# Patient Record
Sex: FEMALE | Race: WHITE | Employment: OTHER | URBAN - METROPOLITAN AREA
[De-identification: names, ages, dates, MRNs, and addresses within clinical notes are randomized per-mention and may not be internally consistent; named-entity substitution may affect disease eponyms.]

---

## 2017-03-29 ENCOUNTER — HOSPITAL ENCOUNTER (OUTPATIENT)
Dept: OTHER | Age: 77
Discharge: HOME | End: 2017-03-29
Attending: INTERNAL MEDICINE | Admitting: INTERNAL MEDICINE

## 2017-03-29 LAB
ALBUMIN SERPL-MCNC: 3.8 GM/DL (ref 3.4–5)
ALP BLD-CCNC: 62 IU/L (ref 40–129)
ALT SERPL-CCNC: 9 U/L (ref 10–40)
ANION GAP SERPL CALCULATED.3IONS-SCNC: 12 MMOL/L (ref 4–16)
AST SERPL-CCNC: 16 IU/L (ref 15–37)
BASOPHILS ABSOLUTE: 0.1 K/CU MM
BASOPHILS RELATIVE PERCENT: 2 % (ref 0–1)
BILIRUB SERPL-MCNC: 0.4 MG/DL (ref 0–1)
BUN BLDV-MCNC: 8 MG/DL (ref 6–23)
CALCIUM SERPL-MCNC: 8.7 MG/DL (ref 8.3–10.6)
CHLORIDE BLD-SCNC: 98 MMOL/L (ref 99–110)
CO2: 27 MMOL/L (ref 21–32)
CREAT SERPL-MCNC: 0.9 MG/DL (ref 0.6–1.1)
DIFFERENTIAL TYPE: ABNORMAL
EOSINOPHILS ABSOLUTE: 0.6 K/CU MM
EOSINOPHILS RELATIVE PERCENT: 9 % (ref 0–3)
GFR AFRICAN AMERICAN: >60 ML/MIN/1.73M2
GFR NON-AFRICAN AMERICAN: >60 ML/MIN/1.73M2
GLUCOSE BLD-MCNC: 150 MG/DL (ref 70–140)
HCT VFR BLD CALC: 35.9 % (ref 37–47)
HEMOGLOBIN: 11.2 GM/DL (ref 12.5–16)
LACTATE DEHYDROGENASE: 217 IU/L (ref 120–246)
LYMPHOCYTES ABSOLUTE: 1.3 K/CU MM
LYMPHOCYTES RELATIVE PERCENT: 20 % (ref 24–44)
MCH RBC QN AUTO: 28.1 PG (ref 27–31)
MCHC RBC AUTO-ENTMCNC: 31.2 % (ref 32–36)
MCV RBC AUTO: 90.2 FL (ref 78–100)
MONOCYTES ABSOLUTE: 0.6 K/CU MM
MONOCYTES RELATIVE PERCENT: 9 % (ref 0–4)
PDW BLD-RTO: 14.8 % (ref 11.7–14.9)
PLATELET # BLD: 52 K/CU MM (ref 140–440)
PLT MORPHOLOGY: ABNORMAL
PMV BLD AUTO: 11.5 FL (ref 7.5–11.1)
POLYCHROMASIA: ABNORMAL
POTASSIUM SERPL-SCNC: 4.1 MMOL/L (ref 3.5–5.1)
RBC # BLD: 3.98 M/CU MM (ref 4.2–5.4)
SEGMENTED NEUTROPHILS ABSOLUTE COUNT: 4.1 K/CU MM
SEGMENTED NEUTROPHILS RELATIVE PERCENT: 60 % (ref 36–66)
SODIUM BLD-SCNC: 137 MMOL/L (ref 135–145)
TOTAL PROTEIN: 6.5 GM/DL (ref 6.4–8.2)
WBC # BLD: 6.7 K/CU MM (ref 4–10.5)

## 2017-05-03 ENCOUNTER — HOSPITAL ENCOUNTER (OUTPATIENT)
Dept: GENERAL RADIOLOGY | Age: 77
Discharge: HOME | End: 2017-05-03

## 2017-05-03 LAB
BASOPHILS ABSOLUTE: 0.1 K/CU MM
BASOPHILS RELATIVE PERCENT: 1.6 % (ref 0–1)
DIFFERENTIAL TYPE: ABNORMAL
EOSINOPHILS ABSOLUTE: 0.5 K/CU MM
EOSINOPHILS RELATIVE PERCENT: 6.7 % (ref 0–3)
HCT VFR BLD CALC: 37 % (ref 37–47)
HEMOGLOBIN: 11.2 GM/DL (ref 12.5–16)
IMMATURE NEUTROPHIL %: 0.4 % (ref 0–0.43)
LYMPHOCYTES ABSOLUTE: 1.4 K/CU MM
LYMPHOCYTES RELATIVE PERCENT: 20.1 % (ref 24–44)
MCH RBC QN AUTO: 29.2 PG (ref 27–31)
MCHC RBC AUTO-ENTMCNC: 30.3 % (ref 32–36)
MCV RBC AUTO: 96.4 FL (ref 78–100)
MONOCYTES ABSOLUTE: 0.7 K/CU MM
MONOCYTES RELATIVE PERCENT: 10.4 % (ref 0–4)
NUCLEATED RBC %: 0 %
PDW BLD-RTO: 15.9 % (ref 11.7–14.9)
PLATELET # BLD: 55 K/CU MM (ref 140–440)
PMV BLD AUTO: 11.2 FL (ref 7.5–11.1)
RBC # BLD: 3.84 M/CU MM (ref 4.2–5.4)
SEGMENTED NEUTROPHILS ABSOLUTE COUNT: 4.1 K/CU MM
SEGMENTED NEUTROPHILS RELATIVE PERCENT: 60.8 % (ref 36–66)
TOTAL IMMATURE NEUTOROPHIL: 0.03 K/CU MM
TOTAL NUCLEATED RBC: 0 K/CU MM
WBC # BLD: 6.7 K/CU MM (ref 4–10.5)

## 2017-05-15 ENCOUNTER — HOSPITAL ENCOUNTER (OUTPATIENT)
Dept: GENERAL RADIOLOGY | Age: 77
Discharge: HOME | End: 2017-05-15
Attending: FAMILY MEDICINE | Admitting: FAMILY MEDICINE

## 2017-05-15 DIAGNOSIS — R10.0 ACUTE ABDOMINAL PAIN SYNDROME: ICD-10-CM

## 2017-06-02 ENCOUNTER — HOSPITAL ENCOUNTER (OUTPATIENT)
Dept: OTHER | Age: 77
Discharge: HOME | End: 2017-06-02
Attending: INTERNAL MEDICINE | Admitting: INTERNAL MEDICINE

## 2017-06-02 LAB
ALBUMIN SERPL-MCNC: 4.1 GM/DL (ref 3.4–5)
ALP BLD-CCNC: 67 IU/L (ref 40–129)
ALT SERPL-CCNC: 13 U/L (ref 10–40)
ANION GAP SERPL CALCULATED.3IONS-SCNC: 12 MMOL/L (ref 4–16)
AST SERPL-CCNC: 17 IU/L (ref 15–37)
BILIRUB SERPL-MCNC: 0.3 MG/DL (ref 0–1)
BUN BLDV-MCNC: 19 MG/DL (ref 6–23)
CALCIUM SERPL-MCNC: 9 MG/DL (ref 8.3–10.6)
CHLORIDE BLD-SCNC: 101 MMOL/L (ref 99–110)
CO2: 23 MMOL/L (ref 21–32)
CREAT SERPL-MCNC: 1.2 MG/DL (ref 0.6–1.1)
DIFFERENTIAL TYPE: ABNORMAL
EOSINOPHILS ABSOLUTE: 0.7 K/CU MM
EOSINOPHILS RELATIVE PERCENT: 10 % (ref 0–3)
GFR AFRICAN AMERICAN: 53 ML/MIN/1.73M2
GFR NON-AFRICAN AMERICAN: 44 ML/MIN/1.73M2
GLUCOSE BLD-MCNC: 155 MG/DL (ref 70–140)
HCT VFR BLD CALC: 38.3 % (ref 37–47)
HEMOGLOBIN: 12 GM/DL (ref 12.5–16)
LACTATE DEHYDROGENASE: 193 IU/L (ref 120–246)
LYMPHOCYTES ABSOLUTE: 1 K/CU MM
LYMPHOCYTES RELATIVE PERCENT: 14 % (ref 24–44)
MCH RBC QN AUTO: 29 PG (ref 27–31)
MCHC RBC AUTO-ENTMCNC: 31.3 % (ref 32–36)
MCV RBC AUTO: 92.5 FL (ref 78–100)
METAMYELOCYTES ABSOLUTE COUNT: 0.07 K/CU MM
METAMYELOCYTES PERCENT: 1 %
MONOCYTES ABSOLUTE: 0.4 K/CU MM
MONOCYTES RELATIVE PERCENT: 6 % (ref 0–4)
PDW BLD-RTO: 13.6 % (ref 11.7–14.9)
PLATELET # BLD: 97 K/CU MM (ref 140–440)
PMV BLD AUTO: 11.5 FL (ref 7.5–11.1)
POTASSIUM SERPL-SCNC: 4.7 MMOL/L (ref 3.5–5.1)
RBC # BLD: 4.14 M/CU MM (ref 4.2–5.4)
SEGMENTED NEUTROPHILS ABSOLUTE COUNT: 4.8 K/CU MM
SEGMENTED NEUTROPHILS RELATIVE PERCENT: 69 % (ref 36–66)
SODIUM BLD-SCNC: 136 MMOL/L (ref 135–145)
TOTAL PROTEIN: 6.7 GM/DL (ref 6.4–8.2)
WBC # BLD: 7 K/CU MM (ref 4–10.5)

## 2017-09-08 ENCOUNTER — HOSPITAL ENCOUNTER (OUTPATIENT)
Dept: OTHER | Age: 77
Discharge: HOME | End: 2017-09-08
Attending: INTERNAL MEDICINE | Admitting: INTERNAL MEDICINE

## 2017-09-08 LAB
ALBUMIN SERPL-MCNC: 4 GM/DL (ref 3.4–5)
ALP BLD-CCNC: 70 IU/L (ref 40–128)
ALT SERPL-CCNC: 12 U/L (ref 10–40)
ANION GAP SERPL CALCULATED.3IONS-SCNC: 11 MMOL/L (ref 4–16)
AST SERPL-CCNC: 17 IU/L (ref 15–37)
BILIRUB SERPL-MCNC: 0.6 MG/DL (ref 0–1)
BUN BLDV-MCNC: 8 MG/DL (ref 6–23)
CALCIUM SERPL-MCNC: 9 MG/DL (ref 8.3–10.6)
CHLORIDE BLD-SCNC: 98 MMOL/L (ref 99–110)
CO2: 29 MMOL/L (ref 21–32)
CREAT SERPL-MCNC: 1 MG/DL (ref 0.6–1.1)
GFR AFRICAN AMERICAN: >60 ML/MIN/1.73M2
GFR NON-AFRICAN AMERICAN: 54 ML/MIN/1.73M2
GLUCOSE BLD-MCNC: 170 MG/DL (ref 70–140)
LACTATE DEHYDROGENASE: 230 IU/L (ref 120–246)
POTASSIUM SERPL-SCNC: 3.7 MMOL/L (ref 3.5–5.1)
SODIUM BLD-SCNC: 138 MMOL/L (ref 135–145)
TOTAL PROTEIN: 6.6 GM/DL (ref 6.4–8.2)

## 2017-09-11 LAB — CHROMOGRANIN A: 52

## 2017-10-16 ENCOUNTER — HOSPITAL ENCOUNTER (OUTPATIENT)
Dept: ULTRASOUND IMAGING | Age: 77
Discharge: HOME | End: 2017-10-16
Attending: UROLOGY | Admitting: UROLOGY

## 2017-10-16 DIAGNOSIS — C7A.8 NEUROENDOCRINE CARCINOMA (HCC): ICD-10-CM

## 2017-10-16 DIAGNOSIS — N17.9 ACUTE RENAL FAILURE, UNSPECIFIED ACUTE RENAL FAILURE TYPE (HCC): ICD-10-CM

## 2018-01-18 ENCOUNTER — HOSPITAL ENCOUNTER (OUTPATIENT)
Dept: OTHER | Age: 78
Discharge: HOME | End: 2018-01-18
Attending: NURSE PRACTITIONER | Admitting: NURSE PRACTITIONER

## 2018-01-18 LAB
ALBUMIN SERPL-MCNC: 4.3 GM/DL (ref 3.4–5)
ALP BLD-CCNC: 87 IU/L (ref 40–129)
ALT SERPL-CCNC: 11 U/L (ref 10–40)
ANION GAP SERPL CALCULATED.3IONS-SCNC: 13 MMOL/L (ref 4–16)
AST SERPL-CCNC: 14 IU/L (ref 15–37)
BILIRUB SERPL-MCNC: 0.5 MG/DL (ref 0–1)
BUN BLDV-MCNC: 13 MG/DL (ref 6–23)
CALCIUM SERPL-MCNC: 9.4 MG/DL (ref 8.3–10.6)
CHLORIDE BLD-SCNC: 99 MMOL/L (ref 99–110)
CO2: 27 MMOL/L (ref 21–32)
CREAT SERPL-MCNC: 1 MG/DL (ref 0.6–1.1)
GFR AFRICAN AMERICAN: >60 ML/MIN/1.73M2
GFR NON-AFRICAN AMERICAN: 54 ML/MIN/1.73M2
GLUCOSE BLD-MCNC: 256 MG/DL (ref 70–99)
LACTATE DEHYDROGENASE: 226 IU/L (ref 120–246)
POTASSIUM SERPL-SCNC: 4 MMOL/L (ref 3.5–5.1)
SODIUM BLD-SCNC: 139 MMOL/L (ref 135–145)
TOTAL PROTEIN: 6.8 GM/DL (ref 6.4–8.2)

## 2018-01-21 LAB — CHROMOGRANIN A: 44

## 2018-04-27 ENCOUNTER — HOSPITAL ENCOUNTER (OUTPATIENT)
Dept: OTHER | Age: 78
Discharge: HOME | End: 2018-04-27
Attending: INTERNAL MEDICINE | Admitting: INTERNAL MEDICINE

## 2018-04-27 LAB
ALBUMIN SERPL-MCNC: 4.3 GM/DL (ref 3.4–5)
ALP BLD-CCNC: 72 IU/L (ref 40–129)
ALT SERPL-CCNC: 9 U/L (ref 10–40)
ANION GAP SERPL CALCULATED.3IONS-SCNC: 12 MMOL/L (ref 4–16)
AST SERPL-CCNC: 15 IU/L (ref 15–37)
BILIRUB SERPL-MCNC: 0.8 MG/DL (ref 0–1)
BUN BLDV-MCNC: 12 MG/DL (ref 6–23)
CALCIUM SERPL-MCNC: 9 MG/DL (ref 8.3–10.6)
CHLORIDE BLD-SCNC: 97 MMOL/L (ref 99–110)
CO2: 29 MMOL/L (ref 21–32)
CREAT SERPL-MCNC: 1 MG/DL (ref 0.6–1.1)
GFR AFRICAN AMERICAN: >60 ML/MIN/1.73M2
GFR NON-AFRICAN AMERICAN: 54 ML/MIN/1.73M2
GLUCOSE BLD-MCNC: 186 MG/DL (ref 70–99)
POTASSIUM SERPL-SCNC: 4 MMOL/L (ref 3.5–5.1)
SODIUM BLD-SCNC: 138 MMOL/L (ref 135–145)
TOTAL PROTEIN: 6.4 GM/DL (ref 6.4–8.2)

## 2018-06-04 ENCOUNTER — HOSPITAL ENCOUNTER (OUTPATIENT)
Dept: OTHER | Age: 78
Discharge: HOME | End: 2018-06-04
Attending: NURSE PRACTITIONER | Admitting: NURSE PRACTITIONER

## 2018-06-04 LAB
ALBUMIN SERPL-MCNC: 4.5 GM/DL (ref 3.4–5)
ALP BLD-CCNC: 68 IU/L (ref 40–128)
ALT SERPL-CCNC: 9 U/L (ref 10–40)
ANION GAP SERPL CALCULATED.3IONS-SCNC: 13 MMOL/L (ref 4–16)
AST SERPL-CCNC: 16 IU/L (ref 15–37)
BILIRUB SERPL-MCNC: 0.4 MG/DL (ref 0–1)
BUN BLDV-MCNC: 19 MG/DL (ref 6–23)
CALCIUM SERPL-MCNC: 9.3 MG/DL (ref 8.3–10.6)
CHLORIDE BLD-SCNC: 100 MMOL/L (ref 99–110)
CO2: 28 MMOL/L (ref 21–32)
CREAT SERPL-MCNC: 1.1 MG/DL (ref 0.6–1.1)
GFR AFRICAN AMERICAN: 58 ML/MIN/1.73M2
GFR NON-AFRICAN AMERICAN: 48 ML/MIN/1.73M2
GLUCOSE BLD-MCNC: 287 MG/DL (ref 70–99)
POTASSIUM SERPL-SCNC: 4 MMOL/L (ref 3.5–5.1)
SODIUM BLD-SCNC: 141 MMOL/L (ref 135–145)
TOTAL PROTEIN: 6.7 GM/DL (ref 6.4–8.2)

## 2018-07-02 ENCOUNTER — HOSPITAL ENCOUNTER (OUTPATIENT)
Dept: OTHER | Age: 78
Discharge: HOME | End: 2018-07-02
Attending: NURSE PRACTITIONER | Admitting: NURSE PRACTITIONER

## 2018-07-02 LAB
ALBUMIN SERPL-MCNC: 4.3 GM/DL (ref 3.4–5)
ALP BLD-CCNC: 61 IU/L (ref 40–129)
ALT SERPL-CCNC: 10 U/L (ref 10–40)
ANION GAP SERPL CALCULATED.3IONS-SCNC: 15 MMOL/L (ref 4–16)
AST SERPL-CCNC: 17 IU/L (ref 15–37)
BILIRUB SERPL-MCNC: 0.6 MG/DL (ref 0–1)
BUN BLDV-MCNC: 18 MG/DL (ref 6–23)
CALCIUM SERPL-MCNC: 8.8 MG/DL (ref 8.3–10.6)
CHLORIDE BLD-SCNC: 100 MMOL/L (ref 99–110)
CO2: 25 MMOL/L (ref 21–32)
CREAT SERPL-MCNC: 1.1 MG/DL (ref 0.6–1.1)
GFR AFRICAN AMERICAN: 58 ML/MIN/1.73M2
GFR NON-AFRICAN AMERICAN: 48 ML/MIN/1.73M2
GLUCOSE BLD-MCNC: 180 MG/DL (ref 70–99)
POTASSIUM SERPL-SCNC: 3.8 MMOL/L (ref 3.5–5.1)
SODIUM BLD-SCNC: 140 MMOL/L (ref 135–145)
TOTAL PROTEIN: 6.5 GM/DL (ref 6.4–8.2)

## 2018-07-30 ENCOUNTER — HOSPITAL ENCOUNTER (OUTPATIENT)
Dept: OTHER | Age: 78
Discharge: HOME | End: 2018-07-30
Attending: NURSE PRACTITIONER | Admitting: NURSE PRACTITIONER

## 2018-07-30 LAB
ALBUMIN SERPL-MCNC: 4.3 GM/DL (ref 3.4–5)
ALP BLD-CCNC: 69 IU/L (ref 40–128)
ALT SERPL-CCNC: 10 U/L (ref 10–40)
ANION GAP SERPL CALCULATED.3IONS-SCNC: 16 MMOL/L (ref 4–16)
AST SERPL-CCNC: 18 IU/L (ref 15–37)
BILIRUB SERPL-MCNC: 0.6 MG/DL (ref 0–1)
BUN BLDV-MCNC: 12 MG/DL (ref 6–23)
CALCIUM SERPL-MCNC: 9.3 MG/DL (ref 8.3–10.6)
CHLORIDE BLD-SCNC: 98 MMOL/L (ref 99–110)
CO2: 26 MMOL/L (ref 21–32)
CREAT SERPL-MCNC: 1.2 MG/DL (ref 0.6–1.1)
GFR AFRICAN AMERICAN: 53 ML/MIN/1.73M2
GFR NON-AFRICAN AMERICAN: 44 ML/MIN/1.73M2
GLUCOSE BLD-MCNC: 208 MG/DL (ref 70–99)
POTASSIUM SERPL-SCNC: 4 MMOL/L (ref 3.5–5.1)
SODIUM BLD-SCNC: 140 MMOL/L (ref 135–145)
TOTAL PROTEIN: 6.8 GM/DL (ref 6.4–8.2)

## 2018-08-31 ENCOUNTER — HOSPITAL ENCOUNTER (OUTPATIENT)
Dept: OTHER | Age: 78
Discharge: HOME | End: 2018-08-31
Attending: NURSE PRACTITIONER | Admitting: NURSE PRACTITIONER

## 2018-08-31 LAB
ALBUMIN SERPL-MCNC: 4.2 GM/DL (ref 3.4–5)
ALP BLD-CCNC: 60 IU/L (ref 40–128)
ALT SERPL-CCNC: 9 U/L (ref 10–40)
ANION GAP SERPL CALCULATED.3IONS-SCNC: 16 MMOL/L (ref 4–16)
AST SERPL-CCNC: 17 IU/L (ref 15–37)
BILIRUB SERPL-MCNC: 0.4 MG/DL (ref 0–1)
BUN BLDV-MCNC: 22 MG/DL (ref 6–23)
CALCIUM SERPL-MCNC: 9.3 MG/DL (ref 8.3–10.6)
CHLORIDE BLD-SCNC: 98 MMOL/L (ref 99–110)
CO2: 27 MMOL/L (ref 21–32)
CREAT SERPL-MCNC: 1.2 MG/DL (ref 0.6–1.1)
GFR AFRICAN AMERICAN: 53 ML/MIN/1.73M2
GFR NON-AFRICAN AMERICAN: 44 ML/MIN/1.73M2
GLUCOSE BLD-MCNC: 147 MG/DL (ref 70–99)
POTASSIUM SERPL-SCNC: 4.3 MMOL/L (ref 3.5–5.1)
SODIUM BLD-SCNC: 141 MMOL/L (ref 135–145)
TOTAL PROTEIN: 6.3 GM/DL (ref 6.4–8.2)

## 2018-11-06 ENCOUNTER — APPOINTMENT (OUTPATIENT)
Dept: CT IMAGING | Age: 78
End: 2018-11-06
Payer: MEDICARE

## 2018-11-06 ENCOUNTER — HOSPITAL ENCOUNTER (EMERGENCY)
Age: 78
Discharge: HOME | End: 2018-11-06
Attending: EMERGENCY MEDICINE
Payer: MEDICARE

## 2018-11-06 VITALS
SYSTOLIC BLOOD PRESSURE: 199 MMHG | DIASTOLIC BLOOD PRESSURE: 63 MMHG | OXYGEN SATURATION: 99 % | WEIGHT: 161 LBS | TEMPERATURE: 97.7 F | HEART RATE: 72 BPM | RESPIRATION RATE: 16 BRPM | HEIGHT: 62 IN | BODY MASS INDEX: 29.63 KG/M2

## 2018-11-06 DIAGNOSIS — S22.31XA CLOSED FRACTURE OF ONE RIB OF RIGHT SIDE, INITIAL ENCOUNTER: Primary | ICD-10-CM

## 2018-11-06 DIAGNOSIS — K63.89 MESENTERIC MASS: ICD-10-CM

## 2018-11-06 LAB
ALBUMIN SERPL-MCNC: 3.5 GM/DL (ref 3.4–5)
ALP BLD-CCNC: 52 IU/L (ref 40–129)
ALT SERPL-CCNC: 9 U/L (ref 10–40)
ANION GAP SERPL CALCULATED.3IONS-SCNC: 12 MMOL/L (ref 4–16)
AST SERPL-CCNC: 17 IU/L (ref 15–37)
BASOPHILS ABSOLUTE: 0.1 K/CU MM
BASOPHILS RELATIVE PERCENT: 0.9 % (ref 0–1)
BILIRUB SERPL-MCNC: 0.4 MG/DL (ref 0–1)
BUN BLDV-MCNC: 34 MG/DL (ref 6–23)
CALCIUM SERPL-MCNC: 8.6 MG/DL (ref 8.3–10.6)
CHLORIDE BLD-SCNC: 99 MMOL/L (ref 99–110)
CO2: 25 MMOL/L (ref 21–32)
CREAT SERPL-MCNC: 1.3 MG/DL (ref 0.6–1.1)
DIFFERENTIAL TYPE: ABNORMAL
EOSINOPHILS ABSOLUTE: 0.3 K/CU MM
EOSINOPHILS RELATIVE PERCENT: 3.5 % (ref 0–3)
GFR AFRICAN AMERICAN: 48 ML/MIN/1.73M2
GFR NON-AFRICAN AMERICAN: 40 ML/MIN/1.73M2
GLUCOSE BLD-MCNC: 221 MG/DL (ref 70–99)
HCT VFR BLD CALC: 33.3 % (ref 37–47)
HEMOGLOBIN: 10.2 GM/DL (ref 12.5–16)
IMMATURE NEUTROPHIL %: 0.4 % (ref 0–0.43)
LYMPHOCYTES ABSOLUTE: 1.3 K/CU MM
LYMPHOCYTES RELATIVE PERCENT: 15.9 % (ref 24–44)
MCH RBC QN AUTO: 28.7 PG (ref 27–31)
MCHC RBC AUTO-ENTMCNC: 30.6 % (ref 32–36)
MCV RBC AUTO: 93.5 FL (ref 78–100)
MONOCYTES ABSOLUTE: 0.8 K/CU MM
MONOCYTES RELATIVE PERCENT: 9.1 % (ref 0–4)
NUCLEATED RBC %: 0 %
PDW BLD-RTO: 14.1 % (ref 11.7–14.9)
PLATELET # BLD: 58 K/CU MM (ref 140–440)
PMV BLD AUTO: 11.4 FL (ref 7.5–11.1)
POTASSIUM SERPL-SCNC: 3.7 MMOL/L (ref 3.5–5.1)
RBC # BLD: 3.56 M/CU MM (ref 4.2–5.4)
SEGMENTED NEUTROPHILS ABSOLUTE COUNT: 5.8 K/CU MM
SEGMENTED NEUTROPHILS RELATIVE PERCENT: 70.2 % (ref 36–66)
SODIUM BLD-SCNC: 136 MMOL/L (ref 135–145)
TOTAL IMMATURE NEUTOROPHIL: 0.03 K/CU MM
TOTAL NUCLEATED RBC: 0 K/CU MM
TOTAL PROTEIN: 6.2 GM/DL (ref 6.4–8.2)
WBC # BLD: 8.2 K/CU MM (ref 4–10.5)

## 2018-11-06 PROCEDURE — 74176 CT ABD & PELVIS W/O CONTRAST: CPT

## 2018-11-06 PROCEDURE — 36415 COLL VENOUS BLD VENIPUNCTURE: CPT

## 2018-11-06 PROCEDURE — 80053 COMPREHEN METABOLIC PANEL: CPT

## 2018-11-06 PROCEDURE — 71250 CT THORAX DX C-: CPT

## 2018-11-06 PROCEDURE — 85025 COMPLETE CBC W/AUTO DIFF WBC: CPT

## 2018-11-06 PROCEDURE — 99284 EMERGENCY DEPT VISIT MOD MDM: CPT

## 2018-11-06 RX ORDER — HYDROCODONE BITARTRATE AND ACETAMINOPHEN 5; 325 MG/1; MG/1
1 TABLET ORAL EVERY 6 HOURS PRN
Qty: 10 TABLET | Refills: 0 | Status: SHIPPED | OUTPATIENT
Start: 2018-11-06 | End: 2018-11-09

## 2018-11-06 ASSESSMENT — PAIN DESCRIPTION - LOCATION: LOCATION: BACK

## 2018-11-06 ASSESSMENT — PAIN DESCRIPTION - PAIN TYPE: TYPE: ACUTE PAIN

## 2018-11-06 ASSESSMENT — PAIN SCALES - GENERAL: PAINLEVEL_OUTOF10: 6

## 2018-11-06 NOTE — ED PROVIDER NOTES
eMERGENCY dEPARTMENT eNCOUnter        Susie Fearing    Chief Complaint   Patient presents with    Fall     3-4 DAYS AGO    Back Pain     from fall    Abdominal Pain       HPI    Madison Fregoso is a 69 y.o. female who presents following a fall. She states that 3 or 4 days ago she had a dream which caused her to roll out of bed. She states she fell on her right side on the floor and it awoke her from sleep at that time. She states that since then she's been having pain in the right side of her mid back as well as right ribs and now in the right side of her abdomen. She reports it worsens with movement or palpation. She denies any midline back or neck pain. She denies any radiation of pain into the extremities. No numbness tingling or weakness of extremities. She denies any anticoagulant use. She denies shortness of breath or cough. Denies fever or chills. Denies nausea or vomiting. REVIEW OF SYSTEMS    Constitutional:  Denies fever, chills  Neurologic:   Denies confusion or memory loss, weakness or numbness. Neck: Denies neck pain or injury  Eyes:  Denies vision change, loss of vision. 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      Diabetes mellitus ([address]     H/O cardiovascular stress test 5/02    EF 74 %,normal perfusion    H/O echocardiogram 11/02    EF 60%, mild to mod MR,    involve the lumbar spine. 1. Acute right 11th rib fracture. 2. Trace right hemothorax. 3. 3.2 cm enlarging soft tissue mass within the right upper quadrant mesentery. The differential diagnosis favors carcinoid tumor rather than adenopathy, desmoid tumor or sclerosing mesenteritis. Ct Chest Wo Contrast    Result Date: 11/6/2018  EXAMINATION: CT OF THE CHEST WITHOUT CONTRAST; CT OF THE ABDOMEN AND PELVIS WITHOUT CONTRAST 11/6/2018 2:12 pm; 11/6/2018 2:13 pm TECHNIQUE: CT of the chest was performed without the administration of intravenous contrast. Multiplanar reformatted images are provided for review. Dose modulation, iterative reconstruction, and/or weight based adjustment of the mA/kV was utilized to reduce the radiation dose to as low as reasonably achievable.; CT of the abdomen and pelvis was performed without the administration of intravenous contrast. Multiplanar reformatted images are provided for review. Dose modulation, iterative reconstruction, and/or weight based adjustment of the mA/kV was utilized to reduce the radiation dose to as low as reasonably achievable. COMPARISON: 08/16/2015 and 08/11/2015 HISTORY: ORDERING SYSTEM PROVIDED HISTORY: right rib pain and abdominal pain, fell out of bed TECHNOLOGIST PROVIDED HISTORY: Ordering Physician Provided Reason for Exam: right rib pain and abdominal pain, fell out of bed Acuity: Unknown Type of Exam: Unknown Additional signs and symptoms: right rib pain and abdominal pain, fell out of bed Relevant Medical/Surgical History: none ; ORDERING SYSTEM PROVIDED HISTORY: right rib and abdominal pain, fell out of bed TECHNOLOGIST PROVIDED HISTORY: IV contrast only. Thank you.  Additional Contrast?->None Ordering Physician Provided Reason for Exam: right rib pain and abdominal pain, fell out of bed Acuity: Acute Type of Exam: Initial Mechanism of Injury: right rib pain and abdominal pain, fell out of bed Relevant Medical/Surgical History: none FINDINGS: Chest: results of any tests/labs/imaging, the treatment plan, and time was allotted to answer questions. This is a 50-year-old female who presented with right-sided back, abdominal pain after a fall 3 or 4 days ago. She did have a bruise on the right lateral rib cage. No midline tenderness on exam.  She is neurologically intact. CT scan of the abdomen, chest were done, showed 11th right rib fracture, trace right hemothorax. I do feel this is likely stable given that this occurred 3 or 4 days ago. She's not requiring any oxygen, is not hypoxic, is well-appearing and nontoxic, no respiratory distress. CT scan abdomen also showed a enlarging soft tissue mass in the right upper quadrant mesentery. Discussed with the patient, she is aware of this, takes medication for it, follows with [facility]. We discussed that she should get a copy of the CT scan, take a dose to because it does appear slightly larger than her previous CT scan. Plan will be symptomatically care for rib fracture, close follow-up with primary care physician as well as her surgeons at [facility]. She is instructed to return here with any new or worsening signs or symptoms. Patient and her daughter reports understanding of the discharge instructions and return precautions. Clinical  IMPRESSION    Rib fracture      I discussed Return to emergency Department precautions with patient which include worsening pain or swelling, vision changes, focal neurological symptoms (discussed), or any new symptoms.       (Please note the MDM and HPI sections of this note were completed with a voice recognition program.  Efforts were made to edit the dictations but occasionally words are mis-transcribed.)      Geni Kenney,   11/06/18 [id number]

## 2018-11-26 ENCOUNTER — HOSPITAL ENCOUNTER (OUTPATIENT)
Age: 78
Setting detail: SPECIMEN
Discharge: HOME | End: 2018-11-26
Payer: MEDICARE

## 2018-11-26 LAB
ALBUMIN SERPL-MCNC: 4.2 GM/DL (ref 3.4–5)
ALP BLD-CCNC: 77 IU/L (ref 40–129)
ALT SERPL-CCNC: 15 U/L (ref 10–40)
ANION GAP SERPL CALCULATED.3IONS-SCNC: 12 MMOL/L (ref 4–16)
AST SERPL-CCNC: 18 IU/L (ref 15–37)
BILIRUB SERPL-MCNC: 0.5 MG/DL (ref 0–1)
BUN BLDV-MCNC: 18 MG/DL (ref 6–23)
CALCIUM SERPL-MCNC: 8.9 MG/DL (ref 8.3–10.6)
CHLORIDE BLD-SCNC: 99 MMOL/L (ref 99–110)
CO2: 28 MMOL/L (ref 21–32)
CREAT SERPL-MCNC: 1.1 MG/DL (ref 0.6–1.1)
GFR AFRICAN AMERICAN: 58 ML/MIN/1.73M2
GFR NON-AFRICAN AMERICAN: 48 ML/MIN/1.73M2
GLUCOSE BLD-MCNC: 208 MG/DL (ref 70–99)
POTASSIUM SERPL-SCNC: 3.8 MMOL/L (ref 3.5–5.1)
SODIUM BLD-SCNC: 139 MMOL/L (ref 135–145)
TOTAL PROTEIN: 6.5 GM/DL (ref 6.4–8.2)

## 2018-11-26 PROCEDURE — 80053 COMPREHEN METABOLIC PANEL: CPT

## 2018-12-23 ENCOUNTER — APPOINTMENT (OUTPATIENT)
Dept: GENERAL RADIOLOGY | Age: 78
End: 2018-12-23
Payer: MEDICARE

## 2018-12-23 ENCOUNTER — APPOINTMENT (OUTPATIENT)
Dept: CT IMAGING | Age: 78
End: 2018-12-23
Payer: MEDICARE

## 2018-12-23 ENCOUNTER — HOSPITAL ENCOUNTER (EMERGENCY)
Age: 78
Discharge: HOME | End: 2018-12-23
Attending: EMERGENCY MEDICINE
Payer: MEDICARE

## 2018-12-23 VITALS
WEIGHT: 165 LBS | RESPIRATION RATE: 16 BRPM | OXYGEN SATURATION: 98 % | DIASTOLIC BLOOD PRESSURE: 74 MMHG | TEMPERATURE: 98 F | BODY MASS INDEX: 30.36 KG/M2 | HEART RATE: 75 BPM | SYSTOLIC BLOOD PRESSURE: 185 MMHG | HEIGHT: 62 IN

## 2018-12-23 DIAGNOSIS — M25.551 RIGHT HIP PAIN: Primary | ICD-10-CM

## 2018-12-23 DIAGNOSIS — T14.8XXA ABRASION: ICD-10-CM

## 2018-12-23 DIAGNOSIS — S60.222A CONTUSION OF LEFT HAND, INITIAL ENCOUNTER: ICD-10-CM

## 2018-12-23 PROCEDURE — 70450 CT HEAD/BRAIN W/O DYE: CPT

## 2018-12-23 PROCEDURE — 73501 X-RAY EXAM HIP UNI 1 VIEW: CPT

## 2018-12-23 PROCEDURE — 99284 EMERGENCY DEPT VISIT MOD MDM: CPT

## 2018-12-23 PROCEDURE — 73130 X-RAY EXAM OF HAND: CPT

## 2018-12-23 PROCEDURE — 73080 X-RAY EXAM OF ELBOW: CPT

## 2018-12-23 ASSESSMENT — PAIN DESCRIPTION - PROGRESSION: CLINICAL_PROGRESSION: NOT CHANGED

## 2018-12-23 ASSESSMENT — PAIN SCALES - GENERAL: PAINLEVEL_OUTOF10: 7

## 2018-12-23 ASSESSMENT — PAIN DESCRIPTION - PAIN TYPE: TYPE: ACUTE PAIN

## 2018-12-23 ASSESSMENT — PAIN DESCRIPTION - FREQUENCY: FREQUENCY: CONTINUOUS

## 2018-12-23 ASSESSMENT — PAIN DESCRIPTION - LOCATION: LOCATION: HEAD

## 2018-12-23 NOTE — ED NOTES
Bed: ED-17  Expected date:   Expected time:   Means of arrival:   Comments:  Medic 7     Katie Foreman RN  12/23/18 [id number]

## 2018-12-23 NOTE — ED PROVIDER NOTES
(MIN 3 VIEWS)   Preliminary Result   Osteopenia with no evidence for fracture involving the left hand, right elbow   or right hip. XR HIP 1 VW W PELVIS RIGHT   Preliminary Result   Osteopenia with no evidence for fracture involving the left hand, right elbow   or right hip. CT Head WO Contrast   Final Result   No acute intracranial abnormality. EKG (if obtained): (All EKG's are interpreted by myself in the absence of a cardiologist)    Chart review shows recent radiographs:  No results found. MDM:  Presenting after mechanical fall, imaging ordered based on areas of patient's concern, imaging read by radiology as no evidence of fracture, patient was ambulated here and did so without difficulty at this point we will discharge and treat symptomatically, have her follow-up as an outpatient she understands and agrees, return warnings given. Clinical Impression:  1. Right hip pain    2. Contusion of left hand, initial encounter    3. Abrasion      Disposition referral (if applicable):  Son Lundberg MD  [address]  [address] [place] [id number] [phone]    In 2 days      Disposition medications (if applicable):  New Prescriptions    No medications on file       Comment: Please note this report has been produced using speech recognition software and may contain errors related to that system including errors in grammar, punctuation, and spelling, as well as words and phrases that may be inappropriate. If there are any questions or concerns please feel free to contact the dictating provider for clarification.         Amy Guajardo MD  12/23/18 559 [facility] Christa Bryant MD  12/23/18 5035

## 2019-03-28 ENCOUNTER — HOSPITAL ENCOUNTER (INPATIENT)
Age: 79
LOS: 1 days | Discharge: HOME | DRG: 683 | End: 2019-03-30
Attending: EMERGENCY MEDICINE | Admitting: HOSPITALIST
Payer: MEDICARE

## 2019-03-28 ENCOUNTER — APPOINTMENT (OUTPATIENT)
Dept: CT IMAGING | Age: 79
DRG: 683 | End: 2019-03-28
Payer: MEDICARE

## 2019-03-28 DIAGNOSIS — N39.0 URINARY TRACT INFECTION WITHOUT HEMATURIA, SITE UNSPECIFIED: ICD-10-CM

## 2019-03-28 DIAGNOSIS — R10.84 GENERALIZED ABDOMINAL PAIN: ICD-10-CM

## 2019-03-28 DIAGNOSIS — N17.9 AKI (ACUTE KIDNEY INJURY) (HCC): ICD-10-CM

## 2019-03-28 DIAGNOSIS — R11.2 NON-INTRACTABLE VOMITING WITH NAUSEA, UNSPECIFIED VOMITING TYPE: Primary | ICD-10-CM

## 2019-03-28 LAB
ALBUMIN SERPL-MCNC: 4 GM/DL (ref 3.4–5)
ALP BLD-CCNC: 86 IU/L (ref 40–129)
ALT SERPL-CCNC: 13 U/L (ref 10–40)
ANION GAP SERPL CALCULATED.3IONS-SCNC: 14 MMOL/L (ref 4–16)
AST SERPL-CCNC: 21 IU/L (ref 15–37)
BACTERIA: ABNORMAL /HPF
BASOPHILS ABSOLUTE: 0.1 K/CU MM
BASOPHILS RELATIVE PERCENT: 0.7 % (ref 0–1)
BILIRUB SERPL-MCNC: 0.6 MG/DL (ref 0–1)
BILIRUBIN URINE: NEGATIVE MG/DL
BLOOD, URINE: ABNORMAL
BUN BLDV-MCNC: 26 MG/DL (ref 6–23)
CALCIUM SERPL-MCNC: 8.8 MG/DL (ref 8.3–10.6)
CHLORIDE BLD-SCNC: 93 MMOL/L (ref 99–110)
CLARITY: CLEAR
CO2: 22 MMOL/L (ref 21–32)
COLOR: YELLOW
CREAT SERPL-MCNC: 1.6 MG/DL (ref 0.6–1.1)
DIFFERENTIAL TYPE: ABNORMAL
EOSINOPHILS ABSOLUTE: 0.2 K/CU MM
EOSINOPHILS RELATIVE PERCENT: 1.6 % (ref 0–3)
GFR AFRICAN AMERICAN: 38 ML/MIN/1.73M2
GFR NON-AFRICAN AMERICAN: 31 ML/MIN/1.73M2
GLUCOSE BLD-MCNC: 305 MG/DL (ref 70–99)
GLUCOSE, URINE: 150 MG/DL
HCT VFR BLD CALC: 40 % (ref 37–47)
HEMOGLOBIN: 12.6 GM/DL (ref 12.5–16)
IMMATURE NEUTROPHIL %: 0.4 % (ref 0–0.43)
KETONES, URINE: NEGATIVE MG/DL
LACTATE: ABNORMAL MMOL/L (ref 0.4–2)
LEUKOCYTE ESTERASE, URINE: ABNORMAL
LIPASE: 26 IU/L (ref 13–60)
LYMPHOCYTES ABSOLUTE: 1.3 K/CU MM
LYMPHOCYTES RELATIVE PERCENT: 13.4 % (ref 24–44)
MAGNESIUM: 1.7 MG/DL (ref 1.8–2.4)
MCH RBC QN AUTO: 27.6 PG (ref 27–31)
MCHC RBC AUTO-ENTMCNC: 31.5 % (ref 32–36)
MCV RBC AUTO: 87.5 FL (ref 78–100)
MONOCYTES ABSOLUTE: 0.9 K/CU MM
MONOCYTES RELATIVE PERCENT: 9.2 % (ref 0–4)
NITRITE URINE, QUANTITATIVE: POSITIVE
NUCLEATED RBC %: 0 %
PDW BLD-RTO: 13.2 % (ref 11.7–14.9)
PH, URINE: 6 (ref 5–8)
PLATELET # BLD: 70 K/CU MM (ref 140–440)
PMV BLD AUTO: 11.4 FL (ref 7.5–11.1)
POTASSIUM SERPL-SCNC: 3.4 MMOL/L (ref 3.5–5.1)
PROTEIN UA: NEGATIVE MG/DL
RBC # BLD: 4.57 M/CU MM (ref 4.2–5.4)
RBC URINE: 1 /HPF (ref 0–6)
RENAL EPITHELIAL, UA: <1 /HPF
SEGMENTED NEUTROPHILS ABSOLUTE COUNT: 7 K/CU MM
SEGMENTED NEUTROPHILS RELATIVE PERCENT: 74.7 % (ref 36–66)
SODIUM BLD-SCNC: 129 MMOL/L (ref 135–145)
SPECIFIC GRAVITY UA: 1.01 (ref 1–1.03)
SQUAMOUS EPITHELIAL: 1 /HPF
TOTAL IMMATURE NEUTOROPHIL: 0.04 K/CU MM
TOTAL NUCLEATED RBC: 0 K/CU MM
TOTAL PROTEIN: 7 GM/DL (ref 6.4–8.2)
TRICHOMONAS: ABNORMAL /HPF
TROPONIN T: <0.01 NG/ML
UROBILINOGEN, URINE: NORMAL MG/DL (ref 0.2–1)
WBC # BLD: 9.4 K/CU MM (ref 4–10.5)
WBC CLUMP: ABNORMAL /HPF
WBC UA: 6 /HPF (ref 0–5)

## 2019-03-28 PROCEDURE — 85025 COMPLETE CBC W/AUTO DIFF WBC: CPT

## 2019-03-28 PROCEDURE — 96375 TX/PRO/DX INJ NEW DRUG ADDON: CPT

## 2019-03-28 PROCEDURE — 2580000003 HC RX 258: Performed by: EMERGENCY MEDICINE

## 2019-03-28 PROCEDURE — 83605 ASSAY OF LACTIC ACID: CPT

## 2019-03-28 PROCEDURE — 6360000002 HC RX W HCPCS: Performed by: EMERGENCY MEDICINE

## 2019-03-28 PROCEDURE — 36415 COLL VENOUS BLD VENIPUNCTURE: CPT

## 2019-03-28 PROCEDURE — 93005 ELECTROCARDIOGRAM TRACING: CPT | Performed by: EMERGENCY MEDICINE

## 2019-03-28 PROCEDURE — 83690 ASSAY OF LIPASE: CPT

## 2019-03-28 PROCEDURE — 87186 SC STD MICRODIL/AGAR DIL: CPT

## 2019-03-28 PROCEDURE — 81001 URINALYSIS AUTO W/SCOPE: CPT

## 2019-03-28 PROCEDURE — 83735 ASSAY OF MAGNESIUM: CPT

## 2019-03-28 PROCEDURE — 99285 EMERGENCY DEPT VISIT HI MDM: CPT

## 2019-03-28 PROCEDURE — 84484 ASSAY OF TROPONIN QUANT: CPT

## 2019-03-28 PROCEDURE — 87077 CULTURE AEROBIC IDENTIFY: CPT

## 2019-03-28 PROCEDURE — 80053 COMPREHEN METABOLIC PANEL: CPT

## 2019-03-28 PROCEDURE — 74176 CT ABD & PELVIS W/O CONTRAST: CPT

## 2019-03-28 PROCEDURE — 6370000000 HC RX 637 (ALT 250 FOR IP): Performed by: EMERGENCY MEDICINE

## 2019-03-28 PROCEDURE — 87086 URINE CULTURE/COLONY COUNT: CPT

## 2019-03-28 RX ORDER — ONDANSETRON 2 MG/ML
4 INJECTION INTRAMUSCULAR; INTRAVENOUS EVERY 30 MIN PRN
Status: DISCONTINUED | OUTPATIENT
Start: 2019-03-28 | End: 2019-03-29

## 2019-03-28 RX ORDER — 0.9 % SODIUM CHLORIDE 0.9 %
1000 INTRAVENOUS SOLUTION INTRAVENOUS ONCE
Status: COMPLETED | OUTPATIENT
Start: 2019-03-28 | End: 2019-03-28

## 2019-03-28 RX ORDER — MORPHINE SULFATE 4 MG/ML
4 INJECTION, SOLUTION INTRAMUSCULAR; INTRAVENOUS EVERY 30 MIN PRN
Status: DISCONTINUED | OUTPATIENT
Start: 2019-03-28 | End: 2019-03-29

## 2019-03-28 RX ORDER — 0.9 % SODIUM CHLORIDE 0.9 %
1000 INTRAVENOUS SOLUTION INTRAVENOUS ONCE
Status: COMPLETED | OUTPATIENT
Start: 2019-03-28 | End: 2019-03-29

## 2019-03-28 RX ADMIN — ONDANSETRON 4 MG: 2 INJECTION INTRAMUSCULAR; INTRAVENOUS at 21:35

## 2019-03-28 RX ADMIN — HYOSCYAMINE SULFATE 125 MCG: 0.12 TABLET ORAL; SUBLINGUAL at 21:35

## 2019-03-28 RX ADMIN — SODIUM CHLORIDE 1000 ML: 9 INJECTION, SOLUTION INTRAVENOUS at 21:36

## 2019-03-28 RX ADMIN — SODIUM CHLORIDE 1000 ML: 9 INJECTION, SOLUTION INTRAVENOUS at 23:51

## 2019-03-28 RX ADMIN — MORPHINE SULFATE 4 MG: 4 INJECTION INTRAVENOUS at 21:35

## 2019-03-28 ASSESSMENT — ENCOUNTER SYMPTOMS
RHINORRHEA: 0
COUGH: 0
ABDOMINAL PAIN: 1
VOMITING: 1
BLOOD IN STOOL: 0
EYE REDNESS: 0
SHORTNESS OF BREATH: 0
CONSTIPATION: 0
NAUSEA: 1
DIARRHEA: 1
BACK PAIN: 0
SORE THROAT: 0

## 2019-03-28 ASSESSMENT — PAIN DESCRIPTION - LOCATION: LOCATION: ABDOMEN

## 2019-03-28 ASSESSMENT — PAIN SCALES - GENERAL
PAINLEVEL_OUTOF10: 10
PAINLEVEL_OUTOF10: 6

## 2019-03-28 ASSESSMENT — PAIN DESCRIPTION - PAIN TYPE: TYPE: ACUTE PAIN

## 2019-03-28 ASSESSMENT — PAIN DESCRIPTION - PROGRESSION: CLINICAL_PROGRESSION: RESOLVED

## 2019-03-29 PROBLEM — K52.9 GASTROENTERITIS: Status: ACTIVE | Noted: 2019-03-29

## 2019-03-29 PROBLEM — E86.0 DEHYDRATION: Status: ACTIVE | Noted: 2019-03-29

## 2019-03-29 LAB
GLUCOSE BLD-MCNC: 184 MG/DL (ref 70–99)
GLUCOSE BLD-MCNC: 195 MG/DL (ref 70–99)
GLUCOSE BLD-MCNC: 195 MG/DL (ref 70–99)
GLUCOSE BLD-MCNC: 288 MG/DL (ref 70–99)
GLUCOSE BLD-MCNC: 291 MG/DL (ref 70–99)
LACTATE: 1.6 MMOL/L (ref 0.4–2)

## 2019-03-29 PROCEDURE — 6370000000 HC RX 637 (ALT 250 FOR IP): Performed by: INTERNAL MEDICINE

## 2019-03-29 PROCEDURE — 2580000003 HC RX 258: Performed by: EMERGENCY MEDICINE

## 2019-03-29 PROCEDURE — 2580000003 HC RX 258: Performed by: INTERNAL MEDICINE

## 2019-03-29 PROCEDURE — 6360000002 HC RX W HCPCS: Performed by: INTERNAL MEDICINE

## 2019-03-29 PROCEDURE — 96375 TX/PRO/DX INJ NEW DRUG ADDON: CPT

## 2019-03-29 PROCEDURE — 82962 GLUCOSE BLOOD TEST: CPT

## 2019-03-29 PROCEDURE — 1200000000 HC SEMI PRIVATE

## 2019-03-29 PROCEDURE — G0378 HOSPITAL OBSERVATION PER HR: HCPCS

## 2019-03-29 PROCEDURE — 96368 THER/DIAG CONCURRENT INF: CPT

## 2019-03-29 PROCEDURE — 83605 ASSAY OF LACTIC ACID: CPT

## 2019-03-29 PROCEDURE — 6360000002 HC RX W HCPCS: Performed by: EMERGENCY MEDICINE

## 2019-03-29 PROCEDURE — 93010 ELECTROCARDIOGRAM REPORT: CPT | Performed by: INTERNAL MEDICINE

## 2019-03-29 PROCEDURE — 6370000000 HC RX 637 (ALT 250 FOR IP): Performed by: HOSPITALIST

## 2019-03-29 PROCEDURE — 96366 THER/PROPH/DIAG IV INF ADDON: CPT

## 2019-03-29 PROCEDURE — 36415 COLL VENOUS BLD VENIPUNCTURE: CPT

## 2019-03-29 PROCEDURE — 2580000003 HC RX 258: Performed by: HOSPITALIST

## 2019-03-29 PROCEDURE — 6360000002 HC RX W HCPCS: Performed by: HOSPITALIST

## 2019-03-29 PROCEDURE — 94761 N-INVAS EAR/PLS OXIMETRY MLT: CPT

## 2019-03-29 PROCEDURE — 6370000000 HC RX 637 (ALT 250 FOR IP): Performed by: EMERGENCY MEDICINE

## 2019-03-29 PROCEDURE — 96367 TX/PROPH/DG ADDL SEQ IV INF: CPT

## 2019-03-29 PROCEDURE — 96365 THER/PROPH/DIAG IV INF INIT: CPT

## 2019-03-29 RX ORDER — VENLAFAXINE HYDROCHLORIDE 150 MG/1
150 CAPSULE, EXTENDED RELEASE ORAL DAILY
Status: DISCONTINUED | OUTPATIENT
Start: 2019-03-29 | End: 2019-03-30 | Stop reason: HOSPADM

## 2019-03-29 RX ORDER — FENOFIBRATE 54 MG/1
54 TABLET ORAL DAILY
Status: DISCONTINUED | OUTPATIENT
Start: 2019-03-29 | End: 2019-03-30 | Stop reason: HOSPADM

## 2019-03-29 RX ORDER — MORPHINE SULFATE 4 MG/ML
2 INJECTION, SOLUTION INTRAMUSCULAR; INTRAVENOUS EVERY 4 HOURS PRN
Status: DISCONTINUED | OUTPATIENT
Start: 2019-03-29 | End: 2019-03-30 | Stop reason: HOSPADM

## 2019-03-29 RX ORDER — MEGESTROL ACETATE 20 MG/1
40 TABLET ORAL DAILY
Status: DISCONTINUED | OUTPATIENT
Start: 2019-03-29 | End: 2019-03-30 | Stop reason: HOSPADM

## 2019-03-29 RX ORDER — SODIUM CHLORIDE 0.9 % (FLUSH) 0.9 %
10 SYRINGE (ML) INJECTION PRN
Status: DISCONTINUED | OUTPATIENT
Start: 2019-03-29 | End: 2019-03-30 | Stop reason: HOSPADM

## 2019-03-29 RX ORDER — FAMOTIDINE 20 MG/1
20 TABLET, FILM COATED ORAL DAILY
Status: DISCONTINUED | OUTPATIENT
Start: 2019-03-29 | End: 2019-03-30 | Stop reason: HOSPADM

## 2019-03-29 RX ORDER — PREDNISONE 1 MG/1
5 TABLET ORAL DAILY
Status: DISCONTINUED | OUTPATIENT
Start: 2019-03-29 | End: 2019-03-30 | Stop reason: HOSPADM

## 2019-03-29 RX ORDER — VITAMIN E 268 MG
400 CAPSULE ORAL DAILY
Status: DISCONTINUED | OUTPATIENT
Start: 2019-03-29 | End: 2019-03-30 | Stop reason: HOSPADM

## 2019-03-29 RX ORDER — ALPRAZOLAM 0.5 MG/1
0.5 TABLET ORAL 2 TIMES DAILY PRN
Status: DISCONTINUED | OUTPATIENT
Start: 2019-03-29 | End: 2019-03-30 | Stop reason: HOSPADM

## 2019-03-29 RX ORDER — SODIUM CHLORIDE 9 MG/ML
INJECTION, SOLUTION INTRAVENOUS CONTINUOUS
Status: DISCONTINUED | OUTPATIENT
Start: 2019-03-29 | End: 2019-03-30

## 2019-03-29 RX ORDER — DEXTROSE MONOHYDRATE 50 MG/ML
100 INJECTION, SOLUTION INTRAVENOUS PRN
Status: DISCONTINUED | OUTPATIENT
Start: 2019-03-29 | End: 2019-03-30 | Stop reason: HOSPADM

## 2019-03-29 RX ORDER — ONDANSETRON 2 MG/ML
4 INJECTION INTRAMUSCULAR; INTRAVENOUS EVERY 6 HOURS PRN
Status: DISCONTINUED | OUTPATIENT
Start: 2019-03-29 | End: 2019-03-30 | Stop reason: HOSPADM

## 2019-03-29 RX ORDER — POTASSIUM CHLORIDE 750 MG/1
10 TABLET, FILM COATED, EXTENDED RELEASE ORAL 2 TIMES DAILY
Status: DISCONTINUED | OUTPATIENT
Start: 2019-03-29 | End: 2019-03-30 | Stop reason: HOSPADM

## 2019-03-29 RX ORDER — BUPROPION HYDROCHLORIDE 150 MG/1
150 TABLET ORAL EVERY MORNING
Status: DISCONTINUED | OUTPATIENT
Start: 2019-03-29 | End: 2019-03-30 | Stop reason: HOSPADM

## 2019-03-29 RX ORDER — CLONIDINE HYDROCHLORIDE 0.1 MG/1
0.05 TABLET ORAL 2 TIMES DAILY
Status: DISCONTINUED | OUTPATIENT
Start: 2019-03-29 | End: 2019-03-30

## 2019-03-29 RX ORDER — DEXTROSE MONOHYDRATE 25 G/50ML
12.5 INJECTION, SOLUTION INTRAVENOUS PRN
Status: DISCONTINUED | OUTPATIENT
Start: 2019-03-29 | End: 2019-03-30 | Stop reason: HOSPADM

## 2019-03-29 RX ORDER — ALPRAZOLAM 0.25 MG/1
0.5 TABLET ORAL ONCE
Status: COMPLETED | OUTPATIENT
Start: 2019-03-29 | End: 2019-03-29

## 2019-03-29 RX ORDER — SODIUM CHLORIDE 0.9 % (FLUSH) 0.9 %
10 SYRINGE (ML) INJECTION EVERY 12 HOURS SCHEDULED
Status: DISCONTINUED | OUTPATIENT
Start: 2019-03-29 | End: 2019-03-30 | Stop reason: HOSPADM

## 2019-03-29 RX ORDER — PANTOPRAZOLE SODIUM 40 MG/1
40 TABLET, DELAYED RELEASE ORAL
Status: DISCONTINUED | OUTPATIENT
Start: 2019-03-29 | End: 2019-03-30 | Stop reason: HOSPADM

## 2019-03-29 RX ORDER — CLONIDINE HYDROCHLORIDE 0.1 MG/1
0.1 TABLET ORAL 4 TIMES DAILY PRN
Status: DISCONTINUED | OUTPATIENT
Start: 2019-03-29 | End: 2019-03-30 | Stop reason: HOSPADM

## 2019-03-29 RX ORDER — ACETAMINOPHEN 80 MG
TABLET,CHEWABLE ORAL
Status: COMPLETED
Start: 2019-03-29 | End: 2019-03-29

## 2019-03-29 RX ORDER — NICOTINE POLACRILEX 4 MG
15 LOZENGE BUCCAL PRN
Status: DISCONTINUED | OUTPATIENT
Start: 2019-03-29 | End: 2019-03-30 | Stop reason: HOSPADM

## 2019-03-29 RX ORDER — CARVEDILOL 25 MG/1
25 TABLET ORAL 2 TIMES DAILY
Status: DISCONTINUED | OUTPATIENT
Start: 2019-03-29 | End: 2019-03-30 | Stop reason: HOSPADM

## 2019-03-29 RX ORDER — MAGNESIUM SULFATE 1 G/100ML
1 INJECTION INTRAVENOUS ONCE
Status: COMPLETED | OUTPATIENT
Start: 2019-03-29 | End: 2019-03-29

## 2019-03-29 RX ORDER — CARVEDILOL PHOSPHATE 80 MG/1
80 CAPSULE, EXTENDED RELEASE ORAL
Status: DISCONTINUED | OUTPATIENT
Start: 2019-03-29 | End: 2019-03-29 | Stop reason: CLARIF

## 2019-03-29 RX ORDER — PREGABALIN 75 MG/1
75 CAPSULE ORAL 3 TIMES DAILY
Status: DISCONTINUED | OUTPATIENT
Start: 2019-03-29 | End: 2019-03-30 | Stop reason: HOSPADM

## 2019-03-29 RX ORDER — CYANOCOBALAMIN 1000 UG/ML
1000 INJECTION INTRAMUSCULAR; SUBCUTANEOUS
Status: DISCONTINUED | OUTPATIENT
Start: 2019-04-04 | End: 2019-03-30 | Stop reason: HOSPADM

## 2019-03-29 RX ORDER — PIOGLITAZONEHYDROCHLORIDE 45 MG/1
45 TABLET ORAL DAILY
Status: DISCONTINUED | OUTPATIENT
Start: 2019-03-29 | End: 2019-03-30 | Stop reason: HOSPADM

## 2019-03-29 RX ADMIN — PREGABALIN 75 MG: 75 CAPSULE ORAL at 22:15

## 2019-03-29 RX ADMIN — SODIUM CHLORIDE: 9 INJECTION, SOLUTION INTRAVENOUS at 13:05

## 2019-03-29 RX ADMIN — CLONIDINE HYDROCHLORIDE 0.1 MG: 0.1 TABLET ORAL at 22:15

## 2019-03-29 RX ADMIN — INSULIN LISPRO 1 UNITS: 100 INJECTION, SOLUTION INTRAVENOUS; SUBCUTANEOUS at 22:22

## 2019-03-29 RX ADMIN — CLONIDINE HYDROCHLORIDE 0.1 MG: 0.1 TABLET ORAL at 08:32

## 2019-03-29 RX ADMIN — PREGABALIN 75 MG: 75 CAPSULE ORAL at 12:10

## 2019-03-29 RX ADMIN — INSULIN LISPRO 1 UNITS: 100 INJECTION, SOLUTION INTRAVENOUS; SUBCUTANEOUS at 12:10

## 2019-03-29 RX ADMIN — HYDRALAZINE HYDROCHLORIDE 10 MG: 20 INJECTION INTRAMUSCULAR; INTRAVENOUS at 04:27

## 2019-03-29 RX ADMIN — CARVEDILOL 25 MG: 25 TABLET, FILM COATED ORAL at 22:15

## 2019-03-29 RX ADMIN — CARVEDILOL 25 MG: 25 TABLET, FILM COATED ORAL at 08:33

## 2019-03-29 RX ADMIN — VITAMIN E CAP 400 UNIT 400 UNITS: 400 CAP at 08:33

## 2019-03-29 RX ADMIN — FENOFIBRATE 54 MG: 54 TABLET ORAL at 08:33

## 2019-03-29 RX ADMIN — PREDNISONE 5 MG: 5 TABLET ORAL at 08:33

## 2019-03-29 RX ADMIN — PREGABALIN 75 MG: 75 CAPSULE ORAL at 08:32

## 2019-03-29 RX ADMIN — MORPHINE SULFATE 2 MG: 4 INJECTION INTRAVENOUS at 13:03

## 2019-03-29 RX ADMIN — SODIUM CHLORIDE: 9 INJECTION, SOLUTION INTRAVENOUS at 03:26

## 2019-03-29 RX ADMIN — PANTOPRAZOLE SODIUM 40 MG: 40 TABLET, DELAYED RELEASE ORAL at 05:15

## 2019-03-29 RX ADMIN — MEGESTROL ACETATE 40 MG: 20 TABLET ORAL at 08:33

## 2019-03-29 RX ADMIN — CLONIDINE HYDROCHLORIDE 0.1 MG: 0.1 TABLET ORAL at 04:20

## 2019-03-29 RX ADMIN — CEFTRIAXONE 1 G: 1 INJECTION, POWDER, FOR SOLUTION INTRAMUSCULAR; INTRAVENOUS at 01:48

## 2019-03-29 RX ADMIN — Medication: at 12:04

## 2019-03-29 RX ADMIN — ALPRAZOLAM 0.5 MG: 0.25 TABLET ORAL at 01:36

## 2019-03-29 RX ADMIN — VENLAFAXINE HYDROCHLORIDE 150 MG: 150 CAPSULE, EXTENDED RELEASE ORAL at 08:32

## 2019-03-29 RX ADMIN — MAGNESIUM SULFATE HEPTAHYDRATE 1 G: 1 INJECTION, SOLUTION INTRAVENOUS at 02:19

## 2019-03-29 RX ADMIN — FAMOTIDINE 20 MG: 20 TABLET ORAL at 08:33

## 2019-03-29 RX ADMIN — POTASSIUM CHLORIDE 10 MEQ: 750 TABLET, FILM COATED, EXTENDED RELEASE ORAL at 08:33

## 2019-03-29 RX ADMIN — POTASSIUM CHLORIDE 10 MEQ: 750 TABLET, FILM COATED, EXTENDED RELEASE ORAL at 22:15

## 2019-03-29 RX ADMIN — PIOGLITAZONE 45 MG: 45 TABLET ORAL at 08:35

## 2019-03-29 RX ADMIN — BUPROPION HYDROCHLORIDE 150 MG: 150 TABLET, FILM COATED, EXTENDED RELEASE ORAL at 08:34

## 2019-03-29 RX ADMIN — CLONIDINE HYDROCHLORIDE 0.05 MG: 0.1 TABLET ORAL at 11:23

## 2019-03-29 RX ADMIN — INSULIN LISPRO 3 UNITS: 100 INJECTION, SOLUTION INTRAVENOUS; SUBCUTANEOUS at 18:19

## 2019-03-29 RX ADMIN — INSULIN LISPRO 1 UNITS: 100 INJECTION, SOLUTION INTRAVENOUS; SUBCUTANEOUS at 08:30

## 2019-03-29 ASSESSMENT — PAIN SCALES - GENERAL
PAINLEVEL_OUTOF10: 10
PAINLEVEL_OUTOF10: 0
PAINLEVEL_OUTOF10: 5

## 2019-03-29 NOTE — H&P
HISTORY AND PHYSICAL  (Hospitalist, Internal Medicine)  IDENTIFYING INFORMATION   PATIENT:  Goran Sim  MRN:  [id number]  ADMIT DATE: 3/28/2019  TIME OF EVALUATION: 3/29/2019 2:09 AM    CHIEF COMPLAINT     Nausea or vomiting    HISTORY OF PRESENT ILLNESS   Goran Sim is a 69 y.o. female presents with nausea vomiting    60-year-old female with a history of bowel resection 2 years ago but had been in her usual state of health until 3 days ago when she started having nausea with vomiting daily. This was associated with dyspepsia she however denies abdominal pains. She denies diarrhea or constipation. She denies eating anything unusual.  Imaging done on presentation does not show any evidence of intestinal obstruction. She was however found to be dehydrated with acute kidney injury for which she is being admitted for IV fluid resuscitation. She was noted to have bacteriuria, she however denies any urinary symptoms at this point. She denies fever or chills. 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  Diabetes mellitus ([address]     H/O cardiovascular stress test 5/02    EF 74 %,normal perfusion    H/O echocardiogram 11/02    EF 60%, mild to mod MR,    Hyperlipidemia     Protein-calorie malnutrition, moderate ([address] 8/12/2015     Past Surgical History:   Procedure Laterality Date    ABDOMEN SURGERY      CORONARY ARTERY BYPASS GRAFT  5/07    CABG X 3, L mammary artery to the LAD, saphenous vein graft to RCA.  a saphenous vein graft to Cx marginal artery    DIAGNOSTIC CARDIAC CATH LAB PROCEDURE  3/30/07    PTCA to LAD    OTHER SURGICAL HISTORY  08 11 2015    exp lap, right colon resection    SKIN BIOPSY       No family history on file. Family Hx of HTN  Family Hx as reviewed above, otherwise non-contributory  Social History     Socioeconomic History    Marital status:      Spouse name: Not on file    Number of children: Not on file    Years of education: Not on file    Highest education level: Not on file   Occupational History    Not on file   Social Needs    Financial resource strain: Not on file    Food insecurity:     Worry: Not on file     Inability: Not on file    Transportation needs:     Medical: Not on file     Non-medical: Not on file   Tobacco Use    Smoking status: Former Smoker    Smokeless tobacco: Never Used   Substance and Sexual Activity    Alcohol use: No    Drug use: No    Sexual activity: Not on file   Lifestyle    Physical activity:     Days per week: Not on file     Minutes per session: Not on file    Stress: Not on file   Relationships    Social connections:     Talks on phone: Not on file     Gets together: Not on file     Attends Yazdanism service: Not on file     Active member of club or organization: Not on file     Attends meetings of clubs or organizations: Not on file     Relationship status: Not on file    Intimate partner violence:     Fear of current or ex partner: Not on file     Emotionally abused: Not on file     Physically abused: Not on file     Forced sexual activity: Not on file   Other Topics Concern    Not on file   Social History Narrative    Not on file       MEDICATIONS   Medications Prior to Admission  Not in a hospital admission.     Current Medications  Current Facility-Administered Medications   Medication Dose Route Frequency Provider Last Rate Last Dose    cefTRIAXone (ROCEPHIN) 1 g IVPB in 50 mL D5W minibag  1 g Intravenous Once Juan Gibbs  mL/hr at 03/29/19 0148 1 g at 03/29/19 0148    magnesium sulfate 1 g in dextrose 5% 100 mL IVPB  1 g Intravenous Once Juan Gibbs MD        0.9 % sodium chloride infusion   Intravenous Continuous Nhung Bassett MD        morphine sulfate (PF) injection 4 mg  4 mg Intravenous Q30 Min PRN Paige Moody MD   4 mg at 03/28/19 2135    ondansetron (ZOFRAN) injection 4 mg  4 mg Intravenous Q30 Min PRN Paige Moody MD   4 mg at 03/28/19 2135     Current Outpatient Medications   Medication Sig Dispense Refill    megestrol (MEGACE) 40 MG tablet Take 1 tablet by mouth daily 30 tablet 3    cloNIDine (CATAPRES) 0.1 MG/24HR Place 1 patch onto the skin once a week 4 patch 3    potassium chloride (MICRO-K) 10 MEQ CR capsule Take 10 mEq by mouth 2 times daily      Multiple Vitamins-Minerals (THERAPEUTIC MULTIVITAMIN-MINERALS) tablet Take 1 tablet by mouth daily      vitamin E 1000 UNITS capsule Take 400 Units by mouth daily      predniSONE (DELTASONE) 20 MG tablet Take 5 mg by mouth daily       omeprazole (PRILOSEC) 20 MG capsule Take 20 mg by mouth Daily      pioglitazone (ACTOS) 45 MG tablet Take 45 mg by mouth daily.  ALPRAZolam (XANAX) 0.5 MG tablet Take 0.5 mg by mouth 2 times daily as needed for Sleep.  carvedilol (COREG CR) 80 MG CP24 Take 80 mg by mouth daily (with breakfast).  cyanocobalamin 1000 MCG/ML injection Inject 1,000 mcg into the muscle every 7 days.  glipiZIDE (GLUCOTROL XL) 5 MG CR tablet Take 5 mg by mouth daily.  pregabalin (LYRICA) 75 MG capsule Take 75 mg by mouth 3 times daily.  famotidine (PEPCID) 20 MG tablet Take 20 mg by mouth daily.  fenofibrate (TRICOR) 145 MG tablet Take 145 mg by mouth daily.  buPROPion (WELLBUTRIN XL) 150 MG XL tablet Take 150 mg by mouth every morning.  venlafaxine (EFFEXOR XR) 150 MG XR capsule Take 150 mg by mouth daily.  valsartan-hydrochlorothiazide (DIOVAN-HCT) 160-25 MG per tablet Take 1 tablet by mouth daily.            Allergies  Allergies   Allergen Reactions    Tramadol Itching    Vicodin [Hydrocodone-Acetaminophen] Itching       REVIEW OF SYSTEMS   Within above limitations. 14 point review of systems reviewed. Pertinent positive or negative as per HPI or otherwise negative per 14 point systems review. PHYSICAL EXAM     Wt Readings from Last 3 Encounters:   03/28/19 165 lb (74.8 kg)   12/23/18 165 lb (74.8 kg)   11/06/18 161 lb (73 kg)       Blood pressure (!) 186/65, pulse 60, temperature 98.2 °F (36.8 °C), temperature source Oral, resp. rate 14, height 5' 2\" (1.575 m), weight 165 lb (74.8 kg), SpO2 96 %, not currently breastfeeding. General - AAO x 3  Psych - Appropriate affect/speech. No agitation  Eyes - Eye lids intact. No scleral icterus  ENT - Lips wnl. External ear clear/dry/intact. No thyromegaly on inspection  Neuro - No gross peripheral or central neuro deficits on inspection  Heart - Sinus. RRR. S1 and S2 present. No added HS/murmurs appreciated. No elevated JVD appreciated  Lung - Adequate air entry b/l, No crackles/wheezes appreciated  GI - Soft. No guarding/rigidity. No hepatosplenomegaly/ascites. BS+   - No CVA/suprapubic tenderness or palpable bladder distension  Skin - Intact. No rash/petechiae/ecchymosis. Warm extremities  MSK - Joints with normal ROM.  No joint swellings    Lines/Drains/Airways/Wounds:  [unfilled]    LABS AND IMAGING   CBC  [unfilled]    Last 3 Hemoglobin  Lab Results   Component Value Date    HGB 12.6 03/28/2019    HGB 10.2 11/06/2018    HGB 12.0 06/02/2017     Last 3 WBC/ANC  Lab Results   Component Value Date    WBC 9.4 03/28/2019    WBC 8.2 11/06/2018    WBC 7.0 06/02/2017     No components found for: GRNLOCTYABS  Last 3 Platelets  No results found for: PLATELET  Chemistry  [unfilled]  [unfilled]  Lab Results   Component Value Date     01/18/2018     09/08/2017     06/02/2017     Coagulation Studies  Lab Results   Component Value Date    INR 1.10 08/11/2015     Liver Function Studies  Lab Results   Component Value Date    ALT 13 03/28/2019    AST 21 03/28/2019    ALKPHOS 86 03/28/2019 Recent Imaging        Relevant labs and imaging reviewed    ASSESSMENT AND PLAN     UTI  Continue ceftriaxone  Follow-up with urine cultures    Nausea vomiting  Zofran as needed  Possibly due to UTI  Continue IV fluid resuscitation    Hyponatremia  Most likely due to persistent vomiting  Continue IV fluid resuscitation    Hypokalemia  Supplemented with KCl    Hypertension   Blood pressure control adequate  Continue home antihypertensives.     Hypomagnesemia  Supplemented with magnesium chloride     Acute kidney injury  Most likely from prerenal azotemia  Ensure adequate hydration and avoid any nephrotoxic drugs     DVT prophylaxis  Lovenox        Case d/w ED physician   time spent on admission: 40 minutes   Hospitalist, Internal Medicine  3/29/2019 at 2:09 AM

## 2019-03-29 NOTE — ED NOTES
Report called and given to [facility] on 4N. Pt to be transported to room 4110.      Any Gamez RN  03/29/19 [id number]

## 2019-03-29 NOTE — ED PROVIDER NOTES
Triage Chief Complaint:   Abdominal Pain (onset 3 days ago, pt fell to ground in pain at home tonight. 50 mcg of fentanyl given by EMS en route)    [place]:  Lilia Harp is a 69 y.o. female that presents with abdominal pain, decreased appetite today, nausea and vomiting for the last 3 days. The pain is in the.  Umbilical region and suprapubic region. The vomit is nonbloody and nonbilious. She has had a small amount of nonbloody diarrhea also. Today she had a couple episodes of syncope associated with the vomiting episodes. She has small chest pain last night that has resolved today. No shortness of breath. She denies any fevers. No dysuria or increased urinary frequency. 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 She also has had an appendectomy, cholecystectomy and hysterectomy. Surgeon is Dr. Isabel Torres    ROS:   Review of Systems   Constitutional: Positive for appetite change and fatigue. Negative for chills and fever. HENT: Negative for congestion, rhinorrhea and sore throat. Eyes: Negative for redness and visual disturbance. Respiratory: Negative for cough and shortness of breath. Cardiovascular: Positive for chest pain (last night, now resolved). Negative for leg swelling. Gastrointestinal: Positive for abdominal pain, diarrhea, nausea and vomiting. Negative for blood in stool and constipation. Genitourinary: Negative for dysuria, frequency and hematuria. Musculoskeletal: Negative for arthralgias and back pain. Skin: Negative for rash and wound. Neurological: Negative for dizziness, syncope, weakness, light-headedness, numbness and headaches. Psychiatric/Behavioral: Negative. Negative for hallucinations and suicidal ideas.        Past Medical History:   Diagnosis Date    Acute anterior wall MI ([facility] 2007    CAD (coronary artery disease)     Cancer ([facility]     melanoma,     [facility] ([facility]     near pancreas    Carotid artery stenosis 3/2000    Bilateral intimal thickening and scattered atheromatous plaques but no flow limiting obstructions.  Diabetes mellitus ([facility]     H/O cardiovascular stress test 5/02    EF 74 %,normal perfusion    H/O echocardiogram 11/02    EF 60%, mild to mod MR,    Hyperlipidemia     Protein-calorie malnutrition, moderate ([facility] 8/12/2015     Past Surgical History:   Procedure Laterality Date    ABDOMEN SURGERY      CORONARY ARTERY BYPASS GRAFT  5/07    CABG X 3, L mammary artery to the LAD, saphenous vein graft to RCA. a saphenous vein graft to Cx marginal artery    DIAGNOSTIC CARDIAC CATH LAB PROCEDURE  3/30/07    PTCA to LAD    OTHER SURGICAL HISTORY  08 11 2015    exp lap, right colon resection    SKIN BIOPSY       No family history on file.   Social History     Socioeconomic History    Marital status:      Spouse name: Not on file    Number of children: Not on file    Years of education: Not on file    Highest education level: Not on file   Occupational History    Not on file   Social Needs    Financial resource strain: Not on file    Food insecurity:     Worry: Not on file     Inability: Not on file    Transportation needs:     Medical: Not on file     Non-medical: Not on file   Tobacco Use    Smoking status: Former Smoker    Smokeless tobacco: Never Used   Substance and Sexual Activity    Alcohol use: No    Drug use: No    Sexual activity: Not on file   Lifestyle    Physical activity:     Days per week: Not on file     Minutes per session: Not on file    Stress: Not on file   Relationships    Social connections:     Talks on phone: Not on file     Gets together: Not on file     Attends [employer] service: Not on file     Active member of club or organization: Not on file     Attends meetings of clubs or organizations: Not on file     Relationship status: Not on file    Intimate partner violence:     Fear of current or ex partner: Not on file     Emotionally abused: Not on file     Physically abused: Not on file     Forced sexual activity: Not on file   Other Topics Concern    Not on file   Social History Narrative    Not on file     Current Facility-Administered Medications   Medication Dose Route Frequency Provider Last Rate Last Dose    cefTRIAXone (ROCEPHIN) 1 g IVPB in 50 mL D5W minibag  1 g Intravenous Once Marina Husain MD        magnesium sulfate 1 g in dextrose 5% 100 mL IVPB  1 g Intravenous Once Marina Husain MD        morphine sulfate (PF) injection 4 mg  4 mg Intravenous Q30 Min PRN Marina Husain MD   4 mg at 03/28/19 2135    ondansetron (ZOFRAN) injection 4 mg  4 mg Intravenous Q30 Min PRN Marina Husain MD   4 mg at 03/28/19 2135     Current Outpatient Medications   Medication Sig Dispense Refill    megestrol (MEGACE) 40 MG tablet Take 1 tablet by mouth daily 30 tablet 3    cloNIDine (CATAPRES) 0.1 MG/24HR Place 1 patch onto the skin once a week 4 patch 3    potassium chloride (MICRO-K) 10 MEQ CR capsule Take 10 mEq by mouth 2 times daily      Multiple Vitamins-Minerals (THERAPEUTIC MULTIVITAMIN-MINERALS) tablet Take 1 tablet by mouth daily      vitamin E 1000 UNITS capsule Take 400 Units by mouth daily      predniSONE (DELTASONE) 20 MG tablet Take 5 mg by mouth daily       omeprazole (PRILOSEC) 20 MG capsule Take 20 mg by mouth Daily      pioglitazone (ACTOS) 45 MG tablet Take 45 mg by mouth daily.  ALPRAZolam (XANAX) 0.5 MG tablet Take 0.5 mg by mouth 2 times daily as needed for Sleep.  carvedilol (COREG CR) 80 MG CP24 Take 80 mg by mouth daily (with breakfast).  cyanocobalamin 1000 MCG/ML injection Inject 1,000 mcg into the muscle every 7 days.  glipiZIDE (GLUCOTROL XL) 5 MG CR tablet Take 5 mg by mouth daily.  pregabalin (LYRICA) 75 MG capsule Take 75 mg by mouth 3 times daily.  famotidine (PEPCID) 20 MG tablet Take 20 mg by mouth daily.  fenofibrate (TRICOR) 145 MG tablet Take 145 mg by mouth daily.  buPROPion (WELLBUTRIN XL) 150 MG XL tablet Take 150 mg by mouth every morning.  venlafaxine (EFFEXOR XR) 150 MG XR capsule Take 150 mg by mouth daily.  valsartan-hydrochlorothiazide (DIOVAN-HCT) 160-25 MG per tablet Take 1 tablet by mouth daily. Allergies   Allergen Reactions    Tramadol Itching    Vicodin [Hydrocodone-Acetaminophen] Itching       Nursing Notes Reviewed     Physical Exam:   ED Triage Vitals   Enc Vitals Group      BP       Pulse       Resp       Temp       Temp src       SpO2       Weight       Height       Head Circumference       Peak Flow       Pain Score       Pain Loc       Pain Edu? Excl. in [address]? BP (!) 186/62   Pulse 57   Temp 98.2 °F (36.8 °C) (Oral)   Resp 10   Ht 5' 2\" (1.575 m)   Wt 165 lb (74.8 kg)   SpO2 99%   BMI 30.18 kg/m²   My pulse ox interpretation is - normal  Physical Exam   Constitutional: She appears well-developed and well-nourished. She appears distressed (2/2 pain). HENT:   Head: Normocephalic and atraumatic. Eyes: Conjunctivae are normal. Right eye exhibits no discharge. Left eye exhibits no discharge. Cardiovascular: Regular rhythm and intact distal pulses. Tachycardia present. Pulmonary/Chest: Effort normal and breath sounds normal. No respiratory distress. Abdominal: Soft. Bowel sounds are normal. She exhibits no distension. There is tenderness (midline). There is rebound and guarding. Musculoskeletal: Normal range of motion. She exhibits no edema or deformity. Neurological: She is alert. No cranial nerve deficit. Skin: Skin is warm and dry. She is not diaphoretic. Psychiatric: She has a normal mood and affect.  Her behavior is normal.       I have reviewed and interpreted all of the currently available lab results from this visit (if applicable):  Results for orders placed or performed during the hospital encounter of 03/28/19   CBC Auto Differential   Result Value Ref Range    WBC 9.4 4.0 - 10.5 K/CU MM    RBC Leukocyte Esterase, Urine TRACE (A) NEGATIVE    RBC, UA 1 0 - 6 /HPF    WBC, UA 6 (H) 0 - 5 /HPF    Bacteria, UA OCCASIONAL (A) NEGATIVE /HPF    WBC Clumps, UA RARE /HPF    Squam Epithel, UA 1 /HPF    Renal Epithelial, Urine <1 /HPF    Trichomonas, UA NONE SEEN NONE SEEN /HPF   Lactic Acid, Plasma   Result Value Ref Range    Lactate (HH) 0.4 - 2.0 mMOL/L     2.3  LACTI CALLED TO DR WOODRUFF @ 1408 [id number] [facility] MLT  RESULTS READ BACK     Troponin   Result Value Ref Range    Troponin T <0.010 <0.01 NG/ML   Magnesium   Result Value Ref Range    Magnesium 1.7 (L) 1.8 - 2.4 mg/dl      Radiographs (if obtained):  [] The following radiograph was interpreted by myself in the absence of a radiologist:  [x]Radiologist's Report Reviewed:  CT ABDOMEN PELVIS WO CONTRAST Additional Contrast? None   Preliminary Result   No acute findings are seen to the abdomen or pelvis. Stable appearance to soft tissue mass to the right upper quadrant as above. This is in close approximation to the pancreas and could relate to the   clinical history of pancreatic cancer although other etiologies are not   excluded. If this represents a primary neoplasm no evidence for metastatic   disease is seen to the abdomen or pelvis. Small hiatal hernia. EKG (if obtained): (All EKG's are interpreted by myself in the absence of a cardiologist)  Normal sinus rhythm with a rate of 60. UT interval 160, , . No ST elevations or depressions. Normal T waves. Q waves in the septal leads. Impression: Abnormal EKG, no previous EKGs for comparison. MDM:  Differential diagnoses considered include small bowel obstruction, diverticulitis, gastroenteritis, urinary tract infection, ischemic bowel, recurrent carcinoid. Patient was given pain and nausea medication upon arrival to the emergency department. Basic labs are obtained and are concerning for a cellulitic creatinine level.   Patient's urine does have positive nitrites and an occasional bacteria. I'm concerned for a possible urinary tract infection. Urine was sent for culture. Lactic acid is slightly elevated. Patient was given a bolus of normal saline. CT scan of her abdomen shows no acute findings. There is a soft tissue mass in the right upper quadrant which is unchanged from previous. She is currently going evaluation for this at the [facility]. I'm not concerned for an intestinal obstruction. Her symptoms are improved after initial pain medication. Given the acute kidney injury, ventricular the hospital for further evaluation and monitoring overnight. She was given a dose of Rocephin for the urinary tract infection. I spoke with Dr. Rk Willoughby, who graciously agreed to admit the patient. Plan of care explained to patient. All questions and concerns were addressed to the patient's satisfaction. Patient understood and agreed with plan. Clinical Impression:  1. Non-intractable vomiting with nausea, unspecified vomiting type    2. Generalized abdominal pain    3. KENZIE (acute kidney injury) ([address]    4. Urinary tract infection without hematuria, site unspecified          Rhiannon Flores MD       Please note that portions of this note may have been complete with a voice recognition program.  Effortswere made to edit the dictations, but occasional words are mis-transcribed.           Rhiannon Flores MD  03/29/19 8818

## 2019-03-29 NOTE — ED NOTES
Bed: ED-26  Expected date:   Expected time:   Means of arrival:   Comments:  triage     Coleman Vazquez, ELIE  03/28/19 1589

## 2019-03-29 NOTE — ED NOTES
Bed: H-05  Expected date:   Expected time:   Means of arrival:   Comments:  Medic 7 abdominal pain/fall due to cancer.  78yr female     Fiona Lucian, ELIE  03/28/19 2056

## 2019-03-29 NOTE — ED NOTES
@0209 bed 4110 received clean and ready ER RN Dom notified of ready room     Shaista Haque  03/29/19 0665

## 2019-03-30 VITALS
WEIGHT: 152.2 LBS | HEIGHT: 62 IN | HEART RATE: 61 BPM | TEMPERATURE: 97.6 F | RESPIRATION RATE: 18 BRPM | OXYGEN SATURATION: 92 % | BODY MASS INDEX: 28.01 KG/M2 | SYSTOLIC BLOOD PRESSURE: 154 MMHG | DIASTOLIC BLOOD PRESSURE: 65 MMHG

## 2019-03-30 LAB
ANION GAP SERPL CALCULATED.3IONS-SCNC: 14 MMOL/L (ref 4–16)
BUN BLDV-MCNC: 13 MG/DL (ref 6–23)
CALCIUM SERPL-MCNC: 8.5 MG/DL (ref 8.3–10.6)
CHLORIDE BLD-SCNC: 98 MMOL/L (ref 99–110)
CO2: 23 MMOL/L (ref 21–32)
CREAT SERPL-MCNC: 1.2 MG/DL (ref 0.6–1.1)
GFR AFRICAN AMERICAN: 53 ML/MIN/1.73M2
GFR NON-AFRICAN AMERICAN: 44 ML/MIN/1.73M2
GLUCOSE BLD-MCNC: 190 MG/DL (ref 70–99)
GLUCOSE BLD-MCNC: 208 MG/DL (ref 70–99)
GLUCOSE BLD-MCNC: 213 MG/DL (ref 70–99)
HCT VFR BLD CALC: 37.8 % (ref 37–47)
HEMOGLOBIN: 11.7 GM/DL (ref 12.5–16)
MCH RBC QN AUTO: 27.8 PG (ref 27–31)
MCHC RBC AUTO-ENTMCNC: 31 % (ref 32–36)
MCV RBC AUTO: 89.8 FL (ref 78–100)
PDW BLD-RTO: 13.3 % (ref 11.7–14.9)
PLATELET # BLD: 66 K/CU MM (ref 140–440)
PMV BLD AUTO: 12.1 FL (ref 7.5–11.1)
POTASSIUM SERPL-SCNC: 3.2 MMOL/L (ref 3.5–5.1)
RBC # BLD: 4.21 M/CU MM (ref 4.2–5.4)
SODIUM BLD-SCNC: 135 MMOL/L (ref 135–145)
WBC # BLD: 11.1 K/CU MM (ref 4–10.5)

## 2019-03-30 PROCEDURE — G0378 HOSPITAL OBSERVATION PER HR: HCPCS

## 2019-03-30 PROCEDURE — 36415 COLL VENOUS BLD VENIPUNCTURE: CPT

## 2019-03-30 PROCEDURE — 6370000000 HC RX 637 (ALT 250 FOR IP): Performed by: HOSPITALIST

## 2019-03-30 PROCEDURE — 80048 BASIC METABOLIC PNL TOTAL CA: CPT

## 2019-03-30 PROCEDURE — 96367 TX/PROPH/DG ADDL SEQ IV INF: CPT

## 2019-03-30 PROCEDURE — 6360000002 HC RX W HCPCS: Performed by: HOSPITALIST

## 2019-03-30 PROCEDURE — 6370000000 HC RX 637 (ALT 250 FOR IP): Performed by: INTERNAL MEDICINE

## 2019-03-30 PROCEDURE — 2580000003 HC RX 258: Performed by: HOSPITALIST

## 2019-03-30 PROCEDURE — 82962 GLUCOSE BLOOD TEST: CPT

## 2019-03-30 PROCEDURE — 85027 COMPLETE CBC AUTOMATED: CPT

## 2019-03-30 RX ORDER — PANTOPRAZOLE SODIUM 40 MG/1
40 TABLET, DELAYED RELEASE ORAL
Qty: 90 TABLET | Refills: 1 | Status: ON HOLD | OUTPATIENT
Start: 2019-03-30 | End: 2022-09-27 | Stop reason: SDUPTHER

## 2019-03-30 RX ORDER — CIPROFLOXACIN 500 MG/1
250 TABLET, FILM COATED ORAL 2 TIMES DAILY
Qty: 3 TABLET | Refills: 0 | Status: SHIPPED | OUTPATIENT
Start: 2019-03-30 | End: 2019-04-02

## 2019-03-30 RX ORDER — CLONIDINE HYDROCHLORIDE 0.1 MG/1
0.05 TABLET ORAL 2 TIMES DAILY
Qty: 60 TABLET | Refills: 3 | Status: SHIPPED | OUTPATIENT
Start: 2019-03-30 | End: 2019-10-14

## 2019-03-30 RX ORDER — CLONIDINE HYDROCHLORIDE 0.1 MG/1
0.1 TABLET ORAL 2 TIMES DAILY
Status: DISCONTINUED | OUTPATIENT
Start: 2019-03-30 | End: 2019-03-30

## 2019-03-30 RX ORDER — CLONIDINE HYDROCHLORIDE 0.1 MG/1
0.05 TABLET ORAL 2 TIMES DAILY
Status: DISCONTINUED | OUTPATIENT
Start: 2019-03-30 | End: 2019-03-30 | Stop reason: HOSPADM

## 2019-03-30 RX ORDER — CARVEDILOL 25 MG/1
25 TABLET ORAL 2 TIMES DAILY
Qty: 60 TABLET | Refills: 3 | Status: ON HOLD | OUTPATIENT
Start: 2019-03-30 | End: 2021-04-26 | Stop reason: HOSPADM

## 2019-03-30 RX ORDER — AMLODIPINE BESYLATE 5 MG/1
5 TABLET ORAL DAILY
Status: DISCONTINUED | OUTPATIENT
Start: 2019-03-30 | End: 2019-03-30 | Stop reason: HOSPADM

## 2019-03-30 RX ORDER — AMLODIPINE BESYLATE 5 MG/1
5 TABLET ORAL DAILY
Qty: 30 TABLET | Refills: 3 | Status: ON HOLD | OUTPATIENT
Start: 2019-03-31 | End: 2021-04-16

## 2019-03-30 RX ORDER — ONDANSETRON 4 MG/1
4 TABLET, FILM COATED ORAL DAILY PRN
Qty: 30 TABLET | Refills: 0 | Status: ON HOLD | OUTPATIENT
Start: 2019-03-30 | End: 2021-04-16

## 2019-03-30 RX ORDER — POTASSIUM CHLORIDE 1.5 G/1.77G
40 POWDER, FOR SOLUTION ORAL ONCE
Status: COMPLETED | OUTPATIENT
Start: 2019-03-30 | End: 2019-03-30

## 2019-03-30 RX ADMIN — VITAMIN E CAP 400 UNIT 400 UNITS: 400 CAP at 09:29

## 2019-03-30 RX ADMIN — BUPROPION HYDROCHLORIDE 150 MG: 150 TABLET, FILM COATED, EXTENDED RELEASE ORAL at 09:29

## 2019-03-30 RX ADMIN — SODIUM CHLORIDE: 9 INJECTION, SOLUTION INTRAVENOUS at 04:05

## 2019-03-30 RX ADMIN — SODIUM CHLORIDE: 9 INJECTION, SOLUTION INTRAVENOUS at 14:05

## 2019-03-30 RX ADMIN — FENOFIBRATE 54 MG: 54 TABLET ORAL at 09:30

## 2019-03-30 RX ADMIN — CEFTRIAXONE 1 G: 1 INJECTION, POWDER, FOR SOLUTION INTRAMUSCULAR; INTRAVENOUS at 00:52

## 2019-03-30 RX ADMIN — MEGESTROL ACETATE 40 MG: 20 TABLET ORAL at 09:29

## 2019-03-30 RX ADMIN — FAMOTIDINE 20 MG: 20 TABLET ORAL at 09:30

## 2019-03-30 RX ADMIN — CLONIDINE HYDROCHLORIDE 0.05 MG: 0.1 TABLET ORAL at 09:43

## 2019-03-30 RX ADMIN — ALPRAZOLAM 0.5 MG: 0.5 TABLET ORAL at 01:15

## 2019-03-30 RX ADMIN — AMLODIPINE BESYLATE 5 MG: 5 TABLET ORAL at 11:34

## 2019-03-30 RX ADMIN — PANTOPRAZOLE SODIUM 40 MG: 40 TABLET, DELAYED RELEASE ORAL at 06:09

## 2019-03-30 RX ADMIN — PREDNISONE 5 MG: 5 TABLET ORAL at 09:29

## 2019-03-30 RX ADMIN — POTASSIUM CHLORIDE 10 MEQ: 750 TABLET, FILM COATED, EXTENDED RELEASE ORAL at 09:29

## 2019-03-30 RX ADMIN — PIOGLITAZONE 45 MG: 45 TABLET ORAL at 09:52

## 2019-03-30 RX ADMIN — INSULIN LISPRO 1 UNITS: 100 INJECTION, SOLUTION INTRAVENOUS; SUBCUTANEOUS at 09:25

## 2019-03-30 RX ADMIN — PREGABALIN 75 MG: 75 CAPSULE ORAL at 14:05

## 2019-03-30 RX ADMIN — POTASSIUM CHLORIDE 40 MEQ: 1.5 POWDER, FOR SOLUTION ORAL at 14:54

## 2019-03-30 RX ADMIN — SODIUM CHLORIDE, PRESERVATIVE FREE 10 ML: 5 INJECTION INTRAVENOUS at 09:44

## 2019-03-30 RX ADMIN — INSULIN LISPRO 2 UNITS: 100 INJECTION, SOLUTION INTRAVENOUS; SUBCUTANEOUS at 13:50

## 2019-03-30 RX ADMIN — PREGABALIN 75 MG: 75 CAPSULE ORAL at 10:25

## 2019-03-30 RX ADMIN — VENLAFAXINE HYDROCHLORIDE 150 MG: 150 CAPSULE, EXTENDED RELEASE ORAL at 09:29

## 2019-03-30 NOTE — PLAN OF CARE
Problem: Pain:  Goal: Pain level will decrease  Description  Pain level will decrease  Outcome: Ongoing  Goal: Control of acute pain  Description  Control of acute pain  Outcome: Ongoing  Goal: Control of chronic pain  Description  Control of chronic pain  Outcome: Ongoing     Problem: Falls - Risk of:  Goal: Will remain free from falls  Description  Will remain free from falls  3/30/2019 0221 by Cindi Brown RN  Outcome: Ongoing  3/29/2019 1802 by Shy Malcolm RN  Outcome: Ongoing  3/29/2019 1801 by Shy Malcolm RN  Outcome: Ongoing  Goal: Absence of physical injury  Description  Absence of physical injury  3/30/2019 0221 by Cindi Brown RN  Outcome: Ongoing  3/29/2019 1802 by Shy Malcolm RN  Outcome: Ongoing  3/29/2019 1801 by Shy Malcolm RN  Outcome: Ongoing     Problem: Nutrition  Goal: Optimal nutrition therapy  3/30/2019 0221 by Cindi Brown RN  Outcome: Ongoing  3/29/2019 1536 by Nura Hernandez RD, LD  Outcome: Ongoing

## 2019-03-30 NOTE — CONSULTS
Nephrology Service Consultation        [address], [address] [address]  Phone: [phone]  Office Hours: 8:30AM - 4:30PM  Monday - Friday           Patient:  Elinor Herron  MRN: [id number]  Consulting physician:  Santi Carrasco MD  Reason for Consult: elevated cr, electrolyte derangements      PCP: Aki Tucker MD    HISTORY OF PRESENT ILLNESS:   The patient is a 69 y.o. female with an abdominal mass, HTN, bowel resection 2 y ago presented due to 5 days of vomiting and having passed out at home about 2 days ago, which prompted her arrival to the ED. Renal consult for cr 1.6 on admission from baseline 1.2. She was also hypokalemic and hyponatremic. She denies Nsaid use at home. Denies diarrhea  She is on room air today and has been on NS since admission. She reports walking to the bathroom and not feeling dizzy when she gets up    REVIEW OF SYSTEMS:  14 point ROS is Negative. See positive ROS per HPI    Past Medical History:        Diagnosis Date    Acute anterior wall MI ([address] 2007    CAD (coronary artery disease)     Cancer (HCC)     melanoma,     Cancer ([address]     near pancreas    Carotid artery stenosis 3/2000    Bilateral intimal thickening and scattered atheromatous plaques but no flow limiting obstructions.  Diabetes mellitus ([address]     H/O cardiovascular stress test 5/02    EF 74 %,normal perfusion    H/O echocardiogram 11/02    EF 60%, mild to mod MR,    Hyperlipidemia     Protein-calorie malnutrition, moderate ([address] 8/12/2015       Past Surgical History:        Procedure Laterality Date    ABDOMEN SURGERY      CORONARY ARTERY BYPASS GRAFT  5/07    CABG X 3, L mammary artery to the LAD, saphenous vein graft to RCA.  a saphenous vein graft to Cx marginal artery    DIAGNOSTIC CARDIAC CATH LAB PROCEDURE  3/30/07    PTCA to LAD    OTHER SURGICAL HISTORY  08 11 2015    exp lap, right colon resection    SKIN BIOPSY         Medications:   Scheduled Meds:   buPROPion  150 mg Oral QAM    [START ON 4/4/2019] cyanocobalamin  1,000 mcg Intramuscular Q7 Days    famotidine  20 mg Oral Daily    fenofibrate  54 mg Oral Daily    megestrol  40 mg Oral Daily    pantoprazole  40 mg Oral QAM AC    pioglitazone  45 mg Oral Daily    potassium chloride  10 mEq Oral BID    predniSONE  5 mg Oral Daily    pregabalin  75 mg Oral TID    venlafaxine  150 mg Oral Daily    vitamin E  400 Units Oral Daily    sodium chloride flush  10 mL Intravenous 2 times per day    carvedilol  25 mg Oral BID    cloNIDine  0.05 mg Oral BID    enoxaparin  30 mg Subcutaneous Daily    cefTRIAXone (ROCEPHIN) IV  1 g Intravenous Q24H    insulin lispro  0-6 Units Subcutaneous TID WC    insulin lispro  0-3 Units Subcutaneous Nightly     Continuous Infusions:   sodium chloride 100 mL/hr at 03/30/19 0405    dextrose       PRN Meds:. ALPRAZolam, sodium chloride flush, magnesium hydroxide, ondansetron, morphine, hydrALAZINE (APRESOLINE) ivpb, cloNIDine, glucose, dextrose, glucagon (rDNA), dextrose    Allergies:  Tramadol and Vicodin [hydrocodone-acetaminophen]    Social History:   TOBACCO:   reports that she has quit smoking. She has never used smokeless tobacco.  ETOH:   reports that she does not drink alcohol. OCCUPATION:      Family History:   No family history on file.         Physical Exam:    Vitals: BP (!) 166/74   Pulse 60   Temp 97.6 °F (36.4 °C) (Oral)   Resp 18   Ht 5' 2\" (1.575 m)   Wt 152 lb 3.2 oz (69 kg)   SpO2 92%   BMI 27.84 kg/m²   General appearance: in no acute distress, appears stated age  Skin: Skin color, texture, turgor normal. No rashes or lesions  HEENT: normocephalic, atraumatic  Neck: supple, trachea midline  Lungs: clear to auscultation bilaterally, breathing comfortably  Heart[de-identified] regular rate and rhythm, S1, S2 normal,  Abdomen: soft, non-tender; bowel sounds normal; no masses,   Extremities: extremities normal, atraumatic, no cyanosis or edema  Neurologic: Mental status: alert, oriented, interactive, following commands  Psychiatric: mood and affect appropriate    CBC:   Recent Labs     03/28/19 2110   WBC 9.4   HGB 12.6   PLT 70*     BMP:    Recent Labs     03/28/19 2110   *   K 3.4*   CL 93*   CO2 22   BUN 26*   CREATININE 1.6*   GLUCOSE 305*     Hepatic:   Recent Labs     03/28/19 2110   AST 21   ALT 13   BILITOT 0.6   ALKPHOS 86     Troponin: No results for input(s): TROPONINI in the last 72 hours. BNP: No results for input(s): BNP in the last 72 hours. Lipids: No results for input(s): CHOL, HDL in the last 72 hours. Invalid input(s): LDLCALCU  ABGs:   Lab Results   Component Value Date    PO2ART 112 08/12/2015    DMX1GMB 24.0 08/12/2015     INR: No results for input(s): INR in the last 72 hours.   -----------------------------------------------------------------      Assessment and Recommendations   - KENZIE from volume depletion: ct a/p showed no HN, UA suggestive of UTI  - CKD3; baseline cr 1.2  - hyponatremia  - Hypokalemia  - Vomiting prior to admission  - Pancreatic mass; follows with Dr Chester Quigley  - CAD  - HTN  - Thrombocytopenia: chronic    Plan:  Am BMP pending today  Continue IVF for now  Add amlodipine 5mg daily for better bp control  Discussed nsaid avoidance on dc  Will follow  Thank you          Electronically signed by Charline Fischer DO on 3/30/2019 at 10:25 MD Jose C Marrufo DO  [facility],  Kermit GALVAN [facility], [facility]  PHONE: [phone]  FAX: [phone]

## 2019-03-30 NOTE — PROGRESS NOTES
Hospitalist Progress Note      Name:  Elinor Herron /Age/Sex: 1940  (69 y.o. female)   MRN & CSN:  [id number] & [id number] Admission Date/Time: 3/28/2019  8:55 PM   Location:  [address] [place] [address] PCP: Aki Tucker MD         Hospital Day: 3    Assessment and Plan:   Elinor Herron is a 69 y.o.  female  who presents with Gastroenteritis    1. Urinary Tract Infection. UA. Urine culture with coliform. Continue Ceftriaxone  2. Nausea/Vomiting: with associated electrolyte abnormalities. Could be from the underlying mass compressing the stomach. 3. Hyponatremia: could be from GI loss. continue IVF. Awaiting BMP today  4. Hypokalemia: replace accordingly. Magnesium  5. Hypomagnesemia: replace accordingly  6. Hypertension: Blood pressure controlled. Continue medications. 7. Acute Kidney Injury: Likely from dehydration. Creatinine 1.6 on admission. Continue IVF. Avoid nephrotoxic agents. Awaiting BMP    Diet DIET CARB CONTROL; Dietary Nutrition Supplements: Low Calorie High Protein Supplement  Dietary Nutrition Supplements: Snack (see comment)   DVT Prophylaxis [x] Lovenox, []  Heparin, [] SCDs, [] Warfarin  [] NOAC     GI Prophylaxis [] PPI,  [x] H2 Blocker,  [] Carafate,  [] Diet/Tube Feeds   Code Status Full Code   MDM [] Low, [] Moderate,[]  High     History of Present Illness:     Chief Complaint: Gastroenteritis    She was seen and examined. Feels better. No N/V. Denied abdominal pain. Ten point ROS reviewed negative, unless as noted above    Objective: Intake/Output Summary (Last 24 hours) at 3/30/2019 1220  Last data filed at 3/30/2019 0510  Gross per 24 hour   Intake 1958 ml   Output --   Net 1958 ml      Vitals:   Vitals:    19 1130   BP: (!) 186/78   Pulse: 61   Resp:    Temp:    SpO2:      Physical Exam:   GEN Awake female, sitting upright in bed in no apparent distress. Appears given age. EYES Pupils are equally round. No scleral erythema, discharge, or conjunctivitis.   HENT Mucous
Skin check off done with this Nurse and Aracely Montgomery RN bruising to R upper arm.  No other skin issues
· Evaluation: Goals set   · Goals: Pt will tolerate greater than 50% intake of meals and supplements during los     · Monitoring: Meal Intake, Supplement Intake, Diet Tolerance, Skin Integrity, Wound Healing, Pertinent Labs, Nausea or Vomiting      Electronically signed by Archie Guzman RD, LORE on 3/29/19 at 3:31 PM    Contact Number: [phone]

## 2019-03-30 NOTE — CONSULTS
[facility]  Consult Note    3/30/2019  1:54 PM    Patient:    Cherelle Caal  : 1940   69 y.o. MRN: [id number]  Admitted: 3/28/2019  8:55 PM ATT: Bobby Rahman MD   [id number]  AdmitDx: Gastroenteritis [K52.9]  Generalized abdominal pain [R10.84]  KENZIE (acute kidney injury) ([facility] [N17.9]  Urinary tract infection without hematuria, site unspecified [N39.0]  Non-intractable vomiting with nausea, unspecified vomiting type [R11.2]  Dehydration [E86.0]  PCP: Padmini Albert MD    Reason for Consult:   Nausea and emesis  Requesting Physician:  Bobby Rahman MD      History Obtained From:  Patient and review of all records    HISTORY OF PRESENT ILLNESS:    Ms. Alvarado Barrera is being followed by Dr Chon Tellez  for immune thrombocytopenia, stage IIB malignant melanoma and recurrent/metastatic small intestine carcinoid tumor and has been on octreotide since 2017      She reported two day h/o nausea, emesis, and abdominal cramps. No Diarrhea or any constipation. No fever. No chest pain, sob, cough, dizziness or any palpitations. Denied any overt bleeding or any rash. Denied any  sx. Denied any facial flushing. Denied any ha, vision changes or any focal weakness  3/28/19: Ct abdomen:  No acute findings are seen to the abdomen or pelvis.       Stable appearance to soft tissue mass to the right upper quadrant as above.    This is in close approximation to the pancreas and could relate to the   clinical history of pancreatic cancer although other etiologies are not   excluded.  If this represents a primary neoplasm no evidence for metastatic   disease is seen to the abdomen or pelvis.       Small hiatal hernia.         3/28/19: CMP with Na 129, Kn 3.4, BUN 26, creatinine 1.6, alb 4  3/30/13: CBC with wbc 11.1, Hb 11.7, Hct 37.8, Plt 66    Past Medical History:        Diagnosis Date    Acute anterior wall MI ([facility] [address]     CAD (coronary artery disease)     Cancer ([facility] [address]     melanoma,     Cancer ([facility]     near pancreas    Carotid artery stenosis 3/2000    Bilateral intimal thickening and scattered atheromatous plaques but no flow limiting obstructions.  Diabetes mellitus ([facility] [address]     H/O cardiovascular stress test 5/02    EF 74 %,normal perfusion    H/O echocardiogram 11/02    EF 60%, mild to mod MR,    Hyperlipidemia     Protein-calorie malnutrition, moderate ([facility] [address] 8/12/2015       Past Surgical History:        Procedure Laterality Date    ABDOMEN SURGERY      CORONARY ARTERY BYPASS GRAFT  5/07    CABG X 3, L mammary artery to the LAD, saphenous vein graft to RCA.  a saphenous vein graft to Cx marginal artery    DIAGNOSTIC CARDIAC CATH LAB PROCEDURE  3/30/07    PTCA to LAD    OTHER SURGICAL HISTORY  08 11 2015    exp lap, right colon resection    SKIN BIOPSY           Current Medications:    Medications    Scheduled Medications:    cloNIDine  0.05 mg Oral BID    amLODIPine  5 mg Oral Daily    buPROPion  150 mg Oral QAM    [START ON 4/4/2019] cyanocobalamin  1,000 mcg Intramuscular Q7 Days    famotidine  20 mg Oral Daily    fenofibrate  54 mg Oral Daily    megestrol  40 mg Oral Daily    pantoprazole  40 mg Oral QAM AC    pioglitazone  45 mg Oral Daily    potassium chloride  10 mEq Oral BID    predniSONE  5 mg Oral Daily    pregabalin  75 mg Oral TID    venlafaxine  150 mg Oral Daily    vitamin E  400 Units Oral Daily    sodium chloride flush  10 mL Intravenous 2 times per day    carvedilol  25 mg Oral BID    enoxaparin  30 mg Subcutaneous Daily    cefTRIAXone (ROCEPHIN) IV  1 g Intravenous Q24H    insulin lispro  0-6 Units Subcutaneous TID WC    insulin lispro  0-3 Units Subcutaneous Nightly     PRN Medications: ALPRAZolam, sodium chloride flush, magnesium hydroxide, ondansetron, morphine, hydrALAZINE (APRESOLINE) ivpb, cloNIDine, glucose, dextrose, glucagon (rDNA), dextrose      Allergies:  Tramadol and Vicodin [hydrocodone-acetaminophen]    Social History:   TOBACCO:   reports that she has quit smoking. She has never used smokeless tobacco.  ETOH:   reports that she does not drink alcohol. Family History:   Melanoma in brother ans sister    REVIEW OF SYSTEMS:    Please refer to HPI, other ROS negative    PHYSICAL EXAM:      Vitals:    BP (!) 186/78   Pulse 61   Temp 97.6 °F (36.4 °C) (Oral)   Resp 18   Ht 5' 2\" (1.575 m)   Wt 152 lb 3.2 oz (69 kg)   SpO2 92%   BMI 27.84 kg/m²      CONSTITUTIONAL: awake, alert, cooperative, no apparent distress   EYES: aureliano, no palor  ENT: Normocephalic, without obvious abnormality, atraumatic   NECK: supple, symmetrical, no jugular venous distension and no carotid bruits   HEMATOLOGIC/LYMPHATIC: no cervical, supraclavicular or axillary lymphadenopathy   LUNGS:ctab  CARDIOVASCULAR: regular rate and rhythm, normal S1 and S2, no murmur noted   ABDOMEN: normal bowel sounds x 4, soft, non-distended, non-tender, no masses palpated, no hepatosplenomegaly   MUSCULOSKELETAL: full range of motion noted, tone is normal   NEUROLOGIC: awake, alert, oriented to name, place and time. Motor skills grossly intact. SKIN: Normal skin color, texture, turgor and no jaundice. appears intact   EXTREMITIES: no LE edema         DATA:    ABGs:   Lab Results   Component Value Date    PO2ART 112 08/12/2015    PYQ3KBG 24.0 08/12/2015     CBC:   Recent Labs     03/28/19 2110 03/30/19  1230   WBC 9.4 11.1*   HGB 12.6 11.7*   PLT 70* 66*     BMP:    Recent Labs     03/28/19 2110   *   K 3.4*   CL 93*   CO2 22   BUN 26*   CREATININE 1.6*   GLUCOSE 305*     Magnesium:   Lab Results   Component Value Date    MG 1.7 03/28/2019     Hepatic:   Recent Labs     03/28/19 2110   AST 21   ALT 13   BILITOT 0.6   ALKPHOS 86     Recent Labs     03/28/19 2110   LIPASE 26     No results for input(s): PROTIME, INR in the last 72 hours. No results for input(s): PTT in the last 72 hours. Lipids: No results for input(s): CHOL, HDL in the last 72 hours.     Invalid input(s): LDLCALCU  INR: No results for input(s): INR in the last 72 hours. TSH: No results found for: TSH    Intake/Output Summary (Last 24 hours) at 3/30/2019 1354  Last data filed at 3/30/2019 0510  Gross per 24 hour   Intake 1958 ml   Output --   Net 1958 ml      sodium chloride 100 mL/hr at 03/30/19 0405    dextrose     Nausea and emesis:     URCONSTITUTIONAL: awake, alert, cooperative, no apparent distress   EYES: pupils equal, round and reactive to light, sclera clear and conjunctiva normal   ENT: Normocephalic, without obvious abnormality, atraumatic   NECK: supple, symmetrical, no jugular venous distension and no carotid bruits   HEMATOLOGIC/LYMPHATIC: no cervical, supraclavicular or axillary lymphadenopathy   LUNGS: no increased work of breathing and clear to auscultation   CARDIOVASCULAR: regular rate and rhythm, normal S1 and S2, no murmur noted   ABDOMEN: normal bowel sounds x 4, soft, non-distended, non-tender, no masses palpated, no hepatosplenomegaly   MUSCULOSKELETAL: full range of motion noted, tone is normal   NEUROLOGIC: awake, alert, oriented to name, place and time. Motor skills grossly intact. SKIN: Normal skin color, texture, turgor and no jaundice. appears intact   EXTREMITIES: no LE edema     Nausea and emesis:Sx resolved with antiemetics    UTI:is on antimicrobials    Hyponatremia/Fluid depletion/RF: Adequate hydration, renal was  following    ITP Chronic: Platelet counts at baseline and stable.  No bleeding    Met Carcinoid: is on octreotide SQ as OP    Mild NC anemia    Thank you for letting us be part of the care    Follow with Dr Gertrudis Loza as OP, noted plans for discharge    Jacob Heaton MD  3/30/2019  1:54 PM

## 2019-03-31 LAB
CULTURE: ABNORMAL
CULTURE: ABNORMAL
Lab: ABNORMAL
SPECIMEN: ABNORMAL
TOTAL COLONY COUNT: ABNORMAL

## 2019-04-01 LAB
EKG ATRIAL RATE: 60 BPM
EKG DIAGNOSIS: NORMAL
EKG P AXIS: 85 DEGREES
EKG P-R INTERVAL: 160 MS
EKG Q-T INTERVAL: 484 MS
EKG QRS DURATION: 106 MS
EKG QTC CALCULATION (BAZETT): 484 MS
EKG R AXIS: 44 DEGREES
EKG T AXIS: 51 DEGREES
EKG VENTRICULAR RATE: 60 BPM

## 2019-04-29 PROBLEM — E86.0 DEHYDRATION: Status: RESOLVED | Noted: 2019-03-29 | Resolved: 2019-04-29

## 2019-04-30 ENCOUNTER — HOSPITAL ENCOUNTER (OUTPATIENT)
Age: 79
Setting detail: SPECIMEN
Discharge: HOME | End: 2019-04-30
Payer: MEDICARE

## 2019-04-30 LAB
ALBUMIN SERPL-MCNC: 4.3 GM/DL (ref 3.4–5)
ALP BLD-CCNC: 88 IU/L (ref 40–129)
ALT SERPL-CCNC: 15 U/L (ref 10–40)
ANION GAP SERPL CALCULATED.3IONS-SCNC: 14 MMOL/L (ref 4–16)
AST SERPL-CCNC: 16 IU/L (ref 15–37)
BILIRUB SERPL-MCNC: 0.5 MG/DL (ref 0–1)
BUN BLDV-MCNC: 16 MG/DL (ref 6–23)
CALCIUM SERPL-MCNC: 9 MG/DL (ref 8.3–10.6)
CHLORIDE BLD-SCNC: 102 MMOL/L (ref 99–110)
CO2: 24 MMOL/L (ref 21–32)
CREAT SERPL-MCNC: 1.4 MG/DL (ref 0.6–1.1)
GFR AFRICAN AMERICAN: 44 ML/MIN/1.73M2
GFR NON-AFRICAN AMERICAN: 36 ML/MIN/1.73M2
GLUCOSE BLD-MCNC: 323 MG/DL (ref 70–99)
LACTATE DEHYDROGENASE: 231 IU/L (ref 120–246)
POTASSIUM SERPL-SCNC: 3.7 MMOL/L (ref 3.5–5.1)
SODIUM BLD-SCNC: 140 MMOL/L (ref 135–145)
TOTAL PROTEIN: 6.6 GM/DL (ref 6.4–8.2)

## 2019-04-30 PROCEDURE — 83615 LACTATE (LD) (LDH) ENZYME: CPT

## 2019-04-30 PROCEDURE — 80053 COMPREHEN METABOLIC PANEL: CPT

## 2019-08-27 ENCOUNTER — HOSPITAL ENCOUNTER (OUTPATIENT)
Age: 79
Setting detail: SPECIMEN
Discharge: HOME | End: 2019-08-27
Payer: MEDICARE

## 2019-08-27 LAB
ALBUMIN SERPL-MCNC: 4.6 GM/DL (ref 3.4–5)
ALP BLD-CCNC: 76 IU/L (ref 40–129)
ALT SERPL-CCNC: 15 U/L (ref 10–40)
ANION GAP SERPL CALCULATED.3IONS-SCNC: 16 MMOL/L (ref 4–16)
AST SERPL-CCNC: 18 IU/L (ref 15–37)
BILIRUB SERPL-MCNC: 0.6 MG/DL (ref 0–1)
BUN BLDV-MCNC: 34 MG/DL (ref 6–23)
CALCIUM SERPL-MCNC: 9.1 MG/DL (ref 8.3–10.6)
CHLORIDE BLD-SCNC: 100 MMOL/L (ref 99–110)
CO2: 22 MMOL/L (ref 21–32)
CREAT SERPL-MCNC: 1.4 MG/DL (ref 0.6–1.1)
GFR AFRICAN AMERICAN: 44 ML/MIN/1.73M2
GFR NON-AFRICAN AMERICAN: 36 ML/MIN/1.73M2
GLUCOSE BLD-MCNC: 275 MG/DL (ref 70–99)
LACTATE DEHYDROGENASE: 223 IU/L (ref 120–246)
POTASSIUM SERPL-SCNC: 4 MMOL/L (ref 3.5–5.1)
SODIUM BLD-SCNC: 138 MMOL/L (ref 135–145)
TOTAL PROTEIN: 7.1 GM/DL (ref 6.4–8.2)

## 2019-08-27 PROCEDURE — 80053 COMPREHEN METABOLIC PANEL: CPT

## 2019-08-27 PROCEDURE — 83615 LACTATE (LD) (LDH) ENZYME: CPT

## 2019-10-14 ENCOUNTER — HOSPITAL ENCOUNTER (OUTPATIENT)
Age: 79
Setting detail: OBSERVATION
Discharge: HOME | End: 2019-10-17
Attending: EMERGENCY MEDICINE | Admitting: INTERNAL MEDICINE
Payer: MEDICARE

## 2019-10-14 ENCOUNTER — APPOINTMENT (OUTPATIENT)
Dept: CT IMAGING | Age: 79
End: 2019-10-14
Payer: MEDICARE

## 2019-10-14 ENCOUNTER — APPOINTMENT (OUTPATIENT)
Dept: GENERAL RADIOLOGY | Age: 79
End: 2019-10-14
Payer: MEDICARE

## 2019-10-14 ENCOUNTER — APPOINTMENT (OUTPATIENT)
Dept: ULTRASOUND IMAGING | Age: 79
End: 2019-10-14
Payer: MEDICARE

## 2019-10-14 DIAGNOSIS — N39.0 URINARY TRACT INFECTION WITHOUT HEMATURIA, SITE UNSPECIFIED: ICD-10-CM

## 2019-10-14 DIAGNOSIS — R27.0 ATAXIA: ICD-10-CM

## 2019-10-14 DIAGNOSIS — G62.9 PERIPHERAL POLYNEUROPATHY: Primary | ICD-10-CM

## 2019-10-14 PROBLEM — R29.90 STROKE-LIKE SYMPTOMS: Status: ACTIVE | Noted: 2019-10-14

## 2019-10-14 LAB
ALBUMIN SERPL-MCNC: 4 GM/DL (ref 3.4–5)
ALP BLD-CCNC: 71 IU/L (ref 40–129)
ALT SERPL-CCNC: 9 U/L (ref 10–40)
ANION GAP SERPL CALCULATED.3IONS-SCNC: 14 MMOL/L (ref 4–16)
AST SERPL-CCNC: 15 IU/L (ref 15–37)
BACTERIA: ABNORMAL /HPF
BASOPHILS ABSOLUTE: 0.1 K/CU MM
BASOPHILS RELATIVE PERCENT: 0.7 % (ref 0–1)
BILIRUB SERPL-MCNC: 0.5 MG/DL (ref 0–1)
BILIRUBIN URINE: NEGATIVE MG/DL
BLOOD, URINE: NEGATIVE
BUN BLDV-MCNC: 21 MG/DL (ref 6–23)
CALCIUM SERPL-MCNC: 8.5 MG/DL (ref 8.3–10.6)
CHLORIDE BLD-SCNC: 104 MMOL/L (ref 99–110)
CLARITY: ABNORMAL
CO2: 18 MMOL/L (ref 21–32)
COLOR: YELLOW
CREAT SERPL-MCNC: 1.5 MG/DL (ref 0.6–1.1)
DIFFERENTIAL TYPE: ABNORMAL
EOSINOPHILS ABSOLUTE: 0.3 K/CU MM
EOSINOPHILS RELATIVE PERCENT: 2.3 % (ref 0–3)
GFR AFRICAN AMERICAN: 41 ML/MIN/1.73M2
GFR NON-AFRICAN AMERICAN: 34 ML/MIN/1.73M2
GLUCOSE BLD-MCNC: 130 MG/DL (ref 70–99)
GLUCOSE BLD-MCNC: 194 MG/DL (ref 70–99)
GLUCOSE, URINE: >500 MG/DL
HCT VFR BLD CALC: 37.2 % (ref 37–47)
HEMOGLOBIN: 11.1 GM/DL (ref 12.5–16)
IMMATURE NEUTROPHIL %: 0.3 % (ref 0–0.43)
KETONES, URINE: NEGATIVE MG/DL
LEUKOCYTE ESTERASE, URINE: NEGATIVE
LYMPHOCYTES ABSOLUTE: 1.6 K/CU MM
LYMPHOCYTES RELATIVE PERCENT: 13.1 % (ref 24–44)
MCH RBC QN AUTO: 26.6 PG (ref 27–31)
MCHC RBC AUTO-ENTMCNC: 29.8 % (ref 32–36)
MCV RBC AUTO: 89.2 FL (ref 78–100)
MONOCYTES ABSOLUTE: 1.1 K/CU MM
MONOCYTES RELATIVE PERCENT: 9.1 % (ref 0–4)
NITRITE URINE, QUANTITATIVE: POSITIVE
NUCLEATED RBC %: 0 %
PDW BLD-RTO: 14.2 % (ref 11.7–14.9)
PH, URINE: 5 (ref 5–8)
PLATELET # BLD: 84 K/CU MM (ref 140–440)
PMV BLD AUTO: 11.5 FL (ref 7.5–11.1)
POTASSIUM SERPL-SCNC: 3.6 MMOL/L (ref 3.5–5.1)
PROTEIN UA: NEGATIVE MG/DL
RBC # BLD: 4.17 M/CU MM (ref 4.2–5.4)
RBC URINE: <1 /HPF (ref 0–6)
SEGMENTED NEUTROPHILS ABSOLUTE COUNT: 8.9 K/CU MM
SEGMENTED NEUTROPHILS RELATIVE PERCENT: 74.5 % (ref 36–66)
SODIUM BLD-SCNC: 136 MMOL/L (ref 135–145)
SPECIFIC GRAVITY UA: 1.01 (ref 1–1.03)
SQUAMOUS EPITHELIAL: 2 /HPF
TOTAL IMMATURE NEUTOROPHIL: 0.04 K/CU MM
TOTAL NUCLEATED RBC: 0 K/CU MM
TOTAL PROTEIN: 7.2 GM/DL (ref 6.4–8.2)
TRICHOMONAS: ABNORMAL /HPF
TROPONIN T: <0.01 NG/ML
UROBILINOGEN, URINE: NORMAL MG/DL (ref 0.2–1)
WBC # BLD: 12 K/CU MM (ref 4–10.5)
WBC UA: 1 /HPF (ref 0–5)

## 2019-10-14 PROCEDURE — 6370000000 HC RX 637 (ALT 250 FOR IP): Performed by: NURSE PRACTITIONER

## 2019-10-14 PROCEDURE — G0378 HOSPITAL OBSERVATION PER HR: HCPCS

## 2019-10-14 PROCEDURE — 93010 ELECTROCARDIOGRAM REPORT: CPT | Performed by: INTERNAL MEDICINE

## 2019-10-14 PROCEDURE — 84484 ASSAY OF TROPONIN QUANT: CPT

## 2019-10-14 PROCEDURE — 96366 THER/PROPH/DIAG IV INF ADDON: CPT

## 2019-10-14 PROCEDURE — 70450 CT HEAD/BRAIN W/O DYE: CPT

## 2019-10-14 PROCEDURE — 94761 N-INVAS EAR/PLS OXIMETRY MLT: CPT

## 2019-10-14 PROCEDURE — 93880 EXTRACRANIAL BILAT STUDY: CPT

## 2019-10-14 PROCEDURE — 96372 THER/PROPH/DIAG INJ SC/IM: CPT

## 2019-10-14 PROCEDURE — 99285 EMERGENCY DEPT VISIT HI MDM: CPT

## 2019-10-14 PROCEDURE — 96361 HYDRATE IV INFUSION ADD-ON: CPT

## 2019-10-14 PROCEDURE — 87186 SC STD MICRODIL/AGAR DIL: CPT

## 2019-10-14 PROCEDURE — 80053 COMPREHEN METABOLIC PANEL: CPT

## 2019-10-14 PROCEDURE — 6360000002 HC RX W HCPCS: Performed by: EMERGENCY MEDICINE

## 2019-10-14 PROCEDURE — 81001 URINALYSIS AUTO W/SCOPE: CPT

## 2019-10-14 PROCEDURE — 93005 ELECTROCARDIOGRAM TRACING: CPT | Performed by: EMERGENCY MEDICINE

## 2019-10-14 PROCEDURE — 87086 URINE CULTURE/COLONY COUNT: CPT

## 2019-10-14 PROCEDURE — 85025 COMPLETE CBC W/AUTO DIFF WBC: CPT

## 2019-10-14 PROCEDURE — 6360000002 HC RX W HCPCS: Performed by: NURSE PRACTITIONER

## 2019-10-14 PROCEDURE — 82962 GLUCOSE BLOOD TEST: CPT

## 2019-10-14 PROCEDURE — 2580000003 HC RX 258: Performed by: NURSE PRACTITIONER

## 2019-10-14 PROCEDURE — 2580000003 HC RX 258: Performed by: EMERGENCY MEDICINE

## 2019-10-14 PROCEDURE — 96365 THER/PROPH/DIAG IV INF INIT: CPT

## 2019-10-14 PROCEDURE — 71046 X-RAY EXAM CHEST 2 VIEWS: CPT

## 2019-10-14 PROCEDURE — 36415 COLL VENOUS BLD VENIPUNCTURE: CPT

## 2019-10-14 RX ORDER — ATORVASTATIN CALCIUM 40 MG/1
40 TABLET, FILM COATED ORAL DAILY
Refills: 3 | Status: ON HOLD | COMMUNITY
Start: 2019-07-27 | End: 2019-10-17 | Stop reason: SDUPTHER

## 2019-10-14 RX ORDER — ATORVASTATIN CALCIUM 40 MG/1
80 TABLET, FILM COATED ORAL NIGHTLY
Status: DISCONTINUED | OUTPATIENT
Start: 2019-10-14 | End: 2019-10-15 | Stop reason: SDUPTHER

## 2019-10-14 RX ORDER — ONDANSETRON 2 MG/ML
4 INJECTION INTRAMUSCULAR; INTRAVENOUS EVERY 6 HOURS PRN
Status: DISCONTINUED | OUTPATIENT
Start: 2019-10-14 | End: 2019-10-17 | Stop reason: HOSPADM

## 2019-10-14 RX ORDER — PIOGLITAZONEHYDROCHLORIDE 45 MG/1
45 TABLET ORAL DAILY
Status: DISCONTINUED | OUTPATIENT
Start: 2019-10-14 | End: 2019-10-14

## 2019-10-14 RX ORDER — AMLODIPINE BESYLATE 5 MG/1
5 TABLET ORAL DAILY
Status: DISCONTINUED | OUTPATIENT
Start: 2019-10-14 | End: 2019-10-15

## 2019-10-14 RX ORDER — PANTOPRAZOLE SODIUM 40 MG/1
40 TABLET, DELAYED RELEASE ORAL
Status: DISCONTINUED | OUTPATIENT
Start: 2019-10-15 | End: 2019-10-14

## 2019-10-14 RX ORDER — VALSARTAN AND HYDROCHLOROTHIAZIDE 320; 25 MG/1; MG/1
1 TABLET, FILM COATED ORAL DAILY
Refills: 3 | COMMUNITY
Start: 2019-07-27

## 2019-10-14 RX ORDER — PANTOPRAZOLE SODIUM 40 MG/1
40 TABLET, DELAYED RELEASE ORAL
Status: DISCONTINUED | OUTPATIENT
Start: 2019-10-15 | End: 2019-10-17 | Stop reason: HOSPADM

## 2019-10-14 RX ORDER — SODIUM CHLORIDE 9 MG/ML
1000 INJECTION, SOLUTION INTRAVENOUS CONTINUOUS
Status: DISCONTINUED | OUTPATIENT
Start: 2019-10-14 | End: 2019-10-15

## 2019-10-14 RX ORDER — VENLAFAXINE HYDROCHLORIDE 150 MG/1
150 CAPSULE, EXTENDED RELEASE ORAL DAILY
Status: DISCONTINUED | OUTPATIENT
Start: 2019-10-15 | End: 2019-10-17 | Stop reason: HOSPADM

## 2019-10-14 RX ORDER — FLUCONAZOLE 100 MG/1
150 TABLET ORAL
Status: DISCONTINUED | OUTPATIENT
Start: 2019-10-14 | End: 2019-10-17 | Stop reason: HOSPADM

## 2019-10-14 RX ORDER — SODIUM CHLORIDE 0.9 % (FLUSH) 0.9 %
10 SYRINGE (ML) INJECTION EVERY 12 HOURS SCHEDULED
Status: DISCONTINUED | OUTPATIENT
Start: 2019-10-14 | End: 2019-10-17 | Stop reason: HOSPADM

## 2019-10-14 RX ORDER — ASPIRIN 300 MG/1
300 SUPPOSITORY RECTAL DAILY
Status: DISCONTINUED | OUTPATIENT
Start: 2019-10-14 | End: 2019-10-17 | Stop reason: HOSPADM

## 2019-10-14 RX ORDER — ASPIRIN 81 MG/1
81 TABLET ORAL DAILY
Status: DISCONTINUED | OUTPATIENT
Start: 2019-10-14 | End: 2019-10-17 | Stop reason: HOSPADM

## 2019-10-14 RX ORDER — SODIUM CHLORIDE 0.9 % (FLUSH) 0.9 %
10 SYRINGE (ML) INJECTION PRN
Status: DISCONTINUED | OUTPATIENT
Start: 2019-10-14 | End: 2019-10-17 | Stop reason: HOSPADM

## 2019-10-14 RX ORDER — LABETALOL 20 MG/4 ML (5 MG/ML) INTRAVENOUS SYRINGE
10 EVERY 10 MIN PRN
Status: DISCONTINUED | OUTPATIENT
Start: 2019-10-14 | End: 2019-10-17 | Stop reason: HOSPADM

## 2019-10-14 RX ORDER — ONDANSETRON 2 MG/ML
4 INJECTION INTRAMUSCULAR; INTRAVENOUS EVERY 6 HOURS PRN
Status: DISCONTINUED | OUTPATIENT
Start: 2019-10-14 | End: 2019-10-14 | Stop reason: SDUPTHER

## 2019-10-14 RX ORDER — POTASSIUM CHLORIDE 750 MG/1
10 TABLET, FILM COATED, EXTENDED RELEASE ORAL 2 TIMES DAILY WITH MEALS
Status: DISCONTINUED | OUTPATIENT
Start: 2019-10-14 | End: 2019-10-15

## 2019-10-14 RX ORDER — M-VIT,TX,IRON,MINS/CALC/FOLIC 27MG-0.4MG
1 TABLET ORAL DAILY
Status: DISCONTINUED | OUTPATIENT
Start: 2019-10-15 | End: 2019-10-17 | Stop reason: HOSPADM

## 2019-10-14 RX ORDER — VITAMIN E 268 MG
400 CAPSULE ORAL DAILY
Status: DISCONTINUED | OUTPATIENT
Start: 2019-10-15 | End: 2019-10-17 | Stop reason: HOSPADM

## 2019-10-14 RX ORDER — HEPARIN SODIUM 5000 [USP'U]/ML
5000 INJECTION, SOLUTION INTRAVENOUS; SUBCUTANEOUS EVERY 8 HOURS SCHEDULED
Status: DISCONTINUED | OUTPATIENT
Start: 2019-10-14 | End: 2019-10-17 | Stop reason: HOSPADM

## 2019-10-14 RX ORDER — BUPROPION HYDROCHLORIDE 150 MG/1
150 TABLET ORAL EVERY MORNING
Status: DISCONTINUED | OUTPATIENT
Start: 2019-10-15 | End: 2019-10-17 | Stop reason: HOSPADM

## 2019-10-14 RX ORDER — CLONIDINE HYDROCHLORIDE 0.1 MG/1
0.05 TABLET ORAL 2 TIMES DAILY
Status: DISCONTINUED | OUTPATIENT
Start: 2019-10-14 | End: 2019-10-15

## 2019-10-14 RX ORDER — CANAGLIFLOZIN 300 MG/1
300 TABLET, FILM COATED ORAL DAILY
Status: ON HOLD | COMMUNITY
Start: 2019-10-08 | End: 2019-10-17 | Stop reason: HOSPADM

## 2019-10-14 RX ORDER — CYANOCOBALAMIN 1000 UG/ML
1000 INJECTION INTRAMUSCULAR; SUBCUTANEOUS
Status: DISCONTINUED | OUTPATIENT
Start: 2019-10-15 | End: 2019-10-17 | Stop reason: HOSPADM

## 2019-10-14 RX ORDER — MEGESTROL ACETATE 20 MG/1
40 TABLET ORAL DAILY
Status: DISCONTINUED | OUTPATIENT
Start: 2019-10-15 | End: 2019-10-17 | Stop reason: HOSPADM

## 2019-10-14 RX ORDER — PREGABALIN 75 MG/1
75 CAPSULE ORAL 3 TIMES DAILY
Status: DISCONTINUED | OUTPATIENT
Start: 2019-10-14 | End: 2019-10-17 | Stop reason: HOSPADM

## 2019-10-14 RX ORDER — CARVEDILOL 25 MG/1
25 TABLET ORAL 2 TIMES DAILY WITH MEALS
Status: DISCONTINUED | OUTPATIENT
Start: 2019-10-14 | End: 2019-10-17 | Stop reason: HOSPADM

## 2019-10-14 RX ADMIN — CEFTRIAXONE SODIUM 1 G: 1 INJECTION, POWDER, FOR SOLUTION INTRAMUSCULAR; INTRAVENOUS at 15:46

## 2019-10-14 RX ADMIN — ATORVASTATIN CALCIUM 80 MG: 40 TABLET, FILM COATED ORAL at 22:27

## 2019-10-14 RX ADMIN — CARVEDILOL 25 MG: 25 TABLET, FILM COATED ORAL at 22:28

## 2019-10-14 RX ADMIN — POTASSIUM CHLORIDE 10 MEQ: 750 TABLET, FILM COATED, EXTENDED RELEASE ORAL at 22:28

## 2019-10-14 RX ADMIN — PREGABALIN 75 MG: 75 CAPSULE ORAL at 22:27

## 2019-10-14 RX ADMIN — ASPIRIN 81 MG: 81 TABLET, COATED ORAL at 22:28

## 2019-10-14 RX ADMIN — HEPARIN SODIUM 5000 UNITS: 5000 INJECTION INTRAVENOUS; SUBCUTANEOUS at 22:29

## 2019-10-14 RX ADMIN — FLUCONAZOLE 150 MG: 100 TABLET ORAL at 22:36

## 2019-10-14 RX ADMIN — SODIUM CHLORIDE 1000 ML: 9 INJECTION, SOLUTION INTRAVENOUS at 22:25

## 2019-10-14 ASSESSMENT — PAIN SCALES - GENERAL: PAINLEVEL_OUTOF10: 0

## 2019-10-15 ENCOUNTER — APPOINTMENT (OUTPATIENT)
Dept: MRI IMAGING | Age: 79
End: 2019-10-15
Payer: MEDICARE

## 2019-10-15 LAB
ANION GAP SERPL CALCULATED.3IONS-SCNC: 13 MMOL/L (ref 4–16)
BACTERIA: NEGATIVE /HPF
BILIRUBIN URINE: NEGATIVE MG/DL
BLOOD, URINE: NEGATIVE
BUN BLDV-MCNC: 20 MG/DL (ref 6–23)
CALCIUM SERPL-MCNC: 8.4 MG/DL (ref 8.3–10.6)
CHLORIDE BLD-SCNC: 106 MMOL/L (ref 99–110)
CHOLESTEROL: 84 MG/DL
CLARITY: CLEAR
CO2: 20 MMOL/L (ref 21–32)
COLOR: YELLOW
CREAT SERPL-MCNC: 1.3 MG/DL (ref 0.6–1.1)
CREATININE URINE: 47.2 MG/DL (ref 28–217)
CREATININE URINE: 48.4 MG/DL
ESTIMATED AVERAGE GLUCOSE: 217 MG/DL
GFR AFRICAN AMERICAN: 48 ML/MIN/1.73M2
GFR NON-AFRICAN AMERICAN: 40 ML/MIN/1.73M2
GLUCOSE BLD-MCNC: 167 MG/DL (ref 70–99)
GLUCOSE BLD-MCNC: 198 MG/DL (ref 70–99)
GLUCOSE BLD-MCNC: 249 MG/DL (ref 70–99)
GLUCOSE, URINE: >500 MG/DL
HBA1C MFR BLD: 9.2 % (ref 4.2–6.3)
HCT VFR BLD CALC: 32.8 % (ref 37–47)
HDLC SERPL-MCNC: 32 MG/DL
HEMOGLOBIN: 10 GM/DL (ref 12.5–16)
HOMOCYSTEINE: ABNORMAL UMOL/L (ref 0–10)
KETONES, URINE: NEGATIVE MG/DL
LDL CHOLESTEROL DIRECT: 26 MG/DL
LEUKOCYTE ESTERASE, URINE: ABNORMAL
LV EF: 53 %
LVEF MODALITY: NORMAL
MCH RBC QN AUTO: 26.5 PG (ref 27–31)
MCHC RBC AUTO-ENTMCNC: 30.5 % (ref 32–36)
MCV RBC AUTO: 87 FL (ref 78–100)
NITRITE URINE, QUANTITATIVE: NEGATIVE
PDW BLD-RTO: 14.1 % (ref 11.7–14.9)
PH, URINE: 5 (ref 5–8)
PLATELET # BLD: 66 K/CU MM (ref 140–440)
PMV BLD AUTO: 10.9 FL (ref 7.5–11.1)
POTASSIUM SERPL-SCNC: 3.6 MMOL/L (ref 3.5–5.1)
PROT/CREAT RATIO, UR: NORMAL
PROTEIN UA: NEGATIVE MG/DL
RBC # BLD: 3.77 M/CU MM (ref 4.2–5.4)
RBC URINE: <1 /HPF (ref 0–6)
SODIUM BLD-SCNC: 139 MMOL/L (ref 135–145)
SODIUM URINE: 57 MMOL/L (ref 35–167)
SPECIFIC GRAVITY UA: 1.01 (ref 1–1.03)
SQUAMOUS EPITHELIAL: 1 /HPF
TRICHOMONAS: ABNORMAL /HPF
TRIGL SERPL-MCNC: 182 MG/DL
TROPONIN T: <0.01 NG/ML
TROPONIN T: <0.01 NG/ML
URINE TOTAL PROTEIN: 6.4 MG/DL
UROBILINOGEN, URINE: NORMAL MG/DL (ref 0.2–1)
WBC # BLD: 10.1 K/CU MM (ref 4–10.5)
WBC UA: 6 /HPF (ref 0–5)

## 2019-10-15 PROCEDURE — 97535 SELF CARE MNGMENT TRAINING: CPT

## 2019-10-15 PROCEDURE — 6360000002 HC RX W HCPCS: Performed by: HOSPITALIST

## 2019-10-15 PROCEDURE — 84156 ASSAY OF PROTEIN URINE: CPT

## 2019-10-15 PROCEDURE — 6370000000 HC RX 637 (ALT 250 FOR IP): Performed by: NURSE PRACTITIONER

## 2019-10-15 PROCEDURE — 84300 ASSAY OF URINE SODIUM: CPT

## 2019-10-15 PROCEDURE — 80061 LIPID PANEL: CPT

## 2019-10-15 PROCEDURE — 2580000003 HC RX 258: Performed by: HOSPITALIST

## 2019-10-15 PROCEDURE — 97162 PT EVAL MOD COMPLEX 30 MIN: CPT

## 2019-10-15 PROCEDURE — 93306 TTE W/DOPPLER COMPLETE: CPT

## 2019-10-15 PROCEDURE — 80048 BASIC METABOLIC PNL TOTAL CA: CPT

## 2019-10-15 PROCEDURE — 81001 URINALYSIS AUTO W/SCOPE: CPT

## 2019-10-15 PROCEDURE — 83090 ASSAY OF HOMOCYSTEINE: CPT

## 2019-10-15 PROCEDURE — 83721 ASSAY OF BLOOD LIPOPROTEIN: CPT

## 2019-10-15 PROCEDURE — 97116 GAIT TRAINING THERAPY: CPT

## 2019-10-15 PROCEDURE — 92610 EVALUATE SWALLOWING FUNCTION: CPT

## 2019-10-15 PROCEDURE — 82570 ASSAY OF URINE CREATININE: CPT

## 2019-10-15 PROCEDURE — 6370000000 HC RX 637 (ALT 250 FOR IP): Performed by: HOSPITALIST

## 2019-10-15 PROCEDURE — 83036 HEMOGLOBIN GLYCOSYLATED A1C: CPT

## 2019-10-15 PROCEDURE — 70551 MRI BRAIN STEM W/O DYE: CPT

## 2019-10-15 PROCEDURE — 97166 OT EVAL MOD COMPLEX 45 MIN: CPT

## 2019-10-15 PROCEDURE — 96372 THER/PROPH/DIAG INJ SC/IM: CPT

## 2019-10-15 PROCEDURE — 82962 GLUCOSE BLOOD TEST: CPT

## 2019-10-15 PROCEDURE — G0378 HOSPITAL OBSERVATION PER HR: HCPCS

## 2019-10-15 PROCEDURE — 84484 ASSAY OF TROPONIN QUANT: CPT

## 2019-10-15 PROCEDURE — 96366 THER/PROPH/DIAG IV INF ADDON: CPT

## 2019-10-15 PROCEDURE — 85027 COMPLETE CBC AUTOMATED: CPT

## 2019-10-15 PROCEDURE — 36415 COLL VENOUS BLD VENIPUNCTURE: CPT

## 2019-10-15 PROCEDURE — 96361 HYDRATE IV INFUSION ADD-ON: CPT

## 2019-10-15 PROCEDURE — 6360000002 HC RX W HCPCS: Performed by: NURSE PRACTITIONER

## 2019-10-15 PROCEDURE — 2580000003 HC RX 258: Performed by: NURSE PRACTITIONER

## 2019-10-15 RX ORDER — ATORVASTATIN CALCIUM 40 MG/1
40 TABLET, FILM COATED ORAL NIGHTLY
Status: DISCONTINUED | OUTPATIENT
Start: 2019-10-15 | End: 2019-10-15

## 2019-10-15 RX ORDER — AMLODIPINE BESYLATE 5 MG/1
5 TABLET ORAL DAILY
Status: DISCONTINUED | OUTPATIENT
Start: 2019-10-15 | End: 2019-10-17 | Stop reason: HOSPADM

## 2019-10-15 RX ORDER — ATORVASTATIN CALCIUM 40 MG/1
80 TABLET, FILM COATED ORAL NIGHTLY
Status: DISCONTINUED | OUTPATIENT
Start: 2019-10-15 | End: 2019-10-17 | Stop reason: HOSPADM

## 2019-10-15 RX ORDER — VALSARTAN AND HYDROCHLOROTHIAZIDE 80; 12.5 MG/1; MG/1
2 TABLET, FILM COATED ORAL DAILY
Status: DISCONTINUED | OUTPATIENT
Start: 2019-10-16 | End: 2019-10-17 | Stop reason: HOSPADM

## 2019-10-15 RX ADMIN — HEPARIN SODIUM 5000 UNITS: 5000 INJECTION INTRAVENOUS; SUBCUTANEOUS at 06:23

## 2019-10-15 RX ADMIN — Medication 400 UNITS: at 08:41

## 2019-10-15 RX ADMIN — VENLAFAXINE HYDROCHLORIDE 150 MG: 150 CAPSULE, EXTENDED RELEASE ORAL at 08:41

## 2019-10-15 RX ADMIN — HEPARIN SODIUM 5000 UNITS: 5000 INJECTION INTRAVENOUS; SUBCUTANEOUS at 20:50

## 2019-10-15 RX ADMIN — PREGABALIN 75 MG: 75 CAPSULE ORAL at 20:50

## 2019-10-15 RX ADMIN — CARVEDILOL 25 MG: 25 TABLET, FILM COATED ORAL at 08:41

## 2019-10-15 RX ADMIN — HEPARIN SODIUM 5000 UNITS: 5000 INJECTION INTRAVENOUS; SUBCUTANEOUS at 13:38

## 2019-10-15 RX ADMIN — BUPROPION HYDROCHLORIDE 150 MG: 150 TABLET, FILM COATED, EXTENDED RELEASE ORAL at 08:41

## 2019-10-15 RX ADMIN — MEGESTROL ACETATE 40 MG: 20 TABLET ORAL at 08:40

## 2019-10-15 RX ADMIN — CARVEDILOL 25 MG: 25 TABLET, FILM COATED ORAL at 17:05

## 2019-10-15 RX ADMIN — ASPIRIN 81 MG: 81 TABLET, COATED ORAL at 08:40

## 2019-10-15 RX ADMIN — PREGABALIN 75 MG: 75 CAPSULE ORAL at 13:39

## 2019-10-15 RX ADMIN — CYANOCOBALAMIN 1000 MCG: 1000 INJECTION, SOLUTION INTRAMUSCULAR at 08:39

## 2019-10-15 RX ADMIN — POTASSIUM CHLORIDE 10 MEQ: 750 TABLET, FILM COATED, EXTENDED RELEASE ORAL at 08:41

## 2019-10-15 RX ADMIN — ATORVASTATIN CALCIUM 80 MG: 40 TABLET, FILM COATED ORAL at 20:50

## 2019-10-15 RX ADMIN — CEFTRIAXONE 1 G: 1 INJECTION, POWDER, FOR SOLUTION INTRAMUSCULAR; INTRAVENOUS at 10:20

## 2019-10-15 RX ADMIN — PREGABALIN 75 MG: 75 CAPSULE ORAL at 08:40

## 2019-10-15 RX ADMIN — Medication 10 ML: at 20:55

## 2019-10-15 RX ADMIN — MULTIPLE VITAMINS W/ MINERALS TAB 1 TABLET: TAB at 08:41

## 2019-10-15 RX ADMIN — AMLODIPINE BESYLATE 5 MG: 5 TABLET ORAL at 17:04

## 2019-10-15 RX ADMIN — PANTOPRAZOLE SODIUM 40 MG: 40 TABLET, DELAYED RELEASE ORAL at 06:23

## 2019-10-15 ASSESSMENT — PAIN SCALES - GENERAL
PAINLEVEL_OUTOF10: 0

## 2019-10-16 LAB
ESTIMATED AVERAGE GLUCOSE: 212 MG/DL
GLUCOSE BLD-MCNC: 118 MG/DL (ref 70–99)
GLUCOSE BLD-MCNC: 134 MG/DL (ref 70–99)
GLUCOSE BLD-MCNC: 150 MG/DL (ref 70–99)
HBA1C MFR BLD: 9 % (ref 4.2–6.3)

## 2019-10-16 PROCEDURE — G0378 HOSPITAL OBSERVATION PER HR: HCPCS

## 2019-10-16 PROCEDURE — 82962 GLUCOSE BLOOD TEST: CPT

## 2019-10-16 PROCEDURE — 6360000002 HC RX W HCPCS: Performed by: HOSPITALIST

## 2019-10-16 PROCEDURE — 83036 HEMOGLOBIN GLYCOSYLATED A1C: CPT

## 2019-10-16 PROCEDURE — 6370000000 HC RX 637 (ALT 250 FOR IP): Performed by: HOSPITALIST

## 2019-10-16 PROCEDURE — 97535 SELF CARE MNGMENT TRAINING: CPT

## 2019-10-16 PROCEDURE — 36415 COLL VENOUS BLD VENIPUNCTURE: CPT

## 2019-10-16 PROCEDURE — 6370000000 HC RX 637 (ALT 250 FOR IP): Performed by: NURSE PRACTITIONER

## 2019-10-16 PROCEDURE — 6360000002 HC RX W HCPCS: Performed by: NURSE PRACTITIONER

## 2019-10-16 PROCEDURE — 96366 THER/PROPH/DIAG IV INF ADDON: CPT

## 2019-10-16 PROCEDURE — 96372 THER/PROPH/DIAG INJ SC/IM: CPT

## 2019-10-16 PROCEDURE — 97530 THERAPEUTIC ACTIVITIES: CPT

## 2019-10-16 PROCEDURE — 2580000003 HC RX 258: Performed by: NURSE PRACTITIONER

## 2019-10-16 PROCEDURE — 99205 OFFICE O/P NEW HI 60 MIN: CPT | Performed by: PSYCHIATRY & NEUROLOGY

## 2019-10-16 PROCEDURE — 2580000003 HC RX 258: Performed by: HOSPITALIST

## 2019-10-16 RX ORDER — NICOTINE POLACRILEX 4 MG
15 LOZENGE BUCCAL PRN
Status: DISCONTINUED | OUTPATIENT
Start: 2019-10-16 | End: 2019-10-17 | Stop reason: HOSPADM

## 2019-10-16 RX ORDER — DEXTROSE MONOHYDRATE 25 G/50ML
12.5 INJECTION, SOLUTION INTRAVENOUS PRN
Status: DISCONTINUED | OUTPATIENT
Start: 2019-10-16 | End: 2019-10-17 | Stop reason: HOSPADM

## 2019-10-16 RX ORDER — DEXTROSE MONOHYDRATE 50 MG/ML
100 INJECTION, SOLUTION INTRAVENOUS PRN
Status: DISCONTINUED | OUTPATIENT
Start: 2019-10-16 | End: 2019-10-17 | Stop reason: HOSPADM

## 2019-10-16 RX ORDER — B12/LEVOMEFOLATE CALCIUM/B-6 2-1.13-25
1 TABLET ORAL DAILY
Status: DISCONTINUED | OUTPATIENT
Start: 2019-10-16 | End: 2019-10-17 | Stop reason: HOSPADM

## 2019-10-16 RX ADMIN — PREGABALIN 75 MG: 75 CAPSULE ORAL at 22:16

## 2019-10-16 RX ADMIN — PANTOPRAZOLE SODIUM 40 MG: 40 TABLET, DELAYED RELEASE ORAL at 05:59

## 2019-10-16 RX ADMIN — BUPROPION HYDROCHLORIDE 150 MG: 150 TABLET, FILM COATED, EXTENDED RELEASE ORAL at 09:28

## 2019-10-16 RX ADMIN — CARVEDILOL 25 MG: 25 TABLET, FILM COATED ORAL at 16:34

## 2019-10-16 RX ADMIN — HEPARIN SODIUM 5000 UNITS: 5000 INJECTION INTRAVENOUS; SUBCUTANEOUS at 14:09

## 2019-10-16 RX ADMIN — HEPARIN SODIUM 5000 UNITS: 5000 INJECTION INTRAVENOUS; SUBCUTANEOUS at 22:08

## 2019-10-16 RX ADMIN — CARVEDILOL 25 MG: 25 TABLET, FILM COATED ORAL at 08:07

## 2019-10-16 RX ADMIN — ASPIRIN 81 MG: 81 TABLET, COATED ORAL at 09:29

## 2019-10-16 RX ADMIN — Medication 10 ML: at 09:29

## 2019-10-16 RX ADMIN — HEPARIN SODIUM 5000 UNITS: 5000 INJECTION INTRAVENOUS; SUBCUTANEOUS at 05:59

## 2019-10-16 RX ADMIN — PREGABALIN 75 MG: 75 CAPSULE ORAL at 15:27

## 2019-10-16 RX ADMIN — Medication 1 TABLET: at 10:10

## 2019-10-16 RX ADMIN — MEGESTROL ACETATE 40 MG: 20 TABLET ORAL at 09:28

## 2019-10-16 RX ADMIN — Medication 400 UNITS: at 09:28

## 2019-10-16 RX ADMIN — AMLODIPINE BESYLATE 5 MG: 5 TABLET ORAL at 09:28

## 2019-10-16 RX ADMIN — Medication 10 ML: at 22:19

## 2019-10-16 RX ADMIN — CEFTRIAXONE 1 G: 1 INJECTION, POWDER, FOR SOLUTION INTRAMUSCULAR; INTRAVENOUS at 09:32

## 2019-10-16 RX ADMIN — VENLAFAXINE HYDROCHLORIDE 150 MG: 150 CAPSULE, EXTENDED RELEASE ORAL at 09:28

## 2019-10-16 RX ADMIN — VALSARTAN AND HYDROCHLOROTHIAZIDE 2 TABLET: 80; 12.5 TABLET, FILM COATED ORAL at 09:29

## 2019-10-16 RX ADMIN — PREGABALIN 75 MG: 75 CAPSULE ORAL at 10:42

## 2019-10-16 RX ADMIN — ATORVASTATIN CALCIUM 80 MG: 40 TABLET, FILM COATED ORAL at 22:16

## 2019-10-16 RX ADMIN — MULTIPLE VITAMINS W/ MINERALS TAB 1 TABLET: TAB at 09:28

## 2019-10-16 ASSESSMENT — PAIN SCALES - GENERAL
PAINLEVEL_OUTOF10: 0

## 2019-10-17 VITALS
SYSTOLIC BLOOD PRESSURE: 161 MMHG | BODY MASS INDEX: 27.97 KG/M2 | DIASTOLIC BLOOD PRESSURE: 59 MMHG | OXYGEN SATURATION: 97 % | RESPIRATION RATE: 14 BRPM | WEIGHT: 152 LBS | HEART RATE: 93 BPM | HEIGHT: 62 IN | TEMPERATURE: 98.2 F

## 2019-10-17 LAB
CULTURE: ABNORMAL
CULTURE: ABNORMAL
EKG ATRIAL RATE: 69 BPM
EKG DIAGNOSIS: NORMAL
EKG P AXIS: 48 DEGREES
EKG P-R INTERVAL: 190 MS
EKG Q-T INTERVAL: 436 MS
EKG QRS DURATION: 102 MS
EKG QTC CALCULATION (BAZETT): 467 MS
EKG R AXIS: 36 DEGREES
EKG T AXIS: 38 DEGREES
EKG VENTRICULAR RATE: 69 BPM
GLUCOSE BLD-MCNC: 124 MG/DL (ref 70–99)
GLUCOSE BLD-MCNC: 160 MG/DL (ref 70–99)
GLUCOSE BLD-MCNC: 232 MG/DL (ref 70–99)
Lab: ABNORMAL
SPECIMEN: ABNORMAL
TOTAL COLONY COUNT: ABNORMAL

## 2019-10-17 PROCEDURE — 2580000003 HC RX 258: Performed by: HOSPITALIST

## 2019-10-17 PROCEDURE — G0378 HOSPITAL OBSERVATION PER HR: HCPCS

## 2019-10-17 PROCEDURE — 6360000002 HC RX W HCPCS: Performed by: HOSPITALIST

## 2019-10-17 PROCEDURE — 6370000000 HC RX 637 (ALT 250 FOR IP): Performed by: HOSPITALIST

## 2019-10-17 PROCEDURE — 82962 GLUCOSE BLOOD TEST: CPT

## 2019-10-17 PROCEDURE — 2580000003 HC RX 258: Performed by: NURSE PRACTITIONER

## 2019-10-17 PROCEDURE — 96372 THER/PROPH/DIAG INJ SC/IM: CPT

## 2019-10-17 PROCEDURE — 6360000002 HC RX W HCPCS: Performed by: NURSE PRACTITIONER

## 2019-10-17 PROCEDURE — 6370000000 HC RX 637 (ALT 250 FOR IP): Performed by: NURSE PRACTITIONER

## 2019-10-17 PROCEDURE — 96366 THER/PROPH/DIAG IV INF ADDON: CPT

## 2019-10-17 RX ORDER — ASPIRIN 81 MG/1
81 TABLET ORAL DAILY
Qty: 30 TABLET | Refills: 0 | Status: ON HOLD | OUTPATIENT
Start: 2019-10-18 | End: 2021-04-16

## 2019-10-17 RX ORDER — B12/LEVOMEFOLATE CALCIUM/B-6 2-1.13-25
1 TABLET ORAL DAILY
Qty: 30 TABLET | Refills: 0 | Status: SHIPPED | OUTPATIENT
Start: 2019-10-18 | End: 2022-10-04

## 2019-10-17 RX ORDER — BUPROPION HYDROCHLORIDE 150 MG/1
150 TABLET ORAL EVERY MORNING
Qty: 30 TABLET | Refills: 0 | Status: SHIPPED | OUTPATIENT
Start: 2019-10-18 | End: 2021-01-06

## 2019-10-17 RX ORDER — GLIPIZIDE 5 MG/1
10 TABLET, FILM COATED, EXTENDED RELEASE ORAL DAILY
Qty: 30 TABLET | Refills: 0 | Status: SHIPPED | OUTPATIENT
Start: 2019-10-17 | End: 2022-10-04 | Stop reason: CLARIF

## 2019-10-17 RX ORDER — ATORVASTATIN CALCIUM 40 MG/1
60 TABLET, FILM COATED ORAL DAILY
Qty: 45 TABLET | Refills: 0 | Status: ON HOLD | OUTPATIENT
Start: 2019-10-17 | End: 2021-04-28 | Stop reason: SDUPTHER

## 2019-10-17 RX ORDER — VENLAFAXINE HYDROCHLORIDE 150 MG/1
150 CAPSULE, EXTENDED RELEASE ORAL DAILY
Qty: 30 CAPSULE | Refills: 0 | Status: ON HOLD | OUTPATIENT
Start: 2019-10-18 | End: 2022-09-27 | Stop reason: SDUPTHER

## 2019-10-17 RX ADMIN — BUPROPION HYDROCHLORIDE 150 MG: 150 TABLET, FILM COATED, EXTENDED RELEASE ORAL at 08:13

## 2019-10-17 RX ADMIN — ASPIRIN 81 MG: 81 TABLET, COATED ORAL at 08:14

## 2019-10-17 RX ADMIN — MULTIPLE VITAMINS W/ MINERALS TAB 1 TABLET: TAB at 08:14

## 2019-10-17 RX ADMIN — Medication 400 UNITS: at 08:14

## 2019-10-17 RX ADMIN — CEFTRIAXONE 1 G: 1 INJECTION, POWDER, FOR SOLUTION INTRAMUSCULAR; INTRAVENOUS at 10:26

## 2019-10-17 RX ADMIN — HEPARIN SODIUM 5000 UNITS: 5000 INJECTION INTRAVENOUS; SUBCUTANEOUS at 06:13

## 2019-10-17 RX ADMIN — PANTOPRAZOLE SODIUM 40 MG: 40 TABLET, DELAYED RELEASE ORAL at 06:15

## 2019-10-17 RX ADMIN — VENLAFAXINE HYDROCHLORIDE 150 MG: 150 CAPSULE, EXTENDED RELEASE ORAL at 08:14

## 2019-10-17 RX ADMIN — AMLODIPINE BESYLATE 5 MG: 5 TABLET ORAL at 08:14

## 2019-10-17 RX ADMIN — PREGABALIN 75 MG: 75 CAPSULE ORAL at 08:14

## 2019-10-17 RX ADMIN — CARVEDILOL 25 MG: 25 TABLET, FILM COATED ORAL at 08:13

## 2019-10-17 RX ADMIN — MEGESTROL ACETATE 40 MG: 20 TABLET ORAL at 08:13

## 2019-10-17 RX ADMIN — VALSARTAN AND HYDROCHLOROTHIAZIDE 2 TABLET: 80; 12.5 TABLET, FILM COATED ORAL at 08:23

## 2019-10-17 RX ADMIN — Medication 1 TABLET: at 08:13

## 2019-10-17 RX ADMIN — Medication 10 ML: at 08:22

## 2019-10-17 ASSESSMENT — PAIN SCALES - GENERAL
PAINLEVEL_OUTOF10: 0
PAINLEVEL_OUTOF10: 0

## 2019-12-31 ENCOUNTER — HOSPITAL ENCOUNTER (OUTPATIENT)
Dept: ULTRASOUND IMAGING | Age: 79
Discharge: HOME | End: 2019-12-31
Payer: MEDICARE

## 2019-12-31 ENCOUNTER — HOSPITAL ENCOUNTER (OUTPATIENT)
Age: 79
Discharge: HOME | End: 2019-12-31
Payer: MEDICARE

## 2019-12-31 LAB
ALBUMIN SERPL-MCNC: 4 GM/DL (ref 3.4–5)
ANION GAP SERPL CALCULATED.3IONS-SCNC: 11 MMOL/L (ref 4–16)
BACTERIA: ABNORMAL /HPF
BILIRUBIN URINE: NEGATIVE MG/DL
BLOOD, URINE: NEGATIVE
BUN BLDV-MCNC: 30 MG/DL (ref 6–23)
CALCIUM SERPL-MCNC: 8.7 MG/DL (ref 8.3–10.6)
CHLORIDE BLD-SCNC: 97 MMOL/L (ref 99–110)
CLARITY: CLEAR
CO2: 26 MMOL/L (ref 21–32)
COLOR: YELLOW
CREAT SERPL-MCNC: 1.7 MG/DL (ref 0.6–1.1)
CREATININE URINE: 88.3 MG/DL (ref 28–217)
GFR AFRICAN AMERICAN: 35 ML/MIN/1.73M2
GFR NON-AFRICAN AMERICAN: 29 ML/MIN/1.73M2
GLUCOSE BLD-MCNC: 181 MG/DL (ref 70–99)
GLUCOSE, URINE: NEGATIVE MG/DL
KETONES, URINE: NEGATIVE MG/DL
LEUKOCYTE ESTERASE, URINE: ABNORMAL
MUCUS: ABNORMAL HPF
NITRITE URINE, QUANTITATIVE: NEGATIVE
PH, URINE: 5 (ref 5–8)
PHOSPHORUS: 3.8 MG/DL (ref 2.5–4.9)
POTASSIUM SERPL-SCNC: 4.5 MMOL/L (ref 3.5–5.1)
PROT/CREAT RATIO, UR: ABNORMAL
PROTEIN UA: NEGATIVE MG/DL
RBC URINE: <1 /HPF (ref 0–6)
SODIUM BLD-SCNC: 134 MMOL/L (ref 135–145)
SPECIFIC GRAVITY UA: 1.01 (ref 1–1.03)
SQUAMOUS EPITHELIAL: <1 /HPF
TRICHOMONAS: ABNORMAL /HPF
URINE TOTAL PROTEIN: 12.9 MG/DL
UROBILINOGEN, URINE: NORMAL MG/DL (ref 0.2–1)
WBC CLUMP: ABNORMAL /HPF
WBC UA: 16 /HPF (ref 0–5)

## 2019-12-31 PROCEDURE — 81001 URINALYSIS AUTO W/SCOPE: CPT

## 2019-12-31 PROCEDURE — 36415 COLL VENOUS BLD VENIPUNCTURE: CPT

## 2019-12-31 PROCEDURE — 80069 RENAL FUNCTION PANEL: CPT

## 2019-12-31 PROCEDURE — 76775 US EXAM ABDO BACK WALL LIM: CPT

## 2019-12-31 PROCEDURE — 82570 ASSAY OF URINE CREATININE: CPT

## 2019-12-31 PROCEDURE — 84156 ASSAY OF PROTEIN URINE: CPT

## 2020-01-27 ENCOUNTER — HOSPITAL ENCOUNTER (OUTPATIENT)
Dept: INFUSION THERAPY | Age: 80
Discharge: HOME | End: 2020-01-27
Payer: MEDICARE

## 2020-01-27 LAB
ALBUMIN SERPL-MCNC: 4.3 GM/DL (ref 3.4–5)
ALP BLD-CCNC: 78 IU/L (ref 40–129)
ALT SERPL-CCNC: 12 U/L (ref 10–40)
ANION GAP SERPL CALCULATED.3IONS-SCNC: 15 MMOL/L (ref 4–16)
AST SERPL-CCNC: 16 IU/L (ref 15–37)
BASOPHILS ABSOLUTE: 0.1 K/CU MM
BASOPHILS RELATIVE PERCENT: 1 % (ref 0–1)
BILIRUB SERPL-MCNC: 0.7 MG/DL (ref 0–1)
BUN BLDV-MCNC: 32 MG/DL (ref 6–23)
CALCIUM SERPL-MCNC: 8.6 MG/DL (ref 8.3–10.6)
CHLORIDE BLD-SCNC: 96 MMOL/L (ref 99–110)
CO2: 26 MMOL/L (ref 21–32)
CREAT SERPL-MCNC: 1.5 MG/DL (ref 0.6–1.1)
DIFFERENTIAL TYPE: ABNORMAL
EOSINOPHILS ABSOLUTE: 0.2 K/CU MM
EOSINOPHILS RELATIVE PERCENT: 2 % (ref 0–3)
GFR AFRICAN AMERICAN: 41 ML/MIN/1.73M2
GFR NON-AFRICAN AMERICAN: 34 ML/MIN/1.73M2
GLUCOSE BLD-MCNC: 286 MG/DL (ref 70–99)
HCT VFR BLD CALC: 34.1 % (ref 37–47)
HEMOGLOBIN: 10.9 GM/DL (ref 12.5–16)
LACTATE DEHYDROGENASE: 253 IU/L (ref 120–246)
LYMPHOCYTES ABSOLUTE: 1.5 K/CU MM
LYMPHOCYTES RELATIVE PERCENT: 16 % (ref 24–44)
MCH RBC QN AUTO: 26.4 PG (ref 27–31)
MCHC RBC AUTO-ENTMCNC: 32 % (ref 32–36)
MCV RBC AUTO: 82.6 FL (ref 78–100)
MONOCYTES ABSOLUTE: 0.7 K/CU MM
MONOCYTES RELATIVE PERCENT: 8 % (ref 0–4)
PDW BLD-RTO: 15 % (ref 11.7–14.9)
PLATELET # BLD: 52 K/CU MM (ref 140–440)
PMV BLD AUTO: 11.2 FL (ref 7.5–11.1)
POTASSIUM SERPL-SCNC: 3.6 MMOL/L (ref 3.5–5.1)
RBC # BLD: 4.13 M/CU MM (ref 4.2–5.4)
SEGMENTED NEUTROPHILS ABSOLUTE COUNT: 6.6 K/CU MM
SEGMENTED NEUTROPHILS RELATIVE PERCENT: 73 % (ref 36–66)
SODIUM BLD-SCNC: 137 MMOL/L (ref 135–145)
TOTAL PROTEIN: 6.8 GM/DL (ref 6.4–8.2)
WBC # BLD: 9.1 K/CU MM (ref 4–10.5)

## 2020-01-27 PROCEDURE — 6360000002 HC RX W HCPCS: Performed by: INTERNAL MEDICINE

## 2020-01-27 PROCEDURE — 80053 COMPREHEN METABOLIC PANEL: CPT

## 2020-01-27 PROCEDURE — 83615 LACTATE (LD) (LDH) ENZYME: CPT

## 2020-01-27 PROCEDURE — 99214 OFFICE O/P EST MOD 30 MIN: CPT

## 2020-01-27 PROCEDURE — 36415 COLL VENOUS BLD VENIPUNCTURE: CPT

## 2020-01-27 PROCEDURE — 85025 COMPLETE CBC W/AUTO DIFF WBC: CPT

## 2020-01-27 PROCEDURE — 96372 THER/PROPH/DIAG INJ SC/IM: CPT

## 2020-01-27 RX ORDER — LANREOTIDE ACETATE 120 MG/.5ML
120 INJECTION SUBCUTANEOUS ONCE
Status: DISCONTINUED | OUTPATIENT
Start: 2020-01-27 | End: 2020-01-28 | Stop reason: HOSPADM

## 2020-02-24 ENCOUNTER — HOSPITAL ENCOUNTER (OUTPATIENT)
Dept: INFUSION THERAPY | Age: 80
Discharge: HOME | End: 2020-02-24
Payer: MEDICARE

## 2020-02-24 LAB
ALBUMIN SERPL-MCNC: 4.5 GM/DL (ref 3.4–5)
ALP BLD-CCNC: 75 IU/L (ref 40–129)
ALT SERPL-CCNC: 10 U/L (ref 10–40)
ANION GAP SERPL CALCULATED.3IONS-SCNC: 16 MMOL/L (ref 4–16)
AST SERPL-CCNC: 13 IU/L (ref 15–37)
BASOPHILS ABSOLUTE: 0 K/CU MM
BASOPHILS RELATIVE PERCENT: 0.3 % (ref 0–1)
BILIRUB SERPL-MCNC: 0.4 MG/DL (ref 0–1)
BUN BLDV-MCNC: 21 MG/DL (ref 6–23)
CALCIUM SERPL-MCNC: 9.1 MG/DL (ref 8.3–10.6)
CHLORIDE BLD-SCNC: 102 MMOL/L (ref 99–110)
CO2: 22 MMOL/L (ref 21–32)
CREAT SERPL-MCNC: 1.4 MG/DL (ref 0.6–1.1)
DIFFERENTIAL TYPE: ABNORMAL
EOSINOPHILS ABSOLUTE: 0.4 K/CU MM
EOSINOPHILS RELATIVE PERCENT: 4.3 % (ref 0–3)
GFR AFRICAN AMERICAN: 44 ML/MIN/1.73M2
GFR NON-AFRICAN AMERICAN: 36 ML/MIN/1.73M2
GLUCOSE BLD-MCNC: 325 MG/DL (ref 70–99)
HCT VFR BLD CALC: 36.1 % (ref 37–47)
HEMOGLOBIN: 11.5 GM/DL (ref 12.5–16)
LACTATE DEHYDROGENASE: 225 IU/L (ref 120–246)
LYMPHOCYTES ABSOLUTE: 1.3 K/CU MM
LYMPHOCYTES RELATIVE PERCENT: 14.4 % (ref 24–44)
MCH RBC QN AUTO: 27.3 PG (ref 27–31)
MCHC RBC AUTO-ENTMCNC: 31.9 % (ref 32–36)
MCV RBC AUTO: 85.7 FL (ref 78–100)
MONOCYTES ABSOLUTE: 0.7 K/CU MM
MONOCYTES RELATIVE PERCENT: 7.4 % (ref 0–4)
PDW BLD-RTO: 14.6 % (ref 11.7–14.9)
PLATELET # BLD: 56 K/CU MM (ref 140–440)
PMV BLD AUTO: 12.1 FL (ref 7.5–11.1)
POTASSIUM SERPL-SCNC: 4 MMOL/L (ref 3.5–5.1)
RBC # BLD: 4.21 M/CU MM (ref 4.2–5.4)
SEGMENTED NEUTROPHILS ABSOLUTE COUNT: 6.5 K/CU MM
SEGMENTED NEUTROPHILS RELATIVE PERCENT: 73.6 % (ref 36–66)
SODIUM BLD-SCNC: 140 MMOL/L (ref 135–145)
TOTAL PROTEIN: 7.1 GM/DL (ref 6.4–8.2)
WBC # BLD: 8.8 K/CU MM (ref 4–10.5)

## 2020-02-24 PROCEDURE — 80053 COMPREHEN METABOLIC PANEL: CPT

## 2020-02-24 PROCEDURE — 36415 COLL VENOUS BLD VENIPUNCTURE: CPT

## 2020-02-24 PROCEDURE — 85025 COMPLETE CBC W/AUTO DIFF WBC: CPT

## 2020-02-24 PROCEDURE — 6360000002 HC RX W HCPCS: Performed by: INTERNAL MEDICINE

## 2020-02-24 PROCEDURE — 96372 THER/PROPH/DIAG INJ SC/IM: CPT

## 2020-02-24 PROCEDURE — 83615 LACTATE (LD) (LDH) ENZYME: CPT

## 2020-02-24 RX ORDER — LANREOTIDE ACETATE 120 MG/.5ML
120 INJECTION SUBCUTANEOUS ONCE
Status: DISCONTINUED | OUTPATIENT
Start: 2020-02-24 | End: 2020-02-25 | Stop reason: HOSPADM

## 2020-03-23 ENCOUNTER — HOSPITAL ENCOUNTER (OUTPATIENT)
Dept: INFUSION THERAPY | Age: 80
Discharge: HOME | End: 2020-03-23
Payer: MEDICARE

## 2020-03-23 PROCEDURE — 6360000002 HC RX W HCPCS: Performed by: INTERNAL MEDICINE

## 2020-03-23 PROCEDURE — 96372 THER/PROPH/DIAG INJ SC/IM: CPT

## 2020-03-23 RX ORDER — LANREOTIDE ACETATE 120 MG/.5ML
120 INJECTION SUBCUTANEOUS ONCE
Status: DISCONTINUED | OUTPATIENT
Start: 2020-03-23 | End: 2020-03-24 | Stop reason: HOSPADM

## 2020-04-20 ENCOUNTER — HOSPITAL ENCOUNTER (OUTPATIENT)
Dept: INFUSION THERAPY | Age: 80
Discharge: HOME | End: 2020-04-20
Payer: MEDICARE

## 2020-04-20 LAB
ALBUMIN SERPL-MCNC: 4.2 GM/DL (ref 3.4–5)
ALP BLD-CCNC: 66 IU/L (ref 40–129)
ALT SERPL-CCNC: 9 U/L (ref 10–40)
ANION GAP SERPL CALCULATED.3IONS-SCNC: 10 MMOL/L (ref 4–16)
AST SERPL-CCNC: 13 IU/L (ref 15–37)
BASOPHILS ABSOLUTE: 0.1 K/CU MM
BASOPHILS RELATIVE PERCENT: 0.6 % (ref 0–1)
BILIRUB SERPL-MCNC: 0.5 MG/DL (ref 0–1)
BUN BLDV-MCNC: 32 MG/DL (ref 6–23)
CALCIUM SERPL-MCNC: 8.5 MG/DL (ref 8.3–10.6)
CHLORIDE BLD-SCNC: 99 MMOL/L (ref 99–110)
CO2: 24 MMOL/L (ref 21–32)
CREAT SERPL-MCNC: 1.6 MG/DL (ref 0.6–1.1)
DIFFERENTIAL TYPE: ABNORMAL
EOSINOPHILS ABSOLUTE: 0.3 K/CU MM
EOSINOPHILS RELATIVE PERCENT: 4 % (ref 0–3)
GFR AFRICAN AMERICAN: 38 ML/MIN/1.73M2
GFR NON-AFRICAN AMERICAN: 31 ML/MIN/1.73M2
GLUCOSE BLD-MCNC: 329 MG/DL (ref 70–99)
HCT VFR BLD CALC: 34.1 % (ref 37–47)
HEMOGLOBIN: 10.8 GM/DL (ref 12.5–16)
LACTATE DEHYDROGENASE: 211 IU/L (ref 120–246)
LYMPHOCYTES ABSOLUTE: 1 K/CU MM
LYMPHOCYTES RELATIVE PERCENT: 11.4 % (ref 24–44)
MCH RBC QN AUTO: 26.5 PG (ref 27–31)
MCHC RBC AUTO-ENTMCNC: 31.7 % (ref 32–36)
MCV RBC AUTO: 83.8 FL (ref 78–100)
MONOCYTES ABSOLUTE: 0.7 K/CU MM
MONOCYTES RELATIVE PERCENT: 8.7 % (ref 0–4)
PDW BLD-RTO: 13.9 % (ref 11.7–14.9)
PLATELET # BLD: 34 K/CU MM (ref 140–440)
PMV BLD AUTO: 10.5 FL (ref 7.5–11.1)
POTASSIUM SERPL-SCNC: 4.3 MMOL/L (ref 3.5–5.1)
RBC # BLD: 4.07 M/CU MM (ref 4.2–5.4)
SEGMENTED NEUTROPHILS ABSOLUTE COUNT: 6.4 K/CU MM
SEGMENTED NEUTROPHILS RELATIVE PERCENT: 75.3 % (ref 36–66)
SODIUM BLD-SCNC: 133 MMOL/L (ref 135–145)
TOTAL PROTEIN: 6.4 GM/DL (ref 6.4–8.2)
WBC # BLD: 8.5 K/CU MM (ref 4–10.5)

## 2020-04-20 PROCEDURE — 36415 COLL VENOUS BLD VENIPUNCTURE: CPT

## 2020-04-20 PROCEDURE — 6360000002 HC RX W HCPCS: Performed by: INTERNAL MEDICINE

## 2020-04-20 PROCEDURE — 83615 LACTATE (LD) (LDH) ENZYME: CPT

## 2020-04-20 PROCEDURE — 85025 COMPLETE CBC W/AUTO DIFF WBC: CPT

## 2020-04-20 PROCEDURE — 80053 COMPREHEN METABOLIC PANEL: CPT

## 2020-04-20 PROCEDURE — 96372 THER/PROPH/DIAG INJ SC/IM: CPT

## 2020-04-20 RX ORDER — LANREOTIDE ACETATE 120 MG/.5ML
120 INJECTION SUBCUTANEOUS ONCE
Status: DISCONTINUED | OUTPATIENT
Start: 2020-04-20 | End: 2020-04-21 | Stop reason: HOSPADM

## 2020-04-21 PROBLEM — D37.2 NEOPLASM OF UNCERTAIN BEHAVIOR OF SMALL INTESTINE: Status: ACTIVE | Noted: 2020-04-21

## 2020-04-21 PROBLEM — E34.0 CARCINOID SYNDROME (HCC): Status: ACTIVE | Noted: 2020-04-21

## 2020-04-21 PROBLEM — D69.3 IMMUNE THROMBOCYTOPENIC PURPURA (HCC): Status: ACTIVE | Noted: 2020-04-21

## 2020-05-18 ENCOUNTER — HOSPITAL ENCOUNTER (OUTPATIENT)
Dept: INFUSION THERAPY | Age: 80
Discharge: HOME | End: 2020-05-18
Payer: MEDICARE

## 2020-05-18 LAB
ALBUMIN SERPL-MCNC: 4.6 GM/DL (ref 3.4–5)
ALP BLD-CCNC: 72 IU/L (ref 40–129)
ALT SERPL-CCNC: 12 U/L (ref 10–40)
ANION GAP SERPL CALCULATED.3IONS-SCNC: 13 MMOL/L (ref 4–16)
AST SERPL-CCNC: 18 IU/L (ref 15–37)
BASOPHILS ABSOLUTE: 0.1 K/CU MM
BASOPHILS RELATIVE PERCENT: 0.9 % (ref 0–1)
BILIRUB SERPL-MCNC: 0.6 MG/DL (ref 0–1)
BUN BLDV-MCNC: 24 MG/DL (ref 6–23)
CALCIUM SERPL-MCNC: 8.9 MG/DL (ref 8.3–10.6)
CHLORIDE BLD-SCNC: 97 MMOL/L (ref 99–110)
CO2: 27 MMOL/L (ref 21–32)
CREAT SERPL-MCNC: 1.4 MG/DL (ref 0.6–1.1)
DIFFERENTIAL TYPE: ABNORMAL
EOSINOPHILS ABSOLUTE: 0.3 K/CU MM
EOSINOPHILS RELATIVE PERCENT: 3 % (ref 0–3)
GFR AFRICAN AMERICAN: 44 ML/MIN/1.73M2
GFR NON-AFRICAN AMERICAN: 36 ML/MIN/1.73M2
GLUCOSE BLD-MCNC: 170 MG/DL (ref 70–99)
HCT VFR BLD CALC: 36.4 % (ref 37–47)
HEMOGLOBIN: 11.4 GM/DL (ref 12.5–16)
LACTATE DEHYDROGENASE: 243 IU/L (ref 120–246)
LYMPHOCYTES ABSOLUTE: 1.4 K/CU MM
LYMPHOCYTES RELATIVE PERCENT: 16.2 % (ref 24–44)
MCH RBC QN AUTO: 26.1 PG (ref 27–31)
MCHC RBC AUTO-ENTMCNC: 31.3 % (ref 32–36)
MCV RBC AUTO: 83.5 FL (ref 78–100)
MONOCYTES ABSOLUTE: 0.9 K/CU MM
MONOCYTES RELATIVE PERCENT: 10.1 % (ref 0–4)
PDW BLD-RTO: 14.6 % (ref 11.7–14.9)
PLATELET # BLD: 59 K/CU MM (ref 140–440)
PMV BLD AUTO: 11.5 FL (ref 7.5–11.1)
POTASSIUM SERPL-SCNC: 4.1 MMOL/L (ref 3.5–5.1)
RBC # BLD: 4.36 M/CU MM (ref 4.2–5.4)
SEGMENTED NEUTROPHILS ABSOLUTE COUNT: 6.1 K/CU MM
SEGMENTED NEUTROPHILS RELATIVE PERCENT: 69.8 % (ref 36–66)
SODIUM BLD-SCNC: 137 MMOL/L (ref 135–145)
TOTAL PROTEIN: 6.9 GM/DL (ref 6.4–8.2)
WBC # BLD: 8.7 K/CU MM (ref 4–10.5)

## 2020-05-18 PROCEDURE — 85025 COMPLETE CBC W/AUTO DIFF WBC: CPT

## 2020-05-18 PROCEDURE — 36415 COLL VENOUS BLD VENIPUNCTURE: CPT

## 2020-05-18 PROCEDURE — 80053 COMPREHEN METABOLIC PANEL: CPT

## 2020-05-18 PROCEDURE — 83615 LACTATE (LD) (LDH) ENZYME: CPT

## 2020-05-18 PROCEDURE — 96372 THER/PROPH/DIAG INJ SC/IM: CPT

## 2020-05-18 PROCEDURE — 6360000002 HC RX W HCPCS: Performed by: INTERNAL MEDICINE

## 2020-05-18 RX ORDER — LANREOTIDE ACETATE 120 MG/.5ML
120 INJECTION SUBCUTANEOUS ONCE
Status: DISCONTINUED | OUTPATIENT
Start: 2020-05-18 | End: 2020-05-19 | Stop reason: HOSPADM

## 2020-06-17 ENCOUNTER — HOSPITAL ENCOUNTER (OUTPATIENT)
Dept: INFUSION THERAPY | Age: 80
Discharge: HOME | End: 2020-06-17
Payer: MEDICARE

## 2020-06-17 LAB
ALBUMIN SERPL-MCNC: 4.1 GM/DL (ref 3.4–5)
ALP BLD-CCNC: 84 IU/L (ref 40–129)
ALT SERPL-CCNC: 9 U/L (ref 10–40)
ANION GAP SERPL CALCULATED.3IONS-SCNC: 11 MMOL/L (ref 4–16)
AST SERPL-CCNC: 15 IU/L (ref 15–37)
BASOPHILS ABSOLUTE: 0.1 K/CU MM
BASOPHILS RELATIVE PERCENT: 0.9 % (ref 0–1)
BILIRUB SERPL-MCNC: 0.3 MG/DL (ref 0–1)
BUN BLDV-MCNC: 18 MG/DL (ref 6–23)
CALCIUM SERPL-MCNC: 8.4 MG/DL (ref 8.3–10.6)
CHLORIDE BLD-SCNC: 107 MMOL/L (ref 99–110)
CO2: 23 MMOL/L (ref 21–32)
CREAT SERPL-MCNC: 1.4 MG/DL (ref 0.6–1.1)
DIFFERENTIAL TYPE: ABNORMAL
EOSINOPHILS ABSOLUTE: 0.3 K/CU MM
EOSINOPHILS RELATIVE PERCENT: 4.7 % (ref 0–3)
GFR AFRICAN AMERICAN: 44 ML/MIN/1.73M2
GFR NON-AFRICAN AMERICAN: 36 ML/MIN/1.73M2
GLUCOSE BLD-MCNC: 131 MG/DL (ref 70–99)
HCT VFR BLD CALC: 31.6 % (ref 37–47)
HEMOGLOBIN: 10 GM/DL (ref 12.5–16)
LACTATE DEHYDROGENASE: 211 IU/L (ref 120–246)
LYMPHOCYTES ABSOLUTE: 1.1 K/CU MM
LYMPHOCYTES RELATIVE PERCENT: 17.8 % (ref 24–44)
MCH RBC QN AUTO: 26.5 PG (ref 27–31)
MCHC RBC AUTO-ENTMCNC: 31.6 % (ref 32–36)
MCV RBC AUTO: 83.6 FL (ref 78–100)
MONOCYTES ABSOLUTE: 0.7 K/CU MM
MONOCYTES RELATIVE PERCENT: 10.9 % (ref 0–4)
PDW BLD-RTO: 15.3 % (ref 11.7–14.9)
PLATELET # BLD: 51 K/CU MM (ref 140–440)
PMV BLD AUTO: 11.5 FL (ref 7.5–11.1)
POTASSIUM SERPL-SCNC: 4.2 MMOL/L (ref 3.5–5.1)
RBC # BLD: 3.78 M/CU MM (ref 4.2–5.4)
SEGMENTED NEUTROPHILS ABSOLUTE COUNT: 4.2 K/CU MM
SEGMENTED NEUTROPHILS RELATIVE PERCENT: 65.7 % (ref 36–66)
SODIUM BLD-SCNC: 141 MMOL/L (ref 135–145)
TOTAL PROTEIN: 6.4 GM/DL (ref 6.4–8.2)
WBC # BLD: 6.4 K/CU MM (ref 4–10.5)

## 2020-06-17 PROCEDURE — 85025 COMPLETE CBC W/AUTO DIFF WBC: CPT

## 2020-06-17 PROCEDURE — 96372 THER/PROPH/DIAG INJ SC/IM: CPT

## 2020-06-17 PROCEDURE — 6360000002 HC RX W HCPCS: Performed by: INTERNAL MEDICINE

## 2020-06-17 PROCEDURE — 83615 LACTATE (LD) (LDH) ENZYME: CPT

## 2020-06-17 PROCEDURE — 36415 COLL VENOUS BLD VENIPUNCTURE: CPT

## 2020-06-17 PROCEDURE — 80053 COMPREHEN METABOLIC PANEL: CPT

## 2020-06-17 RX ORDER — LANREOTIDE ACETATE 120 MG/.5ML
120 INJECTION SUBCUTANEOUS ONCE
Status: DISCONTINUED | OUTPATIENT
Start: 2020-06-17 | End: 2020-06-18 | Stop reason: HOSPADM

## 2020-07-17 ENCOUNTER — HOSPITAL ENCOUNTER (OUTPATIENT)
Dept: INFUSION THERAPY | Age: 80
Discharge: HOME | End: 2020-07-17
Payer: MEDICARE

## 2020-07-17 PROCEDURE — 6360000002 HC RX W HCPCS: Performed by: INTERNAL MEDICINE

## 2020-07-17 PROCEDURE — 96372 THER/PROPH/DIAG INJ SC/IM: CPT

## 2020-07-17 RX ORDER — LANREOTIDE ACETATE 120 MG/.5ML
120 INJECTION SUBCUTANEOUS ONCE
Status: DISCONTINUED | OUTPATIENT
Start: 2020-07-17 | End: 2020-07-18 | Stop reason: HOSPADM

## 2020-07-24 RX ORDER — LANREOTIDE ACETATE 120 MG/.5ML
120 INJECTION SUBCUTANEOUS ONCE
Status: CANCELLED | OUTPATIENT
Start: 2020-08-12

## 2020-08-09 NOTE — PROGRESS NOTES
Patient Name: Tate Rivas  Patient : 1940  Patient MRN: [id number]     Primary Oncologist: Nighat Molina MD  Referring Provider: Lilia Marvin MD       Date of Service: 2020      Chief Complaint:   Chief Complaint   Patient presents with    Follow-up        Problem List: Patient Active Problem List:     Diabetes mellitus ([address]     CAD (coronary artery disease)     Hyperlipidemia     Carotid artery stenosis     S/P CABG x 3     Ischemia, bowel ([address]     Protein-calorie malnutrition, moderate ([address]     HTN (hypertension)     Hypokalemia     Anxiety     Gastroenteritis     Stroke-like symptoms     Peripheral polyneuropathy     Ataxia     Urinary tract infection without hematuria     Carcinoid syndrome ([address]     Immune thrombocytopenic purpura ([address]     Neoplasm of uncertain behavior of small intestine         HPI: Ms. Mahogany Akers is a 67-year-old very pleasant patient with medical history significant for hyperlipidemia, gastroesophageal reflux disease, coronary artery disease, depression and adjustment disorder, osteoporosis, diabetes mellitus, and history of small intestinal carcinoid tumor, status post surgery by Dr. Lanre Sagastume in , initially referred to me on 2014, for evaluation of thrombocytopenia. She stated that she was found to have thrombocytopenia on routine blood tests done on 2014. Repeat blood tests on 2014, showed persistent thrombocytopenia and she was referred to me for evaluation. She does not have leucopenia or anemia. She complains of severe tiredness and fatigue. Besides that she does not have any other significant symptoms. She denies bleeding diathesis too. Laboratory workups done on 2014, showed platelet count of 37, however, manual differential showed platelet count of 97. Her white blood cell count was 6.4 and hemoglobin was 12.2.   Her LDH is mildly elevated (261), vitamin B12 normal (154), normal folate (more than 20), negative hepatitis panel, antinuclear antibody was not detected and negative rheumatoid factor and normal TSH (2.4). Since all the workups have been within normal range, I believe her mild thrombocytopenia is most likely due to immune thrombocytopenia. Ms. Britney Gallardo started following with Dr. Mee Carbajal at [facility] for immune thrombocytopenia. Since her platelet count dropped below 20,000 per cubic millimeter, she was treated with prednisone. She responded well to prednisone; however, her platelet is always in her 40,000 range when she started tapering off the prednisone. She discussed about the splenectomy and Ms. Britney Gallardo was not sure she really wanted to proceed with splenectomy at this moment. Since Ms. Diane's insurance does not cover [facility], she was referred back to me for continuation of care for her immune thrombocytopenia. Since Ms. Bowman thrombocytopenia has gotten better, I stopped prednisone since April 29, 2016. She has been under close observation since then. Ms. Britney Gallardo was found to have suspicious lesion in her mid back by Dermatologist, Dr. Sydni Keating and she referred her to Dr. Wilfrido Henriquez at [facility] for further evaluation. She initially underwent wide excision and sentinel lymph node biopsy on April 14, 2017. The initial pathology revealed 4 mm in Breslows depth, Charles level IV, non-ulcerated, with 2 mitosis/mm2. 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 Since she has stage IIB malignant melanoma, I recommend for close observation only. She has been under close observation since then. She had PET-CT scan and it showed mesenteric calcified mass. She was subsequently referred to the Gastroenterologist for EUS since she had history of carcinoid tumor. Ms. Britney Gallardo underwent endoscopic ultrasound by Dr. Saida Miles on May 10, 2017, and it showed a mass in the parapancreatic region, 3 cm in size.   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 The ki-67 was less than 2%. Tumor cells are positive for AE1/3, synaptophysin, and chromogranin. She subsequently underwent nuclear PET neuroendocrine scan on July 21, 2017, and it showed a soft tissue mass in the pancreatic uncinate process with intense radiotracer activity, compatible with somatostatin receptor-avid malignancy. Paraesophageal lymph node in the chest at the level of the rafiq and aortocaval lymph node with intense radiotracer activity, compatible with somatostatin receptor-avid robbie metastasis. Since she was found to have metastatic lymph node involvement in intrathoracic and abdominal lymph nodes, she is not a surgical candidate. Tumor markers were reviewed and she was found to have mild elevation in chromogranin A (136) but her other tumor markers were within normal range. Since she has been having diarrhea (at least 5-6 times a day) and her tumors are octreotide avid, she was started with octreotide since September 1, 2017. She was started with hormone therapy, octreotide for tumor stabilization as well as for the symptom management (diarrhea). Since she lives in [facility], her case was subsequently referred to us for continuation of octreotide injection locally here in [facility]. Octreotide was started in our center since October 5, 2017. On June 17, 2020, she presented to me for followup. I have been following Ms. Diane for immune thrombocytopenia, history of stage IIB malignant melanoma, and the recurrent carcinoid tumor. 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 She stated that her diarrhea is getting better since starting the Lanreotid. She will be due for another Lanreotide injection today, 2/24/20. I recommend her to continue with octreotide for now.  Neuroendocrine PET scan done on January or February 2019 showed at least stable disease. Her platelet count today on 6/17/20 was 51,000/cumm and I recommend to continue with observation. She stated that she has been having difficulty controlling her blood sugar. She is loosing some weight lately. She complains of significant tiredness and fatigue. Besides that, she doesn't have any other significant new symptoms on today's visit. Previous Therapies    PAST MEDICAL HISTORY:  Significant for:  1. Hyperlipidemia. 2.         Diabetes mellitus. 3.         Coronary artery disease. 4.         Gastroesophageal reflux disease. 5.         History of depression and adjustment disorder. 6.         Osteoporosis. 7.         History of small intestinal cancer, status post surgery by Dr. Meliza Campos in [address] PAST SURGICAL HISTORY:  Significant for:  1. Appendectomy in 1959.  2.         Coronary artery angioplasty in 1992. 3.         Small intestine cancer removal in 1998.  4.         Cholecystectomy in 1998.  5.         Open heart surgery in March 31, 2009. 6.         Melanoma surgery on October 10, 2011. 7.         Hysterectomy in 1996. FAMILY HISTORY:  Significant for melanoma in her brother and sister. No other family history of cancer disease. SOCIAL HISTORY:  She denies smoking, alcohol drinking, and illicit drug abuse. She is  for 55 years and has four children. She is retiree from the [employer]. ALLERGIES:  No known drug allergies. Interval History:  Presents for scheduled follow up. Reports that she has diarrhea more frequently when she eats chocolate or soda. Denies melena or hematochezia. She reports she has diarrhea intermittently throughout the week. She is using Lomotil once a day. She is encouraged to maximize use for effectiveness. Weight is stable. She denies fever or infectious symptoms, she denies chest pain, shortness of breath, no nausea. She denies easy bleeding or bruising. No lower extremity edema or calf pain. Reports she needs to have teeth extracted. Review of Systems: \"Per interval history; otherwise 10 point ROS is negative. \"     Vital Signs:  /76   Pulse 73   Temp 97.5 °F (36.4 °C) (Infrared)   Resp 18   Ht 5' 2\" (1.575 m)   Wt 132 lb 6.4 oz (60.1 kg)   SpO2 97%   BMI 24.22 kg/m²     Physical Exam:    CONSTITUTIONAL: awake, alert, cooperative, no apparent distress   EYES: pupils equal, round and reactive to light, sclera clear and conjunctiva normal  ENT: Normocephalic, without obvious abnormality, atraumatic  NECK: supple, symmetrical, no jugular venous distension and no carotid bruits   HEMATOLOGIC/LYMPHATIC: no cervical, supraclavicular or axillary lymphadenopathy   LUNGS: no increased work of breathing and clear to auscultation   CARDIOVASCULAR: regular rate and rhythm, normal S1 and S2, no murmur noted  ABDOMEN: normal bowel sounds x 4, soft, non-distended, non-tender, no masses palpated, no hepatosplenomegaly   MUSCULOSKELETAL: full range of motion noted, tone is normal  NEUROLOGIC: awake, alert, oriented to name, place and time. Motor skills grossly intact. SKIN: Normal skin color, texture, turgor and no jaundice.  appears intact   EXTREMITIES: no LE edema     Labs:    Hematology:  Lab Results   Component Value Date    WBC 7.8 08/12/2020    RBC 3.95 (L) 08/12/2020    HGB 10.5 (L) 08/12/2020    HCT 33.6 (L) 08/12/2020    MCV 85.1 08/12/2020    MCH 26.6 (L) 08/12/2020    MCHC 31.3 (L) 08/12/2020    RDW 14.9 08/12/2020    PLT 46 (L) 08/12/2020    MPV 11.9 (H) 08/12/2020    BANDSPCT 6 06/07/2016    SEGSPCT 69.0 (H) 08/12/2020    EOSRELPCT 4.5 (H) 08/12/2020    BASOPCT 0.9 08/12/2020    LYMPHOPCT 16.5 (L) 08/12/2020    MONOPCT 9.1 (H) 08/12/2020    BANDABS 0.37 06/07/2016    SEGSABS 5.4 08/12/2020    EOSABS 0.4 08/12/2020    BASOSABS 0.1 08/12/2020    LYMPHSABS 1.3 08/12/2020    MONOSABS 0.7 08/12/2020    DIFFTYPE AUTOMATED DIFFERENTIAL 08/12/2020 POLYCHROM 1+ 03/29/2017    PLTM  03/29/2017     PLATELETS APPEAR DECREASED    A FEW LARGE PLATELETS NOTED     No results found for: ESR    Chemistry:  Lab Results   Component Value Date     (L) 08/12/2020    K 4.2 08/12/2020     08/12/2020    CO2 19 (L) 08/12/2020    BUN 22 08/12/2020    CREATININE 1.5 (H) 08/12/2020    GLUCOSE 191 (H) 08/12/2020    CALCIUM 8.7 08/12/2020    PROT 6.6 08/12/2020    LABALBU 4.4 08/12/2020    BILITOT 0.4 08/12/2020    ALKPHOS 71 08/12/2020    AST 16 08/12/2020    ALT 9 (L) 08/12/2020    LABGLOM 33 (L) 08/12/2020    GFRAA 41 (L) 08/12/2020    PHOS 3.8 12/31/2019    MG 1.7 (L) 03/28/2019    POCGLU 232 (H) 10/17/2019     Lab Results   Component Value Date     06/17/2020    HOMOCYSTEINE (H) 10/15/2019     17.8  Risk of vascular disease   increases above 9 umol/L  and is significant >15 umol/L. No components found for: LD  Lab Results   Component Value Date    TSHHS 2.480 11/24/2014    T4FREE 1.13 11/24/2014    FT3 2.9 11/24/2014       Immunology:  Lab Results   Component Value Date    PROT 6.6 08/12/2020     No results found for: Rocael Quiroz, KLFLCR  No results found for: B2M    Coagulation Panel:  Lab Results   Component Value Date    PROTIME 12.5 08/11/2015    INR 1.10 08/11/2015    APTT 30.5 08/11/2015       Anemia Panel:  Lab Results   Component Value Date    LCKJMMKP61 8091 (H) 11/24/2014    FOLATE >20.0 (H) 11/24/2014       Tumor Markers:  No results found for: , CEA, , LABCA2, PSA  Observations:  Performance Status: ECOG 1  Cognitive Status:Alert, oriented to person place and time. Assessment & Plan:     Overall Ms. Caleb Yen is feeling quite well and does not have any significant new symptoms on today's visit. I have been following Ms. Diane for immune thrombocytopenia, history of stage IIB malignant melanoma, and the recurrent carcinoid tumor. 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      She stated that her diarrhea is getting better since starting the Lanreotid. She will be due for another Lanreotide injection today, 2/24/20. I recommend her to continue with octreotide for now. Neuroendocrine PET scan done on January or February 2019 showed at least stable disease. Her platelet count today on 8/12/2020 was 48,000/cumm and I recommend to continue with observation. She is anticipating having teeth pulled with date not yet established. She is instructed platelets should be greater than 50, 000/cumm. We will administer steroids as needed prior to extraction. She stated that she has been having difficulty controlling her blood sugar. She is loosing some weight lately. I have reviewed all these plans with Ms. Francisca Burdick and she is in agreement with the plans. I answered all her questions and concerns for today. I recommend that she contact me if she has any medical issues before I see her again next time. I encouraged her to stay active and eat healthy balanced diet. I asked her to follow up with her primary care physician on regular basis and I will continue to keep you update on her progress. Thank you for allowing me to participate in the care of this very pleasant patient. Recent imaging and labs were reviewed and discussed with the patient. I have recommended that the patient follow CDC guidelines for prevention of COVID-19 infection. Return in about 4 weeks (around 9/9/2020) for RTC Dr. Girish Castro and blanca FALL  Patient was instructed to stop at our  to make their next appointment before leaving. They will call in the interim with any questions/concerns/new symptoms. This plan was discussed with the patient and they verbalized understanding and acceptance of the plan. Dr. Girish Castro was available consultation for this visit. I have recommended that the patient follow CDC guidelines for prevention of COVID-19 infection.        Electronically signed by TEMITOPE Mesa CNP on 8/9/20 at 2:00 PM EDT

## 2020-08-12 ENCOUNTER — OFFICE VISIT (OUTPATIENT)
Dept: ONCOLOGY | Age: 80
End: 2020-08-12
Payer: MEDICARE

## 2020-08-12 ENCOUNTER — HOSPITAL ENCOUNTER (OUTPATIENT)
Dept: INFUSION THERAPY | Age: 80
Discharge: HOME | End: 2020-08-12
Payer: MEDICARE

## 2020-08-12 VITALS
DIASTOLIC BLOOD PRESSURE: 76 MMHG | WEIGHT: 132 LBS | SYSTOLIC BLOOD PRESSURE: 138 MMHG | RESPIRATION RATE: 18 BRPM | BODY MASS INDEX: 24.29 KG/M2 | OXYGEN SATURATION: 97 % | TEMPERATURE: 97.5 F | HEIGHT: 62 IN | HEART RATE: 73 BPM

## 2020-08-12 VITALS
BODY MASS INDEX: 24.37 KG/M2 | DIASTOLIC BLOOD PRESSURE: 76 MMHG | WEIGHT: 132.4 LBS | SYSTOLIC BLOOD PRESSURE: 138 MMHG | TEMPERATURE: 97.5 F | RESPIRATION RATE: 18 BRPM | OXYGEN SATURATION: 97 % | HEART RATE: 73 BPM | HEIGHT: 62 IN

## 2020-08-12 DIAGNOSIS — E34.0 CARCINOID SYNDROME (HCC): ICD-10-CM

## 2020-08-12 DIAGNOSIS — E34.0 CARCINOID SYNDROME (HCC): Primary | ICD-10-CM

## 2020-08-12 DIAGNOSIS — D69.3 IMMUNE THROMBOCYTOPENIC PURPURA (HCC): ICD-10-CM

## 2020-08-12 LAB
ALBUMIN SERPL-MCNC: 4.4 GM/DL (ref 3.4–5)
ALP BLD-CCNC: 71 IU/L (ref 40–128)
ALT SERPL-CCNC: 9 U/L (ref 10–40)
ANION GAP SERPL CALCULATED.3IONS-SCNC: 11 MMOL/L (ref 4–16)
AST SERPL-CCNC: 16 IU/L (ref 15–37)
BASOPHILS ABSOLUTE: 0.1 K/CU MM
BASOPHILS RELATIVE PERCENT: 0.9 % (ref 0–1)
BILIRUB SERPL-MCNC: 0.4 MG/DL (ref 0–1)
BUN BLDV-MCNC: 22 MG/DL (ref 6–23)
CALCIUM SERPL-MCNC: 8.7 MG/DL (ref 8.3–10.6)
CHLORIDE BLD-SCNC: 103 MMOL/L (ref 99–110)
CO2: 19 MMOL/L (ref 21–32)
CREAT SERPL-MCNC: 1.5 MG/DL (ref 0.6–1.1)
DIFFERENTIAL TYPE: ABNORMAL
EOSINOPHILS ABSOLUTE: 0.4 K/CU MM
EOSINOPHILS RELATIVE PERCENT: 4.5 % (ref 0–3)
GFR AFRICAN AMERICAN: 41 ML/MIN/1.73M2
GFR NON-AFRICAN AMERICAN: 33 ML/MIN/1.73M2
GLUCOSE BLD-MCNC: 191 MG/DL (ref 70–99)
HCT VFR BLD CALC: 33.6 % (ref 37–47)
HEMOGLOBIN: 10.5 GM/DL (ref 12.5–16)
LACTATE DEHYDROGENASE: 248 IU/L (ref 120–246)
LYMPHOCYTES ABSOLUTE: 1.3 K/CU MM
LYMPHOCYTES RELATIVE PERCENT: 16.5 % (ref 24–44)
MCH RBC QN AUTO: 26.6 PG (ref 27–31)
MCHC RBC AUTO-ENTMCNC: 31.3 % (ref 32–36)
MCV RBC AUTO: 85.1 FL (ref 78–100)
MONOCYTES ABSOLUTE: 0.7 K/CU MM
MONOCYTES RELATIVE PERCENT: 9.1 % (ref 0–4)
PDW BLD-RTO: 14.9 % (ref 11.7–14.9)
PLATELET # BLD: 46 K/CU MM (ref 140–440)
PMV BLD AUTO: 11.9 FL (ref 7.5–11.1)
POTASSIUM SERPL-SCNC: 4.2 MMOL/L (ref 3.5–5.1)
RBC # BLD: 3.95 M/CU MM (ref 4.2–5.4)
SEGMENTED NEUTROPHILS ABSOLUTE COUNT: 5.4 K/CU MM
SEGMENTED NEUTROPHILS RELATIVE PERCENT: 69 % (ref 36–66)
SODIUM BLD-SCNC: 133 MMOL/L (ref 135–145)
TOTAL PROTEIN: 6.6 GM/DL (ref 6.4–8.2)
WBC # BLD: 7.8 K/CU MM (ref 4–10.5)

## 2020-08-12 PROCEDURE — G8427 DOCREV CUR MEDS BY ELIG CLIN: HCPCS | Performed by: NURSE PRACTITIONER

## 2020-08-12 PROCEDURE — G8420 CALC BMI NORM PARAMETERS: HCPCS | Performed by: NURSE PRACTITIONER

## 2020-08-12 PROCEDURE — 1123F ACP DISCUSS/DSCN MKR DOCD: CPT | Performed by: NURSE PRACTITIONER

## 2020-08-12 PROCEDURE — 96402 CHEMO HORMON ANTINEOPL SQ/IM: CPT

## 2020-08-12 PROCEDURE — 85025 COMPLETE CBC W/AUTO DIFF WBC: CPT

## 2020-08-12 PROCEDURE — 6360000002 HC RX W HCPCS: Performed by: INTERNAL MEDICINE

## 2020-08-12 PROCEDURE — 80053 COMPREHEN METABOLIC PANEL: CPT

## 2020-08-12 PROCEDURE — 1036F TOBACCO NON-USER: CPT | Performed by: NURSE PRACTITIONER

## 2020-08-12 PROCEDURE — 83615 LACTATE (LD) (LDH) ENZYME: CPT

## 2020-08-12 PROCEDURE — 1090F PRES/ABSN URINE INCON ASSESS: CPT | Performed by: NURSE PRACTITIONER

## 2020-08-12 PROCEDURE — G8400 PT W/DXA NO RESULTS DOC: HCPCS | Performed by: NURSE PRACTITIONER

## 2020-08-12 PROCEDURE — 36415 COLL VENOUS BLD VENIPUNCTURE: CPT

## 2020-08-12 PROCEDURE — 99214 OFFICE O/P EST MOD 30 MIN: CPT | Performed by: NURSE PRACTITIONER

## 2020-08-12 PROCEDURE — 4040F PNEUMOC VAC/ADMIN/RCVD: CPT | Performed by: NURSE PRACTITIONER

## 2020-08-12 RX ORDER — LANREOTIDE ACETATE 120 MG/.5ML
120 INJECTION SUBCUTANEOUS ONCE
Status: COMPLETED | OUTPATIENT
Start: 2020-08-12 | End: 2020-08-12

## 2020-08-12 RX ORDER — LANREOTIDE ACETATE 120 MG/.5ML
120 INJECTION SUBCUTANEOUS ONCE
Status: CANCELLED | OUTPATIENT
Start: 2020-09-09

## 2020-08-12 RX ORDER — ACYCLOVIR 800 MG/1
TABLET ORAL
Status: ON HOLD | COMMUNITY
Start: 2020-07-23 | End: 2021-04-16

## 2020-08-12 RX ORDER — CANAGLIFLOZIN 300 MG/1
300 TABLET, FILM COATED ORAL
Status: ON HOLD | COMMUNITY
Start: 2020-08-07 | End: 2022-09-27 | Stop reason: HOSPADM

## 2020-08-12 RX ADMIN — LANREOTIDE ACETATE 120 MG: 120 INJECTION SUBCUTANEOUS at 15:08

## 2020-08-12 ASSESSMENT — PATIENT HEALTH QUESTIONNAIRE - PHQ9
SUM OF ALL RESPONSES TO PHQ9 QUESTIONS 1 & 2: 1
SUM OF ALL RESPONSES TO PHQ QUESTIONS 1-9: 1
1. LITTLE INTEREST OR PLEASURE IN DOING THINGS: 0
SUM OF ALL RESPONSES TO PHQ QUESTIONS 1-9: 1
2. FEELING DOWN, DEPRESSED OR HOPELESS: 1

## 2020-08-12 NOTE — PROGRESS NOTES
[facility] Rooming Questions  Patient: Amadeo Khanna  MRN: [id number]    Date: 8/12/2020        1. Do you have any new issues? yes diarrhea often          2. Do you need any refills on medications?    no    3. Have you had any imaging done since your last visit?   no    4. Have you been hospitalized or seen in the emergency room since your last visit here?   no    5. Did the patient have a PHQ-9 collected today? Yes  PHQ-9 Total Score: 1 (8/12/2020  2:20 PM)      PHQ-9 Given to: Suzie Lubin CNP              PHQ-9 Score:                     [] Positive     [x]  Negative              Does question #9 need addressed?                      [] Yes    [x]  No               Ezio Mukherjee

## 2020-09-03 NOTE — PROGRESS NOTES
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 Alejandro Spear is a 69-year-old very pleasant patient with medical history significant for hyperlipidemia, gastroesophageal reflux disease, coronary artery disease, depression and adjustment disorder, osteoporosis, diabetes mellitus, and history of small intestinal carcinoid tumor, status post surgery by Dr. Keara Saravia in , initially referred to me on 2014, for evaluation of thrombocytopenia. She stated that she was found to have thrombocytopenia on routine blood tests done on 2014. Repeat blood tests on 2014, showed persistent thrombocytopenia and she was referred to me for evaluation. She does not have leucopenia or anemia. She complains of severe tiredness and fatigue. Besides that she does not have any other significant symptoms. She denies bleeding diathesis too. Laboratory workups done on 2014, showed platelet count of 37, however, manual differential showed platelet count of 97. Her white blood cell count was 6.4 and hemoglobin was 12.2. Her LDH is mildly elevated (261), vitamin B12 normal (154), normal folate (more than 20), negative hepatitis panel, antinuclear antibody was not detected and negative rheumatoid factor and normal TSH (2.4). Since all the workups have been within normal range, I believe her mild thrombocytopenia is most likely due to immune thrombocytopenia. Ms. Alejandro Spear started following with Dr. Kim Richmond at [facility] for immune thrombocytopenia.  Since her platelet count dropped below 20,000 per cubic millimeter, she was treated with prednisone. She responded well to prednisone; however, her platelet is always in her 40,000 range when she started tapering off the prednisone. She discussed about the splenectomy and Ms. Keiry Davalos was not sure she really wanted to proceed with splenectomy at this moment. Since Ms. Diane's insurance does not cover [facility], she was referred back to me for continuation of care for her immune thrombocytopenia. Since Ms. Bowman thrombocytopenia has gotten better, I stopped prednisone since April 29, 2016. She has been under close observation since then. Ms. Keiry Davalos was found to have suspicious lesion in her mid back by Dermatologist, Dr. Ti Sal and she referred her to Dr. Gómez French at [facility] for further evaluation. She initially underwent wide excision and sentinel lymph node biopsy on April 14, 2017. The initial pathology revealed 4 mm in Breslows depth, Charles level IV, non-ulcerated, with 2 mitosis/mm2. 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 Since she has stage IIB malignant melanoma, I recommend for close observation only. She has been under close observation since then. She had PET-CT scan and it showed mesenteric calcified mass. She was subsequently referred to the Gastroenterologist for EUS since she had history of carcinoid tumor. Ms. Keiry Davalos underwent endoscopic ultrasound by Dr. Amber Spears on May 10, 2017, and it showed a mass in the parapancreatic region, 3 cm in size. 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 The ki-67 was less than 2%. Tumor cells are positive for AE1/3, synaptophysin, and chromogranin.       She subsequently underwent nuclear PET neuroendocrine scan on July 21, 2017, and it showed a soft tissue mass in the pancreatic uncinate process with intense radiotracer activity, compatible with somatostatin receptor-avid malignancy. Paraesophageal lymph node in the chest at the level of the rafiq and aortocaval lymph node with intense radiotracer activity, compatible with somatostatin receptor-avid robbie metastasis. Since she was found to have metastatic lymph node involvement in intrathoracic and abdominal lymph nodes, she is not a surgical candidate. Tumor markers were reviewed and she was found to have mild elevation in chromogranin A (136) but her other tumor markers were within normal range. Since she has been having diarrhea (at least 5-6 times a day) and her tumors are octreotide avid, she was started with octreotide since September 1, 2017. She was started with hormone therapy, octreotide for tumor stabilization as well as for the symptom management (diarrhea). Since she lives in [facility], her case was subsequently referred to us for continuation of octreotide injection locally here in [facility]. Octreotide was started in our center since October 5, 2017. On September 9, 2020, she presented to me for followup. I have been following Ms. Diane for immune thrombocytopenia, history of stage IIB malignant melanoma, and the recurrent carcinoid tumor. 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 She stated that her diarrhea is getting better since starting the Lanreotid. She will be due for another Lanreotide injection today, 9/8/20. I recommend her to continue with octreotide for now. Neuroendocrine PET scan done on January or February 2019 showed at least stable disease. I recommend her to have CT scan of the chest, abdomen and pelvis on 12/2020 to rule out progression of her disease. Her platelet count on 1/95/11 was 46,000/cumm and I recommend to continue with observation. She complains of significant tiredness and fatigue. Besides that, she doesn't have any other significant new symptoms on today's visit. alert, cooperative, no apparent distress   EYES: pupils equal, round and reactive to light, sclera clear and conjunctiva normal  ENT: Normocephalic, without obvious abnormality, atraumatic  NECK: supple, symmetrical, no jugular venous distension and no carotid bruits   HEMATOLOGIC/LYMPHATIC: no cervical, supraclavicular or axillary lymphadenopathy   LUNGS: VBS, no wheezes, no crackles, no rhonchi, no increased work of breathing and clear to auscultation   CARDIOVASCULAR: regular rate and rhythm, normal S1 and S2, no murmur noted  ABDOMEN: normal bowel sounds x 4, soft, non-distended, non-tender, no masses palpated, no hepatosplenomegaly   MUSCULOSKELETAL: full range of motion noted, tone is normal  NEUROLOGIC: awake, alert, oriented to name, place and time. Motor skills grossly intact. SKIN: Normal skin color, texture, turgor and no jaundice.  appears intact   EXTREMITIES: no LE edema, no leg swelling, no cyanosis     Labs:  Hematology:  Lab Results   Component Value Date    WBC 7.8 08/12/2020    RBC 3.95 (L) 08/12/2020    HGB 10.5 (L) 08/12/2020    HCT 33.6 (L) 08/12/2020    MCV 85.1 08/12/2020    MCH 26.6 (L) 08/12/2020    MCHC 31.3 (L) 08/12/2020    RDW 14.9 08/12/2020    PLT 46 (L) 08/12/2020    MPV 11.9 (H) 08/12/2020    BANDSPCT 6 06/07/2016    SEGSPCT 69.0 (H) 08/12/2020    EOSRELPCT 4.5 (H) 08/12/2020    BASOPCT 0.9 08/12/2020    LYMPHOPCT 16.5 (L) 08/12/2020    MONOPCT 9.1 (H) 08/12/2020    BANDABS 0.37 06/07/2016    SEGSABS 5.4 08/12/2020    EOSABS 0.4 08/12/2020    BASOSABS 0.1 08/12/2020    LYMPHSABS 1.3 08/12/2020    MONOSABS 0.7 08/12/2020    DIFFTYPE AUTOMATED DIFFERENTIAL 08/12/2020    POLYCHROM 1+ 03/29/2017    PLTM  03/29/2017     PLATELETS APPEAR DECREASED    A FEW LARGE PLATELETS NOTED     No results found for: ESR  Chemistry:  Lab Results   Component Value Date     (L) 08/12/2020    K 4.2 08/12/2020     08/12/2020    CO2 19 (L) 08/12/2020    BUN 22 08/12/2020    CREATININE 1.5 (H) 08/12/2020    GLUCOSE 191 (H) 08/12/2020    CALCIUM 8.7 08/12/2020    PROT 6.6 08/12/2020    LABALBU 4.4 08/12/2020    BILITOT 0.4 08/12/2020    ALKPHOS 71 08/12/2020    AST 16 08/12/2020    ALT 9 (L) 08/12/2020    LABGLOM 33 (L) 08/12/2020    GFRAA 41 (L) 08/12/2020    PHOS 3.8 12/31/2019    MG 1.7 (L) 03/28/2019    POCGLU 232 (H) 10/17/2019     Lab Results   Component Value Date     (H) 08/12/2020    HOMOCYSTEINE (H) 10/15/2019     17.8  Risk of vascular disease   increases above 9 umol/L  and is significant >15 umol/L. No components found for: LD  Lab Results   Component Value Date    TSHHS 2.480 11/24/2014    T4FREE 1.13 11/24/2014    FT3 2.9 11/24/2014     Immunology:  Lab Results   Component Value Date    PROT 6.6 08/12/2020     No results found for: Duwaine Crate, KLFLCR  No results found for: B2M  Coagulation Panel:  Lab Results   Component Value Date    PROTIME 12.5 08/11/2015    INR 1.10 08/11/2015    APTT 30.5 08/11/2015     Anemia Panel:  Lab Results   Component Value Date    [id number] 7162 (H) 11/24/2014    FOLATE >20.0 (H) 11/24/2014     Tumor Markers:  No results found for: , CEA, , LABCA2, PSA  Observations:  PHQ-9 Total Score: 0 (9/9/2020  1:36 PM)      Assessment & Plan: Thrombocytopenia - most likely immune thrombocytopenia. Stage IIB malignant melanoma. Recurrent/metastatic small intestine carcinoid tumor. PLAN:  Overall Ms. Shelley Pappas is feeling quite well and does not have any significant new symptoms on today's visit. I have been following Ms. Diane for immune thrombocytopenia, history of stage IIB malignant melanoma, and the recurrent carcinoid tumor. 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 She stated that her diarrhea is getting better since starting the Lanreotid. She will be due for another Lanreotide injection today, 9/8/20. I recommend her to continue with octreotide for now.  Neuroendocrine PET scan done on January or February 2019 showed at least stable disease. I recommend her to have CT scan of the chest, abdomen and pelvis on 12/2020 to rule out progression of her disease. Her platelet count on 7/69/81 was 46,000/cumm and I recommend to continue with observation. I have reviewed all these plans with Ms. Joanne Hastings and she is in agreement with the plans. I answered all her questions and concerns for today. I asked her to follow up with her primary care physician on regular basis and I will continue to keep you update on her progress. Thank you for allowing me to participate in the care of this very pleasant patient. Recent imaging and labs were reviewed and discussed with the patient.       Electronically signed by Lizz Cat MD on 9/3/20 at 8:35 AM EDT

## 2020-09-09 ENCOUNTER — HOSPITAL ENCOUNTER (OUTPATIENT)
Dept: INFUSION THERAPY | Age: 80
Discharge: HOME | End: 2020-09-09
Payer: MEDICARE

## 2020-09-09 ENCOUNTER — OFFICE VISIT (OUTPATIENT)
Dept: ONCOLOGY | Age: 80
End: 2020-09-09
Payer: MEDICARE

## 2020-09-09 VITALS
SYSTOLIC BLOOD PRESSURE: 126 MMHG | OXYGEN SATURATION: 98 % | BODY MASS INDEX: 24.48 KG/M2 | HEART RATE: 76 BPM | TEMPERATURE: 98.2 F | RESPIRATION RATE: 16 BRPM | HEIGHT: 62 IN | DIASTOLIC BLOOD PRESSURE: 71 MMHG | WEIGHT: 133 LBS

## 2020-09-09 DIAGNOSIS — E34.0 CARCINOID SYNDROME (HCC): Primary | ICD-10-CM

## 2020-09-09 PROCEDURE — G8420 CALC BMI NORM PARAMETERS: HCPCS | Performed by: INTERNAL MEDICINE

## 2020-09-09 PROCEDURE — 1090F PRES/ABSN URINE INCON ASSESS: CPT | Performed by: INTERNAL MEDICINE

## 2020-09-09 PROCEDURE — G8400 PT W/DXA NO RESULTS DOC: HCPCS | Performed by: INTERNAL MEDICINE

## 2020-09-09 PROCEDURE — 4040F PNEUMOC VAC/ADMIN/RCVD: CPT | Performed by: INTERNAL MEDICINE

## 2020-09-09 PROCEDURE — 6360000002 HC RX W HCPCS: Performed by: INTERNAL MEDICINE

## 2020-09-09 PROCEDURE — 99213 OFFICE O/P EST LOW 20 MIN: CPT | Performed by: INTERNAL MEDICINE

## 2020-09-09 PROCEDURE — G8427 DOCREV CUR MEDS BY ELIG CLIN: HCPCS | Performed by: INTERNAL MEDICINE

## 2020-09-09 PROCEDURE — 1036F TOBACCO NON-USER: CPT | Performed by: INTERNAL MEDICINE

## 2020-09-09 PROCEDURE — 96372 THER/PROPH/DIAG INJ SC/IM: CPT

## 2020-09-09 PROCEDURE — 1123F ACP DISCUSS/DSCN MKR DOCD: CPT | Performed by: INTERNAL MEDICINE

## 2020-09-09 RX ORDER — LANREOTIDE ACETATE 120 MG/.5ML
120 INJECTION SUBCUTANEOUS ONCE
Status: CANCELLED | OUTPATIENT
Start: 2020-10-07

## 2020-09-09 RX ORDER — LANREOTIDE ACETATE 120 MG/.5ML
120 INJECTION SUBCUTANEOUS ONCE
Status: COMPLETED | OUTPATIENT
Start: 2020-09-09 | End: 2020-09-09

## 2020-09-09 RX ADMIN — LANREOTIDE ACETATE 120 MG: 120 INJECTION SUBCUTANEOUS at 14:16

## 2020-09-09 ASSESSMENT — PATIENT HEALTH QUESTIONNAIRE - PHQ9
SUM OF ALL RESPONSES TO PHQ9 QUESTIONS 1 & 2: 0
SUM OF ALL RESPONSES TO PHQ QUESTIONS 1-9: 0
1. LITTLE INTEREST OR PLEASURE IN DOING THINGS: 0
SUM OF ALL RESPONSES TO PHQ QUESTIONS 1-9: 0
2. FEELING DOWN, DEPRESSED OR HOPELESS: 0

## 2020-09-09 NOTE — PROGRESS NOTES
MA Rooming Questions  Patient: Arely Villalpando  MRN: [id number]    Date: 9/9/2020        1. Do you have any new issues?   no         2. Do you need any refills on medications?    no    3. Have you had any imaging done since your last visit?   no    4. Have you been hospitalized or seen in the emergency room since your last visit here?   no    5. Did the patient have a depression screening completed today?  Yes    PHQ-9 Total Score: 0 (9/9/2020  1:36 PM)       PHQ-9 Given to (if applicable):               PHQ-9 Score (if applicable):                     [] Positive     [x]  Negative              Does question #9 need addressed (if applicable)                     [] Yes    [x]  No               Nasreen Saavedra MA

## 2020-10-07 ENCOUNTER — HOSPITAL ENCOUNTER (OUTPATIENT)
Dept: INFUSION THERAPY | Age: 80
Discharge: HOME | End: 2020-10-07
Payer: MEDICARE

## 2020-10-07 VITALS
BODY MASS INDEX: 24.76 KG/M2 | WEIGHT: 135.4 LBS | DIASTOLIC BLOOD PRESSURE: 71 MMHG | HEART RATE: 65 BPM | OXYGEN SATURATION: 96 % | SYSTOLIC BLOOD PRESSURE: 153 MMHG | TEMPERATURE: 98.1 F

## 2020-10-07 DIAGNOSIS — E34.0 CARCINOID SYNDROME (HCC): Primary | ICD-10-CM

## 2020-10-07 PROCEDURE — 6360000002 HC RX W HCPCS: Performed by: INTERNAL MEDICINE

## 2020-10-07 PROCEDURE — 96372 THER/PROPH/DIAG INJ SC/IM: CPT

## 2020-10-07 RX ORDER — LANREOTIDE ACETATE 120 MG/.5ML
120 INJECTION SUBCUTANEOUS ONCE
Status: COMPLETED | OUTPATIENT
Start: 2020-10-07 | End: 2020-10-07

## 2020-10-07 RX ORDER — LANREOTIDE ACETATE 120 MG/.5ML
120 INJECTION SUBCUTANEOUS ONCE
Status: CANCELLED | OUTPATIENT
Start: 2020-11-04

## 2020-10-07 RX ADMIN — LANREOTIDE ACETATE 120 MG: 120 INJECTION SUBCUTANEOUS at 15:07

## 2020-10-07 NOTE — PROGRESS NOTES
Pt. Here for injection. Pt. Denies any questions or concerns. Injection given in left buttocks, pt. Tolerated well, 2x2 and band-aide applied to injection site.

## 2020-11-03 NOTE — PROGRESS NOTES
millimeter, she was treated with prednisone. She responded well to prednisone; however, her platelet is always in her 40,000 range when she started tapering off the prednisone. She discussed about the splenectomy and Ms. José Mchugh was not sure she really wanted to proceed with splenectomy at this moment. Since Ms. Diane's insurance does not cover [facility], she was referred back to me for continuation of care for her immune thrombocytopenia. Since Ms. Bowman thrombocytopenia has gotten better, I stopped prednisone since April 29, 2016. She has been under close observation since then. Ms. José Mchugh was found to have suspicious lesion in her mid back by Dermatologist, Dr. Natali Lorenzo and she referred her to Dr. Edison Nolasco at [facility] for further evaluation. She initially underwent wide excision and sentinel lymph node biopsy on April 14, 2017. The initial pathology revealed 4 mm in Breslows depth, Charles level IV, non-ulcerated, with 2 mitosis/mm2. 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 Since she has stage IIB malignant melanoma, I recommend for close observation only. She has been under close observation since then. She had PET-CT scan and it showed mesenteric calcified mass. She was subsequently referred to the Gastroenterologist for EUS since she had history of carcinoid tumor. Ms. José Mchugh underwent endoscopic ultrasound by Dr. Teressa Lopez on May 10, 2017, and it showed a mass in the parapancreatic region, 3 cm in size. 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 The ki-67 was less than 2%. Tumor cells are positive for AE1/3, synaptophysin, and chromogranin.       She subsequently underwent nuclear PET neuroendocrine scan on July 21, 2017, and it showed a soft tissue mass in the pancreatic uncinate process with intense radiotracer activity, compatible with somatostatin receptor-avid malignancy. Paraesophageal lymph node in the chest at the level of the rafiq and aortocaval lymph node with intense radiotracer activity, compatible with somatostatin receptor-avid robbie metastasis. Since she was found to have metastatic lymph node involvement in intrathoracic and abdominal lymph nodes, she is not a surgical candidate. Tumor markers were reviewed and she was found to have mild elevation in chromogranin A (136) but her other tumor markers were within normal range. Since she has been having diarrhea (at least 5-6 times a day) and her tumors are octreotide avid, she was started with octreotide since September 1, 2017. She was started with hormone therapy, octreotide for tumor stabilization as well as for the symptom management (diarrhea). Since she lives in [facility], her case was subsequently referred to us for continuation of octreotide injection locally here in [facility]. Octreotide was started in our center since October 5, 2017. On November 4, 2020, she presented to me for followup. I have been following Ms. Diane for immune thrombocytopenia, history of stage IIB malignant melanoma, and the recurrent carcinoid tumor. 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 She stated that her diarrhea is getting better since starting the Lanreotid. She will be due for another Lanreotide injection today, 11/4/20. I recommend her to continue with octreotide for now. Neuroendocrine PET scan done on January or February 2019 showed at least stable disease. I recommend her to have CT scan of the chest, abdomen and pelvis on 1223//2020 to rule out progression of her disease. Her platelet count on 58/2/03 was 54,000/cumm and I recommend to continue with observation. She complains of significant fatigue and intermittent diarrhea. She feels this is related to intake of dairy products.  She is asked to keep a food journal.  Besides that, she doesn't have any other significant new symptoms on today's visit. PAST MEDICAL HISTORY:  Significant for:  1. Hyperlipidemia. 2.         Diabetes mellitus. 3.         Coronary artery disease. 4.         Gastroesophageal reflux disease. 5.         History of depression and adjustment disorder. 6.         Osteoporosis. 7.         History of small intestinal cancer, status post surgery by Dr. Shukri Baum in [address]. PAST SURGICAL HISTORY:  Significant for:  1. Appendectomy in 1959.  2.         Coronary artery angioplasty in 1992. 3.         Small intestine cancer removal in 1998.  4.         Cholecystectomy in 1998.  5.         Open heart surgery in March 31, 2009. 6.         Melanoma surgery on October 10, 2011. 7.         Hysterectomy in 1996. FAMILY HISTORY:  Significant for melanoma in her brother and sister. No other family history of cancer disease. SOCIAL HISTORY:  She denies smoking, alcohol drinking, and illicit drug abuse. She is  for 55 years and has four children. She is retiree from the [employer]. ALLERGIES:  No known drug allergies. Review of Systems: \"Per interval history; otherwise 10 point ROS is negative. \"  Her energy level is stable, appetite and sleep are fair. Denies fever, chills, night sweats, cough, shortness of breath, chest pain, hemoptysis or palpitations. She has diarrhea 5-6 episodes a few days a month. She denies nausea, vomiting, abdominal pain, constipation or dysuria. She has some loss of appetite or weight loss. No neuropathy and she doesn't' have bleeding or clotting disorder. Denies any pain in her body. She doesn't have anxiety or depression. The rests of the systems are unremarkable. Denies flushing.      Vital Signs:  /68 (Site: Left Upper Arm, Position: Sitting, Cuff Size: Medium Adult)   Pulse 76   Temp 97.8 °F (36.6 °C) (Temporal)   Ht 5' 2\" (1.575 m)   Wt 135 lb (61.2 kg)   SpO2 95%   BMI 24.69 kg/m²     Physical Exam:  CONSTITUTIONAL: awake, alert, cooperative, no apparent distress   EYES: sclera clear and conjunctiva normal  ENT: Normocephalic, without obvious abnormality, atraumatic  NECK: supple, symmetrical  HEMATOLOGIC/LYMPHATIC: no cervical, supraclavicular  lymphadenopathy   LUNGS: no increased work of breathing and clear to auscultation   CARDIOVASCULAR: regular rate and rhythm, normal S1 and S2, no murmur noted  ABDOMEN: normal bowel sounds x 4, soft, non-distended, non-tender, no masses palpated, no hepatosplenomegaly   MUSCULOSKELETAL: full range of motion noted, tone is normal  NEUROLOGIC: awake, alert, oriented to name, place and time. Motor skills grossly intact. SKIN: Normal skin color, texture, turgor and no jaundice.  appears intact   EXTREMITIES: no LE edema,  no cyanosis     Labs:  Hematology:  Lab Results   Component Value Date    WBC 8.9 11/04/2020    RBC 3.91 (L) 11/04/2020    HGB 10.5 (L) 11/04/2020    HCT 32.9 (L) 11/04/2020    MCV 84.1 11/04/2020    MCH 26.9 (L) 11/04/2020    MCHC 31.9 (L) 11/04/2020    RDW 14.6 11/04/2020    PLT 54 (L) 11/04/2020    MPV 10.8 11/04/2020    BANDSPCT 6 06/07/2016    SEGSPCT 69.5 (H) 11/04/2020    EOSRELPCT 3.4 (H) 11/04/2020    BASOPCT 0.6 11/04/2020    LYMPHOPCT 18.0 (L) 11/04/2020    MONOPCT 8.5 (H) 11/04/2020    BANDABS 0.37 06/07/2016    SEGSABS 6.2 11/04/2020    EOSABS 0.3 11/04/2020    BASOSABS 0.1 11/04/2020    LYMPHSABS 1.6 11/04/2020    MONOSABS 0.8 11/04/2020    DIFFTYPE AUTOMATED DIFFERENTIAL 11/04/2020    POLYCHROM 1+ 03/29/2017    PLTM  03/29/2017     PLATELETS APPEAR DECREASED    A FEW LARGE PLATELETS NOTED     No results found for: ESR  Chemistry:  Lab Results   Component Value Date     (L) 11/04/2020    K 4.4 11/04/2020    CL 96 (L) 11/04/2020    CO2 25 11/04/2020    BUN 23 11/04/2020    CREATININE 1.4 (H) 11/04/2020    GLUCOSE 131 (H) 11/04/2020    CALCIUM 8.7 11/04/2020    PROT 6.4 11/04/2020    LABALBU 4.1 11/04/2020    BILITOT 0.4 11/04/2020    ALKPHOS 67 11/04/2020    AST 16 11/04/2020    ALT 9 (L) 11/04/2020    LABGLOM 36 (L) 11/04/2020    GFRAA 44 (L) 11/04/2020    PHOS 3.8 12/31/2019    MG 1.7 (L) 03/28/2019    POCGLU 232 (H) 10/17/2019     Lab Results   Component Value Date     11/04/2020    HOMOCYSTEINE (H) 10/15/2019     17.8  Risk of vascular disease   increases above 9 umol/L  and is significant >15 umol/L. No components found for: LD  Lab Results   Component Value Date    TSHHS 2.480 11/24/2014    T4FREE 1.13 11/24/2014    FT3 2.9 11/24/2014     Immunology:  Lab Results   Component Value Date    PROT 6.4 11/04/2020     No results found for: Toshia Santana KLFLCR  No results found for: B2M  Coagulation Panel:  Lab Results   Component Value Date    PROTIME 12.5 08/11/2015    INR 1.10 08/11/2015    APTT 30.5 08/11/2015     Anemia Panel:  Lab Results   Component Value Date    [id number] 1548 (H) 11/24/2014    FOLATE >20.0 (H) 11/24/2014     Tumor Markers:  No results found for: , CEA, , LABCA2, PSA  Observations:  PHQ-9 Total Score: 11 (11/4/2020  2:15 PM)  Thoughts that you would be better off dead, or of hurting yourself in some way: 0 (11/4/2020  2:15 PM)      Assessment & Plan: Thrombocytopenia - most likely immune thrombocytopenia. Stage IIB malignant melanoma. Recurrent/metastatic small intestine carcinoid tumor. PLAN:  Overall Ms. Percell Boxer is feeling quite well and does not have any significant new symptoms on today's visit. I have been following Ms. Diane for immune thrombocytopenia, history of stage IIB malignant melanoma, and the recurrent carcinoid tumor. 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 She stated that her diarrhea is getting better since starting the Lanreotid. She will be due for another Lanreotide injection today, 9/8/20. I recommend her to continue with octreotide for now.  Neuroendocrine PET scan done on January or February 2019 showed at least stable disease. I recommend her to have CT scan of the chest, abdomen and pelvis on 12/23/2020 to rule out progression of her disease. Her platelet count on 70/6/30 was 56,000/cumm and I recommend to continue with observation. She reports ongoing loose stool she feels correlates with increased dairy intake. She is asked to keep a food diary. I have reviewed all these plans with Ms. Kalee Agrawal and she is in agreement with the plans. I answered all her questions and concerns for today. I asked her to follow up with her primary care physician on regular basis and I will continue to keep you update on her progress. Thank you for allowing me to participate in the care of this very pleasant patient. Recent imaging and labs were reviewed and discussed with the patient.

## 2020-11-04 ENCOUNTER — HOSPITAL ENCOUNTER (OUTPATIENT)
Dept: INFUSION THERAPY | Age: 80
Discharge: HOME | End: 2020-11-04
Payer: MEDICARE

## 2020-11-04 ENCOUNTER — OFFICE VISIT (OUTPATIENT)
Dept: ONCOLOGY | Age: 80
End: 2020-11-04
Payer: MEDICARE

## 2020-11-04 VITALS
TEMPERATURE: 97.8 F | OXYGEN SATURATION: 95 % | HEIGHT: 62 IN | BODY MASS INDEX: 24.84 KG/M2 | HEART RATE: 76 BPM | WEIGHT: 135 LBS | DIASTOLIC BLOOD PRESSURE: 68 MMHG | SYSTOLIC BLOOD PRESSURE: 138 MMHG | RESPIRATION RATE: 18 BRPM

## 2020-11-04 VITALS
TEMPERATURE: 97.8 F | WEIGHT: 135 LBS | HEART RATE: 76 BPM | SYSTOLIC BLOOD PRESSURE: 138 MMHG | BODY MASS INDEX: 24.84 KG/M2 | OXYGEN SATURATION: 95 % | DIASTOLIC BLOOD PRESSURE: 68 MMHG | HEIGHT: 62 IN

## 2020-11-04 DIAGNOSIS — D69.3 IMMUNE THROMBOCYTOPENIC PURPURA (HCC): ICD-10-CM

## 2020-11-04 DIAGNOSIS — E34.0 CARCINOID SYNDROME (HCC): Primary | ICD-10-CM

## 2020-11-04 LAB
ALBUMIN SERPL-MCNC: 4.1 GM/DL (ref 3.4–5)
ALP BLD-CCNC: 67 IU/L (ref 40–129)
ALT SERPL-CCNC: 9 U/L (ref 10–40)
ANION GAP SERPL CALCULATED.3IONS-SCNC: 11 MMOL/L (ref 4–16)
AST SERPL-CCNC: 16 IU/L (ref 15–37)
BASOPHILS ABSOLUTE: 0.1 K/CU MM
BASOPHILS RELATIVE PERCENT: 0.6 % (ref 0–1)
BILIRUB SERPL-MCNC: 0.4 MG/DL (ref 0–1)
BUN BLDV-MCNC: 23 MG/DL (ref 6–23)
CALCIUM SERPL-MCNC: 8.7 MG/DL (ref 8.3–10.6)
CHLORIDE BLD-SCNC: 96 MMOL/L (ref 99–110)
CO2: 25 MMOL/L (ref 21–32)
CREAT SERPL-MCNC: 1.4 MG/DL (ref 0.6–1.1)
DIFFERENTIAL TYPE: ABNORMAL
EOSINOPHILS ABSOLUTE: 0.3 K/CU MM
EOSINOPHILS RELATIVE PERCENT: 3.4 % (ref 0–3)
GFR AFRICAN AMERICAN: 44 ML/MIN/1.73M2
GFR NON-AFRICAN AMERICAN: 36 ML/MIN/1.73M2
GLUCOSE BLD-MCNC: 131 MG/DL (ref 70–99)
HCT VFR BLD CALC: 32.9 % (ref 37–47)
HEMOGLOBIN: 10.5 GM/DL (ref 12.5–16)
LACTATE DEHYDROGENASE: 244 IU/L (ref 120–246)
LYMPHOCYTES ABSOLUTE: 1.6 K/CU MM
LYMPHOCYTES RELATIVE PERCENT: 18 % (ref 24–44)
MCH RBC QN AUTO: 26.9 PG (ref 27–31)
MCHC RBC AUTO-ENTMCNC: 31.9 % (ref 32–36)
MCV RBC AUTO: 84.1 FL (ref 78–100)
MONOCYTES ABSOLUTE: 0.8 K/CU MM
MONOCYTES RELATIVE PERCENT: 8.5 % (ref 0–4)
PDW BLD-RTO: 14.6 % (ref 11.7–14.9)
PLATELET # BLD: 54 K/CU MM (ref 140–440)
PMV BLD AUTO: 10.8 FL (ref 7.5–11.1)
POTASSIUM SERPL-SCNC: 4.4 MMOL/L (ref 3.5–5.1)
RBC # BLD: 3.91 M/CU MM (ref 4.2–5.4)
SEGMENTED NEUTROPHILS ABSOLUTE COUNT: 6.2 K/CU MM
SEGMENTED NEUTROPHILS RELATIVE PERCENT: 69.5 % (ref 36–66)
SODIUM BLD-SCNC: 132 MMOL/L (ref 135–145)
TOTAL PROTEIN: 6.4 GM/DL (ref 6.4–8.2)
WBC # BLD: 8.9 K/CU MM (ref 4–10.5)

## 2020-11-04 PROCEDURE — G8420 CALC BMI NORM PARAMETERS: HCPCS | Performed by: NURSE PRACTITIONER

## 2020-11-04 PROCEDURE — 96372 THER/PROPH/DIAG INJ SC/IM: CPT

## 2020-11-04 PROCEDURE — 1123F ACP DISCUSS/DSCN MKR DOCD: CPT | Performed by: NURSE PRACTITIONER

## 2020-11-04 PROCEDURE — G8484 FLU IMMUNIZE NO ADMIN: HCPCS | Performed by: NURSE PRACTITIONER

## 2020-11-04 PROCEDURE — 1036F TOBACCO NON-USER: CPT | Performed by: NURSE PRACTITIONER

## 2020-11-04 PROCEDURE — 36415 COLL VENOUS BLD VENIPUNCTURE: CPT

## 2020-11-04 PROCEDURE — 83615 LACTATE (LD) (LDH) ENZYME: CPT

## 2020-11-04 PROCEDURE — 6360000002 HC RX W HCPCS: Performed by: INTERNAL MEDICINE

## 2020-11-04 PROCEDURE — 80053 COMPREHEN METABOLIC PANEL: CPT

## 2020-11-04 PROCEDURE — 1090F PRES/ABSN URINE INCON ASSESS: CPT | Performed by: NURSE PRACTITIONER

## 2020-11-04 PROCEDURE — 99213 OFFICE O/P EST LOW 20 MIN: CPT | Performed by: NURSE PRACTITIONER

## 2020-11-04 PROCEDURE — 85025 COMPLETE CBC W/AUTO DIFF WBC: CPT

## 2020-11-04 PROCEDURE — G8400 PT W/DXA NO RESULTS DOC: HCPCS | Performed by: NURSE PRACTITIONER

## 2020-11-04 PROCEDURE — G0444 DEPRESSION SCREEN ANNUAL: HCPCS | Performed by: NURSE PRACTITIONER

## 2020-11-04 PROCEDURE — G8427 DOCREV CUR MEDS BY ELIG CLIN: HCPCS | Performed by: NURSE PRACTITIONER

## 2020-11-04 PROCEDURE — 4040F PNEUMOC VAC/ADMIN/RCVD: CPT | Performed by: NURSE PRACTITIONER

## 2020-11-04 RX ORDER — LANREOTIDE ACETATE 120 MG/.5ML
120 INJECTION SUBCUTANEOUS ONCE
Status: COMPLETED | OUTPATIENT
Start: 2020-11-04 | End: 2020-11-04

## 2020-11-04 RX ORDER — INSULIN GLARGINE 100 [IU]/ML
INJECTION, SOLUTION SUBCUTANEOUS
COMMUNITY
Start: 2020-10-22 | End: 2022-04-21

## 2020-11-04 RX ORDER — LANREOTIDE ACETATE 120 MG/.5ML
120 INJECTION SUBCUTANEOUS ONCE
Status: CANCELLED | OUTPATIENT
Start: 2020-12-02

## 2020-11-04 RX ADMIN — LANREOTIDE ACETATE 120 MG: 120 INJECTION SUBCUTANEOUS at 14:40

## 2020-11-04 ASSESSMENT — COLUMBIA-SUICIDE SEVERITY RATING SCALE - C-SSRS
6. HAVE YOU EVER DONE ANYTHING, STARTED TO DO ANYTHING, OR PREPARED TO DO ANYTHING TO END YOUR LIFE?: NO
2. HAVE YOU ACTUALLY HAD ANY THOUGHTS OF KILLING YOURSELF?: NO
1. WITHIN THE PAST MONTH, HAVE YOU WISHED YOU WERE DEAD OR WISHED YOU COULD GO TO SLEEP AND NOT WAKE UP?: NO

## 2020-11-04 ASSESSMENT — PATIENT HEALTH QUESTIONNAIRE - PHQ9
5. POOR APPETITE OR OVEREATING: 2
SUM OF ALL RESPONSES TO PHQ QUESTIONS 1-9: 11
6. FEELING BAD ABOUT YOURSELF - OR THAT YOU ARE A FAILURE OR HAVE LET YOURSELF OR YOUR FAMILY DOWN: 0
10. IF YOU CHECKED OFF ANY PROBLEMS, HOW DIFFICULT HAVE THESE PROBLEMS MADE IT FOR YOU TO DO YOUR WORK, TAKE CARE OF THINGS AT HOME, OR GET ALONG WITH OTHER PEOPLE: 1
SUM OF ALL RESPONSES TO PHQ QUESTIONS 1-9: 11
9. THOUGHTS THAT YOU WOULD BE BETTER OFF DEAD, OR OF HURTING YOURSELF: 0
2. FEELING DOWN, DEPRESSED OR HOPELESS: 3
3. TROUBLE FALLING OR STAYING ASLEEP: 2
4. FEELING TIRED OR HAVING LITTLE ENERGY: 2
7. TROUBLE CONCENTRATING ON THINGS, SUCH AS READING THE NEWSPAPER OR WATCHING TELEVISION: 2
SUM OF ALL RESPONSES TO PHQ9 QUESTIONS 1 & 2: 3
1. LITTLE INTEREST OR PLEASURE IN DOING THINGS: 0
SUM OF ALL RESPONSES TO PHQ QUESTIONS 1-9: 11
8. MOVING OR SPEAKING SO SLOWLY THAT OTHER PEOPLE COULD HAVE NOTICED. OR THE OPPOSITE, BEING SO FIGETY OR RESTLESS THAT YOU HAVE BEEN MOVING AROUND A LOT MORE THAN USUAL: 0

## 2020-11-04 NOTE — PROGRESS NOTES
1500: Pt here for treatment, A&OX3. Injection (Lantreotide) given in Right dorsogluteal region, numbing spray and bandaide to site. Pt reports she has her next injection appt scheduled and will stop at the  to make her return provider appt. Pt said she saw the provider today before coming to the tx room. Pt reports that she has diarrhea and takes medication to control this. She reports chronic neuropathy in her feet, She does not use a cane or walker or wheelchair. Patient's status assessed and documented appropriately. All labs and required results were also reviewed today. Treatment parameters have been reviewed. Today's treatment has been approved by the provider. Treatment orders and medication sequencing (when applicable) was verified by 2 registered nurses. The treatment plan was confirmed with the patient prior to administration, and the patient understands the need to report any treatment-related symptoms. Prior to administration, when applicable, the following 8 elements of medication administration were reviewed with 2nd Registered Nurse prior to dosing: drug name, drug dose, infusion volume when prepared in a syringe, rate of administration, expiration dates and/or times, appearance and integrity of drug(s), and rate of pump for infusion. The 5 rights of medication administration have been verified.

## 2020-12-11 ENCOUNTER — TELEPHONE (OUTPATIENT)
Dept: ONCOLOGY | Age: 80
End: 2020-12-11

## 2020-12-11 NOTE — TELEPHONE ENCOUNTER
Called pt regarding scan on 12.23 at 8:00 at [facility]-- gave prep info and pt voiced understanding

## 2020-12-23 ENCOUNTER — HOSPITAL ENCOUNTER (OUTPATIENT)
Dept: CT IMAGING | Age: 80
Discharge: HOME | End: 2020-12-23
Payer: MEDICARE

## 2020-12-23 LAB
GFR AFRICAN AMERICAN: 45 ML/MIN/1.73M2
GFR NON-AFRICAN AMERICAN: 37 ML/MIN/1.73M2
POC CREATININE: 1.4 MG/DL (ref 0.6–1.1)

## 2020-12-23 PROCEDURE — 71250 CT THORAX DX C-: CPT

## 2020-12-23 PROCEDURE — 74176 CT ABD & PELVIS W/O CONTRAST: CPT

## 2020-12-23 PROCEDURE — 6360000004 HC RX CONTRAST MEDICATION: Performed by: INTERNAL MEDICINE

## 2020-12-23 RX ORDER — SODIUM CHLORIDE 0.9 % (FLUSH) 0.9 %
10 SYRINGE (ML) INJECTION PRN
Status: DISCONTINUED | OUTPATIENT
Start: 2020-12-23 | End: 2020-12-24 | Stop reason: HOSPADM

## 2020-12-23 RX ADMIN — IOHEXOL 50 ML: 240 INJECTION, SOLUTION INTRATHECAL; INTRAVASCULAR; INTRAVENOUS; ORAL at 08:30

## 2021-01-03 NOTE — PROGRESS NOTES
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 Chon Szymanski is a 56-year-old very pleasant patient with medical history significant for hyperlipidemia, gastroesophageal reflux disease, coronary artery disease, depression and adjustment disorder, osteoporosis, diabetes mellitus, and history of small intestinal carcinoid tumor, status post surgery by Dr. Lamar Lopes in , initially referred to me on 2014, for evaluation of thrombocytopenia. She stated that she was found to have thrombocytopenia on routine blood tests done on 2014. Repeat blood tests on 2014, showed persistent thrombocytopenia and she was referred to me for evaluation. She does not have leucopenia or anemia. She complains of severe tiredness and fatigue. Besides that she does not have any other significant symptoms. She denies bleeding diathesis too. Laboratory workups done on 2014, showed platelet count of 37, however, manual differential showed platelet count of 97. Her white blood cell count was 6.4 and hemoglobin was 12.2. Her LDH is mildly elevated (261), vitamin B12 normal (154), normal folate (more than 20), negative hepatitis panel, antinuclear antibody was not detected and negative rheumatoid factor and normal TSH (2.4). Since all the workups have been within normal range, I believe her mild thrombocytopenia is most likely due to immune thrombocytopenia. Ms. Chon Szymanski started following with Dr. Maria Teresa Shrestha at [facility] for immune thrombocytopenia.  Since her platelet count dropped below 20,000 per cubic millimeter, she was treated with prednisone. She responded well to prednisone; however, her platelet is always in her 40,000 range when she started tapering off the prednisone. She discussed about the splenectomy and Ms. Rudy Wise was not sure she really wanted to proceed with splenectomy at this moment. Since Ms. Diane's insurance does not cover [facility], she was referred back to me for continuation of care for her immune thrombocytopenia. Since Ms. Bowman thrombocytopenia has gotten better, I stopped prednisone since April 29, 2016. She has been under close observation since then. Ms. Rudy Wise was found to have suspicious lesion in her mid back by Dermatologist, Dr. Rhonda Mccrary and she referred her to Dr. Lamar Do at [facility] for further evaluation. She initially underwent wide excision and sentinel lymph node biopsy on April 14, 2017. The initial pathology revealed 4 mm in Breslows depth, Charles level IV, non-ulcerated, with 2 mitosis/mm2. 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 Since she has stage IIB malignant melanoma, I recommend for close observation only. She has been under close observation since then. She had PET-CT scan and it showed mesenteric calcified mass. She was subsequently referred to the Gastroenterologist for EUS since she had history of carcinoid tumor. Ms. Rudy Wise underwent endoscopic ultrasound by Dr. Ramos Baxter on May 10, 2017, and it showed a mass in the parapancreatic region, 3 cm in size. 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 The ki-67 was less than 2%. Tumor cells are positive for AE1/3, synaptophysin, and chromogranin.       She subsequently underwent nuclear PET neuroendocrine scan on July 21, 2017, and it showed a soft tissue mass in the pancreatic uncinate process with intense radiotracer pelvis. Since she has slight increase in size of pancreatic head mass, I recommend her to see her oncologist at [facility] for possible PRRT therapy. She states that she was scheduled to see them soon. She also complained of increasing pain in her mid back and epigastric area. I believe it is most likely due to increase in size of her pancreatic head mass. I will start Norco 5/325 mg every 6 hourly as needed basis. I am hoping that will also help with her episodic diarrhea. Her platelet count on 93/2/[birth year] was 54,000/cumm and I recommend to continue with observation. Depression - stated that she still has depression. I recognized that she hasn't had bupropion and she is only on effexor. I will start bupropion today and sent the script for her. Hyperlipidemia - on lipitor. Hypertension, CAD - on aspirin, amlodipine and carvedilol. Recommend salt restriction. GERD - stable on pantoprazole. Diabetes - on metformin. Recommend ADA diet. She complains of significant tiredness and fatigue. She doesn't have any other significant new symptoms on today's visit. PAST MEDICAL HISTORY:  Significant for:  1. Hyperlipidemia. 2.         Diabetes mellitus. 3.         Coronary artery disease. 4.         Gastroesophageal reflux disease. 5.         History of depression and adjustment disorder. 6.         Osteoporosis. 7.         History of small intestinal cancer, status post surgery by Dr. Jemima Pena in [facility] [address]. PAST SURGICAL HISTORY:  Significant for:  1. Appendectomy in 1959.  2.         Coronary artery angioplasty in 1992. 3.         Small intestine cancer removal in 1998.  4.         Cholecystectomy in 1998.  5.         Open heart surgery in March 31, 2009. 6.         Melanoma surgery on October 10, 2011. 7.         Hysterectomy in 1996. FAMILY HISTORY:  Significant for melanoma in her brother and sister. No other family history of cancer disease.     SOCIAL HISTORY:  She denies smoking, alcohol drinking, and illicit drug abuse. She is  for 56 years and has four children. She is retiree from the [employer]. ALLERGIES:  No known drug allergies. Review of Systems: \"Per interval history; otherwise 10 point ROS is negative. \"  Her energy level is low, appetite and sleep are okay. She doesn't have fever, chills, night sweats, cough, shortness of breath, chest pain, hemoptysis or palpitations. Her bowels and bladder functions are normal. She doesn't have nausea, vomiting, abdominal pain, constipation or dysuria. She has some loss of appetite or weight loss. She only has minimal loose stool. She doesn't have neuropathy and she denies bleeding or clotting disorder. She doesn't have any pain in her body. No anxiety and she has depression. She doesn't have suicidal idea. The rests of the systems are unremarkable. Vital Signs:  BP (!) 163/68 (Site: Right Upper Arm, Position: Sitting, Cuff Size: Medium Adult)   Pulse 75   Temp 96.5 °F (35.8 °C) (Infrared)   Resp 16   Ht 5' 2\" (1.575 m)   Wt 135 lb 3.2 oz (61.3 kg)   SpO2 98%   BMI 24.73 kg/m²     Physical Exam:  CONSTITUTIONAL: alert, cooperative, awake, no apparent distress   EYES: pupils equal, round and reactive to light, sclera clear and conjunctiva normal  ENT: without obvious abnormality, normocephalic, atraumatic  NECK: supple, symmetrical, no jugular venous distension and no carotid bruits   HEMATOLOGIC/LYMPHATIC: no cervical, supraclavicular or axillary lymphadenopathy   LUNGS: VBS, no rhonchi, no increased work of breathing and clear to auscultation, no wheezes, no crackles,    CARDIOVASCULAR: regular rate and rhythm, normal S1 and S2, no murmur noted  ABDOMEN: soft, non-distended, normal bowel sounds x 4, non-tender, no masses palpated, no hepatosplenomegaly   MUSCULOSKELETAL: full range of motion noted, tone is normal  NEUROLOGIC: awake, alert, oriented to name, place and time. Motor skills grossly intact. SKIN: Normal skin color, texture, turgor and no jaundice. appears intact   EXTREMITIES: no LE edema, no clubbing, no leg swelling, no cyanosis     Labs:  Hematology:  Lab Results   Component Value Date    WBC 8.9 11/04/2020    RBC 3.91 (L) 11/04/2020    HGB 10.5 (L) 11/04/2020    HCT 32.9 (L) 11/04/2020    MCV 84.1 11/04/2020    MCH 26.9 (L) 11/04/2020    MCHC 31.9 (L) 11/04/2020    RDW 14.6 11/04/2020    PLT 54 (L) 11/04/2020    MPV 10.8 11/04/2020    BANDSPCT 6 06/07/2016    SEGSPCT 69.5 (H) 11/04/2020    EOSRELPCT 3.4 (H) 11/04/2020    BASOPCT 0.6 11/04/2020    LYMPHOPCT 18.0 (L) 11/04/2020    MONOPCT 8.5 (H) 11/04/2020    BANDABS 0.37 06/07/2016    SEGSABS 6.2 11/04/2020    EOSABS 0.3 11/04/2020    BASOSABS 0.1 11/04/2020    LYMPHSABS 1.6 11/04/2020    MONOSABS 0.8 11/04/2020    DIFFTYPE AUTOMATED DIFFERENTIAL 11/04/2020    POLYCHROM 1+ 03/29/2017    PLTM  03/29/2017     PLATELETS APPEAR DECREASED    A FEW LARGE PLATELETS NOTED     No results found for: ESR  Chemistry:  Lab Results   Component Value Date     (L) 11/04/2020    K 4.4 11/04/2020    CL 96 (L) 11/04/2020    CO2 25 11/04/2020    BUN 23 11/04/2020    CREATININE 1.4 (H) 12/23/2020    GLUCOSE 131 (H) 11/04/2020    CALCIUM 8.7 11/04/2020    PROT 6.4 11/04/2020    LABALBU 4.1 11/04/2020    BILITOT 0.4 11/04/2020    ALKPHOS 67 11/04/2020    AST 16 11/04/2020    ALT 9 (L) 11/04/2020    LABGLOM 37 (L) 12/23/2020    GFRAA 45 (L) 12/23/2020    PHOS 3.8 12/31/2019    MG 1.7 (L) 03/28/2019    POCGLU 232 (H) 10/17/2019     Lab Results   Component Value Date     11/04/2020    HOMOCYSTEINE (H) 10/15/2019     17.8  Risk of vascular disease   increases above 9 umol/L  and is significant >15 umol/L.        No components found for: LD  Lab Results   Component Value Date    TSHHS 2.480 11/24/2014    T4FREE 1.13 11/24/2014    FT3 2.9 11/24/2014     Immunology:  Lab Results   Component Value Date    PROT 6.4 11/04/2020     No results found for: Jefferson Infante, KLFLCR  No results found for: B2M  Coagulation Panel:  Lab Results   Component Value Date    PROTIME 12.5 08/11/2015    INR 1.10 08/11/2015    APTT 30.5 08/11/2015     Anemia Panel:  Lab Results   Component Value Date    JPTBHQAQ22 1548 (H) 11/24/2014    FOLATE >20.0 (H) 11/24/2014     Tumor Markers:  No results found for: , CEA, , LABCA2, PSA  Observations:  PHQ-9 Total Score: 10 (1/6/2021 11:42 AM)  Thoughts that you would be better off dead, or of hurting yourself in some way: 0 (1/6/2021 11:42 AM)      Assessment & Plan: Thrombocytopenia - most likely immune thrombocytopenia. Stage IIB malignant melanoma. Recurrent/metastatic small intestine carcinoid tumor. PLAN:  Ms. Mark Lopez has been followed for immune thrombocytopenia, history of stage IIB malignant melanoma, and the recurrent carcinoid tumor. CT scan of the chest, abdomen and pelvis done on December 23, 2020 showed interval increase in size of a soft tissue mass with scattered calcifications posterior to the pancreatic head currently measuring up to 4 cm, previously 3.3 cm. No evidence of metastatic disease in the chest.    On January 6, 2021, she presented to me for followup. I have been following Ms. Diane for immune thrombocytopenia, history of stage IIB malignant melanoma, and the recurrent carcinoid tumor. 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 She stated that her diarrhea is stable since starting the Lanreotid. She will be due for another Lanreotide injection today. She still need to take lomotil 2 tablets BID as needed since she sometimes has diarrhea. I recommend her to continue with octreotide for now. On today visit, I reviewed with her findings of CT scan of the abdomen and pelvis. Since she has slight increase in size of pancreatic head mass, I recommend her to see her oncologist at [facility] for possible PRRT therapy.   She

## 2021-01-06 ENCOUNTER — OFFICE VISIT (OUTPATIENT)
Dept: ONCOLOGY | Age: 81
End: 2021-01-06
Payer: MEDICARE

## 2021-01-06 ENCOUNTER — HOSPITAL ENCOUNTER (OUTPATIENT)
Dept: INFUSION THERAPY | Age: 81
Discharge: HOME | End: 2021-01-06
Payer: MEDICARE

## 2021-01-06 VITALS
SYSTOLIC BLOOD PRESSURE: 163 MMHG | RESPIRATION RATE: 16 BRPM | BODY MASS INDEX: 24.88 KG/M2 | WEIGHT: 135.2 LBS | HEART RATE: 75 BPM | OXYGEN SATURATION: 98 % | HEIGHT: 62 IN | TEMPERATURE: 96.5 F | DIASTOLIC BLOOD PRESSURE: 68 MMHG

## 2021-01-06 DIAGNOSIS — E34.0 CARCINOID SYNDROME (HCC): Primary | ICD-10-CM

## 2021-01-06 PROCEDURE — G8420 CALC BMI NORM PARAMETERS: HCPCS | Performed by: INTERNAL MEDICINE

## 2021-01-06 PROCEDURE — G8484 FLU IMMUNIZE NO ADMIN: HCPCS | Performed by: INTERNAL MEDICINE

## 2021-01-06 PROCEDURE — 1036F TOBACCO NON-USER: CPT | Performed by: INTERNAL MEDICINE

## 2021-01-06 PROCEDURE — 1123F ACP DISCUSS/DSCN MKR DOCD: CPT | Performed by: INTERNAL MEDICINE

## 2021-01-06 PROCEDURE — G8400 PT W/DXA NO RESULTS DOC: HCPCS | Performed by: INTERNAL MEDICINE

## 2021-01-06 PROCEDURE — 4040F PNEUMOC VAC/ADMIN/RCVD: CPT | Performed by: INTERNAL MEDICINE

## 2021-01-06 PROCEDURE — 96402 CHEMO HORMON ANTINEOPL SQ/IM: CPT

## 2021-01-06 PROCEDURE — 96372 THER/PROPH/DIAG INJ SC/IM: CPT

## 2021-01-06 PROCEDURE — 1090F PRES/ABSN URINE INCON ASSESS: CPT | Performed by: INTERNAL MEDICINE

## 2021-01-06 PROCEDURE — 6360000002 HC RX W HCPCS: Performed by: INTERNAL MEDICINE

## 2021-01-06 PROCEDURE — G8427 DOCREV CUR MEDS BY ELIG CLIN: HCPCS | Performed by: INTERNAL MEDICINE

## 2021-01-06 PROCEDURE — 99214 OFFICE O/P EST MOD 30 MIN: CPT | Performed by: INTERNAL MEDICINE

## 2021-01-06 RX ORDER — LANREOTIDE ACETATE 120 MG/.5ML
120 INJECTION SUBCUTANEOUS ONCE
Status: COMPLETED | OUTPATIENT
Start: 2021-01-06 | End: 2021-01-06

## 2021-01-06 RX ORDER — HYDROCODONE BITARTRATE AND ACETAMINOPHEN 5; 325 MG/1; MG/1
1 TABLET ORAL EVERY 6 HOURS PRN
Qty: 60 TABLET | Refills: 0 | Status: SHIPPED | OUTPATIENT
Start: 2021-01-06 | End: 2021-01-21

## 2021-01-06 RX ORDER — NYSTATIN 100000 U/G
CREAM TOPICAL 4 TIMES DAILY PRN
Status: ON HOLD | COMMUNITY
Start: 2020-11-23 | End: 2021-04-16

## 2021-01-06 RX ORDER — LANREOTIDE ACETATE 120 MG/.5ML
120 INJECTION SUBCUTANEOUS ONCE
Status: CANCELLED | OUTPATIENT
Start: 2021-01-27 | End: 2021-01-27

## 2021-01-06 RX ORDER — BUPROPION HYDROCHLORIDE 150 MG/1
150 TABLET, EXTENDED RELEASE ORAL 2 TIMES DAILY
Qty: 180 TABLET | Refills: 1 | Status: SHIPPED | OUTPATIENT
Start: 2021-01-06 | End: 2021-07-02

## 2021-01-06 RX ADMIN — LANREOTIDE ACETATE 120 MG: 120 INJECTION SUBCUTANEOUS at 12:20

## 2021-01-06 ASSESSMENT — PATIENT HEALTH QUESTIONNAIRE - PHQ9
7. TROUBLE CONCENTRATING ON THINGS, SUCH AS READING THE NEWSPAPER OR WATCHING TELEVISION: 1
SUM OF ALL RESPONSES TO PHQ QUESTIONS 1-9: 10
1. LITTLE INTEREST OR PLEASURE IN DOING THINGS: 2
5. POOR APPETITE OR OVEREATING: 0
SUM OF ALL RESPONSES TO PHQ QUESTIONS 1-9: 10
6. FEELING BAD ABOUT YOURSELF - OR THAT YOU ARE A FAILURE OR HAVE LET YOURSELF OR YOUR FAMILY DOWN: 0
9. THOUGHTS THAT YOU WOULD BE BETTER OFF DEAD, OR OF HURTING YOURSELF: 0
4. FEELING TIRED OR HAVING LITTLE ENERGY: 2

## 2021-01-06 NOTE — PROGRESS NOTES
Here for lanreotide//OV. Injection administered as ordered. Dr. Zach Steiner would like patient to contact oncologist at [address] [place] [address] regarding PRRT. Reviewed appts. Discharged in stable condition.

## 2021-01-28 ENCOUNTER — TELEPHONE (OUTPATIENT)
Dept: INFUSION THERAPY | Age: 81
End: 2021-01-28

## 2021-01-28 NOTE — TELEPHONE ENCOUNTER
Called pt as she missed her injection appt on 1/27/21; pt states she will need to call me back as she needs to arrange transportation.

## 2021-01-29 ENCOUNTER — TELEPHONE (OUTPATIENT)
Dept: INFUSION THERAPY | Age: 81
End: 2021-01-29

## 2021-02-02 ENCOUNTER — HOSPITAL ENCOUNTER (OUTPATIENT)
Dept: INFUSION THERAPY | Age: 81
Discharge: HOME | End: 2021-02-02
Payer: MEDICARE

## 2021-02-02 DIAGNOSIS — E34.0 CARCINOID SYNDROME (HCC): Primary | ICD-10-CM

## 2021-02-02 DIAGNOSIS — D69.3 IMMUNE THROMBOCYTOPENIC PURPURA (HCC): ICD-10-CM

## 2021-02-02 LAB
ALBUMIN SERPL-MCNC: 4.3 GM/DL (ref 3.4–5)
ALP BLD-CCNC: 71 IU/L (ref 40–128)
ALT SERPL-CCNC: 9 U/L (ref 10–40)
ANION GAP SERPL CALCULATED.3IONS-SCNC: 11 MMOL/L (ref 4–16)
AST SERPL-CCNC: 15 IU/L (ref 15–37)
BASOPHILS ABSOLUTE: 0.1 K/CU MM
BASOPHILS RELATIVE PERCENT: 0.6 % (ref 0–1)
BILIRUB SERPL-MCNC: 0.4 MG/DL (ref 0–1)
BUN BLDV-MCNC: 18 MG/DL (ref 6–23)
CALCIUM SERPL-MCNC: 8.4 MG/DL (ref 8.3–10.6)
CHLORIDE BLD-SCNC: 98 MMOL/L (ref 99–110)
CO2: 24 MMOL/L (ref 21–32)
CREAT SERPL-MCNC: 1.5 MG/DL (ref 0.6–1.1)
DIFFERENTIAL TYPE: ABNORMAL
EOSINOPHILS ABSOLUTE: 0.4 K/CU MM
EOSINOPHILS RELATIVE PERCENT: 3.4 % (ref 0–3)
GFR AFRICAN AMERICAN: 41 ML/MIN/1.73M2
GFR NON-AFRICAN AMERICAN: 33 ML/MIN/1.73M2
GLUCOSE BLD-MCNC: 118 MG/DL (ref 70–99)
HCT VFR BLD CALC: 35.1 % (ref 37–47)
HEMOGLOBIN: 10.9 GM/DL (ref 12.5–16)
LACTATE DEHYDROGENASE: 222 IU/L (ref 120–246)
LYMPHOCYTES ABSOLUTE: 1.5 K/CU MM
LYMPHOCYTES RELATIVE PERCENT: 13.9 % (ref 24–44)
MCH RBC QN AUTO: 26.3 PG (ref 27–31)
MCHC RBC AUTO-ENTMCNC: 31.1 % (ref 32–36)
MCV RBC AUTO: 84.8 FL (ref 78–100)
MONOCYTES ABSOLUTE: 1 K/CU MM
MONOCYTES RELATIVE PERCENT: 9.2 % (ref 0–4)
PDW BLD-RTO: 14.9 % (ref 11.7–14.9)
PLATELET # BLD: 44 K/CU MM (ref 140–440)
PMV BLD AUTO: 11.3 FL (ref 7.5–11.1)
POTASSIUM SERPL-SCNC: 4.3 MMOL/L (ref 3.5–5.1)
RBC # BLD: 4.14 M/CU MM (ref 4.2–5.4)
SEGMENTED NEUTROPHILS ABSOLUTE COUNT: 8 K/CU MM
SEGMENTED NEUTROPHILS RELATIVE PERCENT: 72.9 % (ref 36–66)
SODIUM BLD-SCNC: 133 MMOL/L (ref 135–145)
TOTAL PROTEIN: 6.7 GM/DL (ref 6.4–8.2)
WBC # BLD: 10.9 K/CU MM (ref 4–10.5)

## 2021-02-02 PROCEDURE — 36415 COLL VENOUS BLD VENIPUNCTURE: CPT

## 2021-02-02 PROCEDURE — 96402 CHEMO HORMON ANTINEOPL SQ/IM: CPT

## 2021-02-02 PROCEDURE — 85025 COMPLETE CBC W/AUTO DIFF WBC: CPT

## 2021-02-02 PROCEDURE — 80053 COMPREHEN METABOLIC PANEL: CPT

## 2021-02-02 PROCEDURE — 6360000002 HC RX W HCPCS: Performed by: INTERNAL MEDICINE

## 2021-02-02 PROCEDURE — 83615 LACTATE (LD) (LDH) ENZYME: CPT

## 2021-02-02 RX ORDER — LANREOTIDE ACETATE 120 MG/.5ML
120 INJECTION SUBCUTANEOUS ONCE
Status: CANCELLED | OUTPATIENT
Start: 2021-02-03 | End: 2021-02-03

## 2021-02-02 RX ORDER — LANREOTIDE ACETATE 120 MG/.5ML
120 INJECTION SUBCUTANEOUS ONCE
Status: COMPLETED | OUTPATIENT
Start: 2021-02-02 | End: 2021-02-02

## 2021-02-02 RX ADMIN — LANREOTIDE ACETATE 120 MG: 120 INJECTION SUBCUTANEOUS at 15:25

## 2021-02-02 NOTE — PROGRESS NOTES
Patient arrived to treatment suite from lab for Lanreotide injection. No questions or concerns for the doctor at this time. Treatment approved and given subcutaneously in right hip, band-aid applied. Patient tolerated well. Cold spray utilized for comfort. Patient left treatment suite ambulatory. Discharge instructions provided.

## 2021-02-07 ENCOUNTER — APPOINTMENT (OUTPATIENT)
Dept: GENERAL RADIOLOGY | Age: 81
End: 2021-02-07
Payer: MEDICARE

## 2021-02-07 ENCOUNTER — HOSPITAL ENCOUNTER (EMERGENCY)
Age: 81
Discharge: HOME | End: 2021-02-07
Attending: EMERGENCY MEDICINE
Payer: MEDICARE

## 2021-02-07 ENCOUNTER — APPOINTMENT (OUTPATIENT)
Dept: CT IMAGING | Age: 81
End: 2021-02-07
Payer: MEDICARE

## 2021-02-07 VITALS
TEMPERATURE: 98.2 F | RESPIRATION RATE: 17 BRPM | WEIGHT: 135 LBS | BODY MASS INDEX: 24.84 KG/M2 | OXYGEN SATURATION: 99 % | HEIGHT: 62 IN | SYSTOLIC BLOOD PRESSURE: 170 MMHG | HEART RATE: 75 BPM | DIASTOLIC BLOOD PRESSURE: 74 MMHG

## 2021-02-07 DIAGNOSIS — S52.502A CLOSED FRACTURE OF DISTAL END OF LEFT RADIUS, UNSPECIFIED FRACTURE MORPHOLOGY, INITIAL ENCOUNTER: Primary | ICD-10-CM

## 2021-02-07 DIAGNOSIS — S52.612A CLOSED DISPLACED FRACTURE OF STYLOID PROCESS OF LEFT ULNA, INITIAL ENCOUNTER: ICD-10-CM

## 2021-02-07 DIAGNOSIS — M53.3 SACRAL PAIN: ICD-10-CM

## 2021-02-07 PROCEDURE — 72110 X-RAY EXAM L-2 SPINE 4/>VWS: CPT

## 2021-02-07 PROCEDURE — 70450 CT HEAD/BRAIN W/O DYE: CPT

## 2021-02-07 PROCEDURE — 99285 EMERGENCY DEPT VISIT HI MDM: CPT

## 2021-02-07 PROCEDURE — 73090 X-RAY EXAM OF FOREARM: CPT

## 2021-02-07 PROCEDURE — 29125 APPL SHORT ARM SPLINT STATIC: CPT

## 2021-02-07 PROCEDURE — 6370000000 HC RX 637 (ALT 250 FOR IP): Performed by: EMERGENCY MEDICINE

## 2021-02-07 PROCEDURE — 71045 X-RAY EXAM CHEST 1 VIEW: CPT

## 2021-02-07 PROCEDURE — 72192 CT PELVIS W/O DYE: CPT

## 2021-02-07 PROCEDURE — 73110 X-RAY EXAM OF WRIST: CPT

## 2021-02-07 PROCEDURE — 72125 CT NECK SPINE W/O DYE: CPT

## 2021-02-07 RX ORDER — ACETAMINOPHEN 500 MG
1000 TABLET ORAL ONCE
Status: COMPLETED | OUTPATIENT
Start: 2021-02-07 | End: 2021-02-07

## 2021-02-07 RX ORDER — ACETAMINOPHEN AND CODEINE PHOSPHATE 300; 30 MG/1; MG/1
1 TABLET ORAL EVERY 8 HOURS PRN
Qty: 15 TABLET | Refills: 0 | Status: SHIPPED | OUTPATIENT
Start: 2021-02-07 | End: 2021-02-12

## 2021-02-07 RX ORDER — OXYCODONE HYDROCHLORIDE AND ACETAMINOPHEN 5; 325 MG/1; MG/1
1 TABLET ORAL ONCE
Status: COMPLETED | OUTPATIENT
Start: 2021-02-07 | End: 2021-02-07

## 2021-02-07 RX ADMIN — OXYCODONE HYDROCHLORIDE AND ACETAMINOPHEN 1 TABLET: 5; 325 TABLET ORAL at 19:08

## 2021-02-07 RX ADMIN — ACETAMINOPHEN 1000 MG: 500 TABLET ORAL at 19:08

## 2021-02-07 ASSESSMENT — PAIN DESCRIPTION - LOCATION: LOCATION_2: COCCYX

## 2021-02-07 ASSESSMENT — PAIN DESCRIPTION - DESCRIPTORS: DESCRIPTORS_2: SHARP

## 2021-02-07 ASSESSMENT — PAIN DESCRIPTION - FREQUENCY: FREQUENCY: CONTINUOUS

## 2021-02-07 ASSESSMENT — PAIN SCALES - GENERAL: PAINLEVEL_OUTOF10: 9

## 2021-02-07 ASSESSMENT — PAIN DESCRIPTION - DURATION: DURATION_2: CONTINUOUS

## 2021-02-07 ASSESSMENT — PAIN DESCRIPTION - INTENSITY: RATING_2: 6

## 2021-02-07 ASSESSMENT — PAIN DESCRIPTION - PROGRESSION: CLINICAL_PROGRESSION_2: NOT CHANGED
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
## 2021-02-08 NOTE — ED NOTES
Reviewed discharge information. Patient verbalized understanding of paperwork, medication, and care instructions. Discussed following with ortho. Patient denied any questions.      Rashid Stuart RN  02/07/21 [id number]

## 2021-02-11 ENCOUNTER — HOSPITAL ENCOUNTER (OUTPATIENT)
Age: 81
Setting detail: SPECIMEN
Discharge: HOME | End: 2021-02-11
Payer: MEDICARE

## 2021-02-11 ENCOUNTER — ANESTHESIA EVENT (OUTPATIENT)
Dept: OPERATING ROOM | Age: 81
End: 2021-02-11
Payer: MEDICARE

## 2021-02-11 ENCOUNTER — OFFICE VISIT (OUTPATIENT)
Dept: ORTHOPEDIC SURGERY | Age: 81
End: 2021-02-11
Payer: MEDICARE

## 2021-02-11 VITALS
HEIGHT: 62 IN | RESPIRATION RATE: 15 BRPM | HEART RATE: 79 BPM | OXYGEN SATURATION: 98 % | BODY MASS INDEX: 24.84 KG/M2 | WEIGHT: 135 LBS

## 2021-02-11 DIAGNOSIS — S52.572A OTHER CLOSED INTRA-ARTICULAR FRACTURE OF DISTAL END OF LEFT RADIUS, INITIAL ENCOUNTER: Primary | ICD-10-CM

## 2021-02-11 DIAGNOSIS — S52.592A OTHER CLOSED FRACTURE OF DISTAL END OF LEFT RADIUS, INITIAL ENCOUNTER: Primary | ICD-10-CM

## 2021-02-11 DIAGNOSIS — Z86.79: Primary | ICD-10-CM

## 2021-02-11 DIAGNOSIS — S52.612A TRAUMATIC CLOSED FRACTURE OF ULNAR STYLOID WITH MINIMAL DISPLACEMENT, LEFT, INITIAL ENCOUNTER: ICD-10-CM

## 2021-02-11 DIAGNOSIS — S52.592A OTHER CLOSED FRACTURE OF DISTAL END OF LEFT RADIUS, INITIAL ENCOUNTER: ICD-10-CM

## 2021-02-11 PROBLEM — S52.502A CLOSED FRACTURE OF LEFT DISTAL RADIUS: Status: ACTIVE | Noted: 2021-02-11

## 2021-02-11 PROCEDURE — U0002 COVID-19 LAB TEST NON-CDC: HCPCS

## 2021-02-11 PROCEDURE — 99203 OFFICE O/P NEW LOW 30 MIN: CPT | Performed by: ORTHOPAEDIC SURGERY

## 2021-02-11 RX ORDER — TRAMADOL HYDROCHLORIDE 50 MG/1
50 TABLET ORAL EVERY 6 HOURS PRN
Qty: 20 TABLET | Refills: 0 | Status: SHIPPED | OUTPATIENT
Start: 2021-02-11 | End: 2021-02-16

## 2021-02-11 ASSESSMENT — ENCOUNTER SYMPTOMS
SHORTNESS OF BREATH: 0
CHEST TIGHTNESS: 0
EYE PAIN: 0
VOMITING: 0
COLOR CHANGE: 0
WHEEZING: 0
EYE REDNESS: 0
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
## 2021-02-11 NOTE — PATIENT INSTRUCTIONS
Continue weight-bearing as tolerated. Continue range of motion exercises as instructed. Ice and elevate as needed. Tylenol or Motrin for pain. We will schedule surgery at soonest convenience. If you have any questions regarding your surgery please call our office and ask to speak with Gabby.  [phone]  Left wrist ORIF

## 2021-02-11 NOTE — ANESTHESIA PRE PROCEDURE
Department of Anesthesiology  Preprocedure Note       Name:  Irasema Hussein   Age:  79 y.o.  :  1940                                          MRN:  [id number]         Date:  2021      Surgeon: Chary Barraza):  Brandy Cerrato MD    Procedure: Procedure(s):  LEFT WRIST OPEN REDUCTION INTERNAL FIXATION    Medications prior to admission:   Prior to Admission medications    Medication Sig Start Date End Date Taking? Authorizing Provider   traMADol (ULTRAM) 50 MG tablet Take 1 tablet by mouth every 6 hours as needed for Pain for up to 5 days. Intended supply: 5 days. Take lowest dose possible to manage pain 21  Brandy Cerrato MD   acetaminophen-codeine (TYLENOL/CODEINE #3) 300-30 MG per tablet Take 1 tablet by mouth every 8 hours as needed for Pain for up to 5 days. 21  Samantha Curtis MD   nystatin (MYCOSTATIN) 532279 UNIT/GM cream Apply topically 4 times daily as needed 20   Historical Provider, MD   buPROPion [facility] SR) 150 MG extended release tablet Take 1 tablet by mouth 2 times daily 21   Adri Lawrence MD   LANTUS SOLOSTAR 100 UNIT/ML injection pen  10/22/20   Historical Provider, MD   acyclovir (ZOVIRAX) 800 MG tablet TAKE 1 TABLET (800 MG TOTAL) BY MOUTH 3 TIMES DAILY AS NEEDED (FOR COLD SORES).  20   Historical Provider, MD   INVOKANA 300 MG TABS tablet  20   Historical Provider, MD   aspirin 81 MG EC tablet Take 1 tablet by mouth daily 10/18/19   Aaron London MD   venlafaxine (EFFEXOR XR) 150 MG extended release capsule Take 1 capsule by mouth daily 10/18/19   Aaron London MD   glipiZIDE (GLUCOTROL XL) 5 MG extended release tablet Take 2 tablets by mouth daily 10/17/19   Aaron London MD   metFORMIN (GLUCOPHAGE) 500 MG tablet Take 1 tablet by mouth 2 times daily (with meals) 10/17/19   Aaron London MD   atorvastatin (LIPITOR) 40 MG tablet Take 1.5 tablets by mouth daily 10/17/19   Aaron London MD   folic acid-pyridoxine-cyancobalamin (FOLTX) 1.13-25-2 MG TABS Take 1 tablet by mouth daily 10/18/19   Chad Sanders MD   valsartan-hydrochlorothiazide (DIOVAN-HCT) 160-25 MG per tablet Take 1 tablet by mouth daily 7/27/19   Historical Provider, MD   carvedilol (COREG) 25 MG tablet Take 1 tablet by mouth 2 times daily 3/30/19   Jeovanny Pena MD   amLODIPine [facility]) 5 MG tablet Take 1 tablet by mouth daily 3/31/19   Jeovanny Pena MD   pantoprazole (PROTONIX) 40 MG tablet Take 1 tablet by mouth every morning (before breakfast) 3/30/19   Jeovanny Pena MD   ondansetron [facility]) 4 MG tablet Take 1 tablet by mouth daily as needed for Nausea or Vomiting 3/30/19   Jeovanny Pena MD   potassium chloride (MICRO-K) 10 MEQ CR capsule Take 10 mEq by mouth 2 times daily    Historical Provider, MD   Multiple Vitamins-Minerals (THERAPEUTIC MULTIVITAMIN-MINERALS) tablet Take 1 tablet by mouth daily    Historical Provider, MD   vitamin E 1000 UNITS capsule Take 400 Units by mouth daily    Historical Provider, MD   ALPRAZolam Rosaalix [facility]) 1 MG tablet Take 1 mg by mouth 2 times daily as needed for Sleep. Historical Provider, MD   cyanocobalamin 1000 MCG/ML injection Inject 1,000 mcg into the muscle every 7 days. Historical Provider, MD   fenofibrate (TRICOR) 145 MG tablet Take 145 mg by mouth daily. Historical Provider, MD       Current medications:    Current Outpatient Medications   Medication Sig Dispense Refill    traMADol (ULTRAM) 50 MG tablet Take 1 tablet by mouth every 6 hours as needed for Pain for up to 5 days. Intended supply: 5 days. Take lowest dose possible to manage pain 20 tablet 0    acetaminophen-codeine (TYLENOL/CODEINE #3) 300-30 MG per tablet Take 1 tablet by mouth every 8 hours as needed for Pain for up to 5 days.  15 tablet 0    nystatin (MYCOSTATIN) 526578 UNIT/GM cream Apply topically 4 times daily as needed      buPROPion (WELLBUTRIN SR) 150 MG extended release tablet Take 1 tablet by mouth 2 times daily 180 tablet 1    LANTUS SOLOSTAR 100 UNIT/ML injection pen       acyclovir (ZOVIRAX) 800 MG tablet TAKE 1 TABLET (800 MG TOTAL) BY MOUTH 3 TIMES DAILY AS NEEDED (FOR COLD SORES).  INVOKANA 300 MG TABS tablet       aspirin 81 MG EC tablet Take 1 tablet by mouth daily 30 tablet 0    venlafaxine (EFFEXOR XR) 150 MG extended release capsule Take 1 capsule by mouth daily 30 capsule 0    glipiZIDE (GLUCOTROL XL) 5 MG extended release tablet Take 2 tablets by mouth daily 30 tablet 0    metFORMIN (GLUCOPHAGE) 500 MG tablet Take 1 tablet by mouth 2 times daily (with meals) 60 tablet 0    atorvastatin (LIPITOR) 40 MG tablet Take 1.5 tablets by mouth daily 45 tablet 0    folic acid-pyridoxine-cyancobalamin (FOLTX) 1.13-25-2 MG TABS Take 1 tablet by mouth daily 30 tablet 0    valsartan-hydrochlorothiazide (DIOVAN-HCT) 160-25 MG per tablet Take 1 tablet by mouth daily  3    carvedilol (COREG) 25 MG tablet Take 1 tablet by mouth 2 times daily 60 tablet 3    amLODIPine (NORVASC) 5 MG tablet Take 1 tablet by mouth daily 30 tablet 3    pantoprazole (PROTONIX) 40 MG tablet Take 1 tablet by mouth every morning (before breakfast) 90 tablet 1    ondansetron (ZOFRAN) 4 MG tablet Take 1 tablet by mouth daily as needed for Nausea or Vomiting 30 tablet 0    potassium chloride (MICRO-K) 10 MEQ CR capsule Take 10 mEq by mouth 2 times daily      Multiple Vitamins-Minerals (THERAPEUTIC MULTIVITAMIN-MINERALS) tablet Take 1 tablet by mouth daily      vitamin E 1000 UNITS capsule Take 400 Units by mouth daily      ALPRAZolam (XANAX) 1 MG tablet Take 1 mg by mouth 2 times daily as needed for Sleep.  cyanocobalamin 1000 MCG/ML injection Inject 1,000 mcg into the muscle every 7 days.  fenofibrate (TRICOR) 145 MG tablet Take 145 mg by mouth daily. No current facility-administered medications for this encounter. Allergies:     Allergies   Allergen Reactions    Tramadol Itching    Vicodin [Hydrocodone-Acetaminophen] Itching       Problem List: Patient Active Problem List   Diagnosis Code    Diabetes mellitus ([facility] E11.9    CAD (coronary artery disease) I25.10    Hyperlipidemia E78.5    Carotid artery stenosis I65.29    S/P CABG x 3 Z95.1    Ischemia, bowel (HCC) K55.9    Protein-calorie malnutrition, moderate (HCC) E44.0    HTN (hypertension) I10    Hypokalemia E87.6    Anxiety F41.9    Gastroenteritis K52.9    Stroke-like symptoms R29.90    Peripheral polyneuropathy G62.9    Ataxia R27.0    Urinary tract infection without hematuria N39.0    Carcinoid syndrome (HCC) E34.0    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    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  Diabetes mellitus ([facility]     H/O cardiovascular stress test     EF 74 %,normal perfusion    H/O echocardiogram     EF 60%, mild to mod MR,    Hyperlipidemia     Protein-calorie malnutrition, moderate ([facility] 2015       Past Surgical History:        Procedure Laterality Date    ABDOMEN SURGERY      CORONARY ARTERY BYPASS GRAFT      CABG X 3, L mammary artery to the LAD, saphenous vein graft to RCA. a saphenous vein graft to Cx marginal artery    DIAGNOSTIC CARDIAC CATH LAB PROCEDURE  3/30/07    PTCA to LAD    OTHER SURGICAL HISTORY  2015    exp lap, right colon resection    SKIN BIOPSY         Social History:    Social History     Tobacco Use    Smoking status: Former Smoker     Packs/day: 1.00     Years: 30.00     Pack years: 30.00     Start date:      Quit date:      Years since quittin.1    Smokeless tobacco: Never Used   Substance Use Topics    Alcohol use:  No Counseling given: Not Answered      Vital Signs (Current): There were no vitals filed for this visit. BP Readings from Last 3 Encounters:   02/07/21 (!) 170/74   01/06/21 (!) 163/68   11/04/20 138/68       NPO Status:                                                                                 BMI:   Wt Readings from Last 3 Encounters:   02/11/21 135 lb (61.2 kg)   02/07/21 135 lb (61.2 kg)   01/06/21 135 lb 3.2 oz (61.3 kg)     There is no height or weight on file to calculate BMI. CBC  Lab Results   Component Value Date/Time    WBC 9.7 02/15/2021 10:54 AM    HGB 11.2 (L) 02/15/2021 10:54 AM    HCT 37.0 02/15/2021 10:54 AM    PLT 58 (L) 02/15/2021 10:54 AM     RENAL  Lab Results   Component Value Date/Time     02/15/2021 10:54 AM    K 3.2 (L) 02/15/2021 10:54 AM     02/15/2021 10:54 AM    CO2 25 02/15/2021 10:54 AM    BUN 30 (H) 02/15/2021 10:54 AM    CREATININE 1.7 (H) 02/15/2021 10:54 AM    GLUCOSE 158 (H) 02/15/2021 10:54 AM     COVID-19 Screening (If Applicable): No results found for: COVID19      Anesthesia Evaluation  Patient summary reviewed and Nursing notes reviewed  Airway:         Dental:          Pulmonary:   (+) shortness of breath (moderate activity):                            ROS comment: Former smoker, quit 1989      Former smoker    Counseled on smoking cessation. PAT Airway. Limited exam. COVID 19 precautions. Patient wearing mask  Head/Neck movement: full   History of difficult intubation:  None  Teeth: Upper denture, missing lower teeth   Able to lie flat       COVID 19 screening  Denies recent fever or known COVID 19 exposure. Instructed to quarantine until surgery and report any new respiratory or fever symptoms to surgeon. Aware COVID testing must be completed before DOS.    COVID infection:  NO   COVID vaccination: reports 1st injection 1/31/2021  COVID swab:  2/11/2021 @ office    Cardiovascular:    (+) hypertension (on mass with scattered calcifications posterior to the pancreatic head currently measuring up to 4 cm, previously 3.3 cm. No evidence of metastatic disease in the chest.    S/p appy/armani. Endo/Other:    (+) Diabetes (reports last A1c was ~6.0 unsure date )Type II DM, using insulin, blood dyscrasia (mmune thrombocytopenic purpura. Followed by Dr. Avalos Covert 2014. Hx anemia ): thrombocytopenia:., electrolyte abnormalities (K+ 3.2. Not been taking her Micro K. Instructed to take as previously directed. Recheck ordered DOS), malignancy/cancer (stage IIB melanoma s/p wide excision and sentinel lymph node biopsy , 2017). Pt had PAT visit. ROS comment: Hx multiple falls. FALL RISK    Fall hurt tailbone and fx left wrist DOI 2/7/21 Abdominal:           Vascular:           ROS comment: Hx left carotid bruit. Anesthesia Plan        Deidra Blum, TEMITOPE - CNP Chart reviewed and pt. Interviewed. Anesthesia Evaluation will follow by a certified practitioner prior to surgery.   2/11/2021

## 2021-02-12 LAB
SARS-COV-2: NOT DETECTED
SOURCE: NORMAL

## 2021-02-15 ENCOUNTER — HOSPITAL ENCOUNTER (OUTPATIENT)
Dept: PREADMISSION TESTING | Age: 81
Setting detail: OUTPATIENT SURGERY
Discharge: HOME | End: 2021-02-19
Payer: MEDICARE

## 2021-02-15 VITALS
WEIGHT: 130 LBS | DIASTOLIC BLOOD PRESSURE: 65 MMHG | SYSTOLIC BLOOD PRESSURE: 125 MMHG | BODY MASS INDEX: 23.92 KG/M2 | HEART RATE: 71 BPM | TEMPERATURE: 97.7 F | OXYGEN SATURATION: 94 % | HEIGHT: 62 IN | RESPIRATION RATE: 16 BRPM

## 2021-02-15 DIAGNOSIS — S52.592A OTHER CLOSED FRACTURE OF DISTAL END OF LEFT RADIUS, INITIAL ENCOUNTER: ICD-10-CM

## 2021-02-15 LAB
ANION GAP SERPL CALCULATED.3IONS-SCNC: 13 MMOL/L (ref 4–16)
BUN BLDV-MCNC: 30 MG/DL (ref 6–23)
CALCIUM SERPL-MCNC: 8.4 MG/DL (ref 8.3–10.6)
CHLORIDE BLD-SCNC: 101 MMOL/L (ref 99–110)
CO2: 25 MMOL/L (ref 21–32)
CREAT SERPL-MCNC: 1.7 MG/DL (ref 0.6–1.1)
GFR AFRICAN AMERICAN: 35 ML/MIN/1.73M2
GFR NON-AFRICAN AMERICAN: 29 ML/MIN/1.73M2
GLUCOSE BLD-MCNC: 158 MG/DL (ref 70–99)
HCT VFR BLD CALC: 37 % (ref 37–47)
HEMOGLOBIN: 11.2 GM/DL (ref 12.5–16)
MCH RBC QN AUTO: 26 PG (ref 27–31)
MCHC RBC AUTO-ENTMCNC: 30.3 % (ref 32–36)
MCV RBC AUTO: 85.8 FL (ref 78–100)
PDW BLD-RTO: 14 % (ref 11.7–14.9)
PLATELET # BLD: 58 K/CU MM (ref 140–440)
PMV BLD AUTO: 11.9 FL (ref 7.5–11.1)
POTASSIUM SERPL-SCNC: 3.2 MMOL/L (ref 3.5–5.1)
RBC # BLD: 4.31 M/CU MM (ref 4.2–5.4)
SODIUM BLD-SCNC: 139 MMOL/L (ref 135–145)
WBC # BLD: 9.7 K/CU MM (ref 4–10.5)

## 2021-02-15 PROCEDURE — 80048 BASIC METABOLIC PNL TOTAL CA: CPT

## 2021-02-15 PROCEDURE — 85027 COMPLETE CBC AUTOMATED: CPT

## 2021-02-15 PROCEDURE — U0002 COVID-19 LAB TEST NON-CDC: HCPCS

## 2021-02-15 ASSESSMENT — PAIN - FUNCTIONAL ASSESSMENT: PAIN_FUNCTIONAL_ASSESSMENT: PREVENTS OR INTERFERES SOME ACTIVE ACTIVITIES AND ADLS

## 2021-02-15 ASSESSMENT — PAIN DESCRIPTION - FREQUENCY: FREQUENCY: CONTINUOUS

## 2021-02-15 ASSESSMENT — PAIN DESCRIPTION - PROGRESSION: CLINICAL_PROGRESSION: GRADUALLY WORSENING

## 2021-02-15 ASSESSMENT — PAIN DESCRIPTION - LOCATION: LOCATION: HAND

## 2021-02-15 ASSESSMENT — PAIN DESCRIPTION - PAIN TYPE: TYPE: ACUTE PAIN

## 2021-02-15 ASSESSMENT — ENCOUNTER SYMPTOMS
DYSPNEA ACTIVITY LEVEL: AFTER AMBULATING 1 FLIGHT OF STAIRS
SHORTNESS OF BREATH: 1

## 2021-02-15 ASSESSMENT — PAIN SCALES - GENERAL: PAINLEVEL_OUTOF10: 9

## 2021-02-15 ASSESSMENT — PAIN DESCRIPTION - DESCRIPTORS: DESCRIPTORS: THROBBING

## 2021-02-16 ENCOUNTER — NURSE TRIAGE (OUTPATIENT)
Dept: OTHER | Facility: CLINIC | Age: 81
End: 2021-02-16

## 2021-02-16 NOTE — TELEPHONE ENCOUNTER
Has a procedure tomorrow. Requesting pre-surgical instructions. Needs to know which medication to take. Attempted to call MD office, call did not go through once the  attempted to connect me with the on-call MD. Pritesh Palm out to pre-surgical services at [facility]. A nurse in this unit stated she will call the patient back with directions. I left her name, number and . Reason for Disposition   Nursing judgment    Answer Assessment - Initial Assessment Questions  1. REASON FOR CALL or QUESTION: \"What is your reason for calling today? \" or \"How can I best help you? \" or \"What question do you have that I can help answer? \"    Protocols used: INFORMATION ONLY CALL - NO TRIAGE-ADULT-OH    Brief description of triage: question regarding pre-surgical medications. Care advice provided, patient verbalizes understanding; denies any other questions or concerns; instructed to call back for any new or worsening symptoms. Attention Provider: Thank you for allowing me to participate in the care of your patient. The patient was connected to triage in response to symptoms provided. Please do not respond through this encounter as the response is not directed to a shared pool.

## 2021-02-17 ENCOUNTER — ANESTHESIA (OUTPATIENT)
Dept: OPERATING ROOM | Age: 81
End: 2021-02-17
Payer: MEDICARE

## 2021-02-17 ENCOUNTER — APPOINTMENT (OUTPATIENT)
Dept: GENERAL RADIOLOGY | Age: 81
End: 2021-02-17
Attending: ORTHOPAEDIC SURGERY
Payer: MEDICARE

## 2021-02-17 ENCOUNTER — HOSPITAL ENCOUNTER (OUTPATIENT)
Age: 81
Setting detail: OUTPATIENT SURGERY
Discharge: HOME | End: 2021-02-17
Attending: ORTHOPAEDIC SURGERY | Admitting: ORTHOPAEDIC SURGERY
Payer: MEDICARE

## 2021-02-17 VITALS
TEMPERATURE: 97.6 F | OXYGEN SATURATION: 97 % | RESPIRATION RATE: 16 BRPM | SYSTOLIC BLOOD PRESSURE: 170 MMHG | HEART RATE: 74 BPM | DIASTOLIC BLOOD PRESSURE: 77 MMHG

## 2021-02-17 VITALS — OXYGEN SATURATION: 100 % | DIASTOLIC BLOOD PRESSURE: 74 MMHG | SYSTOLIC BLOOD PRESSURE: 210 MMHG

## 2021-02-17 DIAGNOSIS — S52.572A OTHER CLOSED INTRA-ARTICULAR FRACTURE OF DISTAL END OF LEFT RADIUS, INITIAL ENCOUNTER: Primary | ICD-10-CM

## 2021-02-17 LAB
GLUCOSE BLD-MCNC: 183 MG/DL (ref 70–99)
POTASSIUM SERPL-SCNC: 3.9 MMOL/L (ref 3.5–5.1)

## 2021-02-17 PROCEDURE — 3700000000 HC ANESTHESIA ATTENDED CARE: Performed by: ORTHOPAEDIC SURGERY

## 2021-02-17 PROCEDURE — 3600000004 HC SURGERY LEVEL 4 BASE: Performed by: ORTHOPAEDIC SURGERY

## 2021-02-17 PROCEDURE — 7100000010 HC PHASE II RECOVERY - FIRST 15 MIN: Performed by: ORTHOPAEDIC SURGERY

## 2021-02-17 PROCEDURE — 6360000002 HC RX W HCPCS: Performed by: ANESTHESIOLOGY

## 2021-02-17 PROCEDURE — 6360000002 HC RX W HCPCS: Performed by: NURSE ANESTHETIST, CERTIFIED REGISTERED

## 2021-02-17 PROCEDURE — 2720000010 HC SURG SUPPLY STERILE: Performed by: ORTHOPAEDIC SURGERY

## 2021-02-17 PROCEDURE — 84132 ASSAY OF SERUM POTASSIUM: CPT

## 2021-02-17 PROCEDURE — 76942 ECHO GUIDE FOR BIOPSY: CPT | Performed by: ANESTHESIOLOGY

## 2021-02-17 PROCEDURE — C1713 ANCHOR/SCREW BN/BN,TIS/BN: HCPCS | Performed by: ORTHOPAEDIC SURGERY

## 2021-02-17 PROCEDURE — 7100000011 HC PHASE II RECOVERY - ADDTL 15 MIN: Performed by: ORTHOPAEDIC SURGERY

## 2021-02-17 PROCEDURE — 2500000003 HC RX 250 WO HCPCS: Performed by: NURSE ANESTHETIST, CERTIFIED REGISTERED

## 2021-02-17 PROCEDURE — 25608 OPTX DST RD XART FX/EPI SEP2: CPT | Performed by: ORTHOPAEDIC SURGERY

## 2021-02-17 PROCEDURE — 76000 FLUOROSCOPY <1 HR PHYS/QHP: CPT

## 2021-02-17 PROCEDURE — 3700000001 HC ADD 15 MINUTES (ANESTHESIA): Performed by: ORTHOPAEDIC SURGERY

## 2021-02-17 PROCEDURE — 2580000003 HC RX 258: Performed by: ANESTHESIOLOGY

## 2021-02-17 PROCEDURE — 2709999900 HC NON-CHARGEABLE SUPPLY: Performed by: ORTHOPAEDIC SURGERY

## 2021-02-17 PROCEDURE — 3600000014 HC SURGERY LEVEL 4 ADDTL 15MIN: Performed by: ORTHOPAEDIC SURGERY

## 2021-02-17 PROCEDURE — 82962 GLUCOSE BLOOD TEST: CPT

## 2021-02-17 PROCEDURE — 6360000002 HC RX W HCPCS: Performed by: ORTHOPAEDIC SURGERY

## 2021-02-17 DEVICE — SCREW BNE L20MM DIA2.4MM DST RAD VOLAR S STL ST VAR ANG LOK: Type: IMPLANTABLE DEVICE | Site: WRIST | Status: FUNCTIONAL

## 2021-02-17 DEVICE — SCREW BNE L12MM DIA2.7MM CORT S STL ST T8 STARDRV RECESS: Type: IMPLANTABLE DEVICE | Site: WRIST | Status: FUNCTIONAL

## 2021-02-17 DEVICE — SCREW BNE L12MM DIA2.4MM DST RAD VOLAR S STL ST VAR ANG LOK: Type: IMPLANTABLE DEVICE | Site: WRIST | Status: FUNCTIONAL

## 2021-02-17 DEVICE — PLATE BNE W22XL54MM STD 9 H L DST VOLAR RAD S STL TWO CLMN: Type: IMPLANTABLE DEVICE | Site: WRIST | Status: FUNCTIONAL

## 2021-02-17 DEVICE — SCREW BNE L18MM DIA2.4MM DST RAD VOLAR S STL ST VAR ANG LOK: Type: IMPLANTABLE DEVICE | Site: WRIST | Status: FUNCTIONAL

## 2021-02-17 DEVICE — SCREW BNE L16MM DIA2.4MM DST RAD VOLAR S STL ST VAR ANG LOK: Type: IMPLANTABLE DEVICE | Site: WRIST | Status: FUNCTIONAL

## 2021-02-17 RX ORDER — TRAMADOL HYDROCHLORIDE 50 MG/1
50 TABLET ORAL EVERY 4 HOURS PRN
Qty: 20 TABLET | Refills: 0 | Status: SHIPPED | OUTPATIENT
Start: 2021-02-17 | End: 2021-02-22

## 2021-02-17 RX ORDER — SODIUM CHLORIDE, SODIUM LACTATE, POTASSIUM CHLORIDE, CALCIUM CHLORIDE 600; 310; 30; 20 MG/100ML; MG/100ML; MG/100ML; MG/100ML
INJECTION, SOLUTION INTRAVENOUS CONTINUOUS
Status: DISCONTINUED | OUTPATIENT
Start: 2021-02-17 | End: 2021-02-17 | Stop reason: HOSPADM

## 2021-02-17 RX ORDER — LABETALOL HYDROCHLORIDE 5 MG/ML
5 INJECTION, SOLUTION INTRAVENOUS EVERY 10 MIN PRN
Status: DISCONTINUED | OUTPATIENT
Start: 2021-02-17 | End: 2021-02-17 | Stop reason: HOSPADM

## 2021-02-17 RX ORDER — HYDRALAZINE HYDROCHLORIDE 20 MG/ML
5 INJECTION INTRAMUSCULAR; INTRAVENOUS EVERY 10 MIN PRN
Status: DISCONTINUED | OUTPATIENT
Start: 2021-02-17 | End: 2021-02-17 | Stop reason: HOSPADM

## 2021-02-17 RX ORDER — DEXAMETHASONE SODIUM PHOSPHATE 4 MG/ML
INJECTION, SOLUTION INTRA-ARTICULAR; INTRALESIONAL; INTRAMUSCULAR; INTRAVENOUS; SOFT TISSUE PRN
Status: DISCONTINUED | OUTPATIENT
Start: 2021-02-17 | End: 2021-02-17 | Stop reason: SDUPTHER

## 2021-02-17 RX ORDER — KETAMINE HCL 50MG/ML(1)
SYRINGE (ML) INTRAVENOUS PRN
Status: DISCONTINUED | OUTPATIENT
Start: 2021-02-17 | End: 2021-02-17 | Stop reason: SDUPTHER

## 2021-02-17 RX ORDER — PROMETHAZINE HYDROCHLORIDE 25 MG/ML
6.25 INJECTION, SOLUTION INTRAMUSCULAR; INTRAVENOUS
Status: DISCONTINUED | OUTPATIENT
Start: 2021-02-17 | End: 2021-02-17 | Stop reason: HOSPADM

## 2021-02-17 RX ORDER — DIPHENHYDRAMINE HYDROCHLORIDE 50 MG/ML
INJECTION INTRAMUSCULAR; INTRAVENOUS PRN
Status: DISCONTINUED | OUTPATIENT
Start: 2021-02-17 | End: 2021-02-17 | Stop reason: SDUPTHER

## 2021-02-17 RX ORDER — ROPIVACAINE HYDROCHLORIDE 5 MG/ML
INJECTION, SOLUTION EPIDURAL; INFILTRATION; PERINEURAL
Status: COMPLETED | OUTPATIENT
Start: 2021-02-17 | End: 2021-02-17

## 2021-02-17 RX ORDER — ONDANSETRON 2 MG/ML
4 INJECTION INTRAMUSCULAR; INTRAVENOUS ONCE
Status: COMPLETED | OUTPATIENT
Start: 2021-02-17 | End: 2021-02-17

## 2021-02-17 RX ORDER — MEPERIDINE HYDROCHLORIDE 25 MG/ML
12.5 INJECTION INTRAMUSCULAR; INTRAVENOUS; SUBCUTANEOUS EVERY 5 MIN PRN
Status: DISCONTINUED | OUTPATIENT
Start: 2021-02-17 | End: 2021-02-17 | Stop reason: HOSPADM

## 2021-02-17 RX ORDER — FENTANYL CITRATE 50 UG/ML
50 INJECTION, SOLUTION INTRAMUSCULAR; INTRAVENOUS EVERY 5 MIN PRN
Status: DISCONTINUED | OUTPATIENT
Start: 2021-02-17 | End: 2021-02-17 | Stop reason: HOSPADM

## 2021-02-17 RX ORDER — ONDANSETRON 2 MG/ML
INJECTION INTRAMUSCULAR; INTRAVENOUS PRN
Status: DISCONTINUED | OUTPATIENT
Start: 2021-02-17 | End: 2021-02-17 | Stop reason: SDUPTHER

## 2021-02-17 RX ORDER — LIDOCAINE HYDROCHLORIDE 20 MG/ML
INJECTION, SOLUTION INTRAVENOUS PRN
Status: DISCONTINUED | OUTPATIENT
Start: 2021-02-17 | End: 2021-02-17 | Stop reason: SDUPTHER

## 2021-02-17 RX ORDER — DIPHENHYDRAMINE HYDROCHLORIDE 50 MG/ML
12.5 INJECTION INTRAMUSCULAR; INTRAVENOUS
Status: DISCONTINUED | OUTPATIENT
Start: 2021-02-17 | End: 2021-02-17 | Stop reason: HOSPADM

## 2021-02-17 RX ORDER — METOPROLOL TARTRATE 5 MG/5ML
INJECTION INTRAVENOUS PRN
Status: DISCONTINUED | OUTPATIENT
Start: 2021-02-17 | End: 2021-02-17 | Stop reason: SDUPTHER

## 2021-02-17 RX ORDER — PROPOFOL 10 MG/ML
INJECTION, EMULSION INTRAVENOUS PRN
Status: DISCONTINUED | OUTPATIENT
Start: 2021-02-17 | End: 2021-02-17 | Stop reason: SDUPTHER

## 2021-02-17 RX ORDER — HYDROMORPHONE HCL 110MG/55ML
0.5 PATIENT CONTROLLED ANALGESIA SYRINGE INTRAVENOUS EVERY 5 MIN PRN
Status: DISCONTINUED | OUTPATIENT
Start: 2021-02-17 | End: 2021-02-17 | Stop reason: HOSPADM

## 2021-02-17 RX ADMIN — PROPOFOL 10 MG: 10 INJECTION, EMULSION INTRAVENOUS at 15:14

## 2021-02-17 RX ADMIN — PROPOFOL 20 MG: 10 INJECTION, EMULSION INTRAVENOUS at 15:11

## 2021-02-17 RX ADMIN — ROPIVACAINE HYDROCHLORIDE 30 ML: 5 INJECTION, SOLUTION EPIDURAL; INFILTRATION; PERINEURAL at 12:55

## 2021-02-17 RX ADMIN — PROPOFOL 10 MG: 10 INJECTION, EMULSION INTRAVENOUS at 15:21

## 2021-02-17 RX ADMIN — PROPOFOL 10 MG: 10 INJECTION, EMULSION INTRAVENOUS at 15:18

## 2021-02-17 RX ADMIN — ONDANSETRON 4 MG: 2 INJECTION INTRAMUSCULAR; INTRAVENOUS at 13:39

## 2021-02-17 RX ADMIN — PROPOFOL 10 MG: 10 INJECTION, EMULSION INTRAVENOUS at 15:49

## 2021-02-17 RX ADMIN — Medication 10 MG: at 15:37

## 2021-02-17 RX ADMIN — PROPOFOL 10 MG: 10 INJECTION, EMULSION INTRAVENOUS at 15:45

## 2021-02-17 RX ADMIN — PROPOFOL 10 MG: 10 INJECTION, EMULSION INTRAVENOUS at 15:31

## 2021-02-17 RX ADMIN — METOPROLOL TARTRATE 1 MG: 5 INJECTION, SOLUTION INTRAVENOUS at 16:06

## 2021-02-17 RX ADMIN — PROPOFOL 20 MG: 10 INJECTION, EMULSION INTRAVENOUS at 15:03

## 2021-02-17 RX ADMIN — ONDANSETRON 4 MG: 2 INJECTION INTRAMUSCULAR; INTRAVENOUS at 15:16

## 2021-02-17 RX ADMIN — PROPOFOL 20 MG: 10 INJECTION, EMULSION INTRAVENOUS at 15:08

## 2021-02-17 RX ADMIN — DIPHENHYDRAMINE HYDROCHLORIDE 12.5 MG: 50 INJECTION INTRAMUSCULAR; INTRAVENOUS at 14:59

## 2021-02-17 RX ADMIN — PROPOFOL 10 MG: 10 INJECTION, EMULSION INTRAVENOUS at 15:26

## 2021-02-17 RX ADMIN — CEFAZOLIN SODIUM 2 G: 10 INJECTION, POWDER, FOR SOLUTION INTRAVENOUS at 15:00

## 2021-02-17 RX ADMIN — PROPOFOL 10 MG: 10 INJECTION, EMULSION INTRAVENOUS at 15:34

## 2021-02-17 RX ADMIN — Medication 10 MG: at 15:00

## 2021-02-17 RX ADMIN — PROPOFOL 20 MG: 10 INJECTION, EMULSION INTRAVENOUS at 15:06

## 2021-02-17 RX ADMIN — PROPOFOL 50 MG: 10 INJECTION, EMULSION INTRAVENOUS at 15:00

## 2021-02-17 RX ADMIN — PROPOFOL 10 MG: 10 INJECTION, EMULSION INTRAVENOUS at 15:53

## 2021-02-17 RX ADMIN — DEXAMETHASONE SODIUM PHOSPHATE 4 MG: 4 INJECTION, SOLUTION INTRAMUSCULAR; INTRAVENOUS at 15:16

## 2021-02-17 RX ADMIN — PROPOFOL 10 MG: 10 INJECTION, EMULSION INTRAVENOUS at 15:37

## 2021-02-17 RX ADMIN — PROPOFOL 10 MG: 10 INJECTION, EMULSION INTRAVENOUS at 15:42

## 2021-02-17 RX ADMIN — SODIUM CHLORIDE, POTASSIUM CHLORIDE, SODIUM LACTATE AND CALCIUM CHLORIDE: 600; 310; 30; 20 INJECTION, SOLUTION INTRAVENOUS at 14:51

## 2021-02-17 RX ADMIN — LIDOCAINE HYDROCHLORIDE 40 MG: 20 INJECTION, SOLUTION INTRAVENOUS at 15:00

## 2021-02-17 RX ADMIN — METOPROLOL TARTRATE 2 MG: 5 INJECTION, SOLUTION INTRAVENOUS at 16:03

## 2021-02-17 ASSESSMENT — PULMONARY FUNCTION TESTS
PIF_VALUE: 1
PIF_VALUE: 0
PIF_VALUE: 0
PIF_VALUE: 1
PIF_VALUE: 0
PIF_VALUE: 1
PIF_VALUE: 0
PIF_VALUE: 1
PIF_VALUE: 0
PIF_VALUE: 0
PIF_VALUE: 1
PIF_VALUE: 0
PIF_VALUE: 0
PIF_VALUE: 1
PIF_VALUE: 0
PIF_VALUE: 1
PIF_VALUE: 0
PIF_VALUE: 1
PIF_VALUE: 1
PIF_VALUE: 0
PIF_VALUE: 1
PIF_VALUE: 0
PIF_VALUE: 1
PIF_VALUE: 1
PIF_VALUE: 0
PIF_VALUE: 1
PIF_VALUE: 0
PIF_VALUE: 1

## 2021-02-17 NOTE — FLOWSHEET NOTE
Discharge instructions called to daughter Jeanne Harden and provided to patient. Understanding voiced.

## 2021-02-17 NOTE — ANESTHESIA PROCEDURE NOTES
Peripheral Block    Patient location during procedure: pre-op  Start time: 2/17/2021 12:50 PM  End time: 2/17/2021 12:55 PM  Staffing  Performed: anesthesiologist   Anesthesiologist: Navarro Krishnamurthy DO  Preanesthetic Checklist  Completed: patient identified, IV checked, site marked, risks and benefits discussed, surgical consent, monitors and equipment checked, pre-op evaluation, timeout performed, anesthesia consent given, oxygen available and patient being monitored  Peripheral Block  Patient position: sitting  Prep: ChloraPrep  Patient monitoring: cardiac monitor, continuous pulse ox, frequent blood pressure checks and IV access  Block type: Brachial plexus  Laterality: left  Injection technique: single-shot  Guidance: ultrasound guided  Local infiltration: lidocaine  Infiltration strength: 1 %  Dose: 3 mL  Supraclavicular  Provider prep: mask and sterile gloves  Local infiltration: lidocaine  Needle  Needle type: combined needle/nerve stimulator   Needle gauge: 22 G  Needle length: 5 cm  Needle localization: ultrasound guidance  Assessment  Injection assessment: negative aspiration for heme, no paresthesia on injection and local visualized surrounding nerve on ultrasound  Paresthesia pain: none  Slow fractionated injection: yes  Hemodynamics: stable  Additional Notes  U/S 27097.  (1) Under ultrasound guidance, a 22 gauge needle was inserted and placed in close proximity to the brachial plexus.  (2) Ultrasound was also used to visualize the spread of the anesthetic in close proximity to the nerve being blocked. (3) The nerve appeared anatomically normal, and (4 there were no apparent abnormal pathological findings on the image that were readily visible and related to the nerve being blocked. (5) A permanent ultrasound image was saved in the patient's record.             Medications Administered  Ropivacaine (NAROPIN) injection 0.5%, 30 mL  Reason for block: post-op pain management and at surgeon's request

## 2021-02-17 NOTE — FLOWSHEET NOTE
Returned to room 22. Report received from Abhay Tinsley 44 and CRNA. Alert and oriented. Respirations even and unlabored. Color pink. Fingers on left hand are pink and warm. Put on bedpan per patient request. Voided large amount. Call light in reach. Beverage provided.

## 2021-02-17 NOTE — OP NOTE
Deep subcutaneous layers were closed with buried 3-0 Vicryl and skin was closed with interrupted 3-0 nylon. The tourniquet was deflated and bleeding was well controlled. A sterile dressing was applied with Xeroform 4 x 8's and sterile web roll with a well-padded volar splint. The patient was awakened and taken to PACU in stable condition. All sponge and needle counts were correct. Postoperative plan:  Patient will be discharged to home and remain in her splint for 2 weeks.   She will follow-up at that time for x-rays and suture removal.    Electronically signed by Josiah Vences MD on 2/17/2021 at 4:05 PM

## 2021-02-17 NOTE — ANESTHESIA POSTPROCEDURE EVALUATION
Department of Anesthesiology  Postprocedure Note    Patient: Nishi Sorto  MRN: [id number]  YOB: 1940  Date of evaluation: 2/17/2021  Time:  6:16 PM     Procedure Summary     Date: 02/17/21 Room / Location: [address]    Anesthesia Start: 0469 Anesthesia Stop: 1619    Procedure: LEFT WRIST OPEN REDUCTION INTERNAL FIXATION (Left Wrist) Diagnosis: (LEFT RADIUS FRACTURE)    Surgeons: Silvia Hatchet, MD Responsible Provider: Jordi Bradley MD    Anesthesia Type: regional, general ASA Status: 3          Anesthesia Type: regional, general    Tyler Phase I:      Tyler Phase II: Tyler Score: 10    Last vitals: Reviewed and per EMR flowsheets.        Anesthesia Post Evaluation    Patient location during evaluation: bedside  Patient participation: complete - patient participated  Level of consciousness: awake and alert  Pain score: 0  Airway patency: patent  Nausea & Vomiting: no nausea and no vomiting  Complications: no  Cardiovascular status: blood pressure returned to baseline and hemodynamically stable  Respiratory status: acceptable, room air and spontaneous ventilation  Hydration status: euvolemic

## 2021-02-17 NOTE — PROGRESS NOTES
1330 patient noted to 100 cc's bile color emesis Dr Gia More updated orders received from zofran  1335 states she feels better after her emesis  1339 zofran iv given per order

## 2021-02-17 NOTE — ANESTHESIA PRE PROCEDURE
Department of Anesthesiology  Preprocedure Note       Name:  Gerhardt Ng   Age:  79 y.o.  :  1940                                          MRN:  [id number]         Date:  2021      Surgeon: Laurie Rand):  Ca De MD    Procedure: Procedure(s):  LEFT WRIST OPEN REDUCTION INTERNAL FIXATION    Medications prior to admission:   Prior to Admission medications    Medication Sig Start Date End Date Taking? Authorizing Provider   nystatin (MYCOSTATIN) 877614 UNIT/GM cream Apply topically 4 times daily as needed 20   Historical Provider, MD   buPROPion [facility] SR) 150 MG extended release tablet Take 1 tablet by mouth 2 times daily 21   Eddi Saini MD   LANTUS SOLOSTAR 100 UNIT/ML injection pen  10/22/20   Historical Provider, MD   acyclovir (ZOVIRAX) 800 MG tablet TAKE 1 TABLET (800 MG TOTAL) BY MOUTH 3 TIMES DAILY AS NEEDED (FOR COLD SORES).  20   Historical Provider, MD   INVOKANA 300 MG TABS tablet  20   Historical Provider, MD   aspirin 81 MG EC tablet Take 1 tablet by mouth daily 10/18/19   Marylen Phenix, MD   venlafaxine (EFFEXOR XR) 150 MG extended release capsule Take 1 capsule by mouth daily 10/18/19   Marylen Phenix, MD   glipiZIDE (GLUCOTROL XL) 5 MG extended release tablet Take 2 tablets by mouth daily 10/17/19   Marylen Phenix, MD   metFORMIN (GLUCOPHAGE) 500 MG tablet Take 1 tablet by mouth 2 times daily (with meals) 10/17/19   Marylen Phenix, MD   atorvastatin (LIPITOR) 40 MG tablet Take 1.5 tablets by mouth daily 10/17/19   Marylen Phenix, MD   folic acid-pyridoxine-cyancobalamin (FOLTX) 1.13-25-2 MG TABS Take 1 tablet by mouth daily 10/18/19   Marylen Phenix, MD   valsartan-hydrochlorothiazide (DIOVAN-HCT) 160-25 MG per tablet Take 1 tablet by mouth daily 19   Historical Provider, MD   carvedilol (COREG) 25 MG tablet Take 1 tablet by mouth 2 times daily 3/30/19   Shital Brady MD   amLODIPine [facility]) 5 MG tablet Take 1 tablet by mouth daily 3/31/19   Shital Brady MD pantoprazole (PROTONIX) 40 MG tablet Take 1 tablet by mouth every morning (before breakfast) 3/30/19   Leeann Delgado MD   ondansetron [facility]) 4 MG tablet Take 1 tablet by mouth daily as needed for Nausea or Vomiting 3/30/19   Leeann Delgado MD   potassium chloride (MICRO-K) 10 MEQ CR capsule Take 10 mEq by mouth 2 times daily    Historical Provider, MD   Multiple Vitamins-Minerals (THERAPEUTIC MULTIVITAMIN-MINERALS) tablet Take 1 tablet by mouth daily    Historical Provider, MD   vitamin E 1000 UNITS capsule Take 400 Units by mouth daily    Historical Provider, MD   ALPRAZolam Tanya Alar) 1 MG tablet Take 1 mg by mouth 2 times daily as needed for Sleep. Historical Provider, MD   cyanocobalamin 1000 MCG/ML injection Inject 1,000 mcg into the muscle every 7 days. Historical Provider, MD   fenofibrate (TRICOR) 145 MG tablet Take 145 mg by mouth daily. Historical Provider, MD       Current medications:    Current Facility-Administered Medications   Medication Dose Route Frequency Provider Last Rate Last Admin    lactated ringers infusion   Intravenous Continuous Sweta Ibarra MD        meperidine (DEMEROL) injection 12.5 mg  12.5 mg Intravenous Q5 Min PRN Katie Bachelor, DO        HYDROmorphone (DILAUDID) injection 0.5 mg  0.5 mg Intravenous Q5 Min PRN Katie Bachelor, DO        fentaNYL (SUBLIMAZE) injection 50 mcg  50 mcg Intravenous Q5 Min PRN Katie Bachelor, DO        promethazine (PHENERGAN) injection 6.25 mg  6.25 mg Intravenous Once PRN Katie Bachelor, DO        diphenhydrAMINE (BENADRYL) injection 12.5 mg  12.5 mg Intravenous Once PRN Katie Bachelor, DO        labetalol (NORMODYNE;TRANDATE) injection 5 mg  5 mg Intravenous Q10 Min PRN Katie Bachelor, DO        hydrALAZINE (APRESOLINE) injection 5 mg  5 mg Intravenous Q10 Min PRN Katie Bachelor, DO           Allergies:     Allergies   Allergen Reactions    Tramadol Itching    Vicodin [Hydrocodone-Acetaminophen] Itching       Problem List:    Patient Active Problem List   Diagnosis Code    Diabetes mellitus ([facility] [address] E11.9    CAD (coronary artery disease) I25.10    Hyperlipidemia E78.5    Carotid artery stenosis I65.29    S/P CABG x 3 Z95.1    Ischemia, bowel (HCC) K55.9    Protein-calorie malnutrition, moderate (HCC) E44.0    HTN (hypertension) I10    Hypokalemia E87.6    Anxiety F41.9    Gastroenteritis K52.9    Stroke-like symptoms R29.90    Peripheral polyneuropathy G62.9    Ataxia R27.0    Urinary tract infection without hematuria N39.0    Carcinoid syndrome (HCC) E34.0    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  Diabetes mellitus ([facility] [address]     PCP    H/O cardiovascular stress test 5/02    EF 74 %,normal perfusion    H/O echocardiogram 11/02    EF 60%, mild to mod MR,    Hyperlipidemia     Hypertension     PCP    Protein-calorie malnutrition, moderate ([facility] [address] 8/12/2015       Past Surgical History:        Procedure Laterality Date    ABDOMEN SURGERY      CORONARY ARTERY BYPASS GRAFT  5/07    CABG X 3, L mammary artery to the LAD, saphenous vein graft to RCA.  a saphenous vein graft to Cx marginal artery    DIAGNOSTIC CARDIAC CATH LAB PROCEDURE  3/30/07    PTCA to LAD    FRACTURE SURGERY Left 2001    patella    FRACTURE SURGERY Right 2017    wrist    HYSTERECTOMY  1996    OTHER SURGICAL HISTORY  08 11 2015    exp lap, right colon resection    SKIN BIOPSY         Social History:    Social History     Tobacco Use    Smoking status: Screen (If Applicable):  No results found for: [place], [address]    Drug/Infectious Status (If Applicable):  Lab Results   Component Value Date    HEPCAB 0.02 11/24/2014       COVID-19 Screening (If Applicable):   Lab Results   Component Value Date    COVID19 NOT DETECTED 02/11/2021         Anesthesia Evaluation  Patient summary reviewed no history of anesthetic complications:   Airway: Mallampati: II  TM distance: >3 FB   Neck ROM: full  Mouth opening: > = 3 FB Dental:    (+) upper dentures      Pulmonary: breath sounds clear to auscultation                            ROS comment: Former smoker   Cardiovascular:  Exercise tolerance: good (>4 METS),   (+) hypertension:, past MI: no interval change, CAD: no interval change, CABG/stent: no interval change, hyperlipidemia        Rhythm: regular  Rate: normal  Echocardiogram reviewed         Beta Blocker:  Dose within 24 Hrs         Neuro/Psych:               GI/Hepatic/Renal: Neg GI/Hepatic/Renal ROS            Endo/Other:    (+) DiabetesType II DM, , malignancy/cancer. Abdominal:           Vascular:   + PVD, aortic or cerebral, . Anesthesia Plan      regional and general     ASA 3       Induction: intravenous. MIPS: Prophylactic antiemetics administered. Anesthetic plan and risks discussed with patient. Plan discussed with CRNA.                   Aquiles Foley DO   2/17/2021

## 2021-02-17 NOTE — H&P
Chief Complaint   Patient presents with    Wrist Injury       Closed fracture of distal end of left radius DOI 2/7/21         History of Present Illness:                             Mary Kate Hall is a 79 y.o. female who presents today for evaluation of her left wrist pain swelling and deformity. She had an injury on 2/7/2021 at home in the kitchen. She tripped and fell in the kitchen landing onto an outstretched left hand. She had immediate pain and swelling and inability to move the left wrist.  She was seen in the emergency room and x-rays revealed a fracture with comminution and intra-articular extension of the distal radius as well as a minimally displaced ulnar styloid fracture. She was placed into a sugar tong splint and referred here.     Her pain is been better with rest and immobilization. No history of previous injury to the left wrist.  She has had a fracture in her right hand previously that healed with conservative measures.     Pain is worse with direct pressure or attempted movement of the left wrist.        Patient presents to the office today for ED Fu of the distal radius fx left wrist DOI 2/7/21. Pt states she was cooking a piecrust when it caught on fire and she tripped and fell landed on her left wrist. Pt states she instantly felt pain in the left wrist. Pt states she has been taking the tylenol 3 for the pain but gets no relief. Pt states pain will increase at night.  Pt states she has left her splint of since the accident.           Medical History  Patient's medications, allergies, past medical, surgical, social and family histories were reviewed and updated as appropriate.     Past Medical History        Past Medical History:   Diagnosis Date    Acute anterior wall MI ([address] 2007    CAD (coronary artery disease)      Cancer ([facility] [address]       melanoma,     Cancer ([facility] [address]       near pancreas    Carotid artery stenosis 3/2000     Bilateral intimal thickening and scattered atheromatous plaques but no flow limiting obstructions.      Diabetes mellitus ([facility] [address]      H/O cardiovascular stress test      EF 74 %,normal perfusion    H/O echocardiogram      EF 60%, mild to mod MR,    Hyperlipidemia      Protein-calorie malnutrition, moderate ([facility] [address] 2015         Family History         Family History   Problem Relation Age of Onset    Heart Disease Mother      Heart Attack Mother      Cancer Father      Heart Disease Brother           Social History   Social History            Socioeconomic History    Marital status:        Spouse name: None    Number of children: None    Years of education: None    Highest education level: None   Occupational History    None   Social Needs    Financial resource strain: None    Food insecurity       Worry: None       Inability: None    Transportation needs       Medical: None       Non-medical: None   Tobacco Use    Smoking status: Former Smoker       Packs/day: 1.00       Years: 30.00       Pack years: 30.00       Start date:        Quit date: 47       Years since quittin.1    Smokeless tobacco: Never Used   Substance and Sexual Activity    Alcohol use: No    Drug use: No    Sexual activity: None   Lifestyle    Physical activity       Days per week: None       Minutes per session: None    Stress: None   Relationships    Social connections       Talks on phone: None       Gets together: None       Attends Voodoo service: None       Active member of club or organization: None       Attends meetings of clubs or organizations: None       Relationship status: None    Intimate partner violence       Fear of current or ex partner: None       Emotionally abused: None       Physically abused: None       Forced sexual activity: None   Other Topics Concern    None   Social History Narrative    None         Current Facility-Administered Medications          Current Outpatient Medications   Medication Sig Dispense Refill    traMADol (ULTRAM) 50 MG tablet Take 1 tablet by mouth every 6 hours as needed for Pain for up to 5 days. Intended supply: 5 days. Take lowest dose possible to manage pain 20 tablet 0    acetaminophen-codeine (TYLENOL/CODEINE #3) 300-30 MG per tablet Take 1 tablet by mouth every 8 hours as needed for Pain for up to 5 days. 15 tablet 0    nystatin (MYCOSTATIN) 757162 UNIT/GM cream Apply topically 4 times daily as needed        buPROPion (WELLBUTRIN SR) 150 MG extended release tablet Take 1 tablet by mouth 2 times daily 180 tablet 1    LANTUS SOLOSTAR 100 UNIT/ML injection pen          acyclovir (ZOVIRAX) 800 MG tablet TAKE 1 TABLET (800 MG TOTAL) BY MOUTH 3 TIMES DAILY AS NEEDED (FOR COLD SORES).         INVOKANA 300 MG TABS tablet          aspirin 81 MG EC tablet Take 1 tablet by mouth daily 30 tablet 0    venlafaxine (EFFEXOR XR) 150 MG extended release capsule Take 1 capsule by mouth daily 30 capsule 0    glipiZIDE (GLUCOTROL XL) 5 MG extended release tablet Take 2 tablets by mouth daily 30 tablet 0    metFORMIN (GLUCOPHAGE) 500 MG tablet Take 1 tablet by mouth 2 times daily (with meals) 60 tablet 0    atorvastatin (LIPITOR) 40 MG tablet Take 1.5 tablets by mouth daily 45 tablet 0    folic acid-pyridoxine-cyancobalamin (FOLTX) 1.13-25-2 MG TABS Take 1 tablet by mouth daily 30 tablet 0    valsartan-hydrochlorothiazide (DIOVAN-HCT) 160-25 MG per tablet Take 1 tablet by mouth daily   3    carvedilol (COREG) 25 MG tablet Take 1 tablet by mouth 2 times daily 60 tablet 3    amLODIPine (NORVASC) 5 MG tablet Take 1 tablet by mouth daily 30 tablet 3    pantoprazole (PROTONIX) 40 MG tablet Take 1 tablet by mouth every morning (before breakfast) 90 tablet 1    ondansetron (ZOFRAN) 4 MG tablet Take 1 tablet by mouth daily as needed for Nausea or Vomiting 30 tablet 0    potassium chloride (MICRO-K) 10 MEQ CR capsule Take 10 mEq by mouth 2 times daily        Multiple Vitamins-Minerals (THERAPEUTIC MULTIVITAMIN-MINERALS) tablet Take 1 tablet by mouth daily        vitamin E 1000 UNITS capsule Take 400 Units by mouth daily        ALPRAZolam (XANAX) 1 MG tablet Take 1 mg by mouth 2 times daily as needed for Sleep.         cyanocobalamin 1000 MCG/ML injection Inject 1,000 mcg into the muscle every 7 days.        fenofibrate (TRICOR) 145 MG tablet Take 145 mg by mouth daily.          No current facility-administered medications for this visit.               Allergies   Allergen Reactions    Tramadol Itching    Vicodin [Hydrocodone-Acetaminophen] Itching         Review of Systems:   Review of Systems   Constitutional: Negative for chills and fever. HENT: Negative for congestion and sneezing. Eyes: Negative for pain and redness. Respiratory: Negative for chest tightness, shortness of breath and wheezing. Cardiovascular: Negative for chest pain and palpitations. Gastrointestinal: Negative for vomiting. Musculoskeletal: Positive for arthralgias. Skin: Negative for color change and rash. Neurological: Negative for weakness and numbness. Psychiatric/Behavioral: Negative for agitation. The patient is not nervous/anxious.                                                  Examination:  General Exam:  Vitals: Pulse 79   Resp 15   Ht 5' 2\" (1.575 m)   Wt 135 lb (61.2 kg)   SpO2 98%   BMI 24.69 kg/m²    Physical Exam  Vitals signs and nursing note reviewed. Constitutional:       Appearance: Normal appearance. HENT:      Head: Normocephalic and atraumatic. Eyes:      Conjunctiva/sclera: Conjunctivae normal.      Pupils: Pupils are equal, round, and reactive to light. Neck:      Musculoskeletal: Normal range of motion. Pulmonary:      Effort: Pulmonary effort is normal.   Musculoskeletal:      Left elbow: She exhibits normal range of motion, no swelling and no deformity. No tenderness found.  No radial head, no medial epicondyle, no lateral epicondyle and no olecranon process tenderness noted.      Right wrist: She exhibits normal range of motion, no tenderness, no bony tenderness, no swelling, no effusion, no crepitus, no deformity and no laceration. Comments: Left upper extremity:  There is tenderness to palpation and swelling along the wrist both at the distal radius and ulna. There is diffuse ecchymosis and swelling maximally along the radial aspect of the wrist with a mild impaction and radial deviation deformity of the wrist.  Skin is intact with no lesions or wounds. Sensation and motor function is intact in the median, ulnar, radial nerve distribution. Brisk cap refill present at the fingertips. No tenderness to palpation at the elbow or proximal forearm. Range of motion deferred due to known fracture. Skin:     General: Skin is warm and dry. Neurological:      Mental Status: She is alert and oriented to person, place, and time. Psychiatric:         Mood and Affect: Mood normal.         Behavior: Behavior normal.               Diagnostic testing:  X-rays reviewed in office, I independently reviewed the films in the office today:   3 views of the left wrist show evidence of a comminuted impacted intra-articular distal radius fracture with loss of radial height, tilt, and inclination along with dorsal comminution present. There is a minimally displaced ulnar styloid fracture as well.     Office Procedures:  No orders of the defined types were placed in this encounter.        Assessment and Plan  1. Left wrist impacted comminuted intra-articular distal radius fracture     2. Left minimally displaced ulnar styloid fracture     I reviewed the x-ray findings in detail with the patient. I have explained that the fracture pattern and displacement may lead to complications with nonoperative treatment.   Specifically I am concerned about malalignment and posttraumatic arthritis of the wrist joint.       Therefore I have recommended open reduction and internal fixation to restore the anatomy of the wrist bones and articular surfaces. We have discussed the risks, benefits, and alternatives to surgery. We discussed the goals of surgery and anticipated recovery process. We discussed specific pertinent risks including delayed fracture healing, nonunion, refracture, posttraumatic arthritis, infection, and stiffness.     The patient understands and wishes to proceed with surgical intervention.     The patient was counseled at length about the risks of opal Covid-19 during their perioperative period and any recovery window from their procedure.  The patient was made aware that opal Covid-19  may worsen their prognosis for recovering from their procedure  and lend to a higher morbidity and/or mortality risk.  All material risks, benefits, and reasonable alternatives including postponing the procedure were discussed. The patient does wish to proceed with the procedure at this time.     History and Physical exam note from the office visit is copied above from epic. I have reviewed the note and examined the patient and find no relevant changes.      I have reviewed with the patient and/or family the risks, benefits, and alternatives to the procedure as well as expected postoperative course    Pablo Persaud MD

## 2021-02-17 NOTE — FLOWSHEET NOTE
Assisted with dressing. Left arm placed in sling for protection while numb from block. Assisted to bathroom. Gait steady. 1735- To car per wheelchair. Home with daughter.

## 2021-03-02 ENCOUNTER — HOSPITAL ENCOUNTER (OUTPATIENT)
Dept: INFUSION THERAPY | Age: 81
Discharge: HOME | End: 2021-03-02
Payer: MEDICARE

## 2021-03-02 ENCOUNTER — OFFICE VISIT (OUTPATIENT)
Dept: ORTHOPEDIC SURGERY | Age: 81
End: 2021-03-02

## 2021-03-02 VITALS
OXYGEN SATURATION: 90 % | HEIGHT: 62 IN | WEIGHT: 130 LBS | HEART RATE: 99 BPM | RESPIRATION RATE: 15 BRPM | BODY MASS INDEX: 23.92 KG/M2

## 2021-03-02 DIAGNOSIS — D69.3 IMMUNE THROMBOCYTOPENIC PURPURA (HCC): ICD-10-CM

## 2021-03-02 DIAGNOSIS — E34.0 CARCINOID SYNDROME (HCC): Primary | ICD-10-CM

## 2021-03-02 DIAGNOSIS — Z09 S/P ORTHOPEDIC SURGERY, FOLLOW-UP EXAM: ICD-10-CM

## 2021-03-02 DIAGNOSIS — Z09 POSTOP CHECK: Primary | ICD-10-CM

## 2021-03-02 DIAGNOSIS — Z87.81 S/P ORIF (OPEN REDUCTION INTERNAL FIXATION) FRACTURE: ICD-10-CM

## 2021-03-02 DIAGNOSIS — Z98.890 S/P ORIF (OPEN REDUCTION INTERNAL FIXATION) FRACTURE: ICD-10-CM

## 2021-03-02 LAB
ALBUMIN SERPL-MCNC: 3.8 GM/DL (ref 3.4–5)
ALP BLD-CCNC: 93 IU/L (ref 40–129)
ALT SERPL-CCNC: 7 U/L (ref 10–40)
ANION GAP SERPL CALCULATED.3IONS-SCNC: 11 MMOL/L (ref 4–16)
AST SERPL-CCNC: 14 IU/L (ref 15–37)
BASOPHILS ABSOLUTE: 0.1 K/CU MM
BASOPHILS RELATIVE PERCENT: 0.6 % (ref 0–1)
BILIRUB SERPL-MCNC: 0.5 MG/DL (ref 0–1)
BUN BLDV-MCNC: 27 MG/DL (ref 6–23)
CALCIUM SERPL-MCNC: 9 MG/DL (ref 8.3–10.6)
CHLORIDE BLD-SCNC: 100 MMOL/L (ref 99–110)
CO2: 22 MMOL/L (ref 21–32)
CREAT SERPL-MCNC: 1.5 MG/DL (ref 0.6–1.1)
DIFFERENTIAL TYPE: ABNORMAL
EOSINOPHILS ABSOLUTE: 0.4 K/CU MM
EOSINOPHILS RELATIVE PERCENT: 3.4 % (ref 0–3)
GFR AFRICAN AMERICAN: 41 ML/MIN/1.73M2
GFR NON-AFRICAN AMERICAN: 33 ML/MIN/1.73M2
GLUCOSE BLD-MCNC: 108 MG/DL (ref 70–99)
HCT VFR BLD CALC: 34 % (ref 37–47)
HEMOGLOBIN: 10.7 GM/DL (ref 12.5–16)
LACTATE DEHYDROGENASE: 226 IU/L (ref 120–246)
LYMPHOCYTES ABSOLUTE: 1.6 K/CU MM
LYMPHOCYTES RELATIVE PERCENT: 14.4 % (ref 24–44)
MCH RBC QN AUTO: 26.2 PG (ref 27–31)
MCHC RBC AUTO-ENTMCNC: 31.5 % (ref 32–36)
MCV RBC AUTO: 83.1 FL (ref 78–100)
MONOCYTES ABSOLUTE: 1.1 K/CU MM
MONOCYTES RELATIVE PERCENT: 9.6 % (ref 0–4)
PDW BLD-RTO: 14.7 % (ref 11.7–14.9)
PLATELET # BLD: 37 K/CU MM (ref 140–440)
PMV BLD AUTO: 12.3 FL (ref 7.5–11.1)
POTASSIUM SERPL-SCNC: 4.5 MMOL/L (ref 3.5–5.1)
RBC # BLD: 4.09 M/CU MM (ref 4.2–5.4)
SEGMENTED NEUTROPHILS ABSOLUTE COUNT: 8.2 K/CU MM
SEGMENTED NEUTROPHILS RELATIVE PERCENT: 72 % (ref 36–66)
SODIUM BLD-SCNC: 133 MMOL/L (ref 135–145)
TOTAL PROTEIN: 6.3 GM/DL (ref 6.4–8.2)
WBC # BLD: 11.4 K/CU MM (ref 4–10.5)

## 2021-03-02 PROCEDURE — 96402 CHEMO HORMON ANTINEOPL SQ/IM: CPT

## 2021-03-02 PROCEDURE — 99024 POSTOP FOLLOW-UP VISIT: CPT | Performed by: ORTHOPAEDIC SURGERY

## 2021-03-02 PROCEDURE — 36415 COLL VENOUS BLD VENIPUNCTURE: CPT

## 2021-03-02 PROCEDURE — 6360000002 HC RX W HCPCS: Performed by: INTERNAL MEDICINE

## 2021-03-02 PROCEDURE — 83615 LACTATE (LD) (LDH) ENZYME: CPT

## 2021-03-02 PROCEDURE — 85025 COMPLETE CBC W/AUTO DIFF WBC: CPT

## 2021-03-02 PROCEDURE — 80053 COMPREHEN METABOLIC PANEL: CPT

## 2021-03-02 RX ORDER — LANREOTIDE ACETATE 120 MG/.5ML
120 INJECTION SUBCUTANEOUS ONCE
Status: COMPLETED | OUTPATIENT
Start: 2021-03-02 | End: 2021-03-02

## 2021-03-02 RX ORDER — LANREOTIDE ACETATE 120 MG/.5ML
120 INJECTION SUBCUTANEOUS ONCE
Status: CANCELLED | OUTPATIENT
Start: 2021-03-30 | End: 2021-03-30

## 2021-03-02 RX ADMIN — LANREOTIDE ACETATE 120 MG: 120 INJECTION SUBCUTANEOUS at 14:59

## 2021-03-02 NOTE — PATIENT INSTRUCTIONS
Continue nonweight-bearing as tolerated in the left hand  Continue range of motion exercises as instructed. Ice and elevate as needed. Tylenol or Motrin for pain.   Fitted for a wrist brace today  Keep insicion dry and clean at all times

## 2021-03-02 NOTE — PROGRESS NOTES
Pt. Arrived for lanreotide injection. Injection has to sit a room temperature for 30 minutes prior to administering. Pt. Resting comfortably in chair with water. Injection given in Right hip area, deep subQ, pt. Tolerated well.

## 2021-03-07 NOTE — PROGRESS NOTES
Patient returns to the office today for post op check of the left wrist ORIF DOS 2/17/21. Pt states pain today is a 4/10 pt states she does having increase pain in the left wrist with rotation. Pt states she has some increase swelling of the left wist. Overall pt states she is doing well. She has not been taking her pain medication do to it making her vomit.
complications noted               Office Procedures:  No orders of the defined types were placed in this encounter. Assessment and Plan  1. Left wrist open reduction and internal fixation    She is healing well following her surgery. I have recommended we continue with protection and immobilization in a brace. I provided her with a removable wrist brace today. She can take the brace off for bathing purposes and to do gentle range of motion activities. Follow-up in 1 month for repeat x-rays and discussion of advancement of weightbearing activities.         Electronically signed by Mikel Ching MD on 3/7/2021 at 4:02 PM

## 2021-03-23 ENCOUNTER — APPOINTMENT (OUTPATIENT)
Dept: CT IMAGING | Age: 81
End: 2021-03-23
Payer: MEDICARE

## 2021-03-23 ENCOUNTER — HOSPITAL ENCOUNTER (EMERGENCY)
Age: 81
Discharge: HOME | End: 2021-03-23
Attending: EMERGENCY MEDICINE
Payer: MEDICARE

## 2021-03-23 VITALS
HEIGHT: 62 IN | DIASTOLIC BLOOD PRESSURE: 63 MMHG | WEIGHT: 130 LBS | RESPIRATION RATE: 22 BRPM | OXYGEN SATURATION: 100 % | TEMPERATURE: 97.6 F | SYSTOLIC BLOOD PRESSURE: 142 MMHG | BODY MASS INDEX: 23.92 KG/M2 | HEART RATE: 66 BPM
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
## 2021-03-23 LAB
ALBUMIN SERPL-MCNC: 3.4 GM/DL (ref 3.4–5)
ALP BLD-CCNC: 65 IU/L (ref 40–129)
ALT SERPL-CCNC: 11 U/L (ref 10–40)
ANION GAP SERPL CALCULATED.3IONS-SCNC: 13 MMOL/L (ref 4–16)
AST SERPL-CCNC: 28 IU/L (ref 15–37)
BASOPHILS ABSOLUTE: 0.1 K/CU MM
BASOPHILS RELATIVE PERCENT: 0.5 % (ref 0–1)
BILIRUB SERPL-MCNC: 0.4 MG/DL (ref 0–1)
BUN BLDV-MCNC: 17 MG/DL (ref 6–23)
CALCIUM SERPL-MCNC: 8.9 MG/DL (ref 8.3–10.6)
CHLORIDE BLD-SCNC: 97 MMOL/L (ref 99–110)
CO2: 19 MMOL/L (ref 21–32)
CREAT SERPL-MCNC: 1.3 MG/DL (ref 0.6–1.1)
DIFFERENTIAL TYPE: ABNORMAL
EOSINOPHILS ABSOLUTE: 0.3 K/CU MM
EOSINOPHILS RELATIVE PERCENT: 2.1 % (ref 0–3)
GFR AFRICAN AMERICAN: 48 ML/MIN/1.73M2
GFR NON-AFRICAN AMERICAN: 40 ML/MIN/1.73M2
GLUCOSE BLD-MCNC: 152 MG/DL (ref 70–99)
HCT VFR BLD CALC: 38.8 % (ref 37–47)
HEMOGLOBIN: 11.7 GM/DL (ref 12.5–16)
IMMATURE NEUTROPHIL %: 0.8 % (ref 0–0.43)
LYMPHOCYTES ABSOLUTE: 1.1 K/CU MM
LYMPHOCYTES RELATIVE PERCENT: 7.9 % (ref 24–44)
MCH RBC QN AUTO: 26 PG (ref 27–31)
MCHC RBC AUTO-ENTMCNC: 30.2 % (ref 32–36)
MCV RBC AUTO: 86.2 FL (ref 78–100)
MONOCYTES ABSOLUTE: 1.5 K/CU MM
MONOCYTES RELATIVE PERCENT: 10.4 % (ref 0–4)
NUCLEATED RBC %: 0 %
PDW BLD-RTO: 14.6 % (ref 11.7–14.9)
PLATELET # BLD: 78 K/CU MM (ref 140–440)
POTASSIUM SERPL-SCNC: 3.9 MMOL/L (ref 3.5–5.1)
RBC # BLD: 4.5 M/CU MM (ref 4.2–5.4)
SEGMENTED NEUTROPHILS ABSOLUTE COUNT: 11.3 K/CU MM
SEGMENTED NEUTROPHILS RELATIVE PERCENT: 78.3 % (ref 36–66)
SODIUM BLD-SCNC: 129 MMOL/L (ref 135–145)
TOTAL IMMATURE NEUTOROPHIL: 0.12 K/CU MM
TOTAL NUCLEATED RBC: 0 K/CU MM
TOTAL PROTEIN: 6.6 GM/DL (ref 6.4–8.2)
WBC # BLD: 14.4 K/CU MM (ref 4–10.5)

## 2021-03-23 PROCEDURE — 6360000002 HC RX W HCPCS: Performed by: EMERGENCY MEDICINE

## 2021-03-23 PROCEDURE — 85025 COMPLETE CBC W/AUTO DIFF WBC: CPT

## 2021-03-23 PROCEDURE — 99285 EMERGENCY DEPT VISIT HI MDM: CPT

## 2021-03-23 PROCEDURE — 72128 CT CHEST SPINE W/O DYE: CPT

## 2021-03-23 PROCEDURE — 96374 THER/PROPH/DIAG INJ IV PUSH: CPT

## 2021-03-23 PROCEDURE — 6360000004 HC RX CONTRAST MEDICATION: Performed by: EMERGENCY MEDICINE

## 2021-03-23 PROCEDURE — 80053 COMPREHEN METABOLIC PANEL: CPT

## 2021-03-23 PROCEDURE — 74177 CT ABD & PELVIS W/CONTRAST: CPT

## 2021-03-23 PROCEDURE — 70450 CT HEAD/BRAIN W/O DYE: CPT

## 2021-03-23 PROCEDURE — 72125 CT NECK SPINE W/O DYE: CPT

## 2021-03-23 PROCEDURE — 72131 CT LUMBAR SPINE W/O DYE: CPT

## 2021-03-23 RX ORDER — FENTANYL CITRATE 50 UG/ML
50 INJECTION, SOLUTION INTRAMUSCULAR; INTRAVENOUS ONCE
Status: COMPLETED | OUTPATIENT
Start: 2021-03-23 | End: 2021-03-23

## 2021-03-23 RX ADMIN — FENTANYL CITRATE 50 MCG: 50 INJECTION INTRAMUSCULAR; INTRAVENOUS at 09:48

## 2021-03-23 RX ADMIN — IOPAMIDOL 75 ML: 755 INJECTION, SOLUTION INTRAVENOUS at 11:04

## 2021-03-23 ASSESSMENT — PAIN DESCRIPTION - LOCATION: LOCATION: ABDOMEN;BACK

## 2021-03-23 NOTE — ED PROVIDER NOTES
Triage Chief Complaint:   Fall (back pain )    [place]:  Binh Carrington is a 79 y.o. female that presents with complaint of pain in her lower back and pain \"all over\" after she fell earlier today. She initially told me she was taking her shirt off and fell over but then returned she was walking with her walker and tripped. She denies hitting her head or passing out. She is not on blood thinners. Difficult to obtain further history due to severe pain patient states that she \"needs a pain pill now or I am going to die. \"    ROS:   Review of Systems   Unable to perform ROS: Other   Constitutional: Negative for chills and fever. Respiratory: Negative for cough and shortness of breath. Cardiovascular: Negative for chest pain and leg swelling. Gastrointestinal: Negative for abdominal pain and vomiting. Musculoskeletal: Positive for back pain (lower back as in HPI). Negative for neck pain. Skin: Negative for rash and wound. Neurological: Negative for syncope. Weakness: unknown. Numbness: unknown. Psychiatric/Behavioral: Negative. Negative for hallucinations and suicidal ideas. Difficult to obtain full hx due to severe pain. Past Medical History:   Diagnosis Date    Acute anterior wall MI [facility]) 2007    CAD (coronary artery disease)     Dr. 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      Diabetes mellitus ([address]     PCP    H/O cardiovascular stress test 5/02    EF 74 %,normal perfusion    H/O echocardiogram 11/02    EF 60%, mild to mod MR,    Hyperlipidemia     Hypertension     PCP    Protein-calorie malnutrition, moderate ([address] 8/12/2015     Past Surgical History:   Procedure Laterality Date    ABDOMEN SURGERY      CORONARY ARTERY BYPASS GRAFT  5/07    CABG X 3, L mammary artery to the LAD, saphenous vein graft to RCA.  a saphenous vein graft to Cx marginal artery    DIAGNOSTIC CARDIAC CATH LAB PROCEDURE  3/30/07    PTCA to LAD    FRACTURE SURGERY Left     patella    FRACTURE SURGERY Right 2017    wrist    HYSTERECTOMY  1996    OTHER SURGICAL HISTORY  2015    exp lap, right colon resection    SKIN BIOPSY      WRIST FRACTURE SURGERY Left 2021    LEFT WRIST OPEN REDUCTION INTERNAL FIXATION performed by Griselda Lopez MD at [facility] OR     Family History   Problem Relation Age of Onset    Heart Disease Mother     Heart Attack Mother     Cancer Father     Heart Disease Brother      Social History     Socioeconomic History    Marital status:      Spouse name: Not on file    Number of children: Not on file    Years of education: Not on file    Highest education level: Not on file   Occupational History    Not on file   Social Needs    Financial resource strain: Not on file    Food insecurity     Worry: Not on file     Inability: Not on file    Transportation needs     Medical: Not on file     Non-medical: Not on file   Tobacco Use    Smoking status: Former Smoker     Packs/day: 1.00     Years: 30.00     Pack years: 30.00     Start date:      Quit date:      Years since quittin.2    Smokeless tobacco: Never Used   Substance and Sexual Activity    Alcohol use: No    Drug use: No    Sexual activity: Not on file   Lifestyle    Physical activity     Days per week: Not on file     Minutes per session: Not on file    Stress: Not on file   Relationships    Social connections     Talks on phone: Not on file     Gets together: Not on file     Attends [employer]ical service: Not on file     Active member of club or organization: Not on file     Attends meetings of clubs or organizations: Not on file     Relationship status: Not on file    Intimate partner violence     Fear of current or ex partner: Not on file     Emotionally abused: Not on file     Physically abused: Not on file Forced sexual activity: Not on file   Other Topics Concern    Not on file   Social History Narrative    Not on file     No current facility-administered medications for this encounter. Current Outpatient Medications   Medication Sig Dispense Refill    promethazine (PHENERGAN) 25 MG tablet Take 1 tablet by mouth 4 times daily as needed for Nausea 20 tablet 0    oxyCODONE (ROXICODONE) 5 MG immediate release tablet Take 1 tablet by mouth every 6 hours as needed for Pain for up to 7 days. Intended supply: 3 days. Take lowest dose possible to manage pain 28 tablet 0    nystatin (MYCOSTATIN) 288585 UNIT/GM cream Apply topically 4 times daily as needed      buPROPion (WELLBUTRIN SR) 150 MG extended release tablet Take 1 tablet by mouth 2 times daily 180 tablet 1    LANTUS SOLOSTAR 100 UNIT/ML injection pen       acyclovir (ZOVIRAX) 800 MG tablet TAKE 1 TABLET (800 MG TOTAL) BY MOUTH 3 TIMES DAILY AS NEEDED (FOR COLD SORES).       INVOKANA 300 MG TABS tablet       aspirin 81 MG EC tablet Take 1 tablet by mouth daily 30 tablet 0    venlafaxine (EFFEXOR XR) 150 MG extended release capsule Take 1 capsule by mouth daily 30 capsule 0    glipiZIDE (GLUCOTROL XL) 5 MG extended release tablet Take 2 tablets by mouth daily 30 tablet 0    metFORMIN (GLUCOPHAGE) 500 MG tablet Take 1 tablet by mouth 2 times daily (with meals) 60 tablet 0    atorvastatin (LIPITOR) 40 MG tablet Take 1.5 tablets by mouth daily 45 tablet 0    folic acid-pyridoxine-cyancobalamin (FOLTX) 1.13-25-2 MG TABS Take 1 tablet by mouth daily 30 tablet 0    valsartan-hydrochlorothiazide (DIOVAN-HCT) 160-25 MG per tablet Take 1 tablet by mouth daily  3    carvedilol (COREG) 25 MG tablet Take 1 tablet by mouth 2 times daily 60 tablet 3    amLODIPine (NORVASC) 5 MG tablet Take 1 tablet by mouth daily 30 tablet 3    pantoprazole (PROTONIX) 40 MG tablet Take 1 tablet by mouth every morning (before breakfast) 90 tablet 1    ondansetron (ZOFRAN) 4 MG muscular tenderness. Cardiovascular:      Rate and Rhythm: Normal rate and regular rhythm. Pulses: Normal pulses. Radial pulses are 2+ on the right side and 2+ on the left side. Pulmonary:      Effort: Pulmonary effort is normal. No respiratory distress. Breath sounds: Normal breath sounds. No wheezing or rhonchi. Abdominal:      General: There is no distension. Palpations: Abdomen is soft. Tenderness: There is abdominal tenderness (mild, diffuse, no peritoneal signs). There is no guarding or rebound. Musculoskeletal: Normal range of motion. General: No swelling or signs of injury. Right hip: She exhibits no tenderness. Left hip: She exhibits no tenderness. Lumbar back: She exhibits tenderness. Back:    Skin:     General: Skin is warm and dry. Neurological:      General: No focal deficit present. Mental Status: She is alert. Cranial Nerves: No cranial nerve deficit. Sensory: No sensory deficit. Motor: No weakness. Comments: 5/5 strength in bilateral upper and lower extremities. Normal sensation to light touch throughout all extremities.    Psychiatric:         Mood and Affect: Mood normal.         Behavior: Behavior normal.         I have reviewed and interpreted all of the currently available lab results from this visit (if applicable):  Results for orders placed or performed during the hospital encounter of 03/23/21   CBC Auto Differential   Result Value Ref Range    WBC 14.4 (H) 4.0 - 10.5 K/CU MM    RBC 4.50 4.2 - 5.4 M/CU MM    Hemoglobin 11.7 (L) 12.5 - 16.0 GM/DL    Hematocrit 38.8 37 - 47 %    MCV 86.2 78 - 100 FL    MCH 26.0 (L) 27 - 31 PG    MCHC 30.2 (L) 32.0 - 36.0 %    RDW 14.6 11.7 - 14.9 %    Platelets 78 (L) 934 - 440 K/CU MM    Differential Type AUTOMATED DIFFERENTIAL     Segs Relative 78.3 (H) 36 - 66 %    Lymphocytes % 7.9 (L) 24 - 44 %    Monocytes % 10.4 (H) 0 - 4 %    Eosinophils % 2.1 0 - 3 % Basophils % 0.5 0 - 1 %    Segs Absolute 11.3 K/CU MM    Lymphocytes Absolute 1.1 K/CU MM    Monocytes Absolute 1.5 K/CU MM    Eosinophils Absolute 0.3 K/CU MM    Basophils Absolute 0.1 K/CU MM    Nucleated RBC % 0.0 %    Total Nucleated RBC 0.0 K/CU MM    Total Immature Neutrophil 0.12 K/CU MM    Immature Neutrophil % 0.8 (H) 0 - 0.43 %   Comprehensive Metabolic Panel w/ Reflex to MG   Result Value Ref Range    Sodium 129 (L) 135 - 145 MMOL/L    Potassium 3.9 3.5 - 5.1 MMOL/L    Chloride 97 (L) 99 - 110 mMol/L    CO2 19 (L) 21 - 32 MMOL/L    BUN 17 6 - 23 MG/DL    CREATININE 1.3 (H) 0.6 - 1.1 MG/DL    Glucose 152 (H) 70 - 99 MG/DL    Calcium 8.9 8.3 - 10.6 MG/DL    Albumin 3.4 3.4 - 5.0 GM/DL    Total Protein 6.6 6.4 - 8.2 GM/DL    Total Bilirubin 0.4 0.0 - 1.0 MG/DL    ALT 11 10 - 40 U/L    AST 28 15 - 37 IU/L    Alkaline Phosphatase 65 40 - 129 IU/L    GFR Non- 40 (L) >60 mL/min/1.73m2    GFR  48 (L) >60 mL/min/1.73m2    Anion Gap 13 4 - 16      Radiographs (if obtained):  [] The following radiograph was interpreted by myself in the absence of a radiologist:  [x]Radiologist's Report Reviewed:  CT ABDOMEN PELVIS W IV CONTRAST Additional Contrast? None   Final Result   No acute or traumatic abnormality of the abdomen or pelvis, or the thoracic   or lumbar spine. CT LUMBAR SPINE WO CONTRAST   Final Result   No acute or traumatic abnormality of the abdomen or pelvis, or the thoracic   or lumbar spine. CT THORACIC SPINE WO CONTRAST   Final Result   No acute or traumatic abnormality of the abdomen or pelvis, or the thoracic   or lumbar spine. CT CERVICAL SPINE WO CONTRAST   Final Result   No acute abnormality of the head and cervical spine. CT HEAD WO CONTRAST   Final Result   No acute abnormality of the head and cervical spine.                EKG (if obtained): (All EKG's are interpreted by myself in the absence of a cardiologist)      MDM:  Differential diagnoses considered include but are not limited to fracture, contusion, spinal cord injury, acute injury cranial hemorrhage, concussion, muscle strain, muscle spasm, acute on chronic pain. Patient presents after mechanical fall with significant lower back pain. CT scan of her head, C-spine, T-spine and L-spine showed no acute abnormalities. No acute fractures. She does have moderate degenerative changes throughout her thoracic and lumbar spine. She did have some abdominal tenderness a CT scan of her abdomen was obtained which shows no acute abnormalities. Patient's creatinine is slightly elevated but is improved from previous. I do not believe this is contributing to her current symptoms. She does have a slightly elevated white blood cell count but has not had any fevers or other signs of infection. I suspect this may be elevated due to her acute pain. I do not suspect an emergent cause of her slightly elevated white blood cell count. After pain medication patient is much more comfortable. She is able to ambulate around her room without difficulty. Her ambulation is back to baseline. I do not suspect any emergent injuries from her fall today. We will discharge her home in stable condition. Plan of care explained to patient. Concerning signs and symptoms warranting a return visit to the Emergency Department were explained in detail. All questions and concerns were addressed to the patient's satisfaction. Patient understood and agreed with plan. I did don appropriate PPE (including N95 face mask, protective eye ware/safety glasses, gloves, hair covering, and no isolation gown), as recommended by the health facility/national standard best practice, during my bedside interactions with the patient. The likelihood of other entities in the differential is insufficient to justify any further testing for them. This was explained to the patient.  The patient was advised that persistent or worsening symptoms would requirefurther evaluation. Clinical Impression:  1.  Fall from standing, initial encounter          Keri Gonzalez MD       Please note that portions of this note may have been complete with a voice recognition program.  Iván Andrade made to edit the dictations, but occasional words are mis-transcribed.    Radha Khanna MD  04/05/21 5092

## 2021-03-23 NOTE — ED NOTES
Bed: ED-30  Expected date:   Expected time:   Means of arrival:   Comments:  Josué Esqueda RN  03/23/21 6320

## 2021-03-23 NOTE — ED NOTES
Patient able to ambulate short distance in room without assistance.        Hanane Dawn RN  03/23/21 [id number]

## 2021-03-23 NOTE — ED NOTES
Reviewed discharge instructions with patient and family. All voice understanding/deny questions. Removed from floor in wheelchair. Condition stable.        Yvonna Boeck, RN  03/23/21 7626

## 2021-03-30 ENCOUNTER — HOSPITAL ENCOUNTER (OUTPATIENT)
Dept: INFUSION THERAPY | Age: 81
Discharge: HOME | End: 2021-03-30
Payer: MEDICARE

## 2021-03-30 VITALS
BODY MASS INDEX: 23.78 KG/M2 | TEMPERATURE: 97.6 F | RESPIRATION RATE: 18 BRPM | SYSTOLIC BLOOD PRESSURE: 157 MMHG | HEIGHT: 62 IN | DIASTOLIC BLOOD PRESSURE: 66 MMHG | OXYGEN SATURATION: 95 % | HEART RATE: 76 BPM

## 2021-03-30 DIAGNOSIS — D69.3 IMMUNE THROMBOCYTOPENIC PURPURA (HCC): ICD-10-CM

## 2021-03-30 DIAGNOSIS — D37.2 NEOPLASM OF UNCERTAIN BEHAVIOR OF SMALL INTESTINE: Primary | ICD-10-CM

## 2021-03-30 DIAGNOSIS — E34.0 CARCINOID SYNDROME (HCC): Primary | ICD-10-CM

## 2021-03-30 LAB
ALBUMIN SERPL-MCNC: 3.7 GM/DL (ref 3.4–5)
ALP BLD-CCNC: 72 IU/L (ref 40–129)
ALT SERPL-CCNC: 9 U/L (ref 10–40)
ANION GAP SERPL CALCULATED.3IONS-SCNC: 9 MMOL/L (ref 4–16)
AST SERPL-CCNC: 15 IU/L (ref 15–37)
BASOPHILS ABSOLUTE: 0.1 K/CU MM
BASOPHILS RELATIVE PERCENT: 0.7 % (ref 0–1)
BILIRUB SERPL-MCNC: 0.3 MG/DL (ref 0–1)
BUN BLDV-MCNC: 23 MG/DL (ref 6–23)
CALCIUM SERPL-MCNC: 8.4 MG/DL (ref 8.3–10.6)
CHLORIDE BLD-SCNC: 101 MMOL/L (ref 99–110)
CO2: 29 MMOL/L (ref 21–32)
CREAT SERPL-MCNC: 1.4 MG/DL (ref 0.6–1.1)
DIFFERENTIAL TYPE: ABNORMAL
EOSINOPHILS ABSOLUTE: 0.4 K/CU MM
EOSINOPHILS RELATIVE PERCENT: 4.8 % (ref 0–3)
GFR AFRICAN AMERICAN: 44 ML/MIN/1.73M2
GFR NON-AFRICAN AMERICAN: 36 ML/MIN/1.73M2
GLUCOSE BLD-MCNC: 156 MG/DL (ref 70–99)
HCT VFR BLD CALC: 34.3 % (ref 37–47)
HEMOGLOBIN: 10.6 GM/DL (ref 12.5–16)
LACTATE DEHYDROGENASE: 207 IU/L (ref 120–246)
LYMPHOCYTES ABSOLUTE: 1 K/CU MM
LYMPHOCYTES RELATIVE PERCENT: 13.6 % (ref 24–44)
MCH RBC QN AUTO: 25.8 PG (ref 27–31)
MCHC RBC AUTO-ENTMCNC: 30.9 % (ref 32–36)
MCV RBC AUTO: 83.5 FL (ref 78–100)
MONOCYTES ABSOLUTE: 0.7 K/CU MM
MONOCYTES RELATIVE PERCENT: 8.8 % (ref 0–4)
PDW BLD-RTO: 14.7 % (ref 11.7–14.9)
PLATELET # BLD: 50 K/CU MM (ref 140–440)
PMV BLD AUTO: 11.9 FL (ref 7.5–11.1)
POTASSIUM SERPL-SCNC: 4.5 MMOL/L (ref 3.5–5.1)
RBC # BLD: 4.11 M/CU MM (ref 4.2–5.4)
SEGMENTED NEUTROPHILS ABSOLUTE COUNT: 5.3 K/CU MM
SEGMENTED NEUTROPHILS RELATIVE PERCENT: 72.1 % (ref 36–66)
SODIUM BLD-SCNC: 139 MMOL/L (ref 135–145)
TOTAL PROTEIN: 6.2 GM/DL (ref 6.4–8.2)
WBC # BLD: 7.4 K/CU MM (ref 4–10.5)

## 2021-03-30 PROCEDURE — 36415 COLL VENOUS BLD VENIPUNCTURE: CPT

## 2021-03-30 PROCEDURE — 96372 THER/PROPH/DIAG INJ SC/IM: CPT

## 2021-03-30 PROCEDURE — 85025 COMPLETE CBC W/AUTO DIFF WBC: CPT

## 2021-03-30 PROCEDURE — 6360000002 HC RX W HCPCS: Performed by: INTERNAL MEDICINE

## 2021-03-30 PROCEDURE — 80053 COMPREHEN METABOLIC PANEL: CPT

## 2021-03-30 PROCEDURE — 83615 LACTATE (LD) (LDH) ENZYME: CPT

## 2021-03-30 RX ORDER — LANREOTIDE ACETATE 120 MG/.5ML
120 INJECTION SUBCUTANEOUS ONCE
Status: CANCELLED | OUTPATIENT
Start: 2021-04-27 | End: 2021-04-27

## 2021-03-30 RX ORDER — LANREOTIDE ACETATE 120 MG/.5ML
120 INJECTION SUBCUTANEOUS ONCE
Status: COMPLETED | OUTPATIENT
Start: 2021-03-30 | End: 2021-03-30

## 2021-03-30 RX ORDER — PROMETHAZINE HYDROCHLORIDE 25 MG/1
25 TABLET ORAL 4 TIMES DAILY PRN
Qty: 20 TABLET | Refills: 0 | Status: SHIPPED | OUTPATIENT
Start: 2021-03-30 | End: 2021-04-06

## 2021-03-30 RX ORDER — OXYCODONE HYDROCHLORIDE 5 MG/1
5 TABLET ORAL EVERY 6 HOURS PRN
Qty: 28 TABLET | Refills: 0 | Status: SHIPPED | OUTPATIENT
Start: 2021-03-30 | End: 2021-04-06

## 2021-03-30 RX ADMIN — LANREOTIDE ACETATE 120 MG: 120 INJECTION SUBCUTANEOUS at 15:02

## 2021-03-30 ASSESSMENT — PAIN SCALES - GENERAL: PAINLEVEL_OUTOF10: 10

## 2021-03-30 ASSESSMENT — PAIN DESCRIPTION - LOCATION: LOCATION: BACK

## 2021-03-30 NOTE — PROGRESS NOTES
Patient arrived to treatment suite for Lanreotide injection. Injection was released and allowed to sit for 30 minutes to get to room temperature. Patient stated the need for refills on 2 medications - Given to April and Dr. Linda Cano for attention. Patient stated that she has fallen backward twice within the last week, but is not sure why. Thinks possibly just loosing her balance. Wearing a brace on her left arm at this time. No other questions or concerns. Treatment approved and given IM in left dorsogluteal area. Band-aid applied. Patient tolerated well. Left treatment suite ambulatory. Discharge instructions provided.

## 2021-04-05 ASSESSMENT — ENCOUNTER SYMPTOMS
SHORTNESS OF BREATH: 0
BACK PAIN: 1
COUGH: 0
VOMITING: 0
ABDOMINAL PAIN: 0

## 2021-04-14 ENCOUNTER — OFFICE VISIT (OUTPATIENT)
Dept: ORTHOPEDIC SURGERY | Age: 81
End: 2021-04-14

## 2021-04-14 VITALS
HEIGHT: 62 IN | BODY MASS INDEX: 23.92 KG/M2 | HEART RATE: 71 BPM | RESPIRATION RATE: 16 BRPM | OXYGEN SATURATION: 97 % | WEIGHT: 130 LBS

## 2021-04-14 DIAGNOSIS — Z87.81 S/P ORIF (OPEN REDUCTION INTERNAL FIXATION) FRACTURE: Primary | ICD-10-CM

## 2021-04-14 DIAGNOSIS — Z98.890 S/P ORIF (OPEN REDUCTION INTERNAL FIXATION) FRACTURE: Primary | ICD-10-CM

## 2021-04-14 PROCEDURE — 99024 POSTOP FOLLOW-UP VISIT: CPT | Performed by: PHYSICIAN ASSISTANT

## 2021-04-14 RX ORDER — METHOCARBAMOL 750 MG/1
750 TABLET, FILM COATED ORAL 4 TIMES DAILY PRN
Status: ON HOLD | COMMUNITY
Start: 2021-04-01 | End: 2022-09-27 | Stop reason: HOSPADM

## 2021-04-14 RX ORDER — PREDNISONE 10 MG/1
TABLET ORAL
Status: ON HOLD | COMMUNITY
Start: 2021-04-08 | End: 2021-04-26 | Stop reason: HOSPADM

## 2021-04-14 RX ORDER — OXYCODONE HYDROCHLORIDE 5 MG/1
5 TABLET ORAL EVERY 4 HOURS PRN
Status: ON HOLD | COMMUNITY
Start: 2021-04-13 | End: 2021-04-26 | Stop reason: HOSPADM

## 2021-04-14 RX ORDER — PROMETHAZINE HYDROCHLORIDE 25 MG/1
25 TABLET ORAL EVERY 6 HOURS PRN
Status: ON HOLD | COMMUNITY
Start: 2021-03-16 | End: 2021-04-26 | Stop reason: HOSPADM

## 2021-04-14 RX ORDER — BLOOD-GLUCOSE METER
KIT MISCELLANEOUS
COMMUNITY
Start: 2021-03-26 | End: 2022-10-04

## 2021-04-14 RX ORDER — CARVEDILOL PHOSPHATE 40 MG/1
CAPSULE, EXTENDED RELEASE ORAL
Status: ON HOLD | COMMUNITY
Start: 2021-03-06 | End: 2021-04-26 | Stop reason: HOSPADM

## 2021-04-14 RX ORDER — AMLODIPINE BESYLATE 10 MG/1
TABLET ORAL
Status: ON HOLD | COMMUNITY
Start: 2021-03-05 | End: 2022-09-27 | Stop reason: SDUPTHER

## 2021-04-14 RX ORDER — FAMOTIDINE 20 MG/1
TABLET, FILM COATED ORAL
Status: ON HOLD | COMMUNITY
Start: 2021-03-12 | End: 2021-04-16

## 2021-04-14 NOTE — PROGRESS NOTES
Patient is in the office for a 8 week post op follow up for the left wrist. Patient states that she is doing great. Denies any new injury or falls.  Patient is still nonweightbearing

## 2021-04-14 NOTE — PROGRESS NOTES
Date of surgery:  2/17/2021   Surgeon: Dr. Anastacio Aldrich     history:  Ms. Claudio Hobson is here in follow up regarding her left wrist ORIF post op 8 weeks. Patient rates her pain as 1/10 and describes it as discomfort. She states she has been doing well and has not needed anything for pain. She states she has been wearing her brace consistently but has removed it for hygiene purposes. The patient denies any chest pain, SOB, calf pain, fever, chills, or wound drainage. Physical:   Vitals:    04/14/21 1502   Pulse: 71   Resp: 16   SpO2: 97%   Weight: 130 lb (59 kg)   Height: 5' 2\" (1.575 m)     She demonstrates appropriate mood and affect. Left shoulder/elbow/wrist/hand exam:  The incisions are clean, dry, intact and nontender with no erythema. No active drainage. No fluctuance noted. Mild tenderness palpation over the left wrist region. Soft compartments. Radial pulses equal and intact bilaterally. Capillary refill less than 3. Sensation intact to light touch over the left upper extremity. Active ROM of the wrist:  F: 15 degrees   E: 15 degrees  Patient can perform ok sign, finger to thumb, thumbs up, abduct and adduct fingers, and perform . Imaging:   3 views of the left wrist were obtained. The official read and interpretation of these x-rays will be done by the the [facility]. Please see their impression below. Impression   Sequelae of ORIF of the distal left radius, in anatomic alignment.         Impression: Status post op left wrist ORIF    Plan:   The patient was seen in clinic for follow-up of her left wrist ORIF postop 8 weeks. She states she has been doing well and has been wearing her wrist brace. Xray imaging of the patient's left wrist was obtained which showed intact hardware.   I informed patient that she can discontinue the wrist brace as tolerated and that she can continue to work on gentle range of motion of the left wrist. The patient will follow up in clinic in 4 weeks.    May gradually weight bear no more than 5 pounds  Continue gentle range of motion as tolerated   Ice and elevate as needed  Tylenol for pain as needed.    You may discontinue the wrist brace as tolerated  Follow up in 4 weeks with Dr. Pablo Cook

## 2021-04-16 ENCOUNTER — TELEPHONE (OUTPATIENT)
Dept: ONCOLOGY | Age: 81
End: 2021-04-16

## 2021-04-16 ENCOUNTER — APPOINTMENT (OUTPATIENT)
Dept: GENERAL RADIOLOGY | Age: 81
DRG: 982 | End: 2021-04-16
Payer: MEDICARE

## 2021-04-16 ENCOUNTER — HOSPITAL ENCOUNTER (INPATIENT)
Age: 81
LOS: 10 days | Discharge: HOME HEALTH | DRG: 982 | End: 2021-04-26
Attending: INTERNAL MEDICINE | Admitting: INTERNAL MEDICINE
Payer: MEDICARE

## 2021-04-16 ENCOUNTER — APPOINTMENT (OUTPATIENT)
Dept: CT IMAGING | Age: 81
DRG: 982 | End: 2021-04-16
Payer: MEDICARE

## 2021-04-16 DIAGNOSIS — K92.2 GASTROINTESTINAL HEMORRHAGE, UNSPECIFIED GASTROINTESTINAL HEMORRHAGE TYPE: Primary | ICD-10-CM

## 2021-04-16 DIAGNOSIS — C7A.8 NEUROENDOCRINE CANCER (HCC): ICD-10-CM

## 2021-04-16 DIAGNOSIS — R41.0 INTERMITTENT CONFUSION: ICD-10-CM

## 2021-04-16 DIAGNOSIS — R10.30 LOWER ABDOMINAL PAIN: ICD-10-CM

## 2021-04-16 LAB
ABO/RH: NORMAL
ALBUMIN SERPL-MCNC: 3.5 GM/DL (ref 3.4–5)
ALP BLD-CCNC: 80 IU/L (ref 40–129)
ALT SERPL-CCNC: 7 U/L (ref 10–40)
ANION GAP SERPL CALCULATED.3IONS-SCNC: 9 MMOL/L (ref 4–16)
ANTIBODY SCREEN: NEGATIVE
APTT: 23.8 SECONDS (ref 25.1–37.1)
AST SERPL-CCNC: 11 IU/L (ref 15–37)
BACTERIA: NEGATIVE /HPF
BASOPHILS ABSOLUTE: 0 K/CU MM
BASOPHILS RELATIVE PERCENT: 0.3 % (ref 0–1)
BILIRUB SERPL-MCNC: 0.2 MG/DL (ref 0–1)
BILIRUBIN URINE: NEGATIVE MG/DL
BLOOD, URINE: NEGATIVE
BUN BLDV-MCNC: 37 MG/DL (ref 6–23)
CALCIUM SERPL-MCNC: 8.1 MG/DL (ref 8.3–10.6)
CHLORIDE BLD-SCNC: 100 MMOL/L (ref 99–110)
CLARITY: CLEAR
CO2: 21 MMOL/L (ref 21–32)
COLOR: YELLOW
CREAT SERPL-MCNC: 1.3 MG/DL (ref 0.6–1.1)
DIFFERENTIAL TYPE: ABNORMAL
EOSINOPHILS ABSOLUTE: 0.1 K/CU MM
EOSINOPHILS RELATIVE PERCENT: 0.6 % (ref 0–3)
GFR AFRICAN AMERICAN: 48 ML/MIN/1.73M2
GFR NON-AFRICAN AMERICAN: 40 ML/MIN/1.73M2
GLUCOSE BLD-MCNC: 118 MG/DL (ref 70–99)
GLUCOSE BLD-MCNC: 206 MG/DL (ref 70–99)
GLUCOSE, URINE: >500 MG/DL
HCT VFR BLD CALC: 28.3 % (ref 37–47)
HCT VFR BLD CALC: 29 % (ref 37–47)
HEMOGLOBIN: 8.7 GM/DL (ref 12.5–16)
HEMOGLOBIN: 8.8 GM/DL (ref 12.5–16)
IMMATURE NEUTROPHIL %: 0.8 % (ref 0–0.43)
INR BLD: 1.19 INDEX
KETONES, URINE: NEGATIVE MG/DL
LACTATE: 1.6 MMOL/L (ref 0.4–2)
LEUKOCYTE ESTERASE, URINE: NEGATIVE
LIPASE: 43 IU/L (ref 13–60)
LYMPHOCYTES ABSOLUTE: 1 K/CU MM
LYMPHOCYTES RELATIVE PERCENT: 7.5 % (ref 24–44)
MAGNESIUM: 1.8 MG/DL (ref 1.8–2.4)
MCH RBC QN AUTO: 26.5 PG (ref 27–31)
MCHC RBC AUTO-ENTMCNC: 30.3 % (ref 32–36)
MCV RBC AUTO: 87.3 FL (ref 78–100)
MONOCYTES ABSOLUTE: 0.7 K/CU MM
MONOCYTES RELATIVE PERCENT: 5.4 % (ref 0–4)
NITRITE URINE, QUANTITATIVE: NEGATIVE
NUCLEATED RBC %: 0 %
PDW BLD-RTO: 14.6 % (ref 11.7–14.9)
PH, URINE: 5 (ref 5–8)
PLATELET # BLD: 231 K/CU MM (ref 140–440)
PMV BLD AUTO: 10.5 FL (ref 7.5–11.1)
POTASSIUM SERPL-SCNC: 4.2 MMOL/L (ref 3.5–5.1)
PRO-BNP: 812.6 PG/ML
PROTEIN UA: NEGATIVE MG/DL
PROTHROMBIN TIME: 14.4 SECONDS (ref 11.7–14.5)
RBC # BLD: 3.32 M/CU MM (ref 4.2–5.4)
RBC URINE: ABNORMAL /HPF (ref 0–6)
SEGMENTED NEUTROPHILS ABSOLUTE COUNT: 11.6 K/CU MM
SEGMENTED NEUTROPHILS RELATIVE PERCENT: 85.4 % (ref 36–66)
SODIUM BLD-SCNC: 130 MMOL/L (ref 135–145)
SPECIFIC GRAVITY UA: 1.02 (ref 1–1.03)
SQUAMOUS EPITHELIAL: 1 /HPF
TOTAL IMMATURE NEUTOROPHIL: 0.11 K/CU MM
TOTAL NUCLEATED RBC: 0 K/CU MM
TOTAL PROTEIN: 6.2 GM/DL (ref 6.4–8.2)
TRICHOMONAS: ABNORMAL /HPF
TROPONIN T: <0.01 NG/ML
TROPONIN T: <0.01 NG/ML
UROBILINOGEN, URINE: NEGATIVE MG/DL (ref 0.2–1)
WBC # BLD: 13.5 K/CU MM (ref 4–10.5)
WBC UA: ABNORMAL /HPF (ref 0–5)

## 2021-04-16 PROCEDURE — 81001 URINALYSIS AUTO W/SCOPE: CPT

## 2021-04-16 PROCEDURE — 6370000000 HC RX 637 (ALT 250 FOR IP): Performed by: INTERNAL MEDICINE

## 2021-04-16 PROCEDURE — 83036 HEMOGLOBIN GLYCOSYLATED A1C: CPT

## 2021-04-16 PROCEDURE — 82962 GLUCOSE BLOOD TEST: CPT

## 2021-04-16 PROCEDURE — 2580000003 HC RX 258: Performed by: INTERNAL MEDICINE

## 2021-04-16 PROCEDURE — 83735 ASSAY OF MAGNESIUM: CPT

## 2021-04-16 PROCEDURE — 80053 COMPREHEN METABOLIC PANEL: CPT

## 2021-04-16 PROCEDURE — 74174 CTA ABD&PLVS W/CONTRAST: CPT

## 2021-04-16 PROCEDURE — 85018 HEMOGLOBIN: CPT

## 2021-04-16 PROCEDURE — 2580000003 HC RX 258: Performed by: PHYSICIAN ASSISTANT

## 2021-04-16 PROCEDURE — 85014 HEMATOCRIT: CPT

## 2021-04-16 PROCEDURE — 86900 BLOOD TYPING SEROLOGIC ABO: CPT

## 2021-04-16 PROCEDURE — 83690 ASSAY OF LIPASE: CPT

## 2021-04-16 PROCEDURE — 71045 X-RAY EXAM CHEST 1 VIEW: CPT

## 2021-04-16 PROCEDURE — 96374 THER/PROPH/DIAG INJ IV PUSH: CPT

## 2021-04-16 PROCEDURE — 85730 THROMBOPLASTIN TIME PARTIAL: CPT

## 2021-04-16 PROCEDURE — 85025 COMPLETE CBC W/AUTO DIFF WBC: CPT

## 2021-04-16 PROCEDURE — 70450 CT HEAD/BRAIN W/O DYE: CPT

## 2021-04-16 PROCEDURE — 36415 COLL VENOUS BLD VENIPUNCTURE: CPT

## 2021-04-16 PROCEDURE — 86850 RBC ANTIBODY SCREEN: CPT

## 2021-04-16 PROCEDURE — 96375 TX/PRO/DX INJ NEW DRUG ADDON: CPT

## 2021-04-16 PROCEDURE — 6360000004 HC RX CONTRAST MEDICATION: Performed by: PHYSICIAN ASSISTANT

## 2021-04-16 PROCEDURE — 84484 ASSAY OF TROPONIN QUANT: CPT

## 2021-04-16 PROCEDURE — 83605 ASSAY OF LACTIC ACID: CPT

## 2021-04-16 PROCEDURE — 99283 EMERGENCY DEPT VISIT LOW MDM: CPT

## 2021-04-16 PROCEDURE — 85610 PROTHROMBIN TIME: CPT

## 2021-04-16 PROCEDURE — 83880 ASSAY OF NATRIURETIC PEPTIDE: CPT

## 2021-04-16 PROCEDURE — 86901 BLOOD TYPING SEROLOGIC RH(D): CPT

## 2021-04-16 PROCEDURE — 1200000000 HC SEMI PRIVATE

## 2021-04-16 PROCEDURE — 6360000002 HC RX W HCPCS: Performed by: PHYSICIAN ASSISTANT

## 2021-04-16 RX ORDER — ONDANSETRON 4 MG/1
4 TABLET, FILM COATED ORAL DAILY PRN
Status: DISCONTINUED | OUTPATIENT
Start: 2021-04-16 | End: 2021-04-16 | Stop reason: SDUPTHER

## 2021-04-16 RX ORDER — ACETAMINOPHEN 325 MG/1
650 TABLET ORAL EVERY 6 HOURS PRN
Status: DISCONTINUED | OUTPATIENT
Start: 2021-04-16 | End: 2021-04-26 | Stop reason: HOSPADM

## 2021-04-16 RX ORDER — FAMOTIDINE 20 MG/1
10 TABLET, FILM COATED ORAL DAILY
Status: DISCONTINUED | OUTPATIENT
Start: 2021-04-17 | End: 2021-04-18

## 2021-04-16 RX ORDER — HYDROCHLOROTHIAZIDE 25 MG/1
25 TABLET ORAL DAILY
Status: DISCONTINUED | OUTPATIENT
Start: 2021-04-17 | End: 2021-04-26

## 2021-04-16 RX ORDER — ONDANSETRON 2 MG/ML
4 INJECTION INTRAMUSCULAR; INTRAVENOUS EVERY 30 MIN PRN
Status: DISCONTINUED | OUTPATIENT
Start: 2021-04-16 | End: 2021-04-16 | Stop reason: SDUPTHER

## 2021-04-16 RX ORDER — PANTOPRAZOLE SODIUM 40 MG/1
40 TABLET, DELAYED RELEASE ORAL
Status: DISCONTINUED | OUTPATIENT
Start: 2021-04-17 | End: 2021-04-26 | Stop reason: HOSPADM

## 2021-04-16 RX ORDER — ALPRAZOLAM 0.5 MG/1
1 TABLET ORAL 2 TIMES DAILY PRN
Status: DISCONTINUED | OUTPATIENT
Start: 2021-04-16 | End: 2021-04-26 | Stop reason: HOSPADM

## 2021-04-16 RX ORDER — METHOCARBAMOL 500 MG/1
750 TABLET, FILM COATED ORAL 4 TIMES DAILY
Status: DISCONTINUED | OUTPATIENT
Start: 2021-04-16 | End: 2021-04-26 | Stop reason: HOSPADM

## 2021-04-16 RX ORDER — POLYETHYLENE GLYCOL 3350 17 G/17G
17 POWDER, FOR SOLUTION ORAL DAILY PRN
Status: DISCONTINUED | OUTPATIENT
Start: 2021-04-16 | End: 2021-04-21

## 2021-04-16 RX ORDER — ONDANSETRON 2 MG/ML
4 INJECTION INTRAMUSCULAR; INTRAVENOUS EVERY 6 HOURS PRN
Status: DISCONTINUED | OUTPATIENT
Start: 2021-04-16 | End: 2021-04-26 | Stop reason: HOSPADM

## 2021-04-16 RX ORDER — FENTANYL CITRATE 50 UG/ML
50 INJECTION, SOLUTION INTRAMUSCULAR; INTRAVENOUS ONCE
Status: COMPLETED | OUTPATIENT
Start: 2021-04-16 | End: 2021-04-16

## 2021-04-16 RX ORDER — CYANOCOBALAMIN 1000 UG/ML
1000 INJECTION INTRAMUSCULAR; SUBCUTANEOUS
Status: DISCONTINUED | OUTPATIENT
Start: 2021-04-18 | End: 2021-04-26 | Stop reason: HOSPADM

## 2021-04-16 RX ORDER — ATORVASTATIN CALCIUM 40 MG/1
40 TABLET, FILM COATED ORAL DAILY
Status: DISCONTINUED | OUTPATIENT
Start: 2021-04-16 | End: 2021-04-26 | Stop reason: HOSPADM

## 2021-04-16 RX ORDER — SODIUM CHLORIDE 9 MG/ML
INJECTION, SOLUTION INTRAVENOUS CONTINUOUS
Status: DISCONTINUED | OUTPATIENT
Start: 2021-04-16 | End: 2021-04-18

## 2021-04-16 RX ORDER — DEXTROSE MONOHYDRATE 25 G/50ML
12.5 INJECTION, SOLUTION INTRAVENOUS PRN
Status: DISCONTINUED | OUTPATIENT
Start: 2021-04-16 | End: 2021-04-26 | Stop reason: HOSPADM

## 2021-04-16 RX ORDER — CARVEDILOL 25 MG/1
25 TABLET ORAL 2 TIMES DAILY
Status: DISCONTINUED | OUTPATIENT
Start: 2021-04-16 | End: 2021-04-19

## 2021-04-16 RX ORDER — FENOFIBRATE 54 MG/1
145 TABLET ORAL DAILY
Status: DISCONTINUED | OUTPATIENT
Start: 2021-04-16 | End: 2021-04-16 | Stop reason: CLARIF

## 2021-04-16 RX ORDER — B12/LEVOMEFOLATE CALCIUM/B-6 2-1.13-25
1 TABLET ORAL DAILY
Status: DISCONTINUED | OUTPATIENT
Start: 2021-04-16 | End: 2021-04-26 | Stop reason: HOSPADM

## 2021-04-16 RX ORDER — M-VIT,TX,IRON,MINS/CALC/FOLIC 27MG-0.4MG
1 TABLET ORAL DAILY
Status: DISCONTINUED | OUTPATIENT
Start: 2021-04-16 | End: 2021-04-26 | Stop reason: HOSPADM

## 2021-04-16 RX ORDER — SODIUM CHLORIDE 0.9 % (FLUSH) 0.9 %
5-40 SYRINGE (ML) INJECTION PRN
Status: DISCONTINUED | OUTPATIENT
Start: 2021-04-16 | End: 2021-04-23

## 2021-04-16 RX ORDER — ACETAMINOPHEN 650 MG/1
650 SUPPOSITORY RECTAL EVERY 6 HOURS PRN
Status: DISCONTINUED | OUTPATIENT
Start: 2021-04-16 | End: 2021-04-26 | Stop reason: HOSPADM

## 2021-04-16 RX ORDER — OXYCODONE HYDROCHLORIDE 5 MG/1
5 TABLET ORAL EVERY 4 HOURS PRN
Status: DISCONTINUED | OUTPATIENT
Start: 2021-04-16 | End: 2021-04-26 | Stop reason: HOSPADM

## 2021-04-16 RX ORDER — DEXTROSE MONOHYDRATE 50 MG/ML
100 INJECTION, SOLUTION INTRAVENOUS PRN
Status: DISCONTINUED | OUTPATIENT
Start: 2021-04-16 | End: 2021-04-26 | Stop reason: HOSPADM

## 2021-04-16 RX ORDER — BUPROPION HYDROCHLORIDE 150 MG/1
150 TABLET, EXTENDED RELEASE ORAL 2 TIMES DAILY
Status: DISCONTINUED | OUTPATIENT
Start: 2021-04-16 | End: 2021-04-26 | Stop reason: HOSPADM

## 2021-04-16 RX ORDER — FAMOTIDINE 20 MG/1
20 TABLET, FILM COATED ORAL 2 TIMES DAILY
Status: DISCONTINUED | OUTPATIENT
Start: 2021-04-16 | End: 2021-04-16 | Stop reason: DRUGHIGH

## 2021-04-16 RX ORDER — PROMETHAZINE HYDROCHLORIDE 25 MG/1
12.5 TABLET ORAL EVERY 6 HOURS PRN
Status: DISCONTINUED | OUTPATIENT
Start: 2021-04-16 | End: 2021-04-26 | Stop reason: HOSPADM

## 2021-04-16 RX ORDER — SODIUM CHLORIDE 0.9 % (FLUSH) 0.9 %
5-40 SYRINGE (ML) INJECTION EVERY 12 HOURS SCHEDULED
Status: DISCONTINUED | OUTPATIENT
Start: 2021-04-16 | End: 2021-04-23

## 2021-04-16 RX ORDER — FENOFIBRATE 160 MG/1
160 TABLET ORAL DAILY
Status: DISCONTINUED | OUTPATIENT
Start: 2021-04-17 | End: 2021-04-26 | Stop reason: HOSPADM

## 2021-04-16 RX ORDER — SODIUM CHLORIDE 9 MG/ML
25 INJECTION, SOLUTION INTRAVENOUS PRN
Status: DISCONTINUED | OUTPATIENT
Start: 2021-04-16 | End: 2021-04-23

## 2021-04-16 RX ORDER — VALSARTAN AND HYDROCHLOROTHIAZIDE 160; 25 MG/1; MG/1
1 TABLET ORAL DAILY
Status: DISCONTINUED | OUTPATIENT
Start: 2021-04-16 | End: 2021-04-16 | Stop reason: SDUPTHER

## 2021-04-16 RX ORDER — VENLAFAXINE HYDROCHLORIDE 150 MG/1
150 CAPSULE, EXTENDED RELEASE ORAL DAILY
Status: DISCONTINUED | OUTPATIENT
Start: 2021-04-16 | End: 2021-04-26 | Stop reason: HOSPADM

## 2021-04-16 RX ORDER — VALSARTAN 160 MG/1
160 TABLET ORAL DAILY
Status: DISCONTINUED | OUTPATIENT
Start: 2021-04-17 | End: 2021-04-26 | Stop reason: HOSPADM

## 2021-04-16 RX ORDER — NICOTINE POLACRILEX 4 MG
15 LOZENGE BUCCAL PRN
Status: DISCONTINUED | OUTPATIENT
Start: 2021-04-16 | End: 2021-04-26 | Stop reason: HOSPADM

## 2021-04-16 RX ORDER — 0.9 % SODIUM CHLORIDE 0.9 %
500 INTRAVENOUS SOLUTION INTRAVENOUS ONCE
Status: COMPLETED | OUTPATIENT
Start: 2021-04-16 | End: 2021-04-16

## 2021-04-16 RX ORDER — AMLODIPINE BESYLATE 10 MG/1
10 TABLET ORAL DAILY
Status: DISCONTINUED | OUTPATIENT
Start: 2021-04-16 | End: 2021-04-26 | Stop reason: HOSPADM

## 2021-04-16 RX ORDER — SODIUM CHLORIDE 0.9 % (FLUSH) 0.9 %
10 SYRINGE (ML) INJECTION PRN
Status: DISCONTINUED | OUTPATIENT
Start: 2021-04-16 | End: 2021-04-23

## 2021-04-16 RX ADMIN — BUPROPION HYDROCHLORIDE 150 MG: 150 TABLET, EXTENDED RELEASE ORAL at 22:38

## 2021-04-16 RX ADMIN — AMLODIPINE BESYLATE 10 MG: 10 TABLET ORAL at 22:39

## 2021-04-16 RX ADMIN — SODIUM CHLORIDE, PRESERVATIVE FREE 10 ML: 5 INJECTION INTRAVENOUS at 14:28

## 2021-04-16 RX ADMIN — METHOCARBAMOL 750 MG: 500 TABLET ORAL at 22:38

## 2021-04-16 RX ADMIN — Medication 1 TABLET: at 22:39

## 2021-04-16 RX ADMIN — IOPAMIDOL 80 ML: 755 INJECTION, SOLUTION INTRAVENOUS at 14:27

## 2021-04-16 RX ADMIN — VENLAFAXINE HYDROCHLORIDE 150 MG: 150 CAPSULE, EXTENDED RELEASE ORAL at 22:38

## 2021-04-16 RX ADMIN — SODIUM CHLORIDE: 9 INJECTION, SOLUTION INTRAVENOUS at 20:12

## 2021-04-16 RX ADMIN — ONDANSETRON 4 MG: 2 INJECTION INTRAMUSCULAR; INTRAVENOUS at 13:31

## 2021-04-16 RX ADMIN — FENTANYL CITRATE 50 MCG: 50 INJECTION INTRAMUSCULAR; INTRAVENOUS at 13:30

## 2021-04-16 RX ADMIN — SODIUM CHLORIDE 500 ML: 9 INJECTION, SOLUTION INTRAVENOUS at 13:30

## 2021-04-16 RX ADMIN — ATORVASTATIN CALCIUM 40 MG: 40 TABLET, FILM COATED ORAL at 22:38

## 2021-04-16 RX ADMIN — CARVEDILOL 25 MG: 25 TABLET, FILM COATED ORAL at 22:39

## 2021-04-16 RX ADMIN — OXYCODONE HYDROCHLORIDE 5 MG: 5 TABLET ORAL at 18:20

## 2021-04-16 ASSESSMENT — PAIN SCALES - GENERAL
PAINLEVEL_OUTOF10: 8
PAINLEVEL_OUTOF10: 7
PAINLEVEL_OUTOF10: 8
PAINLEVEL_OUTOF10: 8

## 2021-04-16 ASSESSMENT — PAIN DESCRIPTION - PROGRESSION: CLINICAL_PROGRESSION: NOT CHANGED

## 2021-04-16 ASSESSMENT — PAIN DESCRIPTION - ONSET: ONSET: ON-GOING

## 2021-04-16 ASSESSMENT — PAIN DESCRIPTION - LOCATION: LOCATION: BACK

## 2021-04-16 ASSESSMENT — PAIN DESCRIPTION - PAIN TYPE: TYPE: CHRONIC PAIN

## 2021-04-16 ASSESSMENT — PAIN - FUNCTIONAL ASSESSMENT: PAIN_FUNCTIONAL_ASSESSMENT: PREVENTS OR INTERFERES SOME ACTIVE ACTIVITIES AND ADLS

## 2021-04-16 ASSESSMENT — PAIN DESCRIPTION - ORIENTATION: ORIENTATION: MID

## 2021-04-16 NOTE — H&P
De Byrnes MD, [address]  Internal Medicine Hospitalist  History and Physical    Patient:  Cameron Lester  MRN: [id number]    Date of Service: Pt seen/examined on 4/16/2021 and Admitted to Inpatient status. CHIEF COMPLAINT:    Lower GI bleed and hematuria    History Obtained From:    patient  PCP: Virginia Foster MD    HISTORY OF PRESENT ILLNESS:   The patient is a 79 y.o. female who presents with black tarry stools with some fresh bleeding as well along with hematuria the past 7-10 days off and on. She then called her daughter who then thought that she needs to come in to be evaluated. She has some lower abdomen pains 7/10 that goes all the way to the back. No dysuria in her urine that she complained of. She was also confused some as per ER and so a CT head was done but I did not find her to be confused. She has neuroendocrine tumour and is being followed up by Dr. Opal Rodriguez and Marie Alfaro. Past Medical History:        Diagnosis Date    Acute anterior wall MI [facility]) 2007    CAD (coronary artery disease)     Dr. 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  Diabetes mellitus ([address]     PCP    H/O cardiovascular stress test 5/02    EF 74 %,normal perfusion    H/O echocardiogram 11/02    EF 60%, mild to mod MR,    Hyperlipidemia     Hypertension     PCP    Protein-calorie malnutrition, moderate ([address] 8/12/2015       Past Surgical History:        Procedure Laterality Date    ABDOMEN SURGERY      CORONARY ARTERY BYPASS GRAFT  5/07    CABG X 3, L mammary artery to the LAD, saphenous vein graft to RCA.  a saphenous vein graft to Cx marginal artery    DIAGNOSTIC CARDIAC CATH LAB PROCEDURE  3/30/07    PTCA to LAD    FRACTURE SURGERY Left 2001    patella    FRACTURE SURGERY Right 2017    wrist    HYSTERECTOMY  1996    OTHER SURGICAL HISTORY  08 11 2015    exp lap, right colon resection    SKIN BIOPSY      WRIST FRACTURE SURGERY Left 2/17/2021    LEFT WRIST OPEN REDUCTION INTERNAL FIXATION performed by Radha Waggoner MD at [facility] OR       Medications Prior to Admission:    Prior to Admission medications    Medication Sig Start Date End Date Taking? Authorizing Provider   amLODIPine (NORVASC) 10 MG tablet TAKE 1 TABLET BY MOUTH EVERY DAY 3/5/21   Historical Provider, MD   carvedilol (COREG CR) 40 MG CP24 extended release capsule TAKE 1 CAPSULE BY MOUTH EVERY DAY 3/6/21   Historical Provider, MD   famotidine (PEPCID) 20 MG tablet TAKE ONE TABLET BY MOUTH THREE TIMES PER DAY FOR STOMACH 3/12/21   Historical Provider, MD   FREESTYLE LITE strip TEST BLOOD GLUCOSE 3 TIMES DAILY (BEFORE MEALS). 3/26/21   Historical Provider, MD   methocarbamol (ROBAXIN) 750 MG tablet Take 750 mg by mouth 4 times daily as needed 4/1/21   Historical Provider, MD   oxyCODONE (ROXICODONE) 5 MG immediate release tablet Take 5 mg by mouth every 4 hours as needed. 4/13/21 4/20/21  Historical Provider, MD   predniSONE (DELTASONE) 10 MG tablet 4 tabs daily x 2, 3 tabs daily x 2, 2 tabs daily x 2, 1 tab daily x 4 then stop, take with food 4/8/21   Historical Provider, MD   promethazine (PHENERGAN) 25 MG tablet Take 25 mg by mouth every 6 hours as needed 3/16/21   Historical Provider, MD   nystatin (MYCOSTATIN) 150687 UNIT/GM cream Apply topically 4 times daily as needed 11/23/20   Historical Provider, MD   buPROPion [facility] SR) 150 MG extended release tablet Take 1 tablet by mouth 2 times daily 1/6/21   Domi De Leon MD   LANTUS SOLOSTAR 100 UNIT/ML injection pen  10/22/20   Historical Provider, MD   acyclovir (ZOVIRAX) 800 MG tablet TAKE 1 TABLET (800 MG TOTAL) BY MOUTH 3 TIMES DAILY AS NEEDED (FOR COLD SORES).  7/23/20   Historical Provider, MD   INVOKANA 300 MG TABS tablet  8/7/20   Historical Provider, MD   aspirin 81 MG EC tablet Take 1 tablet by mouth daily 10/18/19 Rhonda Foy MD   venlafaxine (EFFEXOR XR) 150 MG extended release capsule Take 1 capsule by mouth daily 10/18/19   Rhonda Foy MD   glipiZIDE (GLUCOTROL XL) 5 MG extended release tablet Take 2 tablets by mouth daily 10/17/19   Rhonda Foy MD   metFORMIN (GLUCOPHAGE) 500 MG tablet Take 1 tablet by mouth 2 times daily (with meals) 10/17/19   Rhonda Foy MD   atorvastatin (LIPITOR) 40 MG tablet Take 1.5 tablets by mouth daily 10/17/19   Rhonda Foy MD   folic acid-pyridoxine-cyancobalamin (FOLTX) 1.13-25-2 MG TABS Take 1 tablet by mouth daily 10/18/19   Rhonda Foy MD   valsartan-hydrochlorothiazide (DIOVAN-HCT) 160-25 MG per tablet Take 1 tablet by mouth daily 7/27/19   Historical Provider, MD   carvedilol (COREG) 25 MG tablet Take 1 tablet by mouth 2 times daily 3/30/19   Patrick Loyd MD   amLODIPine (NORVASC) 5 MG tablet Take 1 tablet by mouth daily 3/31/19   Patrick Loyd MD   pantoprazole (PROTONIX) 40 MG tablet Take 1 tablet by mouth every morning (before breakfast) 3/30/19   Patrick Loyd MD   ondansetron [facility]) 4 MG tablet Take 1 tablet by mouth daily as needed for Nausea or Vomiting 3/30/19   Patrick Loyd MD   potassium chloride (MICRO-K) 10 MEQ CR capsule Take 10 mEq by mouth 2 times daily    Historical Provider, MD   Multiple Vitamins-Minerals (THERAPEUTIC MULTIVITAMIN-MINERALS) tablet Take 1 tablet by mouth daily    Historical Provider, MD   vitamin E 1000 UNITS capsule Take 400 Units by mouth daily    Historical Provider, MD   ALPRAZolam Loki Cassidy) 1 MG tablet Take 1 mg by mouth 2 times daily as needed for Sleep. Historical Provider, MD   cyanocobalamin 1000 MCG/ML injection Inject 1,000 mcg into the muscle every 7 days. Historical Provider, MD   fenofibrate (TRICOR) 145 MG tablet Take 145 mg by mouth daily.     Historical Provider, MD       Allergies:  Tramadol and Vicodin [hydrocodone-acetaminophen]    Social History:   TOBACCO:   reports that she quit smoking about 32 years ago. She started smoking about 62 years ago. She has a 30.00 pack-year smoking history. She has never used smokeless tobacco.  ETOH:   reports no history of alcohol use. OCCUPATION:      Family History:       Problem Relation Age of Onset    Heart Disease Mother     Heart Attack Mother     Cancer Father     Heart Disease Brother        REVIEW OF SYSTEMS: (POSITIVES WILL BE UNDERLINED)  CONSTITUTIONAL:    recent weight changes, fatigue,  Fever, Chills, night sweats  EYES:     pain, tearing,  itching  acute change in vision  EARS:     hearing loss,  tinnitus,  vertigo,  discharge,  earache. NOSE:     Rhinorrhea,  sneezing,  itching,  allergy,  epistaxis  MOUTH/THROAT:    bleeding gums,  hoarseness,  sore throat. RESPIRATORY:      SOB, wheeze, cough, sputum, hemoptysis, bronchitis. CARDIOVASCULAR      chest pain, palpitations, dyspnea  exertion, orthopnea,  paroxysmal nocturnal dyspnea, pedal edema. GASTROINTESTINAL:      appetite changes,  nausea,  vomiting, indigestion,  dysphagia,  change in bowel movements,  abdominal pain. GENITOURINARY:    Urinary frequency,  hesitancy ,  urgency,  polyuria,  dysuria,  hematuria, no nocturia, no incontinence. HEMATOLOGIC/LYMPHATIC:     Anemia,  bleeding tendencies. MUSCULOSKELETAL:      myalgias,  bone pain,  joint pain,  stiffness,   change in gait. NEUROLOGICAL:     Loss of Consciousness,  memory loss,   periods of confusion,  nervousness,  insomina,  aphasia,  dysarthria. SKIN :    Skin changes,  hair changes,  rashes,  sores. PSYCHIATRIC:  depression,  personality changes,  anxiety. ENDOCRINE:    polydipsia,  polyuria,  abnormal weight changes,  heat or cold intolerance. ALL/IMM :   reactions to drugs other than listed,  food allergy,  insect bites,  skin rash,   trouble breathing,  local or general lymph node enlargement, tenderness in axillae.       Physical Exam:    Vitals: BP (!) 149/118   Pulse 69   Temp 97.5 °F (36.4 °C) (Oral)   Resp 16   Ht 5' 2\" (1.575 m)   Wt 119 lb (54 kg)   SpO2 98%   BMI 21.77 kg/m²   General appearance: alert, appears stated age and cooperative  Skin: Skin color, texture, turgor normal. No rashes or lesions  HEENT: Head: Normal, normocephalic, atraumatic. Eye: Normal external eye, conjunctiva, lids cornea, LOGAN. Neck / Thyroid: Supple, no masses, nodes, nodules or enlargement. Neck: no adenopathy, no carotid bruit, no JVD, supple, symmetrical, trachea midline and thyroid not enlarged, symmetric, no tenderness/mass/nodules  Lungs: clear to auscultation bilaterally and decreased breath sounds at the bases. Heart: regular rate and rhythm, S1, S2 normal, no murmur, click, rub or gallop  Abdomen: soft, non-tender; bowel sounds normal; no masses,  no organomegaly  Extremities: extremities normal, atraumatic, no cyanosis or edema  Neurologic: Mental status: Alert, oriented, thought content appropriate Cranial nerves 2-12 grossly intact. Strength 5/5 all 4 extremities. Normal light touch sensations. Reflexes +1-2 knees and biceps. Gait not checked.      Labs:  CBC with Differential:    Lab Results   Component Value Date    WBC 13.5 04/16/2021    RBC 3.32 04/16/2021    HGB 8.8 04/16/2021    HCT 29.0 04/16/2021     04/16/2021    MCV 87.3 04/16/2021    MCH 26.5 04/16/2021    MCHC 30.3 04/16/2021    RDW 14.6 04/16/2021    SEGSPCT 85.4 04/16/2021    BANDSPCT 6 06/07/2016    LYMPHOPCT 7.5 04/16/2021    MONOPCT 5.4 04/16/2021    EOSPCT 0.0 01/14/2011    BASOPCT 0.3 04/16/2021    MONOSABS 0.7 04/16/2021    LYMPHSABS 1.0 04/16/2021    EOSABS 0.1 04/16/2021    BASOSABS 0.0 04/16/2021    DIFFTYPE AUTOMATED DIFFERENTIAL 04/16/2021     CMP:    Lab Results   Component Value Date     04/16/2021    K 4.2 04/16/2021     04/16/2021    CO2 21 04/16/2021    BUN 37 04/16/2021    CREATININE 1.3 04/16/2021    GFRAA 48 04/16/2021    LABGLOM 40 04/16/2021    GLUCOSE 206 04/16/2021    PROT 6.2 04/16/2021    PROT 7.2 01/14/2011    LABALBU 3.5 04/16/2021    CALCIUM 8.1 04/16/2021    BILITOT 0.2 04/16/2021    ALKPHOS 80 04/16/2021    AST 11 04/16/2021    ALT 7 04/16/2021     -----------------------------------------------------------------  CT HEAD WO CONTRAST [[id number]] Collected: 04/16/21 1432      Order Status: Completed Updated: 04/16/21 1623     Narrative:       EXAMINATION:   CT OF THE HEAD WITHOUT CONTRAST  4/16/2021 2:12 pm     TECHNIQUE:   CT of the head was performed without the administration of intravenous   contrast. Dose modulation, iterative reconstruction, and/or weight based   adjustment of the mA/kV was utilized to reduce the radiation dose to as low   as reasonably achievable. COMPARISON:   03/23/2021, 02/07/2021, brain MRI 11/15/2019, CT head 01/14/2011     HISTORY:   ORDERING SYSTEM PROVIDED HISTORY: intermittent confusion, history of gi   cancer, neuroendocrine tumors   TECHNOLOGIST PROVIDED HISTORY:   Reason for exam:->intermittent confusion, history of gi cancer,   neuroendocrine tumors   Has a \"code stroke\" or \"stroke alert\" been called? ->No   Decision Support Exception->Emergency Medical Condition (MA)   Reason for Exam: intermittent confusion, history of gi cancer, neuroendocrine   tumors   Acuity: Acute   Type of Exam: Initial     Initial evaluation     FINDINGS:   BRAIN/VENTRICLES:  The ventricles and cisternal spaces are normal in size,   shape, and configuration for the age of the patient. There are confluent   areas of hypoattenuation in the periventricular white matter and centrum   semiovale that are likely related to chronic small vessel ischemic disease. There is atherosclerotic calcification of the cavernous carotids. There is no   midline shift or mass effect. No hemorrhage is identified in the brain   parenchyma. ORBITS: The visualized portion of the orbits demonstrate no acute abnormality.      SINUSES: The visualized paranasal sinuses are clear.  There is mild   opacification of the posterior right mastoid air cells. SOFT TISSUES/SKULL:  No acute abnormality of the visualized skull or soft   tissues.  There is a lucency in the high right parietal skull that is bright   on the prior     T1-weighted imaging likely represents a hemangioma.      Impression:       No acute intracranial abnormality. Cerebral atrophy.  Severe chronic small vessel ischemic changes. Mild opacification of the posterior right mastoid air cells.      CTA ABDOMEN PELVIS W CONTRAST [[id number]] Collected: 04/16/21 1430     Order Status: Completed Updated: 04/16/21 1651     Narrative:       EXAMINATION:   CTA OF THE ABDOMEN AND PELVIS WITH CONTRAST 4/16/2021 2:14 pm     TECHNIQUE:   CTA of the abdomen and pelvis was performed with the administration of   intravenous contrast. Multiplanar reformatted images are provided for review. MIP images are provided for review. Dose modulation, iterative   reconstruction, and/or weight based adjustment of the mA/kV was utilized to   reduce the radiation dose to as low as reasonably achievable. COMPARISON:   03/23/2021, 03/28/2019, 12/23/2020     HISTORY:   ORDERING SYSTEM PROVIDED HISTORY: Abdominal pain, bleeding, history of   neuroendocrine tumors, GI cancer. TECHNOLOGIST PROVIDED HISTORY:   Reason for exam:->Abdominal pain, bleeding, history of neuroendocrine tumors,   GI cancer. Decision Support Exception->Emergency Medical Condition (MA)   Reason for Exam: Abdominal pain, bleeding, history of neuroendocrine tumors,   GI cancer   Acuity: Acute   Type of Exam: Initial     FINDINGS:   Lower Chest: Nodular focus along the right hemidiaphragm on series 3, image   20 measures 12 x 8 mm.  Mitral annular calcifications.  Prior sternotomy.      Organs:  Status post cholecystectomy.  The liver, spleen, kidneys, adrenals,   and stomach are without acute process.  Masslike lesion with peripheral   calcifications which arises between the pancreatic head, IVC, and aorta   measures 4.3 x 2.9 cm.  No suspicious renal lesions, with subcentimeter   hypodensities too small to characterize. Mildly distended bladder. GI/Bowel: Postoperative changes of partial right hemicolectomy.  Wall   thickening versus adherent hyperdense material is present in the proximal   colon near the anastomosis site, such as on series 3, image 98.  A small   adjacent hyperdense focus is present posteriorly in the proximal colon, such   as on series 3, image 90.  Hypodense foci are also present within the stomach   body along their posterior wall, such as on series 3, image 49.  No bowel   obstruction. Pelvis: Status post hysterectomy.  No adnexal mass. Peritoneum/Retroperitoneum: Patent vasculature with severe atherosclerosis. No lymphadenopathy.  No free air or free fluid. Bones/Soft Tissues: Superior endplate compression deformity is present at L1,   with approximately 30% loss of vertebral body height.  A retropulsed fragment   along the superior aspect of the vertebral body measures 6 mm.      Impression:       1. Scattered hyperdense material is present within the gastrointestinal   tract, specifically in the proximal colon as well as within the body of the   stomach along the posterior wall.  Their appearance is nonspecific and could   represent ingested material.  Small foci of bleeding are less likely, but if   there is continued clinical concern for acute GI bleeding, further evaluation   with a GI bleed protocol CT angiogram or nuclear medicine tagged red blood   cell scan could be considered. 2. Possible wall thickening versus hyperdense wall-adherent material in the   proximal colon, adjacent to the anastomosis site. Consider correlation with   colonoscopy. 3. Patent visceral arteries with significant diffuse atherosclerosis. 4. Moderate compression deformity at L1, which is subacute in appearance but   new from 03/23/2021. [place] is an associated retropulsed fragment which   measures 6 mm.    5. Stable size of a peripherally calcified mass posterior to the pancreatic   head, measuring up to 4.0 cm.   6. 12 x 8 mm nodule at the right lung base is unchanged since at least   03/28/2019.      XR CHEST PORTABLE [[id number]] Collected: 04/16/21 1309     Order Status: Completed Updated: 04/16/21 1314     Narrative:       EXAMINATION:   ONE XRAY VIEW OF THE CHEST     4/16/2021 1:05 pm     COMPARISON:   Chest radiograph 02/07/2021. HISTORY:   ORDERING SYSTEM PROVIDED HISTORY: weakness, fatigue   TECHNOLOGIST PROVIDED HISTORY:   Reason for exam:->weakness, fatigue   Reason for Exam: weakness, fatigue     FINDINGS:   Status post median sternotomy.  The cardiomediastinal silhouette is within   normal limits.  No pneumothorax, vascular congestion, consolidation, or   pleural effusion is identified.  No acute osseous abnormality.      Impression:       No acute process         Assessment and Plan     Patient Active Problem List   Diagnosis Code    Diabetes mellitus ([facility] E11.9    CAD (coronary artery disease) I25.10    Hyperlipidemia E78.5    Carotid artery stenosis I65.29    S/P CABG x 3 Z95.1    Ischemia, bowel (HCC) K55.9    Protein-calorie malnutrition, moderate (HCC) E44.0    HTN (hypertension) I10    Hypokalemia E87.6    Anxiety F41.9    Gastroenteritis K52.9    Stroke-like symptoms R29.90    Peripheral polyneuropathy G62.9    Ataxia R27.0    Urinary tract infection without hematuria N39.0    Carcinoid syndrome (HCC) E34.0    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 She has some pain will monitor but she has bene on narcotics before as well and so she must be having CPS. She talks about hematuria but I am not sure if she really ha dit and so will repeat her UA as the first one did not show it. If it is true then will need urology consultation. Will consult Dr. Neelam Bee for her tumour diagnosis. Home meds for her CAD, HTN and HLD. I have held her ASA and so will check with her cardiologist as well. Consult nephrology for her renal failure, I will start on some IVF's as well. Also need to monitor her sodium. I will hold off her oral diabetic meds as she is on clears at present and so start SSI for now. Check her A1c. Basic Admit Orders: Will admit to a telemetry floor. Rule out for an AMI with serial Troponin's. DVT prophylaxis with heparin/lovenox and SCD's. Old records have been reviewed if available on line. I have discussed with the patient the treatment plan. Expressed understanding. Patient is a full code. I tried to call [facility] at [phone] but unable to connect through. Brandee Rivas MD, FACP    Thank you Param Ding MD for the opportunity to be involved in this patient's care. If you have any questions or concerns please feel free to contact me at [phone].

## 2021-04-16 NOTE — ED NOTES
Patient is changed into patient gown and is hooked up to the monitor with Tele wires. I gave her two blankets and I told her to be on the safe side if she has to go to the bathroom to go ahead and grab a urine sample and let the nurse know.       Maximo Mcneal  04/16/21 [id number]

## 2021-04-16 NOTE — TELEPHONE ENCOUNTER
Pt's daughter called to state that pt is in severe abdominal and back pain. She has blood in her urine and stool. She has lost 15 lbs since her last visit. She stated that they just saw Dr Mackenzie Frederick who told them they needed to see DR Mary Pedroza. Dr Mary Pedroza consulted and stated that pt should go to the ER for quicker and immediate evaluation and pain control. Pt to follow up for office visit following ER. Daughter will call to update on ER course.

## 2021-04-16 NOTE — ED PROVIDER NOTES
nursing notes for additional information     PAST MEDICAL & SURGICAL HISTORY    Past Medical History:   Diagnosis Date    Acute anterior wall MI [facility]) 2007    CAD (coronary artery disease)     Dr. 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  Diabetes mellitus ([facility] [address]     PCP    H/O cardiovascular stress test 5/02    EF 74 %,normal perfusion    H/O echocardiogram 11/02    EF 60%, mild to mod MR,    Hyperlipidemia     Hypertension     PCP    Protein-calorie malnutrition, moderate ([address] 8/12/2015     Past Surgical History:   Procedure Laterality Date    ABDOMEN SURGERY      CORONARY ARTERY BYPASS GRAFT  5/07    CABG X 3, L mammary artery to the LAD, saphenous vein graft to RCA. a saphenous vein graft to Cx marginal artery    DIAGNOSTIC CARDIAC CATH LAB PROCEDURE  3/30/07    PTCA to LAD    FRACTURE SURGERY Left 2001    patella    FRACTURE SURGERY Right 2017    wrist    HYSTERECTOMY  1996    OTHER SURGICAL HISTORY  08 11 2015    exp lap, right colon resection    SKIN BIOPSY      WRIST FRACTURE SURGERY Left 2/17/2021    LEFT WRIST OPEN REDUCTION INTERNAL FIXATION performed by Yfn Smyth MD at [address]    Current Outpatient Rx   Medication Sig Dispense Refill    amLODIPine (NORVASC) 10 MG tablet TAKE 1 TABLET BY MOUTH EVERY DAY      carvedilol (COREG CR) 40 MG CP24 extended release capsule TAKE 1 CAPSULE BY MOUTH EVERY DAY      famotidine (PEPCID) 20 MG tablet TAKE ONE TABLET BY MOUTH THREE TIMES PER DAY FOR STOMACH      FREESTYLE LITE strip TEST BLOOD GLUCOSE 3 TIMES DAILY (BEFORE MEALS).  methocarbamol (ROBAXIN) 750 MG tablet Take 750 mg by mouth 4 times daily as needed      oxyCODONE (ROXICODONE) 5 MG immediate release tablet Take 5 mg by mouth every 4 hours as needed.       predniSONE (DELTASONE) 10 MG tablet 4 tabs daily x 2, 3 tabs daily x 2, 2 tabs daily x 2, 1 tab daily x 4 then stop, take with food      promethazine (PHENERGAN) 25 MG tablet Take 25 mg by mouth every 6 hours as needed      nystatin (MYCOSTATIN) 883401 UNIT/GM cream Apply topically 4 times daily as needed      buPROPion (WELLBUTRIN SR) 150 MG extended release tablet Take 1 tablet by mouth 2 times daily 180 tablet 1    LANTUS SOLOSTAR 100 UNIT/ML injection pen       acyclovir (ZOVIRAX) 800 MG tablet TAKE 1 TABLET (800 MG TOTAL) BY MOUTH 3 TIMES DAILY AS NEEDED (FOR COLD SORES).       INVOKANA 300 MG TABS tablet       aspirin 81 MG EC tablet Take 1 tablet by mouth daily 30 tablet 0    venlafaxine (EFFEXOR XR) 150 MG extended release capsule Take 1 capsule by mouth daily 30 capsule 0    glipiZIDE (GLUCOTROL XL) 5 MG extended release tablet Take 2 tablets by mouth daily 30 tablet 0    metFORMIN (GLUCOPHAGE) 500 MG tablet Take 1 tablet by mouth 2 times daily (with meals) 60 tablet 0    atorvastatin (LIPITOR) 40 MG tablet Take 1.5 tablets by mouth daily 45 tablet 0    folic acid-pyridoxine-cyancobalamin (FOLTX) 1.13-25-2 MG TABS Take 1 tablet by mouth daily 30 tablet 0    valsartan-hydrochlorothiazide (DIOVAN-HCT) 160-25 MG per tablet Take 1 tablet by mouth daily  3    carvedilol (COREG) 25 MG tablet Take 1 tablet by mouth 2 times daily 60 tablet 3    amLODIPine (NORVASC) 5 MG tablet Take 1 tablet by mouth daily 30 tablet 3    pantoprazole (PROTONIX) 40 MG tablet Take 1 tablet by mouth every morning (before breakfast) 90 tablet 1    ondansetron (ZOFRAN) 4 MG tablet Take 1 tablet by mouth daily as needed for Nausea or Vomiting 30 tablet 0    potassium chloride (MICRO-K) 10 MEQ CR capsule Take 10 mEq by mouth 2 times daily      Multiple Vitamins-Minerals (THERAPEUTIC MULTIVITAMIN-MINERALS) tablet Take 1 tablet by mouth daily      vitamin E 1000 UNITS capsule Take 400 Units by mouth daily      ALPRAZolam Eosinophils Absolute 0.1 K/CU MM    Basophils Absolute 0.0 K/CU MM    Nucleated RBC % 0.0 %    Total Nucleated RBC 0.0 K/CU MM    Total Immature Neutrophil 0.11 K/CU MM    Immature Neutrophil % 0.8 (H) 0 - 0.43 %   CMP   Result Value Ref Range    Sodium 130 (L) 135 - 145 MMOL/L    Potassium 4.2 3.5 - 5.1 MMOL/L    Chloride 100 99 - 110 mMol/L    CO2 21 21 - 32 MMOL/L    BUN 37 (H) 6 - 23 MG/DL    CREATININE 1.3 (H) 0.6 - 1.1 MG/DL    Glucose 206 (H) 70 - 99 MG/DL    Calcium 8.1 (L) 8.3 - 10.6 MG/DL    Albumin 3.5 3.4 - 5.0 GM/DL    Total Protein 6.2 (L) 6.4 - 8.2 GM/DL    Total Bilirubin 0.2 0.0 - 1.0 MG/DL    ALT 7 (L) 10 - 40 U/L    AST 11 (L) 15 - 37 IU/L    Alkaline Phosphatase 80 40 - 129 IU/L    GFR Non- 40 (L) >60 mL/min/1.73m2    GFR  48 (L) >60 mL/min/1.73m2    Anion Gap 9 4 - 16   Lipase   Result Value Ref Range    Lipase 43 13 - 60 IU/L   Troponin   Result Value Ref Range    Troponin T <0.010 <0.01 NG/ML   Brain Natriuretic Peptide   Result Value Ref Range    Pro-.6 (H) <300 PG/ML   Lactic Acid, Plasma   Result Value Ref Range    Lactate 1.6 0.4 - 2.0 mMOL/L   Magnesium   Result Value Ref Range    Magnesium 1.8 1.8 - 2.4 mg/dl   Urinalysis (Lab)   Result Value Ref Range    Color, UA YELLOW YELLOW    Clarity, UA CLEAR CLEAR    Glucose, Urine >500 (A) NEGATIVE MG/DL    Bilirubin Urine NEGATIVE NEGATIVE MG/DL    Ketones, Urine NEGATIVE NEGATIVE MG/DL    Specific Gravity, UA 1.016 1.001 - 1.035    Blood, Urine NEGATIVE NEGATIVE    pH, Urine 5.0 5.0 - 8.0    Protein, UA NEGATIVE NEGATIVE MG/DL    Urobilinogen, Urine NEGATIVE 0.2 - 1.0 MG/DL    Nitrite Urine, Quantitative NEGATIVE NEGATIVE    Leukocyte Esterase, Urine NEGATIVE NEGATIVE    RBC, UA NONE SEEN 0 - 6 /HPF    WBC, UA NONE SEEN 0 - 5 /HPF    Bacteria, UA NEGATIVE NEGATIVE /HPF    Squam Epithel, UA 1 /HPF    Trichomonas, UA NONE SEEN NONE SEEN /HPF   Protime/INR & PTT   Result Value Ref Range    Protime 14.4 11.7 - 14.5 SECONDS    INR 1.19 INDEX    aPTT 23.8 (L) 25.1 - 37.1 SECONDS   TYPE AND SCREEN   Result Value Ref Range    ABO/Rh O POSITIVE     Antibody Screen NEGATIVE        RADIOLOGY/PROCEDURES    Xr Wrist Left (min 3 Views)    Result Date: 4/15/2021  EXAMINATION: 3 XRAY VIEWS OF THE LEFT WRIST 4/14/2021 2:51 pm COMPARISON: 3/2/2021 HISTORY: ORDERING SYSTEM PROVIDED HISTORY: Postop check FINDINGS: Postsurgical changes status post ORIF of the distal radius are noted. There is a volar plate and screw fixation spanning the fracture which is in anatomic alignment. No new fracture is identified. An ulnar styloid fracture is again noted. No new fractures are evident. Sequelae of ORIF of the distal left radius, in anatomic alignment. Ct Head Wo Contrast    Result Date: 4/16/2021  EXAMINATION: CT OF THE HEAD WITHOUT CONTRAST  4/16/2021 2:12 pm TECHNIQUE: CT of the head was performed without the administration of intravenous contrast. Dose modulation, iterative reconstruction, and/or weight based adjustment of the mA/kV was utilized to reduce the radiation dose to as low as reasonably achievable. COMPARISON: 03/23/2021, 02/07/2021, brain MRI 11/15/2019, CT head 01/14/2011 HISTORY: ORDERING SYSTEM PROVIDED HISTORY: intermittent confusion, history of gi cancer, neuroendocrine tumors TECHNOLOGIST PROVIDED HISTORY: Reason for exam:->intermittent confusion, history of gi cancer, neuroendocrine tumors Has a \"code stroke\" or \"stroke alert\" been called? ->No Decision Support Exception->Emergency Medical Condition ([place]) Reason for Exam: intermittent confusion, history of gi cancer, neuroendocrine tumors Acuity: Acute Type of Exam: Initial Initial evaluation FINDINGS: BRAIN/VENTRICLES:  The ventricles and cisternal spaces are normal in size, shape, and configuration for the age of the patient.  There are confluent areas of hypoattenuation in the periventricular white matter and centrum semiovale that are likely related to chronic small vessel ischemic disease. There is atherosclerotic calcification of the cavernous carotids. There is no midline shift or mass effect. No hemorrhage is identified in the brain parenchyma. ORBITS: The visualized portion of the orbits demonstrate no acute abnormality. SINUSES: The visualized paranasal sinuses are clear. There is mild opacification of the posterior right mastoid air cells. SOFT TISSUES/SKULL:  No acute abnormality of the visualized skull or soft tissues. There is a lucency in the high right parietal skull that is bright on the prior T1-weighted imaging likely represents a hemangioma. No acute intracranial abnormality. Cerebral atrophy. Severe chronic small vessel ischemic changes. Mild opacification of the posterior right mastoid air cells. Ct Head Wo Contrast    Result Date: 3/24/2021  EXAMINATION: CT OF THE HEAD WITHOUT CONTRAST; CT OF THE CERVICAL SPINE WITHOUT CONTRAST 3/23/2021 10:37 am TECHNIQUE: CT of the head and the cervical spine was performed without the administration of intravenous contrast. Multiplanar reformatted images are provided for review. Dose modulation, iterative reconstruction, and/or weight based adjustment of the mA/kV was utilized to reduce the radiation dose to as low as reasonably achievable. COMPARISON: 02/07/2021 HISTORY: Acute pain status post fall. FINDINGS: BRAIN/VENTRICLES: No acute intracranial hemorrhage, mass effect or midline shift. No abnormal extra-axial fluid collection. The gray-white differentiation is maintained without evidence of an acute infarct. Age commensurate parenchymal volume loss with extensive chronic small vessel ischemic changes. No hydrocephalus. ORBITS: The visualized portion of the orbits demonstrate no acute abnormality. SINUSES: The visualized paranasal sinuses and mastoid air cells demonstrate no acute abnormality. SOFT TISSUES/SKULL:  No acute abnormality of the visualized skull or soft tissues.  CERVICAL SPINE: Osteopenia. No acute fracture. No spondylolisthesis. Mild-moderate multilevel degenerative changes as described on prior CT. Visualized soft tissues are unremarkable. No acute abnormality of the head and cervical spine. Ct Cervical Spine Wo Contrast    Result Date: 3/24/2021  EXAMINATION: CT OF THE HEAD WITHOUT CONTRAST; CT OF THE CERVICAL SPINE WITHOUT CONTRAST 3/23/2021 10:37 am TECHNIQUE: CT of the head and the cervical spine was performed without the administration of intravenous contrast. Multiplanar reformatted images are provided for review. Dose modulation, iterative reconstruction, and/or weight based adjustment of the mA/kV was utilized to reduce the radiation dose to as low as reasonably achievable. COMPARISON: 02/07/2021 HISTORY: Acute pain status post fall. FINDINGS: BRAIN/VENTRICLES: No acute intracranial hemorrhage, mass effect or midline shift. No abnormal extra-axial fluid collection. The gray-white differentiation is maintained without evidence of an acute infarct. Age commensurate parenchymal volume loss with extensive chronic small vessel ischemic changes. No hydrocephalus. ORBITS: The visualized portion of the orbits demonstrate no acute abnormality. SINUSES: The visualized paranasal sinuses and mastoid air cells demonstrate no acute abnormality. SOFT TISSUES/SKULL:  No acute abnormality of the visualized skull or soft tissues. CERVICAL SPINE:  Osteopenia. No acute fracture. No spondylolisthesis. Mild-moderate multilevel degenerative changes as described on prior CT. Visualized soft tissues are unremarkable. No acute abnormality of the head and cervical spine.      Ct Thoracic Spine Wo Contrast    Result Date: 3/23/2021  EXAMINATION: CT OF THE ABDOMEN AND PELVIS WITH CONTRAST; CT OF THE THORACIC SPINE WITHOUT CONTRAST; CT OF THE LUMBAR SPINE WITHOUT CONTRAST 3/23/2021 11:02 am; 3/23/2021 10:59 am; 3/23/2021 11:01 am TECHNIQUE: CT of the abdomen and pelvis was performed with abnormality of the aorta. No ascites or free air. No hemoperitoneum. No retroperitoneal hematoma. Bones/Soft Tissues: No acute fracture of the abdomen and pelvis. Thoracic and lumbar spine: Moderate degenerative changes throughout the thoracic and lumbar spine. No fracture demonstrated. No dislocation. No acute traumatic findings surrounding the thoracic and lumbar spine. No acute or traumatic abnormality of the abdomen or pelvis, or the thoracic or lumbar spine. Ct Lumbar Spine Wo Contrast    Result Date: 3/23/2021  EXAMINATION: CT OF THE ABDOMEN AND PELVIS WITH CONTRAST; CT OF THE THORACIC SPINE WITHOUT CONTRAST; CT OF THE LUMBAR SPINE WITHOUT CONTRAST 3/23/2021 11:02 am; 3/23/2021 10:59 am; 3/23/2021 11:01 am TECHNIQUE: CT of the abdomen and pelvis was performed with the administration of intravenous contrast. Multiplanar reformatted images are provided for review. Dose modulation, iterative reconstruction, and/or weight based adjustment of the mA/kV was utilized to reduce the radiation dose to as low as reasonably achievable.; CT of the thoracic spine was performed without the administration of intravenous contrast. Multiplanar reformatted images are provided for review. Dose modulation, iterative reconstruction, and/or weight based adjustment of the mA/kV was utilized to reduce the radiation dose to as low as reasonably achievable.; CT of the lumbar spine was performed without the administration of intravenous contrast. Multiplanar reformatted images are provided for review. Dose modulation, iterative reconstruction, and/or weight based adjustment of the mA/kV was utilized to reduce the radiation dose to as low as reasonably achievable.  COMPARISON: December 23, 2020 HISTORY: ORDERING SYSTEM PROVIDED HISTORY: abd pain TECHNOLOGIST PROVIDED HISTORY: Reason for exam:->abd pain Additional Contrast?->None Decision Support Exception->Emergency Medical Condition ([place]) Reason for Exam: fall; ORDERING SYSTEM PROVIDED HISTORY: pain TECHNOLOGIST PROVIDED HISTORY: Reason for exam:->pain Reason for Exam: FALL  UPPER BACK PAIN; ORDERING SYSTEM PROVIDED HISTORY: PAIN TECHNOLOGIST PROVIDED HISTORY: Reason for exam:->pain Decision Support Exception->Emergency Medical Condition (MA) Reason for Exam: fall, low back pain Lower chest: No evidence of trauma or other acute findings. Nodule associated with the right-sided diaphragm unchanged from the patient's December 23rd 2020 CT exam Organs: Unchanged mass associated with the pancreatic head since the December 23rd 2020 CT examination. GI/Bowel: No acute or traumatic findings of bowel throughout the abdomen and pelvis. Pelvis: No acute or traumatic findings. Normal appearance of the bladder. Peritoneum/Retroperitoneum: No acute traumatic abnormality of the aorta. No ascites or free air. No hemoperitoneum. No retroperitoneal hematoma. Bones/Soft Tissues: No acute fracture of the abdomen and pelvis. Thoracic and lumbar spine: Moderate degenerative changes throughout the thoracic and lumbar spine. No fracture demonstrated. No dislocation. No acute traumatic findings surrounding the thoracic and lumbar spine. No acute or traumatic abnormality of the abdomen or pelvis, or the thoracic or lumbar spine. Ct Abdomen Pelvis W Iv Contrast Additional Contrast? None    Result Date: 3/23/2021  EXAMINATION: CT OF THE ABDOMEN AND PELVIS WITH CONTRAST; CT OF THE THORACIC SPINE WITHOUT CONTRAST; CT OF THE LUMBAR SPINE WITHOUT CONTRAST 3/23/2021 11:02 am; 3/23/2021 10:59 am; 3/23/2021 11:01 am TECHNIQUE: CT of the abdomen and pelvis was performed with the administration of intravenous contrast. Multiplanar reformatted images are provided for review.  Dose modulation, iterative reconstruction, and/or weight based adjustment of the mA/kV was utilized to reduce the radiation dose to as low as reasonably achievable.; CT of the thoracic spine was performed without the administration of intravenous contrast. Multiplanar reformatted images are provided for review. Dose modulation, iterative reconstruction, and/or weight based adjustment of the mA/kV was utilized to reduce the radiation dose to as low as reasonably achievable.; CT of the lumbar spine was performed without the administration of intravenous contrast. Multiplanar reformatted images are provided for review. Dose modulation, iterative reconstruction, and/or weight based adjustment of the mA/kV was utilized to reduce the radiation dose to as low as reasonably achievable. COMPARISON: December 23, 2020 HISTORY: ORDERING SYSTEM PROVIDED HISTORY: abd pain TECHNOLOGIST PROVIDED HISTORY: Reason for exam:->abd pain Additional Contrast?->None Decision Support Exception->Emergency Medical Condition (MA) Reason for Exam: fall; ORDERING SYSTEM PROVIDED HISTORY: pain TECHNOLOGIST PROVIDED HISTORY: Reason for exam:->pain Reason for Exam: FALL  UPPER BACK PAIN; ORDERING SYSTEM PROVIDED HISTORY: PAIN TECHNOLOGIST PROVIDED HISTORY: Reason for exam:->pain Decision Support Exception->Emergency Medical Condition (MA) Reason for Exam: fall, low back pain Lower chest: No evidence of trauma or other acute findings. Nodule associated with the right-sided diaphragm unchanged from the patient's December 23rd 2020 CT exam Organs: Unchanged mass associated with the pancreatic head since the December 23rd 2020 CT examination. GI/Bowel: No acute or traumatic findings of bowel throughout the abdomen and pelvis. Pelvis: No acute or traumatic findings. Normal appearance of the bladder. Peritoneum/Retroperitoneum: No acute traumatic abnormality of the aorta. No ascites or free air. No hemoperitoneum. No retroperitoneal hematoma. Bones/Soft Tissues: No acute fracture of the abdomen and pelvis. Thoracic and lumbar spine: Moderate degenerative changes throughout the thoracic and lumbar spine. No fracture demonstrated. No dislocation.   No acute traumatic findings surrounding the thoracic and lumbar spine. No acute or traumatic abnormality of the abdomen or pelvis, or the thoracic or lumbar spine. Xr Chest Portable    Result Date: 4/16/2021  EXAMINATION: ONE XRAY VIEW OF THE CHEST 4/16/2021 1:05 pm COMPARISON: Chest radiograph 02/07/2021. HISTORY: ORDERING SYSTEM PROVIDED HISTORY: weakness, fatigue TECHNOLOGIST PROVIDED HISTORY: Reason for exam:->weakness, fatigue Reason for Exam: weakness, fatigue FINDINGS: Status post median sternotomy. The cardiomediastinal silhouette is within normal limits. No pneumothorax, vascular congestion, consolidation, or pleural effusion is identified. No acute osseous abnormality. No acute process. Cta Abdomen Pelvis W Contrast    Result Date: 4/16/2021  EXAMINATION: CTA OF THE ABDOMEN AND PELVIS WITH CONTRAST 4/16/2021 2:14 pm TECHNIQUE: CTA of the abdomen and pelvis was performed with the administration of intravenous contrast. Multiplanar reformatted images are provided for review. MIP images are provided for review. Dose modulation, iterative reconstruction, and/or weight based adjustment of the mA/kV was utilized to reduce the radiation dose to as low as reasonably achievable. COMPARISON: 03/23/2021, 03/28/2019, 12/23/2020 HISTORY: ORDERING SYSTEM PROVIDED HISTORY: Abdominal pain, bleeding, history of neuroendocrine tumors, GI cancer. TECHNOLOGIST PROVIDED HISTORY: Reason for exam:->Abdominal pain, bleeding, history of neuroendocrine tumors, GI cancer. Decision Support Exception->Emergency Medical Condition (MA) Reason for Exam: Abdominal pain, bleeding, history of neuroendocrine tumors, GI cancer Acuity: Acute Type of Exam: Initial FINDINGS: Lower Chest: Nodular focus along the right hemidiaphragm on series 3, image 20 measures 12 x 8 mm. Mitral annular calcifications. Prior sternotomy. Organs:  Status post cholecystectomy. The liver, spleen, kidneys, adrenals, and stomach are without acute process.   Masslike lesion with peripheral calcifications which arises between the pancreatic head, IVC, and aorta measures 4.3 x 2.9 cm. No suspicious renal lesions, with subcentimeter hypodensities too small to characterize. Mildly distended bladder. GI/Bowel: Postoperative changes of partial right hemicolectomy. Wall thickening versus adherent hyperdense material is present in the proximal colon near the anastomosis site, such as on series 3, image 98. A small adjacent hyperdense focus is present posteriorly in the proximal colon, such as on series 3, image 90. Hypodense foci are also present within the stomach body along their posterior wall, such as on series 3, image 49. No bowel obstruction. Pelvis: Status post hysterectomy. No adnexal mass. Peritoneum/Retroperitoneum: Patent vasculature with severe atherosclerosis. No lymphadenopathy. No free air or free fluid. Bones/Soft Tissues: Superior endplate compression deformity is present at L1, with approximately 30% loss of vertebral body height. A retropulsed fragment along the superior aspect of the vertebral body measures 6 mm. 1. Scattered hyperdense material is present within the gastrointestinal tract, specifically in the proximal colon as well as within the body of the stomach along the posterior wall. Their appearance is nonspecific and could represent ingested material.  Small foci of bleeding are less likely, but if there is continued clinical concern for acute GI bleeding, further evaluation with a GI bleed protocol CT angiogram or nuclear medicine tagged red blood cell scan could be considered. 2. Possible wall thickening versus hyperdense wall-adherent material in the proximal colon, adjacent to the anastomosis site. Consider correlation with colonoscopy. 3. Patent visceral arteries with significant diffuse atherosclerosis. 4. Moderate compression deformity at L1, which is subacute in appearance but new from 03/23/2021.   There is an associated retropulsed fragment which measures 6 mm. 5. Stable size of a peripherally calcified mass posterior to the pancreatic head, measuring up to 4.0 cm. 6. 12 x 8 mm nodule at the right lung base is unchanged since at least 03/28/2019. ED COURSE & MEDICAL DECISION MAKING      Patient presents as above. Emergent etiologies considered. Patient presenting with daughter for concern of blood per bowel movements, rectal bleeding. Patient has a history of significant abdominal surgeries, neuroendocrine tumors of the small intestine, metastatic cancer of the intestines. Follows with local oncology, has issues with thrombocytopenia, receiving lanreotide infusion. Is not anticoagulated. Is complaining of worsening intensifying pain in the last several days. Daughter also is noticing possible intermittent confusion. Overall she appears well, no significant distention or tenderness into her abdominal exam.  She is hemodynamically stable. In no apparent distress. Broad work-up initiated. Patient does have a downtrending hemoglobin compared to her recent blood test.  CTA of the abdomen demonstrate no significant ischemic colitis but there was a concern for possible bleeding in the proximal colon, there is also an area of possible mass in this area. Otherwise her cardiac testing, CT of the head, lab work appearing around her baseline. Touch base with gastroenterology, Dr. Ella Ray who would like the patient having a clear liquid diet, will have hemoglobin hematocrit every 6 hours and will continue to monitor. Will likely need underlying colonoscopy. At this point we will look to admit to the hospitalist team.  She otherwise remained stable while in the ED. Clinical  IMPRESSION    1. Gastrointestinal hemorrhage, unspecified gastrointestinal hemorrhage type    2. Neuroendocrine cancer ([address]    3. Intermittent confusion    4.  Lower abdominal pain      Comment: Please note this report has been produced using speech recognition software and may contain errors related to that system including errors in grammar, punctuation, and spelling, as well as words and phrases that may be inappropriate. If there are any questions or concerns please feel free to contact the dictating provider for clarification.         Tim Luna [address], PA  04/16/21 [id number]

## 2021-04-17 ENCOUNTER — ANESTHESIA EVENT (OUTPATIENT)
Dept: ENDOSCOPY | Age: 81
DRG: 982 | End: 2021-04-17
Payer: MEDICARE

## 2021-04-17 LAB
ANION GAP SERPL CALCULATED.3IONS-SCNC: 8 MMOL/L (ref 4–16)
ANISOCYTOSIS: ABNORMAL
BASOPHILS ABSOLUTE: 0.1 K/CU MM
BASOPHILS ABSOLUTE: 0.1 K/CU MM
BASOPHILS RELATIVE PERCENT: 0.5 % (ref 0–1)
BASOPHILS RELATIVE PERCENT: 0.6 % (ref 0–1)
BUN BLDV-MCNC: 25 MG/DL (ref 6–23)
CALCIUM SERPL-MCNC: 7.7 MG/DL (ref 8.3–10.6)
CHLORIDE BLD-SCNC: 99 MMOL/L (ref 99–110)
CO2: 24 MMOL/L (ref 21–32)
CREAT SERPL-MCNC: 1.2 MG/DL (ref 0.6–1.1)
DIFFERENTIAL TYPE: ABNORMAL
DIFFERENTIAL TYPE: ABNORMAL
EOSINOPHILS ABSOLUTE: 0.3 K/CU MM
EOSINOPHILS ABSOLUTE: 0.5 K/CU MM
EOSINOPHILS RELATIVE PERCENT: 2.2 % (ref 0–3)
EOSINOPHILS RELATIVE PERCENT: 3.6 % (ref 0–3)
ESTIMATED AVERAGE GLUCOSE: 151 MG/DL
FERRITIN: 72 NG/ML (ref 15–150)
FOLATE: >20 NG/ML (ref 3.1–17.5)
GFR AFRICAN AMERICAN: 52 ML/MIN/1.73M2
GFR NON-AFRICAN AMERICAN: 43 ML/MIN/1.73M2
GLUCOSE BLD-MCNC: 111 MG/DL (ref 70–99)
GLUCOSE BLD-MCNC: 142 MG/DL (ref 70–99)
GLUCOSE BLD-MCNC: 179 MG/DL (ref 70–99)
GLUCOSE BLD-MCNC: 241 MG/DL (ref 70–99)
GLUCOSE BLD-MCNC: 83 MG/DL (ref 70–99)
HBA1C MFR BLD: 6.9 % (ref 4.2–6.3)
HCT VFR BLD CALC: 27.1 % (ref 37–47)
HCT VFR BLD CALC: 27.2 % (ref 37–47)
HCT VFR BLD CALC: 27.8 % (ref 37–47)
HCT VFR BLD CALC: 27.9 % (ref 37–47)
HEMOGLOBIN: 7.9 GM/DL (ref 12.5–16)
HEMOGLOBIN: 8 GM/DL (ref 12.5–16)
HEMOGLOBIN: 8.3 GM/DL (ref 12.5–16)
HEMOGLOBIN: 8.4 GM/DL (ref 12.5–16)
HYPOCHROMIA: ABNORMAL
IMMATURE NEUTROPHIL %: 0.9 % (ref 0–0.43)
IMMATURE NEUTROPHIL %: 0.9 % (ref 0–0.43)
IRON: 59 UG/DL (ref 37–145)
LYMPHOCYTES ABSOLUTE: 1.9 K/CU MM
LYMPHOCYTES ABSOLUTE: 2.8 K/CU MM
LYMPHOCYTES RELATIVE PERCENT: 12.7 % (ref 24–44)
LYMPHOCYTES RELATIVE PERCENT: 19.1 % (ref 24–44)
MCH RBC QN AUTO: 26 PG (ref 27–31)
MCH RBC QN AUTO: 26.4 PG (ref 27–31)
MCHC RBC AUTO-ENTMCNC: 29.9 % (ref 32–36)
MCHC RBC AUTO-ENTMCNC: 30.1 % (ref 32–36)
MCV RBC AUTO: 87.1 FL (ref 78–100)
MCV RBC AUTO: 87.7 FL (ref 78–100)
MONOCYTES ABSOLUTE: 1.4 K/CU MM
MONOCYTES ABSOLUTE: 1.4 K/CU MM
MONOCYTES RELATIVE PERCENT: 9.3 % (ref 0–4)
MONOCYTES RELATIVE PERCENT: 9.6 % (ref 0–4)
NUCLEATED RBC %: 0 %
NUCLEATED RBC %: 0 %
OVALOCYTES: ABNORMAL
PCT TRANSFERRIN: 16 % (ref 10–44)
PDW BLD-RTO: 14.4 % (ref 11.7–14.9)
PDW BLD-RTO: 14.6 % (ref 11.7–14.9)
PLATELET # BLD: 234 K/CU MM (ref 140–440)
PLATELET # BLD: 251 K/CU MM (ref 140–440)
PMV BLD AUTO: 10.1 FL (ref 7.5–11.1)
PMV BLD AUTO: 10.3 FL (ref 7.5–11.1)
POTASSIUM SERPL-SCNC: 4.4 MMOL/L (ref 3.5–5.1)
RBC # BLD: 3.18 M/CU MM (ref 4.2–5.4)
RBC # BLD: 3.19 M/CU MM (ref 4.2–5.4)
RETICULOCYTE COUNT PCT: 3.5 % (ref 0.2–2.2)
SEGMENTED NEUTROPHILS ABSOLUTE COUNT: 10.8 K/CU MM
SEGMENTED NEUTROPHILS ABSOLUTE COUNT: 9.9 K/CU MM
SEGMENTED NEUTROPHILS RELATIVE PERCENT: 67.9 % (ref 36–66)
SEGMENTED NEUTROPHILS RELATIVE PERCENT: 72.7 % (ref 36–66)
SODIUM BLD-SCNC: 131 MMOL/L (ref 135–145)
TOTAL IMMATURE NEUTOROPHIL: 0.13 K/CU MM
TOTAL IMMATURE NEUTOROPHIL: 0.13 K/CU MM
TOTAL IRON BINDING CAPACITY: 361 UG/DL (ref 250–450)
TOTAL NUCLEATED RBC: 0 K/CU MM
TOTAL NUCLEATED RBC: 0 K/CU MM
TROPONIN T: <0.01 NG/ML
UNSATURATED IRON BINDING CAPACITY: 302 UG/DL (ref 110–370)
VITAMIN B-12: 1821 PG/ML (ref 211–911)
WBC # BLD: 14.5 K/CU MM (ref 4–10.5)
WBC # BLD: 14.8 K/CU MM (ref 4–10.5)
WBC # BLD: ABNORMAL 10*3/UL

## 2021-04-17 PROCEDURE — 85025 COMPLETE CBC W/AUTO DIFF WBC: CPT

## 2021-04-17 PROCEDURE — 2580000003 HC RX 258: Performed by: INTERNAL MEDICINE

## 2021-04-17 PROCEDURE — 83540 ASSAY OF IRON: CPT

## 2021-04-17 PROCEDURE — 85014 HEMATOCRIT: CPT

## 2021-04-17 PROCEDURE — 84484 ASSAY OF TROPONIN QUANT: CPT

## 2021-04-17 PROCEDURE — 85018 HEMOGLOBIN: CPT

## 2021-04-17 PROCEDURE — 82607 VITAMIN B-12: CPT

## 2021-04-17 PROCEDURE — 82746 ASSAY OF FOLIC ACID SERUM: CPT

## 2021-04-17 PROCEDURE — 85027 COMPLETE CBC AUTOMATED: CPT

## 2021-04-17 PROCEDURE — 1200000000 HC SEMI PRIVATE

## 2021-04-17 PROCEDURE — 84165 PROTEIN E-PHORESIS SERUM: CPT

## 2021-04-17 PROCEDURE — 85007 BL SMEAR W/DIFF WBC COUNT: CPT

## 2021-04-17 PROCEDURE — 6370000000 HC RX 637 (ALT 250 FOR IP): Performed by: INTERNAL MEDICINE

## 2021-04-17 PROCEDURE — 82962 GLUCOSE BLOOD TEST: CPT

## 2021-04-17 PROCEDURE — 80048 BASIC METABOLIC PNL TOTAL CA: CPT

## 2021-04-17 PROCEDURE — 36415 COLL VENOUS BLD VENIPUNCTURE: CPT

## 2021-04-17 PROCEDURE — 83550 IRON BINDING TEST: CPT

## 2021-04-17 PROCEDURE — 99222 1ST HOSP IP/OBS MODERATE 55: CPT | Performed by: INTERNAL MEDICINE

## 2021-04-17 PROCEDURE — 6370000000 HC RX 637 (ALT 250 FOR IP): Performed by: SPECIALIST

## 2021-04-17 PROCEDURE — 85045 AUTOMATED RETICULOCYTE COUNT: CPT

## 2021-04-17 PROCEDURE — 82728 ASSAY OF FERRITIN: CPT

## 2021-04-17 PROCEDURE — 94761 N-INVAS EAR/PLS OXIMETRY MLT: CPT

## 2021-04-17 RX ADMIN — AMLODIPINE BESYLATE 10 MG: 10 TABLET ORAL at 10:06

## 2021-04-17 RX ADMIN — BUPROPION HYDROCHLORIDE 150 MG: 150 TABLET, EXTENDED RELEASE ORAL at 21:29

## 2021-04-17 RX ADMIN — METHOCARBAMOL 750 MG: 500 TABLET ORAL at 21:01

## 2021-04-17 RX ADMIN — SODIUM CHLORIDE, PRESERVATIVE FREE 10 ML: 5 INJECTION INTRAVENOUS at 10:10

## 2021-04-17 RX ADMIN — VENLAFAXINE HYDROCHLORIDE 150 MG: 150 CAPSULE, EXTENDED RELEASE ORAL at 10:06

## 2021-04-17 RX ADMIN — METHOCARBAMOL 750 MG: 500 TABLET ORAL at 10:06

## 2021-04-17 RX ADMIN — OXYCODONE HYDROCHLORIDE 5 MG: 5 TABLET ORAL at 03:00

## 2021-04-17 RX ADMIN — Medication 1 TABLET: at 10:05

## 2021-04-17 RX ADMIN — CARVEDILOL 25 MG: 25 TABLET, FILM COATED ORAL at 10:06

## 2021-04-17 RX ADMIN — POLYETHYLENE GLYCOL 3350, SODIUM SULFATE ANHYDROUS, SODIUM BICARBONATE, SODIUM CHLORIDE, POTASSIUM CHLORIDE 4000 ML: 236; 22.74; 6.74; 5.86; 2.97 POWDER, FOR SOLUTION ORAL at 17:50

## 2021-04-17 RX ADMIN — OXYCODONE HYDROCHLORIDE 5 MG: 5 TABLET ORAL at 20:59

## 2021-04-17 RX ADMIN — VALSARTAN 160 MG: 160 TABLET, FILM COATED ORAL at 10:06

## 2021-04-17 RX ADMIN — BUPROPION HYDROCHLORIDE 150 MG: 150 TABLET, EXTENDED RELEASE ORAL at 10:06

## 2021-04-17 RX ADMIN — OXYCODONE HYDROCHLORIDE 5 MG: 5 TABLET ORAL at 12:51

## 2021-04-17 RX ADMIN — ATORVASTATIN CALCIUM 40 MG: 40 TABLET, FILM COATED ORAL at 10:05

## 2021-04-17 RX ADMIN — METHOCARBAMOL 750 MG: 500 TABLET ORAL at 16:01

## 2021-04-17 RX ADMIN — PANTOPRAZOLE SODIUM 40 MG: 40 TABLET, DELAYED RELEASE ORAL at 05:40

## 2021-04-17 RX ADMIN — HYDROCHLOROTHIAZIDE 25 MG: 25 TABLET ORAL at 10:05

## 2021-04-17 RX ADMIN — ALPRAZOLAM 1 MG: 0.5 TABLET ORAL at 01:54

## 2021-04-17 RX ADMIN — SODIUM CHLORIDE: 9 INJECTION, SOLUTION INTRAVENOUS at 13:28

## 2021-04-17 RX ADMIN — FAMOTIDINE 10 MG: 20 TABLET ORAL at 10:05

## 2021-04-17 RX ADMIN — INSULIN LISPRO 2 UNITS: 100 INJECTION, SOLUTION INTRAVENOUS; SUBCUTANEOUS at 17:55

## 2021-04-17 RX ADMIN — FENOFIBRATE 160 MG: 160 TABLET ORAL at 21:29

## 2021-04-17 RX ADMIN — OXYCODONE HYDROCHLORIDE 5 MG: 5 TABLET ORAL at 07:26

## 2021-04-17 RX ADMIN — SODIUM CHLORIDE, PRESERVATIVE FREE 10 ML: 5 INJECTION INTRAVENOUS at 21:01

## 2021-04-17 ASSESSMENT — PAIN DESCRIPTION - PAIN TYPE
TYPE: CHRONIC PAIN
TYPE: CHRONIC PAIN

## 2021-04-17 ASSESSMENT — PAIN DESCRIPTION - PROGRESSION
CLINICAL_PROGRESSION: NOT CHANGED

## 2021-04-17 ASSESSMENT — PAIN - FUNCTIONAL ASSESSMENT
PAIN_FUNCTIONAL_ASSESSMENT: ACTIVITIES ARE NOT PREVENTED
PAIN_FUNCTIONAL_ASSESSMENT: ACTIVITIES ARE NOT PREVENTED

## 2021-04-17 ASSESSMENT — PAIN SCALES - GENERAL
PAINLEVEL_OUTOF10: 7
PAINLEVEL_OUTOF10: 8

## 2021-04-17 ASSESSMENT — PAIN DESCRIPTION - FREQUENCY
FREQUENCY: CONTINUOUS
FREQUENCY: CONTINUOUS

## 2021-04-17 ASSESSMENT — PAIN DESCRIPTION - DESCRIPTORS: DESCRIPTORS: SHARP

## 2021-04-17 NOTE — CONSULTS
ONCOLOGY HEMATOLOGY CARE ([facility])  CONSULTATION REPORT    2021  12:21 PM    Patient:    Nacho Kuhn  : 1940   79 y.o. MRN: [id number]  Admitted: 2021 11:56 AM ATT: Steve Vazquez MD   1103/1103-A  AdmitDx: GI bleed [K92.2]  Neuroendocrine cancer ([address] [C7A.8]  Lower abdominal pain [R10.30]  Intermittent confusion [R41.0]  Gastrointestinal hemorrhage, unspecified gastrointestinal hemorrhage type [K92.2]  PCP: Yvette Stuart MD    Reason for Consult: Neuroendocrine tumor    Requesting Physician:  Steve Vazquez MD      History Obtained From:  Patient and review of all records      CHIEF COMPLAINT    Chief Complaint   Patient presents with    Abdominal Pain    Rectal Bleeding    Altered Mental Status     per daughter pt has been confused over the last two months and progressively getting worse       HISTORY OF PRESENT ILLNESS   Nacho Kuhn is a 79 y.o. female reported that she has been having epigastric pain for the past several months. Abdominal distension and has not been able to burp or pass gas but has been having regular bowel movements. Nausea and emesis. Poor appetite and weight loss of about 10-12 lbs in the past few months. Fatigue and has been sleeping >12 hrs per day. Reported BRBPR  And also hematuria x 1.     21: wbc 13, hb 8.8, hct 9, platelets 396, mcv 87  Na 131, cr 1.2, lfts wnl    CT head no acute abn  Ct abdomen and pelvis:  1. Scattered hyperdense material is present within the gastrointestinal   tract, specifically in the proximal colon as well as within the body of the   stomach along the posterior wall.  Their appearance is nonspecific and could   represent ingested material.  Small foci of bleeding are less likely, but if   there is continued clinical concern for acute GI bleeding, further evaluation   with a GI bleed protocol CT angiogram or nuclear medicine tagged red blood   cell scan could be considered.    2. Possible wall thickening versus hyperdense wall-adherent material in the   proximal colon, adjacent to the anastomosis site. Consider correlation with   colonoscopy. 3. Patent visceral arteries with significant diffuse atherosclerosis. 4. Moderate compression deformity at L1, which is subacute in appearance but   new from 03/23/2021. Arneta [place] is an associated retropulsed fragment which   measures 6 mm. 5. Stable size of a peripherally calcified mass posterior to the pancreatic   head, measuring up to 4.0 cm.   6. 12 x 8 mm nodule at the right lung base is unchanged since at least   03/28/2019. 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 Receiving octreotide and plan was for ? ? ?PRRT at [address]    Past Medical History:   Diagnosis Date    Acute anterior wall MI [facility]) 2007    CAD (coronary artery disease)     Dr. Isiah Cantu [facility])     melanoma on back    Cancer [facility]) 2019    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  Diabetes mellitus ([address]     PCP    H/O cardiovascular stress test 5/02    EF 74 %,normal perfusion    H/O echocardiogram 11/02    EF 60%, mild to mod MR,    Hyperlipidemia     Hypertension     PCP    Protein-calorie malnutrition, moderate ([address] 8/12/2015       SURGICAL HISTORY    Past Surgical History:   Procedure Laterality Date    ABDOMEN SURGERY      CORONARY ARTERY BYPASS GRAFT  5/07    CABG X 3, L mammary artery to the LAD, saphenous vein graft to RCA.  a saphenous vein graft to Cx marginal artery    DIAGNOSTIC CARDIAC CATH LAB PROCEDURE  3/30/07    PTCA to LAD    FRACTURE SURGERY Left 2001    patella    FRACTURE SURGERY Right 2017    wrist    HYSTERECTOMY  1996    OTHER SURGICAL HISTORY  08 11 2015    exp lap, right colon resection    SKIN BIOPSY      WRIST FRACTURE SURGERY Left 2/17/2021    LEFT WRIST OPEN REDUCTION INTERNAL FIXATION performed by Adrienne Collado MD [facility]       Current Medications:    Medications    Scheduled Medications:    polyethylene glycol  4,000 mL Oral Once    amLODIPine  10 mg Oral Daily    atorvastatin  40 mg Oral Daily    buPROPion  150 mg Oral BID    carvedilol  25 mg Oral BID    [START ON 4/18/2021] cyanocobalamin  1,000 mcg Intramuscular Q7 Days    folic acid-pyridoxine-cyancobalamin  1 tablet Oral Daily    methocarbamol  750 mg Oral 4x Daily    therapeutic multivitamin-minerals  1 tablet Oral Daily    pantoprazole  40 mg Oral QAM AC    venlafaxine  150 mg Oral Daily    sodium chloride flush  5-40 mL Intravenous 2 times per day    insulin lispro  0-6 Units Subcutaneous TID WC    insulin lispro  0-3 Units Subcutaneous Nightly    valsartan  160 mg Oral Daily    And    hydroCHLOROthiazide  25 mg Oral Daily    famotidine  10 mg Oral Daily    fenofibrate  160 mg Oral Daily     PRN Medications: sodium chloride flush, ALPRAZolam, oxyCODONE, glucose, dextrose, glucagon (rDNA), dextrose, sodium chloride flush, sodium chloride, promethazine **OR** ondansetron, polyethylene glycol, acetaminophen **OR** acetaminophen    Allergies:  Tramadol and Vicodin [hydrocodone-acetaminophen]    FAMILY HISTORY    Family History   Problem Relation Age of Onset    Heart Disease Mother     Heart Attack Mother     Cancer Father     Heart Disease Brother        SOCIAL HISTORY    Social History     Socioeconomic History    Marital status:      Spouse name: None    Number of children: None    Years of education: None    Highest education level: None   Occupational History    None   Social Needs    Financial resource strain: None    Food insecurity     Worry: None     Inability: None    Transportation needs     Medical: None     Non-medical: None   Tobacco Use    Smoking status: Former Smoker     Packs/day: 1.00     Years: 30.00 Pack years: 30.00     Start date: 66     Quit date:      Years since quittin.3    Smokeless tobacco: Never Used   Substance and Sexual Activity    Alcohol use: No    Drug use: No    Sexual activity: None   Lifestyle    Physical activity     Days per week: None     Minutes per session: None    Stress: None   Relationships    Social connections     Talks on phone: None     Gets together: None     Attends Denominational service: None     Active member of club or organization: None     Attends meetings of clubs or organizations: None     Relationship status: None    Intimate partner violence     Fear of current or ex partner: None     Emotionally abused: None     Physically abused: None     Forced sexual activity: None   Other Topics Concern    None   Social History Narrative    None       REVIEW OF SYSTEMS    Constitutional:  Denies fever, chills  HEENT:  Denies swelling of neck glands  Respiratory:  Denies cough, shortness of breath or hemoptysis  Cardiovascular:  Denies chest pain, palpitations or swelling   GI:  Deniesconstipation or diarrhea   Musculoskeletal:  Denies back pain   Skin:  Denies rash   Neurologic:  Denies headache, focal weakness or sensory changes   PSYCHIATRIC:  Neg depression, personality changes, anxiety. ENDOCRINE:  Neg polydipsia, polyuria, abnormal weight changes, heat /cold intolerance.   ALL/IMM:  Neg reactions to drugs other than listed  All systems negative except  for symptoms of HPI and as marked as above    PHYSICAL EXAM      Vitals: BP (!) 152/67   Pulse 57   Temp 98 °F (36.7 °C) (Oral)   Resp 16   Ht 5' 2\" (1.575 m)   Wt 119 lb (54 kg)   SpO2 96%   BMI 21.77 kg/m²     CONSTITUTIONAL: awake, alert, tired appearing  EYES: palor  ENT:ATNC  NECK: No JVD  HEMATOLOGIC/LYMPHATIC: no cervical, supraclavicular or axillary lymphadenopathy   LUNGS: CTAB  CARDIOVASCULAR: s1s2 SM +  ABDOMEN: soft mild distended ttp in the epigastric area, bs pos  NEUROLOGIC:GI  SKIN: few echymosis  EXTREMITIES: no LE edema bilaterally      LABORATORY RESULTS  CBC:   Recent Labs     04/16/21  1309 04/16/21  2230 04/17/21  0442 04/17/21  0822   WBC 13.5*  --  14.5*  --    HGB 8.8* 8.7* 8.3* 7.9*     --  234  --      BMP:    Recent Labs     04/16/21  1309 04/17/21  0822   * 131*   K 4.2 4.4    99   CO2 21 24   BUN 37* 25*   CREATININE 1.3* 1.2*   GLUCOSE 206* 111*     Hepatic:   Recent Labs     04/16/21  1309   AST 11*   ALT 7*   BILITOT 0.2   ALKPHOS 80     INR:   Recent Labs     04/16/21  1409   INR 1.19       RADIOLOGY REPORTS  reviewed    IMPRESSION & RECOMMENDATIONS:  Anemia/abdominal pain: Noted plans for EGD and colonoscopy. Ferritin and w/u in progress. Transfusion and parenteral support as needed. Adequate analgesic and bowel regimen. Also recommend adequate antiemetic regimen. Met carcinoid: is on octreotide. Followed by Dr Tamela Yang hydration    ?h/o ITP:platelet counts stable and normal.    Continue other medical care    ECOG Performance Status (ECOG Scale 0-4): This plan was discussed with the patient and her daughter and they verbalized  understanding. We will continue to follow the patient. Thank you for allowing us to participate in the care of this patient.      Po Zaidi MD, 4/17/2021, 12:21 PM

## 2021-04-17 NOTE — CONSULTS
Significant for history of neuroendocrine tumor  of the small intestine diagnosed in 1998, status post surgery by Dr. Bowen Balbuena and also has carcinoid syndrome with subsequent  development of metastatic disease, and also history of malignant  melanoma, hypertension, diabetes mellitus, coronary artery disease,  hyperlipidemia, protein-calorie malnutrition and immune  thrombocytopenia, history of depression and osteoporosis. PAST SURGICAL HISTORY:  The patient had a small intestine resection in  1998 and also had right hemicolectomy done by Dr. Bowen Balbuena. Also  had right hemicolectomy done by Dr. Bowen Balbuena on 08/11/2015. The  patient also has had CABG done, hysterectomy, surgery for wrist  fracture, cholecystectomy, appendectomy and hysterectomy and malignant  melanoma removed on 10/10/2011. FAMILY HISTORY:  The patient's brother was diagnosed with carcinoma of  unknown primary. SOCIOECONOMIC HISTORY:  No history of EtOH abuse. CURRENT MEDICATIONS:  Please refer to the chart. ALLERGIES:  The patient is allergic to TRAMADOL and VICODIN. REVIEW OF SYSTEMS:  CENTRAL NERVOUS SYSTEM:  The patient denies headache or focal  sensorimotor symptoms. CARDIOVASCULAR SYSTEM:  No history of chest pain, shortness of breath,  or leg swelling. GENITOURINARY SYSTEM:  No history of dysuria, pyuria, or hematuria. MUSCULOSKELETAL SYSTEM:  The patient complains of generalized weakness. RESPIRATORY SYSTEM:  No history of cough, hemoptysis, fever, or chills. PHYSICAL EXAMINATION:  GENERAL:  Shows a 28-year-old white female, thin build and poor  nutritional status, who is lying flat in bed, no acute distress. She is  awake, alert, and oriented, and pleasant to talk. VITAL SIGNS:  Stable. HEENT:  Shows skull to be atraumatic. NECK:  Supple. CHEST:  Clear. HEART:  S1 and S2 are normal.  ABDOMEN:  Soft and nondistended with mild tenderness in the lower  abdomen. No guarding or rigidity.   Liver and spleen are not palpable. Bowel sounds are present. RECTAL:  Deferred. CNS:  Exam shows the patient to be awake, alert, and oriented. There  are no focal sensorimotor signs. MUSCULOSKELETAL:  Exam shows wasting of the muscles of the extremities  and degenerative joint disease changes. LABORATORY DATA:  As above mentioned. IMPRESSION:  A 80-year-old white female of thin build and poor  nutritional status with multiple comorbidities, including metastatic  neuroendocrine tumor, presents with lower abdominal pain and rectal  bleeding with abnormal imaging, rule out GI bleeding source, upper  versus lower gastrointestinal tract. RECOMMENDATIONS:  1. Agree with present management. 2.  IV fluids. 3.  We will continue the patient on Protonix. 4.  Monitor the patient's serial H and H and transfuse on a p.r.n. basis  to keep hemoglobin above 7 gm percent. 5.  We will proceed with EGD and colonoscopy in a.m., and if negative,  the patient will need a small bowel evaluation as well. 6.  The case and plan has been discussed in detail with the patient.         Linda Arana MD    D: 04/17/2021 9:18:15       T: 04/17/2021 9:29:14     [email or web address]  Job#: [id number]     Doc#: [id number]    CC:

## 2021-04-17 NOTE — PROGRESS NOTES
Two person skin check performed with ALVAREZ Whitney. Scattered bruising noted. No major skin issues noted.     Electronically signed by Marissa Bellamy RN on 4/17/21 at 12:30 AM EDT

## 2021-04-17 NOTE — CONSULTS
CARDIOLOGY CONSULT NOTE       Reason for consultation:     Referring physician:  Judit Rowley MD     Primary care physician: Federico Perez MD    Thanks for the consult. History of present illness:Terrie is a 79 y. o.year old who  presents with had concerns including Abdominal Pain, Rectal Bleeding, and Altered Mental Status (per daughter pt has been confused over the last two months and progressively getting worse). Chief Complaint   Patient presents with    Abdominal Pain    Rectal Bleeding    Altered Mental Status     per daughter pt has been confused over the last two months and progressively getting worse   Patient with history of neuroendocrine tumor of the intestine with metastasis, history of GI bleed, multiple bowel surgeries, CAD in the past, high cholesterol, carotid stenosis, diabetes, hypertension, protein malnutrition. Patient presented with  black stool and lower abdominal pain for about a week GI seen patient and plan on EGD and colonoscopy tomorrow . cardiology consulted with regard to holding aspirin given history of CAD patient denies any chest pain no shortness of breath no palpitation currently she is in sinus rhythm    Past medical history:    has a past medical history of Acute anterior wall MI ([address] CAD (coronary artery disease), Cancer ([address] Cancer ([address] Carotid artery stenosis, Diabetes mellitus ([address] H/O cardiovascular stress test, H/O echocardiogram, Hyperlipidemia, Hypertension, and Protein-calorie malnutrition, moderate ([address] Past surgical history:   has a past surgical history that includes Coronary artery bypass graft (5/07); Diagnostic Cardiac Cath Lab Procedure (3/30/07); Abdomen surgery; other surgical history (08 11 2015); skin biopsy; fracture surgery (Left, 2001); fracture surgery (Right, 2017); Hysterectomy (1996); and Wrist fracture surgery (Left, 2/17/2021). Social History:   reports that she quit smoking about 32 years ago.  She started smoking about 63 years dextrose 5 % solution  100 mL/hr Intravenous PRN Austyn Gifford MD        sodium chloride flush 0.9 % injection 5-40 mL  5-40 mL Intravenous 2 times per day Austyn Gifford MD   10 mL at 04/17/21 1010    sodium chloride flush 0.9 % injection 5-40 mL  5-40 mL Intravenous PRN Austyn Gifford MD        0.9 % sodium chloride infusion  25 mL Intravenous PRN Austyn Gifford MD        promethazine (PHENERGAN) tablet 12.5 mg  12.5 mg Oral Q6H PRN Austyn Gifford MD        Or    ondansetron [facility]) injection 4 mg  4 mg Intravenous Q6H PRN Austyn Gifford MD        polyethylene glycol [facility]) packet 17 g  17 g Oral Daily PRN Austyn Gifford MD        acetaminophen (TYLENOL) tablet 650 mg  650 mg Oral Q6H PRN Austyn Gifford MD        Or    acetaminophen (TYLENOL) suppository 650 mg  650 mg Rectal Q6H PRN Austyn Gifford MD        insulin lispro (HUMALOG) injection vial 0-6 Units  0-6 Units Subcutaneous TID WC Austyn Gifford MD        insulin lispro (HUMALOG) injection vial 0-3 Units  0-3 Units Subcutaneous Nightly Austyn Gifford MD        0.9 % sodium chloride infusion   Intravenous Continuous Austyn Gifford MD 75 mL/hr at 04/16/21 2012 New Bag at 04/16/21 2012    valsartan (DIOVAN) tablet 160 mg  160 mg Oral Daily Austyn Gifford MD   160 mg at 04/17/21 1006    And    hydroCHLOROthiazide (HYDRODIURIL) tablet 25 mg  25 mg Oral Daily Austyn Gifford MD   25 mg at 04/17/21 1005    famotidine (PEPCID) tablet 10 mg  10 mg Oral Daily Austyn Gifford MD   10 mg at 04/17/21 1005    fenofibrate (TRIGLIDE) tablet 160 mg  160 mg Oral Daily Austyn Gifford MD         Review of Systems:   · Constitutional: No Fever or Weight Loss   · Eyes: No Decreased Vision  · ENT: No Headaches, Hearing Loss or Vertigo  · Cardiovascular: No chest pain, dyspnea on exertion, palpitations or loss of consciousness  · Respiratory: No cough or wheezing    · Gastrointestinal: No abdominal pain, appetite loss, blood in stools, constipation, diarrhea or heartburn  · Genitourinary: No dysuria, trouble F41.9    Gastroenteritis K52.9    Stroke-like symptoms R29.90    Peripheral polyneuropathy G62.9    Ataxia R27.0    Urinary tract infection without hematuria N39.0    Carcinoid syndrome (HCC) E34.0    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    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    Closed fracture of left distal radius S52.502A    GI bleed K92.2    Stage 3 chronic kidney disease N18.30     [unfilled]  Problem List Items Addressed This Visit     None      Visit Diagnoses     Gastrointestinal hemorrhage, unspecified gastrointestinal hemorrhage type    -  Primary    Neuroendocrine cancer (HCC)        Relevant Medications    fentaNYL (SUBLIMAZE) injection 50 mcg (Completed)    ALPRAZolam (XANAX) tablet 1 mg    oxyCODONE (ROXICODONE) immediate release tablet 5 mg    promethazine (PHENERGAN) tablet 12.5 mg    acetaminophen (TYLENOL) tablet 650 mg    acetaminophen (TYLENOL) suppository 650 mg    Intermittent confusion        Lower abdominal pain          1 GI bleed GI evaluation noted and plan for EGD and colonoscopy tomorrow  Okay to hold aspirin  2 CAD patient denies any chest pain continue medical treatment  Except hold aspirin because of GI bleed  3 history of neuroendocrine tumor of the small intestine with metastasis  4 hypertension  Resume home medication make adjustment as needed  5 high cholesterol  6 diabetes management outcomes group  7    Recommendations:   Okay to hold aspirin       Shahid Slater MD, 4/17/2021 1:06 PM

## 2021-04-17 NOTE — PROGRESS NOTES
Lily Woodard MD, [address]                Internal Medicine Hospitalist             Daily Progress  Note   Subjective:     Chief Complaint   Patient presents with    Abdominal Pain    Rectal Bleeding    Altered Mental Status     per daughter pt has been confused over the last two months and progressively getting worse     Ms. Diane Complains of some abdominal pains and back pains that is 5/10. No more bleeding noted. Objective:    BP (!) 152/67   Pulse 57   Temp 98 °F (36.7 °C) (Oral)   Resp 16   Ht 5' 2\" (1.575 m)   Wt 119 lb (54 kg)   SpO2 96%   BMI 21.77 kg/m²      Intake/Output Summary (Last 24 hours) at 4/17/2021 1534  Last data filed at 4/17/2021 1010  Gross per 24 hour   Intake 961.25 ml   Output --   Net 961.25 ml      Physical Exam:  Heart:  Regular rate and rhythm, normal S1 and S2 in all 4 auscultatory areas. No rubs  Murmurs or gallops heard. Lungs: Mostly clear to auscultation, decreased breath sounds at bases. No wheezes appreciated no crackles heard. Abdomen: Soft, non distended. Bowel sounds not appreciated. No obvious liver or spleen enlargement. Non tender, no rebound noted. Extremities: Non tender, no swelling noted, strength 5/5 both legs. CNS: Grossly intact.     Labs:  CBC with Differential:    Lab Results   Component Value Date    WBC 14.5 04/17/2021    RBC 3.19 04/17/2021    HGB 7.9 04/17/2021    HCT 27.1 04/17/2021     04/17/2021    MCV 87.1 04/17/2021    MCH 26.0 04/17/2021    MCHC 29.9 04/17/2021    RDW 14.4 04/17/2021    SEGSPCT 67.9 04/17/2021    BANDSPCT 6 06/07/2016    LYMPHOPCT 19.1 04/17/2021    MONOPCT 9.3 04/17/2021    EOSPCT 0.0 01/14/2011    BASOPCT 0.6 04/17/2021    MONOSABS 1.4 04/17/2021    LYMPHSABS 2.8 04/17/2021    EOSABS 0.3 04/17/2021    BASOSABS 0.1 04/17/2021    DIFFTYPE AUTOMATED DIFFERENTIAL 04/17/2021     CMP:    Lab Results   Component Value Date     04/17/2021    K 4.4 04/17/2021    CL 99 04/17/2021    CO2 24 04/17/2021    BUN 25

## 2021-04-17 NOTE — CONSULTS
Nephrology Service Consultation      [address], [address] [address]  Phone: [phone]  Office Hours: 8:30AM - 4:30PM  Monday - Friday            Patient:  Clifford Rm  MRN: [id number]  Consulting physician:  Carmencita Loredo MD  Reason for Consult: elevated creat    PCP: Cody Rizo MD    HISTORY OF PRESENT ILLNESS:   The patient is a 79 y.o. female with neuroendocrine tumor, dm2, htn, ckd3 presented with back pain, she reports having had a fall. She reports having seen some blood in her stools and possibly in her urine too. This started about 2 wks ago. She also had some encephalopathy  Renal consult for cr 1.4 and iv contrast use  This morning, she is on room air, on IVF and does not seem confused    REVIEW OF SYSTEMS:  14 point ROS is Negative. See positive ROS per HPI    Past Medical History:        Diagnosis Date    Acute anterior wall MI ([address] 2007    CAD (coronary artery disease)     Dr. Opal Stroud [facility])     melanoma on back    Cancer 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  Diabetes mellitus ([address]     PCP    H/O cardiovascular stress test 5/02    EF 74 %,normal perfusion    H/O echocardiogram 11/02    EF 60%, mild to mod MR,    Hyperlipidemia     Hypertension     PCP    Protein-calorie malnutrition, moderate ([address] 8/12/2015       Past Surgical History:        Procedure Laterality Date    ABDOMEN SURGERY      CORONARY ARTERY BYPASS GRAFT  5/07    CABG X 3, L mammary artery to the LAD, saphenous vein graft to RCA.  a saphenous vein graft to Cx marginal artery    DIAGNOSTIC CARDIAC CATH LAB PROCEDURE  3/30/07    PTCA to LAD    FRACTURE SURGERY Left 2001    patella    FRACTURE SURGERY Right 2017    wrist    HYSTERECTOMY  1996    OTHER SURGICAL HISTORY  08 11 2015    exp lap, right colon resection    SKIN BIOPSY      WRIST FRACTURE SURGERY Left 2/17/2021    LEFT WRIST OPEN REDUCTION INTERNAL FIXATION performed by Bárbara Pavon MD at [facility] OR       Medications:   Prior to Admission medications    Medication Sig Start Date End Date Taking? Authorizing Provider   amLODIPine (NORVASC) 10 MG tablet TAKE 1 TABLET BY MOUTH EVERY DAY 3/5/21   Historical Provider, MD   carvedilol (COREG CR) 40 MG CP24 extended release capsule TAKE 1 CAPSULE BY MOUTH EVERY DAY 3/6/21   Historical Provider, MD   FREESTYLE LITE strip TEST BLOOD GLUCOSE 3 TIMES DAILY (BEFORE MEALS). 3/26/21   Historical Provider, MD   methocarbamol (ROBAXIN) 750 MG tablet Take 750 mg by mouth 4 times daily as needed 4/1/21   Historical Provider, MD   oxyCODONE (ROXICODONE) 5 MG immediate release tablet Take 5 mg by mouth every 4 hours as needed.  4/13/21 4/20/21  Historical Provider, MD   predniSONE (DELTASONE) 10 MG tablet 4 tabs daily x 2, 3 tabs daily x 2, 2 tabs daily x 2, 1 tab daily x 4 then stop, take with food 4/8/21   Historical Provider, MD   promethazine (PHENERGAN) 25 MG tablet Take 25 mg by mouth every 6 hours as needed 3/16/21   Historical Provider, MD   buPROPion [facility]) 150 MG extended release tablet Take 1 tablet by mouth 2 times daily 1/6/21   MD JOSE Pathak SOLOSTAR 100 UNIT/ML injection pen  10/22/20   Historical Provider, MD   INVOKANA 300 MG TABS tablet  8/7/20   Historical Provider, MD   venlafaxine (EFFEXOR XR) 150 MG extended release capsule Take 1 capsule by mouth daily  Patient taking differently: Take 225 mg by mouth daily  10/18/19   Daryl Messina MD   glipiZIDE (GLUCOTROL XL) 5 MG extended release tablet Take 2 tablets by mouth daily  Patient taking differently: Take 5 mg by mouth daily  10/17/19   Daryl Messina MD   metFORMIN (GLUCOPHAGE) 500 MG tablet Take 1 tablet by mouth 2 times daily (with meals) 10/17/19   Daryl Messina MD   atorvastatin (LIPITOR) 40 MG tablet Take 1.5 tablets by mouth daily  Patient taking differently: Take 40 mg by mouth daily  10/17/19   Fernanda Olivier MD   folic acid-pyridoxine-cyancobalamin (FOLTX) 1.13-25-2 MG TABS Take 1 tablet by mouth daily 10/18/19   Fernanda Olivier MD   valsartan-hydroCHLOROthiazide (DIOVAN-HCT) 320-25 MG per tablet Take 1 tablet by mouth daily  7/27/19   Historical Provider, MD   carvedilol (COREG) 25 MG tablet Take 1 tablet by mouth 2 times daily  Patient taking differently: Take 40 mg by mouth daily  3/30/19   Carol Naik MD   pantoprazole (PROTONIX) 40 MG tablet Take 1 tablet by mouth every morning (before breakfast) 3/30/19   Carol Naik MD   potassium chloride (MICRO-K) 10 MEQ CR capsule Take 10 mEq by mouth 2 times daily    Historical Provider, MD   Multiple Vitamins-Minerals (THERAPEUTIC MULTIVITAMIN-MINERALS) tablet Take 1 tablet by mouth daily    Historical Provider, MD   vitamin E 1000 UNITS capsule Take 400 Units by mouth daily    Historical Provider, MD   ALPRAZolam Mayank Sheffield) 1 MG tablet Take 1 mg by mouth 2 times daily as needed for Sleep. Historical Provider, MD   cyanocobalamin 1000 MCG/ML injection Inject 1,000 mcg into the muscle every 7 days. Historical Provider, MD   fenofibrate (TRICOR) 145 MG tablet Take 145 mg by mouth daily. Historical Provider, MD        Allergies:  Tramadol and Vicodin [hydrocodone-acetaminophen]    Social History:   TOBACCO:   reports that she quit smoking about 32 years ago. She started smoking about 62 years ago. She has a 30.00 pack-year smoking history. She has never used smokeless tobacco.  ETOH:   reports no history of alcohol use.   OCCUPATION:      Family History:       Problem Relation Age of Onset    Heart Disease Mother     Heart Attack Mother     Cancer Father     Heart Disease Brother            Physical Exam:    Vitals: BP (!) 152/67   Pulse 57   Temp 98 °F (36.7 °C) (Oral)   Resp 16   Ht 5' 2\" (1.575 m)   Wt 119 lb (54 kg)   SpO2 96%   BMI 21.77 kg/m²   General appearance: in no acute distress, appears stated age  Skin: Skin color, texture, turgor normal. No rashes or lesions  HEENT: normocephalic, atraumatic  Neck: supple, trachea midline  Lungs: clear to auscultation bilaterally, breathing comfortably  Heart[de-identified] regular rate and rhythm, S1, S2 normal,  Abdomen: soft, non-tender; bowel sounds normal; no masses,   Extremities: extremities normal, atraumatic, no cyanosis or edema  Neurologic: Mental status: alert, oriented, interactive, following commands  Psychiatric: mood and affect appropriate    CBC:   Recent Labs     04/16/21  1309 04/16/21  2230 04/17/21  0442   WBC 13.5*  --  14.5*   HGB 8.8* 8.7* 8.3*     --  234     BMP:    Recent Labs     04/16/21  1309   *   K 4.2      CO2 21   BUN 37*   CREATININE 1.3*   GLUCOSE 206*     Hepatic:   Recent Labs     04/16/21  1309   AST 11*   ALT 7*   BILITOT 0.2   ALKPHOS 80         Assessment and Recommendations     Patient Active Problem List    Diagnosis Date Noted    GI bleed 04/16/2021    Closed fracture of left distal radius 02/11/2021    Carcinoid syndrome ([address] 04/21/2020    Immune thrombocytopenic purpura ([address] 04/21/2020    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   -Hematuria?  -Anemia  -Neuroendocrine tumor      Plan:  Continue IVF  Continue the antihypertensive meds  UA shows no signs of hematuria  Cr at baseline  Please avoid further nephrotoxins  BMP tomorrow      Electronically signed by Omar Christiansen DO on 4/17/2021 at 8:13 AM    [facility]  MD Jazzmine Cuellar DO [address],  [address]  [place], [place] [address]  PHONE: [phone]  FAX: [phone]

## 2021-04-17 NOTE — ANESTHESIA PRE PROCEDURE
Department of Anesthesiology  Preprocedure Note       Name:  Iam Weber   Age:  79 y.o.  :  1940                                          MRN:  [id number]         Date:  2021      Surgeon: Rachel Bernstein):  Oz Bergeron MD    Procedure: Procedure(s):  EGD DIAGNOSTIC ONLY  COLONOSCOPY DIAGNOSTIC    Medications prior to admission:   Prior to Admission medications    Medication Sig Start Date End Date Taking? Authorizing Provider   amLODIPine (NORVASC) 10 MG tablet TAKE 1 TABLET BY MOUTH EVERY DAY 3/5/21   Historical Provider, MD   carvedilol (COREG CR) 40 MG CP24 extended release capsule TAKE 1 CAPSULE BY MOUTH EVERY DAY 3/6/21   Historical Provider, MD   FREESTYLE LITE strip TEST BLOOD GLUCOSE 3 TIMES DAILY (BEFORE MEALS). 3/26/21   Historical Provider, MD   methocarbamol (ROBAXIN) 750 MG tablet Take 750 mg by mouth 4 times daily as needed 21   Historical Provider, MD   oxyCODONE (ROXICODONE) 5 MG immediate release tablet Take 5 mg by mouth every 4 hours as needed.  21  Historical Provider, MD   predniSONE (DELTASONE) 10 MG tablet 4 tabs daily x 2, 3 tabs daily x 2, 2 tabs daily x 2, 1 tab daily x 4 then stop, take with food 21   Historical Provider, MD   promethazine (PHENERGAN) 25 MG tablet Take 25 mg by mouth every 6 hours as needed 3/16/21   Historical Provider, MD   buPROPion [facility]) 150 MG extended release tablet Take 1 tablet by mouth 2 times daily 21   Quinton Bowman MD   LANTUS SOLOSTAR 100 UNIT/ML injection pen  10/22/20   Historical Provider, MD   INVOKANA 300 MG TABS tablet  20   Historical Provider, MD   venlafaxine (EFFEXOR XR) 150 MG extended release capsule Take 1 capsule by mouth daily  Patient taking differently: Take 225 mg by mouth daily  10/18/19   Yogi Mata MD   glipiZIDE (GLUCOTROL XL) 5 MG extended release tablet Take 2 tablets by mouth daily  Patient taking differently: Take 5 mg by mouth daily  10/17/19   Yogi Mata MD   metFORMIN (GLUCOPHAGE) 500 MG tablet Take 1 tablet by mouth 2 times daily (with meals) 10/17/19   Zulema Panchal MD   atorvastatin (LIPITOR) 40 MG tablet Take 1.5 tablets by mouth daily  Patient taking differently: Take 40 mg by mouth daily  10/17/19   Zulema Panchal MD   folic acid-pyridoxine-cyancobalamin (FOLTX) 1.13-25-2 MG TABS Take 1 tablet by mouth daily 10/18/19   Zulema Panchal MD   valsartan-hydroCHLOROthiazide (DIOVAN-HCT) 320-25 MG per tablet Take 1 tablet by mouth daily  7/27/19   Historical Provider, MD   carvedilol (COREG) 25 MG tablet Take 1 tablet by mouth 2 times daily  Patient taking differently: Take 40 mg by mouth daily  3/30/19   Eliazar Cruz MD   pantoprazole (PROTONIX) 40 MG tablet Take 1 tablet by mouth every morning (before breakfast) 3/30/19   Eliazar Cruz MD   potassium chloride (MICRO-K) 10 MEQ CR capsule Take 10 mEq by mouth 2 times daily    Historical Provider, MD   Multiple Vitamins-Minerals (THERAPEUTIC MULTIVITAMIN-MINERALS) tablet Take 1 tablet by mouth daily    Historical Provider, MD   vitamin E 1000 UNITS capsule Take 400 Units by mouth daily    Historical Provider, MD   ALPRAZolam Georganna Glory) 1 MG tablet Take 1 mg by mouth 2 times daily as needed for Sleep. Historical Provider, MD   cyanocobalamin 1000 MCG/ML injection Inject 1,000 mcg into the muscle every 7 days. Historical Provider, MD   fenofibrate (TRICOR) 145 MG tablet Take 145 mg by mouth daily.     Historical Provider, MD       Current medications:    Current Facility-Administered Medications   Medication Dose Route Frequency Provider Last Rate Last Admin    polyethylene glycol (GoLYTELY) solution 4,000 mL  4,000 mL Oral Once Maynor Miller MD        sodium chloride flush 0.9 % injection 10 mL  10 mL Intravenous PRN Annie Galvin MD   10 mL at 04/16/21 1428    ALPRAZolam (XANAX) tablet 1 mg  1 mg Oral BID PRN Annie Galvin MD   1 mg at 04/17/21 0154    amLODIPine (NORVASC) tablet 10 mg  10 mg Oral Daily Annie Galvin MD   10 mg at 04/17/21 1006    atorvastatin (LIPITOR) tablet 40 mg  40 mg Oral Daily Saúl Valentino MD   40 mg at 04/17/21 1005    buPROPion [facility]) extended release tablet 150 mg  150 mg Oral BID Saúl Valentino MD   150 mg at 04/17/21 1006    carvedilol (COREG) tablet 25 mg  25 mg Oral BID Saúl Valentino MD   25 mg at 04/17/21 1006    [START ON 4/18/2021] cyanocobalamin injection 1,000 mcg  1,000 mcg Intramuscular Q7 Days Saúl Valentino MD        folic acid-pyridoxine-cyancobalamin (FOLTX) 1.13-25-2 MG TABS 1 tablet  1 tablet Oral Daily Saúl Valentino MD   1 tablet at 04/17/21 1005    methocarbamol (ROBAXIN) tablet 750 mg  750 mg Oral 4x Daily Saúl Valentino MD   750 mg at 04/17/21 1601    therapeutic multivitamin-minerals 1 tablet  1 tablet Oral Daily Saúl Valentino MD   1 tablet at 04/17/21 1005    oxyCODONE (ROXICODONE) immediate release tablet 5 mg  5 mg Oral Q4H PRN Saúl Valentino MD   5 mg at 04/17/21 1251    pantoprazole (PROTONIX) tablet 40 mg  40 mg Oral QAM AC Saúl Valentino MD   40 mg at 04/17/21 0540    venlafaxine (EFFEXOR XR) extended release capsule 150 mg  150 mg Oral Daily Saúl Valentino MD   150 mg at 04/17/21 1006    glucose (GLUTOSE) 40 % oral gel 15 g  15 g Oral PRN Saúl Valentino MD        dextrose 50 % IV solution  12.5 g Intravenous PRN Saúl Valentino MD        glucagon (rDNA) injection 1 mg  1 mg Intramuscular PRN Saúl Valentino MD        dextrose 5 % solution  100 mL/hr Intravenous PRN Saúl Valentino MD        sodium chloride flush 0.9 % injection 5-40 mL  5-40 mL Intravenous 2 times per day Saúl Valentino MD   10 mL at 04/17/21 1010    sodium chloride flush 0.9 % injection 5-40 mL  5-40 mL Intravenous PRN Saúl Valentino MD        0.9 % sodium chloride infusion  25 mL Intravenous PRN Saúl Valentino MD        promethazine (PHENERGAN) tablet 12.5 mg  12.5 mg Oral Q6H PRN Saúl Valentino MD        Or    ondansetron [facility]) injection 4 mg  4 mg Intravenous Q6H PRN Saúl Valentino MD        polyethylene glycol [facility]) packet 17 g  17 g Oral Daily PRN Kaci Christiansen MD        acetaminophen (TYLENOL) tablet 650 mg  650 mg Oral Q6H PRN Kaci Christiansen MD        Or    acetaminophen (TYLENOL) suppository 650 mg  650 mg Rectal Q6H PRN Kaci Christiansen MD        insulin lispro (HUMALOG) injection vial 0-6 Units  0-6 Units Subcutaneous TID WC Kaci Christiansen MD        insulin lispro (HUMALOG) injection vial 0-3 Units  0-3 Units Subcutaneous Nightly Kaci Christiansen MD        0.9 % sodium chloride infusion   Intravenous Continuous Kaci Christiansen MD 75 mL/hr at 04/17/21 1328 New Bag at 04/17/21 1328    valsartan (DIOVAN) tablet 160 mg  160 mg Oral Daily Kaci Christiansen MD   160 mg at 04/17/21 1006    And    hydroCHLOROthiazide (HYDRODIURIL) tablet 25 mg  25 mg Oral Daily Kaci Christiansen MD   25 mg at 04/17/21 1005    famotidine (PEPCID) tablet 10 mg  10 mg Oral Daily Kaci Christiansen MD   10 mg at 04/17/21 1005    fenofibrate (TRIGLIDE) tablet 160 mg  160 mg Oral Daily Kaci Christiansen MD           Allergies:     Allergies   Allergen Reactions    Tramadol Itching    Vicodin [Hydrocodone-Acetaminophen] Itching       Problem List:    Patient Active Problem List   Diagnosis Code    Diabetes mellitus ([facility] E11.9    CAD (coronary artery disease) I25.10    Hyperlipidemia E78.5    Carotid artery stenosis I65.29    S/P CABG x 3 Z95.1    Ischemia, bowel (HCC) K55.9    Protein-calorie malnutrition, moderate (HCC) E44.0    HTN (hypertension) I10    Hypokalemia E87.6    Anxiety F41.9    Gastroenteritis K52.9    Stroke-like symptoms R29.90    Peripheral polyneuropathy G62.9    Ataxia R27.0    Urinary tract infection without hematuria N39.0    Carcinoid syndrome (HCC) E34.0    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    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  Diabetes mellitus [facility]     PCP    H/O cardiovascular stress test     EF 74 %,normal perfusion    H/O echocardiogram     EF 60%, mild to mod MR,    Hyperlipidemia     Hypertension     PCP    Protein-calorie malnutrition, moderate ([address] 2015       Past Surgical History:        Procedure Laterality Date    ABDOMEN SURGERY      CORONARY ARTERY BYPASS GRAFT      CABG X 3, L mammary artery to the LAD, saphenous vein graft to RCA. a saphenous vein graft to Cx marginal artery    DIAGNOSTIC CARDIAC CATH LAB PROCEDURE  3/30/07    PTCA to LAD    FRACTURE SURGERY Left     patella    FRACTURE SURGERY Right     wrist    HYSTERECTOMY  1996    OTHER SURGICAL HISTORY  2015    exp lap, right colon resection    SKIN BIOPSY      WRIST FRACTURE SURGERY Left 2021    LEFT WRIST OPEN REDUCTION INTERNAL FIXATION performed by Joelle Tejeda MD at [address] [place] [address] History:    Social History     Tobacco Use    Smoking status: Former Smoker     Packs/day: 1.00     Years: 30.00     Pack years: 30.00     Start date:      Quit date:      Years since quittin.3    Smokeless tobacco: Never Used   Substance Use Topics    Alcohol use:  No                                Counseling given: Not Answered      Vital Signs (Current):   Vitals:    21 1930 21 2042 21 0523 21 1536   BP: (!) 184/72 (!) 193/87 (!) 152/67 138/65   Pulse: 66 70 57 56   Resp:  17 16 14   Temp:  36.6 °C (97.8 °F) 36.7 °C (98 °F) 36.9 °C (98.4 °F)   TempSrc:  Oral Oral Oral   SpO2: 98% 98% 96% 95%   Weight:       Height:                                                  BP Readings from Last 3 Encounters:   21 138/65 21 (!) 157/66   21 (!) 142/63       NPO Status:                                                                                 BMI:   Wt Readings from Last 3 Encounters:   21 119 lb (54 kg)   21 130 lb (59 kg)   21 130 lb (59 kg)     Body mass index is 21.77 kg/m².     CBC:   Lab Results   Component Value Date    WBC 14.5 2021    RBC 3.19 2021    HGB 7.9 2021    HCT 27.1 2021    MCV 87.1 2021    RDW 14.4 2021     2021       CMP:   Lab Results   Component Value Date     2021    K 4.4 2021    CL 99 2021    CO2 24 2021    BUN 25 2021    CREATININE 1.2 2021    GFRAA 52 2021    LABGLOM 43 2021    GLUCOSE 111 2021    PROT 6.2 2021    PROT 7.2 2011    CALCIUM 7.7 2021    BILITOT 0.2 2021    ALKPHOS 80 2021    AST 11 2021    ALT 7 2021       POC Tests:   Recent Labs     21  1613   POCGLU 241*       Coags:   Lab Results   Component Value Date    PROTIME 14.4 2021    INR 1.19 2021    APTT 23.8 2021       HCG (If Applicable): No results found for: PREGTESTUR, PREGSERUM, HCG, HCGQUANT     ABGs:   Lab Results   Component Value Date    PO2ART 112 2015    KGI9DIB 24.0 2015    GPX1JBZ 21.5 2015        Type & Screen (If Applicable):  No results found for: LABABO, [address]    Drug/Infectious Status (If Applicable):  Lab Results   Component Value Date    HEPCAB 0.02 2014       COVID-19 Screening (If Applicable):   Lab Results   Component Value Date    COVID19 NOT DETECTED 2021           Anesthesia Evaluation  Patient summary reviewed  Airway: Mallampati: II        Dental:      Comment: Denies loose teeth    Pulmonary:Negative Pulmonary ROS and normal exam                              ROS comment: Former Smoker - 30 pack years  Quit Smokin89       Cardiovascular:  Exercise tolerance: good (>4 METS),   (+) hypertension:, valvular problems/murmurs: MR, past MI: > 6 months, CAD:, CABG/stent:, hyperlipidemia         Beta Blocker:  Order written      ROS comment: Echo 10/2019:   Summary   Left ventricular systolic function is normal.   Ejection fraction is visually estimated at 50-55%. Moderate left ventricular hypertrophy. Severely dilated left atrium. Mitral annular calcification with mild mitral stenosis; mean P mmHg. Moderate mitral regurgitation is present. No evidence of any pericardial effusion. Neuro/Psych:   (+) psychiatric history:depression/anxiety             GI/Hepatic/Renal:   (+) renal disease: CRI, bowel prep, morbid obesity         ROS comment: Lower GI bleed and hematuria. Endo/Other:    (+) DiabetesType II DM, , blood dyscrasia: anemia:., malignancy/cancer. ROS comment: Cancer  near pancreas - chemo  Abdominal:           Vascular: negative vascular ROS. Anesthesia Plan      MAC     ASA 4       Induction: intravenous. Anesthetic plan and risks discussed with patient. TEMITOPE Velarde - CRNA   2021         Pre Anesthesia Assessment complete.  Chart reviewed on 2021

## 2021-04-18 ENCOUNTER — ANESTHESIA (OUTPATIENT)
Dept: ENDOSCOPY | Age: 81
DRG: 982 | End: 2021-04-18
Payer: MEDICARE

## 2021-04-18 VITALS
DIASTOLIC BLOOD PRESSURE: 36 MMHG | OXYGEN SATURATION: 100 % | SYSTOLIC BLOOD PRESSURE: 140 MMHG | RESPIRATION RATE: 12 BRPM

## 2021-04-18 LAB
ALBUMIN SERPL-MCNC: 3.2 GM/DL (ref 3.4–5)
ALP BLD-CCNC: 77 IU/L (ref 40–129)
ALT SERPL-CCNC: 7 U/L (ref 10–40)
AMYLASE: 44 U/L (ref 25–115)
ANION GAP SERPL CALCULATED.3IONS-SCNC: 11 MMOL/L (ref 4–16)
APTT: 27.4 SECONDS (ref 25.1–37.1)
AST SERPL-CCNC: 15 IU/L (ref 15–37)
BASOPHILS ABSOLUTE: 0 K/CU MM
BASOPHILS RELATIVE PERCENT: 0.2 % (ref 0–1)
BILIRUB SERPL-MCNC: 0.4 MG/DL (ref 0–1)
BUN BLDV-MCNC: 14 MG/DL (ref 6–23)
CALCIUM SERPL-MCNC: 7.6 MG/DL (ref 8.3–10.6)
CHLORIDE BLD-SCNC: 100 MMOL/L (ref 99–110)
CO2: 24 MMOL/L (ref 21–32)
CREAT SERPL-MCNC: 1.2 MG/DL (ref 0.6–1.1)
DIFFERENTIAL TYPE: ABNORMAL
EOSINOPHILS ABSOLUTE: 0.4 K/CU MM
EOSINOPHILS RELATIVE PERCENT: 2.6 % (ref 0–3)
GFR AFRICAN AMERICAN: 52 ML/MIN/1.73M2
GFR NON-AFRICAN AMERICAN: 43 ML/MIN/1.73M2
GLUCOSE BLD-MCNC: 113 MG/DL (ref 70–99)
GLUCOSE BLD-MCNC: 132 MG/DL (ref 70–99)
GLUCOSE BLD-MCNC: 145 MG/DL (ref 70–99)
GLUCOSE BLD-MCNC: 169 MG/DL (ref 70–99)
GLUCOSE BLD-MCNC: 190 MG/DL (ref 70–99)
HCT VFR BLD CALC: 25.3 % (ref 37–47)
HCT VFR BLD CALC: 27.6 % (ref 37–47)
HEMOGLOBIN: 7.8 GM/DL (ref 12.5–16)
HEMOGLOBIN: 8.4 GM/DL (ref 12.5–16)
IMMATURE NEUTROPHIL %: 0.8 % (ref 0–0.43)
INR BLD: 1.29 INDEX
LIPASE: 31 IU/L (ref 13–60)
LYMPHOCYTES ABSOLUTE: 0.9 K/CU MM
LYMPHOCYTES RELATIVE PERCENT: 5.7 % (ref 24–44)
MCH RBC QN AUTO: 26.2 PG (ref 27–31)
MCHC RBC AUTO-ENTMCNC: 30.4 % (ref 32–36)
MCV RBC AUTO: 86 FL (ref 78–100)
MONOCYTES ABSOLUTE: 1.1 K/CU MM
MONOCYTES RELATIVE PERCENT: 7.2 % (ref 0–4)
NUCLEATED RBC %: 0 %
PDW BLD-RTO: 14.6 % (ref 11.7–14.9)
PLATELET # BLD: 166 K/CU MM (ref 140–440)
PMV BLD AUTO: 10.6 FL (ref 7.5–11.1)
POTASSIUM SERPL-SCNC: 3.5 MMOL/L (ref 3.5–5.1)
PROTHROMBIN TIME: 15.6 SECONDS (ref 11.7–14.5)
RBC # BLD: 3.21 M/CU MM (ref 4.2–5.4)
SARS-COV-2, NAAT: NOT DETECTED
SEGMENTED NEUTROPHILS ABSOLUTE COUNT: 12.7 K/CU MM
SEGMENTED NEUTROPHILS RELATIVE PERCENT: 83.5 % (ref 36–66)
SODIUM BLD-SCNC: 135 MMOL/L (ref 135–145)
SOURCE: NORMAL
TOTAL IMMATURE NEUTOROPHIL: 0.12 K/CU MM
TOTAL NUCLEATED RBC: 0 K/CU MM
TOTAL PROTEIN: 5.1 GM/DL (ref 6.4–8.2)
TROPONIN T: <0.01 NG/ML
WBC # BLD: 15.2 K/CU MM (ref 4–10.5)

## 2021-04-18 PROCEDURE — 99232 SBSQ HOSP IP/OBS MODERATE 35: CPT | Performed by: INTERNAL MEDICINE

## 2021-04-18 PROCEDURE — 85025 COMPLETE CBC W/AUTO DIFF WBC: CPT

## 2021-04-18 PROCEDURE — 85014 HEMATOCRIT: CPT

## 2021-04-18 PROCEDURE — 2500000003 HC RX 250 WO HCPCS: Performed by: NURSE ANESTHETIST, CERTIFIED REGISTERED

## 2021-04-18 PROCEDURE — 0DB78ZX EXCISION OF STOMACH, PYLORUS, VIA NATURAL OR ARTIFICIAL OPENING ENDOSCOPIC, DIAGNOSTIC: ICD-10-PCS | Performed by: SPECIALIST

## 2021-04-18 PROCEDURE — 85018 HEMOGLOBIN: CPT

## 2021-04-18 PROCEDURE — 0DJD8ZZ INSPECTION OF LOWER INTESTINAL TRACT, VIA NATURAL OR ARTIFICIAL OPENING ENDOSCOPIC: ICD-10-PCS | Performed by: SPECIALIST

## 2021-04-18 PROCEDURE — 2580000003 HC RX 258: Performed by: INTERNAL MEDICINE

## 2021-04-18 PROCEDURE — 2709999900 HC NON-CHARGEABLE SUPPLY: Performed by: SPECIALIST

## 2021-04-18 PROCEDURE — 1200000000 HC SEMI PRIVATE

## 2021-04-18 PROCEDURE — 3609027000 HC COLONOSCOPY: Performed by: SPECIALIST

## 2021-04-18 PROCEDURE — 3700000000 HC ANESTHESIA ATTENDED CARE: Performed by: SPECIALIST

## 2021-04-18 PROCEDURE — 6370000000 HC RX 637 (ALT 250 FOR IP): Performed by: INTERNAL MEDICINE

## 2021-04-18 PROCEDURE — 6360000002 HC RX W HCPCS: Performed by: INTERNAL MEDICINE

## 2021-04-18 PROCEDURE — 80053 COMPREHEN METABOLIC PANEL: CPT

## 2021-04-18 PROCEDURE — 85730 THROMBOPLASTIN TIME PARTIAL: CPT

## 2021-04-18 PROCEDURE — 87635 SARS-COV-2 COVID-19 AMP PRB: CPT

## 2021-04-18 PROCEDURE — 88305 TISSUE EXAM BY PATHOLOGIST: CPT | Performed by: PATHOLOGY

## 2021-04-18 PROCEDURE — 85610 PROTHROMBIN TIME: CPT

## 2021-04-18 PROCEDURE — 0DB98ZX EXCISION OF DUODENUM, VIA NATURAL OR ARTIFICIAL OPENING ENDOSCOPIC, DIAGNOSTIC: ICD-10-PCS | Performed by: SPECIALIST

## 2021-04-18 PROCEDURE — 36415 COLL VENOUS BLD VENIPUNCTURE: CPT

## 2021-04-18 PROCEDURE — 82150 ASSAY OF AMYLASE: CPT

## 2021-04-18 PROCEDURE — 94761 N-INVAS EAR/PLS OXIMETRY MLT: CPT

## 2021-04-18 PROCEDURE — 83690 ASSAY OF LIPASE: CPT

## 2021-04-18 PROCEDURE — 6360000002 HC RX W HCPCS: Performed by: NURSE ANESTHETIST, CERTIFIED REGISTERED

## 2021-04-18 PROCEDURE — 3700000001 HC ADD 15 MINUTES (ANESTHESIA): Performed by: SPECIALIST

## 2021-04-18 PROCEDURE — 3609012400 HC EGD TRANSORAL BIOPSY SINGLE/MULTIPLE: Performed by: SPECIALIST

## 2021-04-18 PROCEDURE — 82962 GLUCOSE BLOOD TEST: CPT

## 2021-04-18 PROCEDURE — 88342 IMHCHEM/IMCYTCHM 1ST ANTB: CPT | Performed by: PATHOLOGY

## 2021-04-18 RX ORDER — LIDOCAINE HYDROCHLORIDE 20 MG/ML
INJECTION, SOLUTION INFILTRATION; PERINEURAL PRN
Status: DISCONTINUED | OUTPATIENT
Start: 2021-04-18 | End: 2021-04-18 | Stop reason: SDUPTHER

## 2021-04-18 RX ORDER — PROPOFOL 10 MG/ML
INJECTION, EMULSION INTRAVENOUS PRN
Status: DISCONTINUED | OUTPATIENT
Start: 2021-04-18 | End: 2021-04-18 | Stop reason: SDUPTHER

## 2021-04-18 RX ORDER — EPHEDRINE SULFATE 50 MG/ML
INJECTION INTRAVENOUS PRN
Status: DISCONTINUED | OUTPATIENT
Start: 2021-04-18 | End: 2021-04-18 | Stop reason: SDUPTHER

## 2021-04-18 RX ADMIN — METHOCARBAMOL 750 MG: 500 TABLET ORAL at 17:40

## 2021-04-18 RX ADMIN — PROPOFOL 300 MG: 10 INJECTION, EMULSION INTRAVENOUS at 09:04

## 2021-04-18 RX ADMIN — METHOCARBAMOL 750 MG: 500 TABLET ORAL at 21:00

## 2021-04-18 RX ADMIN — ALPRAZOLAM 1 MG: 0.5 TABLET ORAL at 21:00

## 2021-04-18 RX ADMIN — OXYCODONE HYDROCHLORIDE 5 MG: 5 TABLET ORAL at 17:39

## 2021-04-18 RX ADMIN — EPHEDRINE SULFATE 10 MG: 50 INJECTION INTRAVENOUS at 09:18

## 2021-04-18 RX ADMIN — ONDANSETRON 4 MG: 2 INJECTION INTRAMUSCULAR; INTRAVENOUS at 08:03

## 2021-04-18 RX ADMIN — PHENYLEPHRINE HYDROCHLORIDE 200 MCG: 10 INJECTION INTRAVENOUS at 09:21

## 2021-04-18 RX ADMIN — INSULIN LISPRO 1 UNITS: 100 INJECTION, SOLUTION INTRAVENOUS; SUBCUTANEOUS at 17:41

## 2021-04-18 RX ADMIN — LIDOCAINE HYDROCHLORIDE 50 MG: 20 INJECTION, SOLUTION INFILTRATION; PERINEURAL at 09:04

## 2021-04-18 RX ADMIN — SODIUM CHLORIDE, PRESERVATIVE FREE 10 ML: 5 INJECTION INTRAVENOUS at 21:00

## 2021-04-18 RX ADMIN — METHOCARBAMOL 750 MG: 500 TABLET ORAL at 13:07

## 2021-04-18 RX ADMIN — PROMETHAZINE HYDROCHLORIDE 12.5 MG: 25 TABLET ORAL at 11:03

## 2021-04-18 RX ADMIN — CARVEDILOL 25 MG: 25 TABLET, FILM COATED ORAL at 21:00

## 2021-04-18 RX ADMIN — EPHEDRINE SULFATE 20 MG: 50 INJECTION INTRAVENOUS at 09:34

## 2021-04-18 RX ADMIN — OXYCODONE HYDROCHLORIDE 5 MG: 5 TABLET ORAL at 13:06

## 2021-04-18 RX ADMIN — BUPROPION HYDROCHLORIDE 150 MG: 150 TABLET, EXTENDED RELEASE ORAL at 21:01

## 2021-04-18 RX ADMIN — EPHEDRINE SULFATE 20 MG: 50 INJECTION INTRAVENOUS at 09:22

## 2021-04-18 ASSESSMENT — PAIN SCALES - GENERAL
PAINLEVEL_OUTOF10: 0
PAINLEVEL_OUTOF10: 5
PAINLEVEL_OUTOF10: 4

## 2021-04-18 ASSESSMENT — PAIN DESCRIPTION - LOCATION
LOCATION: ABDOMEN
LOCATION: ABDOMEN

## 2021-04-18 ASSESSMENT — PAIN DESCRIPTION - FREQUENCY: FREQUENCY: CONTINUOUS

## 2021-04-18 ASSESSMENT — PAIN DESCRIPTION - ORIENTATION: ORIENTATION: LEFT

## 2021-04-18 ASSESSMENT — PAIN DESCRIPTION - DESCRIPTORS: DESCRIPTORS: ACHING;DISCOMFORT;SHARP

## 2021-04-18 ASSESSMENT — PAIN - FUNCTIONAL ASSESSMENT: PAIN_FUNCTIONAL_ASSESSMENT: PREVENTS OR INTERFERES SOME ACTIVE ACTIVITIES AND ADLS

## 2021-04-18 ASSESSMENT — PAIN DESCRIPTION - ONSET: ONSET: ON-GOING

## 2021-04-18 NOTE — PROGRESS NOTES
Miguel Angel Hannon MD, [address]                Internal Medicine Hospitalist             Daily Progress  Note   Subjective:     Chief Complaint   Patient presents with    Abdominal Pain    Rectal Bleeding    Altered Mental Status     per daughter pt has been confused over the last two months and progressively getting worse     Ms. Diane Complains of some abdominal discomfort but that is chronic. Objective:    BP (!) 153/67   Pulse 62   Temp 97.7 °F (36.5 °C) (Oral)   Resp 16   Ht 5' 2\" (1.575 m)   Wt 127 lb 14.4 oz (58 kg)   SpO2 99%   BMI 23.39 kg/m²      Intake/Output Summary (Last 24 hours) at 4/18/2021 1250  Last data filed at 4/18/2021 0957  Gross per 24 hour   Intake 900 ml   Output 150 ml   Net 750 ml      Physical Exam:  Heart:  Regular rate and rhythm, normal S1 and S2 in all 4 auscultatory areas. No rubs  Murmurs or gallops heard. Lungs: Mostly clear to auscultation, decreased breath sounds at bases. No wheezes appreciated no crackles heard. Abdomen: Soft, non distended. Bowel sounds appreciated. No obvious liver or spleen enlargement. Non tender, no rebound noted. Extremities: Non tender, no swelling noted, strength 5/5 both legs. CNS: Grossly intact.     Labs:  CBC with Differential:    Lab Results   Component Value Date    WBC 15.2 04/18/2021    RBC 3.21 04/18/2021    HGB 8.4 04/18/2021    HCT 27.6 04/18/2021     04/18/2021    MCV 86.0 04/18/2021    MCH 26.2 04/18/2021    MCHC 30.4 04/18/2021    RDW 14.6 04/18/2021    SEGSPCT 83.5 04/18/2021    BANDSPCT 6 06/07/2016    LYMPHOPCT 5.7 04/18/2021    MONOPCT 7.2 04/18/2021    EOSPCT 0.0 01/14/2011    BASOPCT 0.2 04/18/2021    MONOSABS 1.1 04/18/2021    LYMPHSABS 0.9 04/18/2021    EOSABS 0.4 04/18/2021    BASOSABS 0.0 04/18/2021    DIFFTYPE AUTOMATED DIFFERENTIAL 04/18/2021     CMP:    Lab Results   Component Value Date     04/18/2021    K 3.5 04/18/2021     04/18/2021    CO2 24 04/18/2021    BUN 14 04/18/2021    CREATININE 1.2 04/18/2021    GFRAA 52 04/18/2021    LABGLOM 43 04/18/2021    GLUCOSE 132 04/18/2021    PROT 5.1 04/18/2021    PROT 7.2 01/14/2011    LABALBU 3.2 04/18/2021    CALCIUM 7.6 04/18/2021    BILITOT 0.4 04/18/2021    ALKPHOS 77 04/18/2021    AST 15 04/18/2021    ALT 7 04/18/2021     Recent Labs     04/16/21  2230 04/17/21  0442 04/17/21  1856   TROPONINT <0.010 <0.010 <0.010     Lab Results   Component Value Date    TSH 2.480 11/24/2014         dextrose      sodium chloride      sodium chloride Stopped (04/18/21 0957)      amLODIPine  10 mg Oral Daily    atorvastatin  40 mg Oral Daily    buPROPion  150 mg Oral BID    carvedilol  25 mg Oral BID    cyanocobalamin  1,000 mcg Intramuscular Q7 Days    folic acid-pyridoxine-cyancobalamin  1 tablet Oral Daily    methocarbamol  750 mg Oral 4x Daily    therapeutic multivitamin-minerals  1 tablet Oral Daily    pantoprazole  40 mg Oral QAM AC    venlafaxine  150 mg Oral Daily    sodium chloride flush  5-40 mL Intravenous 2 times per day    insulin lispro  0-6 Units Subcutaneous TID WC    insulin lispro  0-3 Units Subcutaneous Nightly    valsartan  160 mg Oral Daily    And    hydroCHLOROthiazide  25 mg Oral Daily    fenofibrate  160 mg Oral Daily         Assessment:       Patient Active Problem List    Diagnosis Date Noted    Stage 3 chronic kidney disease     GI bleed 04/16/2021    Closed fracture of left distal radius 02/11/2021    Carcinoid syndrome ([address] 04/21/2020    Immune thrombocytopenic purpura ([address] 04/21/2020    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

## 2021-04-18 NOTE — PROGRESS NOTES
PATIENT IS AWAKE AND ALERT, PATIENT REVIEWED AND VERIFIED PREOP QUESTIONS, HP AND CONSENT COMPLETED.

## 2021-04-18 NOTE — PROGRESS NOTES
Nephrology Progress Note        [address], [address] [address]  Phone: [phone]  Office Hours: 8:30AM - 4:30PM  Monday - Friday 4/18/2021 7:32 AM  Subjective:   Admit Date: 4/16/2021  PCP: Elise Elaine MD  Interval History: having some nausea this morning  BP is ok    Diet: Diet NPO Time Specified      Data:   Scheduled Meds:   amLODIPine  10 mg Oral Daily    atorvastatin  40 mg Oral Daily    buPROPion  150 mg Oral BID    carvedilol  25 mg Oral BID    cyanocobalamin  1,000 mcg Intramuscular Q7 Days    folic acid-pyridoxine-cyancobalamin  1 tablet Oral Daily    methocarbamol  750 mg Oral 4x Daily    therapeutic multivitamin-minerals  1 tablet Oral Daily    pantoprazole  40 mg Oral QAM AC    venlafaxine  150 mg Oral Daily    sodium chloride flush  5-40 mL Intravenous 2 times per day    insulin lispro  0-6 Units Subcutaneous TID WC    insulin lispro  0-3 Units Subcutaneous Nightly    valsartan  160 mg Oral Daily    And    hydroCHLOROthiazide  25 mg Oral Daily    famotidine  10 mg Oral Daily    fenofibrate  160 mg Oral Daily     Continuous Infusions:   dextrose      sodium chloride      sodium chloride 75 mL/hr at 04/17/21 1328     PRN Meds:sodium chloride flush, ALPRAZolam, oxyCODONE, glucose, dextrose, glucagon (rDNA), dextrose, sodium chloride flush, sodium chloride, promethazine **OR** ondansetron, polyethylene glycol, acetaminophen **OR** acetaminophen  I/O last 3 completed shifts: In: 10 [I.V.:10]  Out: 150 [Emesis/NG output:150]  No intake/output data recorded.     Intake/Output Summary (Last 24 hours) at 4/18/2021 0732  Last data filed at 4/17/2021 2054  Gross per 24 hour   Intake 10 ml   Output 150 ml   Net -140 ml       CBC:   Recent Labs     04/16/21  1309 04/16/21  1309 04/17/21  0442 04/17/21  0822 04/17/21  1856 04/17/21  2348   WBC 13.5*  --  14.5*  --  14.8*  --    HGB 8.8*   < > 8.3* 7.9* 8.4* 8.0*     --  234  --  251  --     < > = values in this interval not displayed. BMP:    Recent Labs     04/16/21  1309 04/17/21  0822   * 131*   K 4.2 4.4    99   CO2 21 24   BUN 37* 25*   CREATININE 1.3* 1.2*   GLUCOSE 206* 111*     Hepatic:   Recent Labs     04/16/21  1309   AST 11*   ALT 7*   BILITOT 0.2   ALKPHOS 80     Troponin: No results for input(s): TROPONINI in the last 72 hours. BNP: No results for input(s): BNP in the last 72 hours. Lipids: No results for input(s): CHOL, HDL in the last 72 hours.     Invalid input(s): LDLCALCU  ABGs:   Lab Results   Component Value Date    PO2ART 112 08/12/2015    TPN7RQO 24.0 08/12/2015     INR:   Recent Labs     04/16/21  1409   INR 1.19       Objective:   Vitals: BP (!) 158/71   Pulse 55   Temp 97.5 °F (36.4 °C) (Oral)   Resp 14   Ht 5' 2\" (1.575 m)   Wt 127 lb 14.4 oz (58 kg)   SpO2 94%   BMI 23.39 kg/m²   General appearance: alert and cooperative with exam, in no acute distress  HEENT: normocephalic, atraumatic,   Neck: supple, trachea midline  Lungsbreathing comfortably on room air  Abdomen:  non distended,   Extremities: extremities atraumatic, no cyanosis or edema  Neurologic: alert, oriented, follows commands, interactive    Assessment and Plan:         Patient Active Problem List     Diagnosis Date Noted    GI bleed 04/16/2021    Closed fracture of left distal radius 02/11/2021    Carcinoid syndrome ([address] 04/21/2020    Immune thrombocytopenic purpura ([address] 04/21/2020    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

## 2021-04-18 NOTE — ANESTHESIA POSTPROCEDURE EVALUATION
Department of Anesthesiology  Postprocedure Note    Patient: Bin Scott  MRN: [id number]  YOB: 1940  Date of evaluation: 4/18/2021  Time:  9:58 AM     Procedure Summary     Date: 04/18/21 Room / Location: [address]    Anesthesia Start: 3462 Anesthesia Stop: 2544    Procedures:       EGD BIOPSY (N/A )      COLONOSCOPY DIAGNOSTIC (N/A ) Diagnosis: (GI BLEED)    Surgeons: Jer Smyth MD Responsible Provider: Lyndon Mack DO    Anesthesia Type: MAC ASA Status: 4          Anesthesia Type: MAC    Tyler Phase I:      Tyler Phase II:      Last vitals: Reviewed and per EMR flowsheets.        Anesthesia Post Evaluation    Patient location during evaluation: bedside  Patient participation: complete - patient participated  Level of consciousness: awake and alert  Airway patency: patent  Nausea & Vomiting: no vomiting  Complications: no  Cardiovascular status: hemodynamically stable and blood pressure returned to baseline  Respiratory status: acceptable, nonlabored ventilation, spontaneous ventilation and room air  Hydration status: stable

## 2021-04-18 NOTE — OP NOTE
duodenitis. 2.  No evidence of an active upper GI bleeding lesion. RECOMMENDATIONS:  1. Antireflux measures. 2.  We will continue present management. 3.  We will follow up on the above path report. 4.  We will proceed with colonoscopy today for further workup of the  patient's anemia and GI bleeding. The patient tolerated the procedure well. There were no postop  complications. The blood loss during the procedure was nil.         Maribell Raphael MD    D: 04/18/2021 9:16:40       T: 04/18/2021 9:19:35     [email or web address]  Job#: [id number]     Doc#: [id number]    CC:  Carolyn Chapman MD

## 2021-04-18 NOTE — OP NOTE
[address] [place] [address], [address] [place] [address]                                OPERATIVE REPORT    PATIENT NAME: Emma Naylor                    :        1940  MED REC NO:   [id number]                          ROOM:       2935  ACCOUNT NO:   [de-identified]                           ADMIT DATE: 2021  PROVIDER:     Meme Roberts MD    DATE OF PROCEDURE:  2021    PRIMARY PHYSICIAN:  Wilfrido Bell MD    PREOPERATIVE DIAGNOSES:  History of anemia and abnormal imaging; rule  out lower GI bleeding. PREMEDICATION:  Please refer to the anesthesiologist's notes. PROCEDURE:  The patient was placed in the left lateral decubitus  position and rectal examination was performed. RECTAL EXAMINATION:  Rectal examination revealed the rectal mucosa which  was felt to be normal.  There was evidence of external hemorrhoids, but  no fissures or fistulas were seen. The video Olympus colonoscope was subsequently introduced into the  rectum and was advanced all the way up to the ileocolonic anastomosis. Postsurgical changes were noted from previous right colon resection. Moderate amount of liquid and semisolid stool was noted in different  portions of the colon, which was flushed with water and suctioned out. A few diverticula were noted scattered in the colon, but no polyps or  mass lesions were seen. No blood, recent or old, was seen in the lumen  of the colon. The colonoscope was retroflexed in the rectum and the anorectal junction  was carefully examined and grade 1 to 2 internal hemorrhoids were seen. POSTOPERATIVE DIAGNOSES:  1. Postsurgical changes noted in the right colon. 2.  Diverticulosis coli. 3.  Hemorrhoids. 4.  Moderate amount of stool present. RECOMMENDATIONS:  1. Followup colonoscopy on an as-needed basis in view of the patient's  advanced age.   2.  We will schedule the patient for a small bowel follow-through in the  a.m. for further workup of anemia and GI bleeding. 3.  Monitor the patient's H and H. The patient tolerated the procedure well. There were no postop  complications.         Boni Atkinson MD    D: 04/18/2021 10:06:22       T: 04/18/2021 10:08:13     [email or web address]  Job#: [id number]     Doc#: [id number]    CC:  Mayito Tena MD

## 2021-04-19 LAB
ALBUMIN SERPL-MCNC: 3.3 GM/DL (ref 3.4–5)
ALP BLD-CCNC: 71 IU/L (ref 40–128)
ALT SERPL-CCNC: 8 U/L (ref 10–40)
ANION GAP SERPL CALCULATED.3IONS-SCNC: 8 MMOL/L (ref 4–16)
AST SERPL-CCNC: 13 IU/L (ref 15–37)
BASOPHILS ABSOLUTE: 0 K/CU MM
BASOPHILS RELATIVE PERCENT: 0.4 % (ref 0–1)
BILIRUB SERPL-MCNC: 0.2 MG/DL (ref 0–1)
BUN BLDV-MCNC: 14 MG/DL (ref 6–23)
CALCIUM SERPL-MCNC: 8.1 MG/DL (ref 8.3–10.6)
CHLORIDE BLD-SCNC: 101 MMOL/L (ref 99–110)
CO2: 26 MMOL/L (ref 21–32)
CREAT SERPL-MCNC: 1.1 MG/DL (ref 0.6–1.1)
DIFFERENTIAL TYPE: ABNORMAL
EOSINOPHILS ABSOLUTE: 0.5 K/CU MM
EOSINOPHILS RELATIVE PERCENT: 4.3 % (ref 0–3)
GFR AFRICAN AMERICAN: 58 ML/MIN/1.73M2
GFR NON-AFRICAN AMERICAN: 48 ML/MIN/1.73M2
GLUCOSE BLD-MCNC: 108 MG/DL (ref 70–99)
GLUCOSE BLD-MCNC: 138 MG/DL (ref 70–99)
GLUCOSE BLD-MCNC: 177 MG/DL (ref 70–99)
GLUCOSE BLD-MCNC: 249 MG/DL (ref 70–99)
GLUCOSE BLD-MCNC: 260 MG/DL (ref 70–99)
GLUCOSE BLD-MCNC: 265 MG/DL (ref 70–99)
HCT VFR BLD CALC: 26.6 % (ref 37–47)
HCT VFR BLD CALC: 26.8 % (ref 37–47)
HEMOGLOBIN: 8 GM/DL (ref 12.5–16)
HEMOGLOBIN: 8.5 GM/DL (ref 12.5–16)
IMMATURE NEUTROPHIL %: 0.6 % (ref 0–0.43)
LV EF: 60 %
LVEF MODALITY: NORMAL
LYMPHOCYTES ABSOLUTE: 0.9 K/CU MM
LYMPHOCYTES RELATIVE PERCENT: 8.5 % (ref 24–44)
MCH RBC QN AUTO: 26.2 PG (ref 27–31)
MCHC RBC AUTO-ENTMCNC: 29.9 % (ref 32–36)
MCV RBC AUTO: 87.9 FL (ref 78–100)
MONOCYTES ABSOLUTE: 1.2 K/CU MM
MONOCYTES RELATIVE PERCENT: 11 % (ref 0–4)
NUCLEATED RBC %: 0 %
PDW BLD-RTO: 14.9 % (ref 11.7–14.9)
PLATELET # BLD: 127 K/CU MM (ref 140–440)
PMV BLD AUTO: 10.9 FL (ref 7.5–11.1)
POTASSIUM SERPL-SCNC: 3.1 MMOL/L (ref 3.5–5.1)
POTASSIUM SERPL-SCNC: 3.4 MMOL/L (ref 3.5–5.1)
RBC # BLD: 3.05 M/CU MM (ref 4.2–5.4)
REASON FOR REJECTION: NORMAL
REJECTED TEST: NORMAL
SEGMENTED NEUTROPHILS ABSOLUTE COUNT: 8.1 K/CU MM
SEGMENTED NEUTROPHILS RELATIVE PERCENT: 75.2 % (ref 36–66)
SODIUM BLD-SCNC: 135 MMOL/L (ref 135–145)
TOTAL IMMATURE NEUTOROPHIL: 0.06 K/CU MM
TOTAL NUCLEATED RBC: 0 K/CU MM
TOTAL PROTEIN: 5.2 GM/DL (ref 6.4–8.2)
WBC # BLD: 10.7 K/CU MM (ref 4–10.5)

## 2021-04-19 PROCEDURE — 2700000000 HC OXYGEN THERAPY PER DAY

## 2021-04-19 PROCEDURE — 93005 ELECTROCARDIOGRAM TRACING: CPT | Performed by: INTERNAL MEDICINE

## 2021-04-19 PROCEDURE — 82962 GLUCOSE BLOOD TEST: CPT

## 2021-04-19 PROCEDURE — 85025 COMPLETE CBC W/AUTO DIFF WBC: CPT

## 2021-04-19 PROCEDURE — 2000000000 HC ICU R&B

## 2021-04-19 PROCEDURE — 6360000002 HC RX W HCPCS: Performed by: INTERNAL MEDICINE

## 2021-04-19 PROCEDURE — 2720000010 HC SURG SUPPLY STERILE

## 2021-04-19 PROCEDURE — C1892 INTRO/SHEATH,FIXED,PEEL-AWAY: HCPCS

## 2021-04-19 PROCEDURE — 6370000000 HC RX 637 (ALT 250 FOR IP): Performed by: INTERNAL MEDICINE

## 2021-04-19 PROCEDURE — 93459 L HRT ART/GRFT ANGIO: CPT

## 2021-04-19 PROCEDURE — 85014 HEMATOCRIT: CPT

## 2021-04-19 PROCEDURE — 2580000003 HC RX 258: Performed by: INTERNAL MEDICINE

## 2021-04-19 PROCEDURE — 93306 TTE W/DOPPLER COMPLETE: CPT

## 2021-04-19 PROCEDURE — B2151ZZ FLUOROSCOPY OF LEFT HEART USING LOW OSMOLAR CONTRAST: ICD-10-PCS | Performed by: INTERNAL MEDICINE

## 2021-04-19 PROCEDURE — 6360000002 HC RX W HCPCS

## 2021-04-19 PROCEDURE — 94761 N-INVAS EAR/PLS OXIMETRY MLT: CPT

## 2021-04-19 PROCEDURE — 85018 HEMOGLOBIN: CPT

## 2021-04-19 PROCEDURE — 36415 COLL VENOUS BLD VENIPUNCTURE: CPT

## 2021-04-19 PROCEDURE — 99232 SBSQ HOSP IP/OBS MODERATE 35: CPT | Performed by: INTERNAL MEDICINE

## 2021-04-19 PROCEDURE — 84132 ASSAY OF SERUM POTASSIUM: CPT

## 2021-04-19 PROCEDURE — C1769 GUIDE WIRE: HCPCS

## 2021-04-19 PROCEDURE — 2709999900 HC NON-CHARGEABLE SUPPLY

## 2021-04-19 PROCEDURE — 2500000003 HC RX 250 WO HCPCS

## 2021-04-19 PROCEDURE — 5A1213Z PERFORMANCE OF CARDIAC PACING, INTERMITTENT: ICD-10-PCS | Performed by: INTERNAL MEDICINE

## 2021-04-19 PROCEDURE — 80053 COMPREHEN METABOLIC PANEL: CPT

## 2021-04-19 PROCEDURE — C1894 INTRO/SHEATH, NON-LASER: HCPCS

## 2021-04-19 PROCEDURE — B2111ZZ FLUOROSCOPY OF MULTIPLE CORONARY ARTERIES USING LOW OSMOLAR CONTRAST: ICD-10-PCS | Performed by: INTERNAL MEDICINE

## 2021-04-19 PROCEDURE — 4A023N7 MEASUREMENT OF CARDIAC SAMPLING AND PRESSURE, LEFT HEART, PERCUTANEOUS APPROACH: ICD-10-PCS | Performed by: INTERNAL MEDICINE

## 2021-04-19 RX ORDER — ATROPINE SULFATE 0.1 MG/ML
0.5 INJECTION INTRAVENOUS
Status: ACTIVE | OUTPATIENT
Start: 2021-04-19 | End: 2021-04-19

## 2021-04-19 RX ORDER — SODIUM CHLORIDE 9 MG/ML
INJECTION, SOLUTION INTRAVENOUS CONTINUOUS
Status: DISCONTINUED | OUTPATIENT
Start: 2021-04-19 | End: 2021-04-21

## 2021-04-19 RX ORDER — ATROPINE SULFATE 0.1 MG/ML
0.5 INJECTION INTRAVENOUS ONCE
Status: COMPLETED | OUTPATIENT
Start: 2021-04-19 | End: 2021-04-19

## 2021-04-19 RX ORDER — DOPAMINE HYDROCHLORIDE 160 MG/100ML
5 INJECTION, SOLUTION INTRAVENOUS CONTINUOUS
Status: DISCONTINUED | OUTPATIENT
Start: 2021-04-19 | End: 2021-04-20

## 2021-04-19 RX ORDER — ATROPINE SULFATE 0.4 MG/ML
0.5 AMPUL (ML) INJECTION
Status: DISCONTINUED | OUTPATIENT
Start: 2021-04-19 | End: 2021-04-19

## 2021-04-19 RX ORDER — CARVEDILOL 12.5 MG/1
12.5 TABLET ORAL 2 TIMES DAILY
Status: DISCONTINUED | OUTPATIENT
Start: 2021-04-19 | End: 2021-04-19

## 2021-04-19 RX ORDER — SODIUM CHLORIDE 0.9 % (FLUSH) 0.9 %
5-40 SYRINGE (ML) INJECTION EVERY 12 HOURS SCHEDULED
Status: DISCONTINUED | OUTPATIENT
Start: 2021-04-19 | End: 2021-04-23

## 2021-04-19 RX ORDER — HYDRALAZINE HYDROCHLORIDE 50 MG/1
50 TABLET, FILM COATED ORAL EVERY 8 HOURS SCHEDULED
Status: DISCONTINUED | OUTPATIENT
Start: 2021-04-19 | End: 2021-04-20

## 2021-04-19 RX ORDER — SODIUM CHLORIDE 0.9 % (FLUSH) 0.9 %
5-40 SYRINGE (ML) INJECTION PRN
Status: DISCONTINUED | OUTPATIENT
Start: 2021-04-19 | End: 2021-04-26 | Stop reason: HOSPADM

## 2021-04-19 RX ORDER — VALSARTAN 160 MG/1
80 TABLET ORAL NIGHTLY
Status: DISCONTINUED | OUTPATIENT
Start: 2021-04-19 | End: 2021-04-20

## 2021-04-19 RX ORDER — POTASSIUM CHLORIDE 7.45 MG/ML
10 INJECTION INTRAVENOUS PRN
Status: DISCONTINUED | OUTPATIENT
Start: 2021-04-19 | End: 2021-04-26 | Stop reason: HOSPADM

## 2021-04-19 RX ORDER — SODIUM CHLORIDE 9 MG/ML
25 INJECTION, SOLUTION INTRAVENOUS PRN
Status: DISCONTINUED | OUTPATIENT
Start: 2021-04-19 | End: 2021-04-23

## 2021-04-19 RX ORDER — HYDRALAZINE HYDROCHLORIDE 20 MG/ML
10 INJECTION INTRAMUSCULAR; INTRAVENOUS ONCE
Status: COMPLETED | OUTPATIENT
Start: 2021-04-19 | End: 2021-04-19

## 2021-04-19 RX ADMIN — HYDRALAZINE HYDROCHLORIDE 50 MG: 50 TABLET ORAL at 21:29

## 2021-04-19 RX ADMIN — POTASSIUM CHLORIDE 10 MEQ: 7.46 INJECTION, SOLUTION INTRAVENOUS at 15:19

## 2021-04-19 RX ADMIN — HYDRALAZINE HYDROCHLORIDE 10 MG: 20 INJECTION INTRAMUSCULAR; INTRAVENOUS at 09:08

## 2021-04-19 RX ADMIN — OXYCODONE HYDROCHLORIDE 5 MG: 5 TABLET ORAL at 20:09

## 2021-04-19 RX ADMIN — SODIUM CHLORIDE: 9 INJECTION, SOLUTION INTRAVENOUS at 23:10

## 2021-04-19 RX ADMIN — VALSARTAN 80 MG: 160 TABLET, FILM COATED ORAL at 20:10

## 2021-04-19 RX ADMIN — HYDRALAZINE HYDROCHLORIDE 50 MG: 50 TABLET ORAL at 14:14

## 2021-04-19 RX ADMIN — INSULIN LISPRO 3 UNITS: 100 INJECTION, SOLUTION INTRAVENOUS; SUBCUTANEOUS at 14:14

## 2021-04-19 RX ADMIN — ACETAMINOPHEN 650 MG: 325 TABLET ORAL at 20:09

## 2021-04-19 RX ADMIN — POTASSIUM CHLORIDE 10 MEQ: 7.46 INJECTION, SOLUTION INTRAVENOUS at 14:33

## 2021-04-19 RX ADMIN — SODIUM CHLORIDE, PRESERVATIVE FREE 10 ML: 5 INJECTION INTRAVENOUS at 12:04

## 2021-04-19 RX ADMIN — BUPROPION HYDROCHLORIDE 150 MG: 150 TABLET, EXTENDED RELEASE ORAL at 20:10

## 2021-04-19 RX ADMIN — SODIUM CHLORIDE: 9 INJECTION, SOLUTION INTRAVENOUS at 12:04

## 2021-04-19 RX ADMIN — ALPRAZOLAM 1 MG: 0.5 TABLET ORAL at 16:27

## 2021-04-19 RX ADMIN — POTASSIUM CHLORIDE 10 MEQ: 7.46 INJECTION, SOLUTION INTRAVENOUS at 16:19

## 2021-04-19 RX ADMIN — ATROPINE SULFATE 0.5 MG: 0.1 INJECTION, SOLUTION ENDOTRACHEAL; INTRAMUSCULAR; INTRAVENOUS; SUBCUTANEOUS at 09:27

## 2021-04-19 RX ADMIN — POTASSIUM CHLORIDE 10 MEQ: 7.46 INJECTION, SOLUTION INTRAVENOUS at 17:19

## 2021-04-19 RX ADMIN — METHOCARBAMOL 750 MG: 500 TABLET ORAL at 20:09

## 2021-04-19 RX ADMIN — METHOCARBAMOL 750 MG: 500 TABLET ORAL at 18:06

## 2021-04-19 ASSESSMENT — PAIN DESCRIPTION - LOCATION: LOCATION: BACK

## 2021-04-19 ASSESSMENT — PAIN DESCRIPTION - PROGRESSION
CLINICAL_PROGRESSION: NOT CHANGED

## 2021-04-19 ASSESSMENT — PAIN SCALES - GENERAL
PAINLEVEL_OUTOF10: 0
PAINLEVEL_OUTOF10: 0
PAINLEVEL_OUTOF10: 7
PAINLEVEL_OUTOF10: 0
PAINLEVEL_OUTOF10: 0

## 2021-04-19 ASSESSMENT — PAIN DESCRIPTION - PAIN TYPE: TYPE: ACUTE PAIN

## 2021-04-19 ASSESSMENT — PAIN DESCRIPTION - DESCRIPTORS: DESCRIPTORS: ACHING;SORE

## 2021-04-19 ASSESSMENT — PAIN DESCRIPTION - FREQUENCY: FREQUENCY: CONTINUOUS

## 2021-04-19 NOTE — PROGRESS NOTES
Pt is a rapid transfer from [facility]. Skin assessment done abrasion on 3rd toe on rt foot. Bruising scattered and redness to coccyx.  No other skin issues noted at this time

## 2021-04-19 NOTE — PROGRESS NOTES
Hospitalist Progress Note      Name:  Marivel Mcallister /Age/Sex: 1940  (79 y.o. female)   MRN & CSN:  [id number] & [id number] Admission Date/Time: 2021 11:56 AM   Location:  -A PCP: Luis Chan MD       Hospital Day: 4    Assessment and Plan:   Marivel Mcallister is a 79 y.o.  female  who presents with GI bleed    Lower GI bleeding  · Status post EGD with gastritis, duodenitis. · Hemoglobin 8.5. Monitor hemoglobin every day. · For possible small bowel follow-through. · GI following. Symptomatic bradycardia with pauses. · Status post temporary pacemaker placed. · Keep potassium more than 4 and magnesium more than 2.  · For pacemaker placement tomorrow. Acute kidney injury on chronic kidney disease stage IIIb. · Creatinine 1.1.  · Associated hyponatremia    Hypokalemia: Potassium 3.1. Replace    History of neuroendocrine tumor: Oncology following. She was on octreotide. Hypertension: Continue medication  Coronary artery disease:  Hyperlipidemia    Hematuria?:  Repeat urinalysis without hematuria. Diabetes: Sliding scale. Hypoglycemia protocol. A1c 6.9. Diet DIET FULL LIQUID; Carb Control: 5 carb choices (75 gms)/meal   DVT Prophylaxis [] Lovenox, []  Heparin, [] SCDs, [] Warfarin  [x] NOAC     GI Prophylaxis [x] PPI,  [] H2 Blocker,  [] Carafate,  [] Diet/Tube Feeds   Code Status Full Code   MDM [] Low, [] Moderate,[x]  High     History of Present Illness:     Chief Complaint: GI bleed    Seen and examined today. Rapid response activated this morning because of bradycardia with pauses. She had unresponsiveness for a brief. Ten point ROS reviewed negative, unless as noted above    Objective:        Intake/Output Summary (Last 24 hours) at 2021 1325  Last data filed at 2021 1114  Gross per 24 hour   Intake 120 ml   Output 750 ml   Net -630 ml      Vitals:   Vitals:    21 1300   BP: (!) 165/71   Pulse: 73   Resp: 19   Temp:    SpO2: 100%     Physical Exam:   GEN Awake female, sitting upright in bed in no apparent distress. Appears given age. EYES Pupils are equally round. No scleral erythema, discharge, or conjunctivitis. HENT Mucous membranes are moist. Oral pharynx without exudates, no evidence of thrush. NECK Supple, no apparent thyromegaly or masses. RESP Clear to auscultation, no wheezes, rales or rhonchi. Symmetric chest movement while on room air. CARDIO/VASC S1/S2 auscultated. Regular rate without appreciable murmurs, rubs, or gallops. No JVD or carotid bruits. Peripheral pulses equal bilaterally and palpable. No peripheral edema. GI Abdomen is soft without significant tenderness, masses, or guarding. Bowel sounds are normoactive. Rectal exam deferred. MSK No gross joint deformities. SKIN Normal coloration, warm, dry. NEURO Cranial nerves appear grossly intact, normal speech, no lateralizing weakness. PSYCH Awake, alert, oriented x 4. Affect appropriate.     Medications:   Medications:    valsartan  80 mg Oral Nightly    hydrALAZINE  50 mg Oral 3 times per day    sodium chloride flush  5-40 mL Intravenous 2 times per day    amLODIPine  10 mg Oral Daily    atorvastatin  40 mg Oral Daily    buPROPion  150 mg Oral BID    cyanocobalamin  1,000 mcg Intramuscular Q7 Days    folic acid-pyridoxine-cyancobalamin  1 tablet Oral Daily    methocarbamol  750 mg Oral 4x Daily    therapeutic multivitamin-minerals  1 tablet Oral Daily    pantoprazole  40 mg Oral QAM AC    venlafaxine  150 mg Oral Daily    sodium chloride flush  5-40 mL Intravenous 2 times per day    insulin lispro  0-6 Units Subcutaneous TID WC    insulin lispro  0-3 Units Subcutaneous Nightly    valsartan  160 mg Oral Daily    And    hydroCHLOROthiazide  25 mg Oral Daily    fenofibrate  160 mg Oral Daily      Infusions:    DOPamine      sodium chloride 75 mL/hr at 04/19/21 1204    sodium chloride      dextrose      sodium chloride       PRN Meds: sodium chloride

## 2021-04-19 NOTE — PROGRESS NOTES
This nurse called and updated patient's  of patient status as well as transfer to ICU. Questions answered,  voiced understanding. ICU contact number given to .

## 2021-04-19 NOTE — PROGRESS NOTES
Daily Progress Note    Had multiple episodes of bradycardia and complete heart block with more than 5.0 sec pauses with syncope  Cath patent grafts  Temp pacer placed-for a PPM later today  Discussed with DR. Damien Tam  GI bleed stable for now  HTN adjusted meds     Pt. Awake, alert and feeling ok   HR stable, NSR in the 70s, BP stable  She had a greater than 5 second pause on tele at 5:30 this am  Denies CP, SOB and dizziness    GI bleed    Hgb low but stable    GI following-s/p EGD and c-scope-diverticulosis and gastritis noted- no active bleeding    Planning for SBFT today    Ok to hold antiplatelet for now    Bradycardia    5 second pause noted on tele    AV block and SSS both noted    Possibly may need PPM    Stopped coreg today    Will order holter for further eval-Has never had holter prior    Hx of CAD s/p 3 V CABG and multiple PCI in the past    Recent OP echo reassuring    EF 55% with Mod MR-will recheck with anemia and now bradycardia    Cont. Med. Tx.    Will follow    Last stress at office 2016- EF greater than 70% with no ischemia noted    Echo 2/12/21    EF 55%    LVH    Mild AS/ mild-mod AI    Mod MR    Past medical history:    has a past medical history of Acute anterior wall MI ([address] CAD (coronary artery disease), Cancer ([facility] Cancer ([address] Carotid artery stenosis, Diabetes mellitus ([facility] H/O cardiovascular stress test, H/O echocardiogram, Hyperlipidemia, Hypertension, and Protein-calorie malnutrition, moderate ([address] Past surgical history:   has a past surgical history that includes Coronary artery bypass graft (5/07); Diagnostic Cardiac Cath Lab Procedure (3/30/07); Abdomen surgery; other surgical history (08 11 2015); skin biopsy; fracture surgery (Left, 2001); fracture surgery (Right, 2017); Hysterectomy (1996); and Wrist fracture surgery (Left, 2/17/2021). Social History:   reports that she quit smoking about 32 years ago. She started smoking about 62 years ago.  She has a 30.00 pack-year smoking history. She has never used smokeless tobacco. She reports that she does not drink alcohol or use drugs.   Family history:  family history includes Cancer in her father; Heart Attack in her mother; Heart Disease in her brother and mother.          Allergies   Allergen Reactions    Tramadol Itching    Vicodin [Hydrocodone-Acetaminophen] Itching       Objective:   BP (!) 160/89   Pulse 69   Temp 99 °F (37.2 °C) (Oral)   Resp 14   Ht 5' 2\" (1.575 m)   Wt 131 lb 14.4 oz (59.8 kg)   SpO2 96%   BMI 24.12 kg/m²       Intake/Output Summary (Last 24 hours) at 4/19/2021 0814  Last data filed at 4/18/2021 1351  Gross per 24 hour   Intake 1020 ml   Output --   Net 1020 ml       Medications:   Scheduled Meds:   valsartan  80 mg Oral Nightly    carvedilol  12.5 mg Oral BID    amLODIPine  10 mg Oral Daily    atorvastatin  40 mg Oral Daily    buPROPion  150 mg Oral BID    cyanocobalamin  1,000 mcg Intramuscular Q7 Days    folic acid-pyridoxine-cyancobalamin  1 tablet Oral Daily    methocarbamol  750 mg Oral 4x Daily    therapeutic multivitamin-minerals  1 tablet Oral Daily    pantoprazole  40 mg Oral QAM AC    venlafaxine  150 mg Oral Daily    sodium chloride flush  5-40 mL Intravenous 2 times per day    insulin lispro  0-6 Units Subcutaneous TID WC    insulin lispro  0-3 Units Subcutaneous Nightly    valsartan  160 mg Oral Daily    And    hydroCHLOROthiazide  25 mg Oral Daily    fenofibrate  160 mg Oral Daily      Infusions:   dextrose      sodium chloride        PRN Meds:  sodium chloride flush, ALPRAZolam, oxyCODONE, glucose, dextrose, glucagon (rDNA), dextrose, sodium chloride flush, sodium chloride, promethazine **OR** ondansetron, polyethylene glycol, acetaminophen **OR** acetaminophen       Physical Exam:  Vitals:    04/19/21 0745   BP: (!) 160/89   Pulse: 69   Resp: 14   Temp: 99 °F (37.2 °C)   SpO2: 96%        General: AAO, NAD  Chest: Nontender  Cardiac: First and Second Heart Sounds are Normal, No Murmurs or Gallops noted  Lungs:Clear to auscultation and percussion. Abdomen: Soft, NT, ND, +BS  Extremities: No clubbing, no edema  Vascular:  Equal 2+ peripheral pulses. Lab Data:  CBC:   Recent Labs     04/17/21  0442 04/17/21  0442 04/17/21  1856 04/17/21  2348 04/18/21  1147 04/18/21  2318   WBC 14.5*  --  14.8*  --  15.2*  --    HGB 8.3*   < > 8.4* 8.0* 8.4* 7.8*   HCT 27.8*   < > 27.9* 27.2* 27.6* 25.3*   MCV 87.1  --  87.7  --  86.0  --      --  251  --  166  --     < > = values in this interval not displayed. BMP:   Recent Labs     04/16/21  1309 04/17/21  0822 04/18/21  1147   * 131* 135   K 4.2 4.4 3.5    99 100   CO2 21 24 24   BUN 37* 25* 14   CREATININE 1.3* 1.2* 1.2*     LIVER PROFILE:   Recent Labs     04/16/21  1309 04/18/21  1147   AST 11* 15   ALT 7* 7*   LIPASE 43 31   BILITOT 0.2 0.4   ALKPHOS 80 77     PT/INR:   Recent Labs     04/16/21  1409 04/18/21  1147   PROTIME 14.4 15.6*   INR 1.19 1.29     APTT:   Recent Labs     04/16/21  1409 04/18/21  1147   APTT 23.8* 27.4     BNP:  No results for input(s): BNP in the last 72 hours.       Assessment:  Patient Active Problem List    Diagnosis Date Noted    Stage 3 chronic kidney disease     GI bleed 04/16/2021    Closed fracture of left distal radius 02/11/2021    Carcinoid syndrome ([facility] 04/21/2020    Immune thrombocytopenic purpura (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    Peripheral polyneuropathy     Ataxia     Urinary tract infection without hematuria     Stroke-like symptoms 10/14/2019    Gastroenteritis 03/29/2019    HTN (hypertension) 08/15/2015    Hypokalemia 08/15/2015    Anxiety 08/15/2015    Protein-calorie malnutrition, moderate ([facility] 08/12/2015    Ischemia, bowel ([facility] 08/11/2015    S/P CABG x 3 02/03/2014    Diabetes mellitus ([facility]     CAD (coronary artery disease)     Hyperlipidemia     Carotid artery stenosis 03/01/2000 Electronically signed by Emil Kennedy PA-C on 4/19/2021 at 8:14 AM     Electronically signed by Joann Monaco MD on 4/19/21 at 11:16 AM EDT

## 2021-04-19 NOTE — PROGRESS NOTES
DOING [facility] HAD CARDIAC ARRYTHMIA AND TRANSFERRED TO ICU FOR PACEMAKER TODAY NO GROSS GIT BLEEDING  VITALS STABLE   LABS NOTED HB 8.0  WILL GET SBFT LATER   D/W DAUGHTER ANTONIO

## 2021-04-19 NOTE — PROGRESS NOTES
Nephrology Progress Note        [address], [address] [address]  Phone: [phone]  Office Hours: 8:30AM - 4:30PM  Monday - Friday 4/19/2021 6:56 AM  Subjective:   Admit Date: 4/16/2021  PCP: Astrid Perry MD  Interval History:   BP elevated  S/p GI scopes yesterday    Diet: DIET GENERAL; Carb Control: 5 carb choices (75 gms)/meal      Data:   Scheduled Meds:   valsartan  80 mg Oral Nightly    amLODIPine  10 mg Oral Daily    atorvastatin  40 mg Oral Daily    buPROPion  150 mg Oral BID    carvedilol  25 mg Oral BID    cyanocobalamin  1,000 mcg Intramuscular Q7 Days    folic acid-pyridoxine-cyancobalamin  1 tablet Oral Daily    methocarbamol  750 mg Oral 4x Daily    therapeutic multivitamin-minerals  1 tablet Oral Daily    pantoprazole  40 mg Oral QAM AC    venlafaxine  150 mg Oral Daily    sodium chloride flush  5-40 mL Intravenous 2 times per day    insulin lispro  0-6 Units Subcutaneous TID WC    insulin lispro  0-3 Units Subcutaneous Nightly    valsartan  160 mg Oral Daily    And    hydroCHLOROthiazide  25 mg Oral Daily    fenofibrate  160 mg Oral Daily     Continuous Infusions:   dextrose      sodium chloride       PRN Meds:sodium chloride flush, ALPRAZolam, oxyCODONE, glucose, dextrose, glucagon (rDNA), dextrose, sodium chloride flush, sodium chloride, promethazine **OR** ondansetron, polyethylene glycol, acetaminophen **OR** acetaminophen  I/O last 3 completed shifts: In: 1020 [P.O.:120; I.V.:900]  Out: -   No intake/output data recorded.     Intake/Output Summary (Last 24 hours) at 4/19/2021 0656  Last data filed at 4/18/2021 1351  Gross per 24 hour   Intake 1020 ml   Output --   Net 1020 ml       CBC:   Recent Labs     04/17/21  0442 04/17/21  0442 04/17/21  1856 04/17/21  2348 04/18/21  1147 04/18/21  2318   WBC 14.5*  --  14.8*  --  15.2*  --    HGB 8.3*   < > 8.4* 8.0* 8.4* 7.8*     --  251  --  166  --     < > = values in this interval not displayed. BMP:    Recent Labs     04/16/21  1309 04/17/21  0822 04/18/21  1147   * 131* 135   K 4.2 4.4 3.5    99 100   CO2 21 24 24   BUN 37* 25* 14   CREATININE 1.3* 1.2* 1.2*   GLUCOSE 206* 111* 132*     Hepatic:   Recent Labs     04/16/21  1309 04/18/21  1147   AST 11* 15   ALT 7* 7*   BILITOT 0.2 0.4   ALKPHOS 80 77     Troponin: No results for input(s): TROPONINI in the last 72 hours. BNP: No results for input(s): BNP in the last 72 hours. Lipids: No results for input(s): CHOL, HDL in the last 72 hours.     Invalid input(s): LDLCALCU  ABGs:   Lab Results   Component Value Date    PO2ART 112 08/12/2015    QFB4LOY 24.0 08/12/2015     INR:   Recent Labs     04/16/21  1409 04/18/21  1147   INR 1.19 1.29       Objective:   Vitals: BP (!) 178/74   Pulse 62   Temp 96.4 °F (35.8 °C) (Oral)   Resp 16   Ht 5' 2\" (1.575 m)   Wt 131 lb 14.4 oz (59.8 kg)   SpO2 94%   BMI 24.12 kg/m²   General appearance: alert and cooperative with exam, in no acute distress  HEENT: normocephalic, atraumatic,   Neck: supple, trachea midline  Lungs:  breathing comfortably on room air  Abdomen: non distended,   Extremities: extremities atraumatic, no cyanosis or edema    Assessment and Plan:     Patient Active Problem List     Diagnosis Date Noted    GI bleed 04/16/2021    Closed fracture of left distal radius 02/11/2021    Carcinoid syndrome ([address] 04/21/2020    Immune thrombocytopenic purpura ([address] 04/21/2020    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

## 2021-04-19 NOTE — PROGRESS NOTES
Entered room to check on patient due to bradycardic episode. Dr Ebony Oliveira at bedside. Patient alert and oriented. New orders placed per Dr Ebony Oliveira.

## 2021-04-19 NOTE — PROGRESS NOTES
Consult received. Pt had 8 second pause and temporary pacing wire placed today by Dr. Ricardo Gastelum. Will plan for PPM tomorrow am.   NPO after MN.

## 2021-04-19 NOTE — CODE DOCUMENTATION
Echo tech called RRT for shaking, stared off and change in level of consciousness. Dr. Ranjeet Monge responded ordered EKG, transfer to ICU for pacing due to bradycardia. 10mg Hydralazine IV push ordered one time. Patient BP elevated, patient answering appropriate to questions.   Glucose 260 after breakfast.

## 2021-04-19 NOTE — OP NOTE
Operative Note      Patient: Ekaterina Ibanez  YOB: 1940  MRN: [id number]    Date of Procedure: 4/18/2021    Pre-Op Diagnosis: heart block  Post-Op Diagnosis: Same         Estimated Blood Loss (mL): less than 50     Complications: None     Gretel Black MD on 4/19/2021 at 10:24 AM     [facility] -[id number]  LEFT MAIN PATENT   LAD MID 70% STENOSIS  LCX MILD DX  % OCCLUDED  LIMA TO LAD AND DIAGONAL PATENT  SVG TO OM PATENT  SVG TO RCA PATENT  LVEDP  15  MEDICAL TREATMENT FROM CAD  FOR A PERMANENT PACER  TEMP=PACER PLACED  NO COMPLICATIONS  RIGHT ARTERIAL AND VENOUS SHEATH

## 2021-04-19 NOTE — PROCEDURES
[address], [address] [place] [address]                            CARDIAC CATHETERIZATION    PATIENT NAME: Sussy Kirk                    :        1940  MED REC NO:   [id number]                          ROOM:       2119  ACCOUNT NO:   [de-identified]                           ADMIT DATE: 2021  PROVIDER:     Olvin Curry MD    DATE OF PROCEDURE:  2021    INDICATION:  Bradycardia and symptomatic bradycardia. This is a 58-year-old female patient with a history of coronary artery  disease status post CABG done. She had a three-vessel bypass surgery  done. The patient had an echo done in office in 2021, with LV  function preserved. She had bypass surgery done in . She presents to have a complete heart block present. The patient is  symptomatic with it. Initially, the plan was to watch her clinically,  and she did not want to have a pacer. The patient became more  symptomatic with a complete heart block present. Therefore, patient's consent was obtained from the family, and the  patient was brought to the cath lab. The patient was injected with a 20 mL of 2% lidocaine in the right  femoral artery region using a micropuncture needle. Under ultrasound  guidance, the right femoral artery was canalized and the 4-Yi was  placed in the right femoral artery and a 6-Yi sheath was placed in  the right femoral vein. Temporary pacer was placed through the right groin. Using a JL5 4-Yi catheter, left coronary angiogram was performed. Left coronary angiogram revealed the left main is patent. It bifurcates  the LAD and the circ and the ramus. The circ is a medium-sized vessel, it gives off the OM branch. There is  a mild disease noted in that. LAD is a medium-sized vessel. LAD reaches and wraps the apex. LAD has  a mid 70% stenosis noted.   There is antegrade and retrograde flow noted  in the LAD. It fills the diagonal branches also and the anastomosis  right at the diagonal branch. It fills the diagonal and also the LAD. LAD reaches and wraps the apex. There is a MIGUELANGEL-3 flow noted. Native right coronary artery was not injected as it was occluded. SVG graft to PDA was performed on distal right coronary artery. SVG  graft to distal RCA was patent. PDA and the PL branch was patent. Moderate amount of annular calcification was noted at the mitral valve. SVG graft to the OM was performed out to the ramus and SVG graft to the  ramus was widely patent.    _____ the LIMA angiogram was performed. Subclavian was patent. LIMA  was also patent. EDP was around 15 mmHg present. On the pullback,  there was no gradient present across the aortic valve. IMPRESSION:  1. Successful temporary pacemaker placement. 2.  The patient has a moderate annular calcification noted on the mitral  valve. 3.  The left main is patent. 4.  The circ has a mild disease noted. 5.  LAD has mid 70% stenosis present. 6.  The RCA is noted to be occluded. 7. CONNELLY to LAD is patent, it fills the retrograde and antegrade, the  LAD, and the diagonal branch. 8.  SVG to OM is patent. 9.  SVG to RCA is patent. 10.  EDP is noted to be 15 mmHg. The patient bypass or open. We will continue medical treatment, and I  think the patient will need a pacemaker as she has a complete heart  block present.     Blood loss Edd Arnold MD    D: 04/19/2021 10:28:39       T: 04/19/2021 10:32:47     [email or web address]  Job#: [id number]     Doc#: [id number]    CC:

## 2021-04-20 ENCOUNTER — ANESTHESIA EVENT (OUTPATIENT)
Dept: OPERATING ROOM | Age: 81
DRG: 982 | End: 2021-04-20
Payer: MEDICARE

## 2021-04-20 ENCOUNTER — APPOINTMENT (OUTPATIENT)
Dept: GENERAL RADIOLOGY | Age: 81
DRG: 982 | End: 2021-04-20
Payer: MEDICARE

## 2021-04-20 ENCOUNTER — ANESTHESIA (OUTPATIENT)
Dept: OPERATING ROOM | Age: 81
DRG: 982 | End: 2021-04-20
Payer: MEDICARE

## 2021-04-20 ENCOUNTER — APPOINTMENT (OUTPATIENT)
Dept: CT IMAGING | Age: 81
DRG: 982 | End: 2021-04-20
Payer: MEDICARE

## 2021-04-20 VITALS
OXYGEN SATURATION: 91 % | SYSTOLIC BLOOD PRESSURE: 148 MMHG | TEMPERATURE: 96.8 F | DIASTOLIC BLOOD PRESSURE: 58 MMHG | RESPIRATION RATE: 15 BRPM

## 2021-04-20 LAB
ALBUMIN ELP: 3 GM/DL (ref 3.2–5.6)
ALBUMIN SERPL-MCNC: 3.5 GM/DL (ref 3.4–5)
ALP BLD-CCNC: 76 IU/L (ref 40–128)
ALPHA-1-GLOBULIN: 0.2 GM/DL (ref 0.1–0.4)
ALPHA-2-GLOBULIN: 0.5 GM/DL (ref 0.4–1.2)
ALT SERPL-CCNC: 9 U/L (ref 10–40)
ANION GAP SERPL CALCULATED.3IONS-SCNC: 7 MMOL/L (ref 4–16)
AST SERPL-CCNC: 15 IU/L (ref 15–37)
BETA GLOBULIN: 0.8 GM/DL (ref 0.5–1.3)
BILIRUB SERPL-MCNC: 0.3 MG/DL (ref 0–1)
BUN BLDV-MCNC: 12 MG/DL (ref 6–23)
CALCIUM SERPL-MCNC: 8.2 MG/DL (ref 8.3–10.6)
CHLORIDE BLD-SCNC: 105 MMOL/L (ref 99–110)
CO2: 23 MMOL/L (ref 21–32)
CREAT SERPL-MCNC: 1 MG/DL (ref 0.6–1.1)
EKG ATRIAL RATE: 64 BPM
EKG DIAGNOSIS: NORMAL
EKG P AXIS: 60 DEGREES
EKG P-R INTERVAL: 190 MS
EKG Q-T INTERVAL: 424 MS
EKG QRS DURATION: 112 MS
EKG QTC CALCULATION (BAZETT): 437 MS
EKG R AXIS: 70 DEGREES
EKG T AXIS: 114 DEGREES
EKG VENTRICULAR RATE: 64 BPM
GAMMA GLOBULIN: 0.8 GM/DL (ref 0.5–1.6)
GFR AFRICAN AMERICAN: >60 ML/MIN/1.73M2
GFR NON-AFRICAN AMERICAN: 53 ML/MIN/1.73M2
GLUCOSE BLD-MCNC: 158 MG/DL (ref 70–99)
GLUCOSE BLD-MCNC: 164 MG/DL (ref 70–99)
GLUCOSE BLD-MCNC: 192 MG/DL (ref 70–99)
GLUCOSE BLD-MCNC: 194 MG/DL (ref 70–99)
GLUCOSE BLD-MCNC: 225 MG/DL (ref 70–99)
HCT VFR BLD CALC: 29.9 % (ref 37–47)
HEMOGLOBIN: 9.2 GM/DL (ref 12.5–16)
POTASSIUM SERPL-SCNC: 4.2 MMOL/L (ref 3.5–5.1)
SODIUM BLD-SCNC: 135 MMOL/L (ref 135–145)
SPEP INTERPRETATION: ABNORMAL
TOTAL PROTEIN: 5.4 GM/DL (ref 6.4–8.2)
TOTAL PROTEIN: 5.5 GM/DL (ref 6.4–8.2)

## 2021-04-20 PROCEDURE — 84132 ASSAY OF SERUM POTASSIUM: CPT

## 2021-04-20 PROCEDURE — 2060000000 HC ICU INTERMEDIATE R&B

## 2021-04-20 PROCEDURE — 3600000013 HC SURGERY LEVEL 3 ADDTL 15MIN: Performed by: THORACIC SURGERY (CARDIOTHORACIC VASCULAR SURGERY)

## 2021-04-20 PROCEDURE — 82962 GLUCOSE BLOOD TEST: CPT

## 2021-04-20 PROCEDURE — 76000 FLUOROSCOPY <1 HR PHYS/QHP: CPT

## 2021-04-20 PROCEDURE — 85014 HEMATOCRIT: CPT

## 2021-04-20 PROCEDURE — 74176 CT ABD & PELVIS W/O CONTRAST: CPT

## 2021-04-20 PROCEDURE — 6360000002 HC RX W HCPCS: Performed by: NURSE ANESTHETIST, CERTIFIED REGISTERED

## 2021-04-20 PROCEDURE — 3700000000 HC ANESTHESIA ATTENDED CARE: Performed by: THORACIC SURGERY (CARDIOTHORACIC VASCULAR SURGERY)

## 2021-04-20 PROCEDURE — 2580000003 HC RX 258: Performed by: THORACIC SURGERY (CARDIOTHORACIC VASCULAR SURGERY)

## 2021-04-20 PROCEDURE — 2709999900 HC NON-CHARGEABLE SUPPLY: Performed by: THORACIC SURGERY (CARDIOTHORACIC VASCULAR SURGERY)

## 2021-04-20 PROCEDURE — 6360000002 HC RX W HCPCS: Performed by: THORACIC SURGERY (CARDIOTHORACIC VASCULAR SURGERY)

## 2021-04-20 PROCEDURE — 0JH606Z INSERTION OF PACEMAKER, DUAL CHAMBER INTO CHEST SUBCUTANEOUS TISSUE AND FASCIA, OPEN APPROACH: ICD-10-PCS | Performed by: THORACIC SURGERY (CARDIOTHORACIC VASCULAR SURGERY)

## 2021-04-20 PROCEDURE — 85025 COMPLETE CBC W/AUTO DIFF WBC: CPT

## 2021-04-20 PROCEDURE — 2580000003 HC RX 258: Performed by: INTERNAL MEDICINE

## 2021-04-20 PROCEDURE — 3600000003 HC SURGERY LEVEL 3 BASE: Performed by: THORACIC SURGERY (CARDIOTHORACIC VASCULAR SURGERY)

## 2021-04-20 PROCEDURE — 6360000002 HC RX W HCPCS

## 2021-04-20 PROCEDURE — 3700000001 HC ADD 15 MINUTES (ANESTHESIA): Performed by: THORACIC SURGERY (CARDIOTHORACIC VASCULAR SURGERY)

## 2021-04-20 PROCEDURE — 71045 X-RAY EXAM CHEST 1 VIEW: CPT

## 2021-04-20 PROCEDURE — 6360000002 HC RX W HCPCS: Performed by: NURSE PRACTITIONER

## 2021-04-20 PROCEDURE — C1898 LEAD, PMKR, OTHER THAN TRANS: HCPCS | Performed by: THORACIC SURGERY (CARDIOTHORACIC VASCULAR SURGERY)

## 2021-04-20 PROCEDURE — 99232 SBSQ HOSP IP/OBS MODERATE 35: CPT | Performed by: INTERNAL MEDICINE

## 2021-04-20 PROCEDURE — 6370000000 HC RX 637 (ALT 250 FOR IP): Performed by: THORACIC SURGERY (CARDIOTHORACIC VASCULAR SURGERY)

## 2021-04-20 PROCEDURE — 6360000002 HC RX W HCPCS: Performed by: INTERNAL MEDICINE

## 2021-04-20 PROCEDURE — 94761 N-INVAS EAR/PLS OXIMETRY MLT: CPT

## 2021-04-20 PROCEDURE — 2500000003 HC RX 250 WO HCPCS: Performed by: THORACIC SURGERY (CARDIOTHORACIC VASCULAR SURGERY)

## 2021-04-20 PROCEDURE — 2000000000 HC ICU R&B

## 2021-04-20 PROCEDURE — 02H63JZ INSERTION OF PACEMAKER LEAD INTO RIGHT ATRIUM, PERCUTANEOUS APPROACH: ICD-10-PCS | Performed by: THORACIC SURGERY (CARDIOTHORACIC VASCULAR SURGERY)

## 2021-04-20 PROCEDURE — 2700000000 HC OXYGEN THERAPY PER DAY

## 2021-04-20 PROCEDURE — 6370000000 HC RX 637 (ALT 250 FOR IP): Performed by: INTERNAL MEDICINE

## 2021-04-20 PROCEDURE — 02HK3JZ INSERTION OF PACEMAKER LEAD INTO RIGHT VENTRICLE, PERCUTANEOUS APPROACH: ICD-10-PCS | Performed by: THORACIC SURGERY (CARDIOTHORACIC VASCULAR SURGERY)

## 2021-04-20 PROCEDURE — 80053 COMPREHEN METABOLIC PANEL: CPT

## 2021-04-20 PROCEDURE — C1785 PMKR, DUAL, RATE-RESP: HCPCS | Performed by: THORACIC SURGERY (CARDIOTHORACIC VASCULAR SURGERY)

## 2021-04-20 PROCEDURE — 93010 ELECTROCARDIOGRAM REPORT: CPT | Performed by: INTERNAL MEDICINE

## 2021-04-20 PROCEDURE — 85018 HEMOGLOBIN: CPT

## 2021-04-20 DEVICE — CABLE PACE LNG L12IN CHN A OR V SM CLP EPG TEMP DISP: Type: IMPLANTABLE DEVICE | Status: FUNCTIONAL

## 2021-04-20 DEVICE — LEAD PACE 6FR L52CM RNG ELECTRD DIA2MM PLATINIZED HELIX SIL: Type: IMPLANTABLE DEVICE | Status: FUNCTIONAL

## 2021-04-20 DEVICE — PACEMAKER CARD 22.5GM W50.8XH46.6MM D7.4MM TI POLYUR SIL: Type: IMPLANTABLE DEVICE | Status: FUNCTIONAL

## 2021-04-20 RX ORDER — MORPHINE SULFATE 4 MG/ML
4 INJECTION, SOLUTION INTRAMUSCULAR; INTRAVENOUS ONCE
Status: COMPLETED | OUTPATIENT
Start: 2021-04-20 | End: 2021-04-20

## 2021-04-20 RX ORDER — SODIUM CHLORIDE 0.9 % (FLUSH) 0.9 %
5-40 SYRINGE (ML) INJECTION PRN
Status: DISCONTINUED | OUTPATIENT
Start: 2021-04-20 | End: 2021-04-26 | Stop reason: HOSPADM

## 2021-04-20 RX ORDER — SODIUM CHLORIDE 9 MG/ML
25 INJECTION, SOLUTION INTRAVENOUS PRN
Status: DISCONTINUED | OUTPATIENT
Start: 2021-04-20 | End: 2021-04-26 | Stop reason: HOSPADM

## 2021-04-20 RX ORDER — PROPOFOL 10 MG/ML
INJECTION, EMULSION INTRAVENOUS CONTINUOUS PRN
Status: DISCONTINUED | OUTPATIENT
Start: 2021-04-20 | End: 2021-04-20 | Stop reason: SDUPTHER

## 2021-04-20 RX ORDER — BUPIVACAINE HYDROCHLORIDE AND EPINEPHRINE 5; 5 MG/ML; UG/ML
INJECTION, SOLUTION EPIDURAL; INTRACAUDAL; PERINEURAL
Status: COMPLETED | OUTPATIENT
Start: 2021-04-20 | End: 2021-04-20

## 2021-04-20 RX ORDER — CARVEDILOL 12.5 MG/1
12.5 TABLET ORAL 2 TIMES DAILY WITH MEALS
Status: DISCONTINUED | OUTPATIENT
Start: 2021-04-20 | End: 2021-04-26 | Stop reason: HOSPADM

## 2021-04-20 RX ORDER — SODIUM CHLORIDE 0.9 % (FLUSH) 0.9 %
5-40 SYRINGE (ML) INJECTION EVERY 12 HOURS SCHEDULED
Status: DISCONTINUED | OUTPATIENT
Start: 2021-04-20 | End: 2021-04-26 | Stop reason: HOSPADM

## 2021-04-20 RX ORDER — CEFAZOLIN SODIUM 2 G/50ML
SOLUTION INTRAVENOUS PRN
Status: DISCONTINUED | OUTPATIENT
Start: 2021-04-20 | End: 2021-04-20 | Stop reason: SDUPTHER

## 2021-04-20 RX ORDER — MORPHINE SULFATE 2 MG/ML
INJECTION, SOLUTION INTRAMUSCULAR; INTRAVENOUS
Status: COMPLETED
Start: 2021-04-20 | End: 2021-04-20

## 2021-04-20 RX ORDER — MORPHINE SULFATE 2 MG/ML
2 INJECTION, SOLUTION INTRAMUSCULAR; INTRAVENOUS ONCE
Status: COMPLETED | OUTPATIENT
Start: 2021-04-20 | End: 2021-04-20

## 2021-04-20 RX ORDER — VALSARTAN 160 MG/1
160 TABLET ORAL NIGHTLY
Status: DISCONTINUED | OUTPATIENT
Start: 2021-04-20 | End: 2021-04-26 | Stop reason: HOSPADM

## 2021-04-20 RX ADMIN — CEFAZOLIN SODIUM 2000 MG: 2 SOLUTION INTRAVENOUS at 13:05

## 2021-04-20 RX ADMIN — HYDRALAZINE HYDROCHLORIDE 50 MG: 50 TABLET ORAL at 15:39

## 2021-04-20 RX ADMIN — METHOCARBAMOL 750 MG: 500 TABLET ORAL at 17:52

## 2021-04-20 RX ADMIN — POTASSIUM CHLORIDE 10 MEQ: 7.46 INJECTION, SOLUTION INTRAVENOUS at 04:02

## 2021-04-20 RX ADMIN — SODIUM CHLORIDE, PRESERVATIVE FREE 10 ML: 5 INJECTION INTRAVENOUS at 20:30

## 2021-04-20 RX ADMIN — SODIUM CHLORIDE: 9 INJECTION, SOLUTION INTRAVENOUS at 12:51

## 2021-04-20 RX ADMIN — ONDANSETRON 4 MG: 2 INJECTION INTRAMUSCULAR; INTRAVENOUS at 20:36

## 2021-04-20 RX ADMIN — CEFAZOLIN 2000 MG: 10 INJECTION, POWDER, FOR SOLUTION INTRAVENOUS at 23:30

## 2021-04-20 RX ADMIN — OXYCODONE HYDROCHLORIDE 5 MG: 5 TABLET ORAL at 00:54

## 2021-04-20 RX ADMIN — POTASSIUM CHLORIDE 10 MEQ: 7.46 INJECTION, SOLUTION INTRAVENOUS at 02:55

## 2021-04-20 RX ADMIN — SODIUM CHLORIDE, PRESERVATIVE FREE 10 ML: 5 INJECTION INTRAVENOUS at 23:36

## 2021-04-20 RX ADMIN — CARVEDILOL 12.5 MG: 12.5 TABLET, FILM COATED ORAL at 18:00

## 2021-04-20 RX ADMIN — POTASSIUM CHLORIDE 10 MEQ: 7.46 INJECTION, SOLUTION INTRAVENOUS at 01:48

## 2021-04-20 RX ADMIN — PROPOFOL 150 MCG/KG/MIN: 10 INJECTION, EMULSION INTRAVENOUS at 12:56

## 2021-04-20 RX ADMIN — HYDRALAZINE HYDROCHLORIDE 50 MG: 50 TABLET ORAL at 06:54

## 2021-04-20 RX ADMIN — POTASSIUM CHLORIDE 10 MEQ: 7.46 INJECTION, SOLUTION INTRAVENOUS at 00:55

## 2021-04-20 RX ADMIN — SODIUM CHLORIDE, PRESERVATIVE FREE 10 ML: 5 INJECTION INTRAVENOUS at 15:42

## 2021-04-20 RX ADMIN — OXYCODONE HYDROCHLORIDE 5 MG: 5 TABLET ORAL at 15:40

## 2021-04-20 RX ADMIN — VALSARTAN 160 MG: 160 TABLET, FILM COATED ORAL at 23:30

## 2021-04-20 RX ADMIN — MORPHINE SULFATE 4 MG: 4 INJECTION, SOLUTION INTRAMUSCULAR; INTRAVENOUS at 22:11

## 2021-04-20 RX ADMIN — ALPRAZOLAM 1 MG: 0.5 TABLET ORAL at 06:54

## 2021-04-20 RX ADMIN — METHOCARBAMOL 750 MG: 500 TABLET ORAL at 23:29

## 2021-04-20 RX ADMIN — MORPHINE SULFATE 2 MG: 2 INJECTION, SOLUTION INTRAMUSCULAR; INTRAVENOUS at 20:39

## 2021-04-20 RX ADMIN — ACETAMINOPHEN 650 MG: 325 TABLET ORAL at 21:12

## 2021-04-20 RX ADMIN — PANTOPRAZOLE SODIUM 40 MG: 40 TABLET, DELAYED RELEASE ORAL at 06:54

## 2021-04-20 RX ADMIN — BUPROPION HYDROCHLORIDE 150 MG: 150 TABLET, EXTENDED RELEASE ORAL at 23:30

## 2021-04-20 RX ADMIN — PROMETHAZINE HYDROCHLORIDE 12.5 MG: 25 TABLET ORAL at 23:29

## 2021-04-20 RX ADMIN — SODIUM CHLORIDE, PRESERVATIVE FREE 10 ML: 5 INJECTION INTRAVENOUS at 15:43

## 2021-04-20 RX ADMIN — ALPRAZOLAM 1 MG: 0.5 TABLET ORAL at 18:00

## 2021-04-20 RX ADMIN — OXYCODONE HYDROCHLORIDE 5 MG: 5 TABLET ORAL at 21:12

## 2021-04-20 ASSESSMENT — PAIN DESCRIPTION - LOCATION
LOCATION: ABDOMEN
LOCATION: ABDOMEN

## 2021-04-20 ASSESSMENT — PAIN SCALES - GENERAL
PAINLEVEL_OUTOF10: 7
PAINLEVEL_OUTOF10: 7
PAINLEVEL_OUTOF10: 10
PAINLEVEL_OUTOF10: 6

## 2021-04-20 ASSESSMENT — PULMONARY FUNCTION TESTS
PIF_VALUE: 0
PIF_VALUE: 1
PIF_VALUE: 0
PIF_VALUE: 1
PIF_VALUE: 0
PIF_VALUE: 1
PIF_VALUE: 0
PIF_VALUE: 1
PIF_VALUE: 0
PIF_VALUE: 0
PIF_VALUE: 1
PIF_VALUE: 0
PIF_VALUE: 1
PIF_VALUE: 0
PIF_VALUE: 1
PIF_VALUE: 0
PIF_VALUE: 1
PIF_VALUE: 0
PIF_VALUE: 1
PIF_VALUE: 0
PIF_VALUE: 1
PIF_VALUE: 0
PIF_VALUE: 1
PIF_VALUE: 1
PIF_VALUE: 0

## 2021-04-20 ASSESSMENT — PAIN DESCRIPTION - PROGRESSION
CLINICAL_PROGRESSION: NOT CHANGED
CLINICAL_PROGRESSION: GRADUALLY IMPROVING
CLINICAL_PROGRESSION: NOT CHANGED
CLINICAL_PROGRESSION: GRADUALLY IMPROVING
CLINICAL_PROGRESSION: NOT CHANGED

## 2021-04-20 ASSESSMENT — PAIN DESCRIPTION - DESCRIPTORS
DESCRIPTORS: ACHING;CONSTANT;CRAMPING
DESCRIPTORS: ACHING;SORE
DESCRIPTORS: ACHING;CONSTANT;CRAMPING

## 2021-04-20 ASSESSMENT — PAIN DESCRIPTION - ONSET
ONSET: ON-GOING
ONSET: SUDDEN
ONSET: ON-GOING

## 2021-04-20 ASSESSMENT — PAIN DESCRIPTION - FREQUENCY
FREQUENCY: CONTINUOUS

## 2021-04-20 ASSESSMENT — PAIN - FUNCTIONAL ASSESSMENT
PAIN_FUNCTIONAL_ASSESSMENT: ACTIVITIES ARE NOT PREVENTED
PAIN_FUNCTIONAL_ASSESSMENT: INTOLERABLE, UNABLE TO DO ANY ACTIVE OR PASSIVE ACTIVITIES
PAIN_FUNCTIONAL_ASSESSMENT: ACTIVITIES ARE NOT PREVENTED

## 2021-04-20 ASSESSMENT — PAIN DESCRIPTION - PAIN TYPE
TYPE: ACUTE PAIN

## 2021-04-20 ASSESSMENT — PAIN DESCRIPTION - ORIENTATION
ORIENTATION: MID;LEFT;UPPER
ORIENTATION: MID;LEFT;UPPER

## 2021-04-20 NOTE — ANESTHESIA POSTPROCEDURE EVALUATION
Department of Anesthesiology  Postprocedure Note    Patient: Claudio Hobson  MRN: [id number]  YOB: 1940  Date of evaluation: 4/20/2021  Time:  2:44 PM     Procedure Summary     Date: 04/20/21 Room / Location: [address]    Anesthesia Start: 4299 Anesthesia Stop: 2197    Procedure: PACEMAKER INSERTION PERMANENT (N/A Chest) Diagnosis: (-)    Surgeons: Franca Winston MD Responsible Provider: Nelia Ignacio DO    Anesthesia Type: MAC ASA Status: 4          Anesthesia Type: MAC    Tyler Phase I:      Tyler Phase II:      Last vitals: Reviewed and per EMR flowsheets.        Anesthesia Post Evaluation    Patient location during evaluation: ICU  Patient participation: complete - patient participated  Level of consciousness: awake and alert  Airway patency: patent  Nausea & Vomiting: no vomiting  Complications: no  Cardiovascular status: hemodynamically stable and blood pressure returned to baseline  Respiratory status: acceptable, spontaneous ventilation, nonlabored ventilation and room air  Hydration status: stable

## 2021-04-20 NOTE — PROGRESS NOTES
[facility]    Hospitalist Progress Note      Name:  Nimco Baker /Age/Sex: 1940  (79 y.o. female)   MRN & CSN:  [id number] & [id number] Admission Date/Time: 2021 11:56 AM   Location:  -A PCP: Sheela Viramontes MD         Hospital Day: 5    Assessment and Plan:   Nimco Baker is a 79 y.o.  female  who presents with GI bleed    Lower GI bleeding    Status post EGD with gastritis, duodenitis. Colonoscopy with diverticulosis and hemorrhoids  Hemoglobin 8.5. Monitor hemoglobin closely and transfuse for Hb less than 7  For possible small bowel follow-through. GI following. Complete heart block -Symptomatic bradycardia with 8 sec sinus pause    Status post temporary pacemaker placed   Keep potassium more than 4 and magnesium more than 2. For pacemaker placement today - by CT surgery      Acute kidney injury on chronic kidney disease stage IIIb. Creatinine is stable, avoid nephrotoxins, nephrology following     Hypokalemia: Potassium 3.1. Replace per protocol     History of neuroendocrine tumor: Oncology following. Patient was on octreotide.     Hypertension: Continue with hydralazine, amlodipine, losartan, hydrochlorothiazide    Chronic medical conditions: Resume home medications when clinically appropriate    Coronary artery disease  Hyperlipidemia  Type II diabetes  Depression    Diet DIET FULL LIQUID; Carb Control: 5 carb choices (75 gms)/meal   DVT Prophylaxis [] Lovenox, []  Heparin, [] SCDs, []No VTE prophylaxis, patient ambulating   GI Prophylaxis [] PPI, [] H2 Blocker, [] No GI prophylaxis, patient is receiving diet/Tube Feeds   Code Status Full Code   Disposition Patient requires continued admission due to GI, heart block    MDM [] Low, [x] Moderate,[]  High     History of Present Illness: Subjective     Patient Seen & Examined at the bedside      Patient is resting in bed -no distress while on room air -daughter at bedside     Ten point ROS reviewed negative, unless as noted above    Objective: Intake/Output Summary (Last 24 hours) at 4/20/2021 0854  Last data filed at 4/20/2021 0730  Gross per 24 hour   Intake 2446 ml   Output 2075 ml   Net 371 ml      Vitals:   Vitals:    04/20/21 0800   BP: (!) 138/50   Pulse: 73   Resp: 17   Temp: 98.1 °F (36.7 °C)   SpO2: 97%     Physical Exam:    GEN Awake female, resting in bed in no apparent distress. Appears given age. RESP Clear to auscultation, no wheezes, rales or rhonchi. CARDIO/VASC -S1/S2 auscultated. Regular rate without appreciable murmurs, rubs, or gallops. Peripheral pulses equal bilaterally and palpable. No peripheral edema. GI Abdomen is soft without significant tenderness, masses, or guarding. Bowel sounds are normoactive. Rectal exam deferred.  Harvey catheter is not present. MSK No gross joint deformities.  Spontaneous movement of all extremities  SKIN temporary pacer insertion at the right groin area    Medications:   Medications:    valsartan  160 mg Oral Nightly    hydrALAZINE  50 mg Oral 3 times per day    sodium chloride flush  5-40 mL Intravenous 2 times per day    amLODIPine  10 mg Oral Daily    atorvastatin  40 mg Oral Daily    buPROPion  150 mg Oral BID    cyanocobalamin  1,000 mcg Intramuscular Q7 Days    folic acid-pyridoxine-cyancobalamin  1 tablet Oral Daily    methocarbamol  750 mg Oral 4x Daily    therapeutic multivitamin-minerals  1 tablet Oral Daily    pantoprazole  40 mg Oral QAM AC    venlafaxine  150 mg Oral Daily    sodium chloride flush  5-40 mL Intravenous 2 times per day    insulin lispro  0-6 Units Subcutaneous TID WC    insulin lispro  0-3 Units Subcutaneous Nightly    valsartan  160 mg Oral Daily    And    hydroCHLOROthiazide  25 mg Oral Daily    fenofibrate  160 mg Oral Daily      Infusions:    DOPamine      sodium chloride 75 mL/hr at 04/19/21 2310    sodium chloride      dextrose      sodium chloride       PRN Meds: sodium chloride flush, 5-40 mL, PRN  sodium chloride, 25 mL, PRN  potassium chloride, 10 mEq, PRN  sodium chloride flush, 10 mL, PRN  ALPRAZolam, 1 mg, BID PRN  oxyCODONE, 5 mg, Q4H PRN  glucose, 15 g, PRN  dextrose, 12.5 g, PRN  glucagon (rDNA), 1 mg, PRN  dextrose, 100 mL/hr, PRN  sodium chloride flush, 5-40 mL, PRN  sodium chloride, 25 mL, PRN  promethazine, 12.5 mg, Q6H PRN    Or  ondansetron, 4 mg, Q6H PRN  polyethylene glycol, 17 g, Daily PRN  acetaminophen, 650 mg, Q6H PRN    Or  acetaminophen, 650 mg, Q6H PRN          Electronically signed by Jeannie Jacobson MD on 4/20/2021 at 8:54 AM

## 2021-04-20 NOTE — PROGRESS NOTES
Nephrology Progress Note        [address], [address] [address]  Phone: [phone]  Office Hours: 8:30AM - 4:30PM  Monday - Friday 4/20/2021 8:27 AM  Subjective:   Admit Date: 4/16/2021  PCP: Luis Chan MD  Interval History: doing ok  Symptomatic bradycardia yesterday so now has a temp pacemaker    Diet: DIET FULL LIQUID; Carb Control: 5 carb choices (75 gms)/meal      Data:   Scheduled Meds:   valsartan  160 mg Oral Nightly    hydrALAZINE  50 mg Oral 3 times per day    sodium chloride flush  5-40 mL Intravenous 2 times per day    amLODIPine  10 mg Oral Daily    atorvastatin  40 mg Oral Daily    buPROPion  150 mg Oral BID    cyanocobalamin  1,000 mcg Intramuscular Q7 Days    folic acid-pyridoxine-cyancobalamin  1 tablet Oral Daily    methocarbamol  750 mg Oral 4x Daily    therapeutic multivitamin-minerals  1 tablet Oral Daily    pantoprazole  40 mg Oral QAM AC    venlafaxine  150 mg Oral Daily    sodium chloride flush  5-40 mL Intravenous 2 times per day    insulin lispro  0-6 Units Subcutaneous TID WC    insulin lispro  0-3 Units Subcutaneous Nightly    valsartan  160 mg Oral Daily    And    hydroCHLOROthiazide  25 mg Oral Daily    fenofibrate  160 mg Oral Daily     Continuous Infusions:   DOPamine      sodium chloride 75 mL/hr at 04/19/21 2310    sodium chloride      dextrose      sodium chloride       PRN Meds:sodium chloride flush, sodium chloride, potassium chloride, sodium chloride flush, ALPRAZolam, oxyCODONE, glucose, dextrose, glucagon (rDNA), dextrose, sodium chloride flush, sodium chloride, promethazine **OR** ondansetron, polyethylene glycol, acetaminophen **OR** acetaminophen  I/O last 3 completed shifts: In: 1046 [P.O.:120;  I.V.:926]  Out: 2075 [Urine:2075]  I/O this shift:  In: 1400 [I.V.:1400]  Out: -     Intake/Output Summary (Last 24 hours) at 4/20/2021 0827  Last data filed at 4/20/2021 0730  Gross per 24 hour   Intake 2446 ml Output 2075 ml   Net 371 ml       CBC:   Recent Labs     04/17/21  1856 04/17/21  1856 04/18/21  1147 04/18/21  1147 04/19/21  0832 04/19/21  1233 04/20/21  0700   WBC 14.8*  --  15.2*  --  10.7*  --   --    HGB 8.4*   < > 8.4*   < > 8.0* 8.5* 9.2*     --  166  --  127*  --   --     < > = values in this interval not displayed. BMP:    Recent Labs     04/18/21  1147 04/19/21  0832 04/19/21 2020 04/20/21  0700    135  --  135   K 3.5 3.1* 3.4* 4.2    101  --  105   CO2 24 26  --  23   BUN 14 14  --  12   CREATININE 1.2* 1.1  --  1.0   GLUCOSE 132* 249*  --  158*     Hepatic:   Recent Labs     04/18/21  1147 04/19/21  0832 04/20/21  0700   AST 15 13* 15   ALT 7* 8* 9*   BILITOT 0.4 0.2 0.3   ALKPHOS 77 71 76     Troponin: No results for input(s): TROPONINI in the last 72 hours. BNP: No results for input(s): BNP in the last 72 hours. Lipids: No results for input(s): CHOL, HDL in the last 72 hours.     Invalid input(s): LDLCALCU  ABGs:   Lab Results   Component Value Date    PO2ART 112 08/12/2015    [id number] 24.0 08/12/2015     INR:   Recent Labs     04/18/21  1147   INR 1.29       Objective:   Vitals: BP (!) 153/52   Pulse 76   Temp 97.6 °F (36.4 °C) (Oral)   Resp 17   Ht 5' 2\" (1.575 m)   Wt 130 lb 1.1 oz (59 kg)   SpO2 97%   BMI 23.79 kg/m²   General appearance: alert and cooperative with exam, in no acute distress  HEENT: normocephalic, atraumatic,   Neck: supple, trachea midline  Lungs:  breathing comfortably on room air  Heart[de-identified] regular rate and rhythm,   Abdomen: soft, non-tender; non distended,   Extremities: extremities atraumatic, no cyanosis or edema  Neurologic: alert, oriented, follows commands, interactive    Assessment and Plan:       Patient Active Problem List     Diagnosis Date Noted    GI bleed 04/16/2021    Closed fracture of left distal radius 02/11/2021    Carcinoid syndrome ([facility] 04/21/2020    Immune thrombocytopenic purpura ([facility] 04/21/2020    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    Peripheral polyneuropathy      Ataxia      Urinary tract infection without hematuria      Stroke-like symptoms 10/14/2019    Gastroenteritis 03/29/2019    HTN (hypertension) 08/15/2015    Hypokalemia 08/15/2015    Anxiety 08/15/2015    Protein-calorie malnutrition, moderate ([facility] 08/12/2015    Ischemia, bowel ([facility] 08/11/2015    S/P CABG x 3 02/03/2014    Diabetes mellitus ([facility]      CAD (coronary artery disease)      Hyperlipidemia      Carotid artery stenosis 03/01/2000      -CKD3; cr 1.3//UA shows no signs of hematuria  -Hyponatremia  -Leukocytosis  -Encephalopathy  -Melena/hematochezia?  Colonoscopy showed hemorrhoids  -Hematuria?  -Anemia  -Neuroendocrine tumor  -Symptomatic bradycarida  -HTN        Plan:  Cr stable  increase nightly valsartan to 160mg                           Electronically signed by Amilcar Barajas DO on 4/20/2021 at 8:27 AM    [address] ManorDO Lau [address] [place] [address] [address]  PHONE: [phone]  FAX: [phone]

## 2021-04-20 NOTE — PROGRESS NOTES
ONCOLOGY HEMATOLOGY CARE ([facility])  PROGRESS NOTE      Patient was seen and examined today. She is s/p EGD. Plan for small bowel follow through when she is more stable. Has sinus pause in telemetry and she is going to have pace maker tomorrow. Iron study don ramon 4/17/21 were reviewed. PHYSICAL EXAM    Vitals: BP (!) 171/62   Pulse 78   Temp 98.7 °F (37.1 °C) (Oral)   Resp 16   Ht 5' 2\" (1.575 m)   Wt 131 lb 14.4 oz (59.8 kg)   SpO2 97%   BMI 24.12 kg/m²   CONSTITUTIONAL: awake, alert, cooperative, no apparent distress   EYES: pupils equal, round and reactive to light, sclera clear and conjunctiva normal  ENT: Normocephalic, without obvious abnormality, atraumatic  NECK: supple, symmetrical, no jugular venous distension and no carotid bruits   HEMATOLOGIC/LYMPHATIC: no cervical, supraclavicular or axillary lymphadenopathy   LUNGS: VBS, no wheezes, no increased work of breathing and clear to auscultation   CARDIOVASCULAR: regular rate and rhythm, normal S1 and S2, no murmur noted  ABDOMEN: normal bowel sounds x 4, soft, non-distended, non-tender, no masses palpated, no hepatosplenomgaly   MUSCULOSKELETAL: full range of motion noted, tone is normal  NEUROLOGIC: awake, alert, oriented to name, place and time. Motor skills grossly intact. SKIN: Normal skin color, texture, turgor and no jaundice. appears intact   EXTREMITIES: no LE edema, no leg swelling, no cyanosis, no clubbing     LABORATORY RESULTS  CBC:   Recent Labs     04/17/21  1856 04/17/21  1856 04/18/21  1147 04/18/21  2318 04/19/21  0832 04/19/21  1233   WBC 14.8*  --  15.2*  --  10.7*  --    HGB 8.4*   < > 8.4* 7.8* 8.0* 8.5*     --  166  --  127*  --     < > = values in this interval not displayed.      BMP:    Recent Labs     04/17/21  0822 04/18/21  1147 04/19/21  0832 04/19/21  2020   * 135 135  --    K 4.4 3.5 3.1* 3.4*   CL 99 100 101  --    CO2 24 24 26  --    BUN 25* 14 14  --    CREATININE 1.2* 1.2* 1.1  -- GLUCOSE 111* 132* 249*  --      Hepatic:   Recent Labs     04/18/21  1147 04/19/21  0832   AST 15 13*   ALT 7* 8*   BILITOT 0.4 0.2   ALKPHOS 77 71     INR:   Recent Labs     04/18/21  1147   INR 1.29     ASSESSMENT/RECOMMENDATION  Metastatic carcinoid tumor  Immune thrombocytopenia  Lower GI bleeding  Symptomatic bradycardia with sinus pause    Reviewed her EGD and colonoscopy results. Anemia panel was also reviewed. She is going to have PM placement tomorrow. Will continue to monitor her hemoglobin. She is on octreotide for her metastatic carcinoid and close observation only for ITP. We will continue to follow the patient. Thank you.

## 2021-04-20 NOTE — ANESTHESIA PRE PROCEDURE
Department of Anesthesiology  Preprocedure Note       Name:  Rhonda Hardwick   Age:  79 y.o.  :  1940                                          MRN:  [id number]         Date:  2021      Surgeon: Gracie Reno):  Yamile Gray MD    Procedure: Procedure(s):  PACEMAKER INSERTION PERMANENT    Medications prior to admission:   Prior to Admission medications    Medication Sig Start Date End Date Taking? Authorizing Provider   amLODIPine (NORVASC) 10 MG tablet TAKE 1 TABLET BY MOUTH EVERY DAY 3/5/21   Historical Provider, MD   carvedilol (COREG CR) 40 MG CP24 extended release capsule TAKE 1 CAPSULE BY MOUTH EVERY DAY 3/6/21   Historical Provider, MD   FREESTYLE LITE strip TEST BLOOD GLUCOSE 3 TIMES DAILY (BEFORE MEALS). 3/26/21   Historical Provider, MD   methocarbamol (ROBAXIN) 750 MG tablet Take 750 mg by mouth 4 times daily as needed 21   Historical Provider, MD   oxyCODONE (ROXICODONE) 5 MG immediate release tablet Take 5 mg by mouth every 4 hours as needed.  21  Historical Provider, MD   predniSONE (DELTASONE) 10 MG tablet 4 tabs daily x 2, 3 tabs daily x 2, 2 tabs daily x 2, 1 tab daily x 4 then stop, take with food 21   Historical Provider, MD   promethazine (PHENERGAN) 25 MG tablet Take 25 mg by mouth every 6 hours as needed 3/16/21   Historical Provider, MD   buPROPion [facility]) 150 MG extended release tablet Take 1 tablet by mouth 2 times daily 21   Domi De Leon MD   LANTUS SOLOSTAR 100 UNIT/ML injection pen  10/22/20   Historical Provider, MD   INVOKANA 300 MG TABS tablet  20   Historical Provider, MD   venlafaxine (EFFEXOR XR) 150 MG extended release capsule Take 1 capsule by mouth daily  Patient taking differently: Take 225 mg by mouth daily  10/18/19   Jorge Nye MD   glipiZIDE (GLUCOTROL XL) 5 MG extended release tablet Take 2 tablets by mouth daily  Patient taking differently: Take 5 mg by mouth daily  10/17/19   Jorge Nye MD   metFORMIN (GLUCOPHAGE) 500 MG tablet Take 1 tablet by mouth 2 times daily (with meals) 10/17/19   Kevin Lind MD   atorvastatin (LIPITOR) 40 MG tablet Take 1.5 tablets by mouth daily  Patient taking differently: Take 40 mg by mouth daily  10/17/19   Kevin Lind MD   folic acid-pyridoxine-cyancobalamin (FOLTX) 1.13-25-2 MG TABS Take 1 tablet by mouth daily 10/18/19   Kevin Lind MD   valsartan-hydroCHLOROthiazide (DIOVAN-HCT) 320-25 MG per tablet Take 1 tablet by mouth daily  7/27/19   Historical Provider, MD   carvedilol (COREG) 25 MG tablet Take 1 tablet by mouth 2 times daily  Patient taking differently: Take 40 mg by mouth daily  3/30/19   Vilma Caraballo MD   pantoprazole (PROTONIX) 40 MG tablet Take 1 tablet by mouth every morning (before breakfast) 3/30/19   Vilma Caraballo MD   potassium chloride (MICRO-K) 10 MEQ CR capsule Take 10 mEq by mouth 2 times daily    Historical Provider, MD   Multiple Vitamins-Minerals (THERAPEUTIC MULTIVITAMIN-MINERALS) tablet Take 1 tablet by mouth daily    Historical Provider, MD   vitamin E 1000 UNITS capsule Take 400 Units by mouth daily    Historical Provider, MD   ALPRAZolam Mary Kay Dyke) 1 MG tablet Take 1 mg by mouth 2 times daily as needed for Sleep. Historical Provider, MD   cyanocobalamin 1000 MCG/ML injection Inject 1,000 mcg into the muscle every 7 days. Historical Provider, MD   fenofibrate (TRICOR) 145 MG tablet Take 145 mg by mouth daily. Historical Provider, MD       Current medications:    No current facility-administered medications for this visit. No current outpatient medications on file.      Facility-Administered Medications Ordered in Other Visits   Medication Dose Route Frequency Provider Last Rate Last Admin    valsartan (DIOVAN) tablet 160 mg  160 mg Oral Nightly Evelin Perez DO        hydrALAZINE (APRESOLINE) tablet 50 mg  50 mg Oral 3 times per day Rosana Musa MD   50 mg at 04/20/21 0654    DOPamine (INTROPIN) 400 mg in dextrose 5 % 250 mL infusion  5 mcg/kg/min Intravenous Continuous Sriram Davis MD        0.9 % sodium chloride infusion   Intravenous Continuous Sriram Davis MD 75 mL/hr at 04/19/21 2310 New Bag at 04/19/21 2310    sodium chloride flush 0.9 % injection 5-40 mL  5-40 mL Intravenous 2 times per day Sriram Davis MD   10 mL at 04/19/21 1204    sodium chloride flush 0.9 % injection 5-40 mL  5-40 mL Intravenous PRN Sriram Davis MD        0.9 % sodium chloride infusion  25 mL Intravenous PRN Sriram Davis MD        potassium chloride 10 mEq/100 mL IVPB (Peripheral Line)  10 mEq Intravenous PRN Cassandra Desir  mL/hr at 04/20/21 0402 10 mEq at 04/20/21 0402    sodium chloride flush 0.9 % injection 10 mL  10 mL Intravenous PRN Sriram Davis MD   10 mL at 04/16/21 1428    ALPRAZolam (XANAX) tablet 1 mg  1 mg Oral BID PRN Cassandra Desir MD   1 mg at 04/20/21 0654    amLODIPine (NORVASC) tablet 10 mg  10 mg Oral Daily Sriram Davis MD   Stopped at 04/19/21 1139    atorvastatin (LIPITOR) tablet 40 mg  40 mg Oral Daily Sriram Davis MD   Stopped at 04/19/21 1139    buPROPion [facility]) extended release tablet 150 mg  150 mg Oral BID Sriram Davis MD   150 mg at 04/19/21 2010    cyanocobalamin injection 1,000 mcg  1,000 mcg Intramuscular Q7 Days Sriram Davis MD        folic acid-pyridoxine-cyancobalamin (FOLTX) 1.13-25-2 MG TABS 1 tablet  1 tablet Oral Daily Sriram Davis MD   Stopped at 04/19/21 1155    methocarbamol (ROBAXIN) tablet 750 mg  750 mg Oral 4x Daily Sriram Davis MD   750 mg at 04/19/21 2009    therapeutic multivitamin-minerals 1 tablet  1 tablet Oral Daily Sriram Davis MD   Stopped at 04/19/21 1155    oxyCODONE (ROXICODONE) immediate release tablet 5 mg  5 mg Oral Q4H PRN Sriram Davis MD   5 mg at 04/20/21 0054    pantoprazole (PROTONIX) tablet 40 mg  40 mg Oral QAM AC Sriram Davis MD   40 mg at 04/20/21 0654    venlafaxine (EFFEXOR XR) extended release capsule 150 mg  150 mg Oral Daily Sriram Davis MD Stopped at 04/19/21 1155    glucose (GLUTOSE) 40 % oral gel 15 g  15 g Oral PRN Jo Mcardle, MD        dextrose 50 % IV solution  12.5 g Intravenous PRN Jo Mcardle, MD        glucagon (rDNA) injection 1 mg  1 mg Intramuscular PRN Jo Mcardle, MD        dextrose 5 % solution  100 mL/hr Intravenous PRN Jo Mcardle, MD        sodium chloride flush 0.9 % injection 5-40 mL  5-40 mL Intravenous 2 times per day Jo Mcardle, MD   10 mL at 04/19/21 1204    sodium chloride flush 0.9 % injection 5-40 mL  5-40 mL Intravenous PRN Jo Mcardle, MD        0.9 % sodium chloride infusion  25 mL Intravenous PRN Jo Mcardle, MD        promethazine (PHENERGAN) tablet 12.5 mg  12.5 mg Oral Q6H PRN Jo Mcardle, MD   12.5 mg at 04/18/21 1103    Or    ondansetron (ZOFRAN) injection 4 mg  4 mg Intravenous Q6H PRN Jo Mcardle, MD   4 mg at 04/18/21 0803    polyethylene glycol (GLYCOLAX) packet 17 g  17 g Oral Daily PRN Jo Mcardle, MD        acetaminophen (TYLENOL) tablet 650 mg  650 mg Oral Q6H PRN Jo Mcardle, MD   650 mg at 04/19/21 2009    Or    acetaminophen (TYLENOL) suppository 650 mg  650 mg Rectal Q6H PRN Jo Mcardle, MD        insulin lispro (HUMALOG) injection vial 0-6 Units  0-6 Units Subcutaneous TID WC Jo Mcardle, MD   Stopped at 04/19/21 1803    insulin lispro (HUMALOG) injection vial 0-3 Units  0-3 Units Subcutaneous Nightly Jo Mcardle, MD   1 Units at 04/18/21 2101    valsartan (DIOVAN) tablet 160 mg  160 mg Oral Daily Jo Mcardle, MD   Stopped at 04/19/21 1141    And    hydroCHLOROthiazide (HYDRODIURIL) tablet 25 mg  25 mg Oral Daily Jo Mcardle, MD   Stopped at 04/19/21 1141    fenofibrate (TRIGLIDE) tablet 160 mg  160 mg Oral Daily Jo Mcardle, MD   Stopped at 04/19/21 1154       Allergies:     Allergies   Allergen Reactions    Tramadol Itching    Vicodin [Hydrocodone-Acetaminophen] Itching       Problem List:    Patient Active Problem List   Diagnosis Code    Diabetes mellitus ([facility] E11.9    CAD (coronary artery disease) I25.10    Hyperlipidemia E78.5    Carotid artery stenosis I65.29    S/P CABG x 3 Z95.1    Ischemia, bowel (HCC) K55.9    Protein-calorie malnutrition, moderate (HCC) E44.0    HTN (hypertension) I10    Hypokalemia E87.6    Anxiety F41.9    Gastroenteritis K52.9    Stroke-like symptoms R29.90    Peripheral polyneuropathy G62.9    Ataxia R27.0    Urinary tract infection without hematuria N39.0    Carcinoid syndrome (HCC) E34.0    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 Melvin Andrade [facility])     melanoma on back    Cancer ([address] 2019    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  Diabetes mellitus ([address]     PCP    H/O cardiovascular stress test 5/02    EF 74 %,normal perfusion    H/O echocardiogram 11/02    EF 60%, mild to mod MR,    Hyperlipidemia     Hypertension     PCP    Protein-calorie malnutrition, moderate ([facility] [address] 8/12/2015       Past Surgical History:        Procedure Laterality Date    ABDOMEN SURGERY      COLONOSCOPY N/A 4/18/2021    COLONOSCOPY DIAGNOSTIC performed by Debo Andrade MD at [place] [address]  5/07    CABG X 3, L mammary artery to the LAD, saphenous vein graft to RCA.  a saphenous vein graft to Cx marginal artery    DIAGNOSTIC CARDIAC CATH LAB PROCEDURE  3/30/07    PTCA to LAD    FRACTURE SURGERY Left 2001    patella    FRACTURE SURGERY Right 2017    wrist    HYSTERECTOMY  1996    OTHER SURGICAL HISTORY  08 11 2015    exp lap, right colon resection    SKIN BIOPSY      UPPER GASTROINTESTINAL ENDOSCOPY N/A 2021    EGD BIOPSY performed by Brooklyn Javier MD at [address] [facility] [address] Left 2021    LEFT WRIST OPEN REDUCTION INTERNAL FIXATION performed by Gato Bullard MD at [facility] OR       Social History:    Social History     Tobacco Use    Smoking status: Former Smoker     Packs/day: 1.00     Years: 30.00     Pack years: 30.00     Start date:      Quit date:      Years since quittin.3    Smokeless tobacco: Never Used   Substance Use Topics    Alcohol use: No                                Counseling given: Not Answered      Vital Signs (Current): There were no vitals filed for this visit.                                            BP Readings from Last 3 Encounters:   21 (!) 154/53   21 (!) 140/36   21 (!) 157/66       NPO Status:                                                                                 BMI:   Wt Readings from Last 3 Encounters:   21 130 lb 1.1 oz (59 kg)   21 130 lb (59 kg)   21 130 lb (59 kg)     There is no height or weight on file to calculate BMI.    CBC:   Lab Results   Component Value Date    WBC 10.7 2021    RBC 3.05 2021    HGB 9.2 2021    HCT 29.9 2021    MCV 87.9 2021    RDW 14.9 2021     2021       CMP:   Lab Results   Component Value Date     2021    K 4.2 2021     2021    CO2 23 2021    BUN 12 2021    CREATININE 1.0 2021    GFRAA >60 2021    LABGLOM 53 2021    GLUCOSE 158 2021    PROT 5.5 2021    PROT 7.2 2011    CALCIUM 8.2 2021    BILITOT 0.3 2021    ALKPHOS 76 2021    AST 15 2021    ALT 9 2021       POC Tests:   Recent Labs     21  0839   POCGLU 194*       Coags:   Lab Results   Component Value Date    PROTIME 15.6 2021    INR 1.29 2021    APTT 27.4 2021       HCG (If Applicable): No results found Gender              Female      Age                79 year(s)        Race                      Patient Number     [id number]        Room Number         2119      Visit Number       [id number]      Corporate ID       [id number]      Accession Number   [id number]        Jazmyne Morales RDMS      Ordering Physician Lela Naik MD  Interpreting        Lela Naik MD                                        Physician     Procedure     Type of Study      TTE procedure:ECHOCARDIOGRAM COMPLETE 2D W DOPPLER W COLOR. Procedure Date  Date: 04/19/2021 Start: 11:14 AM     Study Location: Portable  Technical Quality: Fair visualization     Indications:Coronary artery disease.     Patient Status: Routine     Height: 62 inches Weight: 131 pounds BSA: 1.6 m2 BMI: 23.96 kg/m2     HR: 73 bpm BP: 181/56 mmHg      Conclusions      Summary   Left ventricular systolic function is hyperdynamic. Ejection fraction is visually estimated at >60%. Grade I diastolic dysfunction. Moderately dilated left atrium. PPM wiring visualized within the right heart. Mild aortic stenosis with mean gradient of 15 mmHg. Mitral annular calcification with moderate mitral stenosis; mean gradient is   8mmHg. Mild to moderate mitral regurgitation. No evidence of any pericardial effusion. Signature      ------------------------------------------------------------------   Electronically signed by Lela Naik MD (Interpreting   physician) on 04/19/2021 at 12:45 PM   ------------------------------------------------------------------      Findings      Left Ventricle   Left ventricular systolic function is hyperdynamic. Ejection fraction is visually estimated at >60%. Mild left ventricular hypertrophy. Grade I diastolic dysfunction. Left Atrium   Moderately dilated left atrium. Right Atrium   Essentially normal right atrium.       Right (Continuity):2.37   7 mmHg   MV Mean Gradient: 7     cm2                         Estimated RVSP: 34 mmHg   mmHg                                                Estimated RAP:3 mmHg   MV Mean Velocity: 128   LVOT VTI: 35.4 cm   cm/s   MV P1/2t: 57 msec       Estimated PASP: 21.15 mmHg  TR Velocity:213 cm/s   MVA by PHT:3.86 cm2                                 TR Gradient:18.15 mmHg   MV Area (continuity):   1.72 cm2   MV E' Septal Velocity:   3.84 cm/s   MV E' Lateral Velocity:   6.03 cm/s   MV E/E' septal: 37.76   MV E/E' lateral: 24.05           Specimen Collected: 04/19/21 11:14 Last Resulted: 04/19/21 12:45 Order Details View Encounter Lab and Collection Details Routing Result History        Routing History    Priority Sent On From To Message Type   4/19/2021  3:04 PM MD Diamond Wheeler MD CC'd Results   4/19/2021  2:15 PM [facility] Incoming Transcription Diamond Colin MD CC'd Results   4/16/2021 10:04 PM [facility] Incoming Lab Results From [employer] Madiha Cervantes MD CC'd Results   4/16/2021  1:24 PM [facility] Incoming Lab Results From [employer] Madiha Cervantes MD CC'd Results  PACS Images     Show images for Echocardiogram complete 2D with doppler with color  Signed    Electronically signed by Maylin Lehman MD on 4/19/21 at [id number] EDT  Order Report    Order Details  IMPRESSION:  1. Successful temporary pacemaker placement. 2.  The patient has a moderate annular calcification noted on the mitral  valve. 3.  The left main is patent. 4.  The circ has a mild disease noted. 5.  LAD has mid 70% stenosis present. 6.  The RCA is noted to be occluded. 7. CONNELLY to LAD is patent, it fills the retrograde and antegrade, the  LAD, and the diagonal branch. 8.  SVG to OM is patent. 9.  SVG to RCA is patent. 10.  EDP is noted to be 15 mmHg.     The patient bypass or open.   We will continue medical treatment, and I  think the patient will need a pacemaker as she has a complete heart  block present.     Blood loss Elio Rees MD     D: 04/19/2021 10:28:39       T: 04/19/2021 10:32:47     [email or web address]  Job#: [id number]     Doc#: [id number]    Ppm placed 4/19 via cath             Neuro/Psych:   (+) psychiatric history:depression/anxiety             GI/Hepatic/Renal:   (+) renal disease: CRI, bowel prep,          ROS comment: Lower GI bleed and hematuria. Endo/Other:    (+) DiabetesType II DM, , blood dyscrasia: anemia:., malignancy/cancer. 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 Anesthesia Plan      MAC     ASA 4       Induction: intravenous. Anesthetic plan and risks discussed with patient. Plan discussed with CRNA. TEMITOPE Bryant - CRNA   4/20/2021         Pre Anesthesia Assessment complete.  Chart reviewed on 4/20/2021

## 2021-04-20 NOTE — PROGRESS NOTES
Daily Progress Note    Doing ok  For a PPM pacer this is am  Had intermittent pacing last night  Off betablockers  Will resume betablockers post pacer  GI bleed stable  Hx of CAD and CABG patent grafts -keep on ASA when ok  Symptomatic bradycardia    Pt. Awake, alert and feeling ok   HR stable, NSR in the 70s, BP stable  Denies CP, SOB and dizziness     GI bleed    Hgb low but stable    GI following-s/p EGD and c-scope-diverticulosis and gastritis noted- no active bleeding    Planning for SBFT today    Ok to hold antiplatelet for now     Bradycardia    Several 5-8 second pauses on tele yesterday    AV block and SSS both noted    Temp pacer placed yesterday- only paced a few times overnight per RN    [facility] done patent grafts noted    Stopped coreg    Planning for PPM today     Hx of CAD s/p 3 V CABG and multiple PCI in the past    Recent OP echo reassuring    EF 55% with Mod MR-will recheck with anemia and now bradycardia    Cont. Med. Tx.     Will follow  Await pacer today    Echo-4/19/21  Summary   Left ventricular systolic function is hyperdynamic. Ejection fraction is visually estimated at >60%. Grade I diastolic dysfunction. Moderately dilated left atrium. PPM wiring visualized within the right heart. Mild aortic stenosis with mean gradient of 15 mmHg. Mitral annular calcification with moderate mitral stenosis; mean gradient is   8mmHg. Mild to moderate mitral regurgitation.    No evidence of any pericardial effusion.     [facility]-4/19/21  LEFT MAIN PATENT   LAD MID 70% STENOSIS  LCX MILD DX  % OCCLUDED  LIMA TO LAD AND DIAGONAL PATENT  SVG TO OM PATENT  SVG TO RCA PATENT  LVEDP  15  MEDICAL TREATMENT FROM CAD  FOR A PERMANENT PACER  TEMP=PACER PLACED  NO COMPLICATIONS  RIGHT ARTERIAL AND VENOUS SHEATH       Past medical history:    has a past medical history of Acute anterior wall MI ([facility] [address] CAD (coronary artery disease), Cancer ([facility] [address] Cancer ([facility] [address] Carotid artery stenosis, Diabetes mellitus [facility]), H/O cardiovascular stress test, H/O echocardiogram, Hyperlipidemia, Hypertension, and Protein-calorie malnutrition, moderate ([facility] [address] Past surgical history:   has a past surgical history that includes Coronary artery bypass graft (5/07); Diagnostic Cardiac Cath Lab Procedure (3/30/07); Abdomen surgery; other surgical history (08 11 2015); skin biopsy; fracture surgery (Left, 2001); fracture surgery (Right, 2017); Hysterectomy (1996); and Wrist fracture surgery (Left, 2/17/2021). Social History:   reports that she quit smoking about 32 years ago. She started smoking about 62 years ago. She has a 30.00 pack-year smoking history. She has never used smokeless tobacco. She reports that she does not drink alcohol or use drugs.   Family history:  family history includes Cancer in her father; Heart Attack in her mother; Heart Disease in her brother and mother.             Allergies   Allergen Reactions    Tramadol Itching    Vicodin [Hydrocodone-Acetaminophen] Itching              Objective:   BP (!) 138/50   Pulse 73   Temp 98.1 °F (36.7 °C) (Oral)   Resp 17   Ht 5' 2\" (1.575 m)   Wt 130 lb 1.1 oz (59 kg)   SpO2 97%   BMI 23.79 kg/m²       Intake/Output Summary (Last 24 hours) at 4/20/2021 0575  Last data filed at 4/20/2021 0730  Gross per 24 hour   Intake 2446 ml   Output 2075 ml   Net 371 ml       Medications:   Scheduled Meds:   valsartan  160 mg Oral Nightly    hydrALAZINE  50 mg Oral 3 times per day    sodium chloride flush  5-40 mL Intravenous 2 times per day    amLODIPine  10 mg Oral Daily    atorvastatin  40 mg Oral Daily    buPROPion  150 mg Oral BID    cyanocobalamin  1,000 mcg Intramuscular Q7 Days    folic acid-pyridoxine-cyancobalamin  1 tablet Oral Daily    methocarbamol  750 mg Oral 4x Daily    therapeutic multivitamin-minerals  1 tablet Oral Daily    pantoprazole  40 mg Oral QAM AC    venlafaxine  150 mg Oral Daily    sodium chloride flush  5-40 mL Intravenous 2 times per day    insulin lispro  0-6 Units Subcutaneous TID     insulin lispro  0-3 Units Subcutaneous Nightly    valsartan  160 mg Oral Daily    And    hydroCHLOROthiazide  25 mg Oral Daily    fenofibrate  160 mg Oral Daily      Infusions:   DOPamine      sodium chloride 75 mL/hr at 04/19/21 2310    sodium chloride      dextrose      sodium chloride        PRN Meds:  sodium chloride flush, sodium chloride, potassium chloride, sodium chloride flush, ALPRAZolam, oxyCODONE, glucose, dextrose, glucagon (rDNA), dextrose, sodium chloride flush, sodium chloride, promethazine **OR** ondansetron, polyethylene glycol, acetaminophen **OR** acetaminophen       Physical Exam:  Vitals:    04/20/21 0800   BP: (!) 138/50   Pulse: 73   Resp: 17   Temp: 98.1 °F (36.7 °C)   SpO2: 97%        General: AAO, NAD  Chest: Nontender  Cardiac: First and Second Heart Sounds are Normal, No Murmurs or Gallops noted  Lungs:Clear to auscultation and percussion. Abdomen: Soft, NT, ND, +BS  Extremities: No clubbing, no edema  Vascular:  Equal 2+ peripheral pulses. Lab Data:  CBC:   Recent Labs     04/17/21  1856 04/17/21  1856 04/18/21  1147 04/18/21  1147 04/19/21  0832 04/19/21  1233 04/20/21  0700   WBC 14.8*  --  15.2*  --  10.7*  --   --    HGB 8.4*   < > 8.4*   < > 8.0* 8.5* 9.2*   HCT 27.9*   < > 27.6*   < > 26.8* 26.6* 29.9*   MCV 87.7  --  86.0  --  87.9  --   --      --  166  --  127*  --   --     < > = values in this interval not displayed.      BMP:   Recent Labs     04/18/21  1147 04/19/21  0832 04/19/21  2020 04/20/21  0700    135  --  135   K 3.5 3.1* 3.4* 4.2    101  --  105   CO2 24 26  --  23   BUN 14 14  --  12   CREATININE 1.2* 1.1  --  1.0     LIVER PROFILE:   Recent Labs     04/18/21  1147 04/19/21  0832 04/20/21  0700   AST 15 13* 15   ALT 7* 8* 9*   LIPASE 31  --   --    BILITOT 0.4 0.2 0.3   ALKPHOS 77 71 76     PT/INR:   Recent Labs     04/18/21  1147   PROTIME 15.6*   INR 1.29     APTT:   Recent Labs

## 2021-04-20 NOTE — OP NOTE
Operative Note      Date of Procedure: 04/20/21      Surgeon: Carolina Torres MD    Anesthesia: MAC    Preoperative Diagnosis: GI bleed [K92.2]  Neuroendocrine cancer ([address] [C7A.8]  Lower abdominal pain [R10.30]  Intermittent confusion [R41.0]  Gastrointestinal hemorrhage, unspecified gastrointestinal hemorrhage type [K92.2]  Active Hospital Problems    Diagnosis Date Noted    Stage 3 chronic kidney disease [N18.30]     GI bleed [K92.2] 04/16/2021         Postoperative Diagnosis: GI bleed [K92.2]  Neuroendocrine cancer ([facility] [address] [C7A.8]  Lower abdominal pain [R10.30]  Intermittent confusion [R41.0]  Gastrointestinal hemorrhage, unspecified gastrointestinal hemorrhage type [K92.2]   Active Hospital Problems    Diagnosis Date Noted    Stage 3 chronic kidney disease [N18.30]     GI bleed [K92.2] 04/16/2021       Operation:  Left subclavian Permanent Dual Chamber Pacemaker (DDD)     Details of Procedure:  Patient was brought to the operating room after request from cardiology and preoperative evaluations and discussions with patient and family. Time out per checklist confirmed. Left infraclavicular area was prepared and draped. Local anesthesia was infiltrated with 1% Lidocaine. Incision was made below the clavicle. Left subclavian vein access was achieved using Seldinger technique and confirmed under fluoroscopy. Two 9 Welsh sheaths were placed over the wire. Both atrial and ventricle leads were introduced into the sheaths. Atrial and ventricle leads were placed in the right atrial appendage and RV apex under fluoroscopy guidance. Thresholds as recorded were obtained. There was no diaphragmatic pacing at 10V on either lead. Leads were secured to the pectoralis fascia. Leads were connected to their respective locations on the Medtronic generator. The generator was placed in a pocked under the lower skin flap and anchored to the pectoralis fascia.  Wound was irrigated with antibiotic solution and closed in layers. Blood loss was minimal. Sponge and instrument count was correct before closure. The lead thresholds were rechecked after closure and a lateral view of the chest was obtained to confirm the position of the leads. Patient tolerated the procedure well.

## 2021-04-21 LAB
ALBUMIN SERPL-MCNC: 2.8 GM/DL (ref 3.4–5)
ALP BLD-CCNC: 77 IU/L (ref 40–129)
ALT SERPL-CCNC: 7 U/L (ref 10–40)
AMYLASE: 29 U/L (ref 25–115)
ANION GAP SERPL CALCULATED.3IONS-SCNC: 9 MMOL/L (ref 4–16)
ANISOCYTOSIS: ABNORMAL
AST SERPL-CCNC: 16 IU/L (ref 15–37)
BACTERIA: ABNORMAL /HPF
BANDED NEUTROPHILS ABSOLUTE COUNT: 0.94 K/CU MM
BANDED NEUTROPHILS RELATIVE PERCENT: 3 % (ref 5–11)
BILIRUB SERPL-MCNC: 0.2 MG/DL (ref 0–1)
BILIRUBIN URINE: ABNORMAL MG/DL
BLOOD, URINE: ABNORMAL
BUN BLDV-MCNC: 16 MG/DL (ref 6–23)
CALCIUM SERPL-MCNC: 7.5 MG/DL (ref 8.3–10.6)
CHLORIDE BLD-SCNC: 108 MMOL/L (ref 99–110)
CLARITY: ABNORMAL
CO2: 20 MMOL/L (ref 21–32)
COLOR: YELLOW
CREAT SERPL-MCNC: 1.3 MG/DL (ref 0.6–1.1)
DIFFERENTIAL TYPE: ABNORMAL
DOHLE BODIES: PRESENT
EOSINOPHILS ABSOLUTE: 0.6 K/CU MM
EOSINOPHILS RELATIVE PERCENT: 2 % (ref 0–3)
GFR AFRICAN AMERICAN: 48 ML/MIN/1.73M2
GFR NON-AFRICAN AMERICAN: 40 ML/MIN/1.73M2
GLUCOSE BLD-MCNC: 164 MG/DL (ref 70–99)
GLUCOSE BLD-MCNC: 177 MG/DL (ref 70–99)
GLUCOSE BLD-MCNC: 203 MG/DL (ref 70–99)
GLUCOSE BLD-MCNC: 209 MG/DL (ref 70–99)
GLUCOSE BLD-MCNC: 213 MG/DL (ref 70–99)
GLUCOSE, URINE: 50 MG/DL
HCT VFR BLD CALC: 34.3 % (ref 37–47)
HCT VFR BLD CALC: 35.6 % (ref 37–47)
HEMOGLOBIN: 10.3 GM/DL (ref 12.5–16)
HEMOGLOBIN: 10.5 GM/DL (ref 12.5–16)
ICTOTEST: POSITIVE
KETONES, URINE: ABNORMAL MG/DL
LACTATE: 1.4 MMOL/L (ref 0.4–2)
LEUKOCYTE ESTERASE, URINE: ABNORMAL
LIPASE: 19 IU/L (ref 13–60)
LYMPHOCYTES ABSOLUTE: 0.9 K/CU MM
LYMPHOCYTES RELATIVE PERCENT: 3 % (ref 24–44)
MCH RBC QN AUTO: 25.9 PG (ref 27–31)
MCH RBC QN AUTO: 26 PG (ref 27–31)
MCHC RBC AUTO-ENTMCNC: 29.5 % (ref 32–36)
MCHC RBC AUTO-ENTMCNC: 30 % (ref 32–36)
MCV RBC AUTO: 86.6 FL (ref 78–100)
MCV RBC AUTO: 87.7 FL (ref 78–100)
MONOCYTES ABSOLUTE: 1.6 K/CU MM
MONOCYTES RELATIVE PERCENT: 5 % (ref 0–4)
MUCUS: ABNORMAL HPF
NITRITE URINE, QUANTITATIVE: NEGATIVE
PDW BLD-RTO: 15.8 % (ref 11.7–14.9)
PDW BLD-RTO: 15.9 % (ref 11.7–14.9)
PH, URINE: 5 (ref 5–8)
PLATELET # BLD: 100 K/CU MM (ref 140–440)
PLATELET # BLD: 80 K/CU MM (ref 140–440)
PMV BLD AUTO: 11.3 FL (ref 7.5–11.1)
POTASSIUM SERPL-SCNC: 4.4 MMOL/L (ref 3.5–5.1)
PROTEIN UA: 30 MG/DL
RBC # BLD: 3.96 M/CU MM (ref 4.2–5.4)
RBC # BLD: 4.06 M/CU MM (ref 4.2–5.4)
RBC URINE: 94 /HPF (ref 0–6)
SEGMENTED NEUTROPHILS ABSOLUTE COUNT: 27.2 K/CU MM
SEGMENTED NEUTROPHILS RELATIVE PERCENT: 87 % (ref 36–66)
SODIUM BLD-SCNC: 137 MMOL/L (ref 135–145)
SPECIFIC GRAVITY UA: 1.03 (ref 1–1.03)
SQUAMOUS EPITHELIAL: 3 /HPF
TOTAL PROTEIN: 4.7 GM/DL (ref 6.4–8.2)
TOXIC GRANULATION: PRESENT
TRICHOMONAS: ABNORMAL /HPF
UROBILINOGEN, URINE: NEGATIVE MG/DL (ref 0.2–1)
WBC # BLD: 29.8 K/CU MM (ref 4–10.5)
WBC # BLD: 31.2 K/CU MM (ref 4–10.5)
WBC CLUMP: ABNORMAL /HPF
WBC UA: 60 /HPF (ref 0–5)
YEAST: ABNORMAL /HPF

## 2021-04-21 PROCEDURE — 80053 COMPREHEN METABOLIC PANEL: CPT

## 2021-04-21 PROCEDURE — 6370000000 HC RX 637 (ALT 250 FOR IP): Performed by: THORACIC SURGERY (CARDIOTHORACIC VASCULAR SURGERY)

## 2021-04-21 PROCEDURE — 6360000002 HC RX W HCPCS: Performed by: THORACIC SURGERY (CARDIOTHORACIC VASCULAR SURGERY)

## 2021-04-21 PROCEDURE — 6370000000 HC RX 637 (ALT 250 FOR IP): Performed by: INTERNAL MEDICINE

## 2021-04-21 PROCEDURE — 2580000003 HC RX 258: Performed by: THORACIC SURGERY (CARDIOTHORACIC VASCULAR SURGERY)

## 2021-04-21 PROCEDURE — 94761 N-INVAS EAR/PLS OXIMETRY MLT: CPT

## 2021-04-21 PROCEDURE — 81001 URINALYSIS AUTO W/SCOPE: CPT

## 2021-04-21 PROCEDURE — 87040 BLOOD CULTURE FOR BACTERIA: CPT

## 2021-04-21 PROCEDURE — 99232 SBSQ HOSP IP/OBS MODERATE 35: CPT | Performed by: INTERNAL MEDICINE

## 2021-04-21 PROCEDURE — C1751 CATH, INF, PER/CENT/MIDLINE: HCPCS

## 2021-04-21 PROCEDURE — 6360000002 HC RX W HCPCS: Performed by: SPECIALIST

## 2021-04-21 PROCEDURE — 2060000000 HC ICU INTERMEDIATE R&B

## 2021-04-21 PROCEDURE — 2580000003 HC RX 258: Performed by: SPECIALIST

## 2021-04-21 PROCEDURE — 76937 US GUIDE VASCULAR ACCESS: CPT

## 2021-04-21 PROCEDURE — 99231 SBSQ HOSP IP/OBS SF/LOW 25: CPT | Performed by: SURGERY

## 2021-04-21 PROCEDURE — 36410 VNPNXR 3YR/> PHY/QHP DX/THER: CPT

## 2021-04-21 PROCEDURE — 2700000000 HC OXYGEN THERAPY PER DAY

## 2021-04-21 PROCEDURE — 83690 ASSAY OF LIPASE: CPT

## 2021-04-21 PROCEDURE — 85027 COMPLETE CBC AUTOMATED: CPT

## 2021-04-21 PROCEDURE — 05H933Z INSERTION OF INFUSION DEVICE INTO RIGHT BRACHIAL VEIN, PERCUTANEOUS APPROACH: ICD-10-PCS | Performed by: INTERNAL MEDICINE

## 2021-04-21 PROCEDURE — 82962 GLUCOSE BLOOD TEST: CPT

## 2021-04-21 PROCEDURE — 82150 ASSAY OF AMYLASE: CPT

## 2021-04-21 PROCEDURE — 36415 COLL VENOUS BLD VENIPUNCTURE: CPT

## 2021-04-21 PROCEDURE — 83605 ASSAY OF LACTIC ACID: CPT

## 2021-04-21 RX ORDER — POLYETHYLENE GLYCOL 3350 17 G/17G
17 POWDER, FOR SOLUTION ORAL DAILY
Status: DISCONTINUED | OUTPATIENT
Start: 2021-04-21 | End: 2021-04-26 | Stop reason: HOSPADM

## 2021-04-21 RX ORDER — SODIUM PHOSPHATE, DIBASIC AND SODIUM PHOSPHATE, MONOBASIC 7; 19 G/133ML; G/133ML
1 ENEMA RECTAL
Status: COMPLETED | OUTPATIENT
Start: 2021-04-21 | End: 2021-04-21

## 2021-04-21 RX ORDER — MORPHINE SULFATE 2 MG/ML
2 INJECTION, SOLUTION INTRAMUSCULAR; INTRAVENOUS EVERY 4 HOURS PRN
Status: DISCONTINUED | OUTPATIENT
Start: 2021-04-21 | End: 2021-04-26 | Stop reason: HOSPADM

## 2021-04-21 RX ADMIN — OXYCODONE HYDROCHLORIDE 5 MG: 5 TABLET ORAL at 14:30

## 2021-04-21 RX ADMIN — METHOCARBAMOL 750 MG: 500 TABLET ORAL at 20:21

## 2021-04-21 RX ADMIN — MORPHINE SULFATE 2 MG: 2 INJECTION, SOLUTION INTRAMUSCULAR; INTRAVENOUS at 11:49

## 2021-04-21 RX ADMIN — PANTOPRAZOLE SODIUM 40 MG: 40 TABLET, DELAYED RELEASE ORAL at 09:05

## 2021-04-21 RX ADMIN — PIPERACILLIN AND TAZOBACTAM 3375 MG: 3; .375 INJECTION, POWDER, LYOPHILIZED, FOR SOLUTION INTRAVENOUS at 14:35

## 2021-04-21 RX ADMIN — MORPHINE SULFATE 2 MG: 2 INJECTION, SOLUTION INTRAMUSCULAR; INTRAVENOUS at 21:15

## 2021-04-21 RX ADMIN — METHOCARBAMOL 750 MG: 500 TABLET ORAL at 08:49

## 2021-04-21 RX ADMIN — PIPERACILLIN AND TAZOBACTAM 3375 MG: 3; .375 INJECTION, POWDER, LYOPHILIZED, FOR SOLUTION INTRAVENOUS at 20:21

## 2021-04-21 RX ADMIN — SODIUM PHOSPHATE, DIBASIC AND SODIUM PHOSPHATE, MONOBASIC 1 ENEMA: 7; 19 ENEMA RECTAL at 12:13

## 2021-04-21 RX ADMIN — BUPROPION HYDROCHLORIDE 150 MG: 150 TABLET, EXTENDED RELEASE ORAL at 08:50

## 2021-04-21 RX ADMIN — ALPRAZOLAM 1 MG: 0.5 TABLET ORAL at 21:14

## 2021-04-21 RX ADMIN — METHOCARBAMOL 750 MG: 500 TABLET ORAL at 17:03

## 2021-04-21 RX ADMIN — PROMETHAZINE HYDROCHLORIDE 12.5 MG: 25 TABLET ORAL at 11:04

## 2021-04-21 RX ADMIN — INSULIN LISPRO 2 UNITS: 100 INJECTION, SOLUTION INTRAVENOUS; SUBCUTANEOUS at 16:59

## 2021-04-21 RX ADMIN — VALSARTAN 160 MG: 160 TABLET, FILM COATED ORAL at 20:22

## 2021-04-21 RX ADMIN — ACETAMINOPHEN 650 MG: 325 TABLET ORAL at 04:57

## 2021-04-21 RX ADMIN — CARVEDILOL 12.5 MG: 12.5 TABLET, FILM COATED ORAL at 17:03

## 2021-04-21 RX ADMIN — INSULIN LISPRO 2 UNITS: 100 INJECTION, SOLUTION INTRAVENOUS; SUBCUTANEOUS at 12:15

## 2021-04-21 RX ADMIN — POLYETHYLENE GLYCOL 3350 17 G: 17 POWDER, FOR SOLUTION ORAL at 09:00

## 2021-04-21 RX ADMIN — AMLODIPINE BESYLATE 10 MG: 10 TABLET ORAL at 08:49

## 2021-04-21 RX ADMIN — VALSARTAN 160 MG: 160 TABLET, FILM COATED ORAL at 08:50

## 2021-04-21 RX ADMIN — OXYCODONE HYDROCHLORIDE 5 MG: 5 TABLET ORAL at 08:57

## 2021-04-21 RX ADMIN — OXYCODONE HYDROCHLORIDE 5 MG: 5 TABLET ORAL at 04:57

## 2021-04-21 RX ADMIN — CEFAZOLIN 2000 MG: 10 INJECTION, POWDER, FOR SOLUTION INTRAVENOUS at 04:57

## 2021-04-21 RX ADMIN — BUPROPION HYDROCHLORIDE 150 MG: 150 TABLET, EXTENDED RELEASE ORAL at 20:22

## 2021-04-21 RX ADMIN — Medication 1 TABLET: at 08:50

## 2021-04-21 RX ADMIN — CARVEDILOL 12.5 MG: 12.5 TABLET, FILM COATED ORAL at 08:50

## 2021-04-21 RX ADMIN — ALPRAZOLAM 1 MG: 0.5 TABLET ORAL at 08:56

## 2021-04-21 RX ADMIN — ATORVASTATIN CALCIUM 40 MG: 40 TABLET, FILM COATED ORAL at 08:49

## 2021-04-21 RX ADMIN — METHOCARBAMOL 750 MG: 500 TABLET ORAL at 12:21

## 2021-04-21 RX ADMIN — Medication 1 TABLET: at 08:49

## 2021-04-21 RX ADMIN — VENLAFAXINE HYDROCHLORIDE 150 MG: 150 CAPSULE, EXTENDED RELEASE ORAL at 08:49

## 2021-04-21 RX ADMIN — FENOFIBRATE 160 MG: 160 TABLET ORAL at 08:49

## 2021-04-21 RX ADMIN — OXYCODONE HYDROCHLORIDE 5 MG: 5 TABLET ORAL at 20:21

## 2021-04-21 RX ADMIN — HYDROCHLOROTHIAZIDE 25 MG: 25 TABLET ORAL at 08:49

## 2021-04-21 ASSESSMENT — PAIN SCALES - GENERAL
PAINLEVEL_OUTOF10: 6
PAINLEVEL_OUTOF10: 8
PAINLEVEL_OUTOF10: 10
PAINLEVEL_OUTOF10: 4
PAINLEVEL_OUTOF10: 10

## 2021-04-21 ASSESSMENT — PAIN DESCRIPTION - PROGRESSION
CLINICAL_PROGRESSION: NOT CHANGED

## 2021-04-21 ASSESSMENT — PAIN DESCRIPTION - PAIN TYPE: TYPE: ACUTE PAIN

## 2021-04-21 ASSESSMENT — PAIN DESCRIPTION - ONSET: ONSET: ON-GOING

## 2021-04-21 ASSESSMENT — PAIN DESCRIPTION - ORIENTATION: ORIENTATION: MID

## 2021-04-21 NOTE — PROGRESS NOTES
Dr Gilson De at bedside. Pt only had 150ml UO. Dr Gilson De to review labs and place orders. Pt has lost 5 IVs in the last 36 hours, even 2 placed via US guidance. Order for PICC team to place midline.

## 2021-04-21 NOTE — PROGRESS NOTES
Initial visit with patient to discuss spiritual care. Dtr Tanisha Champion is here with her mother. Pt in the chair, alert and awake. Pt complained of pain for the entirety of visit. Also \"very anxious. \" Called RN to update on patients pain and anxiety. Offered validation of feelings. Offered a blessing.

## 2021-04-21 NOTE — PROGRESS NOTES
CREATININE 1.1  --  1.0 1.3*   GLUCOSE 249*  --  158* 177*     Hepatic:   Recent Labs     04/19/21  0832 04/20/21  0700 04/21/21  0700   AST 13* 15 16   ALT 8* 9* 7*   BILITOT 0.2 0.3 0.2   ALKPHOS 71 76 77     Mag:      No results for input(s): MG in the last 72 hours. Phos:   No results for input(s): PHOS in the last 72 hours. INR:   Recent Labs     04/18/21  1147   INR 1.29       Radiology Review:  CXR leads in good position, no PTX      ASSESSMENT AND PLAN:    Patient Active Problem List   Diagnosis    Diabetes mellitus ([facility] [address]    CAD (coronary artery disease)    Hyperlipidemia    Carotid artery stenosis    S/P CABG x 3    Ischemia, bowel (HCC)    Protein-calorie malnutrition, moderate (HCC)    HTN (hypertension)    Hypokalemia    Anxiety    Gastroenteritis    Stroke-like symptoms    Peripheral polyneuropathy    Ataxia    Urinary tract infection without hematuria    Carcinoid syndrome (HCC)    Immune thrombocytopenic purpura (HCC)    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 Incision C/D/I without hematoma. F/u 1 mo for wound check.      Dong Culp PA-C

## 2021-04-21 NOTE — PROGRESS NOTES
[facility]    Hospitalist Progress Note      Name:  Binh Carrington /Age/Sex: 1940  (79 y.o. female)   MRN & CSN:  [id number] & [id number] Admission Date/Time: 2021 11:56 AM   Location:  -A PCP: Isiah Galaviz MD         Hospital Day: 6    Assessment and Plan:   Binh Carrington is a 79 y.o.  female  who presents with GI bleed    Lower GI bleeding    Status post EGD with gastritis, duodenitis. Colonoscopy with diverticulosis and hemorrhoids  Hemoglobin 8.5. Monitor hemoglobin closely and transfuse for Hb less than 7  For possible small bowel follow-through. GI following. Complete heart block -Symptomatic bradycardia with 8 sec sinus pause    Status post temporary pacemaker placed   Keep potassium more than 4 and magnesium more than 2. For pacemaker placement today - by CT surgery     UTI     UA Positive, wbc - 31.2/29.8 - afebrile   Started on Zosyn      Acute kidney injury on chronic kidney disease stage IIIb. Creatinine is stable, avoid nephrotoxins, nephrology following     Hypokalemia: Replaced per protocol    Abdominal pain     CT abdomen with stool in rectal area? constipation? But patient had Cscope few days ago-  Check lipase, amylase, evaluated by GI and suggested empiric Zosyn   Consult surgery as she has previous bowel surgery      History of neuroendocrine tumor: Oncology following. Patient was on octreotide.     Hypertension: Continue with hydralazine, amlodipine, losartan, hydrochlorothiazide    Chronic medical conditions: Resume home medications when clinically appropriate    Coronary artery disease  Hyperlipidemia  Type II diabetes  Depression    Diet DIET CARDIAC; Carb Control: 5 carb choices (75 gms)/meal   DVT Prophylaxis [] Lovenox, []  Heparin, [] SCDs, []No VTE prophylaxis, patient ambulating   GI Prophylaxis [] PPI, [] H2 Blocker, [] No GI prophylaxis, patient is receiving diet/Tube Feeds   Code Status Full Code   Disposition Patient requires continued admission due to GI, heart block    MDM [] Low, [x] Moderate,[]  High     History of Present Illness: Subjective     Patient Seen & Examined at the bedside      Patient is resting in bed -no distress while on room air - complains of abdominal pain which became severe but started weeks ago - has no fever -     Ten point ROS reviewed negative, unless as noted above    Objective: Intake/Output Summary (Last 24 hours) at 4/21/2021 1016  Last data filed at 4/21/2021 0907  Gross per 24 hour   Intake 2414 ml   Output 1400 ml   Net 1014 ml      Vitals:   Vitals:    04/21/21 0907   BP:    Pulse: 79   Resp: 17   Temp:    SpO2: 98%     Physical Exam:    GEN Awake female, resting in bed in no apparent distress. Appears given age. RESP Clear to auscultation, no wheezes, rales or rhonchi. CARDIO/VASC -S1/S2 auscultated. Regular rate without appreciable murmurs, rubs, or gallops. Peripheral pulses equal bilaterally and palpable. No peripheral edema. GI Abdomen is soft without significant tenderness, masses, or guarding. Bowel sounds are normoactive. Rectal exam deferred.  Harvey catheter is not present. MSK No gross joint deformities.  Spontaneous movement of all extremities  SKIN temporary pacer insertion at the right groin area    Medications:   Medications:    polyethylene glycol  17 g Oral Daily    valsartan  160 mg Oral Nightly    sodium chloride flush  5-40 mL Intravenous 2 times per day    carvedilol  12.5 mg Oral BID WC    sodium chloride flush  5-40 mL Intravenous 2 times per day    amLODIPine  10 mg Oral Daily    atorvastatin  40 mg Oral Daily    buPROPion  150 mg Oral BID    cyanocobalamin  1,000 mcg Intramuscular Q7 Days    folic acid-pyridoxine-cyancobalamin  1 tablet Oral Daily    methocarbamol  750 mg Oral 4x Daily    therapeutic multivitamin-minerals  1 tablet Oral Daily    pantoprazole  40 mg Oral QAM AC    venlafaxine  150 mg Oral Daily    sodium chloride flush  5-40 mL Intravenous 2 times per day    insulin lispro  0-6 Units Subcutaneous TID WC    insulin lispro  0-3 Units Subcutaneous Nightly    valsartan  160 mg Oral Daily    And    hydroCHLOROthiazide  25 mg Oral Daily    fenofibrate  160 mg Oral Daily      Infusions:    sodium chloride      sodium chloride      dextrose      sodium chloride       PRN Meds: fleet, 1 enema, Once PRN  sodium chloride flush, 5-40 mL, PRN  sodium chloride, 25 mL, PRN  sodium chloride flush, 5-40 mL, PRN  sodium chloride, 25 mL, PRN  potassium chloride, 10 mEq, PRN  sodium chloride flush, 10 mL, PRN  ALPRAZolam, 1 mg, BID PRN  oxyCODONE, 5 mg, Q4H PRN  glucose, 15 g, PRN  dextrose, 12.5 g, PRN  glucagon (rDNA), 1 mg, PRN  dextrose, 100 mL/hr, PRN  sodium chloride flush, 5-40 mL, PRN  sodium chloride, 25 mL, PRN  promethazine, 12.5 mg, Q6H PRN    Or  ondansetron, 4 mg, Q6H PRN  acetaminophen, 650 mg, Q6H PRN    Or  acetaminophen, 650 mg, Q6H PRN          Electronically signed by Christina Gooden MD on 4/21/2021 at 10:16 AM

## 2021-04-21 NOTE — CONSULTS
SURGERY Left 2001    patella    FRACTURE SURGERY Right 2017    wrist    HYSTERECTOMY  1996    OTHER SURGICAL HISTORY  08 11 2015    exp lap, right colon resection    SKIN BIOPSY      UPPER GASTROINTESTINAL ENDOSCOPY N/A 4/18/2021    EGD BIOPSY performed by Jeanne Tran MD at [address] [place] [address] Left 2/17/2021    LEFT WRIST OPEN REDUCTION INTERNAL FIXATION performed by Hollie Ivan MD at [facility] OR     Current Medications:   Current Facility-Administered Medications   Medication Dose Route Frequency Provider Last Rate Last Admin    fleet rectal enema 1 enema  1 enema Rectal Once PRN Ishmael Rodriguez MD        polyethylene glycol (GLYCOLAX) packet 17 g  17 g Oral Daily Ishmael Rodriguez MD        piperacillin-tazobactam (ZOSYN) 3,375 mg in dextrose 5 % 50 mL IVPB extended infusion (mini-bag)  3,375 mg Intravenous Q8H Jeanne Tran MD        morphine (PF) injection 2 mg  2 mg Intravenous Q4H PRN Jeanne Tran MD   2 mg at 04/21/21 1149    valsartan (DIOVAN) tablet 160 mg  160 mg Oral Nightly Cady Magaña MD   160 mg at 04/20/21 2330    sodium chloride flush 0.9 % injection 5-40 mL  5-40 mL Intravenous 2 times per day Cady Magaña MD   10 mL at 04/20/21 2336    sodium chloride flush 0.9 % injection 5-40 mL  5-40 mL Intravenous PRN Cady Magaña MD        0.9 % sodium chloride infusion  25 mL Intravenous PRN Cady Magaña MD        carvedilol (COREG) tablet 12.5 mg  12.5 mg Oral BID  Se Dowell MD   12.5 mg at 04/21/21 0850    sodium chloride flush 0.9 % injection 5-40 mL  5-40 mL Intravenous 2 times per day Cady Magaña MD   10 mL at 04/20/21 2030    sodium chloride flush 0.9 % injection 5-40 mL  5-40 mL Intravenous PRN Cady Magaña MD        0.9 % sodium chloride infusion  25 mL Intravenous PRN Cady Magaña MD        potassium chloride 10 mEq/100 mL IVPB (Peripheral Line)  10 mEq Intravenous PRN Cady Magaña  mL/hr at 04/20/21 0402 10 mEq at 04/20/21 0402    sodium chloride flush 0.9 % Talks on phone: Not on file     Gets together: Not on file     Attends [employer] service: Not on file     Active member of club or organization: Not on file     Attends meetings of clubs or organizations: Not on file     Relationship status: Not on file    Intimate partner violence     Fear of current or ex partner: Not on file     Emotionally abused: Not on file     Physically abused: Not on file     Forced sexual activity: Not on file   Other Topics Concern    Not on file   Social History Narrative    Not on file     Family History:   Family History   Problem Relation Age of Onset    Heart Disease Mother     Heart Attack Mother     Cancer Father     Heart Disease Brother         REVIEW OF SYSTEMS:    CONSTITUTIONAL:  positive for  anorexia  HEENT:  negative  CARDIOVASCULAR:  negative  GASTROINTESTINAL:  positive for nausea and abdominal pain  GENITOURINARY:  negative  HEMATOLOGIC/LYMPHATIC:  negative  ENDOCRINE:  negative    PHYSICAL EXAM:    VITALS:  BP (!) 126/52   Pulse 79   Temp 98.2 °F (36.8 °C) (Oral)   Resp 17   Ht 5' 2\" (1.575 m)   Wt 130 lb 1.1 oz (59 kg)   SpO2 98%   BMI 23.79 kg/m²   CONSTITUTIONAL:  awake, alert, mild distress and normal weight  ENT:  normocepalic, without obvious abnormality  NECK:  supple, symmetrical, trachea midline   LUNGS:  clear to auscultation  CARDIOVASCULAR:  regular rate and rhythm   GASTROINTESTINAL;  scars noted , normal bowel sounds, soft, non-distended, tenderness noted in the left lower quadrant, voluntary guarding absent, no masses palpated and hernia absent  MUSCULOSKELETAL:  0+ pitting edema lower extremities  NEUROLOGIC:  Mental Status Exam:  Level of Alertness:   awake  Orientation:   person, place, time    DATA:    CBC:   Lab Results   Component Value Date    WBC 29.8 04/21/2021    RBC 3.96 04/21/2021    HGB 10.3 04/21/2021    HCT 34.3 04/21/2021    MCV 86.6 04/21/2021    MCH 26.0 04/21/2021    MCHC 30.0 04/21/2021    RDW 15.9 04/21/2021    PLT

## 2021-04-21 NOTE — CARE COORDINATION
Attempted to meet with patient; she is asleep in the chair. No family present. Will follow for discharge needs. Barth Crigler RN    [address] Dr Salina Callahan for PT/OT order.  Barth Crigler RN

## 2021-04-21 NOTE — PROGRESS NOTES
ONCOLOGY HEMATOLOGY CARE ([facility])  PROGRESS NOTE      Patient was seen and examined today. She is s/p EGD and it showed gastritis and duodenitis. Colonoscopy showed diverticulitis and hemorrhoids. Plan for small bowel follow through when she is more stable. Has sinus pause in telemetry and she is s/p permanent pace maker placement today. Iron study done on 4/17/21 were reviewed. PHYSICAL EXAM    Vitals: BP (!) 151/65   Pulse 74   Temp 97.5 °F (36.4 °C) (Oral)   Resp 24   Ht 5' 2\" (1.575 m)   Wt 130 lb 1.1 oz (59 kg)   SpO2 98%   BMI 23.79 kg/m²   CONSTITUTIONAL: awake, alert, cooperative, no apparent distress   EYES: pupils equal, round and reactive to light, sclera clear and conjunctiva normal  ENT: Normocephalic, without obvious abnormality, atraumatic  NECK: supple, symmetrical, no jugular venous distension and no carotid bruits   HEMATOLOGIC/LYMPHATIC: no cervical, supraclavicular or axillary lymphadenopathy   LUNGS: VBS, no wheezes, no increased work of breathing, no crackles, no rhonchi, clear to auscultation   CARDIOVASCULAR: regular rate and rhythm, normal S1 and S2, no murmur noted  ABDOMEN: normal bowel sounds x 4, soft, non-distended, non-tender, no masses palpated, no hepatosplenomgaly   MUSCULOSKELETAL: full range of motion noted, tone is normal  NEUROLOGIC: awake, alert, oriented to name, place and time. Motor skills grossly intact. SKIN: Normal skin color, texture, turgor and no jaundice. appears intact   EXTREMITIES: no leg swelling, no cyanosis, no LE edema, no clubbing     LABORATORY RESULTS  CBC:   Recent Labs     04/18/21  1147 04/18/21  1147 04/19/21  0832 04/19/21  1233 04/20/21  0700   WBC 15.2*  --  10.7*  --   --    HGB 8.4*   < > 8.0* 8.5* 9.2*     --  127*  --   --     < > = values in this interval not displayed.      BMP:    Recent Labs     04/18/21  1147 04/19/21  0832 04/19/21  2020 04/20/21  0700    135  --  135   K 3.5 3.1* 3.4* 4.2    101  --  105   CO2 24 26  --  23   BUN 14 14  --  12   CREATININE 1.2* 1.1  --  1.0   GLUCOSE 132* 249*  --  158*     Hepatic:   Recent Labs     04/18/21  1147 04/19/21  0832 04/20/21  0700   AST 15 13* 15   ALT 7* 8* 9*   BILITOT 0.4 0.2 0.3   ALKPHOS 77 71 76     INR:   Recent Labs     04/18/21  1147   INR 1.29     ASSESSMENT/RECOMMENDATION  Metastatic carcinoid tumor  Immune thrombocytopenia  Lower GI bleeding  Symptomatic bradycardia with sinus pause    Reviewed her EGD and colonoscopy results. Anemia panel was also reviewed. She is status post PM placement today. Will continue to monitor her hemoglobin. She is on Lanreotide for her metastatic carcinoid and she will be due for lanreotide on 4/27/21. She is on close observation only for ITP. We will continue to follow the patient. Thank you.

## 2021-04-21 NOTE — PROGRESS NOTES
2030:  Pt began screaming out in pain. Upon further assessment, pt very pale, pt complaining of excruciating abdominal pain and tenderness, level 10/10, BP 20pts lower than previous measurements. Pt abdomen soft and no difference in pain when palpated. Pt stating \"im going to die, im dying, this is it im going to die tonight\" Pt also stating that she feels like she is going to pass out, feels lightheaded and dizzy. 2034:  DEISY Kline called. Updated. Asked for IV pain medication and abdominal image. Received order for 2mg morphine and STAT abd CT.    2043  Pt taken to CT    2047  BP 78/37 (50)    2049  CT being completed    2104  Pt back to room. Morphine has not helped pain yet. Pt states pain is worse than it ever was before, even when she came into the ER for her GIB originally on this visit. Pt also slightly diaphoretic. Called DEISY Kline via phone. 2106  DEISY Kline came to bedside. Assessed pt. Aware BP in CT was low after morphine admin. Doesn't want to give pt anymore IV pain meds at this time. 2112  Gave pt PO tylenol and oxycodone  Pt still yelling out and writhing in pain    Will continue to monitor closely.

## 2021-04-21 NOTE — PROGRESS NOTES
Nephrology Progress Note        [address], [address] [address]  Phone: [phone]  Office Hours: 8:30AM - 4:30PM  Monday - Friday 4/21/2021 7:59 AM  Subjective:   Admit Date: 4/16/2021  PCP: Jordi Carrera MD  Interval History: minimal UOP  Had severe LUQ pain yesterday so a ct ap was done  Feeling better this am    Diet: DIET CARDIAC; Carb Control: 5 carb choices (75 gms)/meal      Data:   Scheduled Meds:   valsartan  160 mg Oral Nightly    sodium chloride flush  5-40 mL Intravenous 2 times per day    carvedilol  12.5 mg Oral BID WC    sodium chloride flush  5-40 mL Intravenous 2 times per day    amLODIPine  10 mg Oral Daily    atorvastatin  40 mg Oral Daily    buPROPion  150 mg Oral BID    cyanocobalamin  1,000 mcg Intramuscular Q7 Days    folic acid-pyridoxine-cyancobalamin  1 tablet Oral Daily    methocarbamol  750 mg Oral 4x Daily    therapeutic multivitamin-minerals  1 tablet Oral Daily    pantoprazole  40 mg Oral QAM AC    venlafaxine  150 mg Oral Daily    sodium chloride flush  5-40 mL Intravenous 2 times per day    insulin lispro  0-6 Units Subcutaneous TID WC    insulin lispro  0-3 Units Subcutaneous Nightly    valsartan  160 mg Oral Daily    And    hydroCHLOROthiazide  25 mg Oral Daily    fenofibrate  160 mg Oral Daily     Continuous Infusions:   sodium chloride      sodium chloride 75 mL/hr at 04/19/21 2310    sodium chloride      dextrose      sodium chloride       PRN Meds:sodium chloride flush, sodium chloride, sodium chloride flush, sodium chloride, potassium chloride, sodium chloride flush, ALPRAZolam, oxyCODONE, glucose, dextrose, glucagon (rDNA), dextrose, sodium chloride flush, sodium chloride, promethazine **OR** ondansetron, polyethylene glycol, acetaminophen **OR** acetaminophen  I/O last 3 completed shifts: In: 0589 [P.O.:120; I.V.:8164]  Out: 1400 [Urine:1400]  No intake/output data recorded.     Intake/Output Summary (Last 24 hours) at 4/21/2021 0759  Last data filed at 4/21/2021 0630  Gross per 24 hour   Intake 2064 ml   Output 1400 ml   Net 664 ml       CBC:   Recent Labs     04/19/21  0832 04/19/21  0832 04/20/21  0700 04/20/21  2220 04/21/21  0530   WBC 10.7*  --   --  31.2* 29.8*   HGB 8.0*   < > 9.2* 10.5* 10.3*   *  --   --  80* 100*    < > = values in this interval not displayed.        BMP:    Recent Labs     04/18/21  1147 04/19/21  0832 04/19/21 2020 04/20/21  0700    135  --  135   K 3.5 3.1* 3.4* 4.2    101  --  105   CO2 24 26  --  23   BUN 14 14  --  12   CREATININE 1.2* 1.1  --  1.0   GLUCOSE 132* 249*  --  158*     Hepatic:   Recent Labs     04/18/21  1147 04/19/21  0832 04/20/21  0700   AST 15 13* 15   ALT 7* 8* 9*   BILITOT 0.4 0.2 0.3   ALKPHOS 77 71 76         Objective:   Vitals: /63   Pulse 77   Temp 97.9 °F (36.6 °C) (Oral)   Resp 17   Ht 5' 2\" (1.575 m)   Wt 130 lb 1.1 oz (59 kg)   SpO2 99%   BMI 23.79 kg/m²   General appearance: alert and cooperative with exam, in no acute distress  HEENT: normocephalic, atraumatic,   Neck: supple, trachea midline  Lungs:  breathing comfortably on nc  Heart[de-identified] regular rate and rhythm, S1, S2 normal,  Abdomen: non distended,   Extremities: extremities atraumatic, no cyanosis or edema  Neurologic: alert, oriented, follows commands, interactive    Assessment and Plan:     Patient Active Problem List     Diagnosis Date Noted    GI bleed 04/16/2021    Closed fracture of left distal radius 02/11/2021    Carcinoid syndrome ([facility] [address] 04/21/2020    Immune thrombocytopenic purpura (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    Peripheral polyneuropathy      Ataxia      Urinary tract infection without hematuria      Stroke-like symptoms 10/14/2019    Gastroenteritis 03/29/2019    HTN (hypertension) 08/15/2015    Hypokalemia 08/15/2015    Anxiety 08/15/2015    Protein-calorie malnutrition, moderate (HCC) 08/12/2015    Ischemia, bowel ([facility] [address] 08/11/2015    S/P CABG x 3 02/03/2014    Diabetes mellitus ([facility]      CAD (coronary artery disease)      Hyperlipidemia      Carotid artery stenosis 03/01/2000      -CKD3; cr 1.3//UA shows no signs of hematuria  -Hyponatremia  -Leukocytosis  -Encephalopathy  -Melena/hematochezia? Colonoscopy showed hemorrhoids  -Hematuria?  -Anemia  -Neuroendocrine tumor  -Symptomatic bradycardia; s/p PPM on 4/20/21  -HTN        Plan:  Awaiting labs today  Avoid nephrotoxins                                      Electronically signed by Vicenta Andrew DO on 4/21/2021 at [address] [facility], [address],  Kermit GALVAN [facility], [place] [address]  PHONE: [phone]  FAX: [phone]

## 2021-04-21 NOTE — PROGRESS NOTES
Physician Progress Note      PATIENT:               Anabell Collier  CSN #:                  [id number]  :                       1940  ADMIT DATE:       2021 11:56 AM  DISCH DATE:  RESPONDING  PROVIDER #:        Stephenie Booker MD          QUERY TEXT:    Dear hospitalist,    Pt admitted with GI bleed. Noted documentation of encephalopathy on 2021   nephrology consult note. If possible, please document in progress notes and   discharge summary:    Risk Factors: acute illness  Clinical Indicators: Per Consult note: She also had some encephalopathy,  ED   note: Daughters also noticed intermittent confusion that has progressed over   the last several days. Per H&P:  intermittent confusion, history of gi cancer  Treatment: admission, labs, monitor, Nephrology and GI consult, CT head. Options provided:  -- Encephalopathy confirmed present on admission  -- Encephalopathy confirmed not present on admission  -- Encephalopathy ruled out  -- Other - I will add my own diagnosis  -- Disagree - Not applicable / Not valid  -- Disagree - Clinically unable to determine / Unknown  -- Refer to Clinical Documentation Reviewer    PROVIDER RESPONSE TEXT:    The diagnosis of encephalopathy was ruled out. Query created by: Tyrone Vanegas on 2021 3:07 PM      QUERY TEXT:    Dear hospitalist,    Pt admitted with GI bleed and has anemia documented. If possible, please   document in progress notes and discharge summary further specificity regarding   the acuity and type of anemia:    The medical record reflects the following:  Risk Factors: acute illness  Clinical Indicators: H&P: Lower GI bleed / Anemia, HEMATOLOGIC/LYMPHATIC:       Anemia,  bleeding tendencies,  H&H: 8.3 -8.8, 27.8 - 29.0. Treatment: admission, labs, GI and nephrology consult, type and screen, H&H   every 12 hours.   Options provided:  -- Anemia due to acute blood loss  -- Anemia due to chronic blood loss  -- Anemia due to acute on chronic blood loss  -- Anemia due to chronic disease  -- Other - I will add my own diagnosis  -- Disagree - Not applicable / Not valid  -- Disagree - Clinically unable to determine / Unknown  -- Refer to Clinical Documentation Reviewer    PROVIDER RESPONSE TEXT:    This patient has anemia due to chronic disease.     Query created by: Zenia Diaz on 4/20/2021 3:08 PM      Electronically signed by:  Jane Page MD 4/21/2021 8:03 AM

## 2021-04-21 NOTE — PROGRESS NOTES
Messaged DEISY Kline NP via perfect serve:    \"BP better. 151/65 (86); 117/50 (70)  she is still yelling out and writhing in pain. the oxy didn't help her last night with her back pain. morphine isn't touching her. I can hold her night time BP meds for now if we can do something else for her pain? she is begging and pleading for more pain meds\"      Order placed for 4mg morphine. DEISY Kline and this RN did review the CT abd. Will give pain meds and continue to provide emotional support to pt.

## 2021-04-21 NOTE — PROGRESS NOTES
Call initiated by: Nursing staff:  Kaylen Padilla  Call addressed around: 4/20/2021 8:41 PM      Reason for call: Patient reports abdominal pain      Orders placed: Patient assessed a bedside. She appears to be in severe distress due to reported pain. She reports 10/10 right UQ abdominal pain. Grimaced with palpation. Given morphine x1 without improvement. CT abdomen pelvis stat ordered. Will continue IV fluids and pain managemement pending CT abd results.         Lizett Hayes, APRN - CNP

## 2021-04-21 NOTE — PROGRESS NOTES
Daily Progress Note    Awake alert feeling ok  Complain of abdominal pain   Remain in sinus rate is stable  S/p PPM -for severe symptomatic bradycardia  Mild CAD   Continue medical treatment from cardiac stand   Diuretics per renal   Ok to move out of ICU and increase activity   Hx of CAD and CABG   Anemia stable  GI cancer hx    Pt. Awake, alert and feeling ok   HR stable, NSR in the 70s, BP stable  Denies CP, SOB and dizziness     GI bleed    Hgb low but stable    GI following-s/p EGD and c-scope-diverticulosis and gastritis noted- no active bleeding    Ok to hold antiplatelet for now    Having sig Abdominal pain still today- CT abdomen showed constipation- Given Laxative per nurse     Bradycardia    Several 5-8 second pauses on tele yesterday    AV block and SSS both noted    LHC done patent grafts noted    PPM placed yesterday-site stable and CXR stable    Restarted Coreg     Hx of CAD s/p 3 V CABG and multiple PCI in the past    EF 60% with Mod MS    Cont. Med. Tx.     Will follow     Echo-4/19/21  Summary   Left ventricular systolic function is hyperdynamic.   Ejection fraction is visually estimated at >60%.  Grade I diastolic dysfunction.   Moderately dilated left atrium.    PPM wiring visualized within the right heart.   Mild aortic stenosis with mean gradient of 15 mmHg.   Mitral annular calcification with moderate mitral stenosis; mean gradient is   8mmHg.   Mild to moderate mitral regurgitation.   No evidence of any pericardial effusion.     [facility]-4/19/21  LEFT MAIN PATENT   LAD MID 70% STENOSIS  LCX MILD DX  % OCCLUDED  LIMA TO LAD AND DIAGONAL PATENT  SVG TO OM PATENT  SVG TO RCA PATENT  LVEDP  15  MEDICAL TREATMENT FROM CAD  FOR A PERMANENT PACER  TEMP=PACER PLACED  NO COMPLICATIONS  RIGHT ARTERIAL AND VENOUS SHEATH        Past medical history:    has a past medical history of Acute anterior wall MI ([facility] CAD (coronary artery disease), Cancer ([facility] Cancer ([facility] Carotid artery stenosis, Diabetes mellitus ([facility] H/O cardiovascular stress test, H/O echocardiogram, Hyperlipidemia, Hypertension, and Protein-calorie malnutrition, moderate ([facility] Past surgical history:   has a past surgical history that includes Coronary artery bypass graft (5/07); Diagnostic Cardiac Cath Lab Procedure (3/30/07); Abdomen surgery; other surgical history (08 11 2015); skin biopsy; fracture surgery (Left, 2001); fracture surgery (Right, 2017); Hysterectomy (1996); and Wrist fracture surgery (Left, 2/17/2021). Social History:   reports that she quit smoking about 32 years ago. She started smoking about 62 years ago. She has a 30.00 pack-year smoking history. She has never used smokeless tobacco. She reports that she does not drink alcohol or use drugs.   Family history:  family history includes Cancer in her father; Heart Attack in her mother; Heart Disease in her brother and mother.             Allergies   Allergen Reactions    Tramadol Itching    Vicodin [Hydrocodone-Acetaminophen] Itching              Objective:   BP (!) 126/52   Pulse 79   Temp 98.2 °F (36.8 °C) (Oral)   Resp 17   Ht 5' 2\" (1.575 m)   Wt 130 lb 1.1 oz (59 kg)   SpO2 98%   BMI 23.79 kg/m²       Intake/Output Summary (Last 24 hours) at 4/21/2021 0927  Last data filed at 4/21/2021 8373  Gross per 24 hour   Intake 2414 ml   Output 1400 ml   Net 1014 ml       Medications:   Scheduled Meds:   valsartan  160 mg Oral Nightly    sodium chloride flush  5-40 mL Intravenous 2 times per day    carvedilol  12.5 mg Oral BID WC    sodium chloride flush  5-40 mL Intravenous 2 times per day    amLODIPine  10 mg Oral Daily    atorvastatin  40 mg Oral Daily    buPROPion  150 mg Oral BID    cyanocobalamin  1,000 mcg Intramuscular Q7 Days    folic acid-pyridoxine-cyancobalamin  1 tablet Oral Daily    methocarbamol  750 mg Oral 4x Daily    therapeutic multivitamin-minerals  1 tablet Oral Daily    pantoprazole  40 mg Oral QAM AC    venlafaxine  150 mg Oral Daily    sodium chloride flush  5-40 mL Intravenous 2 times per day    insulin lispro  0-6 Units Subcutaneous TID WC    insulin lispro  0-3 Units Subcutaneous Nightly    valsartan  160 mg Oral Daily    And    hydroCHLOROthiazide  25 mg Oral Daily    fenofibrate  160 mg Oral Daily      Infusions:   sodium chloride      sodium chloride      dextrose      sodium chloride        PRN Meds:  sodium chloride flush, sodium chloride, sodium chloride flush, sodium chloride, potassium chloride, sodium chloride flush, ALPRAZolam, oxyCODONE, glucose, dextrose, glucagon (rDNA), dextrose, sodium chloride flush, sodium chloride, promethazine **OR** ondansetron, polyethylene glycol, acetaminophen **OR** acetaminophen       Physical Exam:  Vitals:    04/21/21 0907   BP:    Pulse: 79   Resp: 17   Temp:    SpO2: 98%        General: AAO, NAD  Chest: Nontender  Cardiac: First and Second Heart Sounds are Normal, No Murmurs or Gallops noted  Lungs:Clear to auscultation and percussion. Abdomen: Soft, NT, ND, +BS  Extremities: No clubbing, no edema  Vascular:  Equal 2+ peripheral pulses. Lab Data:  CBC:   Recent Labs     04/19/21  0832 04/19/21  0832 04/20/21  0700 04/20/21  2220 04/21/21  0530   WBC 10.7*  --   --  31.2* 29.8*   HGB 8.0*   < > 9.2* 10.5* 10.3*   HCT 26.8*   < > 29.9* 35.6* 34.3*   MCV 87.9  --   --  87.7 86.6   *  --   --  80* 100*    < > = values in this interval not displayed.      BMP:   Recent Labs     04/19/21  0832 04/19/21  2020 04/20/21  0700 04/21/21  0700     --  135 137   K 3.1* 3.4* 4.2 4.4     --  105 108   CO2 26  --  23 20*   BUN 14  --  12 16   CREATININE 1.1  --  1.0 1.3*     LIVER PROFILE:   Recent Labs     04/18/21  1147 04/19/21  0832 04/20/21  0700 04/21/21  0700   AST 15 13* 15 16   ALT 7* 8* 9* 7*   LIPASE 31  --   --   --    BILITOT 0.4 0.2 0.3 0.2   ALKPHOS 77 71 76 77     PT/INR:   Recent Labs     04/18/21  1147   PROTIME 15.6*   INR 1.29     APTT:   Recent Labs     04/18/21  1147   APTT 27.4     BNP:  No results for input(s): BNP in the last 72 hours.       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    Peripheral polyneuropathy     Ataxia     Urinary tract infection without hematuria     Stroke-like symptoms 10/14/2019    Gastroenteritis 03/29/2019    HTN (hypertension) 08/15/2015    Hypokalemia 08/15/2015    Anxiety 08/15/2015    Protein-calorie malnutrition, moderate ([address] 08/12/2015    Ischemia, bowel ([facility] 08/11/2015    S/P CABG x 3 02/03/2014    Diabetes mellitus ([facility] [address]     CAD (coronary artery disease)     Hyperlipidemia     Carotid artery stenosis 03/01/2000       Electronically signed by Lesly Martinez PA-C on 4/21/2021 at 9:27 AM   Electronically signed by Erickson Iniguez MD on 4/21/21 at 10:32 AM EDT

## 2021-04-21 NOTE — CONSULTS
Consult completed. Moderate diffuse edema throughout RUE 2/2 infiltration noted, but Provider requesting line not be placed in LUE 2/2 recent Pacemaker placement. Procedure/rationale explained to pt & consent obtained. #22ga Arrow Endurance Extended Dwell MidLine Catheter initiated to RUE Brachial Vein using sterile, UltraSound-guided technique without difficulty/complications. Positioning verified via UltraSound visualization of catheter within vessel lumen; site returns blood briskly and flushes without resistance/abnormalities. Sterile dressing with SkinPrep, StatLock Securing Device, BioPatch, SwabCap, and Limb Precautions band applied. Pt tolerated well & no other c/o or needs noted or reported. RN notified.

## 2021-04-22 LAB
ALBUMIN SERPL-MCNC: 3 GM/DL (ref 3.4–5)
ALP BLD-CCNC: 65 IU/L (ref 40–129)
ALT SERPL-CCNC: <5 U/L (ref 10–40)
AMYLASE: 25 U/L (ref 25–115)
ANION GAP SERPL CALCULATED.3IONS-SCNC: 8 MMOL/L (ref 4–16)
APTT: 31.5 SECONDS (ref 25.1–37.1)
AST SERPL-CCNC: 18 IU/L (ref 15–37)
BASOPHILS ABSOLUTE: 0.1 K/CU MM
BASOPHILS RELATIVE PERCENT: 0.2 % (ref 0–1)
BILIRUB SERPL-MCNC: 0.3 MG/DL (ref 0–1)
BUN BLDV-MCNC: 17 MG/DL (ref 6–23)
CALCIUM SERPL-MCNC: 7.4 MG/DL (ref 8.3–10.6)
CHLORIDE BLD-SCNC: 105 MMOL/L (ref 99–110)
CO2: 24 MMOL/L (ref 21–32)
CREAT SERPL-MCNC: 1.3 MG/DL (ref 0.6–1.1)
DIFFERENTIAL TYPE: ABNORMAL
EOSINOPHILS ABSOLUTE: 0.9 K/CU MM
EOSINOPHILS RELATIVE PERCENT: 3.8 % (ref 0–3)
GFR AFRICAN AMERICAN: 48 ML/MIN/1.73M2
GFR NON-AFRICAN AMERICAN: 40 ML/MIN/1.73M2
GLUCOSE BLD-MCNC: 152 MG/DL (ref 70–99)
GLUCOSE BLD-MCNC: 160 MG/DL (ref 70–99)
GLUCOSE BLD-MCNC: 192 MG/DL (ref 70–99)
GLUCOSE BLD-MCNC: 215 MG/DL (ref 70–99)
GLUCOSE BLD-MCNC: 246 MG/DL (ref 70–99)
HCT VFR BLD CALC: 26.4 % (ref 37–47)
HEMOGLOBIN: 8.1 GM/DL (ref 12.5–16)
IMMATURE NEUTROPHIL %: 0.7 % (ref 0–0.43)
INR BLD: 1.09 INDEX
LIPASE: 15 IU/L (ref 13–60)
LYMPHOCYTES ABSOLUTE: 1.5 K/CU MM
LYMPHOCYTES RELATIVE PERCENT: 6.3 % (ref 24–44)
MAGNESIUM: 1.5 MG/DL (ref 1.8–2.4)
MCH RBC QN AUTO: 27 PG (ref 27–31)
MCHC RBC AUTO-ENTMCNC: 30.7 % (ref 32–36)
MCV RBC AUTO: 88 FL (ref 78–100)
MONOCYTES ABSOLUTE: 2.1 K/CU MM
MONOCYTES RELATIVE PERCENT: 8.6 % (ref 0–4)
NUCLEATED RBC %: 0 %
PDW BLD-RTO: 15.7 % (ref 11.7–14.9)
PLATELET # BLD: 125 K/CU MM (ref 140–440)
PMV BLD AUTO: 12 FL (ref 7.5–11.1)
POTASSIUM SERPL-SCNC: 2.9 MMOL/L (ref 3.5–5.1)
POTASSIUM SERPL-SCNC: 3.6 MMOL/L (ref 3.5–5.1)
PROTHROMBIN TIME: 13.2 SECONDS (ref 11.7–14.5)
RBC # BLD: 3 M/CU MM (ref 4.2–5.4)
SEGMENTED NEUTROPHILS ABSOLUTE COUNT: 19 K/CU MM
SEGMENTED NEUTROPHILS RELATIVE PERCENT: 80.4 % (ref 36–66)
SODIUM BLD-SCNC: 137 MMOL/L (ref 135–145)
TOTAL IMMATURE NEUTOROPHIL: 0.17 K/CU MM
TOTAL NUCLEATED RBC: 0 K/CU MM
TOTAL PROTEIN: 4.7 GM/DL (ref 6.4–8.2)
WBC # BLD: 23.7 K/CU MM (ref 4–10.5)

## 2021-04-22 PROCEDURE — 6360000002 HC RX W HCPCS: Performed by: INTERNAL MEDICINE

## 2021-04-22 PROCEDURE — 2500000003 HC RX 250 WO HCPCS: Performed by: INTERNAL MEDICINE

## 2021-04-22 PROCEDURE — 6370000000 HC RX 637 (ALT 250 FOR IP): Performed by: THORACIC SURGERY (CARDIOTHORACIC VASCULAR SURGERY)

## 2021-04-22 PROCEDURE — 2580000003 HC RX 258: Performed by: INTERNAL MEDICINE

## 2021-04-22 PROCEDURE — 97163 PT EVAL HIGH COMPLEX 45 MIN: CPT

## 2021-04-22 PROCEDURE — 83735 ASSAY OF MAGNESIUM: CPT

## 2021-04-22 PROCEDURE — 97116 GAIT TRAINING THERAPY: CPT

## 2021-04-22 PROCEDURE — 6370000000 HC RX 637 (ALT 250 FOR IP): Performed by: INTERNAL MEDICINE

## 2021-04-22 PROCEDURE — 85610 PROTHROMBIN TIME: CPT

## 2021-04-22 PROCEDURE — 6360000002 HC RX W HCPCS: Performed by: SPECIALIST

## 2021-04-22 PROCEDURE — 2580000003 HC RX 258: Performed by: THORACIC SURGERY (CARDIOTHORACIC VASCULAR SURGERY)

## 2021-04-22 PROCEDURE — 82962 GLUCOSE BLOOD TEST: CPT

## 2021-04-22 PROCEDURE — 99232 SBSQ HOSP IP/OBS MODERATE 35: CPT | Performed by: INTERNAL MEDICINE

## 2021-04-22 PROCEDURE — 2580000003 HC RX 258: Performed by: SPECIALIST

## 2021-04-22 PROCEDURE — 85025 COMPLETE CBC W/AUTO DIFF WBC: CPT

## 2021-04-22 PROCEDURE — 84132 ASSAY OF SERUM POTASSIUM: CPT

## 2021-04-22 PROCEDURE — 97530 THERAPEUTIC ACTIVITIES: CPT

## 2021-04-22 PROCEDURE — 83690 ASSAY OF LIPASE: CPT

## 2021-04-22 PROCEDURE — 97535 SELF CARE MNGMENT TRAINING: CPT

## 2021-04-22 PROCEDURE — 97166 OT EVAL MOD COMPLEX 45 MIN: CPT

## 2021-04-22 PROCEDURE — 36415 COLL VENOUS BLD VENIPUNCTURE: CPT

## 2021-04-22 PROCEDURE — 82150 ASSAY OF AMYLASE: CPT

## 2021-04-22 PROCEDURE — 94761 N-INVAS EAR/PLS OXIMETRY MLT: CPT

## 2021-04-22 PROCEDURE — 2060000000 HC ICU INTERMEDIATE R&B

## 2021-04-22 PROCEDURE — 80053 COMPREHEN METABOLIC PANEL: CPT

## 2021-04-22 PROCEDURE — 99231 SBSQ HOSP IP/OBS SF/LOW 25: CPT | Performed by: NURSE PRACTITIONER

## 2021-04-22 PROCEDURE — 85730 THROMBOPLASTIN TIME PARTIAL: CPT

## 2021-04-22 RX ORDER — LACTULOSE 10 G/15ML
20 SOLUTION ORAL 3 TIMES DAILY
Status: DISCONTINUED | OUTPATIENT
Start: 2021-04-22 | End: 2021-04-26 | Stop reason: HOSPADM

## 2021-04-22 RX ORDER — POTASSIUM CHLORIDE 750 MG/1
40 TABLET, FILM COATED, EXTENDED RELEASE ORAL ONCE
Status: COMPLETED | OUTPATIENT
Start: 2021-04-22 | End: 2021-04-22

## 2021-04-22 RX ORDER — POTASSIUM CHLORIDE 750 MG/1
10 TABLET, FILM COATED, EXTENDED RELEASE ORAL 2 TIMES DAILY WITH MEALS
Status: DISCONTINUED | OUTPATIENT
Start: 2021-04-22 | End: 2021-04-23

## 2021-04-22 RX ORDER — POTASSIUM CHLORIDE 750 MG/1
10 TABLET, FILM COATED, EXTENDED RELEASE ORAL 2 TIMES DAILY
Status: DISCONTINUED | OUTPATIENT
Start: 2021-04-22 | End: 2021-04-22

## 2021-04-22 RX ORDER — MAGNESIUM SULFATE IN WATER 40 MG/ML
2000 INJECTION, SOLUTION INTRAVENOUS ONCE
Status: COMPLETED | OUTPATIENT
Start: 2021-04-22 | End: 2021-04-22

## 2021-04-22 RX ADMIN — FENOFIBRATE 160 MG: 160 TABLET ORAL at 09:20

## 2021-04-22 RX ADMIN — POTASSIUM PHOSPHATE, MONOBASIC AND POTASSIUM PHOSPHATE, DIBASIC 15 MMOL: 224; 236 INJECTION, SOLUTION, CONCENTRATE INTRAVENOUS at 09:23

## 2021-04-22 RX ADMIN — MORPHINE SULFATE 2 MG: 2 INJECTION, SOLUTION INTRAMUSCULAR; INTRAVENOUS at 09:39

## 2021-04-22 RX ADMIN — MORPHINE SULFATE 2 MG: 2 INJECTION, SOLUTION INTRAMUSCULAR; INTRAVENOUS at 02:33

## 2021-04-22 RX ADMIN — POTASSIUM CHLORIDE 40 MEQ: 750 TABLET, FILM COATED, EXTENDED RELEASE ORAL at 09:18

## 2021-04-22 RX ADMIN — MAGNESIUM SULFATE HEPTAHYDRATE 2000 MG: 2 INJECTION, SOLUTION INTRAVENOUS at 14:35

## 2021-04-22 RX ADMIN — CARVEDILOL 12.5 MG: 12.5 TABLET, FILM COATED ORAL at 09:20

## 2021-04-22 RX ADMIN — METHOCARBAMOL 750 MG: 500 TABLET ORAL at 20:53

## 2021-04-22 RX ADMIN — VALSARTAN 160 MG: 160 TABLET, FILM COATED ORAL at 21:13

## 2021-04-22 RX ADMIN — PIPERACILLIN AND TAZOBACTAM 3375 MG: 3; .375 INJECTION, POWDER, LYOPHILIZED, FOR SOLUTION INTRAVENOUS at 12:13

## 2021-04-22 RX ADMIN — VALSARTAN 160 MG: 160 TABLET, FILM COATED ORAL at 09:20

## 2021-04-22 RX ADMIN — MORPHINE SULFATE 2 MG: 2 INJECTION, SOLUTION INTRAMUSCULAR; INTRAVENOUS at 14:42

## 2021-04-22 RX ADMIN — POTASSIUM CHLORIDE 10 MEQ: 750 TABLET, FILM COATED, EXTENDED RELEASE ORAL at 16:43

## 2021-04-22 RX ADMIN — Medication 1 TABLET: at 09:19

## 2021-04-22 RX ADMIN — PROMETHAZINE HYDROCHLORIDE 12.5 MG: 25 TABLET ORAL at 09:39

## 2021-04-22 RX ADMIN — INSULIN LISPRO 2 UNITS: 100 INJECTION, SOLUTION INTRAVENOUS; SUBCUTANEOUS at 12:10

## 2021-04-22 RX ADMIN — INSULIN LISPRO 1 UNITS: 100 INJECTION, SOLUTION INTRAVENOUS; SUBCUTANEOUS at 09:23

## 2021-04-22 RX ADMIN — METHOCARBAMOL 750 MG: 500 TABLET ORAL at 12:04

## 2021-04-22 RX ADMIN — PIPERACILLIN AND TAZOBACTAM 3375 MG: 3; .375 INJECTION, POWDER, LYOPHILIZED, FOR SOLUTION INTRAVENOUS at 20:52

## 2021-04-22 RX ADMIN — ALPRAZOLAM 1 MG: 0.5 TABLET ORAL at 21:13

## 2021-04-22 RX ADMIN — METHOCARBAMOL 750 MG: 500 TABLET ORAL at 16:43

## 2021-04-22 RX ADMIN — LACTULOSE 20 G: 10 SOLUTION ORAL at 20:53

## 2021-04-22 RX ADMIN — BUPROPION HYDROCHLORIDE 150 MG: 150 TABLET, EXTENDED RELEASE ORAL at 20:53

## 2021-04-22 RX ADMIN — INSULIN LISPRO 1 UNITS: 100 INJECTION, SOLUTION INTRAVENOUS; SUBCUTANEOUS at 16:44

## 2021-04-22 RX ADMIN — LACTULOSE 20 G: 10 SOLUTION ORAL at 14:39

## 2021-04-22 RX ADMIN — SODIUM CHLORIDE, PRESERVATIVE FREE 10 ML: 5 INJECTION INTRAVENOUS at 09:20

## 2021-04-22 RX ADMIN — PANTOPRAZOLE SODIUM 40 MG: 40 TABLET, DELAYED RELEASE ORAL at 09:19

## 2021-04-22 RX ADMIN — ATORVASTATIN CALCIUM 40 MG: 40 TABLET, FILM COATED ORAL at 09:19

## 2021-04-22 RX ADMIN — AMLODIPINE BESYLATE 10 MG: 10 TABLET ORAL at 09:19

## 2021-04-22 RX ADMIN — CARVEDILOL 12.5 MG: 12.5 TABLET, FILM COATED ORAL at 16:43

## 2021-04-22 RX ADMIN — BUPROPION HYDROCHLORIDE 150 MG: 150 TABLET, EXTENDED RELEASE ORAL at 09:20

## 2021-04-22 RX ADMIN — SODIUM CHLORIDE, PRESERVATIVE FREE 10 ML: 5 INJECTION INTRAVENOUS at 20:52

## 2021-04-22 RX ADMIN — POLYETHYLENE GLYCOL 3350 17 G: 17 POWDER, FOR SOLUTION ORAL at 09:20

## 2021-04-22 RX ADMIN — PIPERACILLIN AND TAZOBACTAM 3375 MG: 3; .375 INJECTION, POWDER, LYOPHILIZED, FOR SOLUTION INTRAVENOUS at 04:08

## 2021-04-22 RX ADMIN — VENLAFAXINE HYDROCHLORIDE 150 MG: 150 CAPSULE, EXTENDED RELEASE ORAL at 09:19

## 2021-04-22 RX ADMIN — POTASSIUM CHLORIDE 10 MEQ: 750 TABLET, FILM COATED, EXTENDED RELEASE ORAL at 12:04

## 2021-04-22 RX ADMIN — METHOCARBAMOL 750 MG: 500 TABLET ORAL at 09:19

## 2021-04-22 RX ADMIN — HYDROCHLOROTHIAZIDE 25 MG: 25 TABLET ORAL at 09:20

## 2021-04-22 ASSESSMENT — PAIN SCALES - GENERAL
PAINLEVEL_OUTOF10: 6
PAINLEVEL_OUTOF10: 6
PAINLEVEL_OUTOF10: 7
PAINLEVEL_OUTOF10: 6
PAINLEVEL_OUTOF10: 1
PAINLEVEL_OUTOF10: 0
PAINLEVEL_OUTOF10: 5

## 2021-04-22 ASSESSMENT — PAIN DESCRIPTION - FREQUENCY
FREQUENCY: CONTINUOUS
FREQUENCY: CONTINUOUS

## 2021-04-22 ASSESSMENT — PAIN DESCRIPTION - LOCATION
LOCATION: ABDOMEN

## 2021-04-22 ASSESSMENT — PAIN DESCRIPTION - DESCRIPTORS
DESCRIPTORS: ACHING
DESCRIPTORS: ACHING
DESCRIPTORS: ACHING;DULL

## 2021-04-22 NOTE — PROGRESS NOTES
Physical Therapy    Facility/Department: [facility] ICU  Initial Assessment    NAME: Nacho Kuhn  : 1940  MRN: [id number]    Date of Service: 2021    Discharge Recommendations:  IP Rehab        Assessment   Assessment: Pt is a 79 y.o. female with a medical history, surgical history, co-morbidities, and personal factors including Acute anterior wall MI ([address] CAD (coronary artery disease), Cancer ([address] Cancer ([address] Carotid artery stenosis, Diabetes mellitus ([address] H/O cardiovascular stress test, H/O echocardiogram, Hyperlipidemia, Hypertension, and Protein-calorie malnutrition, moderate ([address] Coronary artery bypass graft (); Diagnostic Cardiac Cath Lab Procedure (3/30/07); Abdomen surgery; other surgical history (2015); skin biopsy; fracture surgery (Left, ); fracture surgery (Right, ); Hysterectomy (); Wrist fracture surgery (Left, 2021); Upper gastrointestinal endoscopy (N/A, 2021); and Colonoscopy (N/A, 2021), with admission for  gastrointestinal hemorrhage. Prior to admission, pt was independent with functional mobility and ADLs. Examination of body systems reveals decreased strength, decreased balance, decreased endurance, and decreased independence with functional mobility. Prognosis: Good  Decision Making: High Complexity  Clinical Presentation: unpredictable characteristics  PT Education: Goals;Weight-bearing Education;PT Role;General Safety; Adaptive Device Training;Plan of Care;Gait Training;Family Education;Precautions; Equipment; Functional Mobility Training;Transfer Training  REQUIRES PT FOLLOW UP: Yes  Activity Tolerance  Activity Tolerance: Patient Tolerated treatment well;Patient limited by endurance; Patient limited by fatigue       Restrictions  Restrictions/Precautions  Restrictions/Precautions: Fall Risk, General Precautions  Implants present? : Pacemaker(Placed on 2021)  Vision/Hearing  Vision: Within Functional Limits  Hearing: Within functional limits     Subjective  General  Chart Reviewed: Yes  Patient assessed for rehabilitation services?: Yes  Family / Caregiver Present: Yes(Daughter)  Follows Commands: Within Functional Limits  Pain Screening  Patient Currently in Pain: Yes  Pain Assessment  Pain Assessment: Faces  Patient's Stated Pain Goal: 4  Pain Type: Acute pain  Pain Location: Abdomen  Pain Orientation: Mid  Vital Signs  Patient Currently in Pain: Yes       Orientation  Orientation  Overall Orientation Status: Within Functional Limits  Social/Functional History  Social/Functional History  Lives With: Spouse  Type of Home: House  Home Layout: One level  Home Access: Stairs to enter with rails  Entrance Stairs - Number of Steps: 3  Entrance Stairs - Rails: Left  Bathroom Shower/Tub: Tub/Shower unit  Bathroom Equipment: Shower chair  Bathroom Accessibility: Accessible  ADL Assistance: Independent  Homemaking Assistance: Independent  Homemaking Responsibilities: Yes  Ambulation Assistance: Independent  Transfer Assistance: Independent  Active : No  Patient's  Info: Children  Occupation: Retired  Additional Comments: Pt reports 2 recent falls in last 6 months due to being off balanced  Cognition   Cognition  Overall Cognitive Status: WFL    Objective     Observation/Palpation  Posture: Fair(Forward head, rounded shoulders)       Strength RLE  Comment: 4/5 knee extension, 4-/5 DF  Strength LLE  Comment: 4/5 knee extension, 4/5 DF     Sensation  Overall Sensation Status: WFL  Bed mobility  Rolling to Right: Contact guard assistance;Minimal assistance(with verbal cues to reach for bed rail)  Sit to Supine: Minimal assistance; Moderate assistance(with verbal cues for BUE and BLE placement and sequencing throughout; verbal cues to push through heels to scoot toward [facility]; assist required at BLE and trunk)  Scooting: Contact guard assistance;Minimal assistance  Transfers  Sit to Stand: Contact guard assistance;Minimal Assistance(with verbal cues for BUE and BLE placement and walker placement)  Stand to sit: Contact guard assistance;Minimal Assistance(With verbal cues to feel chair at back of legs, reach back, and sit slowly)  Ambulation  Ambulation?: Yes  Ambulation 1  Surface: level tile  Device: Rolling Walker  Assistance: Contact guard assistance  Quality of Gait: decreased gait speed, decreased stride length bilaterally, intermittent bouts of path deviation, intermittent rest breaks  Distance: 50 feet + 50 feet  Comments: with verbal cues for BUE and BLE palcement, walker placement, directions to safely navigate hallway and to return to correct room; PT provided intermittent assistance with walker navigation to clear obstacles in hallway     Balance  Posture: Fair  Sitting - Static: Good  Sitting - Dynamic: Fair  Standing - Static: Fair;-      Gait Training:  Cues were given for safety, sequence, device management, balance, posture, awareness, path. Therapeutic Activity Training:   Therapeutic activity training was instructed today. Cues were given for safety, sequence, UE/LE placement, awareness, and balance. Activities performed today included bed mobility training, sup-sit, sit-stand. Plan   Plan  Times per week: 3+  Current Treatment Recommendations: Strengthening, ADL/Self-care Training, Gait Training, Patient/Caregiver Education & Training, ROM, IADL Training, Stair training, Equipment Evaluation, Education, & procurement, Balance Training, Neuromuscular Re-education, Pain Management, Functional Mobility Training, Endurance Training, Home Exercise Program, Transfer Training, Safety Education & Training  Safety Devices  Type of devices:  All fall risk precautions in place, Bed alarm in place, Call light within reach, Left in bed, Nurse notified, Gait belt      AM-PAC Score  AM-PAC Inpatient Mobility Raw Score : 14 (04/22/21 1424)  AM-PAC Inpatient T-Scale Score : 38.1 (04/22/21 1424)  Mobility Inpatient CMS 0-100% Score: 61.29 (04/22/21 1424)  Mobility Inpatient CMS G-Code Modifier : CL (04/22/21 1424)          Goals  Long term goals  Time Frame for Long term goals :  In one week:  Long term goal 1: pt will complete all bed mobility with CGAx1  Long term goal 2: pt will complete all transfers with CGAx1  Long term goal 3: pt will ambulate 400 feet with CGAx1 and use of LRAD  Long term goal 4: pt will ascend/descend 3 steps with minAx1 and use of handrail  Long term goal 5: pt will independently complete 3 sets of 10 reps of BLE AROM/AAROM exercises in appropriate and allowed ROM     Time In: 1330  Time Out: 1405  Total Treatment Time: 35  Timed Code Treatment Minutes: 25    Michelle Brunson, IAN

## 2021-04-22 NOTE — PROGRESS NOTES
Nephrology Progress Note        [address], [address] [address]  Phone: [phone]  Office Hours: 8:30AM - 4:30PM  Monday - Friday 4/22/2021 8:01 AM  Subjective:   Admit Date: 4/16/2021  PCP: Gracie Sorto MD  Interval History: on nc  UOP 450ml? Diet: DIET CLEAR LIQUID;      Data:   Scheduled Meds:   potassium chloride  10 mEq Oral BID    potassium phosphate IVPB  15 mmol Intravenous Once    polyethylene glycol  17 g Oral Daily    piperacillin-tazobactam  3,375 mg Intravenous Q8H    valsartan  160 mg Oral Nightly    sodium chloride flush  5-40 mL Intravenous 2 times per day    carvedilol  12.5 mg Oral BID WC    sodium chloride flush  5-40 mL Intravenous 2 times per day    amLODIPine  10 mg Oral Daily    atorvastatin  40 mg Oral Daily    buPROPion  150 mg Oral BID    cyanocobalamin  1,000 mcg Intramuscular Q7 Days    folic acid-pyridoxine-cyancobalamin  1 tablet Oral Daily    methocarbamol  750 mg Oral 4x Daily    therapeutic multivitamin-minerals  1 tablet Oral Daily    pantoprazole  40 mg Oral QAM AC    venlafaxine  150 mg Oral Daily    sodium chloride flush  5-40 mL Intravenous 2 times per day    insulin lispro  0-6 Units Subcutaneous TID WC    insulin lispro  0-3 Units Subcutaneous Nightly    valsartan  160 mg Oral Daily    And    hydroCHLOROthiazide  25 mg Oral Daily    fenofibrate  160 mg Oral Daily     Continuous Infusions:   sodium chloride      sodium chloride      dextrose      sodium chloride       PRN Meds:morphine, sodium chloride flush, sodium chloride, sodium chloride flush, sodium chloride, potassium chloride, sodium chloride flush, ALPRAZolam, oxyCODONE, glucose, dextrose, glucagon (rDNA), dextrose, sodium chloride flush, sodium chloride, promethazine **OR** ondansetron, acetaminophen **OR** acetaminophen  I/O last 3 completed shifts: In: 750 [P.O.:750]  Out: 450 [Urine:450]  No intake/output data recorded.     Intake/Output Summary (Last 24 hours) at 4/22/2021 0801  Last data filed at 4/21/2021 1708  Gross per 24 hour   Intake 750 ml   Output 450 ml   Net 300 ml       CBC:   Recent Labs     04/20/21  2220 04/21/21  0530 04/22/21  0535   WBC 31.2* 29.8* 23.7*   HGB 10.5* 10.3* 8.1*   PLT 80* 100* 59*       BMP:    Recent Labs     04/20/21  0700 04/21/21  0700 04/22/21  0535    137 137   K 4.2 4.4 2.9*    108 105   CO2 23 20* 24   BUN 12 16 17   CREATININE 1.0 1.3* 1.3*   GLUCOSE 158* 177* 152*     Hepatic:   Recent Labs     04/20/21  0700 04/21/21  0700 04/22/21  0535   AST 15 16 18   ALT 9* 7* <5*   BILITOT 0.3 0.2 0.3   ALKPHOS 76 77 65     Troponin: No results for input(s): TROPONINI in the last 72 hours. BNP: No results for input(s): BNP in the last 72 hours. Lipids: No results for input(s): CHOL, HDL in the last 72 hours.     Invalid input(s): LDLCALCU  ABGs:   Lab Results   Component Value Date    PO2ART 112 08/12/2015    NUO1BGM 24.0 08/12/2015     INR:   Recent Labs     04/22/21  0535   INR 1.09       Objective:   Vitals: BP (!) 170/57   Pulse 73   Temp 98.2 °F (36.8 °C) (Oral)   Resp 15   Ht 5' 2\" (1.575 m)   Wt 130 lb 4.7 oz (59.1 kg)   SpO2 98%   BMI 23.83 kg/m²   General appearance:in no acute distress  HEENT: normocephalic, atraumatic,   Neck: supple, trachea midline  Lungs:  breathing comfortably on nc  Heart[de-identified] regular rate and rhythm,   Extremities: extremities atraumatic, no cyanosis or edema  Neurologic: alert, oriented, follows commands, interactive    Assessment and Plan:       Patient Active Problem List     Diagnosis Date Noted    GI bleed 04/16/2021    Closed fracture of left distal radius 02/11/2021    Carcinoid syndrome ([facility] 04/21/2020    Immune thrombocytopenic purpura ([facility] [facility] 04/21/2020    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    Peripheral polyneuropathy      Ataxia      Urinary tract infection without hematuria      Stroke-like symptoms 10/14/2019    Gastroenteritis 03/29/2019    HTN (hypertension) 08/15/2015    Hypokalemia 08/15/2015    Anxiety 08/15/2015    Protein-calorie malnutrition, moderate ([facility] 08/12/2015    Ischemia[facility] ([facility] 08/11/2015    S/P CABG x 3 02/03/2014    Diabetes mellitus ([facility]      CAD (coronary artery disease)      Hyperlipidemia      Carotid artery stenosis 03/01/2000      -CKD3; cr 1.3//UA shows no signs of hematuria  -Hyponatremia  -Leukocytosis  -Encephalopathy  -Melena/hematochezia? Colonoscopy showed hemorrhoids  -Hematuria?  -Anemia  -Neuroendocrine tumor  -Symptomatic bradycardia; s/p PPM on 4/20/21  -HTN  -constipation        Plan:  Cr 1.3  Continue supportive mgmt  Replete K po and iv  Avoid nephrotoxins                                    Electronically signed by Nikhil Lancaster DO on 4/22/2021 at 8:01 AM    [address] Casey Brumfield DO  [address],  [facility] [address]  PHONE: [phone]  FAX: [phone]

## 2021-04-22 NOTE — PROGRESS NOTES
DOING BETTER HAD SMALL BM AFTER ENEMA NO GROSS GIT BLEEDING  VITALS STABLE   LABS NOTED WBC DOWN TO 23.7 AND HB 8.1 WILL CPM HOME SOON

## 2021-04-22 NOTE — PROGRESS NOTES
ZAY    Hospitalist Progress Note      Name:  Mynor Ahmadi /Age/Sex: 1940  (79 y.o. female)   MRN & CSN:  [id number] & [id number] Admission Date/Time: 2021 11:56 AM   Location:  -A PCP: Cody Olguin MD         Hospital Day: 7    Assessment and Plan:   Mynor Ahmadi is a 79 y.o.  female  who presents with GI bleed    Lower GI bleeding    Status post EGD with gastritis, duodenitis. Colonoscopy with diverticulosis and hemorrhoids  Hemoglobin 8.5. Monitor hemoglobin closely and transfuse for Hb less than 7  For possible small bowel follow-through. GI following     Complete heart block -Symptomatic bradycardia with 8 sec sinus pause    Status post temporary pacemaker placed   Keep potassium more than 4 and magnesium more than 2. For pacemaker placement today - by CT surgery     UTI     UA Positive, wbc - 31.2/29.8- improving > 23.7 - afebrile   Continue on Zosyn      Acute kidney injury on chronic kidney disease stage IIIb. Creatinine is stable, avoid nephrotoxins, nephrology following     Hypokalemia, hypomagnesemia -  k -2.9, Mag 1.5, Replaced per protocol    Abdominal pain   Constipation ? CT abdomen with stool in rectal area? constipation? But patient had Cscope few days ago-  Lipase, amylase - both normal, evaluated by GI and suggested empiric Zosyn   On Miralax. Consulted surgery as she has previous bowel surgery      History of neuroendocrine tumor: Oncology following. Patient was on octreotide.     Hypertension: Continue with hydralazine, amlodipine, losartan, hydrochlorothiazide    Chronic medical conditions: Resume home medications when clinically appropriate    Coronary artery disease  Hyperlipidemia  Type II diabetes  Depression    Diet DIET CLEAR LIQUID;   DVT Prophylaxis [] Lovenox, []  Heparin, [] SCDs, []No VTE prophylaxis, patient ambulating   GI Prophylaxis [] PPI, [] H2 Blocker, [] No GI prophylaxis, patient is receiving diet/Tube Feeds   Code Status Full Code   Disposition Patient requires continued admission due to GI, heart block    MDM [] Low, [x] Moderate,[]  High     History of Present Illness: Subjective     Patient Seen & Examined at the bedside      Patient is resting in bed -no distress while on room air - continues to have abdominal pain which has improved to some extent    Ten point ROS reviewed negative, unless as noted above    Objective: Intake/Output Summary (Last 24 hours) at 4/22/2021 0959  Last data filed at 4/22/2021 0730  Gross per 24 hour   Intake 400 ml   Output 975 ml   Net -575 ml      Vitals:   Vitals:    04/22/21 0930   BP: (!) 166/62   Pulse: 70   Resp: 19   Temp:    SpO2: 96%     Physical Exam:    GEN Awake female, resting in bed in no apparent distress. Appears given age. RESP Clear to auscultation, no wheezes, rales or rhonchi. CARDIO/VASC -S1/S2 auscultated. Regular rate without appreciable murmurs, rubs, or gallops. Peripheral pulses equal bilaterally and palpable. No peripheral edema. GI Abdomen is soft without significant tenderness, masses, or guarding. Bowel sounds are normoactive. Rectal exam deferred.  Harvey catheter is not present.     Medications:   Medications:    potassium phosphate IVPB  15 mmol Intravenous Once    potassium chloride  10 mEq Oral BID WC    polyethylene glycol  17 g Oral Daily    piperacillin-tazobactam  3,375 mg Intravenous Q8H    valsartan  160 mg Oral Nightly    sodium chloride flush  5-40 mL Intravenous 2 times per day    carvedilol  12.5 mg Oral BID WC    sodium chloride flush  5-40 mL Intravenous 2 times per day    amLODIPine  10 mg Oral Daily    atorvastatin  40 mg Oral Daily    buPROPion  150 mg Oral BID    cyanocobalamin  1,000 mcg Intramuscular Q7 Days    folic acid-pyridoxine-cyancobalamin  1 tablet Oral Daily    methocarbamol  750 mg Oral 4x Daily    therapeutic multivitamin-minerals  1 tablet Oral Daily    pantoprazole  40 mg Oral QAM AC    venlafaxine  150 mg Oral

## 2021-04-22 NOTE — PROGRESS NOTES
Daily Progress Note    Patient is awake alert  Doing ok-pacer site is stable  Abdominal pain improved  Mild CAD and symptomatic bradycardia  HTN meds per renal    constipation being addressed   Correct K and mag    Pt. Awake, alert and feeling ok   HR stable, NSR in the 70s, BP elevated  Denies CP, SOB and dizziness     GI bleed    Hgb low but stable    GI following-s/p EGD and c-scope-diverticulosis and gastritis noted- no active bleeding    Ok to hold antiplatelet for now-when ok with GI would consider restarting ASA    Having sig Abdominal pain still today- CT abdomen showed constipation- Given Laxative per nurse-Appears to be improved with small BM     Bradycardia    Several 5-8 second pauses on tele    AV block and SSS both noted    LHC done patent grafts noted    PPM placed-site stable and CXR stable, PPM check stable    Restarted Coreg     Hx of CAD s/p 3 V CABG and multiple PCI in the past    EF 60% with Mod MS    Cont. Med. Tx.     Replace K  Will follow-increase activity if able     Echo-4/19/21  Summary   Left ventricular systolic function is hyperdynamic.   Ejection fraction is visually estimated at >60%.  Grade I diastolic dysfunction.   Moderately dilated left atrium.    PPM wiring visualized within the right heart.   Mild aortic stenosis with mean gradient of 15 mmHg.   Mitral annular calcification with moderate mitral stenosis; mean gradient is   8mmHg.   Mild to moderate mitral regurgitation.   No evidence of any pericardial effusion.     [facility]-4/19/21  LEFT MAIN PATENT   LAD MID 70% STENOSIS  LCX MILD DX  % OCCLUDED  LIMA TO LAD AND DIAGONAL PATENT  SVG TO OM PATENT  SVG TO RCA PATENT  LVEDP  15  MEDICAL TREATMENT FROM CAD  FOR A PERMANENT PACER  TEMP=PACER PLACED  NO COMPLICATIONS  RIGHT ARTERIAL AND VENOUS SHEATH        Past medical history:    has a past medical history of Acute anterior wall MI ([facility] CAD (coronary artery disease), Cancer ([facility] [address] Cancer ([facility] [address] Carotid artery stenosis, Q7 Days    folic acid-pyridoxine-cyancobalamin  1 tablet Oral Daily    methocarbamol  750 mg Oral 4x Daily    therapeutic multivitamin-minerals  1 tablet Oral Daily    pantoprazole  40 mg Oral QAM AC    venlafaxine  150 mg Oral Daily    sodium chloride flush  5-40 mL Intravenous 2 times per day    insulin lispro  0-6 Units Subcutaneous TID WC    insulin lispro  0-3 Units Subcutaneous Nightly    valsartan  160 mg Oral Daily    And    hydroCHLOROthiazide  25 mg Oral Daily    fenofibrate  160 mg Oral Daily      Infusions:   sodium chloride      sodium chloride      dextrose      sodium chloride        PRN Meds:  morphine, sodium chloride flush, sodium chloride, sodium chloride flush, sodium chloride, potassium chloride, sodium chloride flush, ALPRAZolam, oxyCODONE, glucose, dextrose, glucagon (rDNA), dextrose, sodium chloride flush, sodium chloride, promethazine **OR** ondansetron, acetaminophen **OR** acetaminophen       Physical Exam:  Vitals:    04/22/21 0730   BP: (!) 170/57   Pulse: 73   Resp: 15   Temp:    SpO2: 98%        General: AAO, NAD  Chest: Nontender  Cardiac: First and Second Heart Sounds are Normal, No Murmurs or Gallops noted  Lungs:Clear to auscultation and percussion. Abdomen: Soft, NT, ND, +BS  Extremities: No clubbing, no edema  Vascular:  Equal 2+ peripheral pulses.         Lab Data:  CBC:   Recent Labs     04/20/21  2220 04/21/21  0530 04/22/21  0535   WBC 31.2* 29.8* 23.7*   HGB 10.5* 10.3* 8.1*   HCT 35.6* 34.3* 26.4*   MCV 87.7 86.6 88.0   PLT 80* 100* 125*     BMP:   Recent Labs     04/20/21  0700 04/21/21  0700 04/22/21  0535    137 137   K 4.2 4.4 2.9*    108 105   CO2 23 20* 24   BUN 12 16 17   CREATININE 1.0 1.3* 1.3*     LIVER PROFILE:   Recent Labs     04/20/21  0700 04/21/21  0700 04/22/21  0535   AST 15 16 18   ALT 9* 7* <5*   LIPASE  --  19 15   BILITOT 0.3 0.2 0.3   ALKPHOS 76 77 65     PT/INR:   Recent Labs     04/22/21  0535   PROTIME 13.2   INR 1.09 APTT:   Recent Labs     04/22/21  0535   APTT 31.5     BNP:  No results for input(s): BNP in the last 72 hours.       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 Diabetes mellitus ([facility] [address] [facility]     CAD (coronary artery disease)     Hyperlipidemia     Carotid artery stenosis 03/01/2000       Electronically signed by Rand Russo PA-C on 4/22/2021 at 9:03 AM    Electronically signed by Garrett Martin MD on 4/22/21 at 1:50 PM EDT

## 2021-04-22 NOTE — PROGRESS NOTES
[address] [facility] ACUTE CARE OCCUPATIONAL THERAPY EVALUATION  Kevin Esposito, 1940, 2119/2119-A, 4/22/2021    History  [place]:  The primary encounter diagnosis was Gastrointestinal hemorrhage, unspecified gastrointestinal hemorrhage type. Diagnoses of Neuroendocrine cancer ([address] Intermittent confusion, and Lower abdominal pain were also pertinent to this visit. Patient  has a past medical history of Acute anterior wall MI ([address] CAD (coronary artery disease), Cancer ([address] Cancer ([address] Carotid artery stenosis, Diabetes mellitus ([address] H/O cardiovascular stress test, H/O echocardiogram, Hyperlipidemia, Hypertension, and Protein-calorie malnutrition, moderate ([address] Patient  has a past surgical history that includes Coronary artery bypass graft (5/07); Diagnostic Cardiac Cath Lab Procedure (3/30/07); Abdomen surgery; other surgical history (08 11 2015); skin biopsy; fracture surgery (Left, 2001); fracture surgery (Right, 2017); Hysterectomy (1996); Wrist fracture surgery (Left, 2/17/2021); Upper gastrointestinal endoscopy (N/A, 4/18/2021); and Colonoscopy (N/A, 4/18/2021). Subjective:  Patient states:  \"I need to get up and walk to get strong\".     Pain:  No.    Communication with other providers:  Handoff to RN  Restrictions: General Precautions, Fall Risk    Home Setup/Prior level of function  Social/Functional History  Lives With: Spouse  Type of Home: House  Home Layout: One level  Home Access: Stairs to enter with rails  Entrance Stairs - Number of Steps: 3  Entrance Stairs - Rails: Left  Bathroom Shower/Tub: Tub/Shower unit  Bathroom Equipment: Shower chair  Bathroom Accessibility: Accessible  ADL Assistance: Independent  Homemaking Assistance: Independent  Homemaking Responsibilities: Yes  Ambulation Assistance: Independent  Transfer Assistance: Independent  Active : No  Patient's  Info: children  Occupation: Retired  Additional Comments: Pt reports 2 recent falls in last 6 months due to being off balanced    Examination of body systems (includes body structures/functions, activity/participation limitations):  · Observation:  Sitting upright in chair, agreeable to therapy  · Vision:  Glasses  · [facility]  [facility]  · Cardiopulmonary:  2L of 02       Body Systems and functions:  · ROM R/L:  WFL. · Strength R/L:  4/5,   · Sensation: WFL  · Tone: Normal  · Coordination: WFL  · Perception: WNL    Activities of Daily Living (ADLs):  · Feeding: Camelia (upper/lower dentures)  · Grooming: CGA (washing face/hands w/ warm washcloth)  · UB bathing: SBA  · LB bathing: Darci  · UB dressing: SBA  · LB dressing: Darci  · Toileting: Darci    Cognitive and Psychosocial Functioning:  · Overall cognitive status: WNL  · Affect: Normal        Mobility:  · Supine to sit:  DNT  · Transfers: Darci from reclining chair up to RW  · Sitting balance:  Supervision. · Standing balance:  CGA w/ RW.  · Functional Mobility:  Darci w/ RW ~45 feet  · Toilet/Shower Transfers: DNT             [facility] Daily Activity Inpatient   How much help for putting on and taking off regular lower body clothing?: A Little  How much help for Bathing?: A Little  How much help for Toileting?: A Little  How much help for putting on and taking off regular upper body clothing?: A Little  How much help for taking care of personal grooming?: A Little  How much help for eating meals?: None  [facility] Inpatient Daily Activity Raw Score: 19  AM-[facility] Inpatient ADL T-Scale Score : 40.22  ADL Inpatient CMS 0-100% Score: 42.8  ADL Inpatient CMS G-Code Modifier : CK    Treatment:  Self Care Training:   Cues were given for safety, sequence, UE/LE placement, visual cues, and balance. Activities performed today included grooming    Therapeutic Activity Training:   Therapeutic activity training was instructed today. Cues were given for safety, sequence, UE/LE placement, awareness, and balance.     Activities performed today included STS, functional mobility, stand to sit    Safety: patient left in chair with chair alarm, call light within reach, RN notified, gait belt used. Assessment:  Pt is a 80 yo female admitted from home for GI bleed. Pt at baseline is Independent for ADLs Independent for high level IADLs and Independent for functional transfers/mobility w/ AD. Pt currently presents w/ deficits in ADL and high level IADL independence, functional activity tolerance, dynamic sitting and standing balance and tolerance and functional transfers and BUE strength. Pt would benefit from continued acute care OT services w/ discharge to ARU  Complexity: Moderate  Prognosis: Good, no significant barriers to participation at this time.    Plan  Times per week: 3x+  Times per day: Daily  Current Treatment Recommendations: Strengthening, Patient/Caregiver Education & Training, Positioning, Home Management Training, Endurance Training, Pain Management, ROM, Functional Mobility Training, Equipment Evaluation, Education, & procurement, Balance Training, Safety Education & Training, Self-Care / ADL     Equipment: defer    Goals:  Pt goal: go home  Time Frame for STGs: discharge  Goal 1: Pt will perform UE ADLs Independent  Goal 2: Pt will perform LE ADLs Independent  Goal 3: Pt will perform toileting Independent  Goal 4: Pt will perform functional transfer w/ AD Camelia  Goal 5: Pt will perform functional mobility w/ AD Camelia  Goal 6: Pt will perform therex/theract in order to increase functional activity tolerance and dynamic standing balance    Treatment plan:  Pt will perform functional task in stand reaching in all 3 planes in order to increase dynamic standing balance and functional activity tolerance    Recommendations for NURSING activity: Up to chair for all 3 meals and up to standard commode for all toileting needs    Time:   Time in: 1109  Time out: 1138  Timed treatment minutes: 24 minutes  Total time: 29 minutes    Electronically signed by:    Memo Lazcano OTOCTAVIO/L [id number]  11:58 AM,4/22/2021

## 2021-04-22 NOTE — PROGRESS NOTES
BP (!) 170/57   Pulse 73   Temp 98.2 °F (36.8 °C) (Oral)   Resp 15   Ht 5' 2\" (1.575 m)   Wt 130 lb 4.7 oz (59.1 kg)   SpO2 98%   BMI 23.83 kg/m²     I/O last 3 completed shifts: In: 750 [P.O.:750]  Out: 450 [Urine:450]  No intake/output data recorded.     CT showed large amount of stool in rectosigmoid with colonic distention  Was given fleets with small bowel movement  Continue miralax daily  Replace K+  Nakia Nelson, APRN-CNP

## 2021-04-23 LAB
ALBUMIN SERPL-MCNC: 2.9 GM/DL (ref 3.4–5)
ALP BLD-CCNC: 65 IU/L (ref 40–129)
ALT SERPL-CCNC: <5 U/L (ref 10–40)
ANION GAP SERPL CALCULATED.3IONS-SCNC: 6 MMOL/L (ref 4–16)
ANION GAP SERPL CALCULATED.3IONS-SCNC: 8 MMOL/L (ref 4–16)
AST SERPL-CCNC: 17 IU/L (ref 15–37)
BASOPHILS ABSOLUTE: 0.1 K/CU MM
BASOPHILS RELATIVE PERCENT: 0.3 % (ref 0–1)
BILIRUB SERPL-MCNC: 0.3 MG/DL (ref 0–1)
BUN BLDV-MCNC: 11 MG/DL (ref 6–23)
BUN BLDV-MCNC: 9 MG/DL (ref 6–23)
CALCIUM SERPL-MCNC: 7.6 MG/DL (ref 8.3–10.6)
CALCIUM SERPL-MCNC: 7.6 MG/DL (ref 8.3–10.6)
CHLORIDE BLD-SCNC: 102 MMOL/L (ref 99–110)
CHLORIDE BLD-SCNC: 103 MMOL/L (ref 99–110)
CO2: 24 MMOL/L (ref 21–32)
CO2: 27 MMOL/L (ref 21–32)
CREAT SERPL-MCNC: 1.1 MG/DL (ref 0.6–1.1)
CREAT SERPL-MCNC: 1.1 MG/DL (ref 0.6–1.1)
DIFFERENTIAL TYPE: ABNORMAL
EOSINOPHILS ABSOLUTE: 0.8 K/CU MM
EOSINOPHILS RELATIVE PERCENT: 5.5 % (ref 0–3)
GFR AFRICAN AMERICAN: 58 ML/MIN/1.73M2
GFR AFRICAN AMERICAN: 58 ML/MIN/1.73M2
GFR NON-AFRICAN AMERICAN: 48 ML/MIN/1.73M2
GFR NON-AFRICAN AMERICAN: 48 ML/MIN/1.73M2
GLUCOSE BLD-MCNC: 150 MG/DL (ref 70–99)
GLUCOSE BLD-MCNC: 168 MG/DL (ref 70–99)
GLUCOSE BLD-MCNC: 176 MG/DL (ref 70–99)
GLUCOSE BLD-MCNC: 184 MG/DL (ref 70–99)
GLUCOSE BLD-MCNC: 233 MG/DL (ref 70–99)
GLUCOSE BLD-MCNC: 282 MG/DL (ref 70–99)
HCT VFR BLD CALC: 22.3 % (ref 37–47)
HEMOGLOBIN: 6.8 GM/DL (ref 12.5–16)
IMMATURE NEUTROPHIL %: 0.5 % (ref 0–0.43)
LYMPHOCYTES ABSOLUTE: 1 K/CU MM
LYMPHOCYTES RELATIVE PERCENT: 6.6 % (ref 24–44)
MAGNESIUM: 2 MG/DL (ref 1.8–2.4)
MAGNESIUM: 2.1 MG/DL (ref 1.8–2.4)
MCH RBC QN AUTO: 26.5 PG (ref 27–31)
MCHC RBC AUTO-ENTMCNC: 30.5 % (ref 32–36)
MCV RBC AUTO: 86.8 FL (ref 78–100)
MONOCYTES ABSOLUTE: 1.2 K/CU MM
MONOCYTES RELATIVE PERCENT: 8.3 % (ref 0–4)
NUCLEATED RBC %: 0 %
PDW BLD-RTO: 15.8 % (ref 11.7–14.9)
PLATELET # BLD: 154 K/CU MM (ref 140–440)
PMV BLD AUTO: 12.2 FL (ref 7.5–11.1)
POTASSIUM SERPL-SCNC: 3.4 MMOL/L (ref 3.5–5.1)
POTASSIUM SERPL-SCNC: 3.5 MMOL/L (ref 3.5–5.1)
RBC # BLD: 2.57 M/CU MM (ref 4.2–5.4)
SEGMENTED NEUTROPHILS ABSOLUTE COUNT: 11.5 K/CU MM
SEGMENTED NEUTROPHILS RELATIVE PERCENT: 78.8 % (ref 36–66)
SODIUM BLD-SCNC: 135 MMOL/L (ref 135–145)
SODIUM BLD-SCNC: 135 MMOL/L (ref 135–145)
TOTAL IMMATURE NEUTOROPHIL: 0.07 K/CU MM
TOTAL NUCLEATED RBC: 0 K/CU MM
TOTAL PROTEIN: 4.5 GM/DL (ref 6.4–8.2)
WBC # BLD: 14.6 K/CU MM (ref 4–10.5)

## 2021-04-23 PROCEDURE — 2580000003 HC RX 258: Performed by: THORACIC SURGERY (CARDIOTHORACIC VASCULAR SURGERY)

## 2021-04-23 PROCEDURE — 82962 GLUCOSE BLOOD TEST: CPT

## 2021-04-23 PROCEDURE — 86850 RBC ANTIBODY SCREEN: CPT

## 2021-04-23 PROCEDURE — 86922 COMPATIBILITY TEST ANTIGLOB: CPT

## 2021-04-23 PROCEDURE — 6370000000 HC RX 637 (ALT 250 FOR IP): Performed by: THORACIC SURGERY (CARDIOTHORACIC VASCULAR SURGERY)

## 2021-04-23 PROCEDURE — 99232 SBSQ HOSP IP/OBS MODERATE 35: CPT | Performed by: INTERNAL MEDICINE

## 2021-04-23 PROCEDURE — 86901 BLOOD TYPING SEROLOGIC RH(D): CPT

## 2021-04-23 PROCEDURE — 6370000000 HC RX 637 (ALT 250 FOR IP): Performed by: INTERNAL MEDICINE

## 2021-04-23 PROCEDURE — 94761 N-INVAS EAR/PLS OXIMETRY MLT: CPT

## 2021-04-23 PROCEDURE — 85025 COMPLETE CBC W/AUTO DIFF WBC: CPT

## 2021-04-23 PROCEDURE — 2580000003 HC RX 258: Performed by: SPECIALIST

## 2021-04-23 PROCEDURE — 2140000000 HC CCU INTERMEDIATE R&B

## 2021-04-23 PROCEDURE — 6360000002 HC RX W HCPCS: Performed by: SPECIALIST

## 2021-04-23 PROCEDURE — 99231 SBSQ HOSP IP/OBS SF/LOW 25: CPT | Performed by: SURGERY

## 2021-04-23 PROCEDURE — 80053 COMPREHEN METABOLIC PANEL: CPT

## 2021-04-23 PROCEDURE — 86900 BLOOD TYPING SEROLOGIC ABO: CPT

## 2021-04-23 PROCEDURE — 83735 ASSAY OF MAGNESIUM: CPT

## 2021-04-23 PROCEDURE — 80048 BASIC METABOLIC PNL TOTAL CA: CPT

## 2021-04-23 PROCEDURE — 36430 TRANSFUSION BLD/BLD COMPNT: CPT

## 2021-04-23 PROCEDURE — P9016 RBC LEUKOCYTES REDUCED: HCPCS

## 2021-04-23 RX ORDER — POTASSIUM CHLORIDE 750 MG/1
20 TABLET, FILM COATED, EXTENDED RELEASE ORAL 2 TIMES DAILY WITH MEALS
Status: COMPLETED | OUTPATIENT
Start: 2021-04-23 | End: 2021-04-24

## 2021-04-23 RX ORDER — SODIUM CHLORIDE 9 MG/ML
INJECTION, SOLUTION INTRAVENOUS PRN
Status: DISCONTINUED | OUTPATIENT
Start: 2021-04-23 | End: 2021-04-26 | Stop reason: HOSPADM

## 2021-04-23 RX ADMIN — ATORVASTATIN CALCIUM 40 MG: 40 TABLET, FILM COATED ORAL at 09:20

## 2021-04-23 RX ADMIN — PANTOPRAZOLE SODIUM 40 MG: 40 TABLET, DELAYED RELEASE ORAL at 07:45

## 2021-04-23 RX ADMIN — MORPHINE SULFATE 2 MG: 2 INJECTION, SOLUTION INTRAMUSCULAR; INTRAVENOUS at 08:15

## 2021-04-23 RX ADMIN — PIPERACILLIN AND TAZOBACTAM 3375 MG: 3; .375 INJECTION, POWDER, LYOPHILIZED, FOR SOLUTION INTRAVENOUS at 12:00

## 2021-04-23 RX ADMIN — ALPRAZOLAM 1 MG: 0.5 TABLET ORAL at 23:16

## 2021-04-23 RX ADMIN — Medication 1 TABLET: at 09:20

## 2021-04-23 RX ADMIN — PIPERACILLIN AND TAZOBACTAM 3375 MG: 3; .375 INJECTION, POWDER, LYOPHILIZED, FOR SOLUTION INTRAVENOUS at 21:32

## 2021-04-23 RX ADMIN — INSULIN LISPRO 1 UNITS: 100 INJECTION, SOLUTION INTRAVENOUS; SUBCUTANEOUS at 08:00

## 2021-04-23 RX ADMIN — LACTULOSE 20 G: 10 SOLUTION ORAL at 09:19

## 2021-04-23 RX ADMIN — LACTULOSE 20 G: 10 SOLUTION ORAL at 14:37

## 2021-04-23 RX ADMIN — OXYCODONE HYDROCHLORIDE 5 MG: 5 TABLET ORAL at 02:47

## 2021-04-23 RX ADMIN — BUPROPION HYDROCHLORIDE 150 MG: 150 TABLET, EXTENDED RELEASE ORAL at 21:32

## 2021-04-23 RX ADMIN — INSULIN LISPRO 2 UNITS: 100 INJECTION, SOLUTION INTRAVENOUS; SUBCUTANEOUS at 17:44

## 2021-04-23 RX ADMIN — PIPERACILLIN AND TAZOBACTAM 3375 MG: 3; .375 INJECTION, POWDER, LYOPHILIZED, FOR SOLUTION INTRAVENOUS at 04:47

## 2021-04-23 RX ADMIN — METHOCARBAMOL 750 MG: 500 TABLET ORAL at 17:43

## 2021-04-23 RX ADMIN — POLYETHYLENE GLYCOL 3350 17 G: 17 POWDER, FOR SOLUTION ORAL at 09:18

## 2021-04-23 RX ADMIN — SODIUM CHLORIDE, PRESERVATIVE FREE 10 ML: 5 INJECTION INTRAVENOUS at 09:21

## 2021-04-23 RX ADMIN — POTASSIUM CHLORIDE 10 MEQ: 750 TABLET, FILM COATED, EXTENDED RELEASE ORAL at 07:45

## 2021-04-23 RX ADMIN — FENOFIBRATE 160 MG: 160 TABLET ORAL at 09:20

## 2021-04-23 RX ADMIN — AMLODIPINE BESYLATE 10 MG: 10 TABLET ORAL at 09:20

## 2021-04-23 RX ADMIN — INSULIN LISPRO 1 UNITS: 100 INJECTION, SOLUTION INTRAVENOUS; SUBCUTANEOUS at 13:05

## 2021-04-23 RX ADMIN — VALSARTAN 160 MG: 160 TABLET, FILM COATED ORAL at 09:18

## 2021-04-23 RX ADMIN — METHOCARBAMOL 750 MG: 500 TABLET ORAL at 12:17

## 2021-04-23 RX ADMIN — PROMETHAZINE HYDROCHLORIDE 12.5 MG: 25 TABLET ORAL at 14:37

## 2021-04-23 RX ADMIN — POTASSIUM CHLORIDE 20 MEQ: 750 TABLET, FILM COATED, EXTENDED RELEASE ORAL at 17:43

## 2021-04-23 RX ADMIN — BUPROPION HYDROCHLORIDE 150 MG: 150 TABLET, EXTENDED RELEASE ORAL at 09:18

## 2021-04-23 RX ADMIN — METHOCARBAMOL 750 MG: 500 TABLET ORAL at 09:19

## 2021-04-23 RX ADMIN — LACTULOSE 20 G: 10 SOLUTION ORAL at 21:31

## 2021-04-23 RX ADMIN — METHOCARBAMOL 750 MG: 500 TABLET ORAL at 21:31

## 2021-04-23 RX ADMIN — CARVEDILOL 12.5 MG: 12.5 TABLET, FILM COATED ORAL at 07:44

## 2021-04-23 RX ADMIN — VALSARTAN 160 MG: 160 TABLET, FILM COATED ORAL at 21:32

## 2021-04-23 RX ADMIN — VENLAFAXINE HYDROCHLORIDE 150 MG: 150 CAPSULE, EXTENDED RELEASE ORAL at 09:20

## 2021-04-23 RX ADMIN — CARVEDILOL 12.5 MG: 12.5 TABLET, FILM COATED ORAL at 17:47

## 2021-04-23 RX ADMIN — HYDROCHLOROTHIAZIDE 25 MG: 25 TABLET ORAL at 09:20

## 2021-04-23 RX ADMIN — MORPHINE SULFATE 2 MG: 2 INJECTION, SOLUTION INTRAMUSCULAR; INTRAVENOUS at 14:37

## 2021-04-23 RX ADMIN — SODIUM CHLORIDE, PRESERVATIVE FREE 10 ML: 5 INJECTION INTRAVENOUS at 21:32

## 2021-04-23 ASSESSMENT — PAIN SCALES - GENERAL
PAINLEVEL_OUTOF10: 0
PAINLEVEL_OUTOF10: 7
PAINLEVEL_OUTOF10: 8
PAINLEVEL_OUTOF10: 0
PAINLEVEL_OUTOF10: 0
PAINLEVEL_OUTOF10: 7
PAINLEVEL_OUTOF10: 8
PAINLEVEL_OUTOF10: 0
PAINLEVEL_OUTOF10: 10
PAINLEVEL_OUTOF10: 8

## 2021-04-23 ASSESSMENT — PAIN DESCRIPTION - PROGRESSION
CLINICAL_PROGRESSION: GRADUALLY WORSENING

## 2021-04-23 ASSESSMENT — PAIN DESCRIPTION - DESCRIPTORS: DESCRIPTORS: SHARP

## 2021-04-23 ASSESSMENT — PAIN DESCRIPTION - LOCATION
LOCATION: ABDOMEN
LOCATION: ABDOMEN

## 2021-04-23 ASSESSMENT — PAIN - FUNCTIONAL ASSESSMENT: PAIN_FUNCTIONAL_ASSESSMENT: PREVENTS OR INTERFERES SOME ACTIVE ACTIVITIES AND ADLS

## 2021-04-23 ASSESSMENT — PAIN DESCRIPTION - PAIN TYPE: TYPE: ACUTE PAIN

## 2021-04-23 ASSESSMENT — PAIN DESCRIPTION - ONSET: ONSET: ON-GOING

## 2021-04-23 NOTE — PROGRESS NOTES
Nephrology Progress Note        [address], [address] [address]  Phone: [phone]  Office Hours: 8:30AM - 4:30PM  Monday - Friday 4/23/2021 7:23 AM  Subjective:   Admit Date: 4/16/2021  PCP: India Quintero MD  Interval History: On nc  Nonoliguric  BP is ok      Diet: DIET GENERAL; Carb Control: 5 carb choices (75 gms)/meal      Data:   Scheduled Meds:   potassium chloride  10 mEq Oral BID WC    lactulose  20 g Oral TID    polyethylene glycol  17 g Oral Daily    piperacillin-tazobactam  3,375 mg Intravenous Q8H    valsartan  160 mg Oral Nightly    sodium chloride flush  5-40 mL Intravenous 2 times per day    carvedilol  12.5 mg Oral BID WC    sodium chloride flush  5-40 mL Intravenous 2 times per day    amLODIPine  10 mg Oral Daily    atorvastatin  40 mg Oral Daily    buPROPion  150 mg Oral BID    cyanocobalamin  1,000 mcg Intramuscular Q7 Days    folic acid-pyridoxine-cyancobalamin  1 tablet Oral Daily    methocarbamol  750 mg Oral 4x Daily    therapeutic multivitamin-minerals  1 tablet Oral Daily    pantoprazole  40 mg Oral QAM AC    venlafaxine  150 mg Oral Daily    sodium chloride flush  5-40 mL Intravenous 2 times per day    insulin lispro  0-6 Units Subcutaneous TID WC    insulin lispro  0-3 Units Subcutaneous Nightly    valsartan  160 mg Oral Daily    And    hydroCHLOROthiazide  25 mg Oral Daily    fenofibrate  160 mg Oral Daily     Continuous Infusions:   sodium chloride      sodium chloride      dextrose      sodium chloride       PRN Meds:morphine, sodium chloride flush, sodium chloride, sodium chloride flush, sodium chloride, potassium chloride, sodium chloride flush, ALPRAZolam, oxyCODONE, glucose, dextrose, glucagon (rDNA), dextrose, sodium chloride flush, sodium chloride, promethazine **OR** ondansetron, acetaminophen **OR** acetaminophen  I/O last 3 completed shifts: In: 56 [P.O.:600;  I.V.:10]  Out: 1855 [Urine:1855]  No intake/output data recorded. Intake/Output Summary (Last 24 hours) at 4/23/2021 0723  Last data filed at 4/23/2021 0545  Gross per 24 hour   Intake 610 ml   Output 1855 ml   Net -1245 ml       CBC:   Recent Labs     04/20/21  2220 04/21/21  0530 04/22/21  0535   WBC 31.2* 29.8* 23.7*   HGB 10.5* 10.3* 8.1*   PLT 80* 100* 125*       BMP:    Recent Labs     04/21/21  0700 04/22/21  0535 04/22/21  2100 04/23/21  0450    137  --  135   K 4.4 2.9* 3.6 3.5    105  --  103   CO2 20* 24  --  24   BUN 16 17  --  11   CREATININE 1.3* 1.3*  --  1.1   GLUCOSE 177* 152*  --  168*     Hepatic:   Recent Labs     04/21/21  0700 04/22/21  0535   AST 16 18   ALT 7* <5*   BILITOT 0.2 0.3   ALKPHOS 77 65     Troponin: No results for input(s): TROPONINI in the last 72 hours. BNP: No results for input(s): BNP in the last 72 hours. Lipids: No results for input(s): CHOL, HDL in the last 72 hours.     Invalid input(s): LDLCALCU  ABGs:   Lab Results   Component Value Date    PO2ART 112 08/12/2015    YNU2SZW 24.0 08/12/2015     INR:   Recent Labs     04/22/21  0535   INR 1.09       Objective:   Vitals: BP (!) 126/42   Pulse 76   Temp 97.6 °F (36.4 °C) (Oral)   Resp 19   Ht 5' 2\" (1.575 m)   Wt 134 lb 14.7 oz (61.2 kg)   SpO2 90%   BMI 24.68 kg/m²   General appearance:  in no acute distress  HEENT: normocephalic, atraumatic,   Neck: supple, trachea midline  Lungs:  breathing comfortably on nc  Heart[de-identified] regular rate and rhythm,   Extremities: extremities atraumatic, no cyanosis or edema    Assessment and Plan:     Patient Active Problem List     Diagnosis Date Noted    GI bleed 04/16/2021    Closed fracture of left distal radius 02/11/2021    Carcinoid syndrome ([facility] 04/21/2020    Immune thrombocytopenic purpura ([facility] 04/21/2020    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    Peripheral polyneuropathy      Ataxia      Urinary tract infection without hematuria      Stroke-like symptoms 10/14/2019  Gastroenteritis 03/29/2019    HTN (hypertension) 08/15/2015    Hypokalemia 08/15/2015    Anxiety 08/15/2015    Protein-calorie malnutrition, moderate (HCC) 08/12/2015    Ischemia, bowel ([facility] 08/11/2015    S/P CABG x 3 02/03/2014    Diabetes mellitus ([facility]      CAD (coronary artery disease)      Hyperlipidemia      Carotid artery stenosis 03/01/2000      -CKD3; cr 1.3//UA shows no signs of hematuria  -Hyponatremia  -Leukocytosis  -Encephalopathy  -Melena/hematochezia? Colonoscopy showed hemorrhoids  -Hematuria?  -Anemia  -Neuroendocrine tumor  -Symptomatic bradycardia; s/p PPM on 4/20/21  -HTN  -constipation        Plan:  Cr 1.1 today  Replete K as needed  Continue supportive mgmt  Avoid nephrotoxins                               Electronically signed by Parveen Cervantes DO on 4/23/2021 at [place] [address], DO [address],  [facility]  [address], [place] [address]  PHONE: [phone]  FAX: [phone]

## 2021-04-23 NOTE — PLAN OF CARE
Problem: Pain:  Goal: Pain level will decrease  Description: Pain level will decrease  Outcome: Ongoing  Goal: Control of acute pain  Description: Control of acute pain  Outcome: Ongoing  Goal: Control of chronic pain  Description: Control of chronic pain  Outcome: Ongoing     Problem: Falls - Risk of:  Goal: Will remain free from falls  Description: Will remain free from falls  Outcome: Ongoing  Goal: Absence of physical injury  Description: Absence of physical injury  Outcome: Ongoing     Problem: Skin Integrity:  Goal: Will show no infection signs and symptoms  Description: Will show no infection signs and symptoms  Outcome: Ongoing  Goal: Absence of new skin breakdown  Description: Absence of new skin breakdown  Outcome: Ongoing  Goal: Skin integrity will improve  Description: Skin integrity will improve  Outcome: Ongoing  Goal: Signs of wound healing will improve  Description: Signs of wound healing will improve  Outcome: Ongoing     Problem: Activity:  Goal: Fatigue will decrease  Description: Fatigue will decrease  Outcome: Ongoing  Goal: Ability to tolerate increased activity will improve  Description: Ability to tolerate increased activity will improve  Outcome: Ongoing  Goal: Ability to maintain optimal joint mobility will improve  Description: Ability to maintain optimal joint mobility will improve  Outcome: Ongoing     Problem:  Bowel/Gastric:  Goal: Ability to achieve a regular elimination pattern will improve  Description: Ability to achieve a regular elimination pattern will improve  Outcome: Ongoing     Problem: Cardiac:  Goal: Ability to maintain an adequate cardiac output will improve  Description: Ability to maintain an adequate cardiac output will improve  Outcome: Ongoing  Goal: Ability to maintain adequate ventilation will improve  Description: Ability to maintain adequate ventilation will improve  Outcome: Ongoing  Goal: Ability to achieve and maintain adequate cardiopulmonary perfusion will improve  Description: Ability to achieve and maintain adequate cardiopulmonary perfusion will improve  Outcome: Ongoing     Problem: Coping:  Goal: Ability to adjust to condition or change in health will improve  Description: Ability to adjust to condition or change in health will improve  Outcome: Ongoing  Goal: Communication of feelings regarding changes in body function or appearance will improve  Description: Communication of feelings regarding changes in body function or appearance will improve  Outcome: Ongoing     Problem: Health Behavior:  Goal: Identification of resources available to assist in meeting health care needs will improve  Description: Identification of resources available to assist in meeting health care needs will improve  Outcome: Ongoing     Problem: Nutritional:  Goal: Maintenance of adequate nutrition will improve  Description: Maintenance of adequate nutrition will improve  Outcome: Ongoing     Problem: Physical Regulation:  Goal: Signs and symptoms of infection will decrease  Description: Signs and symptoms of infection will decrease  Outcome: Ongoing  Goal: Will show no signs and symptoms of excessive bleeding  Description: Will show no signs and symptoms of excessive bleeding  Outcome: Ongoing  Goal: Complications related to the disease process, condition or treatment will be avoided or minimized  Description: Complications related to the disease process, condition or treatment will be avoided or minimized  Outcome: Ongoing     Problem: Safety:  Goal: Ability to remain free from injury will improve  Description: Ability to remain free from injury will improve  Outcome: Ongoing     Problem: Sensory:  Goal: Pain level will decrease  Description: Pain level will decrease  Outcome: Ongoing  Goal: General experience of comfort will improve  Description: General experience of comfort will improve  Outcome: Ongoing     Problem: Tissue Perfusion:  Goal: Ability to maintain adequate tissue perfusion will improve  Description: Ability to maintain adequate tissue perfusion will improve  Outcome: Ongoing  Goal: Ability to maintain a stable neurologic state will improve  Description: Ability to maintain a stable neurologic state will improve  Outcome: Ongoing

## 2021-04-23 NOTE — CARE COORDINATION
Spoke with patient regarding ARU. Patient is hesitant as she wants to get home to see . I informed patient the benefits of completing therapy with us prior to going home. Patient is still hesitant, will follow.

## 2021-04-23 NOTE — CARE COORDINATION
Referral received for ARU. Will review PT/OT notes and clinical information and discuss with Dr. Mireya Diaz.  Thank you for the referral.

## 2021-04-23 NOTE — PROGRESS NOTES
ONCOLOGY HEMATOLOGY CARE ([place])  PROGRESS NOTE    Patient was seen and examined today. She is s/p EGD and it showed gastritis and duodenitis. Colonoscopy showed diverticulitis and hemorrhoids. Has sinus pause in telemetry and she is s/p permanent pace maker placement on 4/21/21. Iron study done on 4/17/21 were reviewed. Her abdominal pain is getting better. Amylase and lipase were normal.    Her WBC today was 14.6 and hemoglobin was 6.8, dropped from 8.5. Platelet count was 073. No new complaints today. PHYSICAL EXAM    Vitals: BP (!) 125/104   Pulse 74   Temp 98 °F (36.7 °C) (Oral)   Resp 16   Ht 5' 2\" (1.575 m)   Wt 134 lb 14.7 oz (61.2 kg)   SpO2 99%   BMI 24.68 kg/m²   CONSTITUTIONAL: awake, alert, no apparent distress, cooperative,   EYES: pupils equal, round and reactive to light, sclera clear and conjunctiva normal  ENT: Normocephalic, without obvious abnormality, atraumatic  NECK: supple, symmetrical, no jugular venous distension and no carotid bruits   HEMATOLOGIC/LYMPHATIC: no cervical, supraclavicular or axillary lymphadenopathy   LUNGS: VBS, no crackles, no rhonchi, no increased work of breathing, clear to auscultation, no wheezes,    CARDIOVASCULAR: regular rate and rhythm, normal S1 and S2, no murmur noted  ABDOMEN: normal bowel sounds x 4, soft, non-distended, non-tender, no masses palpated, no hepatosplenomgaly   MUSCULOSKELETAL: full range of motion noted, tone is normal  NEUROLOGIC: awake, alert, oriented to name, place and time. Motor skills grossly intact. SKIN: Normal skin color, texture, turgor and no jaundice.  appears intact   EXTREMITIES: no leg swelling, no cyanosis, no LE edema, no clubbing     LABORATORY RESULTS  CBC:   Recent Labs     04/21/21  0530 04/22/21  0535 04/23/21  1105   WBC 29.8* 23.7* 14.6*   HGB 10.3* 8.1* 6.8*   * 125* 154     BMP:    Recent Labs     04/22/21  0535 04/22/21  2100 04/23/21  0450 04/23/21  1105     --  135 135 K 2.9* 3.6 3.5 3.4*     --  103 102   CO2 24  --  24 27   BUN 17  --  11 9   CREATININE 1.3*  --  1.1 1.1   GLUCOSE 152*  --  168* 176*     Hepatic:   Recent Labs     04/21/21  0700 04/22/21  0535 04/23/21  1105   AST 16 18 17   ALT 7* <5* <5*   BILITOT 0.2 0.3 0.3   ALKPHOS 77 65 65     INR:   Recent Labs     04/22/21  0535   INR 1.09     ASSESSMENT/RECOMMENDATION  Metastatic carcinoid tumor  Immune thrombocytopenia  Lower GI bleeding  Symptomatic bradycardia with sinus pause    Reviewed her EGD and colonoscopy results. Anemia panel was also reviewed. She is status post PM placement on 4/21/21. She is on Lanreotide for her metastatic carcinoid and she will be due for lanreotide on 4/27/21. She is on close observation only for ITP. Her abdominal pain is getting better. Amylase and lipase were normal.    Her WBC today was 14.6 and hemoglobin was 6.8, dropped from 8.5. Platelet count was 164. Will transfuse with one unit of PRBC today and will continue to monitor. We will continue to follow the patient. Thank you.

## 2021-04-23 NOTE — CARE COORDINATION
Chart reviewed. PT/OT rec ARU. Contacted [facility]; left  for return call.  Margarita Cedeño RN

## 2021-04-23 NOTE — PROGRESS NOTES
[facility]    Hospitalist Progress Note      Name:  Anahi Desir /Age/Sex: 1940  (79 y.o. female)   MRN & CSN:  [id number] & [id number] Admission Date/Time: 2021 11:56 AM   Location:  -A PCP: Leona Krishnan MD         Hospital Day: 8    Assessment and Plan:   Anahi Desir is a 79 y.o.  female  who presents with GI bleed    Lower GI bleeding  Acute anemia of GI blood loss    Status post EGD with gastritis, duodenitis. Colonoscopy with diverticulosis and hemorrhoids  Hemoglobin 8.5> 6.8. Will transfuse with a unit of PRBC  Monitor hemoglobin closely and transfuse for Hb less than 7  For possible small bowel follow-through. GI following     Complete heart block -Symptomatic bradycardia with 8 sec sinus pause    Status post temporary pacemaker placed   Keep potassium more than 4 and magnesium more than 2. For pacemaker placement today - by CT surgery     UTI     UA Positive, wbc - 31.2/29.8- improving > 23.7>14.6 - afebrile   Continue on Zosyn      Acute kidney injury on chronic kidney disease stage IIIb. Creatinine is stable, avoid nephrotoxins, nephrology following     Hypokalemia, hypomagnesemia -  k -2.9>3.4, Mag 1.5, Replaced per protocol    Abdominal pain   Constipation ? CT abdomen with stool in rectal area? constipation? But patient had Cscope few days ago-  Lipase, amylase - both normal, evaluated by GI and suggested empiric Zosyn   On Miralax. Added lactulose  Consulted surgery as she has previous bowel surgery      History of neuroendocrine tumor: Oncology following. Patient was on octreotide.     Hypertension: Continue with hydralazine, amlodipine, losartan, hydrochlorothiazide    Chronic medical conditions: Resume home medications when clinically appropriate    Coronary artery disease  Hyperlipidemia  Type II diabetes  Depression    Diet DIET GENERAL; Carb Control: 5 carb choices (75 gms)/meal   DVT Prophylaxis [] Lovenox, []  Heparin, [] SCDs, []No VTE prophylaxis, patient ambulating   GI Prophylaxis [] PPI, [] H2 Blocker, [] No GI prophylaxis, patient is receiving diet/Tube Feeds   Code Status Full Code   Disposition Patient requires continued admission due to GI, heart block    MDM [] Low, [x] Moderate,[]  High     History of Present Illness: Subjective     Patient Seen & Examined at the bedside      Patient is resting in bed -no distress while on room air -   continues to have abdominal pain which has improved significantly  Passed a small BM    Ten point ROS reviewed negative, unless as noted above    Objective: Intake/Output Summary (Last 24 hours) at 4/23/2021 1124  Last data filed at 4/23/2021 0900  Gross per 24 hour   Intake 600 ml   Output 1330 ml   Net -730 ml      Vitals:   Vitals:    04/23/21 1000   BP: (!) 144/60   Pulse: 64   Resp: 18   Temp:    SpO2: 97%     Physical Exam:    GEN Awake female, resting in bed in no apparent distress. Appears given age. RESP Clear to auscultation, no wheezes, rales or rhonchi. CARDIO/VASC -S1/S2 auscultated. Regular rate without appreciable murmurs, rubs, or gallops. Peripheral pulses equal bilaterally and palpable. No peripheral edema. GI Abdomen is soft without significant tenderness, masses, or guarding. Bowel sounds are normoactive. Rectal exam deferred.  Harvey catheter is not present.     Medications:   Medications:    potassium chloride  20 mEq Oral BID WC    lactulose  20 g Oral TID    polyethylene glycol  17 g Oral Daily    piperacillin-tazobactam  3,375 mg Intravenous Q8H    valsartan  160 mg Oral Nightly    sodium chloride flush  5-40 mL Intravenous 2 times per day    carvedilol  12.5 mg Oral BID WC    amLODIPine  10 mg Oral Daily    atorvastatin  40 mg Oral Daily    buPROPion  150 mg Oral BID    cyanocobalamin  1,000 mcg Intramuscular Q7 Days    folic acid-pyridoxine-cyancobalamin  1 tablet Oral Daily    methocarbamol  750 mg Oral 4x Daily    therapeutic multivitamin-minerals  1 tablet Oral Daily    pantoprazole  40 mg Oral QAM AC    venlafaxine  150 mg Oral Daily    insulin lispro  0-6 Units Subcutaneous TID WC    insulin lispro  0-3 Units Subcutaneous Nightly    valsartan  160 mg Oral Daily    And    hydroCHLOROthiazide  25 mg Oral Daily    fenofibrate  160 mg Oral Daily      Infusions:    sodium chloride      dextrose       PRN Meds: morphine, 2 mg, Q4H PRN  sodium chloride flush, 5-40 mL, PRN  sodium chloride, 25 mL, PRN  sodium chloride flush, 5-40 mL, PRN  potassium chloride, 10 mEq, PRN  ALPRAZolam, 1 mg, BID PRN  oxyCODONE, 5 mg, Q4H PRN  glucose, 15 g, PRN  dextrose, 12.5 g, PRN  glucagon (rDNA), 1 mg, PRN  dextrose, 100 mL/hr, PRN  promethazine, 12.5 mg, Q6H PRN    Or  ondansetron, 4 mg, Q6H PRN  acetaminophen, 650 mg, Q6H PRN    Or  acetaminophen, 650 mg, Q6H PRN          Electronically signed by Ishmael Rodriguez MD on 4/23/2021 at 11:24 AM

## 2021-04-23 NOTE — PROGRESS NOTES
Daily Progress Note    More awake alert   Feeling better  Remain in sinus rate is stable  HTN stable  Making a slow progress  S/p PPM for symptomatic bradycardia  Overall doing better     Patient awake, alert, feeling ok  HR stable, SR in the 60s, BP stable  Still c/o abdominal pain   Denies CP, SOB, dizziness, or lightheadedness    GI bleed  - Hgb low but stable  - s/p EGD and c-scope - no active bleed  - hold antiplatelet for now, consider restarting when ok with GI   - GI following     Bradycardia  - Several 5-8 second pauses on tele   - S/p PPM - pacer site stable   - Continue coreg     Hx CAD s/p 3V CABG and multiple PCI in the past   -  with moderate MS   - continue medical tx     K improved     Will continue to follow     Echo-4/19/21  Summary   Left ventricular systolic function is hyperdynamic.   Ejection fraction is visually estimated at >60%.  Grade I diastolic dysfunction.   Moderately dilated left atrium.  PPM wiring visualized within the right heart.   Mild aortic stenosis with mean gradient of 15 mmHg.   Mitral annular calcification with moderate mitral stenosis; mean gradient is   8mmHg.   Mild to moderate mitral regurgitation.   No evidence of any pericardial effusion.     [facility]-4/19/21  LEFT MAIN PATENT   LAD MID 70% STENOSIS  LCX MILD DX  % OCCLUDED  LIMA TO LAD AND DIAGONAL PATENT  SVG TO OM PATENT  SVG TO RCA PATENT  LVEDP  15  MEDICAL TREATMENT FROM CAD  FOR A PERMANENT PACER  TEMP=PACER PLACED  NO COMPLICATIONS  RIGHT ARTERIAL AND VENOUS SHEATH        Past medical history:    has a past medical history of Acute anterior wall MI ([address] CAD (coronary artery disease), Cancer ([address] Cancer ([address] Carotid artery stenosis, Diabetes mellitus ([address] H/O cardiovascular stress test, H/O echocardiogram, Hyperlipidemia, Hypertension, and Protein-calorie malnutrition, moderate ([address]   Past surgical history:   has a past surgical history that includes Coronary artery bypass graft (5/07);  hydroCHLOROthiazide  25 mg Oral Daily    fenofibrate  160 mg Oral Daily      Infusions:   sodium chloride      sodium chloride      dextrose      sodium chloride        PRN Meds:  morphine, sodium chloride flush, sodium chloride, sodium chloride flush, sodium chloride, potassium chloride, sodium chloride flush, ALPRAZolam, oxyCODONE, glucose, dextrose, glucagon (rDNA), dextrose, sodium chloride flush, sodium chloride, promethazine **OR** ondansetron, acetaminophen **OR** acetaminophen       Physical Exam:  Vitals:    04/23/21 0900   BP: (!) 153/55   Pulse: 69   Resp: 15   Temp:    SpO2: 92%        General: A&Ox4, NAD  Chest: Nontender  Cardiac: s1 and s2 auscultated, no murmurs or gallops   Lungs:Clear to auscultation and percussion. Abdomen: Soft, NT, ND, +BS  Extremities: no cyanosis or edema   Vascular:  Equal 2+ peripheral pulses. Lab Data:  CBC:   Recent Labs     04/20/21  2220 04/21/21  0530 04/22/21  0535   WBC 31.2* 29.8* 23.7*   HGB 10.5* 10.3* 8.1*   HCT 35.6* 34.3* 26.4*   MCV 87.7 86.6 88.0   PLT 80* 100* 125*     BMP:   Recent Labs     04/21/21  0700 04/22/21  0535 04/22/21  2100 04/23/21  0450    137  --  135   K 4.4 2.9* 3.6 3.5    105  --  103   CO2 20* 24  --  24   BUN 16 17  --  11   CREATININE 1.3* 1.3*  --  1.1     LIVER PROFILE:   Recent Labs     04/21/21  0700 04/22/21  0535   AST 16 18   ALT 7* <5*   LIPASE 19 15   BILITOT 0.2 0.3   ALKPHOS 77 65     PT/INR:   Recent Labs     04/22/21  0535   PROTIME 13.2   INR 1.09     APTT:   Recent Labs     04/22/21  0535   APTT 31.5     BNP:  No results for input(s): BNP in the last 72 hours.       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

## 2021-04-23 NOTE — PROGRESS NOTES
ONCOLOGY HEMATOLOGY CARE ([facility])  PROGRESS NOTE    Patient was seen and examined today. She is s/p EGD and it showed gastritis and duodenitis. Colonoscopy showed diverticulitis and hemorrhoids. Has sinus pause in telemetry and she is s/p permanent pace maker placement on 4/21/21. Iron study done on 4/17/21 were reviewed. Her abdominal pain is getting better. Amylase and lipase were normal.    Her WBC today was 23.7 and hemoglobin was 8.1. Platelet count was 239. No new complaints today. PHYSICAL EXAM    Vitals: BP (!) 137/49   Pulse 60   Temp 97.9 °F (36.6 °C) (Oral)   Resp 14   Ht 5' 2\" (1.575 m)   Wt 130 lb 4.7 oz (59.1 kg)   SpO2 94%   BMI 23.83 kg/m²   CONSTITUTIONAL: awake, alert, no apparent distress, cooperative,   EYES: pupils equal, round and reactive to light, sclera clear and conjunctiva normal  ENT: Normocephalic, without obvious abnormality, atraumatic  NECK: supple, symmetrical, no jugular venous distension and no carotid bruits   HEMATOLOGIC/LYMPHATIC: no cervical, supraclavicular or axillary lymphadenopathy   LUNGS: VBS, no crackles, no increased work of breathing, no rhonchi, clear to auscultation, no wheezes,    CARDIOVASCULAR: regular rate and rhythm, normal S1 and S2, no murmur noted  ABDOMEN: normal bowel sounds x 4, soft, non-distended, non-tender, no masses palpated, no hepatosplenomgaly   MUSCULOSKELETAL: full range of motion noted, tone is normal  NEUROLOGIC: awake, alert, oriented to name, place and time. Motor skills grossly intact. SKIN: Normal skin color, texture, turgor and no jaundice.  appears intact   EXTREMITIES: no LE edema, no leg swelling, no cyanosis, no clubbing     LABORATORY RESULTS  CBC:   Recent Labs     04/20/21  2220 04/21/21  0530 04/22/21  0535   WBC 31.2* 29.8* 23.7*   HGB 10.5* 10.3* 8.1*   PLT 80* 100* 125*     BMP:    Recent Labs     04/20/21  0700 04/21/21  0700 04/22/21  0535    137 137   K 4.2 4.4 2.9*    108 105 CO2 23 20* 24   BUN 12 16 17   CREATININE 1.0 1.3* 1.3*   GLUCOSE 158* 177* 152*     Hepatic:   Recent Labs     04/20/21  0700 04/21/21  0700 04/22/21  0535   AST 15 16 18   ALT 9* 7* <5*   BILITOT 0.3 0.2 0.3   ALKPHOS 76 77 65     INR:   Recent Labs     04/22/21  0535   INR 1.09     ASSESSMENT/RECOMMENDATION  Metastatic carcinoid tumor  Immune thrombocytopenia  Lower GI bleeding  Symptomatic bradycardia with sinus pause    Reviewed her EGD and colonoscopy results. Anemia panel was also reviewed. She is status post PM placement on 4/21/21. She is on Lanreotide for her metastatic carcinoid and she will be due for lanreotide on 4/27/21. She is on close observation only for ITP. Her abdominal pain is getting better. Amylase and lipase were normal.    Her WBC today was 23.7 and hemoglobin was 8.1. Platelet count was 544. We will continue to follow the patient. Thank you.

## 2021-04-24 LAB
ABO/RH: NORMAL
ALBUMIN SERPL-MCNC: 3 GM/DL (ref 3.4–5)
ALP BLD-CCNC: 68 IU/L (ref 40–128)
ALT SERPL-CCNC: 5 U/L (ref 10–40)
ANION GAP SERPL CALCULATED.3IONS-SCNC: 10 MMOL/L (ref 4–16)
ANTIBODY SCREEN: NEGATIVE
AST SERPL-CCNC: 16 IU/L (ref 15–37)
BILIRUB SERPL-MCNC: 0.5 MG/DL (ref 0–1)
BUN BLDV-MCNC: 8 MG/DL (ref 6–23)
CALCIUM SERPL-MCNC: 7.7 MG/DL (ref 8.3–10.6)
CHLORIDE BLD-SCNC: 103 MMOL/L (ref 99–110)
CO2: 24 MMOL/L (ref 21–32)
COMPONENT: NORMAL
CREAT SERPL-MCNC: 1 MG/DL (ref 0.6–1.1)
CROSSMATCH RESULT: NORMAL
GFR AFRICAN AMERICAN: >60 ML/MIN/1.73M2
GFR NON-AFRICAN AMERICAN: 53 ML/MIN/1.73M2
GLUCOSE BLD-MCNC: 163 MG/DL (ref 70–99)
GLUCOSE BLD-MCNC: 194 MG/DL (ref 70–99)
GLUCOSE BLD-MCNC: 204 MG/DL (ref 70–99)
GLUCOSE BLD-MCNC: 213 MG/DL (ref 70–99)
GLUCOSE BLD-MCNC: 244 MG/DL (ref 70–99)
HCT VFR BLD CALC: 26.9 % (ref 37–47)
HEMOGLOBIN: 8.5 GM/DL (ref 12.5–16)
POTASSIUM SERPL-SCNC: 3.6 MMOL/L (ref 3.5–5.1)
SODIUM BLD-SCNC: 137 MMOL/L (ref 135–145)
STATUS: NORMAL
TOTAL PROTEIN: 4.9 GM/DL (ref 6.4–8.2)
TRANSFUSION STATUS: NORMAL
UNIT DIVISION: 0
UNIT NUMBER: NORMAL

## 2021-04-24 PROCEDURE — 84132 ASSAY OF SERUM POTASSIUM: CPT

## 2021-04-24 PROCEDURE — 6370000000 HC RX 637 (ALT 250 FOR IP): Performed by: INTERNAL MEDICINE

## 2021-04-24 PROCEDURE — 2580000003 HC RX 258: Performed by: SPECIALIST

## 2021-04-24 PROCEDURE — 99232 SBSQ HOSP IP/OBS MODERATE 35: CPT | Performed by: INTERNAL MEDICINE

## 2021-04-24 PROCEDURE — 94761 N-INVAS EAR/PLS OXIMETRY MLT: CPT

## 2021-04-24 PROCEDURE — 85014 HEMATOCRIT: CPT

## 2021-04-24 PROCEDURE — 85018 HEMOGLOBIN: CPT

## 2021-04-24 PROCEDURE — 6360000002 HC RX W HCPCS

## 2021-04-24 PROCEDURE — 82962 GLUCOSE BLOOD TEST: CPT

## 2021-04-24 PROCEDURE — 6370000000 HC RX 637 (ALT 250 FOR IP): Performed by: THORACIC SURGERY (CARDIOTHORACIC VASCULAR SURGERY)

## 2021-04-24 PROCEDURE — 80053 COMPREHEN METABOLIC PANEL: CPT

## 2021-04-24 PROCEDURE — 2580000003 HC RX 258: Performed by: THORACIC SURGERY (CARDIOTHORACIC VASCULAR SURGERY)

## 2021-04-24 PROCEDURE — 2140000000 HC CCU INTERMEDIATE R&B

## 2021-04-24 PROCEDURE — 97530 THERAPEUTIC ACTIVITIES: CPT

## 2021-04-24 PROCEDURE — 6360000002 HC RX W HCPCS: Performed by: SPECIALIST

## 2021-04-24 PROCEDURE — 97116 GAIT TRAINING THERAPY: CPT

## 2021-04-24 RX ORDER — MORPHINE SULFATE 4 MG/ML
INJECTION, SOLUTION INTRAMUSCULAR; INTRAVENOUS
Status: COMPLETED
Start: 2021-04-24 | End: 2021-04-24

## 2021-04-24 RX ADMIN — METHOCARBAMOL 750 MG: 500 TABLET ORAL at 08:26

## 2021-04-24 RX ADMIN — INSULIN LISPRO 2 UNITS: 100 INJECTION, SOLUTION INTRAVENOUS; SUBCUTANEOUS at 08:36

## 2021-04-24 RX ADMIN — ALPRAZOLAM 1 MG: 0.5 TABLET ORAL at 23:11

## 2021-04-24 RX ADMIN — CARVEDILOL 12.5 MG: 12.5 TABLET, FILM COATED ORAL at 08:27

## 2021-04-24 RX ADMIN — BUPROPION HYDROCHLORIDE 150 MG: 150 TABLET, EXTENDED RELEASE ORAL at 08:27

## 2021-04-24 RX ADMIN — METHOCARBAMOL 750 MG: 500 TABLET ORAL at 13:54

## 2021-04-24 RX ADMIN — VENLAFAXINE HYDROCHLORIDE 150 MG: 150 CAPSULE, EXTENDED RELEASE ORAL at 08:27

## 2021-04-24 RX ADMIN — PROMETHAZINE HYDROCHLORIDE 12.5 MG: 25 TABLET ORAL at 21:40

## 2021-04-24 RX ADMIN — PANTOPRAZOLE SODIUM 40 MG: 40 TABLET, DELAYED RELEASE ORAL at 06:09

## 2021-04-24 RX ADMIN — LACTULOSE 20 G: 10 SOLUTION ORAL at 21:40

## 2021-04-24 RX ADMIN — MORPHINE SULFATE 2 MG: 2 INJECTION, SOLUTION INTRAMUSCULAR; INTRAVENOUS at 00:15

## 2021-04-24 RX ADMIN — CARVEDILOL 12.5 MG: 12.5 TABLET, FILM COATED ORAL at 17:31

## 2021-04-24 RX ADMIN — POTASSIUM CHLORIDE 20 MEQ: 750 TABLET, FILM COATED, EXTENDED RELEASE ORAL at 08:26

## 2021-04-24 RX ADMIN — PIPERACILLIN AND TAZOBACTAM 3375 MG: 3; .375 INJECTION, POWDER, LYOPHILIZED, FOR SOLUTION INTRAVENOUS at 11:56

## 2021-04-24 RX ADMIN — METHOCARBAMOL 750 MG: 500 TABLET ORAL at 21:40

## 2021-04-24 RX ADMIN — PIPERACILLIN AND TAZOBACTAM 3375 MG: 3; .375 INJECTION, POWDER, LYOPHILIZED, FOR SOLUTION INTRAVENOUS at 20:09

## 2021-04-24 RX ADMIN — PIPERACILLIN AND TAZOBACTAM 3375 MG: 3; .375 INJECTION, POWDER, LYOPHILIZED, FOR SOLUTION INTRAVENOUS at 04:24

## 2021-04-24 RX ADMIN — POTASSIUM CHLORIDE 20 MEQ: 750 TABLET, FILM COATED, EXTENDED RELEASE ORAL at 17:31

## 2021-04-24 RX ADMIN — SODIUM CHLORIDE, PRESERVATIVE FREE 10 ML: 5 INJECTION INTRAVENOUS at 08:28

## 2021-04-24 RX ADMIN — MORPHINE SULFATE 2 MG: 2 INJECTION, SOLUTION INTRAMUSCULAR; INTRAVENOUS at 15:26

## 2021-04-24 RX ADMIN — PROMETHAZINE HYDROCHLORIDE 12.5 MG: 25 TABLET ORAL at 15:26

## 2021-04-24 RX ADMIN — FENOFIBRATE 160 MG: 160 TABLET ORAL at 08:27

## 2021-04-24 RX ADMIN — INSULIN LISPRO 1 UNITS: 100 INJECTION, SOLUTION INTRAVENOUS; SUBCUTANEOUS at 11:56

## 2021-04-24 RX ADMIN — MORPHINE SULFATE 2 MG: 4 INJECTION, SOLUTION INTRAMUSCULAR; INTRAVENOUS at 21:40

## 2021-04-24 RX ADMIN — VALSARTAN 160 MG: 160 TABLET, FILM COATED ORAL at 08:27

## 2021-04-24 RX ADMIN — METHOCARBAMOL 750 MG: 500 TABLET ORAL at 17:31

## 2021-04-24 RX ADMIN — HYDROCHLOROTHIAZIDE 25 MG: 25 TABLET ORAL at 08:27

## 2021-04-24 RX ADMIN — INSULIN LISPRO 2 UNITS: 100 INJECTION, SOLUTION INTRAVENOUS; SUBCUTANEOUS at 17:31

## 2021-04-24 RX ADMIN — Medication 1 TABLET: at 08:27

## 2021-04-24 RX ADMIN — ATORVASTATIN CALCIUM 40 MG: 40 TABLET, FILM COATED ORAL at 08:27

## 2021-04-24 RX ADMIN — VALSARTAN 160 MG: 160 TABLET, FILM COATED ORAL at 21:40

## 2021-04-24 RX ADMIN — BUPROPION HYDROCHLORIDE 150 MG: 150 TABLET, EXTENDED RELEASE ORAL at 21:40

## 2021-04-24 RX ADMIN — AMLODIPINE BESYLATE 10 MG: 10 TABLET ORAL at 08:27

## 2021-04-24 ASSESSMENT — PAIN DESCRIPTION - PROGRESSION
CLINICAL_PROGRESSION: GRADUALLY WORSENING
CLINICAL_PROGRESSION: GRADUALLY WORSENING

## 2021-04-24 ASSESSMENT — PAIN SCALES - GENERAL
PAINLEVEL_OUTOF10: 0
PAINLEVEL_OUTOF10: 8
PAINLEVEL_OUTOF10: 10

## 2021-04-24 ASSESSMENT — PAIN DESCRIPTION - FREQUENCY
FREQUENCY: INTERMITTENT
FREQUENCY: CONTINUOUS

## 2021-04-24 ASSESSMENT — PAIN - FUNCTIONAL ASSESSMENT: PAIN_FUNCTIONAL_ASSESSMENT: ACTIVITIES ARE NOT PREVENTED

## 2021-04-24 ASSESSMENT — PAIN DESCRIPTION - LOCATION
LOCATION: ABDOMEN
LOCATION: BACK

## 2021-04-24 ASSESSMENT — PAIN DESCRIPTION - ORIENTATION
ORIENTATION: MID
ORIENTATION: LOWER

## 2021-04-24 ASSESSMENT — PAIN DESCRIPTION - DESCRIPTORS
DESCRIPTORS: ACHING;CONSTANT
DESCRIPTORS: ACHING;DISCOMFORT

## 2021-04-24 ASSESSMENT — PAIN DESCRIPTION - ONSET: ONSET: GRADUAL

## 2021-04-24 ASSESSMENT — PAIN DESCRIPTION - PAIN TYPE: TYPE: ACUTE PAIN

## 2021-04-24 NOTE — PROGRESS NOTES
DOING BETTER ABD PAIN IMPROVED NO N/ VOMITING HAD SMALL BM NO GROSS GIT BLEEDING NOT PASSING FLATUS  VITALS STABLE   LABS NOTED HB 8.5 AFTER 1 UNIT PRBC YESTERDAY WILL CPM SBFT LATER ONCE ABD PAIN IMPROVES D/W DAUGHTER YESTERDAY

## 2021-04-24 NOTE — PROGRESS NOTES
Supplements: Diabetic Oral Supplement   DVT Prophylaxis [] Lovenox, []  Heparin, [] SCDs, []No VTE prophylaxis, patient ambulating   GI Prophylaxis [] PPI, [] H2 Blocker, [] No GI prophylaxis, patient is receiving diet/Tube Feeds   Code Status Full Code   Disposition Patient requires continued admission due to GI, heart block    MDM [] Low, [x] Moderate,[]  High     History of Present Illness: Subjective     Patient Seen & Examined at the bedside      Patient is resting in bed -no distress while on room air -   Abdominal pain has improved significantly -almost resolved    Spoke to patient's son who was at the bedside regarding placement as patient does not want to go to ARU or any ECF    Ten point ROS reviewed negative, unless as noted above    Objective: Intake/Output Summary (Last 24 hours) at 4/24/2021 1046  Last data filed at 4/24/2021 0847  Gross per 24 hour   Intake 994.06 ml   Output 1800 ml   Net -805.94 ml      Vitals:   Vitals:    04/24/21 0855   BP:    Pulse:    Resp:    Temp:    SpO2: 98%     Physical Exam:    GEN Awake female, resting in bed in no apparent distress. Appears given age. RESP Clear to auscultation, no wheezes, rales or rhonchi. CARDIO/VASC -S1/S2 auscultated. Regular rate without appreciable murmurs, rubs, or gallops. Peripheral pulses equal bilaterally and palpable. No peripheral edema. GI Abdomen is soft without significant tenderness, masses, or guarding. Bowel sounds are normoactive. Rectal exam deferred.  Harvey catheter is not present.     Medications:   Medications:    potassium chloride  20 mEq Oral BID WC    lactulose  20 g Oral TID    polyethylene glycol  17 g Oral Daily    piperacillin-tazobactam  3,375 mg Intravenous Q8H    valsartan  160 mg Oral Nightly    sodium chloride flush  5-40 mL Intravenous 2 times per day    carvedilol  12.5 mg Oral BID WC    amLODIPine  10 mg Oral Daily    atorvastatin  40 mg Oral Daily    buPROPion  150 mg Oral BID    cyanocobalamin  1,000 mcg Intramuscular Q7 Days    folic acid-pyridoxine-cyancobalamin  1 tablet Oral Daily    methocarbamol  750 mg Oral 4x Daily    therapeutic multivitamin-minerals  1 tablet Oral Daily    pantoprazole  40 mg Oral QAM AC    venlafaxine  150 mg Oral Daily    insulin lispro  0-6 Units Subcutaneous TID WC    insulin lispro  0-3 Units Subcutaneous Nightly    valsartan  160 mg Oral Daily    And    hydroCHLOROthiazide  25 mg Oral Daily    fenofibrate  160 mg Oral Daily      Infusions:    sodium chloride      sodium chloride      dextrose       PRN Meds: sodium chloride, , PRN  morphine, 2 mg, Q4H PRN  sodium chloride flush, 5-40 mL, PRN  sodium chloride, 25 mL, PRN  sodium chloride flush, 5-40 mL, PRN  potassium chloride, 10 mEq, PRN  ALPRAZolam, 1 mg, BID PRN  oxyCODONE, 5 mg, Q4H PRN  glucose, 15 g, PRN  dextrose, 12.5 g, PRN  glucagon (rDNA), 1 mg, PRN  dextrose, 100 mL/hr, PRN  promethazine, 12.5 mg, Q6H PRN    Or  ondansetron, 4 mg, Q6H PRN  acetaminophen, 650 mg, Q6H PRN    Or  acetaminophen, 650 mg, Q6H PRN          Electronically signed by Nguyen Mandel MD on 4/24/2021 at 10:46 AM

## 2021-04-24 NOTE — PROGRESS NOTES
Comprehensive Nutrition Assessment    Type and Reason for Visit:  Initial, RD Nutrition Re-Screen/LOS    Nutrition Recommendations/Plan:   · Continue current diet  · Start diabetic supplements    Nutrition Assessment:  pt assessed due to a los. She has been on various diets over the past week and has not eaten more than 25% of her meals. Pt weight has been stable over the past few months. Will order supplements and continue to follow    Malnutrition Assessment:  Malnutrition Status: At risk for malnutrition (Comment)    Context:  Acute Illness       Estimated Daily Nutrient Needs:  Energy (kcal):  0667-5712; Weight Used for Energy Requirements:  Current     Protein (g):  60; Weight Used for Protein Requirements:  Ideal        Fluid (ml/day):  8184-4003; Method Used for Fluid Requirements:  1 ml/kcal      Wounds:  None       Current Nutrition Therapies:    DIET GENERAL; Carb Control: 5 carb choices (75 gms)/meal    Anthropometric Measures:  · Height: 5' 2\" (157.5 cm)  · Current Body Weight: 134 lb (60.8 kg)   · Admission Body Weight: 134 lb (60.8 kg)    · Usual Body Weight: 135 lb (61.2 kg)     · Ideal Body Weight: 110 lbs; % Ideal Body Weight 121.8 %   · BMI: 24.5  · BMI Categories: Normal Weight (BMI 18.5-24. 9)       Nutrition Diagnosis:   · Inadequate oral intake related to acute injury/trauma as evidenced by intake 0-25%, intake 26-50%      Nutrition Interventions:   Food and/or Nutrient Delivery:  Continue Current Diet, Start Oral Nutrition Supplement  Nutrition Education/Counseling:  No recommendation at this time   Coordination of Nutrition Care:  Continue to monitor while inpatient    Goals:  pt will consume greater than 75% of her meals and supplements       Nutrition Monitoring and Evaluation:   Food/Nutrient Intake Outcomes:  Food and Nutrient Intake, Supplement Intake  Physical Signs/Symptoms Outcomes:  Biochemical Data, Skin, Weight     Discharge Planning:     Too soon to determine     Electronically

## 2021-04-24 NOTE — CARE COORDINATION
Patient does not wish to come to ARU. She states that she needs to get home to take care of her . I will remove this patient from consideration for ARU due to patient refusal. Please defer to lower level of care such as HHC. Explained benefits to patient of following recommendations of therapy, both OT and PT and she would still like to return home.

## 2021-04-24 NOTE — FLOWSHEET NOTE
Atropine 0.5 pushed for 8 second pauses.
Unable to reach spouse  No answer. Called Daughter Oscar Springer [phone] and updated.
Stairs - Number of Steps: 3  Entrance Stairs - Rails: Left  Bathroom Shower/Tub: Tub/Shower unit  Bathroom Equipment: Shower chair  Bathroom Accessibility: Accessible  ADL Assistance: Independent  Homemaking Assistance: Independent  Homemaking Responsibilities: Yes  Ambulation Assistance: Independent  Transfer Assistance: Independent  Active : No  Patient's  Info: Children  Occupation: Retired  Additional Comments: Pt reports 2 recent falls in last 6 months due to being off balanced     Long term goals  Time Frame for Long term goals :  In one week:  Long term goal 1: pt will complete all bed mobility with CGAx1  Long term goal 2: pt will complete all transfers with CGAx1  Long term goal 3: pt will ambulate 400 feet with CGAx1 and use of LRAD  Long term goal 4: pt will ascend/descend 3 steps with minAx1 and use of handrail  Long term goal 5: pt will independently complete 3 sets of 10 reps of BLE AROM/AAROM exercises in appropriate and allowed ROM    Electronically signed by:    Dyana Zuniga, [id number]  4/24/2021, 1:36 PM

## 2021-04-24 NOTE — PROGRESS NOTES
Patient seen and examined personally. Patient stable denies any chest pain or shortness of breath. Symptomatic bradycardia status post pacemaker. Anemia has been worked up by primary team.  GI is following. At this time no need for any cardiac work-up. We will sign off. Patient needs to have a follow-up outpatient will arrange that. Please call for any questions or concerns. Electronically signed by Rowena Ramirez DO on 4/24/2021 at 3:17 PM     Daily Progress Note    Pt. Awake, alert and feeling ok   HR stable, NSR in the 70s, BP elevated this am but has been stable  Denies CP, SOB and dizziness,still with abdominal pain     Anemia, r/o GI bleed    Hgb low but stable-needed another transfusion yesterday    GI following-s/p EGD and c-scope-diverticulosis and gastritis noted- no active bleeding    She has ITP and Heme is following as well    Ok to hold antiplatelet for now-when ok with GI and Heme would consider restarting ASA     Symptomatic Bradycardia    Several 5-8 second pauses on tele    LH done patent grafts noted    PPM placed-site stable and CXR stable, PPM check stable    Restarted Coreg     Hx of CAD s/p 3 V CABG and multiple PCI in the past    EF 60% with Mod MS    Cont. Med. Tx.     Will follow-increase activity if able-PT to work with pt. today     Echo-4/19/21  Summary   Left ventricular systolic function is hyperdynamic.   Ejection fraction is visually estimated at >60%.  Grade I diastolic dysfunction.   Moderately dilated left atrium.    PPM wiring visualized within the right heart.   Mild aortic stenosis with mean gradient of 15 mmHg.   Mitral annular calcification with moderate mitral stenosis; mean gradient is   8mmHg.   Mild to moderate mitral regurgitation.   No evidence of any pericardial effusion.     [facility]-4/19/21  LEFT MAIN PATENT   LAD MID 70% STENOSIS  LCX MILD DX  % OCCLUDED  LIMA TO LAD AND DIAGONAL PATENT  SVG TO OM PATENT  SVG TO RCA PATENT  LVEDP  15  MEDICAL TREATMENT FROM CAD  FOR A PERMANENT PACER  TEMP=PACER PLACED  NO COMPLICATIONS  RIGHT ARTERIAL AND VENOUS SHEATH        Past medical history:    has a past medical history of Acute anterior wall MI ([facility] [address] CAD (coronary artery disease), Cancer ([facility] [address] Cancer ([facility] [address] Carotid artery stenosis, Diabetes mellitus ([facility] [address] H/O cardiovascular stress test, H/O echocardiogram, Hyperlipidemia, Hypertension, and Protein-calorie malnutrition, moderate ([address] Past surgical history:   has a past surgical history that includes Coronary artery bypass graft (5/07); Diagnostic Cardiac Cath Lab Procedure (3/30/07); Abdomen surgery; other surgical history (08 11 2015); skin biopsy; fracture surgery (Left, 2001); fracture surgery (Right, 2017); Hysterectomy (1996); and Wrist fracture surgery (Left, 2/17/2021). Social History:   reports that she quit smoking about 32 years ago. She started smoking about 62 years ago. She has a 30.00 pack-year smoking history. She has never used smokeless tobacco. She reports that she does not drink alcohol or use drugs.   Family history:  family history includes Cancer in her father; Heart Attack in her mother; Heart Disease in her brother and mother.             Allergies   Allergen Reactions    Tramadol Itching    Vicodin [Hydrocodone-Acetaminophen] Itching           Objective:   BP (!) 145/57   Pulse 71   Temp 98.9 °F (37.2 °C) (Oral)   Resp 17   Ht 5' 2\" (1.575 m)   Wt 133 lb 6.1 oz (60.5 kg)   SpO2 96%   BMI 24.40 kg/m²       Intake/Output Summary (Last 24 hours) at 4/24/2021 0733  Last data filed at 4/24/2021 7892  Gross per 24 hour   Intake 1164.06 ml   Output 1350 ml   Net -185.94 ml       Medications:   Scheduled Meds:   potassium chloride  20 mEq Oral BID WC    lactulose  20 g Oral TID    polyethylene glycol  17 g Oral Daily    piperacillin-tazobactam  3,375 mg Intravenous Q8H    valsartan  160 mg Oral Nightly    sodium chloride flush  5-40 mL Intravenous 2 times per day    carvedilol  12.5 mg Oral BID WC    amLODIPine  10 mg Oral Daily    atorvastatin  40 mg Oral Daily    buPROPion  150 mg Oral BID    cyanocobalamin  1,000 mcg Intramuscular Q7 Days    folic acid-pyridoxine-cyancobalamin  1 tablet Oral Daily    methocarbamol  750 mg Oral 4x Daily    therapeutic multivitamin-minerals  1 tablet Oral Daily    pantoprazole  40 mg Oral QAM AC    venlafaxine  150 mg Oral Daily    insulin lispro  0-6 Units Subcutaneous TID WC    insulin lispro  0-3 Units Subcutaneous Nightly    valsartan  160 mg Oral Daily    And    hydroCHLOROthiazide  25 mg Oral Daily    fenofibrate  160 mg Oral Daily      Infusions:   sodium chloride      sodium chloride      dextrose        PRN Meds:  sodium chloride, morphine, sodium chloride flush, sodium chloride, sodium chloride flush, potassium chloride, ALPRAZolam, oxyCODONE, glucose, dextrose, glucagon (rDNA), dextrose, promethazine **OR** ondansetron, acetaminophen **OR** acetaminophen       Physical Exam:  Vitals:    04/24/21 0353   BP: (!) 145/57   Pulse: 71   Resp: 17   Temp: 98.9 °F (37.2 °C)   SpO2: 96%        General: AAO, NAD  Chest: Nontender  Cardiac: First and Second Heart Sounds are Normal, No Murmurs or Gallops noted  Lungs:Clear to auscultation and percussion. Abdomen: Soft, NT, ND, +BS  Extremities: No clubbing, no edema  Vascular:  Equal 2+ peripheral pulses.         Lab Data:  CBC:   Recent Labs     04/22/21  0535 04/23/21  1105 04/24/21  0340   WBC 23.7* 14.6*  --    HGB 8.1* 6.8* 8.5*   HCT 26.4* 22.3* 26.9*   MCV 88.0 86.8  --    * 154  --      BMP:   Recent Labs     04/23/21  0450 04/23/21  1105 04/24/21  0340    135 137   K 3.5 3.4* 3.6    102 103   CO2 24 27 24   BUN 11 9 8   CREATININE 1.1 1.1 1.0     LIVER PROFILE:   Recent Labs     04/22/21  0535 04/23/21  1105 04/24/21  0340   AST 18 17 16   ALT <5* <5* 5*   LIPASE 15  --   --    BILITOT 0.3 0.3 0.5   ALKPHOS 65 65 68     PT/INR:   Recent Labs 04/22/21  0535   PROTIME 13.2   INR 1.09     APTT:   Recent Labs     04/22/21  0535   APTT 31.5     BNP:  No results for input(s): BNP in the last 72 hours.       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

## 2021-04-24 NOTE — PLAN OF CARE
Problem: Pain:  Goal: Pain level will decrease  Description: Pain level will decrease  4/23/2021 2013 by Anastacio Bauman RN  Outcome: Ongoing  4/23/2021 1443 by Lorenza Lawson RN  Outcome: Ongoing  Goal: Control of acute pain  Description: Control of acute pain  4/23/2021 2013 by Anastacio Bauman RN  Outcome: Ongoing  4/23/2021 1443 by Lorenza Lawson RN  Outcome: Ongoing  Goal: Control of chronic pain  Description: Control of chronic pain  4/23/2021 2013 by Anastacio Bauman RN  Outcome: Ongoing  4/23/2021 1443 by Lorenza Lawson RN  Outcome: Ongoing     Problem: Falls - Risk of:  Goal: Will remain free from falls  Description: Will remain free from falls  4/23/2021 2013 by Anastacio Bauman RN  Outcome: Ongoing  4/23/2021 1443 by Lorenza Lawson RN  Outcome: Ongoing  Goal: Absence of physical injury  Description: Absence of physical injury  4/23/2021 2013 by Anastacio Bauman RN  Outcome: Ongoing  4/23/2021 1443 by Lorenza Lawson RN  Outcome: Ongoing     Problem: Skin Integrity:  Goal: Will show no infection signs and symptoms  Description: Will show no infection signs and symptoms  4/23/2021 2013 by Anastacio Bauman RN  Outcome: Ongoing  4/23/2021 1443 by Lorenza Lawson RN  Outcome: Ongoing  Goal: Absence of new skin breakdown  Description: Absence of new skin breakdown  4/23/2021 2013 by Anastacio Bauman RN  Outcome: Ongoing  4/23/2021 1443 by Lorenza Lawson RN  Outcome: Ongoing  Goal: Skin integrity will improve  Description: Skin integrity will improve  4/23/2021 2013 by Anastacio Bauman RN  Outcome: Ongoing  4/23/2021 1443 by Lorenza Lawson RN  Outcome: Ongoing  Goal: Signs of wound healing will improve  Description: Signs of wound healing will improve  4/23/2021 2013 by Anastacio Bauman RN  Outcome: Ongoing  4/23/2021 1443 by Lorenza Lawson RN  Outcome: Ongoing     Problem:  Activity:  Goal: Fatigue will decrease  Description: Fatigue will decrease  4/23/2021 2013 by Anastacio Bauman RN  Outcome: Ongoing  4/23/2021 1443 by Reshma Armstrong RN  Outcome: Ongoing  Goal: Ability to tolerate increased activity will improve  Description: Ability to tolerate increased activity will improve  4/23/2021 2013 by Robby Timmons RN  Outcome: Ongoing  4/23/2021 1443 by Reshma Armstrong RN  Outcome: Ongoing  Goal: Ability to maintain optimal joint mobility will improve  Description: Ability to maintain optimal joint mobility will improve  4/23/2021 2013 by Robby Timmons RN  Outcome: Ongoing  4/23/2021 1443 by Reshma Armstrong RN  Outcome: Ongoing     Problem:  Bowel/Gastric:  Goal: Ability to achieve a regular elimination pattern will improve  Description: Ability to achieve a regular elimination pattern will improve  4/23/2021 2013 by Robby Timmons RN  Outcome: Ongoing  4/23/2021 1443 by Reshma Armstrong RN  Outcome: Ongoing     Problem: Cardiac:  Goal: Ability to maintain an adequate cardiac output will improve  Description: Ability to maintain an adequate cardiac output will improve  4/23/2021 2013 by Robby Timmons RN  Outcome: Ongoing  4/23/2021 1443 by Reshma Armstrong RN  Outcome: Ongoing  Goal: Ability to maintain adequate ventilation will improve  Description: Ability to maintain adequate ventilation will improve  4/23/2021 2013 by Robby Timmons RN  Outcome: Ongoing  4/23/2021 1443 by Reshma Armstrong RN  Outcome: Ongoing  Goal: Ability to achieve and maintain adequate cardiopulmonary perfusion will improve  Description: Ability to achieve and maintain adequate cardiopulmonary perfusion will improve  4/23/2021 2013 by Robby Timmons RN  Outcome: Ongoing  4/23/2021 1443 by Reshma Armstrong RN  Outcome: Ongoing     Problem: Coping:  Goal: Ability to adjust to condition or change in health will improve  Description: Ability to adjust to condition or change in health will improve  4/23/2021 2013 by Robby Timmons RN  Outcome: Ongoing  4/23/2021 1443 by Reshma Armstrong RN  Outcome: Ongoing  Goal: Communication of feelings regarding changes in body function or appearance will improve  Description: Communication of feelings regarding changes in body function or appearance will improve  4/23/2021 2013 by Jordi Diaz RN  Outcome: Ongoing  4/23/2021 1443 by Chuyita More RN  Outcome: Ongoing     Problem: Health Behavior:  Goal: Identification of resources available to assist in meeting health care needs will improve  Description: Identification of resources available to assist in meeting health care needs will improve  4/23/2021 2013 by Jordi Diaz RN  Outcome: Ongoing  4/23/2021 1443 by Chuyita More RN  Outcome: Ongoing     Problem: Nutritional:  Goal: Maintenance of adequate nutrition will improve  Description: Maintenance of adequate nutrition will improve  4/23/2021 2013 by Jordi Daiz RN  Outcome: Ongoing  4/23/2021 1443 by Chuyita More RN  Outcome: Ongoing     Problem: Physical Regulation:  Goal: Signs and symptoms of infection will decrease  Description: Signs and symptoms of infection will decrease  4/23/2021 2013 by Jordi Diaz RN  Outcome: Ongoing  4/23/2021 1443 by Chuyita More RN  Outcome: Ongoing  Goal: Will show no signs and symptoms of excessive bleeding  Description: Will show no signs and symptoms of excessive bleeding  4/23/2021 2013 by Jordi Diaz RN  Outcome: Ongoing  4/23/2021 1443 by Chuyita More RN  Outcome: Ongoing  Goal: Complications related to the disease process, condition or treatment will be avoided or minimized  Description: Complications related to the disease process, condition or treatment will be avoided or minimized  4/23/2021 2013 by Jordi Diaz RN  Outcome: Ongoing  4/23/2021 1443 by Chuyita More RN  Outcome: Ongoing     Problem: Safety:  Goal: Ability to remain free from injury will improve  Description: Ability to remain free from injury will improve  4/23/2021 2013 by Jordi Diaz RN  Outcome: Ongoing  4/23/2021 1443 by Chuyita More RN  Outcome: Ongoing     Problem: Sensory:  Goal: Pain level will decrease  Description: Pain level will decrease  4/23/2021 2013 by Severo Hubert, RN  Outcome: Ongoing  4/23/2021 1443 by Radha Enciso RN  Outcome: Ongoing  Goal: General experience of comfort will improve  Description: General experience of comfort will improve  4/23/2021 2013 by Severo Hubert, RN  Outcome: Ongoing  4/23/2021 1443 by Radha Enciso RN  Outcome: Ongoing     Problem: Tissue Perfusion:  Goal: Ability to maintain adequate tissue perfusion will improve  Description: Ability to maintain adequate tissue perfusion will improve  4/23/2021 2013 by Severo Hubert, RN  Outcome: Ongoing  4/23/2021 1443 by Radha Enciso RN  Outcome: Ongoing  Goal: Ability to maintain a stable neurologic state will improve  Description: Ability to maintain a stable neurologic state will improve  4/23/2021 2013 by Severo Hubert, RN  Outcome: Ongoing  4/23/2021 1443 by Radha Enciso RN  Outcome: Ongoing

## 2021-04-25 LAB
ALBUMIN SERPL-MCNC: 3.4 GM/DL (ref 3.4–5)
ALP BLD-CCNC: 84 IU/L (ref 40–128)
ALT SERPL-CCNC: 8 U/L (ref 10–40)
AMYLASE: 27 U/L (ref 25–115)
ANION GAP SERPL CALCULATED.3IONS-SCNC: 12 MMOL/L (ref 4–16)
APTT: 32.4 SECONDS (ref 25.1–37.1)
AST SERPL-CCNC: 20 IU/L (ref 15–37)
BASOPHILS ABSOLUTE: 0 K/CU MM
BASOPHILS RELATIVE PERCENT: 0.2 % (ref 0–1)
BILIRUB SERPL-MCNC: 0.6 MG/DL (ref 0–1)
BUN BLDV-MCNC: 7 MG/DL (ref 6–23)
CALCIUM SERPL-MCNC: 8.5 MG/DL (ref 8.3–10.6)
CHLORIDE BLD-SCNC: 97 MMOL/L (ref 99–110)
CO2: 26 MMOL/L (ref 21–32)
CREAT SERPL-MCNC: 0.9 MG/DL (ref 0.6–1.1)
DIFFERENTIAL TYPE: ABNORMAL
EOSINOPHILS ABSOLUTE: 0.6 K/CU MM
EOSINOPHILS RELATIVE PERCENT: 3.9 % (ref 0–3)
GFR AFRICAN AMERICAN: >60 ML/MIN/1.73M2
GFR NON-AFRICAN AMERICAN: >60 ML/MIN/1.73M2
GLUCOSE BLD-MCNC: 146 MG/DL (ref 70–99)
GLUCOSE BLD-MCNC: 158 MG/DL (ref 70–99)
GLUCOSE BLD-MCNC: 159 MG/DL (ref 70–99)
GLUCOSE BLD-MCNC: 176 MG/DL (ref 70–99)
GLUCOSE BLD-MCNC: 220 MG/DL (ref 70–99)
HCT VFR BLD CALC: 31.9 % (ref 37–47)
HEMOGLOBIN: 9.8 GM/DL (ref 12.5–16)
IMMATURE NEUTROPHIL %: 0.6 % (ref 0–0.43)
INR BLD: 1.07 INDEX
LIPASE: 22 IU/L (ref 13–60)
LYMPHOCYTES ABSOLUTE: 1 K/CU MM
LYMPHOCYTES RELATIVE PERCENT: 6.1 % (ref 24–44)
MCH RBC QN AUTO: 26.3 PG (ref 27–31)
MCHC RBC AUTO-ENTMCNC: 30.7 % (ref 32–36)
MCV RBC AUTO: 85.5 FL (ref 78–100)
MONOCYTES ABSOLUTE: 1.1 K/CU MM
MONOCYTES RELATIVE PERCENT: 7.1 % (ref 0–4)
NUCLEATED RBC %: 0 %
PDW BLD-RTO: 15.6 % (ref 11.7–14.9)
PLATELET # BLD: 67 K/CU MM (ref 140–440)
PMV BLD AUTO: 11.3 FL (ref 7.5–11.1)
POTASSIUM SERPL-SCNC: 3.8 MMOL/L (ref 3.5–5.1)
PROTHROMBIN TIME: 12.9 SECONDS (ref 11.7–14.5)
RBC # BLD: 3.73 M/CU MM (ref 4.2–5.4)
SEGMENTED NEUTROPHILS ABSOLUTE COUNT: 13.2 K/CU MM
SEGMENTED NEUTROPHILS RELATIVE PERCENT: 82.1 % (ref 36–66)
SODIUM BLD-SCNC: 135 MMOL/L (ref 135–145)
TOTAL IMMATURE NEUTOROPHIL: 0.09 K/CU MM
TOTAL NUCLEATED RBC: 0 K/CU MM
TOTAL PROTEIN: 5.8 GM/DL (ref 6.4–8.2)
WBC # BLD: 16 K/CU MM (ref 4–10.5)

## 2021-04-25 PROCEDURE — 36415 COLL VENOUS BLD VENIPUNCTURE: CPT

## 2021-04-25 PROCEDURE — 85025 COMPLETE CBC W/AUTO DIFF WBC: CPT

## 2021-04-25 PROCEDURE — 6360000002 HC RX W HCPCS: Performed by: THORACIC SURGERY (CARDIOTHORACIC VASCULAR SURGERY)

## 2021-04-25 PROCEDURE — 6360000002 HC RX W HCPCS: Performed by: SPECIALIST

## 2021-04-25 PROCEDURE — 83690 ASSAY OF LIPASE: CPT

## 2021-04-25 PROCEDURE — 94761 N-INVAS EAR/PLS OXIMETRY MLT: CPT

## 2021-04-25 PROCEDURE — 6370000000 HC RX 637 (ALT 250 FOR IP): Performed by: INTERNAL MEDICINE

## 2021-04-25 PROCEDURE — 6370000000 HC RX 637 (ALT 250 FOR IP): Performed by: THORACIC SURGERY (CARDIOTHORACIC VASCULAR SURGERY)

## 2021-04-25 PROCEDURE — 2580000003 HC RX 258: Performed by: SPECIALIST

## 2021-04-25 PROCEDURE — 2580000003 HC RX 258: Performed by: THORACIC SURGERY (CARDIOTHORACIC VASCULAR SURGERY)

## 2021-04-25 PROCEDURE — 82150 ASSAY OF AMYLASE: CPT

## 2021-04-25 PROCEDURE — 85610 PROTHROMBIN TIME: CPT

## 2021-04-25 PROCEDURE — 2140000000 HC CCU INTERMEDIATE R&B

## 2021-04-25 PROCEDURE — 85730 THROMBOPLASTIN TIME PARTIAL: CPT

## 2021-04-25 PROCEDURE — 80053 COMPREHEN METABOLIC PANEL: CPT

## 2021-04-25 PROCEDURE — 82962 GLUCOSE BLOOD TEST: CPT

## 2021-04-25 RX ADMIN — PROMETHAZINE HYDROCHLORIDE 12.5 MG: 25 TABLET ORAL at 13:52

## 2021-04-25 RX ADMIN — LACTULOSE 20 G: 10 SOLUTION ORAL at 08:59

## 2021-04-25 RX ADMIN — PIPERACILLIN AND TAZOBACTAM 3375 MG: 3; .375 INJECTION, POWDER, LYOPHILIZED, FOR SOLUTION INTRAVENOUS at 11:28

## 2021-04-25 RX ADMIN — METHOCARBAMOL 750 MG: 500 TABLET ORAL at 18:32

## 2021-04-25 RX ADMIN — CYANOCOBALAMIN 1000 MCG: 1000 INJECTION, SOLUTION INTRAMUSCULAR at 09:00

## 2021-04-25 RX ADMIN — LACTULOSE 20 G: 10 SOLUTION ORAL at 13:49

## 2021-04-25 RX ADMIN — HYDROCHLOROTHIAZIDE 25 MG: 25 TABLET ORAL at 09:00

## 2021-04-25 RX ADMIN — BUPROPION HYDROCHLORIDE 150 MG: 150 TABLET, EXTENDED RELEASE ORAL at 20:35

## 2021-04-25 RX ADMIN — PROMETHAZINE HYDROCHLORIDE 12.5 MG: 25 TABLET ORAL at 22:07

## 2021-04-25 RX ADMIN — Medication 1 TABLET: at 08:59

## 2021-04-25 RX ADMIN — ALPRAZOLAM 1 MG: 0.5 TABLET ORAL at 22:07

## 2021-04-25 RX ADMIN — AMLODIPINE BESYLATE 10 MG: 10 TABLET ORAL at 08:59

## 2021-04-25 RX ADMIN — SODIUM CHLORIDE, PRESERVATIVE FREE 10 ML: 5 INJECTION INTRAVENOUS at 08:58

## 2021-04-25 RX ADMIN — BUPROPION HYDROCHLORIDE 150 MG: 150 TABLET, EXTENDED RELEASE ORAL at 09:00

## 2021-04-25 RX ADMIN — MORPHINE SULFATE 2 MG: 2 INJECTION, SOLUTION INTRAMUSCULAR; INTRAVENOUS at 13:52

## 2021-04-25 RX ADMIN — PIPERACILLIN AND TAZOBACTAM 3375 MG: 3; .375 INJECTION, POWDER, LYOPHILIZED, FOR SOLUTION INTRAVENOUS at 03:55

## 2021-04-25 RX ADMIN — ATORVASTATIN CALCIUM 40 MG: 40 TABLET, FILM COATED ORAL at 09:00

## 2021-04-25 RX ADMIN — METHOCARBAMOL 750 MG: 500 TABLET ORAL at 08:59

## 2021-04-25 RX ADMIN — SODIUM CHLORIDE, PRESERVATIVE FREE 10 ML: 5 INJECTION INTRAVENOUS at 20:36

## 2021-04-25 RX ADMIN — VALSARTAN 160 MG: 160 TABLET, FILM COATED ORAL at 20:35

## 2021-04-25 RX ADMIN — CARVEDILOL 12.5 MG: 12.5 TABLET, FILM COATED ORAL at 18:33

## 2021-04-25 RX ADMIN — METHOCARBAMOL 750 MG: 500 TABLET ORAL at 20:35

## 2021-04-25 RX ADMIN — CARVEDILOL 12.5 MG: 12.5 TABLET, FILM COATED ORAL at 09:00

## 2021-04-25 RX ADMIN — VENLAFAXINE HYDROCHLORIDE 150 MG: 150 CAPSULE, EXTENDED RELEASE ORAL at 08:59

## 2021-04-25 RX ADMIN — METHOCARBAMOL 750 MG: 500 TABLET ORAL at 13:49

## 2021-04-25 RX ADMIN — INSULIN LISPRO 1 UNITS: 100 INJECTION, SOLUTION INTRAVENOUS; SUBCUTANEOUS at 11:29

## 2021-04-25 RX ADMIN — VALSARTAN 160 MG: 160 TABLET, FILM COATED ORAL at 09:00

## 2021-04-25 RX ADMIN — PIPERACILLIN AND TAZOBACTAM 3375 MG: 3; .375 INJECTION, POWDER, LYOPHILIZED, FOR SOLUTION INTRAVENOUS at 20:35

## 2021-04-25 RX ADMIN — MORPHINE SULFATE 2 MG: 2 INJECTION, SOLUTION INTRAMUSCULAR; INTRAVENOUS at 18:38

## 2021-04-25 RX ADMIN — PANTOPRAZOLE SODIUM 40 MG: 40 TABLET, DELAYED RELEASE ORAL at 09:00

## 2021-04-25 RX ADMIN — FENOFIBRATE 160 MG: 160 TABLET ORAL at 09:00

## 2021-04-25 RX ADMIN — OXYCODONE HYDROCHLORIDE 5 MG: 5 TABLET ORAL at 03:43

## 2021-04-25 ASSESSMENT — PAIN - FUNCTIONAL ASSESSMENT: PAIN_FUNCTIONAL_ASSESSMENT: ACTIVITIES ARE NOT PREVENTED

## 2021-04-25 ASSESSMENT — PAIN DESCRIPTION - PROGRESSION: CLINICAL_PROGRESSION: GRADUALLY WORSENING

## 2021-04-25 ASSESSMENT — PAIN DESCRIPTION - PAIN TYPE: TYPE: ACUTE PAIN

## 2021-04-25 ASSESSMENT — PAIN DESCRIPTION - LOCATION: LOCATION: ABDOMEN

## 2021-04-25 ASSESSMENT — PAIN DESCRIPTION - ONSET: ONSET: AWAKENED FROM SLEEP

## 2021-04-25 ASSESSMENT — PAIN SCALES - GENERAL
PAINLEVEL_OUTOF10: 9
PAINLEVEL_OUTOF10: 7

## 2021-04-25 ASSESSMENT — PAIN DESCRIPTION - ORIENTATION: ORIENTATION: MID

## 2021-04-25 NOTE — PROGRESS NOTES
stable for DC if/when approved    Diet DIET GENERAL; Carb Control: 5 carb choices (75 gms)/meal  Dietary Nutrition Supplements: Diabetic Oral Supplement   DVT Prophylaxis [] Lovenox, []  Heparin, [] SCDs, []No VTE prophylaxis, patient ambulating   GI Prophylaxis [] PPI, [] H2 Blocker, [] No GI prophylaxis, patient is receiving diet/Tube Feeds   Code Status Full Code   Disposition Patient requires continued admission due to GI bleed, heart block, placement issue   MDM [] Low, [x] Moderate,[]  High     History of Present Illness: Subjective     Patient Seen & Examined at the bedside      Patient is resting in bed -no distress while on room air -   Abdominal pain has improved significantly -almost resolved    Spoke to patient's son who was at the bedside regarding placement as patient does not want to go to ARU or any ECF    Ten point ROS reviewed negative, unless as noted above    Objective: Intake/Output Summary (Last 24 hours) at 4/25/2021 1634  Last data filed at 4/25/2021 1200  Gross per 24 hour   Intake 1080 ml   Output 600 ml   Net 480 ml      Vitals:   Vitals:    04/25/21 1257   BP:    Pulse:    Resp: 21   Temp:    SpO2: 92%     Physical Exam:    GEN Awake female, resting in bed in no apparent distress. Appears given age. RESP Clear to auscultation, no wheezes, rales or rhonchi. CARDIO/VASC -S1/S2 auscultated. Regular rate without appreciable murmurs, rubs, or gallops. Peripheral pulses equal bilaterally and palpable. No peripheral edema. GI Abdomen is soft without significant tenderness, masses, or guarding. Bowel sounds are normoactive. Rectal exam deferred.  Harvey catheter is not present.     Medications:   Medications:    lactulose  20 g Oral TID    polyethylene glycol  17 g Oral Daily    piperacillin-tazobactam  3,375 mg Intravenous Q8H    valsartan  160 mg Oral Nightly    sodium chloride flush  5-40 mL Intravenous 2 times per day    carvedilol  12.5 mg Oral BID WC    amLODIPine  10 mg Oral Daily    atorvastatin  40 mg Oral Daily    buPROPion  150 mg Oral BID    cyanocobalamin  1,000 mcg Intramuscular Q7 Days    folic acid-pyridoxine-cyancobalamin  1 tablet Oral Daily    methocarbamol  750 mg Oral 4x Daily    therapeutic multivitamin-minerals  1 tablet Oral Daily    pantoprazole  40 mg Oral QAM AC    venlafaxine  150 mg Oral Daily    insulin lispro  0-6 Units Subcutaneous TID WC    insulin lispro  0-3 Units Subcutaneous Nightly    valsartan  160 mg Oral Daily    And    hydroCHLOROthiazide  25 mg Oral Daily    fenofibrate  160 mg Oral Daily      Infusions:    sodium chloride      sodium chloride      dextrose       PRN Meds: sodium chloride, , PRN  morphine, 2 mg, Q4H PRN  sodium chloride flush, 5-40 mL, PRN  sodium chloride, 25 mL, PRN  sodium chloride flush, 5-40 mL, PRN  potassium chloride, 10 mEq, PRN  ALPRAZolam, 1 mg, BID PRN  oxyCODONE, 5 mg, Q4H PRN  glucose, 15 g, PRN  dextrose, 12.5 g, PRN  glucagon (rDNA), 1 mg, PRN  dextrose, 100 mL/hr, PRN  promethazine, 12.5 mg, Q6H PRN    Or  ondansetron, 4 mg, Q6H PRN  acetaminophen, 650 mg, Q6H PRN    Or  acetaminophen, 650 mg, Q6H PRN          Electronically signed by Anup Wilson MD on 4/25/2021 at 4:34 PM

## 2021-04-25 NOTE — PROGRESS NOTES
ONCOLOGY HEMATOLOGY CARE ([facility])  PROGRESS NOTE    Patient was seen and examined today. She is s/p EGD and it showed gastritis and duodenitis. Colonoscopy showed diverticulitis and hemorrhoids. Has sinus pause in telemetry and she is s/p permanent pace maker placement on 4/21/21. Iron study done on 4/17/21 were reviewed. Her hemoglobin today was 8.5 (received one unit of PRBC on 4/23/21). No new complaints today. PHYSICAL EXAM    Vitals: BP (!) 147/86   Pulse 80   Temp 98.7 °F (37.1 °C) (Oral)   Resp 20   Ht 5' 2\" (1.575 m)   Wt 133 lb 6.1 oz (60.5 kg)   SpO2 94%   BMI 24.40 kg/m²   CONSTITUTIONAL: awake, alert, no apparent distress, cooperative,   EYES: pupils equal, round and reactive to light, sclera clear and conjunctiva normal  ENT: Normocephalic, without obvious abnormality, atraumatic  NECK: supple, symmetrical, no jugular venous distension and no carotid bruits   HEMATOLOGIC/LYMPHATIC: no cervical, supraclavicular or axillary lymphadenopathy   LUNGS: VBS, no crackles, no increased work of breathing, no rhonchi, clear to auscultation, no wheezes,    CARDIOVASCULAR: regular rate and rhythm, normal S1 and S2, no murmur noted  ABDOMEN: normal bowel sounds x 4, soft, non-distended, non-tender, no masses palpated, no hepatosplenomgaly   MUSCULOSKELETAL: full range of motion noted, tone is normal  NEUROLOGIC: awake, alert, oriented to name, place and time. Motor skills grossly intact. SKIN: Normal skin color, texture, turgor and no jaundice.  appears intact   EXTREMITIES: no cyanosis, no LE edema, no leg swelling, no clubbing     LABORATORY RESULTS  CBC:   Recent Labs     04/22/21  0535 04/23/21  1105 04/24/21  0340   WBC 23.7* 14.6*  --    HGB 8.1* 6.8* 8.5*   * 154  --      BMP:    Recent Labs     04/23/21  0450 04/23/21  1105 04/24/21  0340    135 137   K 3.5 3.4* 3.6    102 103   CO2 24 27 24   BUN 11 9 8   CREATININE 1.1 1.1 1.0   GLUCOSE 168* 176* 204* Hepatic:   Recent Labs     04/22/21  0535 04/23/21  1105 04/24/21  0340   AST 18 17 16   ALT <5* <5* 5*   BILITOT 0.3 0.3 0.5   ALKPHOS 65 65 68     INR:   Recent Labs     04/22/21  0535   INR 1.09     ASSESSMENT/RECOMMENDATION  Metastatic carcinoid tumor  Immune thrombocytopenia  Lower GI bleeding  Symptomatic bradycardia with sinus pause    Reviewed her EGD and colonoscopy results. Anemia panel was also reviewed. She is status post PM placement on 4/21/21. She is on Lanreotide for her metastatic carcinoid and she will be due for lanreotide on 4/27/21. She is on close observation only for ITP. Her hemoglobin today was 8.5 (received one unit of PRBC on 4/23/21). Will continue to monitor her blood count. Thank you.

## 2021-04-25 NOTE — PLAN OF CARE
Problem: Pain:  Description: Pain management should include both nonpharmacologic and pharmacologic interventions. Goal: Pain level will decrease  Description: Pain level will decrease  Outcome: Ongoing  Goal: Control of acute pain  Description: Control of acute pain  Outcome: Ongoing  Goal: Control of chronic pain  Description: Control of chronic pain  Outcome: Ongoing     Problem: Falls - Risk of:  Goal: Will remain free from falls  Description: Will remain free from falls  Outcome: Ongoing  Goal: Absence of physical injury  Description: Absence of physical injury  Outcome: Ongoing     Problem: Skin Integrity:  Goal: Will show no infection signs and symptoms  Description: Will show no infection signs and symptoms  Outcome: Ongoing  Goal: Absence of new skin breakdown  Description: Absence of new skin breakdown  Outcome: Ongoing  Goal: Skin integrity will improve  Description: Skin integrity will improve  Outcome: Ongoing  Goal: Signs of wound healing will improve  Description: Signs of wound healing will improve  Outcome: Ongoing     Problem: Activity:  Goal: Fatigue will decrease  Description: Fatigue will decrease  Outcome: Ongoing  Goal: Ability to tolerate increased activity will improve  Description: Ability to tolerate increased activity will improve  Outcome: Ongoing  Goal: Ability to maintain optimal joint mobility will improve  Description: Ability to maintain optimal joint mobility will improve  Outcome: Ongoing     Problem:  Bowel/Gastric:  Goal: Ability to achieve a regular elimination pattern will improve  Description: Ability to achieve a regular elimination pattern will improve  Outcome: Ongoing     Problem: Cardiac:  Goal: Ability to maintain an adequate cardiac output will improve  Description: Ability to maintain an adequate cardiac output will improve  Outcome: Ongoing  Goal: Ability to maintain adequate ventilation will improve  Description: Ability to maintain adequate ventilation will improve  Outcome: Ongoing  Goal: Ability to achieve and maintain adequate cardiopulmonary perfusion will improve  Description: Ability to achieve and maintain adequate cardiopulmonary perfusion will improve  Outcome: Ongoing     Problem: Coping:  Goal: Ability to adjust to condition or change in health will improve  Description: Ability to adjust to condition or change in health will improve  Outcome: Ongoing  Goal: Communication of feelings regarding changes in body function or appearance will improve  Description: Communication of feelings regarding changes in body function or appearance will improve  Outcome: Ongoing     Problem: Health Behavior:  Goal: Identification of resources available to assist in meeting health care needs will improve  Description: Identification of resources available to assist in meeting health care needs will improve  Outcome: Ongoing     Problem: Nutritional:  Goal: Maintenance of adequate nutrition will improve  Description: Maintenance of adequate nutrition will improve  Outcome: Ongoing     Problem: Physical Regulation:  Goal: Signs and symptoms of infection will decrease  Description: Signs and symptoms of infection will decrease  Outcome: Ongoing  Goal: Will show no signs and symptoms of excessive bleeding  Description: Will show no signs and symptoms of excessive bleeding  Outcome: Ongoing  Goal: Complications related to the disease process, condition or treatment will be avoided or minimized  Description: Complications related to the disease process, condition or treatment will be avoided or minimized  Outcome: Ongoing     Problem: Safety:  Goal: Ability to remain free from injury will improve  Description: Ability to remain free from injury will improve  Outcome: Ongoing     Problem: Sensory:  Goal: Pain level will decrease  Description: Pain level will decrease  Outcome: Ongoing  Goal: General experience of comfort will improve  Description: General experience of comfort will improve  Outcome: Ongoing     Problem: Tissue Perfusion:  Goal: Ability to maintain adequate tissue perfusion will improve  Description: Ability to maintain adequate tissue perfusion will improve  Outcome: Ongoing  Goal: Ability to maintain a stable neurologic state will improve  Description: Ability to maintain a stable neurologic state will improve  Outcome: Ongoing

## 2021-04-25 NOTE — PROGRESS NOTES
DOING BETTER STILL WITH SOME ABD PAIN AND NAUSEA NO VOMITING NO BM TODAY NUT 3 YESTERDAY AND 2 LAST NIGHT ORAL INTAKE GOOD  VITALS STABLE   LABS FROM AM PENDING  WILL CPM SBFT LATER AS OUTPT ONCE ABD PAIN IMPROVES

## 2021-04-26 VITALS
TEMPERATURE: 98.2 F | BODY MASS INDEX: 23.08 KG/M2 | OXYGEN SATURATION: 93 % | WEIGHT: 125.44 LBS | DIASTOLIC BLOOD PRESSURE: 87 MMHG | SYSTOLIC BLOOD PRESSURE: 158 MMHG | HEIGHT: 62 IN | HEART RATE: 104 BPM | RESPIRATION RATE: 18 BRPM

## 2021-04-26 LAB
ALBUMIN SERPL-MCNC: 3.4 GM/DL (ref 3.4–5)
ALP BLD-CCNC: 79 IU/L (ref 40–128)
ALT SERPL-CCNC: 8 U/L (ref 10–40)
ANION GAP SERPL CALCULATED.3IONS-SCNC: 9 MMOL/L (ref 4–16)
AST SERPL-CCNC: 15 IU/L (ref 15–37)
BASOPHILS ABSOLUTE: 0.1 K/CU MM
BASOPHILS RELATIVE PERCENT: 0.5 % (ref 0–1)
BILIRUB SERPL-MCNC: 0.5 MG/DL (ref 0–1)
BUN BLDV-MCNC: 7 MG/DL (ref 6–23)
CALCIUM SERPL-MCNC: 8.3 MG/DL (ref 8.3–10.6)
CHLORIDE BLD-SCNC: 97 MMOL/L (ref 99–110)
CO2: 27 MMOL/L (ref 21–32)
CREAT SERPL-MCNC: 0.9 MG/DL (ref 0.6–1.1)
CULTURE: NORMAL
CULTURE: NORMAL
DIFFERENTIAL TYPE: ABNORMAL
EOSINOPHILS ABSOLUTE: 0.7 K/CU MM
EOSINOPHILS RELATIVE PERCENT: 5.3 % (ref 0–3)
GFR AFRICAN AMERICAN: >60 ML/MIN/1.73M2
GFR NON-AFRICAN AMERICAN: >60 ML/MIN/1.73M2
GLUCOSE BLD-MCNC: 189 MG/DL (ref 70–99)
GLUCOSE BLD-MCNC: 200 MG/DL (ref 70–99)
GLUCOSE BLD-MCNC: 216 MG/DL (ref 70–99)
HCT VFR BLD CALC: 32.3 % (ref 37–47)
HEMOGLOBIN: 10.3 GM/DL (ref 12.5–16)
IMMATURE NEUTROPHIL %: 0.5 % (ref 0–0.43)
LYMPHOCYTES ABSOLUTE: 1 K/CU MM
LYMPHOCYTES RELATIVE PERCENT: 7.4 % (ref 24–44)
Lab: NORMAL
Lab: NORMAL
MAGNESIUM: 1.7 MG/DL (ref 1.8–2.4)
MCH RBC QN AUTO: 26.9 PG (ref 27–31)
MCHC RBC AUTO-ENTMCNC: 31.9 % (ref 32–36)
MCV RBC AUTO: 84.3 FL (ref 78–100)
MONOCYTES ABSOLUTE: 1.2 K/CU MM
MONOCYTES RELATIVE PERCENT: 8.8 % (ref 0–4)
NUCLEATED RBC %: 0 %
PDW BLD-RTO: 15.5 % (ref 11.7–14.9)
PLATELET # BLD: 38 K/CU MM (ref 140–440)
PMV BLD AUTO: 11.4 FL (ref 7.5–11.1)
POTASSIUM SERPL-SCNC: 3.3 MMOL/L (ref 3.5–5.1)
RBC # BLD: 3.83 M/CU MM (ref 4.2–5.4)
SEGMENTED NEUTROPHILS ABSOLUTE COUNT: 10.2 K/CU MM
SEGMENTED NEUTROPHILS RELATIVE PERCENT: 77.5 % (ref 36–66)
SODIUM BLD-SCNC: 133 MMOL/L (ref 135–145)
SPECIMEN: NORMAL
SPECIMEN: NORMAL
TOTAL IMMATURE NEUTOROPHIL: 0.07 K/CU MM
TOTAL NUCLEATED RBC: 0 K/CU MM
TOTAL PROTEIN: 5.8 GM/DL (ref 6.4–8.2)
WBC # BLD: 13.2 K/CU MM (ref 4–10.5)

## 2021-04-26 PROCEDURE — 80053 COMPREHEN METABOLIC PANEL: CPT

## 2021-04-26 PROCEDURE — 6370000000 HC RX 637 (ALT 250 FOR IP): Performed by: INTERNAL MEDICINE

## 2021-04-26 PROCEDURE — 6360000002 HC RX W HCPCS: Performed by: THORACIC SURGERY (CARDIOTHORACIC VASCULAR SURGERY)

## 2021-04-26 PROCEDURE — 85025 COMPLETE CBC W/AUTO DIFF WBC: CPT

## 2021-04-26 PROCEDURE — 94761 N-INVAS EAR/PLS OXIMETRY MLT: CPT

## 2021-04-26 PROCEDURE — 2580000003 HC RX 258: Performed by: THORACIC SURGERY (CARDIOTHORACIC VASCULAR SURGERY)

## 2021-04-26 PROCEDURE — 6360000002 HC RX W HCPCS: Performed by: SPECIALIST

## 2021-04-26 PROCEDURE — 82962 GLUCOSE BLOOD TEST: CPT

## 2021-04-26 PROCEDURE — 83735 ASSAY OF MAGNESIUM: CPT

## 2021-04-26 PROCEDURE — 6370000000 HC RX 637 (ALT 250 FOR IP): Performed by: THORACIC SURGERY (CARDIOTHORACIC VASCULAR SURGERY)

## 2021-04-26 PROCEDURE — 36415 COLL VENOUS BLD VENIPUNCTURE: CPT

## 2021-04-26 PROCEDURE — 2580000003 HC RX 258: Performed by: SPECIALIST

## 2021-04-26 RX ORDER — LACTULOSE 10 G/15ML
20 SOLUTION ORAL 3 TIMES DAILY
Qty: 1 BOTTLE | Refills: 0 | Status: ON HOLD | OUTPATIENT
Start: 2021-04-26 | End: 2022-09-27 | Stop reason: HOSPADM

## 2021-04-26 RX ORDER — POTASSIUM CHLORIDE 20 MEQ/1
40 TABLET, EXTENDED RELEASE ORAL ONCE
Status: COMPLETED | OUTPATIENT
Start: 2021-04-26 | End: 2021-04-26

## 2021-04-26 RX ORDER — CARVEDILOL 12.5 MG/1
12.5 TABLET ORAL 2 TIMES DAILY WITH MEALS
Qty: 60 TABLET | Refills: 3 | Status: ON HOLD | OUTPATIENT
Start: 2021-04-26 | End: 2022-09-27 | Stop reason: HOSPADM

## 2021-04-26 RX ORDER — ASPIRIN 81 MG/1
81 TABLET ORAL DAILY
Qty: 30 TABLET | Refills: 0 | Status: ON HOLD | OUTPATIENT
Start: 2021-04-26 | End: 2022-09-27 | Stop reason: SDUPTHER

## 2021-04-26 RX ADMIN — ATORVASTATIN CALCIUM 40 MG: 40 TABLET, FILM COATED ORAL at 10:12

## 2021-04-26 RX ADMIN — OXYCODONE HYDROCHLORIDE 5 MG: 5 TABLET ORAL at 08:30

## 2021-04-26 RX ADMIN — OXYCODONE HYDROCHLORIDE 5 MG: 5 TABLET ORAL at 16:00

## 2021-04-26 RX ADMIN — ONDANSETRON 4 MG: 2 INJECTION INTRAMUSCULAR; INTRAVENOUS at 02:38

## 2021-04-26 RX ADMIN — PANTOPRAZOLE SODIUM 40 MG: 40 TABLET, DELAYED RELEASE ORAL at 06:45

## 2021-04-26 RX ADMIN — POTASSIUM CHLORIDE 40 MEQ: 1500 TABLET, EXTENDED RELEASE ORAL at 10:12

## 2021-04-26 RX ADMIN — METHOCARBAMOL 750 MG: 500 TABLET ORAL at 12:47

## 2021-04-26 RX ADMIN — SODIUM CHLORIDE, PRESERVATIVE FREE 10 ML: 5 INJECTION INTRAVENOUS at 10:12

## 2021-04-26 RX ADMIN — VENLAFAXINE HYDROCHLORIDE 150 MG: 150 CAPSULE, EXTENDED RELEASE ORAL at 10:12

## 2021-04-26 RX ADMIN — ONDANSETRON 4 MG: 2 INJECTION INTRAMUSCULAR; INTRAVENOUS at 08:30

## 2021-04-26 RX ADMIN — MORPHINE SULFATE 2 MG: 2 INJECTION, SOLUTION INTRAMUSCULAR; INTRAVENOUS at 04:35

## 2021-04-26 RX ADMIN — PIPERACILLIN AND TAZOBACTAM 3375 MG: 3; .375 INJECTION, POWDER, LYOPHILIZED, FOR SOLUTION INTRAVENOUS at 12:03

## 2021-04-26 RX ADMIN — OXYCODONE HYDROCHLORIDE 5 MG: 5 TABLET ORAL at 01:10

## 2021-04-26 RX ADMIN — SODIUM CHLORIDE, PRESERVATIVE FREE 10 ML: 5 INJECTION INTRAVENOUS at 08:30

## 2021-04-26 RX ADMIN — PIPERACILLIN AND TAZOBACTAM 3375 MG: 3; .375 INJECTION, POWDER, LYOPHILIZED, FOR SOLUTION INTRAVENOUS at 04:35

## 2021-04-26 RX ADMIN — CARVEDILOL 12.5 MG: 12.5 TABLET, FILM COATED ORAL at 10:12

## 2021-04-26 RX ADMIN — METHOCARBAMOL 750 MG: 500 TABLET ORAL at 10:11

## 2021-04-26 RX ADMIN — INSULIN LISPRO 1 UNITS: 100 INJECTION, SOLUTION INTRAVENOUS; SUBCUTANEOUS at 11:59

## 2021-04-26 RX ADMIN — PROMETHAZINE HYDROCHLORIDE 12.5 MG: 25 TABLET ORAL at 12:47

## 2021-04-26 RX ADMIN — Medication 1 TABLET: at 10:18

## 2021-04-26 RX ADMIN — Medication 1 TABLET: at 10:12

## 2021-04-26 RX ADMIN — AMLODIPINE BESYLATE 10 MG: 10 TABLET ORAL at 10:11

## 2021-04-26 RX ADMIN — ONDANSETRON 4 MG: 2 INJECTION INTRAMUSCULAR; INTRAVENOUS at 16:00

## 2021-04-26 RX ADMIN — BUPROPION HYDROCHLORIDE 150 MG: 150 TABLET, EXTENDED RELEASE ORAL at 10:12

## 2021-04-26 RX ADMIN — FENOFIBRATE 160 MG: 160 TABLET ORAL at 10:18

## 2021-04-26 RX ADMIN — INSULIN LISPRO 2 UNITS: 100 INJECTION, SOLUTION INTRAVENOUS; SUBCUTANEOUS at 08:20

## 2021-04-26 RX ADMIN — VALSARTAN 160 MG: 160 TABLET, FILM COATED ORAL at 10:18

## 2021-04-26 RX ADMIN — ALPRAZOLAM 1 MG: 0.5 TABLET ORAL at 08:30

## 2021-04-26 ASSESSMENT — PAIN DESCRIPTION - FREQUENCY: FREQUENCY: INTERMITTENT

## 2021-04-26 ASSESSMENT — PAIN SCALES - GENERAL: PAINLEVEL_OUTOF10: 0

## 2021-04-26 ASSESSMENT — PAIN DESCRIPTION - DESCRIPTORS: DESCRIPTORS: CRAMPING;DISCOMFORT

## 2021-04-26 ASSESSMENT — PAIN DESCRIPTION - ONSET: ONSET: ON-GOING

## 2021-04-26 ASSESSMENT — PAIN DESCRIPTION - PROGRESSION: CLINICAL_PROGRESSION: GRADUALLY WORSENING

## 2021-04-26 ASSESSMENT — PAIN DESCRIPTION - LOCATION: LOCATION: ABDOMEN

## 2021-04-26 NOTE — CARE COORDINATION
Chart reviewed. Noted that pt wants to go home instead of ARU per note from [facility] on 04/24. Pt's nurse verified with pt does want to go home and she is agreeable to [facility]. CM met with pt and provided her with a [facility] list. She states that she does not have a [facility] preference. Informed her of the hospital affiliation with [facility]. Referral made to [facility], [facility]. liaison/Raquel via PS. Pt denies any other d/c needs at this time. D/c plan is home with spouse and [facility]. Notify CM if any new d/c needs arise.   TE    .Please notify [facility] upon discharge, [phone], they will provide fax number to send [facility] order & AVS.

## 2021-04-26 NOTE — DISCHARGE SUMMARY
[address] Hospitalist     Discharge Summary    Name:  Corean Landau /Age/Sex: 1940  (79 y.o. female)   MRN & CSN:  [id number] & [id number] Admission Date/Time: 2021 11:56 AM   Attending:  Rich Caal MD Discharging Physician: Rich Caal MD     HPI:     Please, see admission HPI in [address] and patient's hospital course below    Hospital Course: Corean Landau is a 79 y.o.  female  who presents with GI bleed and the following assessments below, reflect the patient's hospital course     Lower GI bleeding  Acute anemia of GI blood loss     Status post EGD with gastritis, duodenitis. Colonoscopy with diverticulosis and hemorrhoids  Hemoglobin 8.5> 6.8 transfused with a unit of PRBC > Hb 8.5 post transfusion  Monitor hemoglobin closely and transfuse for Hb less than 7  Small bowel follow-through to be done as OP  Hb up to 10.3  GI follow up as OP     Complete heart block -Symptomatic bradycardia with 8 sec sinus pause     Status post temporary pacemaker placed   Keep potassium more than 4 and magnesium more than 2. Follow up with Cardiology AS OP     UTI      UA Positive, wbc - 31.2/29.8- improving > 23.7>14.6 >13.2 - afebrile   Completed 5 days of IV Zosyn      Acute kidney injury on chronic kidney disease stage IIIb- resolved     Creatinine is stable, avoid nephrotoxins, nephrology following     Hypokalemia, hypomagnesemia -  k -2.9>3.4, Mag 1.5, Replaced per protocol -resolved     Abdominal pain - resolving   Constipation ?     CT abdomen with stool in rectal area? constipation? But patient had Cscope few days ago-  Lipase, amylase - both normal, evaluated by GI and suggested empiric Zosyn   On Miralax. Added lactulose  General surgery and GI services follow up as OP     History of neuroendocrine tumor:  Oncology follow up as OP     Hypertension: Continue with hydralazine, amlodipine, losartan, hydrochlorothiazide     Chronic medical conditions: Resume home medications when clinically appropriate     Coronary artery disease  Hyperlipidemia  Type II diabetes  Depression    Patient is hemodynamically stable for DC to home with [facility]    The patient expressed appropriate understanding of and agreement with the discharge recommendations, medications, and plan. Consults this admission:  IP CONSULT TO GI  IP CONSULT TO HOSPITALIST  IP CONSULT TO NEPHROLOGY  IP CONSULT TO ONCOLOGY  IP CONSULT TO CARDIOLOGY  IP CONSULT TO GI  IP CONSULT TO [address] [place] [address] [place] CONSULT TO IV TEAM  IP CONSULT TO GENERAL SURGERY  IP CONSULT TO [address] [place] [address] NEEDS    Discharge Instruction:   Follow up appointments: GI  Primary care physician:  within 1 to 2 weeks    Diet:  diabetic diet    Activity: activity as tolerated with fall precautions   Disposition: Discharged to:   []Home, [x][facility], []SNF, []Acute Rehab, []Hospice   Condition on discharge: Stable    Discharge Medications:      [facility]   Home Medication Instructions [facility] [place]:[id number]    Printed on:04/26/21 6511   Medication Information                      ALPRAZolam (XANAX) 1 MG tablet  Take 1 mg by mouth 2 times daily as needed for Sleep. amLODIPine (NORVASC) 10 MG tablet  TAKE 1 TABLET BY MOUTH EVERY DAY             aspirin 81 MG EC tablet  Take 1 tablet by mouth daily             atorvastatin (LIPITOR) 40 MG tablet  Take 1.5 tablets by mouth daily             buPROPion (WELLBUTRIN SR) 150 MG extended release tablet  Take 1 tablet by mouth 2 times daily             carvedilol (COREG) 12.5 MG tablet  Take 1 tablet by mouth 2 times daily (with meals)             cyanocobalamin 1000 MCG/ML injection  Inject 1,000 mcg into the muscle every 7 days. fenofibrate (TRICOR) 145 MG tablet  Take 145 mg by mouth daily. folic acid-pyridoxine-cyancobalamin (FOLTX) 1.13-25-2 MG TABS  Take 1 tablet by mouth daily             FREESTYLE LITE strip  TEST BLOOD GLUCOSE 3 TIMES DAILY (BEFORE MEALS).              glipiZIDE (GLUCOTROL XL) 5 MG extended release tablet  Take 2 tablets by mouth daily             INVOKANA 300 MG TABS tablet               lactulose (CHRONULAC) 10 GM/15ML solution  Take 30 mLs by mouth 3 times daily             LANTUS SOLOSTAR 100 UNIT/ML injection pen               metFORMIN (GLUCOPHAGE) 500 MG tablet  Take 1 tablet by mouth 2 times daily (with meals)             methocarbamol (ROBAXIN) 750 MG tablet  Take 750 mg by mouth 4 times daily as needed             Multiple Vitamins-Minerals (THERAPEUTIC MULTIVITAMIN-MINERALS) tablet  Take 1 tablet by mouth daily             pantoprazole (PROTONIX) 40 MG tablet  Take 1 tablet by mouth every morning (before breakfast)             potassium chloride (MICRO-K) 10 MEQ CR capsule  Take 10 mEq by mouth 2 times daily             valsartan-hydroCHLOROthiazide (DIOVAN-HCT) 320-25 MG per tablet  Take 1 tablet by mouth daily              venlafaxine (EFFEXOR XR) 150 MG extended release capsule  Take 1 capsule by mouth daily             vitamin E 1000 UNITS capsule  Take 400 Units by mouth daily                 Subjective _ patient is resting in bed with no distress - abdominal pain has improved, passed a BM yesterday - no fever - changed her mind again and refused to got o SNF but home     Objective Findings at Discharge:   BP (!) 158/87   Pulse 104   Temp 98.2 °F (36.8 °C) (Oral)   Resp 18   Ht 5' 2\" (1.575 m)   Wt 125 lb 7.1 oz (56.9 kg)   SpO2 93%   BMI 22.94 kg/m²            PHYSICAL EXAM   GEN Awake female, resting in bed in no apparent distress. Appears given age. RESP Clear to auscultation, no wheezes, rales or rhonchi. CARDIO/VAS - S1/S2 auscultated. Regular rate without appreciable murmurs, rubs, or gallops. Peripheral pulses equal bilaterally and palpable. No peripheral edema. GI Abdomen is soft without significant tenderness, masses, or guarding. Bowel sounds are normoactive  MSK No gross joint deformities.  Spontaneous movement of all extremities  SKIN Normal coloration, warm, dry. NEURO Cranial nerves appear grossly intact, normal speech, no lateralizing weakness.     BMP/CBC  Recent Labs     04/24/21  0340 04/25/21  1202 04/26/21  0620    135 133*   K 3.6 3.8 3.3*    97* 97*   CO2 24 26 27   BUN 8 7 7   CREATININE 1.0 0.9 0.9   WBC  --  16.0* 13.2*   HCT 26.9* 31.9* 32.3*   PLT  --  67* 38*         Discharge Time of 35 minutes    Electronically signed by Funmi Bean MD on 4/26/2021 at 11:39 AM

## 2021-04-27 ENCOUNTER — APPOINTMENT (OUTPATIENT)
Dept: CT IMAGING | Age: 81
End: 2021-04-27
Payer: MEDICARE

## 2021-04-27 ENCOUNTER — HOSPITAL ENCOUNTER (OUTPATIENT)
Age: 81
Setting detail: OBSERVATION
Discharge: HOME HEALTH | End: 2021-04-28
Attending: EMERGENCY MEDICINE | Admitting: HOSPITALIST
Payer: MEDICARE

## 2021-04-27 ENCOUNTER — APPOINTMENT (OUTPATIENT)
Dept: GENERAL RADIOLOGY | Age: 81
End: 2021-04-27
Payer: MEDICARE

## 2021-04-27 DIAGNOSIS — E83.42 HYPOMAGNESEMIA: ICD-10-CM

## 2021-04-27 DIAGNOSIS — K52.9 COLITIS: Primary | ICD-10-CM

## 2021-04-27 DIAGNOSIS — R65.10 SIRS (SYSTEMIC INFLAMMATORY RESPONSE SYNDROME) (HCC): ICD-10-CM

## 2021-04-27 PROBLEM — R10.9 ABDOMINAL PAIN: Status: ACTIVE | Noted: 2021-04-27

## 2021-04-27 LAB
ALBUMIN SERPL-MCNC: 3.6 GM/DL (ref 3.4–5)
ALP BLD-CCNC: 80 IU/L (ref 40–128)
ALT SERPL-CCNC: 8 U/L (ref 10–40)
ANION GAP SERPL CALCULATED.3IONS-SCNC: 14 MMOL/L (ref 4–16)
ANISOCYTOSIS: ABNORMAL
AST SERPL-CCNC: 14 IU/L (ref 15–37)
BACTERIA: NEGATIVE /HPF
BASOPHILS ABSOLUTE: 0.1 K/CU MM
BASOPHILS RELATIVE PERCENT: 0.4 % (ref 0–1)
BILIRUB SERPL-MCNC: 0.4 MG/DL (ref 0–1)
BILIRUBIN URINE: NEGATIVE MG/DL
BLOOD, URINE: ABNORMAL
BUN BLDV-MCNC: 12 MG/DL (ref 6–23)
CALCIUM SERPL-MCNC: 8.5 MG/DL (ref 8.3–10.6)
CHLORIDE BLD-SCNC: 100 MMOL/L (ref 99–110)
CLARITY: ABNORMAL
CO2: 22 MMOL/L (ref 21–32)
COLOR: YELLOW
CREAT SERPL-MCNC: 1.1 MG/DL (ref 0.6–1.1)
DIFFERENTIAL TYPE: ABNORMAL
DIFFERENTIAL TYPE: ABNORMAL
EOSINOPHILS ABSOLUTE: 0.6 K/CU MM
EOSINOPHILS RELATIVE PERCENT: 4.6 % (ref 0–3)
ESTIMATED AVERAGE GLUCOSE: 140 MG/DL
GFR AFRICAN AMERICAN: 58 ML/MIN/1.73M2
GFR NON-AFRICAN AMERICAN: 48 ML/MIN/1.73M2
GLUCOSE BLD-MCNC: 135 MG/DL (ref 70–99)
GLUCOSE BLD-MCNC: 151 MG/DL (ref 70–99)
GLUCOSE BLD-MCNC: 207 MG/DL (ref 70–99)
GLUCOSE BLD-MCNC: 262 MG/DL (ref 70–99)
GLUCOSE, URINE: 50 MG/DL
HBA1C MFR BLD: 6.5 % (ref 4.2–6.3)
HCT VFR BLD CALC: 35.9 % (ref 37–47)
HEMOGLOBIN: 11 GM/DL (ref 12.5–16)
HYALINE CASTS: 0 /LPF
IMMATURE NEUTROPHIL %: 0.6 % (ref 0–0.43)
KETONES, URINE: NEGATIVE MG/DL
LACTIC ACID, SEPSIS: 1 MMOL/L (ref 0.5–1.9)
LACTIC ACID, SEPSIS: 1.9 MMOL/L (ref 0.5–1.9)
LEUKOCYTE ESTERASE, URINE: NEGATIVE
LIPASE: 34 IU/L (ref 13–60)
LYMPHOCYTES ABSOLUTE: 1.2 K/CU MM
LYMPHOCYTES RELATIVE PERCENT: 9.2 % (ref 24–44)
MAGNESIUM: 1.5 MG/DL (ref 1.8–2.4)
MCH RBC QN AUTO: 26.1 PG (ref 27–31)
MCHC RBC AUTO-ENTMCNC: 30.6 % (ref 32–36)
MCV RBC AUTO: 85.1 FL (ref 78–100)
MONOCYTES ABSOLUTE: 1.4 K/CU MM
MONOCYTES RELATIVE PERCENT: 10.6 % (ref 0–4)
MUCUS: ABNORMAL HPF
NITRITE URINE, QUANTITATIVE: NEGATIVE
NUCLEATED RBC %: 0 %
PDW BLD-RTO: 15.7 % (ref 11.7–14.9)
PH, URINE: 6 (ref 5–8)
PLATELET # BLD: 69 K/CU MM (ref 140–440)
PLT MORPHOLOGY: ABNORMAL
POTASSIUM SERPL-SCNC: 3.3 MMOL/L (ref 3.5–5.1)
PROTEIN UA: NEGATIVE MG/DL
RBC # BLD: 4.22 M/CU MM (ref 4.2–5.4)
RBC # BLD: ABNORMAL 10*6/UL
RBC URINE: 7 /HPF (ref 0–6)
SEGMENTED NEUTROPHILS ABSOLUTE COUNT: 10 K/CU MM
SEGMENTED NEUTROPHILS RELATIVE PERCENT: 74.6 % (ref 36–66)
SODIUM BLD-SCNC: 136 MMOL/L (ref 135–145)
SPECIFIC GRAVITY UA: 1.02 (ref 1–1.03)
SQUAMOUS EPITHELIAL: 1 /HPF
TOTAL IMMATURE NEUTOROPHIL: 0.08 K/CU MM
TOTAL NUCLEATED RBC: 0 K/CU MM
TOTAL PROTEIN: 6 GM/DL (ref 6.4–8.2)
TOXIC GRANULATION: PRESENT
TRICHOMONAS: ABNORMAL /HPF
TROPONIN T: <0.01 NG/ML
UROBILINOGEN, URINE: NEGATIVE MG/DL (ref 0.2–1)
WBC # BLD: 13.4 K/CU MM (ref 4–10.5)
WBC UA: 2 /HPF (ref 0–5)
YEAST: ABNORMAL /HPF

## 2021-04-27 PROCEDURE — 82962 GLUCOSE BLOOD TEST: CPT

## 2021-04-27 PROCEDURE — 96366 THER/PROPH/DIAG IV INF ADDON: CPT

## 2021-04-27 PROCEDURE — 87040 BLOOD CULTURE FOR BACTERIA: CPT

## 2021-04-27 PROCEDURE — 83690 ASSAY OF LIPASE: CPT

## 2021-04-27 PROCEDURE — 81001 URINALYSIS AUTO W/SCOPE: CPT

## 2021-04-27 PROCEDURE — 99284 EMERGENCY DEPT VISIT MOD MDM: CPT

## 2021-04-27 PROCEDURE — 96367 TX/PROPH/DG ADDL SEQ IV INF: CPT

## 2021-04-27 PROCEDURE — 87086 URINE CULTURE/COLONY COUNT: CPT

## 2021-04-27 PROCEDURE — 2500000003 HC RX 250 WO HCPCS: Performed by: EMERGENCY MEDICINE

## 2021-04-27 PROCEDURE — 6360000002 HC RX W HCPCS: Performed by: EMERGENCY MEDICINE

## 2021-04-27 PROCEDURE — 96365 THER/PROPH/DIAG IV INF INIT: CPT

## 2021-04-27 PROCEDURE — 80053 COMPREHEN METABOLIC PANEL: CPT

## 2021-04-27 PROCEDURE — 74177 CT ABD & PELVIS W/CONTRAST: CPT

## 2021-04-27 PROCEDURE — 6360000002 HC RX W HCPCS: Performed by: HOSPITALIST

## 2021-04-27 PROCEDURE — 6370000000 HC RX 637 (ALT 250 FOR IP): Performed by: HOSPITALIST

## 2021-04-27 PROCEDURE — 83735 ASSAY OF MAGNESIUM: CPT

## 2021-04-27 PROCEDURE — 2500000003 HC RX 250 WO HCPCS: Performed by: HOSPITALIST

## 2021-04-27 PROCEDURE — 83036 HEMOGLOBIN GLYCOSYLATED A1C: CPT

## 2021-04-27 PROCEDURE — 96376 TX/PRO/DX INJ SAME DRUG ADON: CPT

## 2021-04-27 PROCEDURE — C9113 INJ PANTOPRAZOLE SODIUM, VIA: HCPCS | Performed by: HOSPITALIST

## 2021-04-27 PROCEDURE — 83605 ASSAY OF LACTIC ACID: CPT

## 2021-04-27 PROCEDURE — 2580000003 HC RX 258: Performed by: HOSPITALIST

## 2021-04-27 PROCEDURE — 96372 THER/PROPH/DIAG INJ SC/IM: CPT

## 2021-04-27 PROCEDURE — G0378 HOSPITAL OBSERVATION PER HR: HCPCS

## 2021-04-27 PROCEDURE — 96368 THER/DIAG CONCURRENT INF: CPT

## 2021-04-27 PROCEDURE — 93005 ELECTROCARDIOGRAM TRACING: CPT | Performed by: EMERGENCY MEDICINE

## 2021-04-27 PROCEDURE — 94761 N-INVAS EAR/PLS OXIMETRY MLT: CPT

## 2021-04-27 PROCEDURE — 36415 COLL VENOUS BLD VENIPUNCTURE: CPT

## 2021-04-27 PROCEDURE — 96375 TX/PRO/DX INJ NEW DRUG ADDON: CPT

## 2021-04-27 PROCEDURE — 6360000004 HC RX CONTRAST MEDICATION: Performed by: EMERGENCY MEDICINE

## 2021-04-27 PROCEDURE — 85025 COMPLETE CBC W/AUTO DIFF WBC: CPT

## 2021-04-27 PROCEDURE — 96374 THER/PROPH/DIAG INJ IV PUSH: CPT

## 2021-04-27 PROCEDURE — 2580000003 HC RX 258: Performed by: EMERGENCY MEDICINE

## 2021-04-27 PROCEDURE — 1200000000 HC SEMI PRIVATE

## 2021-04-27 PROCEDURE — 71045 X-RAY EXAM CHEST 1 VIEW: CPT

## 2021-04-27 PROCEDURE — 96361 HYDRATE IV INFUSION ADD-ON: CPT

## 2021-04-27 PROCEDURE — 84484 ASSAY OF TROPONIN QUANT: CPT

## 2021-04-27 RX ORDER — ONDANSETRON 2 MG/ML
4 INJECTION INTRAMUSCULAR; INTRAVENOUS EVERY 30 MIN PRN
Status: DISCONTINUED | OUTPATIENT
Start: 2021-04-27 | End: 2021-04-28 | Stop reason: HOSPADM

## 2021-04-27 RX ORDER — LACTULOSE 10 G/15ML
20 SOLUTION ORAL 3 TIMES DAILY
Status: DISCONTINUED | OUTPATIENT
Start: 2021-04-27 | End: 2021-04-28 | Stop reason: HOSPADM

## 2021-04-27 RX ORDER — ALPRAZOLAM 0.5 MG/1
1 TABLET ORAL 2 TIMES DAILY PRN
Status: DISCONTINUED | OUTPATIENT
Start: 2021-04-27 | End: 2021-04-28 | Stop reason: HOSPADM

## 2021-04-27 RX ORDER — DICYCLOMINE HYDROCHLORIDE 10 MG/ML
20 INJECTION INTRAMUSCULAR ONCE
Status: COMPLETED | OUTPATIENT
Start: 2021-04-27 | End: 2021-04-27

## 2021-04-27 RX ORDER — PANTOPRAZOLE SODIUM 40 MG/10ML
40 INJECTION, POWDER, LYOPHILIZED, FOR SOLUTION INTRAVENOUS 2 TIMES DAILY
Status: DISCONTINUED | OUTPATIENT
Start: 2021-04-27 | End: 2021-04-28 | Stop reason: HOSPADM

## 2021-04-27 RX ORDER — SODIUM CHLORIDE 9 MG/ML
25 INJECTION, SOLUTION INTRAVENOUS PRN
Status: DISCONTINUED | OUTPATIENT
Start: 2021-04-27 | End: 2021-04-28 | Stop reason: HOSPADM

## 2021-04-27 RX ORDER — METHOCARBAMOL 750 MG/1
750 TABLET, FILM COATED ORAL 4 TIMES DAILY PRN
Status: DISCONTINUED | OUTPATIENT
Start: 2021-04-27 | End: 2021-04-28 | Stop reason: HOSPADM

## 2021-04-27 RX ORDER — CYANOCOBALAMIN 1000 UG/ML
1000 INJECTION INTRAMUSCULAR; SUBCUTANEOUS
Status: DISCONTINUED | OUTPATIENT
Start: 2021-04-27 | End: 2021-04-28 | Stop reason: HOSPADM

## 2021-04-27 RX ORDER — FENTANYL CITRATE 50 UG/ML
25 INJECTION, SOLUTION INTRAMUSCULAR; INTRAVENOUS ONCE
Status: COMPLETED | OUTPATIENT
Start: 2021-04-27 | End: 2021-04-27

## 2021-04-27 RX ORDER — CIPROFLOXACIN 2 MG/ML
400 INJECTION, SOLUTION INTRAVENOUS EVERY 12 HOURS
Status: DISCONTINUED | OUTPATIENT
Start: 2021-04-27 | End: 2021-04-28 | Stop reason: HOSPADM

## 2021-04-27 RX ORDER — VITAMIN E 268 MG
400 CAPSULE ORAL DAILY
Status: DISCONTINUED | OUTPATIENT
Start: 2021-04-27 | End: 2021-04-28 | Stop reason: HOSPADM

## 2021-04-27 RX ORDER — PROMETHAZINE HYDROCHLORIDE 25 MG/1
12.5 TABLET ORAL EVERY 6 HOURS PRN
Status: DISCONTINUED | OUTPATIENT
Start: 2021-04-27 | End: 2021-04-28 | Stop reason: HOSPADM

## 2021-04-27 RX ORDER — CIPROFLOXACIN 2 MG/ML
400 INJECTION, SOLUTION INTRAVENOUS ONCE
Status: COMPLETED | OUTPATIENT
Start: 2021-04-27 | End: 2021-04-27

## 2021-04-27 RX ORDER — SODIUM CHLORIDE 0.9 % (FLUSH) 0.9 %
5-40 SYRINGE (ML) INJECTION PRN
Status: DISCONTINUED | OUTPATIENT
Start: 2021-04-27 | End: 2021-04-28 | Stop reason: HOSPADM

## 2021-04-27 RX ORDER — DEXTROSE MONOHYDRATE 25 G/50ML
12.5 INJECTION, SOLUTION INTRAVENOUS PRN
Status: DISCONTINUED | OUTPATIENT
Start: 2021-04-27 | End: 2021-04-28 | Stop reason: HOSPADM

## 2021-04-27 RX ORDER — POTASSIUM CHLORIDE 20 MEQ/1
40 TABLET, EXTENDED RELEASE ORAL 2 TIMES DAILY WITH MEALS
Status: DISPENSED | OUTPATIENT
Start: 2021-04-27 | End: 2021-04-28

## 2021-04-27 RX ORDER — SODIUM CHLORIDE 0.9 % (FLUSH) 0.9 %
5-40 SYRINGE (ML) INJECTION 2 TIMES DAILY
Status: DISCONTINUED | OUTPATIENT
Start: 2021-04-27 | End: 2021-04-28 | Stop reason: HOSPADM

## 2021-04-27 RX ORDER — POLYETHYLENE GLYCOL 3350 17 G/17G
17 POWDER, FOR SOLUTION ORAL DAILY PRN
Status: DISCONTINUED | OUTPATIENT
Start: 2021-04-27 | End: 2021-04-28 | Stop reason: HOSPADM

## 2021-04-27 RX ORDER — ACETAMINOPHEN 325 MG/1
650 TABLET ORAL EVERY 6 HOURS PRN
Status: DISCONTINUED | OUTPATIENT
Start: 2021-04-27 | End: 2021-04-28 | Stop reason: HOSPADM

## 2021-04-27 RX ORDER — AMLODIPINE BESYLATE 10 MG/1
10 TABLET ORAL DAILY
Status: DISCONTINUED | OUTPATIENT
Start: 2021-04-27 | End: 2021-04-28 | Stop reason: HOSPADM

## 2021-04-27 RX ORDER — SODIUM CHLORIDE 0.9 % (FLUSH) 0.9 %
5-40 SYRINGE (ML) INJECTION EVERY 12 HOURS SCHEDULED
Status: DISCONTINUED | OUTPATIENT
Start: 2021-04-27 | End: 2021-04-28 | Stop reason: HOSPADM

## 2021-04-27 RX ORDER — SODIUM CHLORIDE, SODIUM LACTATE, POTASSIUM CHLORIDE, CALCIUM CHLORIDE 600; 310; 30; 20 MG/100ML; MG/100ML; MG/100ML; MG/100ML
1000 INJECTION, SOLUTION INTRAVENOUS ONCE
Status: COMPLETED | OUTPATIENT
Start: 2021-04-27 | End: 2021-04-27

## 2021-04-27 RX ORDER — OXYCODONE HYDROCHLORIDE AND ACETAMINOPHEN 5; 325 MG/1; MG/1
1 TABLET ORAL EVERY 4 HOURS PRN
Status: DISCONTINUED | OUTPATIENT
Start: 2021-04-27 | End: 2021-04-28 | Stop reason: HOSPADM

## 2021-04-27 RX ORDER — CARVEDILOL 12.5 MG/1
12.5 TABLET ORAL 2 TIMES DAILY WITH MEALS
Status: DISCONTINUED | OUTPATIENT
Start: 2021-04-27 | End: 2021-04-28 | Stop reason: HOSPADM

## 2021-04-27 RX ORDER — VENLAFAXINE HYDROCHLORIDE 150 MG/1
150 CAPSULE, EXTENDED RELEASE ORAL DAILY
Status: DISCONTINUED | OUTPATIENT
Start: 2021-04-27 | End: 2021-04-28 | Stop reason: HOSPADM

## 2021-04-27 RX ORDER — FENOFIBRATE 54 MG/1
145 TABLET ORAL DAILY
Status: DISCONTINUED | OUTPATIENT
Start: 2021-04-27 | End: 2021-04-28 | Stop reason: HOSPADM

## 2021-04-27 RX ORDER — B12/LEVOMEFOLATE CALCIUM/B-6 2-1.13-25
1 TABLET ORAL DAILY
Status: DISCONTINUED | OUTPATIENT
Start: 2021-04-27 | End: 2021-04-28 | Stop reason: HOSPADM

## 2021-04-27 RX ORDER — ASPIRIN 81 MG/1
81 TABLET ORAL DAILY
Status: DISCONTINUED | OUTPATIENT
Start: 2021-04-27 | End: 2021-04-28 | Stop reason: HOSPADM

## 2021-04-27 RX ORDER — ONDANSETRON 2 MG/ML
4 INJECTION INTRAMUSCULAR; INTRAVENOUS EVERY 6 HOURS PRN
Status: DISCONTINUED | OUTPATIENT
Start: 2021-04-27 | End: 2021-04-28 | Stop reason: HOSPADM

## 2021-04-27 RX ORDER — NICOTINE POLACRILEX 4 MG
15 LOZENGE BUCCAL PRN
Status: DISCONTINUED | OUTPATIENT
Start: 2021-04-27 | End: 2021-04-28 | Stop reason: HOSPADM

## 2021-04-27 RX ORDER — SODIUM CHLORIDE 9 MG/ML
INJECTION, SOLUTION INTRAVENOUS CONTINUOUS
Status: DISPENSED | OUTPATIENT
Start: 2021-04-27 | End: 2021-04-27

## 2021-04-27 RX ORDER — DEXTROSE MONOHYDRATE 50 MG/ML
100 INJECTION, SOLUTION INTRAVENOUS PRN
Status: DISCONTINUED | OUTPATIENT
Start: 2021-04-27 | End: 2021-04-28 | Stop reason: HOSPADM

## 2021-04-27 RX ORDER — MAGNESIUM SULFATE IN WATER 40 MG/ML
2000 INJECTION, SOLUTION INTRAVENOUS ONCE
Status: COMPLETED | OUTPATIENT
Start: 2021-04-27 | End: 2021-04-27

## 2021-04-27 RX ORDER — BUPROPION HYDROCHLORIDE 150 MG/1
150 TABLET, EXTENDED RELEASE ORAL 2 TIMES DAILY
Status: DISCONTINUED | OUTPATIENT
Start: 2021-04-27 | End: 2021-04-28 | Stop reason: HOSPADM

## 2021-04-27 RX ORDER — ATORVASTATIN CALCIUM 40 MG/1
40 TABLET, FILM COATED ORAL NIGHTLY
Status: DISCONTINUED | OUTPATIENT
Start: 2021-04-27 | End: 2021-04-28 | Stop reason: HOSPADM

## 2021-04-27 RX ADMIN — OXYCODONE HYDROCHLORIDE AND ACETAMINOPHEN 1 TABLET: 5; 325 TABLET ORAL at 11:06

## 2021-04-27 RX ADMIN — Medication 1 TABLET: at 11:06

## 2021-04-27 RX ADMIN — VENLAFAXINE HYDROCHLORIDE 150 MG: 150 CAPSULE, EXTENDED RELEASE ORAL at 11:07

## 2021-04-27 RX ADMIN — AMLODIPINE BESYLATE 10 MG: 10 TABLET ORAL at 11:07

## 2021-04-27 RX ADMIN — FENTANYL CITRATE 25 MCG: 50 INJECTION INTRAMUSCULAR; INTRAVENOUS at 07:10

## 2021-04-27 RX ADMIN — FENOFIBRATE 135 MG: 54 TABLET ORAL at 11:08

## 2021-04-27 RX ADMIN — Medication 400 UNITS: at 11:07

## 2021-04-27 RX ADMIN — FENTANYL CITRATE 25 MCG: 50 INJECTION INTRAMUSCULAR; INTRAVENOUS at 05:00

## 2021-04-27 RX ADMIN — CIPROFLOXACIN 400 MG: 2 INJECTION, SOLUTION INTRAVENOUS at 21:48

## 2021-04-27 RX ADMIN — DICYCLOMINE HYDROCHLORIDE 20 MG: 10 INJECTION INTRAMUSCULAR at 07:10

## 2021-04-27 RX ADMIN — SODIUM CHLORIDE, POTASSIUM CHLORIDE, SODIUM LACTATE AND CALCIUM CHLORIDE 1000 ML: 600; 310; 30; 20 INJECTION, SOLUTION INTRAVENOUS at 05:01

## 2021-04-27 RX ADMIN — METRONIDAZOLE 500 MG: 500 INJECTION, SOLUTION INTRAVENOUS at 07:10

## 2021-04-27 RX ADMIN — METRONIDAZOLE 500 MG: 500 INJECTION, SOLUTION INTRAVENOUS at 17:35

## 2021-04-27 RX ADMIN — SODIUM CHLORIDE, PRESERVATIVE FREE 10 ML: 5 INJECTION INTRAVENOUS at 21:48

## 2021-04-27 RX ADMIN — BUPROPION HYDROCHLORIDE 150 MG: 150 TABLET, EXTENDED RELEASE ORAL at 11:07

## 2021-04-27 RX ADMIN — BUPROPION HYDROCHLORIDE 150 MG: 150 TABLET, EXTENDED RELEASE ORAL at 21:48

## 2021-04-27 RX ADMIN — METRONIDAZOLE 500 MG: 500 INJECTION, SOLUTION INTRAVENOUS at 11:10

## 2021-04-27 RX ADMIN — PANTOPRAZOLE SODIUM 40 MG: 40 INJECTION, POWDER, FOR SOLUTION INTRAVENOUS at 21:48

## 2021-04-27 RX ADMIN — SODIUM CHLORIDE: 9 INJECTION, SOLUTION INTRAVENOUS at 08:14

## 2021-04-27 RX ADMIN — CIPROFLOXACIN 400 MG: 2 INJECTION, SOLUTION INTRAVENOUS at 07:09

## 2021-04-27 RX ADMIN — ATORVASTATIN CALCIUM 40 MG: 40 TABLET, FILM COATED ORAL at 21:48

## 2021-04-27 RX ADMIN — CARVEDILOL 12.5 MG: 12.5 TABLET, FILM COATED ORAL at 17:35

## 2021-04-27 RX ADMIN — MAGNESIUM SULFATE HEPTAHYDRATE 2000 MG: 2 INJECTION, SOLUTION INTRAVENOUS at 07:10

## 2021-04-27 RX ADMIN — IOPAMIDOL 75 ML: 755 INJECTION, SOLUTION INTRAVENOUS at 05:29

## 2021-04-27 RX ADMIN — ASPIRIN 81 MG: 81 TABLET, COATED ORAL at 11:06

## 2021-04-27 RX ADMIN — ONDANSETRON 4 MG: 2 INJECTION INTRAMUSCULAR; INTRAVENOUS at 05:01

## 2021-04-27 RX ADMIN — ALPRAZOLAM 1 MG: 0.5 TABLET ORAL at 17:39

## 2021-04-27 RX ADMIN — CIPROFLOXACIN 400 MG: 2 INJECTION, SOLUTION INTRAVENOUS at 11:11

## 2021-04-27 ASSESSMENT — PAIN DESCRIPTION - ORIENTATION: ORIENTATION: MID

## 2021-04-27 ASSESSMENT — PAIN SCALES - GENERAL
PAINLEVEL_OUTOF10: 8
PAINLEVEL_OUTOF10: 0
PAINLEVEL_OUTOF10: 8
PAINLEVEL_OUTOF10: 7

## 2021-04-27 ASSESSMENT — PAIN DESCRIPTION - DESCRIPTORS: DESCRIPTORS: CRAMPING;DISCOMFORT

## 2021-04-27 NOTE — CONSULTS
[address], [address] [facility]                                  CONSULTATION    PATIENT NAME: Gladis Burdick                    :        1940  MED REC NO:   [id number]                          ROOM:       7934  ACCOUNT NO:   [de-identified]                           ADMIT DATE: 2021  PROVIDER:     Pete Serna MD    CONSULT DATE:  2021    The patient is in room 4010. CHIEF COMPLAINT:  Lower abdominal pain with hematochezia and abnormal  CAT scan, rule out \"colitis. \"    HISTORY OF PRESENT ILLNESS:  As follows. The patient is a 64-year-old  white female patient known to me with past medical history significant  for neuroendocrine tumor of the small intestine diagnosed in ,  status post surgery by Dr. Kyler Chan and also carcinoid syndrome  with subsequent development of metastatic disease, history of malignant  melanoma, hypertension, diabetes mellitus, coronary artery disease,  hyperlipidemia, protein calorie malnutrition, immune thrombocytopenia,  depression and osteoporosis. The patient was admitted to the hospital  and was seen in consultation on the phone the 2021 with lower  abdominal pain, GI bleeding and abnormal CAT scan, and I did perform EGD  and colonoscopy on the 2021 and the patient was noted to have  gastritis and duodenitis with no evidence of upper GI bleeding and the  colonoscopy showed postsurgical changes from right colon resection along  with hemorrhoids and diverticulosis coli. No blood recent or old was  seen in the lumen of the upper or lower GI tract. The patient  subsequently did continue to complain of abdominal pain and was seen by  the surgical consultant, Dr. Gopal Moon also and repeat CAT scan of the  abdomen and pelvis was negative for acute finding.   The patient finally  went home yesterday and did well till early this morning when she woke  up and had severe pain in the lower abdomen and had a bowel movement  with small amount of bright red blood per rectum. This prompted the  patient to come back to the hospital where the blood workup done in the  emergency room comprised a Chem profile and LFTs which were within  normal limits. CBC showed a WBC count of 13.4, hemoglobin 11, platelet  count of 82,448. The patient's hemoglobin yesterday prior to discharge  was 10.3, and CAT scan of the abdomen and pelvis was done again in the  emergency room early this morning and it showed thickening and mucosal  enhancement involving the rectosigmoid junction concerning for colitis. The patient was started on IV antibiotics Cipro and Flagyl and was  admitted for further workup and management. Of note, since the patient's admission today, the patient has had two  bowel movements with no gross blood in the stool at all. The patient's  abdominal pain has abated as well. The patient denies nausea or  vomiting and the patient seems to be tolerating the clear liquids fairly  well. The patient is hemodynamically stable. REVIEW OF SYSTEMS:  CENTRAL NERVOUS SYSTEM:  The patient denies headache or focal sensory or  motor symptoms. CARDIOVASCULAR SYSTEM:  No history of chest pain, shortness of breath or  leg swelling. GENITOURINARY SYSTEM:  No history of dysuria, pyuria, or hematuria. MUSCULOSKELETAL SYSTEM:  The patient complains of generalized weakness. RESPIRATORY SYSTEM:  No history of cough, hemoptysis, fever or chills. PAST MEDICAL HISTORY:  Significant for history of neuroendocrine tumor  of the small intestine diagnosed in 1998, status post surgery by Dr. Kyler Chan and also history of carcinoid syndrome with subsequent  development of metastatic disease, history of malignant melanoma,  hypertension, diabetes mellitus, coronary artery disease,  hyperlipidemia, protein calorie malnutrition, immune thrombocytopenia,  depression and osteoporosis.     FAMILY HISTORY:  The patient's brother was diagnosed with carcinoma of  unknown primary. MEDICATIONS:  Please refer to chart. SOCIOECONOMIC HISTORY:  No history of EtOH abuse and the patient does  not smoke cigarettes. SURGERIES:  The patient has had small intestinal resection done in 1998  and also had right hemicolectomy done by Dr. Sugey Rowell for ischemia  on 08/11/2015. The patient also has had CABG done, hysterectomy,  surgery for wrist fracture, cholecystectomy, appendectomy, hysterectomy  and malignant melanoma removed on 10/10/2011. ALLERGIES:  The patient is allergic to TRAMADOL and VICODIN. PHYSICAL EXAMINATION:  GENERAL:  Shows a 70-year-old white female of thin built and fair  nutritional status, who is lying flat in bed, no acute distress. She is  awake, alert and oriented and pleasant to talk with. VITAL SIGNS:  Stable. HEENT:  Shows skull to be atraumatic. NECK:  Supple. CHEST:  Clear. HEART:  S1, S2 is normal.  ABDOMEN:  Soft, nondistended, nontender. Liver and spleen are not  palpable. Bowel sounds are present. RECTAL:  Deferred. CNS:  Exam shows the patient to be awake, alert and oriented. There is  no focal sensory motor sign. MUSCULOSKELETAL SYSTEM:  Exam shows evidence of degenerative joint  disease changes. LABORATORY DATA:  As above mentioned. IMPRESSION:  A 70-year-old white female with multiple comorbidities  including metastatic carcinoid tumor who presents with lower abdominal  pain and small amount of bright red blood per rectum and abnormal CAT  scan, rule out colitis, etiology, rule out ischemic colitis, rule out  infectious colitis. RECOMMENDATIONS:  1. Agree with present management with IV fluids and parenteral  analgesics and antiemetics as tolerated. 2.  Recheck CBC, Chem profile, amylase, lipase, PT/PTT, INR in a.m.  3.  We will follow up on stool studies. 4.  We will continue IV antibiotics for now. 5.  Advance diet as tolerated.   6.  No need for repeat colonoscopy at this point since the patient had  one on 04/18/2021.  7.  The patient is doing well. Tomorrow she can be discharged home on  p.o. antibiotics. 8.  The case and plan have been discussed in detail with the patient and  her daughter.         Yimi Campos MD    D: 04/27/2021 15:03:41       T: 04/27/2021 15:08:54     [email or web address]  Job#: [id number]     Doc#: [id number]    CC:

## 2021-04-27 NOTE — H&P
[address] [place] [address]  HOSPITALIST HISTORY AND PHYSICAL     Name:  Deepika Black /Age/Sex: 1940  (79 y.o. female)   MRN & CSN:  [id number] & [id number] Admission Date/Time: 2021  4:08 AM   Location:  ED30/ED-30 Attending: Delia Vicente MD                                                  Chief compaint-Abdominal Pain and Rectal Bleeding (dark red blood)    HPI  Deepika Black is a 79 y.o. female who was discharged from the hospital yesterday, after having been admitted on  with concerns of GIB for which she had EGD/colonoscopy on , also had cardiac cath and PPM on . She reports since discharge continues to have abdominal pain and then this morning had a large bloody bowel movement, but also told ED physician came due to progressively worsening pain this morning. Had CT abd/pelvis with IV contrast showing concerns of colitis, had SIRS features hence admission recommended. No chest pain/shortness of breath. Hemoglobin appear stable or even better compared to yesterday. Was given cipro and flagyl due to colitis findings. 10 point review of systems reviewed and negative unless noted above. ALLERGIES PCP    Allergies   Allergen Reactions    Tramadol Itching    Vicodin [Hydrocodone-Acetaminophen] Itching    Matthew Lagos MD   PAST MEDICAL HISTORY SURGICAL HISTORY   Past Medical History:   Diagnosis Date    Acute anterior wall MI [facility])     CAD (coronary artery disease)     Dr. 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      Diabetes mellitus ([address]     PCP    H/O cardiovascular stress test     EF 74 %,normal perfusion    H/O echocardiogram     EF 60%, mild to mod MR,    Hyperlipidemia     Hypertension     PCP    Protein-calorie malnutrition, moderate ([address] 2015    Past Surgical History: Procedure Laterality Date    ABDOMEN SURGERY      COLONOSCOPY N/A 2021    COLONOSCOPY DIAGNOSTIC performed by Brooklyn Javier MD at [facility]. 72 GRAFT      CABG X 3, L mammary artery to the LAD, saphenous vein graft to RCA.  a saphenous vein graft to Cx marginal artery    DIAGNOSTIC CARDIAC CATH LAB PROCEDURE  3/30/07    PTCA to LAD    FRACTURE SURGERY Left     patella    FRACTURE SURGERY Right 2017    wrist    HYSTERECTOMY      OTHER SURGICAL HISTORY  2015    exp lap, right colon resection    SKIN BIOPSY      UPPER GASTROINTESTINAL ENDOSCOPY N/A 2021    EGD BIOPSY performed by Brooklyn Javier MD at [address] [place] [address] Left 2021    LEFT WRIST OPEN REDUCTION INTERNAL FIXATION performed by Gato Bullard MD at [place] [address] History     Socioeconomic History    Marital status:      Spouse name: None    Number of children: None    Years of education: None    Highest education level: None   Occupational History    None   Social Needs    Financial resource strain: None    Food insecurity     Worry: None     Inability: None    Transportation needs     Medical: None     Non-medical: None   Tobacco Use    Smoking status: Former Smoker     Packs/day: 1.00     Years: 30.00     Pack years: 30.00     Start date:      Quit date:  [facility]     Years since quittin.3    Smokeless tobacco: Never Used   Substance and Sexual Activity    Alcohol use: No    Drug use: No    Sexual activity: None   Lifestyle    Physical activity     Days per week: None     Minutes per session: None    Stress: None   Relationships    Social connections     Talks on phone: None     Gets together: None     Attends [employer] service: None     Active member of club or organization: None     Attends meetings of clubs or organizations: None     Relationship status: None    Intimate partner violence     Fear of current or ex partner: None     Emotionally abused: None     Physically abused: None     Forced sexual activity: None   Other Topics Concern    None   Social History Narrative    None    Family History   Problem Relation Age of Onset    Heart Disease Mother     Heart Attack Mother     Cancer Father     Heart Disease Brother         HOME MEDICATIONS    Prior to Admission medications    Medication Sig Start Date End Date Taking? Authorizing Provider   aspirin 81 MG EC tablet Take 1 tablet by mouth daily 4/26/21   Verna Laazr MD   carvedilol (COREG) 12.5 MG tablet Take 1 tablet by mouth 2 times daily (with meals) 4/26/21   Fer Rios MD   lactulose (CHRONULAC) 10 GM/15ML solution Take 30 mLs by mouth 3 times daily 4/26/21   Verna Lazar MD   amLODIPine (NORVASC) 10 MG tablet TAKE 1 TABLET BY MOUTH EVERY DAY 3/5/21   Historical Provider, MD   FREESTYLE LITE strip TEST BLOOD GLUCOSE 3 TIMES DAILY (BEFORE MEALS).  3/26/21   Historical Provider, MD   methocarbamol (ROBAXIN) 750 MG tablet Take 750 mg by mouth 4 times daily as needed 4/1/21   Historical Provider, MD   buPROPion [facility] SR) 150 MG extended release tablet Take 1 tablet by mouth 2 times daily 1/6/21   Domi De Leon MD   LANTUS SOLOSTAR 100 UNIT/ML injection pen  10/22/20   Historical Provider, MD   INVOKANA 300 MG TABS tablet Take 300 mg by mouth every morning (before breakfast)  8/7/20   Historical Provider, MD   venlafaxine (EFFEXOR XR) 150 MG extended release capsule Take 1 capsule by mouth daily  Patient taking differently: Take 225 mg by mouth daily  10/18/19   Jorge Nye MD   glipiZIDE (GLUCOTROL XL) 5 MG extended release tablet Take 2 tablets by mouth daily  Patient taking differently: Take 5 mg by mouth daily  10/17/19   Jorge Nye MD   metFORMIN (GLUCOPHAGE) 500 MG tablet Take 1 tablet by mouth 2 times daily (with meals) 10/17/19   Jorge Nye MD   atorvastatin (LIPITOR) 40 MG tablet Take 1.5 tablets by mouth daily  Patient taking differently: Take 40 mg by mouth daily  10/17/19   Kj Rivas MD   folic acid-pyridoxine-cyancobalamin (FOLTX) 1.13-25-2 MG TABS Take 1 tablet by mouth daily 10/18/19   Kj Rivas MD   valsartan-hydroCHLOROthiazide (DIOVAN-HCT) 320-25 MG per tablet Take 1 tablet by mouth daily  7/27/19   Rolando Valdez MD   pantoprazole (PROTONIX) 40 MG tablet Take 1 tablet by mouth every morning (before breakfast) 3/30/19   Nelly Nix MD   potassium chloride (MICRO-K) 10 MEQ CR capsule Take 10 mEq by mouth 2 times daily    Historical Provider, MD   Multiple Vitamins-Minerals (THERAPEUTIC MULTIVITAMIN-MINERALS) tablet Take 1 tablet by mouth daily    Historical Provider, MD   vitamin E 1000 UNITS capsule Take 400 Units by mouth daily    Historical Provider, MD   ALPRAZolam Hilpricilla Mendes) 1 MG tablet Take 1 mg by mouth 2 times daily as needed for Sleep. Historical Provider, MD   cyanocobalamin 1000 MCG/ML injection Inject 1,000 mcg into the muscle every 7 days. Historical Provider, MD   fenofibrate (TRICOR) 145 MG tablet Take 145 mg by mouth daily. Historical Provider, MD          OBJECTIVE  Vitals:    04/27/21 0700   BP: (!) 165/93   Pulse: 84   Resp:    Temp:    SpO2: 94%       Intake/Output Summary (Last 24 hours) at 4/27/2021 0748  Last data filed at 4/27/2021 0648  Gross per 24 hour   Intake 1783.33 ml   Output --   Net 1783.33 ml       Intake/Output Summary (Last 24 hours) at 4/27/2021 0748  Last data filed at 4/27/2021 0648  Gross per 24 hour   Intake 1783.33 ml   Output --   Net 1783.33 ml             No intake/output data recorded. PHYSICAL EXAM   GEN Awake female, sitting upright in bed in no apparent distress. EYES Pupils are equally round. No scleral erythema, discharge, or conjunctivitis. HENT Mucous membranes are moist. Oral pharynx without exudates, no evidence of thrush. NECK Supple, no apparent thyromegaly or masses. RESP Clear to auscultation, no wheezes, rales or rhonchi. Symmetric chest movement while on room air. CARDIO/VASC S1/S2 auscultated. Regular rate without appreciable murmurs, rubs, or gallops. No JVD or carotid bruits. Peripheral pulses equal bilaterally and palpable. No peripheral edema. GI Abdomen is soft with umbilical tenderness without rebound nor rigidity. No HSM   No costovertebral angle tenderness. Normal appearing external genitalia. HEME/LYMPH No palpable cervical lymphadenopathy and no hepatosplenomegaly. No petechiae or ecchymoses. MSK Spontaneous movement of all extremities. No gross joint deformities. SKIN Normal coloration, warm, dry. NEURO Cranial nerves appear grossly intact, normal speech, no lateralizing weakness. PSYCH Awake, alert, oriented x 4. Affect appropriate. LABS  Recent Labs     04/25/21  1202 04/26/21  0620 04/27/21  0425   WBC 16.0* 13.2* 13.4*   HGB 9.8* 10.3* 11.0*   HCT 31.9* 32.3* 35.9*   PLT 67* 38* 69*      Recent Labs     04/25/21  1202 04/26/21  0620 04/27/21  0425    133* 136   K 3.8 3.3* 3.3*   CL 97* 97* 100   CO2 26 27 22   BUN 7 7 12   CREATININE 0.9 0.9 1.1     Recent Labs     04/25/21  1202 04/26/21  0620 04/27/21  0425   AST 20 15 14*   ALT 8* 8* 8*   BILITOT 0.6 0.5 0.4   ALKPHOS 84 79 80     Recent Labs     04/25/21  1202   INR 1.07     Recent Labs     04/27/21  0425   TROPONINT <0.010        IMAGING      MEDS  Scheduled Meds:   sodium chloride flush  5-40 mL Intravenous BID    magnesium sulfate  2,000 mg Intravenous Once    ciprofloxacin  400 mg Intravenous Once    metroNIDAZOLE  500 mg Intravenous Once     Continuous Infusions:  PRN Meds:ondansetron    ASSESSMENT and [address] [facility]: 1  1-? Colitis per CT with manifestations of abdominal pain and bloody stool- had colonoscopy on 4/18 with findings of hemorrhoid/diverticulosis and post- surgical changes of right colon, also EGD with mild superficial gastritis/duodenitis.  Had SIRS features in ED- and also reported loose stool- will admit and reconsult GI for input. IVF, pain management and empiric abx. -check lactic acid. 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   5-ITP- platelets stable     Other issues    Coronary artery disease  Hyperlipidemia  Type II diabetes  Depression  HTN    Hold pharmacological DVT prophpylaxis for now given concerns of bleed         Diet No diet orders on file   DVT Prophylaxis [] Lovenox, []  Heparin, [] SCDs, [] Ambulation   GI Prophylaxis [] PPI,  [] H2 Blocker,  [] Carafate,  [] Diet/Tube Feeds   Code Status Prior   Disposition Patient requires continued admission due to  ?colitis   CMS Level of Risk [] Low, [x] Moderate,[]  High  Patient's risk as above due to colitis       Dr. Kina Zamora MD 4/27/2021 7:48 AM

## 2021-04-27 NOTE — ED PROVIDER NOTES
Gwendolyn Crowe was checked out to me by Dr. Ave Garcia. Please see his/her initial documentation for details of the patient's ED presentation, physical exam and completed studies. In brief, Gwendolyn Crowe is a 79 y.o. female that presents with increasing abdominal pain.     Labs  Results for orders placed or performed during the hospital encounter of 04/27/21   CBC Auto Differential   Result Value Ref Range    WBC 13.4 (H) 4.0 - 10.5 K/CU MM    RBC 4.22 4.2 - 5.4 M/CU MM    Hemoglobin 11.0 (L) 12.5 - 16.0 GM/DL    Hematocrit 35.9 (L) 37 - 47 %    MCV 85.1 78 - 100 FL    MCH 26.1 (L) 27 - 31 PG    MCHC 30.6 (L) 32.0 - 36.0 %    RDW 15.7 (H) 11.7 - 14.9 %    Platelets 69 (L) 225 - 440 K/CU MM    Differential Type AUTOMATED DIFFERENTIAL     Segs Relative 74.6 (H) 36 - 66 %    Lymphocytes % 9.2 (L) 24 - 44 %    Monocytes % 10.6 (H) 0 - 4 %    Eosinophils % 4.6 (H) 0 - 3 %    Basophils % 0.4 0 - 1 %    Segs Absolute 10.0 K/CU MM    Lymphocytes Absolute 1.2 K/CU MM    Monocytes Absolute 1.4 K/CU MM    Eosinophils Absolute 0.6 K/CU MM    Basophils Absolute 0.1 K/CU MM    Nucleated RBC % 0.0 %    Total Nucleated RBC 0.0 K/CU MM    Total Immature Neutrophil 0.08 K/CU MM    Immature Neutrophil % 0.6 (H) 0 - 0.43 %    Differential Type AUTOMATED DIFF RESULTS CONFIRMED BY SMEAR REVIEW     RBC Morphology OCCASIONAL     Anisocytosis 1+     Toxic Granulation PRESENT     PLT Morphology DECREASED    Comprehensive Metabolic Panel w/ Reflex to MG   Result Value Ref Range    Sodium 136 135 - 145 MMOL/L    Potassium 3.3 (L) 3.5 - 5.1 MMOL/L    Chloride 100 99 - 110 mMol/L    CO2 22 21 - 32 MMOL/L    BUN 12 6 - 23 MG/DL    CREATININE 1.1 0.6 - 1.1 MG/DL    Glucose 207 (H) 70 - 99 MG/DL    Calcium 8.5 8.3 - 10.6 MG/DL    Albumin 3.6 3.4 - 5.0 GM/DL    Total Protein 6.0 (L) 6.4 - 8.2 GM/DL    Total Bilirubin 0.4 0.0 - 1.0 MG/DL    ALT 8 (L) 10 - 40 U/L    AST 14 (L) 15 - 37 IU/L    Alkaline Phosphatase 80 40 - 128 IU/L    GFR Non- 48 (L) >60 mL/min/1.73m2    GFR  58 (L) >60 mL/min/1.73m2    Anion Gap 14 4 - 16   Lipase   Result Value Ref Range    Lipase 34 13 - 60 IU/L   Troponin   Result Value Ref Range    Troponin T <0.010 <0.01 NG/ML   Lactate, Sepsis   Result Value Ref Range    Lactic Acid, Sepsis 1.9 0.5 - 1.9 mMOL/L   Magnesium   Result Value Ref Range    Magnesium 1.5 (L) 1.8 - 2.4 mg/dl   EKG 12 Lead   Result Value Ref Range    Ventricular Rate 91 BPM    Atrial Rate 86 BPM    QRS Duration 100 ms    Q-T Interval 394 ms    QTc Calculation (Bazett) 484 ms    R Axis 32 degrees    T Axis 28 degrees    Diagnosis       Atrial fibrillation  Anterior infarct , age undetermined  Abnormal ECG  When compared with ECG of 19-APR-2021 09:01,  Atrial fibrillation has replaced Sinus rhythm  Anterior infarct is now present         MDM:  Patient endorsed to me pending CT imaging of patient's abdomen/pelvis secondary to abdominal pain. CT does demonstrate a sigmoid colitis. Additionally, on review of labs patient is hypomagnesemic at 1.5. Magnesium repletion is ordered. On my recheck of the patient she still is uncomfortable and additional IV fentanyl is given. Will start patient on Cipro and Flagyl IV as she is not currently on antibiotics. With shared decision-making with patient and daughter they would feel more comfortable with patient in the hospital for her persistent abdominal pain for better pain control while antibiotic therapy is initiated which I do believe is reasonable given her age and initial tachycardia and leukocytosis. Patient to be discussed with hospitalist and patient to be admitted to medicine. Final Impression:  1. Colitis          Please note that portions of this note may have been complete with a voice recognition program.  Efforts were made to edit the dictations, but occasional words are mis-transcribed.          Kassandra Montgomery MD  04/27/21 5430

## 2021-04-27 NOTE — ED NOTES
@[id number] paged hospitalist
Bed: ED-30  Expected date:   Expected time:   Means of arrival:   Comments:  EMS     Soni Ramos RN  04/27/21 1486
Bedside report from [facility], Dr Blade Smart in room with patient, no further needs at this time, IVF infusing. Call light in reach.        Maximus Linn RN  04/27/21 [id number]
Hospitalist returned page notified Dr Jennifer Waller
DISCHARGE

## 2021-04-27 NOTE — PROGRESS NOTES
Skin assessment done with gayathri DELGADILLO. Pt has some scattered bruising along with a small red intact scratch left lower back above buttcrack. Slightly flaky dry skin on BLE.

## 2021-04-27 NOTE — ED PROVIDER NOTES
[address]  EMERGENCY DEPARTMENT ENCOUNTER      Pt Name: Chaparro Limon  MRN: [id number]  [place] 1940  Date of evaluation: 4/27/2021  Provider: Osman Chan DO    CHIEF COMPLAINT       Chief Complaint   Patient presents with    Abdominal Pain    Rectal Bleeding     dark red blood         HISTORY OF PRESENT ILLNESS      Chaparro Limon is a 79 y.o. female who presents to the emergency department  for   Chief Complaint   Patient presents with    Abdominal Pain    Rectal Bleeding     dark red blood       6year-old female presents emergency department with chief complaint of abdominal pain and rectal bleeding. Patient was recently discharged from the hospital for similar symptoms. Patient did receive a thorough work-up and transfusion at that time. Patient reports that pain has not been controlled. Patient denies other associated symptoms. The history is provided by the patient and medical records. No  was used. Nursing Notes, Triage Notes & Vital Signs were reviewed. REVIEW OF SYSTEMS    (2-9 systems for level 4, 10 or more for level 5)     Review of Systems   Constitutional: Negative. Negative for chills, fatigue and fever. HENT: Negative. Negative for congestion, dental problem, facial swelling, nosebleeds, postnasal drip, rhinorrhea, sinus pressure, sinus pain and sore throat. Eyes: Negative. Negative for pain, discharge, redness and visual disturbance. Respiratory: Negative. Negative for cough, chest tightness, shortness of breath and wheezing. Cardiovascular: Negative. Negative for chest pain and palpitations. Gastrointestinal: Positive for abdominal pain, blood in stool and rectal pain. Negative for nausea and vomiting. Genitourinary: Negative. Negative for dysuria, flank pain, frequency, hematuria and urgency. Musculoskeletal: Negative. Negative for arthralgias and myalgias. Skin: Negative. Negative for rash.    Neurological: Negative. Negative for dizziness, speech difficulty, light-headedness, numbness and headaches. Psychiatric/Behavioral: Negative. Negative for agitation and confusion. The patient is not nervous/anxious. All other systems reviewed and are negative. Except as noted above the remainder of the review of systems was reviewed and negative. PAST MEDICAL HISTORY     Past Medical History:   Diagnosis Date    Acute anterior wall MI [facility]) 2007    CAD (coronary artery disease)     Dr. 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  Diabetes mellitus ([facility] [address]     PCP    H/O cardiovascular stress test 5/02    EF 74 %,normal perfusion    H/O echocardiogram 11/02    EF 60%, mild to mod MR,    Hyperlipidemia     Hypertension     PCP    Protein-calorie malnutrition, moderate ([facility] [address] [address] 8/12/2015       Prior to Admission medications    Medication Sig Start Date End Date Taking? Authorizing Provider   aspirin 81 MG EC tablet Take 1 tablet by mouth daily 4/26/21   Lucien Kenyon MD   carvedilol (COREG) 12.5 MG tablet Take 1 tablet by mouth 2 times daily (with meals) 4/26/21   Fer Mcbride MD   lactulose (CHRONULAC) 10 GM/15ML solution Take 30 mLs by mouth 3 times daily 4/26/21   Lucien Kenyon MD   amLODIPine (NORVASC) 10 MG tablet TAKE 1 TABLET BY MOUTH EVERY DAY 3/5/21   Historical Provider, MD   FREESTYLE LITE strip TEST BLOOD GLUCOSE 3 TIMES DAILY (BEFORE MEALS).  3/26/21   Historical Provider, MD   methocarbamol (ROBAXIN) 750 MG tablet Take 750 mg by mouth 4 times daily as needed 4/1/21   Historical Provider, MD   buPROPion [facility] SR) 150 MG extended release tablet Take 1 tablet by mouth 2 times daily 1/6/21   Gregory Rehman MD   LANSOLEDADUS SOLOSTAR 100 UNIT/ML injection pen  10/22/20   Historical Provider, MD   INVOKANA 300 MG TABS tablet Take 300 mg by mouth every morning (before breakfast)  8/7/20   Historical Provider, MD   venlafaxine (EFFEXOR XR) 150 MG extended release capsule Take 1 capsule by mouth daily  Patient taking differently: Take 225 mg by mouth daily  10/18/19   Amy Mackay MD   glipiZIDE (GLUCOTROL XL) 5 MG extended release tablet Take 2 tablets by mouth daily  Patient taking differently: Take 5 mg by mouth daily  10/17/19   Amy Mackay MD   metFORMIN (GLUCOPHAGE) 500 MG tablet Take 1 tablet by mouth 2 times daily (with meals) 10/17/19   [facility]   atorvastatin (LIPITOR) 40 MG tablet Take 1.5 tablets by mouth daily  Patient taking differently: Take 40 mg by mouth daily  10/17/19   Amy Mackay MD   folic acid-pyridoxine-cyancobalamin (FOLTX) 1.13-25-2 MG TABS Take 1 tablet by mouth daily 10/18/19   [facility]   valsartan-hydroCHLOROthiazide (DIOVAN-HCT) 320-25 MG per tablet Take 1 tablet by mouth daily  7/27/19   Historical Provider, MD   pantoprazole (PROTONIX) 40 MG tablet Take 1 tablet by mouth every morning (before breakfast) 3/30/19   Erin Aguilera MD   potassium chloride (MICRO-K) 10 MEQ CR capsule Take 10 mEq by mouth 2 times daily    Historical Provider, MD   Multiple Vitamins-Minerals (THERAPEUTIC MULTIVITAMIN-MINERALS) tablet Take 1 tablet by mouth daily    Historical Provider, MD   vitamin E 1000 UNITS capsule Take 400 Units by mouth daily    Historical Provider, MD   ALPRAZolam Elvia Smith) 1 MG tablet Take 1 mg by mouth 2 times daily as needed for Sleep. Historical Provider, MD   cyanocobalamin 1000 MCG/ML injection Inject 1,000 mcg into the muscle every 7 days. Historical Provider, MD   fenofibrate (TRICOR) 145 MG tablet Take 145 mg by mouth daily.     Historical Provider, MD        Patient Active Problem List   Diagnosis    [facility]    CAD (coronary artery disease)    Hyperlipidemia    Carotid artery stenosis    S/P CABG x 3    Ischemia, bowel ([facility]    750 MG tablet Take 750 mg by mouth 4 times daily as needed      buPROPion (WELLBUTRIN SR) 150 MG extended release tablet Take 1 tablet by mouth 2 times daily  Qty: 180 tablet, Refills: 1      LANTUS SOLOSTAR 100 UNIT/ML injection pen       INVOKANA 300 MG TABS tablet Take 300 mg by mouth every morning (before breakfast)       venlafaxine (EFFEXOR XR) 150 MG extended release capsule Take 1 capsule by mouth daily  Qty: 30 capsule, Refills: 0      glipiZIDE (GLUCOTROL XL) 5 MG extended release tablet Take 2 tablets by mouth daily  Qty: 30 tablet, Refills: 0      metFORMIN (GLUCOPHAGE) 500 MG tablet Take 1 tablet by mouth 2 times daily (with meals)  Qty: 60 tablet, Refills: 0      atorvastatin (LIPITOR) 40 MG tablet Take 1.5 tablets by mouth daily  Qty: 45 tablet, Refills: 0      folic acid-pyridoxine-cyancobalamin (FOLTX) 1.13-25-2 MG TABS Take 1 tablet by mouth daily  Qty: 30 tablet, Refills: 0      valsartan-hydroCHLOROthiazide (DIOVAN-HCT) 320-25 MG per tablet Take 1 tablet by mouth daily   Refills: 3      pantoprazole (PROTONIX) 40 MG tablet Take 1 tablet by mouth every morning (before breakfast)  Qty: 90 tablet, Refills: 1      potassium chloride (MICRO-K) 10 MEQ CR capsule Take 10 mEq by mouth 2 times daily      Multiple Vitamins-Minerals (THERAPEUTIC MULTIVITAMIN-MINERALS) tablet Take 1 tablet by mouth daily      vitamin E 1000 UNITS capsule Take 400 Units by mouth daily      ALPRAZolam (XANAX) 1 MG tablet Take 1 mg by mouth 2 times daily as needed for Sleep. cyanocobalamin 1000 MCG/ML injection Inject 1,000 mcg into the muscle every 7 days. fenofibrate (TRICOR) 145 MG tablet Take 145 mg by mouth daily.              ALLERGIES     Tramadol and Vicodin [hydrocodone-acetaminophen]    FAMILY HISTORY       Family History   Problem Relation Age of Onset    Heart Disease Mother     Heart Attack Mother     Cancer Father     Heart Disease Brother           SOCIAL HISTORY       Social History Socioeconomic History    Marital status:      Spouse name: None    Number of children: None    Years of education: None    Highest education level: None   Occupational History    None   Social Needs    Financial resource strain: None    Food insecurity     Worry: None     Inability: None    Transportation needs     Medical: None     Non-medical: None   Tobacco Use    Smoking status: Former Smoker     Packs/day: 1.00     Years: 30.00     Pack years: 30.00     Start date:      Quit date:      Years since quittin.3    Smokeless tobacco: Never Used   Substance and Sexual Activity    Alcohol use: No    Drug use: No    Sexual activity: None   Lifestyle    Physical activity     Days per week: None     Minutes per session: None    Stress: None   Relationships    Social connections     Talks on phone: None     Gets together: None     Attends Sikh service: None     Active member of club or organization: None     Attends meetings of clubs or organizations: None     Relationship status: None    Intimate partner violence     Fear of current or ex partner: None     Emotionally abused: None     Physically abused: None     Forced sexual activity: None   Other Topics Concern    None   Social History Narrative    None       SCREENINGS    Fareed Coma Scale  Eye Opening: Spontaneous  Best Verbal Response: Oriented  Best Motor Response: Obeys commands  Fareed Coma Scale Score: 15          PHYSICAL EXAM    (up to 7 for level 4, 8 or more for level 5)     ED Triage Vitals [21 0410]   BP Temp Temp Source Pulse Resp SpO2 Height Weight   (!) 156/70 98.6 °F (37 °C) Oral 103 17 95 % 5' 2\" (1.575 m) 131 lb (59.4 kg)       Physical Exam  Vitals signs and nursing note reviewed. Constitutional:       General: She is not in acute distress. Appearance: She is well-developed. She is not diaphoretic. HENT:      Head: Normocephalic and atraumatic.       Right Ear: External ear normal. Left Ear: External ear normal.      Nose: Nose normal.      Mouth/Throat:      Pharynx: No oropharyngeal exudate. Eyes:      General: No scleral icterus. Right eye: No discharge. Left eye: No discharge. Pupils: Pupils are equal, round, and reactive to light. Neck:      Musculoskeletal: Normal range of motion. Thyroid: No thyromegaly. Vascular: No JVD. Trachea: No tracheal deviation. Cardiovascular:      Rate and Rhythm: Normal rate and regular rhythm. Heart sounds: Normal heart sounds. No murmur. No friction rub. No gallop. Pulmonary:      Effort: Pulmonary effort is normal. No respiratory distress. Breath sounds: Normal breath sounds. No stridor. No wheezing or rales. Chest:      Chest wall: No tenderness. Abdominal:      General: Bowel sounds are normal. There is no distension. Palpations: Abdomen is soft. There is no mass. Tenderness: There is generalized abdominal tenderness. There is guarding. There is no rebound. Musculoskeletal: Normal range of motion. General: No tenderness or deformity. Lymphadenopathy:      Cervical: No cervical adenopathy. Skin:     General: Skin is warm. Capillary Refill: Capillary refill takes less than 2 seconds. Coloration: Skin is not pale. Findings: No erythema or rash. Neurological:      Mental Status: She is alert and oriented to person, place, and time. Cranial Nerves: No cranial nerve deficit. Sensory: No sensory deficit. Motor: No abnormal muscle tone. Coordination: Coordination normal.      Deep Tendon Reflexes: Reflexes normal.   Psychiatric:         Behavior: Behavior normal.         Thought Content:  Thought content normal.         Judgment: Judgment normal.         DIAGNOSTIC RESULTS     Labs Reviewed   CBC WITH AUTO DIFFERENTIAL - Abnormal; Notable for the following components:       Result Value    WBC 13.4 (*)     Hemoglobin 11.0 (*)     Hematocrit 35.9 of a cardiologist.       EKG Interpretation    Interpreted by emergency department physician    EKG Interpretation    Interpreted by emergency department physician    Rhythm: atrial fibrillation - controlled  Rate: normal  Axis: normal  Ectopy: none  Conduction: normal  ST Segments: no acute change  T Waves: no acute change  Q Waves: none and anterior leads    Clinical Impression: atrial fibrillation (chronic)    Mildred Rondon     RADIOLOGY:     Non-plain film images such as CT, Ultrasound and MRI are read by the radiologist. Plain radiographic images are visualized and preliminarily interpreted by the emergency physician. Interpretation per the Radiologist below, if available at the time of this note:    CT ABDOMEN PELVIS W IV CONTRAST Additional Contrast? None   Preliminary Result   1. Thickening and mucosal enhancement involving the rectosigmoid colon,   concerning for infectious/inflammatory colitis. 2. Stable appearance of a pancreatic mass measuring 3.3 x 4.0 cm.   3. Small bilateral pleural effusions. 4. Mild bilateral pelvicaliectasis. 5. No significant change in a moderate compression deformity of L1 with   retropulsion of bone into the canal resulting in mild canal stenosis. XR CHEST PORTABLE   Preliminary Result   COPD. No acute findings.                ED BEDSIDE ULTRASOUND:   Performed by ED Physician Keny Huerta DO       LABS:  Labs Reviewed   CBC WITH AUTO DIFFERENTIAL - Abnormal; Notable for the following components:       Result Value    WBC 13.4 (*)     Hemoglobin 11.0 (*)     Hematocrit 35.9 (*)     MCH 26.1 (*)     MCHC 30.6 (*)     RDW 15.7 (*)     Platelets 69 (*)     Segs Relative 74.6 (*)     Lymphocytes % 9.2 (*)     Monocytes % 10.6 (*)     Eosinophils % 4.6 (*)     Immature Neutrophil % 0.6 (*)     All other components within normal limits   COMPREHENSIVE METABOLIC PANEL W/ REFLEX TO MG FOR LOW K - Abnormal; Notable for the following components:    Potassium 3.3 (*)     Glucose 207 (*)     Total Protein 6.0 (*)     ALT 8 (*)     AST 14 (*)     GFR Non- 48 (*)     GFR  58 (*)     All other components within normal limits   URINALYSIS - Abnormal; Notable for the following components:    Clarity, UA HAZY (*)     Glucose, Urine 50 (*)     Blood, Urine SMALL (*)     RBC, UA 7 (*)     Mucus, UA RARE (*)     All other components within normal limits   MAGNESIUM - Abnormal; Notable for the following components:    Magnesium 1.5 (*)     All other components within normal limits   HEMOGLOBIN A1C - Abnormal; Notable for the following components:    Hemoglobin A1C 6.5 (*)     All other components within normal limits   POCT GLUCOSE - Abnormal; Notable for the following components:    POC Glucose 262 (*)     All other components within normal limits   POCT GLUCOSE - Abnormal; Notable for the following components:    POC Glucose 151 (*)     All other components within normal limits   POCT GLUCOSE - Abnormal; Notable for the following components:    POC Glucose 135 (*)     All other components within normal limits   CULTURE, URINE   CULTURE, BLOOD 2   CULTURE, BLOOD 1   GI DISEASE PANEL PCR   C DIFFICILE MOLECULAR/PCR   LIPASE   TROPONIN   LACTATE, SEPSIS   LACTATE, SEPSIS   CBC WITH AUTO DIFFERENTIAL   RENAL FUNCTION PANEL   MAGNESIUM   PROTIME/INR & PTT   LIPASE   AMYLASE   POCT GLUCOSE   POCT GLUCOSE   POCT GLUCOSE   POCT GLUCOSE   POCT GLUCOSE       All other labs were within normal range or not returned as of this dictation.     EMERGENCY DEPARTMENT COURSE and DIFFERENTIAL DIAGNOSIS/MDM:   Vitals:    Vitals:    04/27/21 0800 04/27/21 0920 04/27/21 1159 04/27/21 2051   BP: (!) 188/89 (!) 166/77  (!) 150/61   Pulse: 81 90  75   Resp: 18 20 15 17   Temp: 98.3 °F (36.8 °C) 97.6 °F (36.4 °C)  98.1 °F (36.7 °C)   TempSrc: Oral Axillary  Oral   SpO2: 92% 97% 96%    Weight:       Height:               MDM  Number of Diagnoses or Management Options  Colitis  Hypomagnesemia  SIRS (systemic inflammatory response syndrome) ([facility]  Diagnosis management comments: 52-year-old female presents emergency department with chief complaint of abdominal pain. Patient has recent history of admission with EGD and colonoscopy for similar symptoms. Patient required transfusion at that time. Patient also had a heart cath during her last visit. Patient reports pain throughout the entire abdomen. Patient does have guarding on examination. Lab work and CT of the abdomen pelvis were ordered. Currently awaiting results. Patient care was turned over to Dr. Tammie Vivas at 0600 pending results. Anticipate admission but may discharge if pain is well controlled and CT of the abdomen pelvis is unremarkable. Amount and/or Complexity of Data Reviewed  Clinical lab tests: ordered and reviewed  Tests in the radiology section of CPT®: ordered and reviewed  Tests in the medicine section of CPT®: ordered and reviewed    Risk of Complications, Morbidity, and/or Mortality  Presenting problems: moderate  Diagnostic procedures: moderate  Management options: moderate    Critical Care  Total time providing critical care: < 30 minutes    Patient Progress  Patient progress: improved      -  Patient seen and evaluated in the emergency department. -  Triage and nursing notes reviewed and incorporated. -  Old chart records reviewed and incorporated. -  Work-up included:  See above  -  Results discussed with patient. REASSESSMENT          CRITICAL CARE TIME     This excludes seperately billable procedures and family discussion time. Critical care time provided for obtaining history, conducting a physical exam, performing and monitoring interventions, ordering, collecting and interpreting tests, and establishing medical decision-making.   There was a potential for life/limb threatening pathology requiring close evaluation and intervention with concern for patient decompensation. CONSULTS:  IP CONSULT TO HOSPITALIST  IP CONSULT TO GI  IP CONSULT TO ONCOLOGY    PROCEDURES:  None performed unless otherwise noted below     Procedures        FINAL IMPRESSION      1. Colitis    2. Hypomagnesemia    3. SIRS (systemic inflammatory response syndrome) [facility])          DISPOSITION/PLAN   DISPOSITION Admitted 04/27/2021 07:21:29 AM      PATIENT REFERRED TO:  [address], [address]  [address]  [phone]            DISCHARGE MEDICATIONS:  Current Discharge Medication List          ED Provider Disposition Time  DISPOSITION Admitted 04/27/2021 07:21:29 AM      Appropriate personal protective equipment was worn during the patient's evaluation. These included surgical, eye protection, surgical mask or in 95 respirator and gloves. The patient was also placed in a surgical mask for the prevention of possible spread of respiratory viral illnesses. The Patient was instructed to read the package inserts with any medication that was prescribed. Major potential reactions and medication interactions were discussed. The Patient understands that there are numerous possible adverse reactions not covered. The patient was also instructed to arrange follow-up with his or her primary care provider for review of any pending labwork or incidental findings on any radiology results that were obtained. All efforts were made to discuss any incidental findings that require further monitoring. Controlled Substances Monitoring:     No flowsheet data found.     (Please note that portions of this note were completed with a voice recognition program.  Efforts were made to edit the dictations but occasionally words are mis-transcribed.)    Justina De La Rosa DO (electronically signed)  Attending Emergency Physician            Justina De La Rosa DO  04/28/21 0116

## 2021-04-27 NOTE — CARE COORDINATION
CM into see pt to initiate a safe discharge plan. Cm introduced self and explained role of CM. Dtr Rosalba Simons in room. Pt is kind, alert and oriented. Pt lives with her . Pt has four children and two are local and very loving and supportive. Cm informed that grand dtr lives with pt and dtrs see her every evening. Dtrs provide transportation. They go to Drs appointments with her. Pt has a PCP pt has insurance and is able to obtain her medications. CM discussed discharge plan. Pt went home yest. Was readmitted with rectal bleeding this morning. CM discussed tentative plan of pt returning to home, cared for by her family and having [address] to follow. DME includes a walker. CM discussed additional options for a shower chair and life line. CM placed a PS to [facility] with referral. CM will continue to follow for any additional needs. Discharge plan is home with  and grand dtr supported by her children. [facility] [address] will follow. CM provided card and encouraged to call for any needs or concern. CM is available if any needs arise.

## 2021-04-27 NOTE — ED TRIAGE NOTES
Patient arrived in the ED via stretcher for abdominal pain and rectal bleeding. Patient stated she was in hospital recently and had to receive blood for bleeding. Patient also had a pace makes placed recently and current;y has a bandage over surgical side. Bandage is clean and dry. She also said her abdominal pain never stops so this is normal due to her \"cancer. \"

## 2021-04-28 VITALS
OXYGEN SATURATION: 97 % | BODY MASS INDEX: 24.11 KG/M2 | DIASTOLIC BLOOD PRESSURE: 70 MMHG | WEIGHT: 131 LBS | HEIGHT: 62 IN | SYSTOLIC BLOOD PRESSURE: 172 MMHG | RESPIRATION RATE: 20 BRPM | HEART RATE: 84 BPM | TEMPERATURE: 98.4 F

## 2021-04-28 LAB
ADENOVIRUS F 40 41 PCR: NOT DETECTED
ALBUMIN SERPL-MCNC: 3 GM/DL (ref 3.4–5)
AMYLASE: 20 U/L (ref 25–115)
ANION GAP SERPL CALCULATED.3IONS-SCNC: 7 MMOL/L (ref 4–16)
APTT: 33.1 SECONDS (ref 25.1–37.1)
ASTROVIRUS PCR: NOT DETECTED
BASOPHILS ABSOLUTE: 0.1 K/CU MM
BASOPHILS RELATIVE PERCENT: 0.6 % (ref 0–1)
BUN BLDV-MCNC: 7 MG/DL (ref 6–23)
CALCIUM SERPL-MCNC: 8 MG/DL (ref 8.3–10.6)
CAMPYLOBACTER PCR: NOT DETECTED
CHLORIDE BLD-SCNC: 101 MMOL/L (ref 99–110)
CO2: 27 MMOL/L (ref 21–32)
CREAT SERPL-MCNC: 0.9 MG/DL (ref 0.6–1.1)
CRYPTOSPORIDIUM PCR: NOT DETECTED
CULTURE: ABNORMAL
CYCLOSPORA CAYETANENSIS PCR: NOT DETECTED
DIFFERENTIAL TYPE: ABNORMAL
E COLI 0157 PCR: NOT DETECTED
E COLI ENTEROAGGREGATIVE PCR: NOT DETECTED
E COLI ENTEROPATHOGENIC PCR: NOT DETECTED
E COLI ENTEROTOXIGENIC PCR: NOT DETECTED
E COLI SHIGA LIKE TOXIN PCR: NOT DETECTED
E COLI SHIGELLA/ENTEROINVASIVE PCR: NOT DETECTED
EKG DIAGNOSIS: NORMAL
EKG Q-T INTERVAL: 394 MS
EKG QRS DURATION: 100 MS
EKG QTC CALCULATION (BAZETT): 484 MS
EKG R AXIS: 32 DEGREES
EKG T AXIS: 28 DEGREES
EKG VENTRICULAR RATE: 91 BPM
ENTAMOEBA HISTOLYTICA PCR: NOT DETECTED
EOSINOPHILS ABSOLUTE: 0.5 K/CU MM
EOSINOPHILS RELATIVE PERCENT: 6.1 % (ref 0–3)
GFR AFRICAN AMERICAN: >60 ML/MIN/1.73M2
GFR NON-AFRICAN AMERICAN: >60 ML/MIN/1.73M2
GIARDIA LAMBLIA PCR: NOT DETECTED
GLUCOSE BLD-MCNC: 185 MG/DL (ref 70–99)
GLUCOSE BLD-MCNC: 231 MG/DL (ref 70–99)
GLUCOSE BLD-MCNC: 237 MG/DL (ref 70–99)
GLUCOSE BLD-MCNC: 280 MG/DL (ref 70–99)
HCT VFR BLD CALC: 31.1 % (ref 37–47)
HEMOGLOBIN: 9.4 GM/DL (ref 12.5–16)
IMMATURE NEUTROPHIL %: 0.5 % (ref 0–0.43)
INR BLD: 1.26 INDEX
LIPASE: 23 IU/L (ref 13–60)
LYMPHOCYTES ABSOLUTE: 1 K/CU MM
LYMPHOCYTES RELATIVE PERCENT: 11.1 % (ref 24–44)
Lab: ABNORMAL
MAGNESIUM: 1.6 MG/DL (ref 1.8–2.4)
MCH RBC QN AUTO: 25.9 PG (ref 27–31)
MCHC RBC AUTO-ENTMCNC: 30.2 % (ref 32–36)
MCV RBC AUTO: 85.7 FL (ref 78–100)
MONOCYTES ABSOLUTE: 0.9 K/CU MM
MONOCYTES RELATIVE PERCENT: 9.9 % (ref 0–4)
NOROVIRUS GI GII PCR: NOT DETECTED
NUCLEATED RBC %: 0 %
PDW BLD-RTO: 15.3 % (ref 11.7–14.9)
PHOSPHORUS: 1.4 MG/DL (ref 2.5–4.9)
PLATELET # BLD: 69 K/CU MM (ref 140–440)
PLESIOMONAS SHIGELLOIDES PCR: NOT DETECTED
POTASSIUM SERPL-SCNC: 3.3 MMOL/L (ref 3.5–5.1)
PROTHROMBIN TIME: 15.3 SECONDS (ref 11.7–14.5)
RBC # BLD: 3.63 M/CU MM (ref 4.2–5.4)
ROTAVIRUS A PCR: NOT DETECTED
SALMONELLA PCR: NOT DETECTED
SAPOVIRUS PCR: NOT DETECTED
SEGMENTED NEUTROPHILS ABSOLUTE COUNT: 6.2 K/CU MM
SEGMENTED NEUTROPHILS RELATIVE PERCENT: 71.8 % (ref 36–66)
SODIUM BLD-SCNC: 135 MMOL/L (ref 135–145)
SPECIMEN: ABNORMAL
TOTAL IMMATURE NEUTOROPHIL: 0.04 K/CU MM
TOTAL NUCLEATED RBC: 0 K/CU MM
VIBRIO CHOLERAE PCR: NOT DETECTED
VIBRIO PCR: NOT DETECTED
WBC # BLD: 8.7 K/CU MM (ref 4–10.5)
YERSINIA ENTEROCOLITICA PCR: NOT DETECTED

## 2021-04-28 PROCEDURE — 6370000000 HC RX 637 (ALT 250 FOR IP): Performed by: HOSPITALIST

## 2021-04-28 PROCEDURE — 85730 THROMBOPLASTIN TIME PARTIAL: CPT

## 2021-04-28 PROCEDURE — 83735 ASSAY OF MAGNESIUM: CPT

## 2021-04-28 PROCEDURE — G0378 HOSPITAL OBSERVATION PER HR: HCPCS

## 2021-04-28 PROCEDURE — 85610 PROTHROMBIN TIME: CPT

## 2021-04-28 PROCEDURE — 36415 COLL VENOUS BLD VENIPUNCTURE: CPT

## 2021-04-28 PROCEDURE — 80053 COMPREHEN METABOLIC PANEL: CPT

## 2021-04-28 PROCEDURE — 93010 ELECTROCARDIOGRAM REPORT: CPT | Performed by: INTERNAL MEDICINE

## 2021-04-28 PROCEDURE — 6370000000 HC RX 637 (ALT 250 FOR IP): Performed by: INTERNAL MEDICINE

## 2021-04-28 PROCEDURE — 83690 ASSAY OF LIPASE: CPT

## 2021-04-28 PROCEDURE — 6360000002 HC RX W HCPCS: Performed by: HOSPITALIST

## 2021-04-28 PROCEDURE — 2500000003 HC RX 250 WO HCPCS: Performed by: HOSPITALIST

## 2021-04-28 PROCEDURE — 96376 TX/PRO/DX INJ SAME DRUG ADON: CPT

## 2021-04-28 PROCEDURE — 99233 SBSQ HOSP IP/OBS HIGH 50: CPT | Performed by: INTERNAL MEDICINE

## 2021-04-28 PROCEDURE — 87507 IADNA-DNA/RNA PROBE TQ 12-25: CPT

## 2021-04-28 PROCEDURE — 82962 GLUCOSE BLOOD TEST: CPT

## 2021-04-28 PROCEDURE — 80069 RENAL FUNCTION PANEL: CPT

## 2021-04-28 PROCEDURE — 2580000003 HC RX 258: Performed by: HOSPITALIST

## 2021-04-28 PROCEDURE — 6360000002 HC RX W HCPCS: Performed by: INTERNAL MEDICINE

## 2021-04-28 PROCEDURE — 96366 THER/PROPH/DIAG IV INF ADDON: CPT

## 2021-04-28 PROCEDURE — 82150 ASSAY OF AMYLASE: CPT

## 2021-04-28 PROCEDURE — C9113 INJ PANTOPRAZOLE SODIUM, VIA: HCPCS | Performed by: HOSPITALIST

## 2021-04-28 PROCEDURE — 85025 COMPLETE CBC W/AUTO DIFF WBC: CPT

## 2021-04-28 PROCEDURE — 94761 N-INVAS EAR/PLS OXIMETRY MLT: CPT

## 2021-04-28 RX ORDER — POTASSIUM CHLORIDE 20 MEQ/1
40 TABLET, EXTENDED RELEASE ORAL ONCE
Status: DISCONTINUED | OUTPATIENT
Start: 2021-04-28 | End: 2021-04-28

## 2021-04-28 RX ORDER — ATORVASTATIN CALCIUM 40 MG/1
40 TABLET, FILM COATED ORAL DAILY
Qty: 30 TABLET | Refills: 3 | Status: ON HOLD | OUTPATIENT
Start: 2021-04-28 | End: 2022-09-27 | Stop reason: SDUPTHER

## 2021-04-28 RX ORDER — METRONIDAZOLE 500 MG/1
500 TABLET ORAL 3 TIMES DAILY
Qty: 21 TABLET | Refills: 0 | Status: SHIPPED | OUTPATIENT
Start: 2021-04-28 | End: 2021-05-05

## 2021-04-28 RX ORDER — MAGNESIUM SULFATE IN WATER 40 MG/ML
2000 INJECTION, SOLUTION INTRAVENOUS ONCE
Status: COMPLETED | OUTPATIENT
Start: 2021-04-28 | End: 2021-04-28

## 2021-04-28 RX ORDER — CIPROFLOXACIN 500 MG/1
500 TABLET, FILM COATED ORAL 2 TIMES DAILY
Qty: 14 TABLET | Refills: 0 | Status: SHIPPED | OUTPATIENT
Start: 2021-04-28 | End: 2021-05-05

## 2021-04-28 RX ADMIN — METRONIDAZOLE 500 MG: 500 INJECTION, SOLUTION INTRAVENOUS at 08:31

## 2021-04-28 RX ADMIN — MAGNESIUM SULFATE HEPTAHYDRATE 2000 MG: 2 INJECTION, SOLUTION INTRAVENOUS at 10:49

## 2021-04-28 RX ADMIN — CIPROFLOXACIN 400 MG: 2 INJECTION, SOLUTION INTRAVENOUS at 08:32

## 2021-04-28 RX ADMIN — SODIUM CHLORIDE, PRESERVATIVE FREE 10 ML: 5 INJECTION INTRAVENOUS at 08:31

## 2021-04-28 RX ADMIN — CARVEDILOL 12.5 MG: 12.5 TABLET, FILM COATED ORAL at 08:31

## 2021-04-28 RX ADMIN — PANTOPRAZOLE SODIUM 40 MG: 40 INJECTION, POWDER, FOR SOLUTION INTRAVENOUS at 08:32

## 2021-04-28 RX ADMIN — ASPIRIN 81 MG: 81 TABLET, COATED ORAL at 08:31

## 2021-04-28 RX ADMIN — BUPROPION HYDROCHLORIDE 150 MG: 150 TABLET, EXTENDED RELEASE ORAL at 08:31

## 2021-04-28 RX ADMIN — METRONIDAZOLE 500 MG: 500 INJECTION, SOLUTION INTRAVENOUS at 03:36

## 2021-04-28 RX ADMIN — VENLAFAXINE HYDROCHLORIDE 150 MG: 150 CAPSULE, EXTENDED RELEASE ORAL at 08:31

## 2021-04-28 RX ADMIN — Medication 1 TABLET: at 08:31

## 2021-04-28 RX ADMIN — SODIUM CHLORIDE, PRESERVATIVE FREE 10 ML: 5 INJECTION INTRAVENOUS at 08:32

## 2021-04-28 RX ADMIN — AMLODIPINE BESYLATE 10 MG: 10 TABLET ORAL at 08:31

## 2021-04-28 RX ADMIN — POTASSIUM BICARBONATE 40 MEQ: 782 TABLET, EFFERVESCENT ORAL at 12:38

## 2021-04-28 RX ADMIN — Medication 400 UNITS: at 08:31

## 2021-04-28 RX ADMIN — FENOFIBRATE 135 MG: 54 TABLET ORAL at 08:31

## 2021-04-28 ASSESSMENT — PAIN SCALES - GENERAL
PAINLEVEL_OUTOF10: 0

## 2021-04-28 ASSESSMENT — ENCOUNTER SYMPTOMS
WHEEZING: 0
FACIAL SWELLING: 0
EYE PAIN: 0
SHORTNESS OF BREATH: 0
VOMITING: 0
NAUSEA: 0
EYE DISCHARGE: 0
EYE REDNESS: 0
RHINORRHEA: 0
RESPIRATORY NEGATIVE: 1
BLOOD IN STOOL: 1
ABDOMINAL PAIN: 1
CHEST TIGHTNESS: 0
RECTAL PAIN: 1
SINUS PAIN: 0
SINUS PRESSURE: 0
COUGH: 0
EYES NEGATIVE: 1
SORE THROAT: 0

## 2021-04-28 NOTE — CONSULTS
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 Jovani Tilley is a 81-year-old very pleasant patient with medical history significant for hyperlipidemia, gastroesophageal reflux disease, coronary artery disease, depression and adjustment disorder, osteoporosis, diabetes mellitus, and history of small intestinal carcinoid tumor, status post surgery by Dr. Freya Nino in , initially referred to me on 2014, for evaluation of thrombocytopenia. She stated that she was found to have thrombocytopenia on routine blood tests done on 2014. Repeat blood tests on 2014, showed persistent thrombocytopenia and she was referred to me for evaluation. She does not have leucopenia or anemia. She complains of severe tiredness and fatigue. Besides that she does not have any other significant symptoms. She denies bleeding diathesis too. Laboratory workups done on 2014, showed platelet count of 37, however, manual differential showed platelet count of 97. Her white blood cell count was 6.4 and hemoglobin was 12.2. Her LDH is mildly elevated (261), vitamin B12 normal (154), normal folate (more than 20), negative hepatitis panel, antinuclear antibody was not detected and negative rheumatoid factor and normal TSH (2.4). Since all the workups have been within normal range, I believe her mild thrombocytopenia is most likely due to immune thrombocytopenia. Ms. Jovani Tilley started following with Dr. Leticia Sapp at [facility] for immune thrombocytopenia.  Since her uncinate process with intense radiotracer activity, compatible with somatostatin receptor-avid malignancy. Paraesophageal lymph node in the chest at the level of the rafiq and aortocaval lymph node with intense radiotracer activity, compatible with somatostatin receptor-avid robbie metastasis. Since she was found to have metastatic lymph node involvement in intrathoracic and abdominal lymph nodes, she is not a surgical candidate. Tumor markers were reviewed and she was found to have mild elevation in chromogranin A (136) but her other tumor markers were within normal range. Since she has been having diarrhea (at least 5-6 times a day) and her tumors are octreotide avid, she was started with octreotide since September 1, 2017. She was started with hormone therapy, octreotide for tumor stabilization as well as for the symptom management (diarrhea). Since she lives in [facility], her case was subsequently referred to us for continuation of octreotide injection locally here in [facility]. Octreotide was started in our center since October 5, 2017. CT scan of the chest, abdomen and pelvis done on December 23, 2020 showed interval increase in size of a soft tissue mass with scattered calcifications posterior to the pancreatic head currently measuring up to 4 cm, previously 3.3 cm. No evidence of metastatic disease in the chest.    On April 27, 2021, she presented to [facility] with abdominal pain and rectal bleeding. She is due for lanreotide injection and I will try to give it either in patient and out patient depending on insurance policy. She doesn't have any other significant new symptoms on today's visit. PAST MEDICAL HISTORY:  Significant for:  1. Hyperlipidemia. 2.         Diabetes mellitus. 3.         Coronary artery disease. 4.         Gastroesophageal reflux disease. 5.         History of depression and adjustment disorder. 6.         Osteoporosis.   7.         History of small intestinal cancer, status post surgery by Dr. Vidya Vanegas in [address] [place] [address]. PAST SURGICAL HISTORY:  Significant for:  1. Appendectomy in 1959.  2.         Coronary artery angioplasty in 1992. 3.         Small intestine cancer removal in 1998.  4.         Cholecystectomy in 1998.  5.         Open heart surgery in March 31, 2009. 6.         Melanoma surgery on October 10, 2011. 7.         Hysterectomy in 1996. FAMILY HISTORY:  Significant for melanoma in her brother and sister. No other family history of cancer disease. SOCIAL HISTORY:  She denies smoking, alcohol drinking, and illicit drug abuse. She is  for 55 years and has four children. She is retiree from the [employer]. ALLERGIES:  No known drug allergies. Review of Systems: \"Per interval history; otherwise 10 point ROS is negative. \"  Her energy level is low, appetite and sleep are okay. She doesn't have fever, chills, night sweats, cough, shortness of breath, chest pain, hemoptysis or palpitations. Her bowels and bladder functions are normal. She doesn't have nausea, vomiting, abdominal pain, constipation or dysuria. She has some loss of appetite or weight loss. She only has minimal loose stool. She doesn't have neuropathy and she denies bleeding or clotting disorder. She doesn't have any pain in her body. No anxiety and she has depression. She doesn't have suicidal idea. The rests of the systems are unremarkable.      Vital Signs:  BP (!) 152/55   Pulse 83   Temp 98 °F (36.7 °C) (Oral)   Resp 28   Ht 5' 2\" (1.575 m)   Wt 131 lb (59.4 kg)   SpO2 95%   BMI 23.96 kg/m²     Physical Exam:  CONSTITUTIONAL: alert, cooperative, awake, no apparent distress   EYES: pupils equal, round and reactive to light, sclera clear and conjunctiva normal  ENT: without obvious abnormality, normocephalic, atraumatic  NECK: supple, symmetrical, no jugular venous distension and no carotid bruits   HEMATOLOGIC/LYMPHATIC: no cervical, supraclavicular or axillary lymphadenopathy   LUNGS: VBS, no rhonchi, no increased work of breathing, no wheezes, clear to auscultation, no crackles,    CARDIOVASCULAR: regular rate and rhythm, normal S1 and S2, no murmur noted  ABDOMEN: soft, non-distended, normal bowel sounds x 4, non-tender, no masses palpated, no hepatosplenomegaly   MUSCULOSKELETAL: full range of motion noted, tone is normal  NEUROLOGIC: awake, alert, oriented to name, place and time. Motor skills grossly intact. SKIN: Normal skin color, texture, turgor and no jaundice.  appears intact   EXTREMITIES: no clubbing, no leg swelling, no LE edema, no cyanosis     Labs:  Hematology:  Lab Results   Component Value Date    WBC 8.7 04/28/2021    RBC 3.63 (L) 04/28/2021    HGB 9.4 (L) 04/28/2021    HCT 31.1 (L) 04/28/2021    MCV 85.7 04/28/2021    MCH 25.9 (L) 04/28/2021    MCHC 30.2 (L) 04/28/2021    RDW 15.3 (H) 04/28/2021    PLT 69 (L) 04/28/2021    MPV 11.4 (H) 04/26/2021    BANDSPCT 3 (L) 04/20/2021    SEGSPCT 71.8 (H) 04/28/2021    EOSRELPCT 6.1 (H) 04/28/2021    BASOPCT 0.6 04/28/2021    LYMPHOPCT 11.1 (L) 04/28/2021    MONOPCT 9.9 (H) 04/28/2021    BANDABS 0.94 04/20/2021    SEGSABS 6.2 04/28/2021    EOSABS 0.5 04/28/2021    BASOSABS 0.1 04/28/2021    LYMPHSABS 1.0 04/28/2021    MONOSABS 0.9 04/28/2021    DIFFTYPE AUTOMATED DIFFERENTIAL 04/28/2021    ANISOCYTOSIS 1+ 04/27/2021    POLYCHROM 1+ 03/29/2017    WBCMORP  04/17/2021     SLIDE SCANNED  MANUAL DIFFERENTIAL APPEARS TO MATCH AUTOMATED DIFFERENTIAL WELL     PLTM DECREASED 04/27/2021     No results found for: ESR  Chemistry:  Lab Results   Component Value Date     04/28/2021    K 3.3 (L) 04/28/2021     04/28/2021    CO2 27 04/28/2021    BUN 7 04/28/2021    CREATININE 0.9 04/28/2021    GLUCOSE 231 (H) 04/28/2021    CALCIUM 8.0 (L) 04/28/2021    PROT 6.0 (L) 04/27/2021    LABALBU 3.0 (L) 04/28/2021    BILITOT 0.4 04/27/2021    ALKPHOS 80 04/27/2021    AST 14 (L) 04/27/2021    ALT 8 (L) 04/27/2021    LABGLOM >60 04/28/2021    GFRAA >60 04/28/2021    PHOS 1.4 (L) 04/28/2021    MG 1.6 (L) 04/28/2021    POCGLU 237 (H) 04/28/2021     Lab Results   Component Value Date     03/30/2021    HOMOCYSTEINE (H) 10/15/2019     17.8  Risk of vascular disease   increases above 9 umol/L  and is significant >15 umol/L. No components found for: LD  Lab Results   Component Value Date    TSHHS 2.480 11/24/2014    T4FREE 1.13 11/24/2014    FT3 2.9 11/24/2014     Immunology:  Lab Results   Component Value Date    PROT 6.0 (L) 04/27/2021    SPEP  04/17/2021     INTERPRETATION - Decreased albumin and total proteins. LP.    ALBUMINELP 3.0 (L) 04/17/2021    LABALPH 0.2 04/17/2021    LABALPH 0.5 04/17/2021    LABBETA 0.8 04/17/2021    GAMGLOB 0.8 04/17/2021     No results found for: Essence Ellsworth, KLFLCR  No results found for: B2M  Coagulation Panel:  Lab Results   Component Value Date    PROTIME 15.3 (H) 04/28/2021    INR 1.26 04/28/2021    APTT 33.1 04/28/2021     Anemia Panel:  Lab Results   Component Value Date    QUSANHYE10 3975 (H) 04/17/2021    FOLATE >20.0 (H) 04/17/2021     Tumor Markers:  No results found for: , CEA, , LABCA2, PSA  Observations:  No data recorded      Assessment & Plan: Thrombocytopenia - most likely immune thrombocytopenia. Stage IIB malignant melanoma. Recurrent/metastatic small intestine carcinoid tumor. PLAN:  She is feeling better today. She is due for lanreotide. If she will be released home today, we will schedule to give it tomorrow as an out patient. Please continue with current management as per primary team.     Recent imaging and labs were reviewed and discussed with the patient.

## 2021-04-28 NOTE — PROGRESS NOTES
Pt alert and oriented x4 laying in bed watching TV. Call light in reach. Nothing needed at this time.

## 2021-04-28 NOTE — PROGRESS NOTES
Pt d/c home at this time. Pt given d/c paperwork. Educated on importance to  prescriptions and make follow-up appointments. All questions answered and none further.

## 2021-04-28 NOTE — PLAN OF CARE
Problem: Falls - Risk of:  Goal: Will remain free from falls  Description: Will remain free from falls  4/28/2021 0933 by Ute Schneider RN  Outcome: Ongoing  4/28/2021 0557 by Greyson Gloria RN  Outcome: Ongoing  Goal: Absence of physical injury  Description: Absence of physical injury  4/28/2021 0933 by Ute Schneider RN  Outcome: Ongoing  4/28/2021 0557 by Greyson Gloria RN  Outcome: Ongoing

## 2021-04-28 NOTE — DISCHARGE SUMMARY
Discharge Summary    Name:  Wilfrido Grant /Age/Sex: 1940  (79 y.o. female)   MRN & CSN:  [id number] & [id number] Admission Date/Time: 2021  4:08 AM   Attending:  No att. providers found Discharging Physician: Marisela Claude, MD       Hospital Course: Wilfrido Grant is a 79 y.o.  female  who presents with abdominal pain, patient improved significantly, no more abdominal cramping, no nausea or vomiting, tolerating meals. No bleeding during admission    1-Abdominal pain pain likely due to colitis   Recent discharge for GI bleed  colitis per CT with manifestations of abdominal pain and bloody stool- had colonoscopy on  with findings of hemorrhoid/diverticulosis and post- surgical changes of right colon, also EGD with mild superficial gastritis/duodenitis. 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      Consults this admission:  IP CONSULT TO HOSPITALIST  IP CONSULT TO GI  IP CONSULT TO ONCOLOGY  IP CONSULT TO HOME CARE NEEDS    Discharge Instruction:   Follow up appointments: GI, oncology  Primary care physician:  within 2 weeks    Diet:  diabetic diet   Activity: activity as tolerated  Disposition: Discharged to:   []Home, [x]HHC, []SNF, []Acute Rehab, []Hospice   Condition on discharge: Stable    Discharge Medications:      Jacklyn Saxena   Home Medication Instructions UIO:[id number]    Printed on:04/28/21 1283   Medication Information                      ALPRAZolam (XANAX) 1 MG tablet  Take 1 mg by mouth 2 times daily as needed for Sleep. amLODIPine (NORVASC) 10 MG tablet  TAKE 1 TABLET BY MOUTH EVERY DAY             aspirin 81 MG EC tablet  Take 1 tablet by mouth daily             atorvastatin (LIPITOR) 40 MG tablet  Take 1 tablet by mouth daily             buPROPion (WELLBUTRIN SR) 150 MG extended release tablet  Take 1 tablet by mouth 2 times daily             carvedilol (COREG) 12.5 MG tablet  Take 1 tablet by mouth 2 times daily (with meals)             ciprofloxacin (CIPRO) 500 MG tablet  Take 1 tablet by mouth 2 times daily for 7 days             cyanocobalamin 1000 MCG/ML injection  Inject 1,000 mcg into the muscle every 7 days. fenofibrate (TRICOR) 145 MG tablet  Take 145 mg by mouth daily. folic acid-pyridoxine-cyancobalamin (FOLTX) 1.13-25-2 MG TABS  Take 1 tablet by mouth daily             FREESTYLE LITE strip  TEST BLOOD GLUCOSE 3 TIMES DAILY (BEFORE MEALS).              glipiZIDE (GLUCOTROL XL) 5 MG extended release tablet  Take 2 tablets by mouth daily             INVOKANA 300 MG TABS tablet  Take 300 mg by mouth every morning (before breakfast)              lactulose (CHRONULAC) 10 GM/15ML solution  Take 30 mLs by mouth 3 times daily             LANTUS SOLOSTAR 100 UNIT/ML injection pen               metFORMIN (GLUCOPHAGE) 500 MG tablet  Take 1 tablet by mouth 2 times daily (with meals)             methocarbamol (ROBAXIN) 750 MG tablet  Take 750 mg by mouth 4 times daily as needed             metroNIDAZOLE (FLAGYL) 500 MG tablet  Take 1 tablet by mouth 3 times daily for 7 days             Multiple Vitamins-Minerals (THERAPEUTIC MULTIVITAMIN-MINERALS) tablet  Take 1 tablet by mouth daily             pantoprazole (PROTONIX) 40 MG tablet  Take 1 tablet by mouth every morning (before breakfast)             potassium chloride (MICRO-K) 10 MEQ CR capsule  Take 10 mEq by mouth 2 times daily             valsartan-hydroCHLOROthiazide (DIOVAN-HCT) 320-25 MG per tablet  Take 1 tablet by mouth daily              venlafaxine (EFFEXOR XR) 150 MG extended release capsule  Take 1 capsule by mouth daily             vitamin E 1000 UNITS capsule  Take 400 Units by mouth daily                 Objective Findings at Discharge:   BP (!) 172/70   Pulse 84   Temp 98.4 °F (36.9 °C) (Oral)   Resp 20   Ht 5' 2\" (1.575 m)   Wt 131 lb (59.4 kg)   SpO2 97%   BMI 23.96 kg/m²            PHYSICAL EXAM   GEN Awake female, sitting upright in bed in no apparent distress. Appears given age. EYES Pupils are equally round. No scleral erythema, discharge, or conjunctivitis. HENT Mucous membranes are moist.   NECK No apparent thyromegaly or masses. RESP Clear to auscultation, no wheezes, rales or rhonchi. Symmetric chest movement while on room air. CARDIO/VASC S1/S2 auscultated. Regular rate without appreciable murmurs, rubs, or gallops. Peripheral pulses equal bilaterally and palpable. No peripheral edema. GI Abdomen is soft without significant tenderness, masses, or guarding. Bowel sounds are normoactive. Rectal exam deferred.  Harvey catheter is not present. HEME/LYMPH No petechiae or ecchymoses. MSK No gross joint deformities. Spontaneous movement of all extremities  SKIN Normal coloration, warm, dry. NEURO Cranial nerves appear grossly intact, normal speech, no lateralizing weakness. PSYCH Awake, alert, oriented x 4. Affect appropriate.       BMP/CBC  Recent Labs     04/26/21  0620 04/27/21  0425 04/28/21  0226   * 136 135   K 3.3* 3.3* 3.3*   CL 97* 100 101   CO2 27 22 27   BUN 7 12 7   CREATININE 0.9 1.1 0.9   WBC 13.2* 13.4* 8.7   HCT 32.3* 35.9* 31.1*   PLT 38* 69* 69*       IMAGING:  Echocardiogram Complete 2d With Doppler With Color    Result Date: dilated left atrium. Right Atrium  Essentially normal right atrium. Right Ventricle  PPM wiring visualized within the right heart. Aortic Valve  Mild aortic stenosis with mean gradient of 15 mmHg. Mitral Valve  Mitral annular calcification with moderate mitral stenosis; mean gradient is  8mmHg. Mild to moderate mitral regurgitation. Tricuspid Valve  Mild tricuspid regurgitation; RVSP: 29 mmHg. Pulmonic Valve  The pulmonic valve was not well visualized. Pericardial Effusion  No evidence of any pericardial effusion. Pleural Effusion  No evidence of pleural effusion.   M-Mode/2D Measurements & Calculations   LV Diastolic Dimension:  LV Systolic Dimension:  LA Dimension: 4.6 cmAO Root  4.45 cm                  2.11 cm                 Dimension: 2.8 cmLA Area:  LV FS:52.6 %             LV Volume Diastolic: 44 52.0 cm2  LV PW Diastolic: 5.55 cm ml  LV PW Systolic: 8.03 cm  LV Volume Systolic: 11  Septum Diastolic: 0.67   ml  cm                       LV EDV/LV EDV Index: 44 RV Diastolic Dimension:  Septum Systolic: 6.32 cm EY/83 C6YL ESV/LV ESV   2.65 cm  CO: 7.32 l/min           Index: 11 ml/7 m2  CI: 4.58 l/m*m2          EF Calculated (A4C): 75 LA/Aorta: 1.64                           %  LV Area Diastolic: 80.3  EF Calculated (2D):     LA volume/Index: 65 ml  cm2                      83.8 %                  /03R9  LV Area Systolic: 8.72  cm2                      LV Length: 6.56 cm                            LVOT: 1.9 cm  Doppler Measurements & Calculations   MV Peak E-Wave: 145     AV Peak Velocity: 227 cm/s  LVOT Peak Velocity: 163  cm/s                    AV Peak Gradient: 20.61     cm/s  MV Peak A-Wave: 167     mmHg                        LVOT Mean Velocity: 121  cm/s                    AV Mean Velocity: 140 cm/s  cm/s  MV E/A Ratio: 0.87      AV Mean Gradient: 10 mmHg   LVOT Peak Gradient: 11  MV Peak Gradient: 8.41  AV VTI: 42.3 cm             mmHgLVOT Mean Gradient:  mmHg                    AV Area (Continuity):2.37   7 mmHg  MV Mean Gradient: 7     cm2                         Estimated RVSP: 34 mmHg  mmHg                                                Estimated RAP:3 mmHg  MV Mean Velocity: 128   LVOT VTI: 35.4 cm  cm/s  MV P1/2t: 57 msec       Estimated PASP: 21.15 mmHg  TR Velocity:213 cm/s  MVA by PHT:3.86 cm2                                 TR Gradient:18.15 mmHg  MV Area (continuity):  1.72 cm2  MV E' Septal Velocity:  3.84 cm/s  MV E' Lateral Velocity:  6.03 cm/s  MV E/E' septal: 37.76  MV E/E' lateral: 24.05      Ct Abdomen Pelvis Wo Contrast Additional Contrast? None    Result Date: 4/20/2021  EXAMINATION: CT OF THE ABDOMEN AND PELVIS WITHOUT CONTRAST 4/20/2021 9:04 pm TECHNIQUE: CT of the abdomen and pelvis was performed without the administration of intravenous contrast. Multiplanar reformatted images are provided for review. Dose modulation, iterative reconstruction, and/or weight based adjustment of the mA/kV was utilized to reduce the radiation dose to as low as reasonably achievable. COMPARISON: CTA abdomen and pelvis 04/16/2021 and CT abdomen 03/23/2021 HISTORY: ORDERING SYSTEM PROVIDED HISTORY: sudden severe abdominal pain Acuity: Acute Type of Exam: Initial Additional signs and symptoms: surg. hx; hysterectomy Relevant Medical/Surgical History: none FINDINGS: Lower Chest: New, small volume bilateral pleural effusion with bibasilar relaxation atelectasis. Prominent mitral valve annulus calcifications, clinically insignificant finding. Sequela from open-heart surgery and pacemaker. Organs:  Status post cholecystectomy. The liver appears mildly fatty, 18 cm craniocaudal liver length. No discrete hepatic lesion evident. The spleen, kidneys and adrenal glands appear unremarkable. Small calcifications throughout the spleen reflect sequela from old granulomatous disease.   The masslike lesion with peripheral calcifications arising between the pancreatic head, IVC and aorta is less well-defined on noncontrast imaging, 3 x 4 cm axial series 4, image 37 compared to previous description of 2.9 x 4.3 cm. No suspicious renal lesion. No collecting system calculus or hydronephrosis evident. GI/Bowel: Status post right hemicolectomy. No bowel obstruction or definite acute inflammatory change. Prominent fecal burden at the rectal vault. Small bowel and stomach appear unremarkable. Pelvis: Partially filled urinary bladder appears unremarkable, decompressed by Harvey catheter. No pelvic adenopathy or free fluid is seen. No pneumoperitoneum evident. Peritoneum/Retroperitoneum: Stable partially calcified mesenteric mass right paramedian mid abdomen axial series 4, image 44 is 16 x 26 mm extending from the inferior margin of the previously described pancreatic lesion. Dense calcific atherosclerosis aorta and its branches without aneurysm. Unremarkable appearance of the inferior vena cava. No adenopathy or pneumoperitoneum. Trace fluid right upper quadrant. Bones/Soft Tissues: Unchanged superior L1 endplate compression deformity (approximately 30%). Stable small retropulsed fragment along the superior aspect of the vertebral body (5- 6 mm). Bones appear osteoporotic. No acute superficial soft tissue abnormality appreciated. 1. Prominent fecal burden at the rectal vault may reflect constipation. 2. Trace right upper quadrant ascites is probably reactive. 3. New, small volume bilateral pleural effusion with bibasilar relaxation atelectasis. 4. Stable approximately 30% anterior wedge compression superior endplate L1 with unchanged associated retropulsed fragment . 5. Stable, partially calcified mass posterior to the pancreatic head, measuring up to 4.0 cm. Adjacent calcified mesenteric nodule is also unchanged. 6. 12 x 8 mm nodule at the right lung base is unchanged since at least 03/28/2019.      Xr Wrist Left (min 3 Views)    Result Date: 4/15/2021  EXAMINATION: 3 XRAY VIEWS OF THE LEFT WRIST 4/14/2021 2:51 pm COMPARISON: 3/2/2021 HISTORY: ORDERING SYSTEM PROVIDED HISTORY: Postop check FINDINGS: Postsurgical changes status post ORIF of the distal radius are noted. There is a volar plate and screw fixation spanning the fracture which is in anatomic alignment. No new fracture is identified. An ulnar styloid fracture is again noted. No new fractures are evident. Sequelae of ORIF of the distal left radius, in anatomic alignment. Ct Head Wo Contrast    Result Date: 4/21/2021  EXAMINATION: CT OF THE HEAD WITHOUT CONTRAST  4/16/2021 2:12 pm TECHNIQUE: CT of the head was performed without the administration of intravenous contrast. Dose modulation, iterative reconstruction, and/or weight based adjustment of the mA/kV was utilized to reduce the radiation dose to as low as reasonably achievable. COMPARISON: 03/23/2021, 02/07/2021, brain MRI 11/15/2019, CT head 01/14/2011 HISTORY: ORDERING SYSTEM PROVIDED HISTORY: intermittent confusion, history of gi cancer, neuroendocrine tumors TECHNOLOGIST PROVIDED HISTORY: Reason for exam:->intermittent confusion, history of gi cancer, neuroendocrine tumors Has a \"code stroke\" or \"stroke alert\" been called? ->No Decision Support Exception->Emergency Medical Condition (MA) Reason for Exam: intermittent confusion, history of gi cancer, neuroendocrine tumors Acuity: Acute Type of Exam: Initial Initial evaluation FINDINGS: BRAIN/VENTRICLES:  The ventricles and cisternal spaces are normal in size, shape, and configuration for the age of the patient. There are confluent areas of hypoattenuation in the periventricular white matter and centrum semiovale that are likely related to chronic small vessel ischemic disease. There is atherosclerotic calcification of the cavernous carotids. There is no midline shift or mass effect. No hemorrhage is identified in the brain parenchyma. ORBITS: The visualized portion of the orbits demonstrate no acute abnormality.  SINUSES: The visualized paranasal sinuses are clear. There is mild opacification of the posterior right mastoid air cells. SOFT TISSUES/SKULL:  No acute abnormality of the visualized skull or soft tissues. There is a lucency in the high right parietal skull that is bright on the prior T1-weighted imaging likely represents a hemangioma. No acute intracranial abnormality. Cerebral atrophy. Severe chronic small vessel ischemic changes. Mild opacification of the posterior right mastoid air cells. Ct Abdomen Pelvis W Iv Contrast Additional Contrast? None    Result Date: 4/28/2021  EXAMINATION: CT OF THE ABDOMEN AND PELVIS WITH CONTRAST 4/27/2021 5:19 am TECHNIQUE: CT of the abdomen and pelvis was performed with the administration of intravenous contrast. Multiplanar reformatted images are provided for review. Dose modulation, iterative reconstruction, and/or weight based adjustment of the mA/kV was utilized to reduce the radiation dose to as low as reasonably achievable. COMPARISON: April 20, 2021 CT abdomen and pelvis. HISTORY: ORDERING SYSTEM PROVIDED HISTORY: Pain TECHNOLOGIST PROVIDED HISTORY: Additional Contrast?->None Reason for exam:->Pain Decision Support Exception->Emergency Medical Condition (MA) FINDINGS: Lower Chest: Small bilateral pleural effusions are noted. Organs: The liver, spleen, and adrenal glands are unremarkable. Mild bilateral pelvicaliectasis is noted. There is redemonstration of a pancreatic head mass measuring 3.3 x 4.0 cm. This measured 3.0 x 4.0 cm on the prior examination and has not significantly changed. Coarse calcifications are seen involving this mass. The mass encases the SMA. The SMA is patent. The mass extends into the root of the mesentery with coarse calcifications. GI/Bowel: There is no evidence of bowel obstruction. Postsurgical changes are seen to the bowel loops. There is mucosal thickening and enhancement involving the rectosigmoid colon, concerning for infectious/inflammatory colitis. Pelvis: There is a tiny collection of gas within the bladder lumen which could be related to recent catheterization. There is no free fluid. Peritoneum/Retroperitoneum: There is no free air or lymphadenopathy. Bones/Soft Tissues: No destructive osseous lesions are identified. There is a moderate compression fracture of L1 with retropulsion of bone into the canal resulting in mild canal stenosis without significant change since the prior examination. 1. Thickening and mucosal enhancement involving the rectosigmoid colon, concerning for infectious/inflammatory colitis. 2. Stable appearance of a pancreatic mass measuring 3.3 x 4.0 cm. 3. Small bilateral pleural effusions. 4. Mild bilateral pelvicaliectasis. 5. No significant change in a moderate compression deformity of L1 with retropulsion of bone into the canal resulting in mild canal stenosis. Fl Less Than 1 Hour    Result Date: 4/20/2021  Radiology exam is complete. No Radiologist dictation. Please follow up with ordering provider. Xr Chest Portable    Result Date: 4/28/2021  EXAMINATION: ONE XRAY VIEW OF THE CHEST 4/27/2021 4:19 am COMPARISON: Chest radiograph April 20, 2021 HISTORY: ORDERING SYSTEM PROVIDED HISTORY: Pain TECHNOLOGIST PROVIDED HISTORY: Reason for exam:->Pain Reason for Exam: XR CHEST PORTABLE Acuity: Unknown Type of Exam: Initial FINDINGS: Median sternotomy wires are noted. A left-sided intracardiac device is seen. There is no focal consolidation, pleural effusion, or pneumothorax. There is no evidence of edema. There is evidence of COPD. COPD. No acute findings. Xr Chest Portable    Result Date: 4/20/2021  EXAMINATION: ONE X-RAY VIEW OF THE CHEST 4/20/2021 3:55 pm COMPARISON: Chest 04/16/2021 HISTORY: ORDERING SYSTEM PROVIDED HISTORY: Pacemaker placement FINDINGS: Calcifications involving the aorta reflect atherosclerosis. The cardiomediastinal and hilar silhouettes appear otherwise unremarkable.  Vascular engorgement and cephalization is demonstrated with bilateral peribronchial cuffing and perivascular haziness. No dense consolidation or pleural effusion evident. No pneumothorax is seen. No acute osseous abnormality is identified. Stable sequela from CABG. Interval placement left-sided pacemaker, leads overlying the expected location of the right atrium and right ventricle; no pneumothorax. 1. Interval placement left-sided pacemaker, leads overlying the expected location of the right atrium and right ventricle; no pneumothorax. 2. Findings compatible with pulmonary venous hypertension and mild pulmonary edema. 3. Calcific atherosclerotic disease aorta. Xr Chest Portable    Result Date: 4/16/2021  EXAMINATION: ONE XRAY VIEW OF THE CHEST 4/16/2021 1:05 pm COMPARISON: Chest radiograph 02/07/2021. HISTORY: ORDERING SYSTEM PROVIDED HISTORY: weakness, fatigue TECHNOLOGIST PROVIDED HISTORY: Reason for exam:->weakness, fatigue Reason for Exam: weakness, fatigue FINDINGS: Status post median sternotomy. The cardiomediastinal silhouette is within normal limits. No pneumothorax, vascular congestion, consolidation, or pleural effusion is identified. No acute osseous abnormality. No acute process. Cta Abdomen Pelvis W Contrast    Result Date: 4/16/2021  EXAMINATION: CTA OF THE ABDOMEN AND PELVIS WITH CONTRAST 4/16/2021 2:14 pm TECHNIQUE: CTA of the abdomen and pelvis was performed with the administration of intravenous contrast. Multiplanar reformatted images are provided for review. MIP images are provided for review. Dose modulation, iterative reconstruction, and/or weight based adjustment of the mA/kV was utilized to reduce the radiation dose to as low as reasonably achievable. COMPARISON: 03/23/2021, 03/28/2019, 12/23/2020 HISTORY: ORDERING SYSTEM PROVIDED HISTORY: Abdominal pain, bleeding, history of neuroendocrine tumors, GI cancer.  TECHNOLOGIST PROVIDED HISTORY: Reason for exam:->Abdominal pain, bleeding, history of neuroendocrine tumors, GI cancer. Decision Support Exception->Emergency Medical Condition (MA) Reason for Exam: Abdominal pain, bleeding, history of neuroendocrine tumors, GI cancer Acuity: Acute Type of Exam: Initial FINDINGS: Lower Chest: Nodular focus along the right hemidiaphragm on series 3, image 20 measures 12 x 8 mm. Mitral annular calcifications. Prior sternotomy. Organs:  Status post cholecystectomy. The liver, spleen, kidneys, adrenals, and stomach are without acute process. Masslike lesion with peripheral calcifications which arises between the pancreatic head, IVC, and aorta measures 4.3 x 2.9 cm. No suspicious renal lesions, with subcentimeter hypodensities too small to characterize. Mildly distended bladder. GI/Bowel: Postoperative changes of partial right hemicolectomy. Wall thickening versus adherent hyperdense material is present in the proximal colon near the anastomosis site, such as on series 3, image 98. A small adjacent hyperdense focus is present posteriorly in the proximal colon, such as on series 3, image 90. Hypodense foci are also present within the stomach body along their posterior wall, such as on series 3, image 49. No bowel obstruction. Pelvis: Status post hysterectomy. No adnexal mass. Peritoneum/Retroperitoneum: Patent vasculature with severe atherosclerosis. No lymphadenopathy. No free air or free fluid. Bones/Soft Tissues: Superior endplate compression deformity is present at L1, with approximately 30% loss of vertebral body height. A retropulsed fragment along the superior aspect of the vertebral body measures 6 mm. 1. Scattered hyperdense material is present within the gastrointestinal tract, specifically in the proximal colon as well as within the body of the stomach along the posterior wall.   Their appearance is nonspecific and could represent ingested material.  Small foci of bleeding are less likely, but if there is continued clinical concern for acute GI bleeding, further evaluation with a GI bleed protocol CT angiogram or nuclear medicine tagged red blood cell scan could be considered. 2. Possible wall thickening versus hyperdense wall-adherent material in the proximal colon, adjacent to the anastomosis site. Consider correlation with colonoscopy. 3. Patent visceral arteries with significant diffuse atherosclerosis. 4. Moderate compression deformity at L1, which is subacute in appearance but new from 03/23/2021. There is an associated retropulsed fragment which measures 6 mm. 5. Stable size of a peripherally calcified mass posterior to the pancreatic head, measuring up to 4.0 cm. 6. 12 x 8 mm nodule at the right lung base is unchanged since at least 03/28/2019.      Egd    Result Date: 4/18/2021  No dictation     Colonoscopy    Result Date: 4/18/2021  No dictation   Discharge Time of 28 minutes    Electronically signed by Lorrie Esquivel MD on 4/28/2021 at 3:55 PM

## 2021-04-29 ENCOUNTER — TELEPHONE (OUTPATIENT)
Dept: ONCOLOGY | Age: 81
End: 2021-04-29

## 2021-04-29 NOTE — TELEPHONE ENCOUNTER
Called patient to advise her that Dr Abdirahman Tan wants her to come in today for lab and injection, appt made for today @ 1430, patient states understanding

## 2021-04-30 ENCOUNTER — TELEPHONE (OUTPATIENT)
Dept: ONCOLOGY | Age: 81
End: 2021-04-30

## 2021-04-30 ENCOUNTER — HOSPITAL ENCOUNTER (OUTPATIENT)
Dept: INFUSION THERAPY | Age: 81
Discharge: HOME | End: 2021-04-30
Payer: MEDICARE

## 2021-04-30 DIAGNOSIS — E34.0 CARCINOID SYNDROME (HCC): Primary | ICD-10-CM

## 2021-04-30 DIAGNOSIS — D69.3 IMMUNE THROMBOCYTOPENIC PURPURA (HCC): ICD-10-CM

## 2021-04-30 PROCEDURE — 96402 CHEMO HORMON ANTINEOPL SQ/IM: CPT

## 2021-04-30 PROCEDURE — 6360000002 HC RX W HCPCS: Performed by: INTERNAL MEDICINE

## 2021-04-30 RX ORDER — LANREOTIDE ACETATE 120 MG/.5ML
120 INJECTION SUBCUTANEOUS ONCE
Status: CANCELLED | OUTPATIENT
Start: 2021-05-28 | End: 2021-05-28

## 2021-04-30 RX ORDER — LANREOTIDE ACETATE 120 MG/.5ML
120 INJECTION SUBCUTANEOUS ONCE
Status: COMPLETED | OUTPATIENT
Start: 2021-04-30 | End: 2021-04-30

## 2021-04-30 RX ADMIN — LANREOTIDE ACETATE 120 MG: 120 INJECTION SUBCUTANEOUS at 14:30

## 2021-04-30 NOTE — TELEPHONE ENCOUNTER
Called patient to see why she didn't show yesterday for labs and injection, patient states that she just felt nervous yesterday and didn't feel like coming out, r/s for today and states she will call her daughter to bring her.

## 2021-04-30 NOTE — PROGRESS NOTES
Patient ambulated to treatment room. Gait slow and steady with assist of walker. C/O fatigue. Recently discharged from the hospital. Lanreotide injection given as ordered. Patient tolerated without any problems.  Appt. card given for appointment 5/6 exam.

## 2021-05-02 LAB
CULTURE: NORMAL
CULTURE: NORMAL
Lab: NORMAL
Lab: NORMAL
SPECIMEN: NORMAL
SPECIMEN: NORMAL

## 2021-05-04 NOTE — PROGRESS NOTES
Physician Progress Note      PATIENT:               Chanda Andersen  CSN #:                  [id number]  :                       1940  ADMIT DATE:       2021 11:56 AM  DISCH DATE:        2021 4:10 PM  RESPONDING  PROVIDER #:        Elaina Ortiz MD          QUERY TEXT:    Dear Dr. Jose Manuel Strickland,    Patient admitted with GI bleeding, noted to have hemorrhoids, gastritis,   duodenitis, and diverticulosis. If possible, please document in progress notes   and discharge summary the cause of the GI bleeding: The medical record reflects the following:  Risk Factors: Hx of GI bleed  Clinical Indicators:  op note per Dr. Raven Saini \"1. Mild superficial gastritis   and duodenitis. 2.  No evidence of an active upper GI bleeding lesion. \",     Colonoscopy op note on  per Dr. Raven Saini \" few diverticula were noted   scattered in the colon, but no polyps or  mass lesions were seen. No blood, recent or old, was seen in the lumen of the   colon. The colonoscope was retroflexed in the rectum and the anorectal   junction  was carefully examined and grade 1 to 2 internal hemorrhoids were   seen. \"  Treatment: EGD, colonoscopy, GI and general surgery consult, CBC, IV Protonix. Thank you,  HENRIK CerratoN, RN, [facility]  [phone]  Options provided:  -- GI bleeding due to gastritis  -- GI bleeding due to hemorrhoids  -- GI bleeding due to duodenitis  -- GI bleeding due to diverticulosis  -- Other - I will add my own diagnosis  -- Disagree - Not applicable / Not valid  -- Disagree - Clinically unable to determine / Unknown  -- Refer to Clinical Documentation Reviewer    PROVIDER RESPONSE TEXT:    This patient has GI bleeding due to diverticulosis.     Query created by: Bertha Tony on 5/3/2021 8:38 AM      Electronically signed by:  Elaina Ortiz MD 2021 11:32 AM

## 2021-05-13 ENCOUNTER — OFFICE VISIT (OUTPATIENT)
Dept: ORTHOPEDIC SURGERY | Age: 81
End: 2021-05-13

## 2021-05-13 VITALS
OXYGEN SATURATION: 99 % | BODY MASS INDEX: 23.92 KG/M2 | WEIGHT: 130 LBS | HEART RATE: 90 BPM | RESPIRATION RATE: 14 BRPM | HEIGHT: 62 IN

## 2021-05-13 DIAGNOSIS — Z98.890 S/P ORIF (OPEN REDUCTION INTERNAL FIXATION) FRACTURE: Primary | ICD-10-CM

## 2021-05-13 DIAGNOSIS — Z87.81 S/P ORIF (OPEN REDUCTION INTERNAL FIXATION) FRACTURE: Primary | ICD-10-CM

## 2021-05-13 DIAGNOSIS — Z09 POSTOP CHECK: ICD-10-CM

## 2021-05-13 PROCEDURE — 99024 POSTOP FOLLOW-UP VISIT: CPT | Performed by: ORTHOPAEDIC SURGERY

## 2021-05-13 NOTE — PATIENT INSTRUCTIONS
Work on ROM and strengthening exercises   Wear brace as needed  May take Ibuprofen or Motrin as needed  Weightbearing and activities as tolerated  Follow up

## 2021-05-13 NOTE — PROGRESS NOTES
5/13/2021   Chief Complaint   Patient presents with    Post-Op Check     left wrist ORIF DOS 2/17/21        History of Present Illness:                             Yecenia Martinez is a 79 y.o. female who returns today for follow-up of her left wrist open reduction and internal fixation. She has weaned herself out of the brace and has been using her hand for normal activities of daily living. She has still some aching soreness and mild swelling issues at the healing fracture site but denies any instability or severe pain. Patient returns to the office 3 months post operatively for a post operative follow up on her left wrist ORIF that was performed on 2/17/21. Patient has done well following surgery until a few days ago when she began to have severe, wrist pain along the ulnar and dorsal aspect of the left wrist with some mild swelling to the wrist. NKI reported with pain rated at a 5/10 today limiting her mobility of the wrist. She denies doing at home exercises, OTC pain relievers, or use of wrist brace at this time. She has full sensation within the hand and fingers without complication with performing ADLs. Medical History  Patient's medications, allergies, past medical, surgical, social and family histories were reviewed and updated as appropriate. Review of Systems:   Review of Systems                                            Examination:  General Exam:  Vitals: Pulse 90   Resp 14   Ht 5' 2\" (1.575 m)   Wt 130 lb (59 kg)   SpO2 99%   BMI 23.78 kg/m²    Physical Exam   Left upper extremity:  Well-healed surgical scar over the volar aspect of the distal forearm. Normal alignment of the wrist joint with mild swelling dorsally over the distal radius. No instability during stress manipulation and passive range of motion at the elbow wrist.  Strength is intact with wrist flexion and extension and finger flexion and extension. Sensation motor functions intact throughout.       Diagnostic testing:  X-rays reviewed in office, I independently reviewed the films in the office today:   Xr Wrist Left (min 3 Views)    Result Date: 5/13/2021  3 views of the left wrist show stable alignment of the distal radius status post of reduction and internal fixation. Stable alignment of the fracture site at the distal radius and hardware. There is consolidation of bony healing present the fracture site. Mild settling through the metaphyseal region. No violation of the articular surface at the distal radius of the hardware. Office Procedures:  No orders of the defined types were placed in this encounter. Assessment and Plan  1. Left wrist distal radius open reduction internal fixation    I reassured the patient that her x-ray shows good alignment and healing at the fracture site. I recommend that she advance her activities as tolerated and continue to work on stretching and advancement of her strengthening activities. We discussed the likelihood that she will continue to improve with her pain level and function at the hand over time. I have recommended the use of calcium and vitamin D to help with her bone density. She should follow-up here as needed if there are any issues or concerns.   I recommended over-the-counter bracing and compressive wraps as needed for activities if pain or swelling at the wrist.        Electronically signed by Adrienne Collado MD on 5/13/2021 at 1:26 PM

## 2021-05-24 NOTE — PROGRESS NOTES
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 Aranza Panchal is a 30-year-old very pleasant patient with medical history significant for hyperlipidemia, gastroesophageal reflux disease, coronary artery disease, depression and adjustment disorder, osteoporosis, diabetes mellitus, and history of small intestinal carcinoid tumor, status post surgery by Dr. Vidya Vanegas in , initially referred to me on 2014, for evaluation of thrombocytopenia. She stated that she was found to have thrombocytopenia on routine blood tests done on 2014. Repeat blood tests on 2014, showed persistent thrombocytopenia and she was referred to me for evaluation. She does not have leucopenia or anemia. She complains of severe tiredness and fatigue. Besides that she does not have any other significant symptoms. She denies bleeding diathesis too. Laboratory workups done on 2014, showed platelet count of 37, however, manual differential showed platelet count of 97. Her white blood cell count was 6.4 and hemoglobin was 12.2. Her LDH is mildly elevated (261), vitamin B12 normal (154), normal folate (more than 20), negative hepatitis panel, antinuclear antibody was not detected and negative rheumatoid factor and normal TSH (2.4). Since all the workups have been within normal range, I believe her mild thrombocytopenia is most likely due to immune thrombocytopenia. Ms. Aranza Panchal started following with Dr. Erik Langley at [facility] for immune thrombocytopenia.  Since her platelet count dropped below 20,000 per cubic millimeter, she was treated with prednisone. She responded well to prednisone; however, her platelet is always in her 40,000 range when she started tapering off the prednisone. She discussed about the splenectomy and Ms. Luis Alberto Tierney was not sure she really wanted to proceed with splenectomy at this moment. Since Ms. Diane's insurance does not cover [facility], she was referred back to me for continuation of care for her immune thrombocytopenia. Since Ms. Bowman thrombocytopenia has gotten better, I stopped prednisone since April 29, 2016. She has been under close observation since then. Ms. Luis Alberto Tierney was found to have suspicious lesion in her mid back by Dermatologist, Dr. Inez Hamman and she referred her to Dr. Loretta Wright at [facility] for further evaluation. She initially underwent wide excision and sentinel lymph node biopsy on April 14, 2017. The initial pathology revealed 4 mm in Breslows depth, Charles level IV, non-ulcerated, with 2 mitosis/mm2. 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 Since she has stage IIB malignant melanoma, I recommend for close observation only. She has been under close observation since then. She had PET-CT scan and it showed mesenteric calcified mass. She was subsequently referred to the Gastroenterologist for EUS since she had history of carcinoid tumor. Ms. Luis Alberto Tierney underwent endoscopic ultrasound by Dr. Gustavus Seip on May 10, 2017, and it showed a mass in the parapancreatic region, 3 cm in size. 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 The ki-67 was less than 2%. Tumor cells are positive for AE1/3, synaptophysin, and chromogranin.       She subsequently underwent nuclear PET neuroendocrine scan on July 21, 2017, and it showed a soft tissue mass in the pancreatic uncinate process with intense radiotracer activity, compatible with somatostatin receptor-avid malignancy. Paraesophageal lymph node in the chest at the level of the rafiq and aortocaval lymph node with intense radiotracer activity, compatible with somatostatin receptor-avid robbie metastasis. Since she was found to have metastatic lymph node involvement in intrathoracic and abdominal lymph nodes, she is not a surgical candidate. Tumor markers were reviewed and she was found to have mild elevation in chromogranin A (136) but her other tumor markers were within normal range. Since she has been having diarrhea (at least 5-6 times a day) and her tumors are octreotide avid, she was started with octreotide since September 1, 2017. She was started with hormone therapy, octreotide for tumor stabilization as well as for the symptom management (diarrhea). Since she lives in [facility], her case was subsequently referred to us for continuation of octreotide injection locally here in [facility]. Octreotide was started in our center since October 5, 2017. CT scan of the chest, abdomen and pelvis done on December 23, 2020 showed interval increase in size of a soft tissue mass with scattered calcifications posterior to the pancreatic head currently measuring up to 4 cm, previously 3.3 cm. No evidence of metastatic disease in the chest.    CT scan of the abdomen and pelvis done on April 27, 2021 showed a stable appearance of a pancreatic mass measuring 3.3 x 4 cm. On May 27, 2021, she presented to me for followup. I have been following Ms. Diane for immune thrombocytopenia, history of stage IIB malignant melanoma, and the recurrent carcinoid tumor. 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 She stated that her diarrhea is stable since starting the Lanreotid. She will be due for another Lanreotide injection today.  She still need to take lomotil 2 tablets BID as needed since she sometimes has diarrhea. I recommend her to continue with lanreotide for now. On today visit, I reviewed with her findings of CT scan of the abdomen and pelvis done on 4/27/21. Since pancreatic head mass is stable in size, will continue with observation. Malignancy associated pain - Her pain has been controlled well with Norco 5/325 mg every 6 hourly as needed basis. I will continue to follow her pain status closely. Her platelet count on 1/48/99 was 69,000/cumm. I will repeat all her labs today and I will follow up with results. Depression - stated that she still has depression. I recognized that she hasn't had bupropion and she is only on effexor. I will start bupropion today and sent the script for her. Hyperlipidemia - she is on lipitor. Hypertension, CAD - she is on aspirin, amlodipine and carvedilol. Recommend salt restriction. GERD - stable on pantoprazole. Diabetes - she is on metformin. Recommend ADA diet. Health maintenance - I recommend her to have age-appropriate cancer screening, exercise, low-fat and low-sodium diet. She has tiredness and fatigue. Besides that, she doesn't have any other significant new symptoms on today's visit. PAST MEDICAL HISTORY:  Significant for:  1. Hyperlipidemia. 2.         Diabetes mellitus. 3.         Coronary artery disease. 4.         Gastroesophageal reflux disease. 5.         History of depression and adjustment disorder. 6.         Osteoporosis. 7.         History of small intestinal cancer, status post surgery by Dr. Castro Arnold in [address] [place] [address]. PAST SURGICAL HISTORY:  Significant for:  1. Appendectomy in 1959.  2.         Coronary artery angioplasty in 1992. 3.         Small intestine cancer removal in 1998.  4.         Cholecystectomy in 1998.  5.         Open heart surgery in March 31, 2009. 6.         Melanoma surgery on October 10, 2011. 7.         Hysterectomy in 1996.     FAMILY HISTORY:  Significant for melanoma in her brother and sister. No other family history of cancer disease. SOCIAL HISTORY:  She denies smoking, alcohol drinking, and illicit drug abuse. She is  for 55 years and has four children. She is retiree from the [employer]. ALLERGIES:  No known drug allergies. Review of Systems: \"Per interval history; otherwise 10 point ROS is negative. \"  Her energy level is stable, appetite and sleep are fair. She denies fever, chills, night sweats, cough, shortness of breath, chest pain, hemoptysis or palpitations. Her bowels and bladder functions are normal. She denies nausea, vomiting, abdominal pain, constipation or dysuria. She has some loss of appetite or weight loss. She only has minimal loose stool. She denies neuropathy and she doesn't have bleeding or clotting disorder. She denies any pain in her body. She denies anxiety and she has depression. She doesn't have suicidal idea. The rests of the systems are unremarkable.      Vital Signs:  /67 (Site: Right Upper Arm, Position: Sitting, Cuff Size: Medium Adult)   Pulse 94   Temp 97.5 °F (36.4 °C) (Infrared)   Ht 5' 2\" (1.575 m)   Wt 117 lb 6.4 oz (53.3 kg)   SpO2 96%   BMI 21.47 kg/m²     Physical Exam:  CONSTITUTIONAL: alert, cooperative, awake, no apparent distress   EYES: pupils equal, round and reactive to light, sclera clear and conjunctiva normal  ENT: without obvious abnormality, normocephalic, atraumatic  NECK: supple, symmetrical, no jugular venous distension and no carotid bruits   HEMATOLOGIC/LYMPHATIC: no cervical, supraclavicular or axillary lymphadenopathy   LUNGS: VBS, no rhonchi, no increased work of breathing, no wheezes, clear to auscultation, no crackles,    CARDIOVASCULAR: regular rate and rhythm, normal S1 and S2, no murmur noted  ABDOMEN: soft, non-distended, normal bowel sounds x 4, non-tender, no masses palpated, no hepatosplenomegaly   MUSCULOSKELETAL: full range of motion noted, tone is normal  NEUROLOGIC: awake, alert, oriented to name, place and time. Motor skills grossly intact. SKIN: Normal skin color, texture, turgor and no jaundice. appears intact   EXTREMITIES:  no clubbing, no leg swelling, no LE edema, no cyanosis     Labs:  Hematology:  Lab Results   Component Value Date    WBC 8.7 04/28/2021    RBC 3.63 (L) 04/28/2021    HGB 9.4 (L) 04/28/2021    HCT 31.1 (L) 04/28/2021    MCV 85.7 04/28/2021    MCH 25.9 (L) 04/28/2021    MCHC 30.2 (L) 04/28/2021    RDW 15.3 (H) 04/28/2021    PLT 69 (L) 04/28/2021    MPV 11.4 (H) 04/26/2021    BANDSPCT 3 (L) 04/20/2021    SEGSPCT 71.8 (H) 04/28/2021    EOSRELPCT 6.1 (H) 04/28/2021    BASOPCT 0.6 04/28/2021    LYMPHOPCT 11.1 (L) 04/28/2021    MONOPCT 9.9 (H) 04/28/2021    BANDABS 0.94 04/20/2021    SEGSABS 6.2 04/28/2021    EOSABS 0.5 04/28/2021    BASOSABS 0.1 04/28/2021    LYMPHSABS 1.0 04/28/2021    MONOSABS 0.9 04/28/2021    DIFFTYPE AUTOMATED DIFFERENTIAL 04/28/2021    ANISOCYTOSIS 1+ 04/27/2021    POLYCHROM 1+ 03/29/2017    WBCMORP  04/17/2021     SLIDE SCANNED  MANUAL DIFFERENTIAL APPEARS TO MATCH AUTOMATED DIFFERENTIAL WELL     PLTM DECREASED 04/27/2021     No results found for: ESR  Chemistry:  Lab Results   Component Value Date     04/28/2021    K 3.3 (L) 04/28/2021     04/28/2021    CO2 27 04/28/2021    BUN 7 04/28/2021    CREATININE 0.9 04/28/2021    GLUCOSE 231 (H) 04/28/2021    CALCIUM 8.0 (L) 04/28/2021    PROT 6.0 (L) 04/27/2021    LABALBU 3.0 (L) 04/28/2021    BILITOT 0.4 04/27/2021    ALKPHOS 80 04/27/2021    AST 14 (L) 04/27/2021    ALT 8 (L) 04/27/2021    LABGLOM >60 04/28/2021    GFRAA >60 04/28/2021    PHOS 1.4 (L) 04/28/2021    MG 1.6 (L) 04/28/2021    POCGLU 185 (H) 04/28/2021     Lab Results   Component Value Date     03/30/2021    HOMOCYSTEINE (H) 10/15/2019     17.8  Risk of vascular disease   increases above 9 umol/L  and is significant >15 umol/L.        No components found for: LD  Lab Results   Component Value Date    TSHHS 2.480 11/24/2014    T4FREE 1.13 11/24/2014    FT3 2.9 11/24/2014     Immunology:  Lab Results   Component Value Date    PROT 6.0 (L) 04/27/2021    SPEP  04/17/2021     INTERPRETATION - Decreased albumin and total proteins. LP.    ALBUMINELP 3.0 (L) 04/17/2021    LABALPH 0.2 04/17/2021    LABALPH 0.5 04/17/2021    LABBETA 0.8 04/17/2021    GAMGLOB 0.8 04/17/2021     No results found for: Essence Ellsworth, KLFLCR  No results found for: B2M  Coagulation Panel:  Lab Results   Component Value Date    PROTIME 15.3 (H) 04/28/2021    INR 1.26 04/28/2021    APTT 33.1 04/28/2021     Anemia Panel:  Lab Results   Component Value Date    EPOMSQDK11 4594 (H) 04/17/2021    FOLATE >20.0 (H) 04/17/2021     Tumor Markers:  No results found for: , CEA, , LABCA2, PSA  Observations:  No data recorded      Assessment & Plan: Thrombocytopenia - most likely immune thrombocytopenia. Stage IIB malignant melanoma. Recurrent/metastatic small intestine carcinoid tumor. PLAN:  Ms. Veronica Ruelas has been followed for immune thrombocytopenia, history of stage IIB malignant melanoma, and the recurrent carcinoid tumor. CT scan of the chest, abdomen and pelvis done on December 23, 2020 showed interval increase in size of a soft tissue mass with scattered calcifications posterior to the pancreatic head currently measuring up to 4 cm, previously 3.3 cm. No evidence of metastatic disease in the chest.    CT scan of the abdomen and pelvis done on April 27, 2021 showed a stable appearance of a pancreatic mass measuring 3.3 x 4 cm. On May 27, 2021, she presented to me for followup. I have been following Ms. Diane for immune thrombocytopenia, history of stage IIB malignant melanoma, and the recurrent carcinoid tumor. 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 She stated that her diarrhea is stable since starting the Lanreotid.   She will be due for another Lanreotide injection today. She still need to take lomotil 2 tablets BID as needed since she sometimes has diarrhea. I recommend her to continue with lanreotide for now. On today visit, I reviewed with her findings of CT scan of the abdomen and pelvis done on 4/27/21. Since pancreatic head mass is stable in size, will continue with observation. Malignancy associated pain - Her pain has been controlled well with Norco 5/325 mg every 6 hourly as needed basis. I will continue to follow her pain status closely. Her platelet count on 0/23/29 was 69,000/cumm. I will repeat all her labs today and I will follow up with results. Depression - stated that she still has depression. I recognized that she hasn't had bupropion and she is only on effexor. I will start bupropion today and sent the script for her. Hyperlipidemia - she is on lipitor. Hypertension, CAD - she is on aspirin, amlodipine and carvedilol. Recommend salt restriction. GERD - stable on pantoprazole. Diabetes - she is on metformin. Recommend ADA diet. Health maintenance - I recommend her to have age-appropriate cancer screening, exercise, low-fat and low-sodium diet. I answered all her questions and concerns for today. I asked her to follow up with her primary care physician on regular basis. Recent imaging and labs were reviewed and discussed with the patient.

## 2021-05-27 ENCOUNTER — HOSPITAL ENCOUNTER (OUTPATIENT)
Dept: INFUSION THERAPY | Age: 81
Discharge: HOME | End: 2021-05-27
Payer: MEDICARE

## 2021-05-27 ENCOUNTER — OFFICE VISIT (OUTPATIENT)
Dept: ONCOLOGY | Age: 81
End: 2021-05-27
Payer: MEDICARE

## 2021-05-27 VITALS
BODY MASS INDEX: 21.6 KG/M2 | HEART RATE: 94 BPM | SYSTOLIC BLOOD PRESSURE: 134 MMHG | DIASTOLIC BLOOD PRESSURE: 67 MMHG | OXYGEN SATURATION: 96 % | WEIGHT: 117.4 LBS | HEIGHT: 62 IN | TEMPERATURE: 97.5 F

## 2021-05-27 DIAGNOSIS — E34.0 CARCINOID SYNDROME (HCC): Primary | ICD-10-CM

## 2021-05-27 LAB
ALBUMIN SERPL-MCNC: 4.3 GM/DL (ref 3.4–5)
ALP BLD-CCNC: 77 IU/L (ref 40–129)
ALT SERPL-CCNC: 10 U/L (ref 10–40)
ANION GAP SERPL CALCULATED.3IONS-SCNC: 12 MMOL/L (ref 4–16)
ANISOCYTOSIS: ABNORMAL
AST SERPL-CCNC: 16 IU/L (ref 15–37)
BASOPHILS ABSOLUTE: 0.2 K/CU MM
BASOPHILS RELATIVE PERCENT: 2 % (ref 0–1)
BILIRUB SERPL-MCNC: 0.3 MG/DL (ref 0–1)
BUN BLDV-MCNC: 10 MG/DL (ref 6–23)
CALCIUM SERPL-MCNC: 8.4 MG/DL (ref 8.3–10.6)
CHLORIDE BLD-SCNC: 100 MMOL/L (ref 99–110)
CO2: 23 MMOL/L (ref 21–32)
CREAT SERPL-MCNC: 1 MG/DL (ref 0.6–1.1)
DIFFERENTIAL TYPE: ABNORMAL
EOSINOPHILS ABSOLUTE: 0.4 K/CU MM
EOSINOPHILS RELATIVE PERCENT: 4 % (ref 0–3)
ERYTHROCYTE SEDIMENTATION RATE: 27 MM/HR (ref 0–30)
GFR AFRICAN AMERICAN: >60 ML/MIN/1.73M2
GFR NON-AFRICAN AMERICAN: 53 ML/MIN/1.73M2
GLUCOSE BLD-MCNC: 178 MG/DL (ref 70–99)
HCT VFR BLD CALC: 32.8 % (ref 37–47)
HEMOGLOBIN: 10.3 GM/DL (ref 12.5–16)
LACTATE DEHYDROGENASE: 254 IU/L (ref 120–246)
LYMPHOCYTES ABSOLUTE: 1.8 K/CU MM
LYMPHOCYTES RELATIVE PERCENT: 17 % (ref 24–44)
MCH RBC QN AUTO: 25.2 PG (ref 27–31)
MCHC RBC AUTO-ENTMCNC: 31.4 % (ref 32–36)
MCV RBC AUTO: 80.4 FL (ref 78–100)
MONOCYTES ABSOLUTE: 0.6 K/CU MM
MONOCYTES RELATIVE PERCENT: 6 % (ref 0–4)
PDW BLD-RTO: 16 % (ref 11.7–14.9)
PLATELET # BLD: 40 K/CU MM (ref 140–440)
PMV BLD AUTO: ABNORMAL FL (ref 7.5–11.1)
POTASSIUM SERPL-SCNC: 4.4 MMOL/L (ref 3.5–5.1)
RBC # BLD: 4.08 M/CU MM (ref 4.2–5.4)
RBC # BLD: ABNORMAL 10*6/UL
SEGMENTED NEUTROPHILS ABSOLUTE COUNT: 7.6 K/CU MM
SEGMENTED NEUTROPHILS RELATIVE PERCENT: 71 % (ref 36–66)
SODIUM BLD-SCNC: 135 MMOL/L (ref 135–145)
TOTAL PROTEIN: 6 GM/DL (ref 6.4–8.2)
WBC # BLD: 10.6 K/CU MM (ref 4–10.5)

## 2021-05-27 PROCEDURE — 4040F PNEUMOC VAC/ADMIN/RCVD: CPT | Performed by: INTERNAL MEDICINE

## 2021-05-27 PROCEDURE — 1090F PRES/ABSN URINE INCON ASSESS: CPT | Performed by: INTERNAL MEDICINE

## 2021-05-27 PROCEDURE — 1036F TOBACCO NON-USER: CPT | Performed by: INTERNAL MEDICINE

## 2021-05-27 PROCEDURE — G8400 PT W/DXA NO RESULTS DOC: HCPCS | Performed by: INTERNAL MEDICINE

## 2021-05-27 PROCEDURE — 1123F ACP DISCUSS/DSCN MKR DOCD: CPT | Performed by: INTERNAL MEDICINE

## 2021-05-27 PROCEDURE — 85007 BL SMEAR W/DIFF WBC COUNT: CPT

## 2021-05-27 PROCEDURE — 86316 IMMUNOASSAY TUMOR OTHER: CPT

## 2021-05-27 PROCEDURE — 85027 COMPLETE CBC AUTOMATED: CPT

## 2021-05-27 PROCEDURE — 85652 RBC SED RATE AUTOMATED: CPT

## 2021-05-27 PROCEDURE — G8420 CALC BMI NORM PARAMETERS: HCPCS | Performed by: INTERNAL MEDICINE

## 2021-05-27 PROCEDURE — 36415 COLL VENOUS BLD VENIPUNCTURE: CPT

## 2021-05-27 PROCEDURE — 96402 CHEMO HORMON ANTINEOPL SQ/IM: CPT

## 2021-05-27 PROCEDURE — G8427 DOCREV CUR MEDS BY ELIG CLIN: HCPCS | Performed by: INTERNAL MEDICINE

## 2021-05-27 PROCEDURE — 99214 OFFICE O/P EST MOD 30 MIN: CPT | Performed by: INTERNAL MEDICINE

## 2021-05-27 PROCEDURE — 83615 LACTATE (LD) (LDH) ENZYME: CPT

## 2021-05-27 PROCEDURE — 80053 COMPREHEN METABOLIC PANEL: CPT

## 2021-05-27 PROCEDURE — 6360000002 HC RX W HCPCS: Performed by: INTERNAL MEDICINE

## 2021-05-27 RX ORDER — LANREOTIDE ACETATE 120 MG/.5ML
120 INJECTION SUBCUTANEOUS ONCE
Status: COMPLETED | OUTPATIENT
Start: 2021-05-27 | End: 2021-05-27

## 2021-05-27 RX ORDER — OXYCODONE HYDROCHLORIDE 5 MG/1
1 TABLET ORAL 4 TIMES DAILY
Status: ON HOLD | COMMUNITY
Start: 2021-05-13 | End: 2022-09-27 | Stop reason: SDUPTHER

## 2021-05-27 RX ORDER — LANREOTIDE ACETATE 120 MG/.5ML
120 INJECTION SUBCUTANEOUS ONCE
Status: CANCELLED | OUTPATIENT
Start: 2021-05-28 | End: 2021-05-28

## 2021-05-27 RX ADMIN — LANREOTIDE ACETATE 120 MG: 120 INJECTION SUBCUTANEOUS at 15:28

## 2021-05-27 NOTE — PROGRESS NOTES
[address] [facility] [place] Rooming Questions  Patient: Curtis Anna  MRN: [id number]    Date: 5/27/2021        1. Do you have any new issues? yes - bruise on right foot and bruising easily all over body         2. Do you need any refills on medications?    no    3. Have you had any imaging done since your last visit? No\\es - [address]    4. Have you been hospitalized or seen in the emergency room since your last visit here?   yes - [address]    5. Did the patient have a depression screening completed today?  No    No data recorded     PHQ-9 Given to (if applicable):               PHQ-9 Score (if applicable):                     [] Positive     []  Negative              Does question #9 need addressed (if applicable)                     [] Yes    []  No               Sulema Dyer CMA

## 2021-05-27 NOTE — PROGRESS NOTES
Patient arrived to treatment suite from doctor appointment and lab for Lanreotide injection. No questions or concerns for the doctor at this time. Treatment approved and given subcutaneously in left buttock, band-aid applied. Cold spray utilized for comfort. Patient tolerated well. Left treatment suite ambulatory with walker. Discharge instructions and lab results provided. Schedule needs completed for next injection and a call to patient with the date and time.

## 2021-05-31 LAB — CHROMOGRANIN A: 241 NG/ML (ref 0–103)

## 2021-06-02 ENCOUNTER — HOSPITAL ENCOUNTER (OUTPATIENT)
Dept: NUCLEAR MEDICINE | Age: 81
Discharge: HOME | End: 2021-06-02
Payer: MEDICARE

## 2021-06-02 DIAGNOSIS — S32.019B: ICD-10-CM

## 2021-06-02 PROCEDURE — A9503 TC99M MEDRONATE: HCPCS | Performed by: PHYSICIAN ASSISTANT

## 2021-06-02 PROCEDURE — 3430000000 HC RX DIAGNOSTIC RADIOPHARMACEUTICAL: Performed by: PHYSICIAN ASSISTANT

## 2021-06-02 PROCEDURE — 78315 BONE IMAGING 3 PHASE: CPT

## 2021-06-02 RX ORDER — TC 99M MEDRONATE 20 MG/10ML
29.5 INJECTION, POWDER, LYOPHILIZED, FOR SOLUTION INTRAVENOUS
Status: COMPLETED | OUTPATIENT
Start: 2021-06-02 | End: 2021-06-02

## 2021-06-02 RX ADMIN — TC 99M MEDRONATE 29.5 MILLICURIE: 20 INJECTION, POWDER, LYOPHILIZED, FOR SOLUTION INTRAVENOUS at 08:20

## 2021-06-22 ENCOUNTER — HOSPITAL ENCOUNTER (OUTPATIENT)
Dept: INFUSION THERAPY | Age: 81
Discharge: HOME | End: 2021-06-22
Payer: MEDICARE

## 2021-06-22 DIAGNOSIS — E34.0 CARCINOID SYNDROME (HCC): Primary | ICD-10-CM

## 2021-06-22 DIAGNOSIS — D69.3 IMMUNE THROMBOCYTOPENIC PURPURA (HCC): ICD-10-CM

## 2021-06-22 LAB
ALBUMIN SERPL-MCNC: 4.6 GM/DL (ref 3.4–5)
ALP BLD-CCNC: 73 IU/L (ref 40–129)
ALT SERPL-CCNC: 10 U/L (ref 10–40)
ANION GAP SERPL CALCULATED.3IONS-SCNC: 13 MMOL/L (ref 4–16)
AST SERPL-CCNC: 15 IU/L (ref 15–37)
BASOPHILS ABSOLUTE: 0.1 K/CU MM
BASOPHILS RELATIVE PERCENT: 0.9 % (ref 0–1)
BILIRUB SERPL-MCNC: 0.4 MG/DL (ref 0–1)
BUN BLDV-MCNC: 15 MG/DL (ref 6–23)
CALCIUM SERPL-MCNC: 8.4 MG/DL (ref 8.3–10.6)
CHLORIDE BLD-SCNC: 99 MMOL/L (ref 99–110)
CO2: 24 MMOL/L (ref 21–32)
CREAT SERPL-MCNC: 1.3 MG/DL (ref 0.6–1.1)
DIFFERENTIAL TYPE: ABNORMAL
EOSINOPHILS ABSOLUTE: 0.4 K/CU MM
EOSINOPHILS RELATIVE PERCENT: 3.7 % (ref 0–3)
GFR AFRICAN AMERICAN: 48 ML/MIN/1.73M2
GFR NON-AFRICAN AMERICAN: 40 ML/MIN/1.73M2
GLUCOSE BLD-MCNC: 204 MG/DL (ref 70–99)
HCT VFR BLD CALC: 31.9 % (ref 37–47)
HEMOGLOBIN: 10.1 GM/DL (ref 12.5–16)
LACTATE DEHYDROGENASE: 248 IU/L (ref 120–246)
LYMPHOCYTES ABSOLUTE: 1.1 K/CU MM
LYMPHOCYTES RELATIVE PERCENT: 11.8 % (ref 24–44)
MCH RBC QN AUTO: 25 PG (ref 27–31)
MCHC RBC AUTO-ENTMCNC: 31.7 % (ref 32–36)
MCV RBC AUTO: 79 FL (ref 78–100)
MONOCYTES ABSOLUTE: 0.8 K/CU MM
MONOCYTES RELATIVE PERCENT: 8 % (ref 0–4)
PDW BLD-RTO: 16.3 % (ref 11.7–14.9)
PLATELET # BLD: 47 K/CU MM (ref 140–440)
PMV BLD AUTO: ABNORMAL FL (ref 7.5–11.1)
POTASSIUM SERPL-SCNC: 3.9 MMOL/L (ref 3.5–5.1)
RBC # BLD: 4.04 M/CU MM (ref 4.2–5.4)
SEGMENTED NEUTROPHILS ABSOLUTE COUNT: 7.3 K/CU MM
SEGMENTED NEUTROPHILS RELATIVE PERCENT: 75.6 % (ref 36–66)
SODIUM BLD-SCNC: 136 MMOL/L (ref 135–145)
TOTAL PROTEIN: 6.6 GM/DL (ref 6.4–8.2)
WBC # BLD: 9.6 K/CU MM (ref 4–10.5)

## 2021-06-22 PROCEDURE — 6360000002 HC RX W HCPCS: Performed by: INTERNAL MEDICINE

## 2021-06-22 PROCEDURE — 80053 COMPREHEN METABOLIC PANEL: CPT

## 2021-06-22 PROCEDURE — 83615 LACTATE (LD) (LDH) ENZYME: CPT

## 2021-06-22 PROCEDURE — 36415 COLL VENOUS BLD VENIPUNCTURE: CPT

## 2021-06-22 PROCEDURE — 96372 THER/PROPH/DIAG INJ SC/IM: CPT

## 2021-06-22 PROCEDURE — 85025 COMPLETE CBC W/AUTO DIFF WBC: CPT

## 2021-06-22 RX ORDER — LANREOTIDE ACETATE 120 MG/.5ML
120 INJECTION SUBCUTANEOUS ONCE
Status: CANCELLED | OUTPATIENT
Start: 2021-06-24 | End: 2021-06-24

## 2021-06-22 RX ORDER — LANREOTIDE ACETATE 120 MG/.5ML
120 INJECTION SUBCUTANEOUS ONCE
Status: COMPLETED | OUTPATIENT
Start: 2021-06-22 | End: 2021-06-22

## 2021-06-22 RX ADMIN — LANREOTIDE ACETATE 120 MG: 120 INJECTION SUBCUTANEOUS at 15:00

## 2021-06-22 NOTE — PROGRESS NOTES
Patient arrived to treatment suite from lab for Lanreotide injection. No questions or concerns for the doctor at this time. Treatment approved and given as deep subcutaneous in right buttock. Cold spray utilized for comfort and band-aid applied. Patient tolerated well. Left treatment suite ambulatory. Discharge instructions provided.

## 2021-08-04 ENCOUNTER — TELEPHONE (OUTPATIENT)
Dept: INFUSION THERAPY | Age: 81
End: 2021-08-04

## 2021-08-04 NOTE — TELEPHONE ENCOUNTER
Pt LVM concerning her missed injection appointment due to her husbands passing. Returned her call & date given which was already scheduled for her ov + injection on 8/17 @ 1:45; pt voiced understanding.

## 2021-08-13 RX ORDER — LANREOTIDE ACETATE 120 MG/.5ML
120 INJECTION SUBCUTANEOUS ONCE
Status: CANCELLED | OUTPATIENT
Start: 2021-08-13 | End: 2021-08-13

## 2021-08-17 ENCOUNTER — OFFICE VISIT (OUTPATIENT)
Dept: ONCOLOGY | Age: 81
End: 2021-08-17
Payer: MEDICARE

## 2021-08-17 ENCOUNTER — CLINICAL DOCUMENTATION (OUTPATIENT)
Dept: ONCOLOGY | Age: 81
End: 2021-08-17

## 2021-08-17 ENCOUNTER — HOSPITAL ENCOUNTER (OUTPATIENT)
Dept: INFUSION THERAPY | Age: 81
Discharge: HOME | End: 2021-08-17
Payer: MEDICARE

## 2021-08-17 VITALS
TEMPERATURE: 97.7 F | RESPIRATION RATE: 16 BRPM | SYSTOLIC BLOOD PRESSURE: 109 MMHG | HEIGHT: 62 IN | WEIGHT: 111.6 LBS | OXYGEN SATURATION: 96 % | HEART RATE: 82 BPM | DIASTOLIC BLOOD PRESSURE: 55 MMHG | BODY MASS INDEX: 20.54 KG/M2

## 2021-08-17 DIAGNOSIS — D69.3 IMMUNE THROMBOCYTOPENIC PURPURA (HCC): Primary | ICD-10-CM

## 2021-08-17 DIAGNOSIS — E34.0 CARCINOID SYNDROME (HCC): ICD-10-CM

## 2021-08-17 LAB
ALBUMIN SERPL-MCNC: 4.4 GM/DL (ref 3.4–5)
ALP BLD-CCNC: 77 IU/L (ref 40–129)
ALT SERPL-CCNC: 9 U/L (ref 10–40)
ANION GAP SERPL CALCULATED.3IONS-SCNC: 11 MMOL/L (ref 4–16)
AST SERPL-CCNC: 16 IU/L (ref 15–37)
BASOPHILS ABSOLUTE: 0 K/CU MM
BASOPHILS RELATIVE PERCENT: 0.5 % (ref 0–1)
BILIRUB SERPL-MCNC: 0.5 MG/DL (ref 0–1)
BUN BLDV-MCNC: 26 MG/DL (ref 6–23)
CALCIUM SERPL-MCNC: 8.7 MG/DL (ref 8.3–10.6)
CHLORIDE BLD-SCNC: 97 MMOL/L (ref 99–110)
CO2: 22 MMOL/L (ref 21–32)
CREAT SERPL-MCNC: 1.3 MG/DL (ref 0.6–1.1)
DIFFERENTIAL TYPE: ABNORMAL
EOSINOPHILS ABSOLUTE: 0.2 K/CU MM
EOSINOPHILS RELATIVE PERCENT: 2.4 % (ref 0–3)
ERYTHROCYTE SEDIMENTATION RATE: 17 MM/HR (ref 0–30)
FERRITIN: 49 NG/ML (ref 15–150)
FOLATE: >20 NG/ML (ref 3.1–17.5)
GFR AFRICAN AMERICAN: 48 ML/MIN/1.73M2
GFR NON-AFRICAN AMERICAN: 39 ML/MIN/1.73M2
GLUCOSE BLD-MCNC: 112 MG/DL (ref 70–99)
HCT VFR BLD CALC: 30.3 % (ref 37–47)
HEMOGLOBIN: 9.6 GM/DL (ref 12.5–16)
IRON: 41 UG/DL (ref 37–145)
LACTATE DEHYDROGENASE: 221 IU/L (ref 120–246)
LYMPHOCYTES ABSOLUTE: 0.8 K/CU MM
LYMPHOCYTES RELATIVE PERCENT: 9.2 % (ref 24–44)
MCH RBC QN AUTO: 25 PG (ref 27–31)
MCHC RBC AUTO-ENTMCNC: 31.7 % (ref 32–36)
MCV RBC AUTO: 78.9 FL (ref 78–100)
MONOCYTES ABSOLUTE: 0.6 K/CU MM
MONOCYTES RELATIVE PERCENT: 7 % (ref 0–4)
PCT TRANSFERRIN: 10 % (ref 10–44)
PDW BLD-RTO: 17.6 % (ref 11.7–14.9)
PLATELET # BLD: 48 K/CU MM (ref 140–440)
PMV BLD AUTO: ABNORMAL FL (ref 7.5–11.1)
POTASSIUM SERPL-SCNC: 3.7 MMOL/L (ref 3.5–5.1)
RBC # BLD: 3.84 M/CU MM (ref 4.2–5.4)
SEGMENTED NEUTROPHILS ABSOLUTE COUNT: 7 K/CU MM
SEGMENTED NEUTROPHILS RELATIVE PERCENT: 80.9 % (ref 36–66)
SODIUM BLD-SCNC: 130 MMOL/L (ref 135–145)
TOTAL IRON BINDING CAPACITY: 423 UG/DL (ref 250–450)
TOTAL PROTEIN: 6.6 GM/DL (ref 6.4–8.2)
TSH HIGH SENSITIVITY: 2.35 UIU/ML (ref 0.27–4.2)
UNSATURATED IRON BINDING CAPACITY: 382 UG/DL (ref 110–370)
VITAMIN B-12: 523.2 PG/ML (ref 211–911)
WBC # BLD: 8.7 K/CU MM (ref 4–10.5)

## 2021-08-17 PROCEDURE — 1036F TOBACCO NON-USER: CPT | Performed by: INTERNAL MEDICINE

## 2021-08-17 PROCEDURE — 99214 OFFICE O/P EST MOD 30 MIN: CPT | Performed by: INTERNAL MEDICINE

## 2021-08-17 PROCEDURE — 84443 ASSAY THYROID STIM HORMONE: CPT

## 2021-08-17 PROCEDURE — 83550 IRON BINDING TEST: CPT

## 2021-08-17 PROCEDURE — 1090F PRES/ABSN URINE INCON ASSESS: CPT | Performed by: INTERNAL MEDICINE

## 2021-08-17 PROCEDURE — 83615 LACTATE (LD) (LDH) ENZYME: CPT

## 2021-08-17 PROCEDURE — 36415 COLL VENOUS BLD VENIPUNCTURE: CPT

## 2021-08-17 PROCEDURE — 6360000002 HC RX W HCPCS: Performed by: INTERNAL MEDICINE

## 2021-08-17 PROCEDURE — 80053 COMPREHEN METABOLIC PANEL: CPT

## 2021-08-17 PROCEDURE — 96402 CHEMO HORMON ANTINEOPL SQ/IM: CPT

## 2021-08-17 PROCEDURE — G8400 PT W/DXA NO RESULTS DOC: HCPCS | Performed by: INTERNAL MEDICINE

## 2021-08-17 PROCEDURE — 85025 COMPLETE CBC W/AUTO DIFF WBC: CPT

## 2021-08-17 PROCEDURE — 82728 ASSAY OF FERRITIN: CPT

## 2021-08-17 PROCEDURE — 85652 RBC SED RATE AUTOMATED: CPT

## 2021-08-17 PROCEDURE — G8420 CALC BMI NORM PARAMETERS: HCPCS | Performed by: INTERNAL MEDICINE

## 2021-08-17 PROCEDURE — 1123F ACP DISCUSS/DSCN MKR DOCD: CPT | Performed by: INTERNAL MEDICINE

## 2021-08-17 PROCEDURE — 83540 ASSAY OF IRON: CPT

## 2021-08-17 PROCEDURE — 82607 VITAMIN B-12: CPT

## 2021-08-17 PROCEDURE — 82746 ASSAY OF FOLIC ACID SERUM: CPT

## 2021-08-17 PROCEDURE — 86316 IMMUNOASSAY TUMOR OTHER: CPT

## 2021-08-17 PROCEDURE — G8427 DOCREV CUR MEDS BY ELIG CLIN: HCPCS | Performed by: INTERNAL MEDICINE

## 2021-08-17 PROCEDURE — 4040F PNEUMOC VAC/ADMIN/RCVD: CPT | Performed by: INTERNAL MEDICINE

## 2021-08-17 RX ORDER — DIPHENOXYLATE HYDROCHLORIDE AND ATROPINE SULFATE 2.5; .025 MG/1; MG/1
1 TABLET ORAL 4 TIMES DAILY PRN
COMMUNITY
Start: 2021-08-12 | End: 2021-08-17

## 2021-08-17 RX ORDER — ACYCLOVIR 800 MG/1
TABLET ORAL
Status: ON HOLD | COMMUNITY
Start: 2021-07-13 | End: 2022-09-27 | Stop reason: HOSPADM

## 2021-08-17 RX ORDER — DEXAMETHASONE 2 MG/1
2 TABLET ORAL
Qty: 30 TABLET | Refills: 0 | Status: SHIPPED | OUTPATIENT
Start: 2021-08-17 | End: 2021-11-17

## 2021-08-17 RX ORDER — DIPHENOXYLATE HYDROCHLORIDE AND ATROPINE SULFATE 2.5; .025 MG/1; MG/1
TABLET ORAL
COMMUNITY
Start: 2021-08-12 | End: 2021-11-09 | Stop reason: SDUPTHER

## 2021-08-17 RX ORDER — LANREOTIDE ACETATE 120 MG/.5ML
120 INJECTION SUBCUTANEOUS ONCE
Status: CANCELLED | OUTPATIENT
Start: 2021-09-14 | End: 2021-09-14

## 2021-08-17 RX ORDER — FAMOTIDINE 20 MG/1
TABLET, FILM COATED ORAL
Status: ON HOLD | COMMUNITY
Start: 2021-06-10 | End: 2022-09-27 | Stop reason: HOSPADM

## 2021-08-17 RX ORDER — LANREOTIDE ACETATE 120 MG/.5ML
120 INJECTION SUBCUTANEOUS ONCE
Status: COMPLETED | OUTPATIENT
Start: 2021-08-17 | End: 2021-08-17

## 2021-08-17 RX ORDER — PROMETHAZINE HYDROCHLORIDE 25 MG/1
25 TABLET ORAL EVERY 6 HOURS PRN
Status: ON HOLD | COMMUNITY
Start: 2021-08-12 | End: 2022-09-27 | Stop reason: SDUPTHER

## 2021-08-17 RX ADMIN — LANREOTIDE ACETATE 120 MG: 120 INJECTION SUBCUTANEOUS at 14:29

## 2021-08-17 ASSESSMENT — PATIENT HEALTH QUESTIONNAIRE - PHQ9
SUM OF ALL RESPONSES TO PHQ9 QUESTIONS 1 & 2: 0
SUM OF ALL RESPONSES TO PHQ QUESTIONS 1-9: 0
SUM OF ALL RESPONSES TO PHQ QUESTIONS 1-9: 0
1. LITTLE INTEREST OR PLEASURE IN DOING THINGS: 0
2. FEELING DOWN, DEPRESSED OR HOPELESS: 0
SUM OF ALL RESPONSES TO PHQ QUESTIONS 1-9: 0

## 2021-08-17 NOTE — PROGRESS NOTES
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 Uriel Montelongo is a 21-year-old very pleasant patient with medical history significant for hyperlipidemia, gastroesophageal reflux disease, coronary artery disease, depression and adjustment disorder, osteoporosis, diabetes mellitus, and history of small intestinal carcinoid tumor, status post surgery by Dr. Annelise Hodges in , initially referred to me on 2014, for evaluation of thrombocytopenia. She stated that she was found to have thrombocytopenia on routine blood tests done on 2014. Repeat blood tests on 2014, showed persistent thrombocytopenia and she was referred to me for evaluation. She does not have leucopenia or anemia. She complains of severe tiredness and fatigue. Besides that she does not have any other significant symptoms. She denies bleeding diathesis too. Laboratory workups done on 2014, showed platelet count of 37, however, manual differential showed platelet count of 97. Her white blood cell count was 6.4 and hemoglobin was 12.2. Her LDH is mildly elevated (261), vitamin B12 normal (154), normal folate (more than 20), negative hepatitis panel, antinuclear antibody was not detected and negative rheumatoid factor and normal TSH (2.4). Since all the workups have been within normal range, I believe her mild thrombocytopenia is most likely due to immune thrombocytopenia. Ms. Uriel Montelongo started following with Dr. Santhosh Araujo at [facility] for immune thrombocytopenia.  Since her platelet count dropped below 20,000 per cubic millimeter, she was treated with prednisone. She responded well to prednisone; however, her platelet is always in her 40,000 range when she started tapering off the prednisone. She discussed about the splenectomy and Ms. Kalee Agrawal was not sure she really wanted to proceed with splenectomy at this moment. Since Ms. Diane's insurance does not cover [facility], she was referred back to me for continuation of care for her immune thrombocytopenia. Since Ms. Bowman thrombocytopenia has gotten better, I stopped prednisone since April 29, 2016. She has been under close observation since then. Ms. Kalee Agrawal was found to have suspicious lesion in her mid back by Dermatologist, Dr. Brandon Saab and she referred her to Dr. Ge Morin at [facility] for further evaluation. She initially underwent wide excision and sentinel lymph node biopsy on April 14, 2017. The initial pathology revealed 4 mm in Breslows depth, Charles level IV, non-ulcerated, with 2 mitosis/mm2. 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 Since she has stage IIB malignant melanoma, I recommend for close observation only. She has been under close observation since then. She had PET-CT scan and it showed mesenteric calcified mass. She was subsequently referred to the Gastroenterologist for EUS since she had history of carcinoid tumor. Ms. Kalee Agrawal underwent endoscopic ultrasound by Dr. Kandi Kim on May 10, 2017, and it showed a mass in the parapancreatic region, 3 cm in size. 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 The ki-67 was less than 2%. Tumor cells are positive for AE1/3, synaptophysin, and chromogranin.       She subsequently underwent nuclear PET neuroendocrine scan on July 21, 2017, and it showed a soft tissue mass in the pancreatic uncinate process with intense diarrhea. I recommend her to continue with lanreotide for now. Since pancreatic head mass is stable in size on 4/27/21 CT scan, I will continue with observation. Malignancy associated significant fatigue - I will start dexamethasone 2 mg daily for 30 days. Will evaluate her fatigue again in 3 months. Malignancy associated pain - Her pain has been controlled well with Norco 5/325 mg every 6 hourly as needed basis. I will continue to follow her pain status closely. Her platelet count on 4/22/62 was 69,000/cumm. I will repeat all her labs today and I will follow up with results. Depression - stated that she still has depression. I recognized that she hasn't had bupropion and she is only on effexor. I will start bupropion today and sent the script for her. Hyperlipidemia - she is on lipitor. Hypertension, CAD - she is on aspirin, amlodipine and carvedilol. Recommend salt restriction. GERD - stable on pantoprazole. Diabetes - she is on metformin. Recommend ADA diet. Health maintenance - I recommend her to have age-appropriate cancer screening, exercise, low-fat and low-sodium diet. She has tiredness and fatigue. Besides that, she doesn't have any other significant new symptoms on today's visit. PAST MEDICAL HISTORY:  Significant for:  1. Hyperlipidemia. 2.         Diabetes mellitus. 3.         Coronary artery disease. 4.         Gastroesophageal reflux disease. 5.         History of depression and adjustment disorder. 6.         Osteoporosis. 7.         History of small intestinal cancer, status post surgery by Dr. Zhen Coronado in [address]. PAST SURGICAL HISTORY:  Significant for:  1. Appendectomy in 1959.  2.         Coronary artery angioplasty in 1992. 3.         Small intestine cancer removal in 1998.  4.         Cholecystectomy in 1998.  5.         Open heart surgery in March 31, 2009. 6.         Melanoma surgery on October 10, 2011.   7. Hysterectomy in 1996. FAMILY HISTORY:  Significant for melanoma in her brother and sister. No other family history of cancer disease. SOCIAL HISTORY:  She denies smoking, alcohol drinking, and illicit drug abuse. She is  for 55 years and has four children. She is retiree from the "[employer]. ALLERGIES:  No known drug allergies. Review of Systems: \"Per interval history; otherwise 10 point ROS is negative. \"  Her energy level is okay, appetite and sleep are fair. She denies fever, chills, night sweats, cough, shortness of breath, chest pain, hemoptysis or palpitations. Her bowels and bladder functions are normal. She denies nausea, vomiting, abdominal pain, constipation or dysuria. She has some loss of appetite and weight loss. She only has minimal loose stool. She denies neuropathy and she doesn't have bleeding or clotting disorder. She denies any pain in her body. Denies anxiety and she has depression. She doesn't ahve suicidal idea. The rests of the systems are unremarkable.      Vital Signs:  BP (!) 109/55 (Site: Right Upper Arm, Position: Sitting, Cuff Size: Medium Adult)   Pulse 82   Temp 97.7 °F (36.5 °C) (Infrared)   Resp 16   Ht 5' 2\" (1.575 m)   Wt 111 lb 9.6 oz (50.6 kg)   SpO2 96%   BMI 20.41 kg/m²     Physical Exam:  CONSTITUTIONAL: alert, cooperative, awake, no apparent distress   EYES: pupils equal, round and reactive to light, sclera clear and conjunctiva normal  ENT: without obvious abnormality, normocephalic, atraumatic  NECK: supple, symmetrical, no jugular venous distension and no carotid bruits   HEMATOLOGIC/LYMPHATIC: no cervical, supraclavicular or axillary lymphadenopathy   LUNGS: VBS, no crackles, no rhonchi, no increased work of breathing, no wheezes, clear to auscultation,      CARDIOVASCULAR: regular rate and rhythm, normal S1 and S2, no murmur noted  ABDOMEN: soft, non-distended, normal bowel sounds x 4, non-tender, no masses palpated, no hepatosplenomegaly   MUSCULOSKELETAL: full range of motion noted, tone is normal  NEUROLOGIC: awake, alert, oriented to name, place and time. Motor skills grossly intact. SKIN: Normal skin color, texture, turgor and no jaundice.  appears intact   EXTREMITIES: no cyanosis, no leg swelling, no clubbing, no LE edema,      Labs:  Hematology:  Lab Results   Component Value Date    WBC 8.7 08/17/2021    RBC 3.84 (L) 08/17/2021    HGB 9.6 (L) 08/17/2021    HCT 30.3 (L) 08/17/2021    MCV 78.9 08/17/2021    MCH 25.0 (L) 08/17/2021    MCHC 31.7 (L) 08/17/2021    RDW 17.6 (H) 08/17/2021    PLT 48 (L) 08/17/2021    MPV INSTRUMENT UNABLE TO MEASURE OR CALCULATE. 08/17/2021    BANDSPCT 3 (L) 04/20/2021    SEGSPCT 80.9 (H) 08/17/2021    EOSRELPCT 2.4 08/17/2021    BASOPCT 0.5 08/17/2021    LYMPHOPCT 9.2 (L) 08/17/2021    MONOPCT 7.0 (H) 08/17/2021    BANDABS 0.94 04/20/2021    SEGSABS 7.0 08/17/2021    EOSABS 0.2 08/17/2021    BASOSABS 0.0 08/17/2021    LYMPHSABS 0.8 08/17/2021    MONOSABS 0.6 08/17/2021    DIFFTYPE AUTOMATED DIFFERENTIAL 08/17/2021    ANISOCYTOSIS 1+ 05/27/2021    POLYCHROM 1+ 03/29/2017    WBCMORP  04/17/2021     SLIDE SCANNED  MANUAL DIFFERENTIAL APPEARS TO MATCH AUTOMATED DIFFERENTIAL WELL     PLTM DECREASED 04/27/2021     Lab Results   Component Value Date    ESR 27 05/27/2021     Chemistry:  Lab Results   Component Value Date     06/22/2021    K 3.9 06/22/2021    CL 99 06/22/2021    CO2 24 06/22/2021    BUN 15 06/22/2021    CREATININE 1.3 (H) 06/22/2021    GLUCOSE 204 (H) 06/22/2021    CALCIUM 8.4 06/22/2021    PROT 6.6 06/22/2021    LABALBU 4.6 06/22/2021    BILITOT 0.4 06/22/2021    ALKPHOS 73 06/22/2021    AST 15 06/22/2021    ALT 10 06/22/2021    LABGLOM 40 (L) 06/22/2021    GFRAA 48 (L) 06/22/2021    PHOS 1.4 (L) 04/28/2021    MG 1.6 (L) 04/28/2021    POCGLU 185 (H) 04/28/2021     Lab Results   Component Value Date     (H) 06/22/2021    HOMOCYSTEINE (H) 10/15/2019     17.8  Risk of vascular disease   increases above 9 umol/L  and is significant >15 umol/L. No components found for: LD  Lab Results   Component Value Date    TSHHS 2.480 11/24/2014    T4FREE 1.13 11/24/2014    FT3 2.9 11/24/2014     Immunology:  Lab Results   Component Value Date    PROT 6.6 06/22/2021    SPEP  04/17/2021     INTERPRETATION - Decreased albumin and total proteins. LP.    ALBUMINELP 3.0 (L) 04/17/2021    LABALPH 0.2 04/17/2021    LABALPH 0.5 04/17/2021    LABBETA 0.8 04/17/2021    GAMGLOB 0.8 04/17/2021     No results found for: Chastity Brandon, KLFLCR  No results found for: B2M  Coagulation Panel:  Lab Results   Component Value Date    PROTIME 15.3 (H) 04/28/2021    INR 1.26 04/28/2021    APTT 33.1 04/28/2021     Anemia Panel:  Lab Results   Component Value Date    FQVNKSZD67 6436 (H) 04/17/2021    FOLATE >20.0 (H) 04/17/2021     Tumor Markers:  No results found for: , CEA, , LABCA2, PSA  Observations:  PHQ-9 Total Score: 0 (8/17/2021  2:05 PM)      Assessment & Plan: Thrombocytopenia - most likely immune thrombocytopenia. Stage IIB malignant melanoma. Recurrent/metastatic small intestine carcinoid tumor. PLAN:  Ms. Kalee Agrawal has been followed for immune thrombocytopenia, history of stage IIB malignant melanoma, and the recurrent carcinoid tumor. CT scan of the chest, abdomen and pelvis done on December 23, 2020 showed interval increase in size of a soft tissue mass with scattered calcifications posterior to the pancreatic head currently measuring up to 4 cm, previously 3.3 cm. No evidence of metastatic disease in the chest.    CT scan of the abdomen and pelvis done on April 27, 2021 showed a stable appearance of a pancreatic mass measuring 3.3 x 4 cm. On August 17, 2021, she presented to me for followup. I have been following Ms. Diane for immune thrombocytopenia, history of stage IIB malignant melanoma, and the recurrent carcinoid tumor.  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 She stated that her diarrhea is stable since starting the Lanreotid. She will be due for another Lanreotide injection today. She still need to take lomotil 2 tablets BID as needed since she sometimes has diarrhea. I recommend her to continue with lanreotide for now. Since pancreatic head mass is stable in size on 4/27/21 CT scan, I will continue with observation. Malignancy associated significant fatigue - I will start dexamethasone 2 mg daily for 30 days. Will evaluate her fatigue again in 3 months. Malignancy associated pain - Her pain has been controlled well with Norco 5/325 mg every 6 hourly as needed basis. I will continue to follow her pain status closely. Her platelet count on 4/03/21 was 69,000/cumm. I will repeat all her labs today and I will follow up with results. Depression - stated that she still has depression. I recognized that she hasn't had bupropion and she is only on effexor. I will start bupropion today and sent the script for her. Hyperlipidemia - she is on lipitor. Hypertension, CAD - she is on aspirin, amlodipine and carvedilol. Recommend salt restriction. GERD - stable on pantoprazole. Diabetes - she is on metformin. Recommend ADA diet. Health maintenance - I recommend her to have age-appropriate cancer screening, exercise, low-fat and low-sodium diet. I answered all her questions and concerns for today. I asked her to follow up with her primary care physician on regular basis. Recent imaging and labs were reviewed and discussed with the patient.

## 2021-08-17 NOTE — PROGRESS NOTES
[place] Rooming Questions  Patient: Karen Rodriguez  MRN: [id number]    Date: 8/17/2021        1. Do you have any new issues?   no         2. Do you need any refills on medications?    no    3. Have you had any imaging done since your last visit?   no    4. Have you been hospitalized or seen in the emergency room since your last visit here?   no    5. Did the patient have a depression screening completed today?  Yes    PHQ-9 Total Score: 0 (8/17/2021  2:05 PM)       PHQ-9 Given to (if applicable):               PHQ-9 Score (if applicable):                     [] Positive     []  Negative              Does question #9 need addressed (if applicable)                     [] Yes    []  No               Jeromy Stanton, CMA

## 2021-08-19 ENCOUNTER — TELEPHONE (OUTPATIENT)
Dept: ONCOLOGY | Age: 81
End: 2021-08-19

## 2021-08-19 RX ORDER — LANOLIN ALCOHOL/MO/W.PET/CERES
325 CREAM (GRAM) TOPICAL
Qty: 30 TABLET | Refills: 5 | Status: SHIPPED | OUTPATIENT
Start: 2021-08-19 | End: 2021-09-10

## 2021-08-19 NOTE — TELEPHONE ENCOUNTER
Attempted to call pt regarding low iron and the need to take an iron supplement. Attempted to leave message but several clicking sounds heard so message may not have been received.

## 2021-08-19 NOTE — TELEPHONE ENCOUNTER
Spoke with pt regarding lab results and explained the need for oral iron supplementation. Pt expressed understanding.

## 2021-08-20 DIAGNOSIS — R53.0 NEOPLASTIC MALIGNANT RELATED FATIGUE: Primary | ICD-10-CM

## 2021-08-21 LAB — CHROMOGRANIN A: 382 NG/ML (ref 0–103)

## 2021-09-14 ENCOUNTER — HOSPITAL ENCOUNTER (OUTPATIENT)
Dept: INFUSION THERAPY | Age: 81
Discharge: HOME | End: 2021-09-14
Payer: MEDICARE

## 2021-09-14 DIAGNOSIS — E34.0 CARCINOID SYNDROME (HCC): Primary | ICD-10-CM

## 2021-09-14 PROCEDURE — 96372 THER/PROPH/DIAG INJ SC/IM: CPT

## 2021-09-14 PROCEDURE — 6360000002 HC RX W HCPCS: Performed by: INTERNAL MEDICINE

## 2021-09-14 RX ORDER — LANREOTIDE ACETATE 120 MG/.5ML
120 INJECTION SUBCUTANEOUS ONCE
Status: CANCELLED | OUTPATIENT
Start: 2021-10-12 | End: 2021-10-12

## 2021-09-14 RX ORDER — LANREOTIDE ACETATE 120 MG/.5ML
120 INJECTION SUBCUTANEOUS ONCE
Status: COMPLETED | OUTPATIENT
Start: 2021-09-14 | End: 2021-09-14

## 2021-09-14 RX ADMIN — LANREOTIDE ACETATE 120 MG: 120 INJECTION SUBCUTANEOUS at 15:24

## 2021-10-12 ENCOUNTER — HOSPITAL ENCOUNTER (OUTPATIENT)
Dept: INFUSION THERAPY | Age: 81
Discharge: HOME | End: 2021-10-12
Payer: MEDICARE

## 2021-10-12 VITALS
TEMPERATURE: 96.4 F | HEART RATE: 71 BPM | HEIGHT: 62 IN | DIASTOLIC BLOOD PRESSURE: 65 MMHG | RESPIRATION RATE: 16 BRPM | BODY MASS INDEX: 20.68 KG/M2 | WEIGHT: 112.4 LBS | SYSTOLIC BLOOD PRESSURE: 147 MMHG | OXYGEN SATURATION: 94 %

## 2021-10-12 DIAGNOSIS — D64.9 ANEMIA, UNSPECIFIED TYPE: ICD-10-CM

## 2021-10-12 DIAGNOSIS — E78.5 HYPERLIPIDEMIA, UNSPECIFIED HYPERLIPIDEMIA TYPE: ICD-10-CM

## 2021-10-12 DIAGNOSIS — E34.0 CARCINOID SYNDROME (HCC): Primary | ICD-10-CM

## 2021-10-12 DIAGNOSIS — K55.9 ISCHEMIA, BOWEL (HCC): ICD-10-CM

## 2021-10-12 LAB
BASOPHILS ABSOLUTE: 0.1 K/CU MM
BASOPHILS RELATIVE PERCENT: 0.6 % (ref 0–1)
DIFFERENTIAL TYPE: ABNORMAL
EOSINOPHILS ABSOLUTE: 0.1 K/CU MM
EOSINOPHILS RELATIVE PERCENT: 1 % (ref 0–3)
HCT VFR BLD CALC: 24 % (ref 37–47)
HEMOGLOBIN: 7.2 GM/DL (ref 12.5–16)
LYMPHOCYTES ABSOLUTE: 1 K/CU MM
LYMPHOCYTES RELATIVE PERCENT: 9.1 % (ref 24–44)
MCH RBC QN AUTO: 26.1 PG (ref 27–31)
MCHC RBC AUTO-ENTMCNC: 30 % (ref 32–36)
MCV RBC AUTO: 87 FL (ref 78–100)
MONOCYTES ABSOLUTE: 0.6 K/CU MM
MONOCYTES RELATIVE PERCENT: 5.6 % (ref 0–4)
PDW BLD-RTO: 15.8 % (ref 11.7–14.9)
PLATELET # BLD: 162 K/CU MM (ref 140–440)
PMV BLD AUTO: 10.1 FL (ref 7.5–11.1)
RBC # BLD: 2.76 M/CU MM (ref 4.2–5.4)
SEGMENTED NEUTROPHILS ABSOLUTE COUNT: 9.6 K/CU MM
SEGMENTED NEUTROPHILS RELATIVE PERCENT: 83.7 % (ref 36–66)
WBC # BLD: 11.4 K/CU MM (ref 4–10.5)

## 2021-10-12 PROCEDURE — 86900 BLOOD TYPING SEROLOGIC ABO: CPT

## 2021-10-12 PROCEDURE — 85025 COMPLETE CBC W/AUTO DIFF WBC: CPT

## 2021-10-12 PROCEDURE — 6360000002 HC RX W HCPCS: Performed by: INTERNAL MEDICINE

## 2021-10-12 PROCEDURE — 96372 THER/PROPH/DIAG INJ SC/IM: CPT

## 2021-10-12 PROCEDURE — 86901 BLOOD TYPING SEROLOGIC RH(D): CPT

## 2021-10-12 PROCEDURE — 86850 RBC ANTIBODY SCREEN: CPT

## 2021-10-12 PROCEDURE — 36415 COLL VENOUS BLD VENIPUNCTURE: CPT

## 2021-10-12 PROCEDURE — 86922 COMPATIBILITY TEST ANTIGLOB: CPT

## 2021-10-12 RX ORDER — DIPHENHYDRAMINE HCL 25 MG
25 TABLET ORAL ONCE
Status: CANCELLED | OUTPATIENT
Start: 2021-10-12 | End: 2021-10-12

## 2021-10-12 RX ORDER — LANREOTIDE ACETATE 120 MG/.5ML
120 INJECTION SUBCUTANEOUS ONCE
Status: COMPLETED | OUTPATIENT
Start: 2021-10-12 | End: 2021-10-12

## 2021-10-12 RX ORDER — LANREOTIDE ACETATE 120 MG/.5ML
120 INJECTION SUBCUTANEOUS ONCE
Status: CANCELLED | OUTPATIENT
Start: 2021-11-09 | End: 2021-11-09

## 2021-10-12 RX ORDER — FUROSEMIDE 10 MG/ML
20 INJECTION INTRAMUSCULAR; INTRAVENOUS ONCE
Status: CANCELLED | OUTPATIENT
Start: 2021-10-12 | End: 2021-10-12

## 2021-10-12 RX ORDER — SODIUM CHLORIDE 9 MG/ML
25 INJECTION, SOLUTION INTRAVENOUS PRN
Status: CANCELLED | OUTPATIENT
Start: 2021-10-12

## 2021-10-12 RX ORDER — SODIUM CHLORIDE 9 MG/ML
INJECTION, SOLUTION INTRAVENOUS CONTINUOUS
Status: CANCELLED | OUTPATIENT
Start: 2021-10-12

## 2021-10-12 RX ORDER — EPINEPHRINE 1 MG/ML
0.3 INJECTION, SOLUTION, CONCENTRATE INTRAVENOUS PRN
Status: CANCELLED | OUTPATIENT
Start: 2021-10-12

## 2021-10-12 RX ORDER — SODIUM CHLORIDE 0.9 % (FLUSH) 0.9 %
5-40 SYRINGE (ML) INJECTION PRN
Status: CANCELLED | OUTPATIENT
Start: 2021-10-12

## 2021-10-12 RX ORDER — METHYLPREDNISOLONE SODIUM SUCCINATE 40 MG/ML
20 INJECTION, POWDER, LYOPHILIZED, FOR SOLUTION INTRAMUSCULAR; INTRAVENOUS ONCE
Status: CANCELLED
Start: 2021-10-12 | End: 2021-10-12

## 2021-10-12 RX ORDER — SODIUM CHLORIDE 9 MG/ML
20 INJECTION, SOLUTION INTRAVENOUS CONTINUOUS
Status: CANCELLED | OUTPATIENT
Start: 2021-10-12

## 2021-10-12 RX ORDER — DIPHENHYDRAMINE HYDROCHLORIDE 50 MG/ML
50 INJECTION INTRAMUSCULAR; INTRAVENOUS ONCE
Status: CANCELLED | OUTPATIENT
Start: 2021-10-12 | End: 2021-10-12

## 2021-10-12 RX ORDER — METHYLPREDNISOLONE SODIUM SUCCINATE 125 MG/2ML
125 INJECTION, POWDER, LYOPHILIZED, FOR SOLUTION INTRAMUSCULAR; INTRAVENOUS ONCE
Status: CANCELLED | OUTPATIENT
Start: 2021-10-12 | End: 2021-10-12

## 2021-10-12 RX ADMIN — LANREOTIDE ACETATE 120 MG: 120 INJECTION SUBCUTANEOUS at 14:51

## 2021-10-12 ASSESSMENT — PAIN SCALES - GENERAL: PAINLEVEL_OUTOF10: 9

## 2021-10-12 NOTE — PROGRESS NOTES
Patient arrived to treatment suite for Lanreodide injection. States blood in stool x 2-3 days. States tried to call Dr. Debbi Barker and he has not returned her phone call yet. Discussed with Dr. Jose Olivera who stated to get a hold of Dr. Debbi Barker for advice. Instructed patient who voiced understanding. Treatment approved and given subcutaneously in left buttocks area, band-aid applied. Patient tolerated well. Left treatment suite ambulatory. Discharge instructions provided.

## 2021-10-13 ENCOUNTER — HOSPITAL ENCOUNTER (OUTPATIENT)
Dept: INFUSION THERAPY | Age: 81
Setting detail: INFUSION SERIES
Discharge: HOME | End: 2021-10-13
Payer: MEDICARE

## 2021-10-13 ENCOUNTER — CLINICAL DOCUMENTATION (OUTPATIENT)
Dept: ONCOLOGY | Age: 81
End: 2021-10-13

## 2021-10-13 VITALS
HEART RATE: 70 BPM | OXYGEN SATURATION: 100 % | WEIGHT: 112 LBS | BODY MASS INDEX: 20.61 KG/M2 | HEIGHT: 62 IN | TEMPERATURE: 97.3 F | DIASTOLIC BLOOD PRESSURE: 66 MMHG | SYSTOLIC BLOOD PRESSURE: 142 MMHG | RESPIRATION RATE: 16 BRPM

## 2021-10-13 DIAGNOSIS — D64.9 ANEMIA, UNSPECIFIED TYPE: Primary | ICD-10-CM

## 2021-10-13 DIAGNOSIS — E34.0 CARCINOID SYNDROME (HCC): ICD-10-CM

## 2021-10-13 DIAGNOSIS — E78.5 HYPERLIPIDEMIA, UNSPECIFIED HYPERLIPIDEMIA TYPE: ICD-10-CM

## 2021-10-13 DIAGNOSIS — K55.9 ISCHEMIA, BOWEL (HCC): ICD-10-CM

## 2021-10-13 LAB — IRON: 54 UG/DL (ref 37–145)

## 2021-10-13 PROCEDURE — P9016 RBC LEUKOCYTES REDUCED: HCPCS

## 2021-10-13 PROCEDURE — 83540 ASSAY OF IRON: CPT

## 2021-10-13 PROCEDURE — 99211 OFF/OP EST MAY X REQ PHY/QHP: CPT

## 2021-10-13 PROCEDURE — 36430 TRANSFUSION BLD/BLD COMPNT: CPT

## 2021-10-13 PROCEDURE — 2580000003 HC RX 258: Performed by: INTERNAL MEDICINE

## 2021-10-13 PROCEDURE — 96374 THER/PROPH/DIAG INJ IV PUSH: CPT

## 2021-10-13 PROCEDURE — 6360000002 HC RX W HCPCS: Performed by: INTERNAL MEDICINE

## 2021-10-13 RX ORDER — FUROSEMIDE 10 MG/ML
20 INJECTION INTRAMUSCULAR; INTRAVENOUS ONCE
OUTPATIENT
Start: 2021-10-13 | End: 2021-10-13

## 2021-10-13 RX ORDER — SODIUM CHLORIDE 0.9 % (FLUSH) 0.9 %
5-40 SYRINGE (ML) INJECTION PRN
Status: DISCONTINUED | OUTPATIENT
Start: 2021-10-13 | End: 2021-10-14 | Stop reason: HOSPADM

## 2021-10-13 RX ORDER — EPINEPHRINE 1 MG/ML
0.3 INJECTION, SOLUTION, CONCENTRATE INTRAVENOUS PRN
OUTPATIENT
Start: 2021-10-13

## 2021-10-13 RX ORDER — METHYLPREDNISOLONE SODIUM SUCCINATE 125 MG/2ML
125 INJECTION, POWDER, LYOPHILIZED, FOR SOLUTION INTRAMUSCULAR; INTRAVENOUS ONCE
OUTPATIENT
Start: 2021-10-13 | End: 2021-10-13

## 2021-10-13 RX ORDER — SODIUM CHLORIDE 9 MG/ML
25 INJECTION, SOLUTION INTRAVENOUS PRN
OUTPATIENT
Start: 2021-10-13

## 2021-10-13 RX ORDER — DIPHENHYDRAMINE HCL 25 MG
25 TABLET ORAL ONCE
Status: DISCONTINUED | OUTPATIENT
Start: 2021-10-13 | End: 2021-10-14 | Stop reason: HOSPADM

## 2021-10-13 RX ORDER — METHYLPREDNISOLONE SODIUM SUCCINATE 40 MG/ML
20 INJECTION, POWDER, LYOPHILIZED, FOR SOLUTION INTRAMUSCULAR; INTRAVENOUS ONCE
Start: 2021-10-13 | End: 2021-10-13

## 2021-10-13 RX ORDER — DIPHENHYDRAMINE HCL 25 MG
25 TABLET ORAL ONCE
OUTPATIENT
Start: 2021-10-13 | End: 2021-10-13

## 2021-10-13 RX ORDER — METHYLPREDNISOLONE SODIUM SUCCINATE 40 MG/ML
20 INJECTION, POWDER, LYOPHILIZED, FOR SOLUTION INTRAMUSCULAR; INTRAVENOUS ONCE
Status: COMPLETED | OUTPATIENT
Start: 2021-10-13 | End: 2021-10-13

## 2021-10-13 RX ORDER — SODIUM CHLORIDE 9 MG/ML
20 INJECTION, SOLUTION INTRAVENOUS CONTINUOUS
OUTPATIENT
Start: 2021-10-13

## 2021-10-13 RX ORDER — SODIUM CHLORIDE 9 MG/ML
20 INJECTION, SOLUTION INTRAVENOUS CONTINUOUS
Status: DISCONTINUED | OUTPATIENT
Start: 2021-10-13 | End: 2021-10-14 | Stop reason: HOSPADM

## 2021-10-13 RX ORDER — SODIUM CHLORIDE 0.9 % (FLUSH) 0.9 %
5-40 SYRINGE (ML) INJECTION PRN
OUTPATIENT
Start: 2021-10-13

## 2021-10-13 RX ORDER — SODIUM CHLORIDE 9 MG/ML
25 INJECTION, SOLUTION INTRAVENOUS PRN
Status: DISCONTINUED | OUTPATIENT
Start: 2021-10-13 | End: 2021-10-14 | Stop reason: HOSPADM

## 2021-10-13 RX ORDER — DIPHENHYDRAMINE HYDROCHLORIDE 50 MG/ML
50 INJECTION INTRAMUSCULAR; INTRAVENOUS ONCE
OUTPATIENT
Start: 2021-10-13 | End: 2021-10-13

## 2021-10-13 RX ORDER — FUROSEMIDE 10 MG/ML
20 INJECTION INTRAMUSCULAR; INTRAVENOUS ONCE
Status: DISCONTINUED | OUTPATIENT
Start: 2021-10-13 | End: 2021-10-14 | Stop reason: HOSPADM

## 2021-10-13 RX ORDER — SODIUM CHLORIDE 9 MG/ML
INJECTION, SOLUTION INTRAVENOUS CONTINUOUS
OUTPATIENT
Start: 2021-10-13

## 2021-10-13 RX ADMIN — SODIUM CHLORIDE, PRESERVATIVE FREE 10 ML: 5 INJECTION INTRAVENOUS at 10:30

## 2021-10-13 RX ADMIN — SODIUM CHLORIDE 20 ML/HR: 9 INJECTION, SOLUTION INTRAVENOUS at 10:37

## 2021-10-13 RX ADMIN — METHYLPREDNISOLONE SODIUM SUCCINATE 20 MG: 40 INJECTION, POWDER, FOR SOLUTION INTRAMUSCULAR; INTRAVENOUS at 10:35

## 2021-10-13 NOTE — PLAN OF CARE
To unit room 1 for Blood Transfusion. Orientated to unit. Procedure and plan of care explained. Questions answered. Understanding verbalized.

## 2021-10-13 NOTE — PROGRESS NOTES
Hgb 7.2; per Dr Morales Case 1 unit of [facility], called infusion dept, spoke to Malena Chrisrick, patient scheduled for 10/13/2021 10:00 AM, patient advised and states understanding, blood banded and sent for T&S, band on patient (verifed) and patient signed consent.

## 2021-10-13 NOTE — DISCHARGE SUMMARY
Tolerated Transfusion welll. Reviewed discharge instructions, understanding verbalized. Copies of AVS given to take home. Patient discharged home with friend. Down to exit per wheelchair.     Orders Placed This Encounter   Medications    0.9 % sodium chloride infusion    sodium chloride flush 0.9 % injection 5-40 mL    0.9 % sodium chloride infusion    diphenhydrAMINE (BENADRYL) tablet 25 mg    furosemide (LASIX) injection 20 mg    methylPREDNISolone sodium (SOLU-MEDROL) injection 20 mg

## 2021-10-14 LAB
ABO/RH: NORMAL
ANTIBODY SCREEN: NEGATIVE
COMPONENT: NORMAL
CROSSMATCH RESULT: NORMAL
STATUS: NORMAL
TRANSFUSION STATUS: NORMAL
UNIT DIVISION: 0
UNIT NUMBER: NORMAL

## 2021-11-07 NOTE — PROGRESS NOTES
millimeter, she was treated with prednisone. She responded well to prednisone; however, her platelet is always in her 40,000 range when she started tapering off the prednisone. She discussed about the splenectomy and Ms. Da Martinez was not sure she really wanted to proceed with splenectomy at this moment. Since Ms. 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 Since Ms. Bowman thrombocytopenia has gotten better, I stopped prednisone since April 29, 2016. She has been under close observation since then. Ms. Da Martinez was found to have suspicious lesion in her mid back by Dermatologist, Dr. Wendy Luna and she referred her to Dr. Perlita Meadows at [facility] for further evaluation. She initially underwent wide excision and sentinel lymph node biopsy on April 14, 2017. The initial pathology revealed 4 mm in Breslows depth, Charles level IV, non-ulcerated, with 2 mitosis/mm2. 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 Since she has stage IIB malignant melanoma, I recommend for close observation only. She has been under close observation since then. She had PET-CT scan and it showed mesenteric calcified mass. She was subsequently referred to the Gastroenterologist for EUS since she had history of carcinoid tumor. Ms. Da Martinez underwent endoscopic ultrasound by Dr. Preeti Ravi on May 10, 2017, and it showed a mass in the parapancreatic region, 3 cm in size. 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 The ki-67 was less than 2%. Tumor cells are positive for AE1/3, synaptophysin, and chromogranin.       She subsequently underwent nuclear PET neuroendocrine scan on July 21, 2017, and it showed a soft tissue mass in the pancreatic uncinate process with intense radiotracer activity, compatible with somatostatin receptor-avid malignancy. Paraesophageal lymph node in the chest at the level of the rafiq and aortocaval lymph node with intense radiotracer activity, compatible with somatostatin receptor-avid robbie metastasis. Since she was found to have metastatic lymph node involvement in intrathoracic and abdominal lymph nodes, she is not a surgical candidate. Tumor markers were reviewed and she was found to have mild elevation in chromogranin A (136) but her other tumor markers were within normal range. Since she has been having diarrhea (at least 5-6 times a day) and her tumors are octreotide avid, she was started with octreotide since September 1, 2017. She was started with hormone therapy, octreotide for tumor stabilization as well as for the symptom management (diarrhea). Since she lives in [facility], her case was subsequently referred to us for continuation of octreotide injection locally here in [facility]. Octreotide was started in our center since October 5, 2017. CT scan of the chest, abdomen and pelvis done on December 23, 2020 showed interval increase in size of a soft tissue mass with scattered calcifications posterior to the pancreatic head currently measuring up to 4 cm, previously 3.3 cm. No evidence of metastatic disease in the chest.    CT scan of the abdomen and pelvis done on April 27, 2021 showed a stable appearance of a pancreatic mass measuring 3.3 x 4 cm. On November 9, 2021, she presented to me for followup. I have been following Ms. Diane for immune thrombocytopenia, history of stage IIB malignant melanoma, and the recurrent carcinoid tumor. 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 She stated that her diarrhea is getting worse lately. She will be due for another Lanreotide injection today. I recommend her to use lomotil as needed for diarrhea. I recommend her to continue with lanreotide for now. Pancreatic head mass is stable in size on 4/27/21 CT scan. Since she hasn't had scans for a while, I recommend her to have CT scan soon. I will see her again after CT scans. Malignancy associated pain - Her pain has been controlled well with Norco 5/325 mg every 6 hourly as needed basis. I will continue to follow her pain status closely. Her platelet count today was 69,000/cumm. I will continue to monitor closely. Depression - stated that she still has depression. I recognized that she hasn't had bupropion and she is only on effexor. I will start bupropion today and sent the script for her. Hyperlipidemia - she is on lipitor. Hypertension, CAD - she is on aspirin, amlodipine and carvedilol. Recommend salt restriction. GERD - stable on pantoprazole. Diabetes - she is on metformin. Recommend ADA diet. Health maintenance - I recommend her to have age-appropriate cancer screening, exercise, low-fat and low-sodium diet. She has tiredness and fatigue. Besides that, she doesn't have any other significant new symptoms on today's visit. PAST MEDICAL HISTORY:  Significant for:  1. Hyperlipidemia. 2.         Diabetes mellitus. 3.         Coronary artery disease. 4.         Gastroesophageal reflux disease. 5.         History of depression and adjustment disorder. 6.         Osteoporosis. 7.         History of small intestinal cancer, status post surgery by Dr. Freya Ravi in [facility] [address] PAST SURGICAL HISTORY:  Significant for:  1. Appendectomy in 1959.  2.         Coronary artery angioplasty in 1992. 3.         Small intestine cancer removal in 1998.  4.         Cholecystectomy in 1998.  5.         Open heart surgery in March 31, 2009. 6.         Melanoma surgery on October 10, 2011. 7.         Hysterectomy in 1996. FAMILY HISTORY:  Significant for melanoma in her brother and sister. No other family history of cancer disease.     SOCIAL HISTORY:  She denies smoking, alcohol drinking, and illicit drug abuse. She is  for 55 years and has four children. She is retiree from the [employer]. ALLERGIES:  No known drug allergies. Review of Systems: \"Per interval history; otherwise 10 point ROS is negative. \"  Her energy level is low, appetite and sleep are stable. She doesn't have fever, chills, night sweats, cough, shortness of breath, chest pain, hemoptysis or palpitations. Her bowels and bladder functions are normal. She doesn't have nausea, vomiting, abdominal pain, constipation or dysuria. She has some loss of appetite and weight loss. She only has minimal loose stool. She doesn't have neuropathy and she denies bleeding or clotting disorder. She doesn't have any pain in her body. No anxiety and she has depression. She denies any suicidal idea. The rests of the systems are unremarkable. Vital Signs:  BP (!) 156/69 (Site: Right Upper Arm, Position: Sitting, Cuff Size: Medium Adult)   Pulse 72   Temp 97.1 °F (36.2 °C) (Infrared)   Resp 16   Ht 5' 2\" (1.575 m)   Wt 112 lb (50.8 kg)   BMI 20.49 kg/m²     Physical Exam:  CONSTITUTIONAL: alert, cooperative, awake, no apparent distress   EYES: pupils equal, round and reactive to light, sclera clear and conjunctiva normal  ENT: without obvious abnormality, normocephalic, atraumatic  NECK: supple, symmetrical, no jugular venous distension and no carotid bruits   HEMATOLOGIC/LYMPHATIC: no cervical, supraclavicular or axillary lymphadenopathy   LUNGS: VBS, no wheezes, clear to auscultation, no crackles, no rhonchi, no increased work of breathing,       CARDIOVASCULAR: regular rate and rhythm, normal S1 and S2, no murmur noted  ABDOMEN: soft, non-distended, normal bowel sounds x 4, non-tender, no masses palpated, no hepatosplenomegaly   MUSCULOSKELETAL: full range of motion noted, tone is normal  NEUROLOGIC: awake, alert, oriented to name, place and time. Motor skills grossly intact.    SKIN: Normal skin color, texture, turgor and no jaundice. appears intact   EXTREMITIES: no leg swelling, no clubbing, no cyanosis, no LE edema,      Labs:  Hematology:  Lab Results   Component Value Date    WBC 6.9 11/09/2021    RBC 3.17 (L) 11/09/2021    HGB 8.6 (L) 11/09/2021    HCT 28.4 (L) 11/09/2021    MCV 89.6 11/09/2021    MCH 27.1 11/09/2021    MCHC 30.3 (L) 11/09/2021    RDW 16.1 (H) 11/09/2021    PLT 69 (L) 11/09/2021    MPV 12.1 (H) 11/09/2021    BANDSPCT 3 (L) 04/20/2021    SEGSPCT 82.6 (H) 11/09/2021    EOSRELPCT 1.2 11/09/2021    BASOPCT 0.3 11/09/2021    LYMPHOPCT 9.1 (L) 11/09/2021    MONOPCT 6.8 (H) 11/09/2021    BANDABS 0.94 04/20/2021    SEGSABS 5.7 11/09/2021    EOSABS 0.1 11/09/2021    BASOSABS 0.0 11/09/2021    LYMPHSABS 0.6 11/09/2021    MONOSABS 0.5 11/09/2021    DIFFTYPE AUTOMATED DIFFERENTIAL 11/09/2021    ANISOCYTOSIS 1+ 05/27/2021    POLYCHROM 1+ 03/29/2017    WBCMORP  04/17/2021     SLIDE SCANNED  MANUAL DIFFERENTIAL APPEARS TO MATCH AUTOMATED DIFFERENTIAL WELL     PLTM DECREASED 04/27/2021     Lab Results   Component Value Date    ESR 2 11/09/2021     Chemistry:  Lab Results   Component Value Date     11/09/2021    K 3.9 11/09/2021     11/09/2021    CO2 22 11/09/2021    BUN 11 11/09/2021    CREATININE 1.1 11/09/2021    GLUCOSE 190 (H) 11/09/2021    CALCIUM 8.5 11/09/2021    PROT 5.7 (L) 11/09/2021    LABALBU 3.9 11/09/2021    BILITOT 0.4 11/09/2021    ALKPHOS 51 11/09/2021    AST 11 (L) 11/09/2021    ALT 11 11/09/2021    LABGLOM 48 (L) 11/09/2021    GFRAA 58 (L) 11/09/2021    PHOS 1.4 (L) 04/28/2021    MG 1.6 (L) 04/28/2021    POCGLU 185 (H) 04/28/2021     Lab Results   Component Value Date     11/09/2021    HOMOCYSTEINE (H) 10/15/2019     17.8  Risk of vascular disease   increases above 9 umol/L  and is significant >15 umol/L.        No components found for: LD  Lab Results   Component Value Date    TSHHS 0.666 11/09/2021    T4FREE 1.13 11/24/2014    FT3 2.9 11/24/2014 Immunology:  Lab Results   Component Value Date    PROT 5.7 (L) 11/09/2021    SPEP  04/17/2021     INTERPRETATION - Decreased albumin and total proteins. LP.    ALBUMINELP 3.0 (L) 04/17/2021    LABALPH 0.2 04/17/2021    LABALPH 0.5 04/17/2021    LABBETA 0.8 04/17/2021    GAMGLOB 0.8 04/17/2021     No results found for: Osmani Guard, KLFLCR  No results found for: B2M  Coagulation Panel:  Lab Results   Component Value Date    PROTIME 15.3 (H) 04/28/2021    INR 1.26 04/28/2021    APTT 33.1 04/28/2021     Anemia Panel:  Lab Results   Component Value Date    HZDCATNY41 833.4 11/09/2021    FOLATE >20.0 (H) 11/09/2021     Tumor Markers:  No results found for: , CEA, , LABCA2, PSA  Observations:  No data recorded      Assessment & Plan: Thrombocytopenia - most likely immune thrombocytopenia. Stage IIB malignant melanoma. Recurrent/metastatic small intestine carcinoid tumor. PLAN:  Ms. Florentin Kang has been followed for immune thrombocytopenia, history of stage IIB malignant melanoma, and the recurrent carcinoid tumor. CT scan of the chest, abdomen and pelvis done on December 23, 2020 showed interval increase in size of a soft tissue mass with scattered calcifications posterior to the pancreatic head currently measuring up to 4 cm, previously 3.3 cm. No evidence of metastatic disease in the chest.    CT scan of the abdomen and pelvis done on April 27, 2021 showed a stable appearance of a pancreatic mass measuring 3.3 x 4 cm. On November 9, 2021, she presented to me for followup. I have been following Ms. Diane for immune thrombocytopenia, history of stage IIB malignant melanoma, and the recurrent carcinoid tumor. 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 She stated that her diarrhea is getting worse lately. She will be due for another Lanreotide injection today. I recommend her to use lomotil as needed for diarrhea.  I recommend her to continue with lanreotide for now. Pancreatic head mass is stable in size on 4/27/21 CT scan. Since she hasn't had scans for a while, I recommend her to have CT scan soon. I will see her again after CT scans. Malignancy associated pain - Her pain has been controlled well with Norco 5/325 mg every 6 hourly as needed basis. I will continue to follow her pain status closely. Her platelet count today was 69,000/cumm. I will continue to monitor closely. Depression - stated that she still has depression. I recognized that she hasn't had bupropion and she is only on effexor. I will start bupropion today and sent the script for her. Hyperlipidemia - she is on lipitor. Hypertension, CAD - she is on aspirin, amlodipine and carvedilol. Recommend salt restriction. GERD - stable on pantoprazole. Diabetes - she is on metformin. Recommend ADA diet. Health maintenance - I recommend her to have age-appropriate cancer screening, exercise, low-fat and low-sodium diet. I answered all her questions and concerns for today. I asked her to follow up with her primary care physician on regular basis. Recent imaging and labs were reviewed and discussed with the patient.

## 2021-11-09 ENCOUNTER — OFFICE VISIT (OUTPATIENT)
Dept: ONCOLOGY | Age: 81
End: 2021-11-09
Payer: MEDICARE

## 2021-11-09 ENCOUNTER — HOSPITAL ENCOUNTER (OUTPATIENT)
Dept: INFUSION THERAPY | Age: 81
Discharge: HOME | End: 2021-11-09
Payer: MEDICARE

## 2021-11-09 VITALS
TEMPERATURE: 97.1 F | RESPIRATION RATE: 16 BRPM | WEIGHT: 112 LBS | BODY MASS INDEX: 20.61 KG/M2 | SYSTOLIC BLOOD PRESSURE: 156 MMHG | HEIGHT: 62 IN | DIASTOLIC BLOOD PRESSURE: 69 MMHG | HEART RATE: 72 BPM

## 2021-11-09 DIAGNOSIS — E34.0 CARCINOID SYNDROME (HCC): Primary | ICD-10-CM

## 2021-11-09 LAB
ALBUMIN SERPL-MCNC: 3.9 GM/DL (ref 3.4–5)
ALP BLD-CCNC: 51 IU/L (ref 40–129)
ALT SERPL-CCNC: 11 U/L (ref 10–40)
ANION GAP SERPL CALCULATED.3IONS-SCNC: 12 MMOL/L (ref 4–16)
AST SERPL-CCNC: 11 IU/L (ref 15–37)
BASOPHILS ABSOLUTE: 0 K/CU MM
BASOPHILS RELATIVE PERCENT: 0.3 % (ref 0–1)
BILIRUB SERPL-MCNC: 0.4 MG/DL (ref 0–1)
BUN BLDV-MCNC: 11 MG/DL (ref 6–23)
CALCIUM SERPL-MCNC: 8.5 MG/DL (ref 8.3–10.6)
CHLORIDE BLD-SCNC: 108 MMOL/L (ref 99–110)
CO2: 22 MMOL/L (ref 21–32)
CREAT SERPL-MCNC: 1.1 MG/DL (ref 0.6–1.1)
DIFFERENTIAL TYPE: ABNORMAL
EOSINOPHILS ABSOLUTE: 0.1 K/CU MM
EOSINOPHILS RELATIVE PERCENT: 1.2 % (ref 0–3)
ERYTHROCYTE SEDIMENTATION RATE: 2 MM/HR (ref 0–30)
FERRITIN: 39 NG/ML (ref 15–150)
FOLATE: >20 NG/ML (ref 3.1–17.5)
GFR AFRICAN AMERICAN: 58 ML/MIN/1.73M2
GFR NON-AFRICAN AMERICAN: 48 ML/MIN/1.73M2
GLUCOSE BLD-MCNC: 190 MG/DL (ref 70–99)
HCT VFR BLD CALC: 28.4 % (ref 37–47)
HEMOGLOBIN: 8.6 GM/DL (ref 12.5–16)
IRON: 278 UG/DL (ref 37–145)
LACTATE DEHYDROGENASE: 218 IU/L (ref 120–246)
LYMPHOCYTES ABSOLUTE: 0.6 K/CU MM
LYMPHOCYTES RELATIVE PERCENT: 9.1 % (ref 24–44)
MCH RBC QN AUTO: 27.1 PG (ref 27–31)
MCHC RBC AUTO-ENTMCNC: 30.3 % (ref 32–36)
MCV RBC AUTO: 89.6 FL (ref 78–100)
MONOCYTES ABSOLUTE: 0.5 K/CU MM
MONOCYTES RELATIVE PERCENT: 6.8 % (ref 0–4)
PCT TRANSFERRIN: 78 % (ref 10–44)
PDW BLD-RTO: 16.1 % (ref 11.7–14.9)
PLATELET # BLD: 69 K/CU MM (ref 140–440)
PMV BLD AUTO: 12.1 FL (ref 7.5–11.1)
POTASSIUM SERPL-SCNC: 3.9 MMOL/L (ref 3.5–5.1)
RBC # BLD: 3.17 M/CU MM (ref 4.2–5.4)
RETICULOCYTE COUNT PCT: 4.3 % (ref 0.2–2.2)
SEGMENTED NEUTROPHILS ABSOLUTE COUNT: 5.7 K/CU MM
SEGMENTED NEUTROPHILS RELATIVE PERCENT: 82.6 % (ref 36–66)
SODIUM BLD-SCNC: 142 MMOL/L (ref 135–145)
TOTAL IRON BINDING CAPACITY: 355 UG/DL (ref 250–450)
TOTAL PROTEIN: 5.7 GM/DL (ref 6.4–8.2)
TSH HIGH SENSITIVITY: 0.67 UIU/ML (ref 0.27–4.2)
UNSATURATED IRON BINDING CAPACITY: 77 UG/DL (ref 110–370)
VITAMIN B-12: 833.4 PG/ML (ref 211–911)
WBC # BLD: 6.9 K/CU MM (ref 4–10.5)

## 2021-11-09 PROCEDURE — G8427 DOCREV CUR MEDS BY ELIG CLIN: HCPCS | Performed by: INTERNAL MEDICINE

## 2021-11-09 PROCEDURE — G8484 FLU IMMUNIZE NO ADMIN: HCPCS | Performed by: INTERNAL MEDICINE

## 2021-11-09 PROCEDURE — 80053 COMPREHEN METABOLIC PANEL: CPT

## 2021-11-09 PROCEDURE — 84443 ASSAY THYROID STIM HORMONE: CPT

## 2021-11-09 PROCEDURE — 99214 OFFICE O/P EST MOD 30 MIN: CPT | Performed by: INTERNAL MEDICINE

## 2021-11-09 PROCEDURE — 82728 ASSAY OF FERRITIN: CPT

## 2021-11-09 PROCEDURE — 1123F ACP DISCUSS/DSCN MKR DOCD: CPT | Performed by: INTERNAL MEDICINE

## 2021-11-09 PROCEDURE — 1090F PRES/ABSN URINE INCON ASSESS: CPT | Performed by: INTERNAL MEDICINE

## 2021-11-09 PROCEDURE — 83550 IRON BINDING TEST: CPT

## 2021-11-09 PROCEDURE — 86316 IMMUNOASSAY TUMOR OTHER: CPT

## 2021-11-09 PROCEDURE — 4040F PNEUMOC VAC/ADMIN/RCVD: CPT | Performed by: INTERNAL MEDICINE

## 2021-11-09 PROCEDURE — 82746 ASSAY OF FOLIC ACID SERUM: CPT

## 2021-11-09 PROCEDURE — 36415 COLL VENOUS BLD VENIPUNCTURE: CPT

## 2021-11-09 PROCEDURE — 82607 VITAMIN B-12: CPT

## 2021-11-09 PROCEDURE — 6360000002 HC RX W HCPCS: Performed by: INTERNAL MEDICINE

## 2021-11-09 PROCEDURE — 1036F TOBACCO NON-USER: CPT | Performed by: INTERNAL MEDICINE

## 2021-11-09 PROCEDURE — 83540 ASSAY OF IRON: CPT

## 2021-11-09 PROCEDURE — G8420 CALC BMI NORM PARAMETERS: HCPCS | Performed by: INTERNAL MEDICINE

## 2021-11-09 PROCEDURE — 85025 COMPLETE CBC W/AUTO DIFF WBC: CPT

## 2021-11-09 PROCEDURE — 85045 AUTOMATED RETICULOCYTE COUNT: CPT

## 2021-11-09 PROCEDURE — G8400 PT W/DXA NO RESULTS DOC: HCPCS | Performed by: INTERNAL MEDICINE

## 2021-11-09 PROCEDURE — 83615 LACTATE (LD) (LDH) ENZYME: CPT

## 2021-11-09 PROCEDURE — 96372 THER/PROPH/DIAG INJ SC/IM: CPT

## 2021-11-09 PROCEDURE — 85652 RBC SED RATE AUTOMATED: CPT

## 2021-11-09 RX ORDER — DIPHENOXYLATE HYDROCHLORIDE AND ATROPINE SULFATE 2.5; .025 MG/1; MG/1
1 TABLET ORAL
Qty: 240 TABLET | Refills: 0 | Status: SHIPPED | OUTPATIENT
Start: 2021-11-09 | End: 2021-12-09

## 2021-11-09 RX ORDER — LANREOTIDE ACETATE 120 MG/.5ML
120 INJECTION SUBCUTANEOUS ONCE
Status: CANCELLED | OUTPATIENT
Start: 2021-12-07 | End: 2021-12-07

## 2021-11-09 RX ORDER — LANREOTIDE ACETATE 120 MG/.5ML
120 INJECTION SUBCUTANEOUS ONCE
Status: COMPLETED | OUTPATIENT
Start: 2021-11-09 | End: 2021-11-09

## 2021-11-09 RX ADMIN — LANREOTIDE ACETATE 120 MG: 120 INJECTION SUBCUTANEOUS at 14:44

## 2021-11-09 NOTE — PROGRESS NOTES
Patient arrived to treatment suite from doctor appointment for Lanreotide injection. No questions or concerns for the doctor at this time. Treatment approved and given subcutaneously in right buttocks per patient request, band-aid applied. Cold spray utilized for comfort. Patient tolerated well. Left treatment suite with assistance in wheelchair and stopped by lab for blood draw prior to leaving. Discharge instructions provided.

## 2021-11-11 ENCOUNTER — TELEPHONE (OUTPATIENT)
Dept: ONCOLOGY | Age: 81
End: 2021-11-11

## 2021-11-11 NOTE — TELEPHONE ENCOUNTER
Called patient's daughter regarding her CT scan on 12.03.2021 at [address] [place] [address]. Went over prep and went over Metformin 48 after scan.

## 2021-11-12 LAB — CHROMOGRANIN A: 1211 NG/ML (ref 0–103)

## 2021-11-17 RX ORDER — DEXAMETHASONE 2 MG/1
TABLET ORAL
Qty: 30 TABLET | Refills: 0 | Status: SHIPPED | OUTPATIENT
Start: 2021-11-17 | End: 2022-06-02

## 2021-12-10 RX ORDER — LANOLIN ALCOHOL/MO/W.PET/CERES
CREAM (GRAM) TOPICAL
Qty: 90 TABLET | Refills: 0 | Status: SHIPPED | OUTPATIENT
Start: 2021-12-10 | End: 2022-03-21

## 2021-12-13 ENCOUNTER — TELEPHONE (OUTPATIENT)
Dept: ONCOLOGY | Age: 81
End: 2021-12-13

## 2021-12-13 NOTE — TELEPHONE ENCOUNTER
Pt is going to [facility] o 12/16 1130 arrival for a 100 appt-- npo 4 hours-- left message for pt with all information

## 2021-12-27 ENCOUNTER — HOSPITAL ENCOUNTER (OUTPATIENT)
Dept: INFUSION THERAPY | Age: 81
Discharge: HOME | End: 2021-12-27
Payer: MEDICARE

## 2021-12-27 DIAGNOSIS — E34.0 CARCINOID SYNDROME (HCC): Primary | ICD-10-CM

## 2021-12-27 PROCEDURE — 96402 CHEMO HORMON ANTINEOPL SQ/IM: CPT

## 2021-12-27 PROCEDURE — 6360000002 HC RX W HCPCS: Performed by: INTERNAL MEDICINE

## 2021-12-27 RX ORDER — LANREOTIDE ACETATE 120 MG/.5ML
120 INJECTION SUBCUTANEOUS ONCE
Status: COMPLETED | OUTPATIENT
Start: 2021-12-27 | End: 2021-12-27

## 2021-12-27 RX ORDER — LANREOTIDE ACETATE 120 MG/.5ML
120 INJECTION SUBCUTANEOUS ONCE
Status: CANCELLED | OUTPATIENT
Start: 2022-01-04 | End: 2022-01-04

## 2021-12-27 RX ADMIN — LANREOTIDE ACETATE 120 MG: 120 INJECTION SUBCUTANEOUS at 14:40

## 2021-12-27 NOTE — PROGRESS NOTES
Pt. Here for injection with her daughter. Pt. Denies any questions or concerns. Injection given in right ventrogluteal, pt. Tolerated well.

## 2022-01-20 ENCOUNTER — TELEPHONE (OUTPATIENT)
Dept: INFUSION THERAPY | Age: 82
End: 2022-01-20

## 2022-01-20 NOTE — TELEPHONE ENCOUNTER
Pt missed injection; called & spoke with pt's daughter & appt rescheduled for 1/27; future dates adjusted.

## 2022-01-27 ENCOUNTER — TELEPHONE (OUTPATIENT)
Dept: INFUSION THERAPY | Age: 82
End: 2022-01-27

## 2022-01-27 ENCOUNTER — HOSPITAL ENCOUNTER (OUTPATIENT)
Dept: INFUSION THERAPY | Age: 82
Discharge: HOME | End: 2022-01-27
Payer: MEDICARE

## 2022-01-27 VITALS
HEIGHT: 62 IN | WEIGHT: 101.6 LBS | RESPIRATION RATE: 16 BRPM | SYSTOLIC BLOOD PRESSURE: 134 MMHG | DIASTOLIC BLOOD PRESSURE: 62 MMHG | HEART RATE: 65 BPM | BODY MASS INDEX: 18.69 KG/M2 | TEMPERATURE: 97.4 F | OXYGEN SATURATION: 95 %

## 2022-01-27 DIAGNOSIS — E34.0 CARCINOID SYNDROME (HCC): Primary | ICD-10-CM

## 2022-01-27 PROCEDURE — 96372 THER/PROPH/DIAG INJ SC/IM: CPT

## 2022-01-27 PROCEDURE — 6360000002 HC RX W HCPCS: Performed by: INTERNAL MEDICINE

## 2022-01-27 RX ORDER — LANREOTIDE ACETATE 120 MG/.5ML
120 INJECTION SUBCUTANEOUS ONCE
Status: COMPLETED | OUTPATIENT
Start: 2022-01-27 | End: 2022-01-27

## 2022-01-27 RX ORDER — LANREOTIDE ACETATE 120 MG/.5ML
120 INJECTION SUBCUTANEOUS ONCE
Status: CANCELLED | OUTPATIENT
Start: 2022-02-24 | End: 2022-02-24

## 2022-01-27 RX ADMIN — LANREOTIDE ACETATE 120 MG: 120 INJECTION SUBCUTANEOUS at 14:21

## 2022-01-27 NOTE — PROGRESS NOTES
Patient arrived to treatment suite from doctor appointment for Lanreotide injection. No questions or concerns for the doctor at this time. Treatment approved and given subcutaneously in left buttocks per patient request, band-aid applied. Cold spray utilized for comfort. Patient tolerated well. Left treatment suite with assistance from caregiver in wheelchair and stopped. Discharge instructions provided.

## 2022-01-27 NOTE — TELEPHONE ENCOUNTER
Called & spoke with pt's daughter asking if she could come today @ 1:45 to see Dr. Law Henson before her injection at 2:00 as she missed her ov in December; daughter states she is bringing pt during her lunch break from work & is pressed for time. OV made with pt's next injection date on 2/24; daughter voiced understanding & will get this appt today upon leaving.

## 2022-02-24 ENCOUNTER — TELEPHONE (OUTPATIENT)
Dept: INFUSION THERAPY | Age: 82
End: 2022-02-24

## 2022-02-24 NOTE — TELEPHONE ENCOUNTER
LVM for pt as she missed her ov + injection appt today; asked for a return call to reschedule appts.

## 2022-03-01 ENCOUNTER — TELEPHONE (OUTPATIENT)
Dept: INFUSION THERAPY | Age: 82
End: 2022-03-01

## 2022-03-01 NOTE — TELEPHONE ENCOUNTER
Patient No Showed for OV 02/24/22. Rescheduled to 03/24/22. LM on patients phone with Rescheduled appointment. Spouse phone number is disconnected.

## 2022-03-21 RX ORDER — LANOLIN ALCOHOL/MO/W.PET/CERES
CREAM (GRAM) TOPICAL
Qty: 90 TABLET | Refills: 1 | Status: SHIPPED | OUTPATIENT
Start: 2022-03-21 | End: 2022-07-12

## 2022-03-24 ENCOUNTER — APPOINTMENT (OUTPATIENT)
Dept: INFUSION THERAPY | Age: 82
End: 2022-03-24
Payer: MEDICARE

## 2022-04-17 NOTE — PROGRESS NOTES
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 Alicia Larkin is a 60-year-old very pleasant patient with medical history significant for hyperlipidemia, gastroesophageal reflux disease, coronary artery disease, depression and adjustment disorder, osteoporosis, diabetes mellitus, and history of small intestinal carcinoid tumor, status post surgery by Dr. Chris Wright in , initially referred to me on 2014, for evaluation of thrombocytopenia. She stated that she was found to have thrombocytopenia on routine blood tests done on 2014. Repeat blood tests on 2014, showed persistent thrombocytopenia and she was referred to me for evaluation. She does not have leucopenia or anemia. She complains of severe tiredness and fatigue. Besides that she does not have any other significant symptoms. She denies bleeding diathesis too. Laboratory workups done on 2014, showed platelet count of 37, however, manual differential showed platelet count of 97. Her white blood cell count was 6.4 and hemoglobin was 12.2. Her LDH is mildly elevated (261), vitamin B12 normal (154), normal folate (more than 20), negative hepatitis panel, antinuclear antibody was not detected and negative rheumatoid factor and normal TSH (2.4). Since all the workups have been within normal range, I believe her mild thrombocytopenia is most likely due to immune thrombocytopenia. Ms. Alicia Larkin started following with Dr. Tracy Cormier at [facility] for immune thrombocytopenia.  Since her platelet count dropped below 20,000 per cubic millimeter, she was treated with prednisone. She responded well to prednisone; however, her platelet is always in her 40,000 range when she started tapering off the prednisone. She discussed about the splenectomy and Ms. Isaak Hernadez was not sure she really wanted to proceed with splenectomy at this moment. Since Ms. Diane's insurance does not cover [facility], she was referred back to me for continuation of care for her immune thrombocytopenia. Since Ms. Bowman thrombocytopenia has gotten better, I stopped prednisone since April 29, 2016. She has been under close observation since then. Ms. Isaak Hernadez was found to have suspicious lesion in her mid back by Dermatologist, Dr. Jennifer Peters and she referred her to Dr. Gopal Garsia at [facility] for further evaluation. She initially underwent wide excision and sentinel lymph node biopsy on April 14, 2017. The initial pathology revealed 4 mm in Breslows depth, Charles level IV, non-ulcerated, with 2 mitosis/mm2. 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 Since she has stage IIB malignant melanoma, I recommend for close observation only. She has been under close observation since then. She had PET-CT scan and it showed mesenteric calcified mass. She was subsequently referred to the Gastroenterologist for EUS since she had history of carcinoid tumor. Ms. Isaak Hernadez underwent endoscopic ultrasound by Dr. Augustine Foley on May 10, 2017, and it showed a mass in the parapancreatic region, 3 cm in size. 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 The ki-67 was less than 2%. Tumor cells are positive for AE1/3, synaptophysin, and chromogranin.       She subsequently underwent nuclear PET neuroendocrine scan on July 21, 2017, and it showed a soft tissue mass in the pancreatic uncinate process with intense radiotracer activity, compatible with somatostatin receptor-avid malignancy. Paraesophageal lymph node in the chest at the level of the rafiq and aortocaval lymph node with intense radiotracer activity, compatible with somatostatin receptor-avid robbie metastasis. Since she was found to have metastatic lymph node involvement in intrathoracic and abdominal lymph nodes, she is not a surgical candidate. Tumor markers were reviewed and she was found to have mild elevation in chromogranin A (136) but her other tumor markers were within normal range. Since she has been having diarrhea (at least 5-6 times a day) and her tumors are octreotide avid, she was started with octreotide since September 1, 2017. She was started with hormone therapy, octreotide for tumor stabilization as well as for the symptom management (diarrhea). Since she lives in [facility], her case was subsequently referred to us for continuation of octreotide injection locally here in [facility]. Octreotide was started in our center since October 5, 2017. CT scan of the chest, abdomen and pelvis done on December 23, 2020 showed interval increase in size of a soft tissue mass with scattered calcifications posterior to the pancreatic head currently measuring up to 4 cm, previously 3.3 cm. No evidence of metastatic disease in the chest.    CT scan of the abdomen and pelvis done on April 27, 2021 showed a stable appearance of a pancreatic mass measuring 3.3 x 4 cm. On April 21, 2022, she presented to me for followup. I have been following Ms. Diane for immune thrombocytopenia, history of stage IIB malignant melanoma, and the recurrent carcinoid tumor. 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 She stated that her diarrhea is getting worse lately. She missed her lanreotide dose last time. I will give her Lanreotide injection today. I recommend her to have injection regularly.  I also ask her to use lomotil as needed for diarrhea. Pancreatic head mass is stable in size on 4/27/21 CT scan. She has been loosing weight a lot lately and I am concerned about it. Since she hasn't had scans for a while and significant weight loss, I recommend her to have CT scan of chest, abdomen and pelvis soon. I will see her again after CT scans. Malignancy associated pain - Her pain has been controlled well with Norco 5/325 mg every 6 hourly as needed basis. I will continue to follow her pain status closely. Her platelet count today was 69,000/cumm. I will continue to monitor closely. Depression - stated that she still has depression. I recognized that she hasn't had bupropion and she is only on effexor. I will start bupropion today and sent the script for her. Hyperlipidemia - she is on lipitor. Hypertension, CAD - she is on aspirin, amlodipine and carvedilol. Recommend salt restriction. GERD - stable on pantoprazole. Diabetes - she is on metformin. Recommend ADA diet. Health maintenance - I recommend her to have age-appropriate cancer screening, exercise, low-fat and low-sodium diet. She has tiredness and fatigue. Besides that, she doesn't have any other significant new symptoms on today's visit. PAST MEDICAL HISTORY:  Significant for:  1. Hyperlipidemia. 2.         Diabetes mellitus. 3.         Coronary artery disease. 4.         Gastroesophageal reflux disease. 5.         History of depression and adjustment disorder. 6.         Osteoporosis. 7.         History of small intestinal cancer, status post surgery by Dr. Fernandez Joaquin in [address] [place] [address] Avenue. PAST SURGICAL HISTORY:  Significant for:  1. Appendectomy in 1959.  2.         Coronary artery angioplasty in 1992. 3.         Small intestine cancer removal in 1998.  4.         Cholecystectomy in 1998.  5.         Open heart surgery in March 31, 2009. 6.         Melanoma surgery on October 10, 2011.   7.         Hysterectomy in 1996.    FAMILY HISTORY:  Significant for melanoma in her brother and sister. No other family history of cancer disease. SOCIAL HISTORY:  She denies smoking, alcohol drinking, and illicit drug abuse. She is  for 55 years and has four children. She is retiree from the [employer]. ALLERGIES:  No known drug allergies. Review of Systems: \"Per interval history; otherwise 10 point ROS is negative. \"  Her energy level is okay and her sleep is good. She denies fever, chills, night sweats, cough, shortness of breath, chest pain, hemoptysis or palpitations. Her bowels and bladder functions are normal. She doesn't have nausea, vomiting, abdominal pain, constipation or dysuria. She has some loss of appetite and weight loss. She only has minimal loose stool. She denies neuropathy and she doesn't have bleeding or clotting disorder. She denies any pain in her body. No anxiety and she has depression. She denies any suicidal idea. The rests of the systems are unremarkable.      Vital Signs:  /60 (Site: Left Upper Arm, Position: Sitting, Cuff Size: Medium Adult)   Pulse 61   Temp 96.6 °F (35.9 °C) (Infrared)   Resp 16   Ht 5' 2\" (1.575 m)   Wt 99 lb (44.9 kg)   SpO2 99%   BMI 18.11 kg/m²     Physical Exam:  CONSTITUTIONAL: alert, cooperative, awake, no apparent distress   EYES: pupils equal, round and reactive to light, sclera clear and conjunctiva normal  ENT: without obvious abnormality, normocephalic, atraumatic  NECK: supple, symmetrical, no jugular venous distension and no carotid bruits   HEMATOLOGIC/LYMPHATIC: no cervical, supraclavicular or axillary lymphadenopathy   LUNGS: VBS, no wheezes, clear to auscultation, no crackles, no increased work of breathing, no rhonchi,        CARDIOVASCULAR: regular rate and rhythm, normal S1 and S2, no murmur noted  ABDOMEN: soft, non-distended, normal bowel sounds x 4, non-tender, no masses palpated, no hepatosplenomegaly   MUSCULOSKELETAL: full range of motion noted, tone is normal  NEUROLOGIC: awake, alert, oriented to name, place and time. Motor skills grossly intact. SKIN: Normal skin color, texture, turgor and no jaundice. appears intact   EXTREMITIES: no clubbing, no cyanosis, no leg swelling, no LE edema,      Labs:  Hematology:  Lab Results   Component Value Date    WBC 8.3 04/21/2022    RBC 4.00 (L) 04/21/2022    HGB 10.5 (L) 04/21/2022    HCT 34.3 (L) 04/21/2022    MCV 85.8 04/21/2022    MCH 26.3 (L) 04/21/2022    MCHC 30.6 (L) 04/21/2022    RDW 15.5 (H) 04/21/2022    PLT 61 (L) 04/21/2022    MPV 11.0 04/21/2022    BANDSPCT 3 (L) 04/20/2021    SEGSPCT 79.1 (H) 04/21/2022    EOSRELPCT 3.1 (H) 04/21/2022    BASOPCT 0.4 04/21/2022    LYMPHOPCT 10.9 (L) 04/21/2022    MONOPCT 6.5 (H) 04/21/2022    BANDABS 0.94 04/20/2021    SEGSABS 6.5 04/21/2022    EOSABS 0.3 04/21/2022    BASOSABS 0.0 04/21/2022    LYMPHSABS 0.9 04/21/2022    MONOSABS 0.5 04/21/2022    DIFFTYPE AUTOMATED DIFFERENTIAL 04/21/2022    ANISOCYTOSIS 1+ 05/27/2021    POLYCHROM 1+ 03/29/2017    WBCMORP  04/17/2021     SLIDE SCANNED  MANUAL DIFFERENTIAL APPEARS TO MATCH AUTOMATED DIFFERENTIAL WELL     PLTM DECREASED 04/27/2021     Lab Results   Component Value Date    ESR 2 11/09/2021     Chemistry:  Lab Results   Component Value Date     11/09/2021    K 3.9 11/09/2021     11/09/2021    CO2 22 11/09/2021    BUN 11 11/09/2021    CREATININE 1.1 11/09/2021    GLUCOSE 190 (H) 11/09/2021    CALCIUM 8.5 11/09/2021    PROT 5.7 (L) 11/09/2021    LABALBU 3.9 11/09/2021    BILITOT 0.4 11/09/2021    ALKPHOS 51 11/09/2021    AST 11 (L) 11/09/2021    ALT 11 11/09/2021    LABGLOM 48 (L) 11/09/2021    GFRAA 58 (L) 11/09/2021    PHOS 1.4 (L) 04/28/2021    MG 1.6 (L) 04/28/2021    POCGLU 185 (H) 04/28/2021     Lab Results   Component Value Date     11/09/2021    HOMOCYSTEINE (H) 10/15/2019     17.8  Risk of vascular disease   increases above 9 umol/L  and is significant >15 umol/L. No components found for: LD  Lab Results   Component Value Date    TSHHS 0.666 11/09/2021    T4FREE 1.13 11/24/2014    FT3 2.9 11/24/2014     Immunology:  Lab Results   Component Value Date    PROT 5.7 (L) 11/09/2021    SPEP  04/17/2021     INTERPRETATION - Decreased albumin and total proteins. LP.    ALBUMINELP 3.0 (L) 04/17/2021    LABALPH 0.2 04/17/2021    LABALPH 0.5 04/17/2021    LABBETA 0.8 04/17/2021    GAMGLOB 0.8 04/17/2021     No results found for: Saravanan [place], KLFLCR  No results found for: B2M  Coagulation Panel:  Lab Results   Component Value Date    PROTIME 15.3 (H) 04/28/2021    INR 1.26 04/28/2021    APTT 33.1 04/28/2021     Anemia Panel:  Lab Results   Component Value Date    FAQNDJCT04 833.4 11/09/2021    FOLATE >20.0 (H) 11/09/2021     Tumor Markers:  No results found for: , CEA, , LABCA2, PSA  Observations:  PHQ-9 Total Score: 13 (4/21/2022  1:44 PM)  Thoughts that you would be better off dead, or of hurting yourself in some way: 0 (4/21/2022  1:44 PM)      Assessment & Plan: Thrombocytopenia  most likely immune thrombocytopenia. Stage IIB malignant melanoma. Recurrent/metastatic small intestine carcinoid tumor. PLAN:  Ms. Maria T Gongora has been followed for immune thrombocytopenia, history of stage IIB malignant melanoma, and the recurrent carcinoid tumor. CT scan of the chest, abdomen and pelvis done on December 23, 2020 showed interval increase in size of a soft tissue mass with scattered calcifications posterior to the pancreatic head currently measuring up to 4 cm, previously 3.3 cm. No evidence of metastatic disease in the chest.    CT scan of the abdomen and pelvis done on April 27, 2021 showed a stable appearance of a pancreatic mass measuring 3.3 x 4 cm. On April 21, 2022, she presented to me for followup. I have been following Ms. Diane for immune thrombocytopenia, history of stage IIB malignant melanoma, and the recurrent carcinoid tumor.  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 She stated that her diarrhea is getting worse lately. She missed her lanreotide dose last time. I will give her Lanreotide injection today. I recommend her to have injection regularly. I also ask her to use lomotil as needed for diarrhea. Pancreatic head mass is stable in size on 4/27/21 CT scan. She has been loosing weight a lot lately and I am concerned about it. Since she hasn't had scans for a while and significant weight loss, I recommend her to have CT scan of chest, abdomen and pelvis soon. I will see her again after CT scans. Malignancy associated pain - Her pain has been controlled well with Norco 5/325 mg every 6 hourly as needed basis. I will continue to follow her pain status closely. Her platelet count today was 69,000/cumm. I will continue to monitor closely. Depression - stated that she still has depression. I recognized that she hasn't had bupropion and she is only on effexor. I will start bupropion today and sent the script for her. Hyperlipidemia - she is on lipitor. Hypertension, CAD - she is on aspirin, amlodipine and carvedilol. Recommend salt restriction. GERD - stable on pantoprazole. Diabetes - she is on metformin. Recommend ADA diet. Health maintenance - I recommend her to have age-appropriate cancer screening, exercise, low-fat and low-sodium diet. I answered all her questions and concerns for today. I asked her to follow up with her primary care physician on regular basis. Recent imaging and labs were reviewed and discussed with the patient.

## 2022-04-21 ENCOUNTER — HOSPITAL ENCOUNTER (OUTPATIENT)
Dept: INFUSION THERAPY | Age: 82
Discharge: HOME | End: 2022-04-21
Payer: MEDICARE

## 2022-04-21 ENCOUNTER — OFFICE VISIT (OUTPATIENT)
Dept: ONCOLOGY | Age: 82
End: 2022-04-21
Payer: MEDICARE

## 2022-04-21 VITALS
RESPIRATION RATE: 16 BRPM | HEIGHT: 62 IN | DIASTOLIC BLOOD PRESSURE: 60 MMHG | OXYGEN SATURATION: 99 % | HEART RATE: 61 BPM | WEIGHT: 99 LBS | BODY MASS INDEX: 18.22 KG/M2 | SYSTOLIC BLOOD PRESSURE: 126 MMHG | TEMPERATURE: 96.6 F

## 2022-04-21 DIAGNOSIS — E34.0 CARCINOID SYNDROME (HCC): Primary | ICD-10-CM

## 2022-04-21 LAB
ALBUMIN SERPL-MCNC: 4 GM/DL (ref 3.4–5)
ALP BLD-CCNC: 75 IU/L (ref 40–128)
ALT SERPL-CCNC: 12 U/L (ref 10–40)
ANION GAP SERPL CALCULATED.3IONS-SCNC: 10 MMOL/L (ref 4–16)
AST SERPL-CCNC: 15 IU/L (ref 15–37)
BASOPHILS ABSOLUTE: 0 K/CU MM
BASOPHILS RELATIVE PERCENT: 0.4 % (ref 0–1)
BILIRUB SERPL-MCNC: 0.5 MG/DL (ref 0–1)
BUN BLDV-MCNC: 23 MG/DL (ref 6–23)
CALCIUM SERPL-MCNC: 8.7 MG/DL (ref 8.3–10.6)
CHLORIDE BLD-SCNC: 104 MMOL/L (ref 99–110)
CO2: 23 MMOL/L (ref 21–32)
CREAT SERPL-MCNC: 1.2 MG/DL (ref 0.6–1.1)
DIFFERENTIAL TYPE: ABNORMAL
EOSINOPHILS ABSOLUTE: 0.3 K/CU MM
EOSINOPHILS RELATIVE PERCENT: 3.1 % (ref 0–3)
GFR AFRICAN AMERICAN: 52 ML/MIN/1.73M2
GFR NON-AFRICAN AMERICAN: 43 ML/MIN/1.73M2
GLUCOSE BLD-MCNC: 215 MG/DL (ref 70–99)
HCT VFR BLD CALC: 34.3 % (ref 37–47)
HEMOGLOBIN: 10.5 GM/DL (ref 12.5–16)
LYMPHOCYTES ABSOLUTE: 0.9 K/CU MM
LYMPHOCYTES RELATIVE PERCENT: 10.9 % (ref 24–44)
MCH RBC QN AUTO: 26.3 PG (ref 27–31)
MCHC RBC AUTO-ENTMCNC: 30.6 % (ref 32–36)
MCV RBC AUTO: 85.8 FL (ref 78–100)
MONOCYTES ABSOLUTE: 0.5 K/CU MM
MONOCYTES RELATIVE PERCENT: 6.5 % (ref 0–4)
PDW BLD-RTO: 15.5 % (ref 11.7–14.9)
PLATELET # BLD: 61 K/CU MM (ref 140–440)
PMV BLD AUTO: 11 FL (ref 7.5–11.1)
POTASSIUM SERPL-SCNC: 3.9 MMOL/L (ref 3.5–5.1)
RBC # BLD: 4 M/CU MM (ref 4.2–5.4)
SEGMENTED NEUTROPHILS ABSOLUTE COUNT: 6.5 K/CU MM
SEGMENTED NEUTROPHILS RELATIVE PERCENT: 79.1 % (ref 36–66)
SODIUM BLD-SCNC: 137 MMOL/L (ref 135–145)
TOTAL PROTEIN: 5.8 GM/DL (ref 6.4–8.2)
WBC # BLD: 8.3 K/CU MM (ref 4–10.5)

## 2022-04-21 PROCEDURE — 86316 IMMUNOASSAY TUMOR OTHER: CPT

## 2022-04-21 PROCEDURE — 96372 THER/PROPH/DIAG INJ SC/IM: CPT

## 2022-04-21 PROCEDURE — G8427 DOCREV CUR MEDS BY ELIG CLIN: HCPCS | Performed by: INTERNAL MEDICINE

## 2022-04-21 PROCEDURE — 80053 COMPREHEN METABOLIC PANEL: CPT

## 2022-04-21 PROCEDURE — 4040F PNEUMOC VAC/ADMIN/RCVD: CPT | Performed by: INTERNAL MEDICINE

## 2022-04-21 PROCEDURE — 85025 COMPLETE CBC W/AUTO DIFF WBC: CPT

## 2022-04-21 PROCEDURE — 1036F TOBACCO NON-USER: CPT | Performed by: INTERNAL MEDICINE

## 2022-04-21 PROCEDURE — 99214 OFFICE O/P EST MOD 30 MIN: CPT | Performed by: INTERNAL MEDICINE

## 2022-04-21 PROCEDURE — 6360000002 HC RX W HCPCS: Performed by: INTERNAL MEDICINE

## 2022-04-21 PROCEDURE — 1123F ACP DISCUSS/DSCN MKR DOCD: CPT | Performed by: INTERNAL MEDICINE

## 2022-04-21 PROCEDURE — G8400 PT W/DXA NO RESULTS DOC: HCPCS | Performed by: INTERNAL MEDICINE

## 2022-04-21 PROCEDURE — 1090F PRES/ABSN URINE INCON ASSESS: CPT | Performed by: INTERNAL MEDICINE

## 2022-04-21 PROCEDURE — G8419 CALC BMI OUT NRM PARAM NOF/U: HCPCS | Performed by: INTERNAL MEDICINE

## 2022-04-21 PROCEDURE — 36415 COLL VENOUS BLD VENIPUNCTURE: CPT

## 2022-04-21 RX ORDER — LANREOTIDE ACETATE 120 MG/.5ML
120 INJECTION SUBCUTANEOUS ONCE
Status: COMPLETED | OUTPATIENT
Start: 2022-04-21 | End: 2022-04-21

## 2022-04-21 RX ORDER — LANREOTIDE ACETATE 120 MG/.5ML
120 INJECTION SUBCUTANEOUS ONCE
Status: CANCELLED | OUTPATIENT
Start: 2022-05-19 | End: 2022-05-19

## 2022-04-21 RX ADMIN — LANREOTIDE ACETATE 120 MG: 120 INJECTION SUBCUTANEOUS at 14:34

## 2022-04-21 ASSESSMENT — PATIENT HEALTH QUESTIONNAIRE - PHQ9
1. LITTLE INTEREST OR PLEASURE IN DOING THINGS: 2
3. TROUBLE FALLING OR STAYING ASLEEP: 0
2. FEELING DOWN, DEPRESSED OR HOPELESS: 2
SUM OF ALL RESPONSES TO PHQ QUESTIONS 1-9: 13
9. THOUGHTS THAT YOU WOULD BE BETTER OFF DEAD, OR OF HURTING YOURSELF: 0
4. FEELING TIRED OR HAVING LITTLE ENERGY: 3
7. TROUBLE CONCENTRATING ON THINGS, SUCH AS READING THE NEWSPAPER OR WATCHING TELEVISION: 0
SUM OF ALL RESPONSES TO PHQ9 QUESTIONS 1 & 2: 4
SUM OF ALL RESPONSES TO PHQ QUESTIONS 1-9: 13
10. IF YOU CHECKED OFF ANY PROBLEMS, HOW DIFFICULT HAVE THESE PROBLEMS MADE IT FOR YOU TO DO YOUR WORK, TAKE CARE OF THINGS AT HOME, OR GET ALONG WITH OTHER PEOPLE: 1
6. FEELING BAD ABOUT YOURSELF - OR THAT YOU ARE A FAILURE OR HAVE LET YOURSELF OR YOUR FAMILY DOWN: 3
SUM OF ALL RESPONSES TO PHQ QUESTIONS 1-9: 13
5. POOR APPETITE OR OVEREATING: 3
8. MOVING OR SPEAKING SO SLOWLY THAT OTHER PEOPLE COULD HAVE NOTICED. OR THE OPPOSITE, BEING SO FIGETY OR RESTLESS THAT YOU HAVE BEEN MOVING AROUND A LOT MORE THAN USUAL: 0
SUM OF ALL RESPONSES TO PHQ QUESTIONS 1-9: 13

## 2022-04-21 NOTE — PROGRESS NOTES
MA Rooming Questions  Patient: Wilberto Harkins  MRN: [id number]    Date: 4/21/2022        1. Do you have any new issues? yes - Pt c/o stomach pains and accidents         2. Do you need any refills on medications?    no    3. Have you had any imaging done since your last visit?   no    4. Have you been hospitalized or seen in the emergency room since your last visit here?   no    5. Did the patient have a depression screening completed today?  Yes    No data recorded     PHQ-9 Given to (if applicable):               PHQ-9 Score (if applicable):                     [x] Positive     []  Negative              Does question #9 need addressed (if applicable)                     [] Yes    []  No               Keeley Vegas MA

## 2022-04-21 NOTE — PROGRESS NOTES
In clinic for office visit. Lanreotide injection administered as ordered. Tolerated well. Discharged in stable condition.

## 2022-04-22 ENCOUNTER — TELEPHONE (OUTPATIENT)
Dept: ONCOLOGY | Age: 82
End: 2022-04-22

## 2022-04-22 NOTE — TELEPHONE ENCOUNTER
Called to advise patient that her CT scans are scheduled @ the imaging center on 5/5/22 @ 8:30 am. Nothing to eat or drink 4 hours prior to scan and hold Metformin 48 hours after scan. Patient and daughter verbalized understanding.

## 2022-04-25 LAB — CHROMOGRANIN A: 175 NG/ML (ref 0–103)

## 2022-05-05 ENCOUNTER — HOSPITAL ENCOUNTER (OUTPATIENT)
Dept: CT IMAGING | Age: 82
Discharge: HOME | End: 2022-05-05
Payer: MEDICARE

## 2022-05-05 DIAGNOSIS — E34.0 CARCINOID SYNDROME (HCC): ICD-10-CM

## 2022-05-05 LAB
GFR AFRICAN AMERICAN: 55 ML/MIN/1.73M2
GFR NON-AFRICAN AMERICAN: 46 ML/MIN/1.73M2
POC CREATININE: 1.2 MG/DL (ref 0.6–1.1)

## 2022-05-05 PROCEDURE — 2580000003 HC RX 258: Performed by: INTERNAL MEDICINE

## 2022-05-05 PROCEDURE — 74177 CT ABD & PELVIS W/CONTRAST: CPT

## 2022-05-05 PROCEDURE — 71260 CT THORAX DX C+: CPT

## 2022-05-05 PROCEDURE — 6360000004 HC RX CONTRAST MEDICATION: Performed by: INTERNAL MEDICINE

## 2022-05-05 RX ORDER — SODIUM CHLORIDE 0.9 % (FLUSH) 0.9 %
5-40 SYRINGE (ML) INJECTION PRN
Status: DISCONTINUED | OUTPATIENT
Start: 2022-05-05 | End: 2022-05-06 | Stop reason: HOSPADM

## 2022-05-05 RX ADMIN — IOPAMIDOL 75 ML: 755 INJECTION, SOLUTION INTRAVENOUS at 10:30

## 2022-05-05 RX ADMIN — IOHEXOL 50 ML: 240 INJECTION, SOLUTION INTRATHECAL; INTRAVASCULAR; INTRAVENOUS; ORAL at 09:15

## 2022-05-05 RX ADMIN — SODIUM CHLORIDE, PRESERVATIVE FREE 10 ML: 5 INJECTION INTRAVENOUS at 10:30

## 2022-05-19 ENCOUNTER — OFFICE VISIT (OUTPATIENT)
Dept: ONCOLOGY | Age: 82
End: 2022-05-19
Payer: MEDICARE

## 2022-05-19 ENCOUNTER — HOSPITAL ENCOUNTER (OUTPATIENT)
Dept: INFUSION THERAPY | Age: 82
Discharge: HOME | End: 2022-05-19
Payer: MEDICARE

## 2022-05-19 VITALS
DIASTOLIC BLOOD PRESSURE: 66 MMHG | TEMPERATURE: 98.3 F | RESPIRATION RATE: 16 BRPM | OXYGEN SATURATION: 96 % | SYSTOLIC BLOOD PRESSURE: 148 MMHG | HEIGHT: 62 IN | WEIGHT: 101 LBS | HEART RATE: 67 BPM | BODY MASS INDEX: 18.58 KG/M2

## 2022-05-19 DIAGNOSIS — E34.0 CARCINOID SYNDROME (HCC): Primary | ICD-10-CM

## 2022-05-19 PROCEDURE — 99214 OFFICE O/P EST MOD 30 MIN: CPT | Performed by: INTERNAL MEDICINE

## 2022-05-19 PROCEDURE — G8427 DOCREV CUR MEDS BY ELIG CLIN: HCPCS | Performed by: INTERNAL MEDICINE

## 2022-05-19 PROCEDURE — 1090F PRES/ABSN URINE INCON ASSESS: CPT | Performed by: INTERNAL MEDICINE

## 2022-05-19 PROCEDURE — 6360000002 HC RX W HCPCS: Performed by: INTERNAL MEDICINE

## 2022-05-19 PROCEDURE — 1036F TOBACCO NON-USER: CPT | Performed by: INTERNAL MEDICINE

## 2022-05-19 PROCEDURE — 4040F PNEUMOC VAC/ADMIN/RCVD: CPT | Performed by: INTERNAL MEDICINE

## 2022-05-19 PROCEDURE — G8419 CALC BMI OUT NRM PARAM NOF/U: HCPCS | Performed by: INTERNAL MEDICINE

## 2022-05-19 PROCEDURE — 1123F ACP DISCUSS/DSCN MKR DOCD: CPT | Performed by: INTERNAL MEDICINE

## 2022-05-19 PROCEDURE — 96372 THER/PROPH/DIAG INJ SC/IM: CPT

## 2022-05-19 PROCEDURE — G8400 PT W/DXA NO RESULTS DOC: HCPCS | Performed by: INTERNAL MEDICINE

## 2022-05-19 RX ORDER — TRIAMCINOLONE ACETONIDE 0.25 MG/G
OINTMENT TOPICAL
Qty: 60 G | Refills: 5 | Status: SHIPPED | OUTPATIENT
Start: 2022-05-19 | End: 2022-05-26

## 2022-05-19 RX ORDER — LANREOTIDE ACETATE 120 MG/.5ML
120 INJECTION SUBCUTANEOUS ONCE
Status: COMPLETED | OUTPATIENT
Start: 2022-05-19 | End: 2022-05-19

## 2022-05-19 RX ORDER — LANREOTIDE ACETATE 120 MG/.5ML
120 INJECTION SUBCUTANEOUS ONCE
Status: CANCELLED | OUTPATIENT
Start: 2022-06-16 | End: 2022-06-16

## 2022-05-19 RX ADMIN — LANREOTIDE ACETATE 120 MG: 120 INJECTION SUBCUTANEOUS at 14:27

## 2022-05-19 NOTE — PROGRESS NOTES
Patient to start taking telotristat 250 mg by mouth TID. Called [facility] at [phone] to inquire if they carry medication. [facility] states it is a specialty drug. RX sent to  [facility]. Called patient at [phone] and spoke with daughter, Melba Christianson to notify. Voices understanding.

## 2022-05-19 NOTE — PROGRESS NOTES
MA Rooming Questions  Patient: Gilma Hernandez  MRN: [id number]    Date: 5/19/2022        1. Do you have any new issues? yes - has been getting itchy all over- states it is ongoing. 2. Do you need any refills on medications?    no    3. Have you had any imaging done since your last visit? Yes- CT    4. Have you been hospitalized or seen in the emergency room since your last visit here?   no    5. Did the patient have a depression screening completed today?  No    No data recorded     PHQ-9 Given to (if applicable):               PHQ-9 Score (if applicable):                     [] Positive     []  Negative              Does question #9 need addressed (if applicable)                     [] Yes    []  No               James Hernandez CMA

## 2022-05-19 NOTE — PROGRESS NOTES
In clinic for office visit. Lanreotide injection administered as ordered in [place]. Tolerated well. Discharged in stable condition. Copy of AVS given.

## 2022-05-19 NOTE — PROGRESS NOTES
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 Lina Diaz is a 56-year-old very pleasant patient with medical history significant for hyperlipidemia, gastroesophageal reflux disease, coronary artery disease, depression and adjustment disorder, osteoporosis, diabetes mellitus, and history of small intestinal carcinoid tumor, status post surgery by Dr. Elizabet Calixto in , initially referred to me on 2014, for evaluation of thrombocytopenia. She stated that she was found to have thrombocytopenia on routine blood tests done on 2014. Repeat blood tests on 2014, showed persistent thrombocytopenia and she was referred to me for evaluation. She does not have leucopenia or anemia. She complains of severe tiredness and fatigue. Besides that she does not have any other significant symptoms. She denies bleeding diathesis too. Laboratory workups done on 2014, showed platelet count of 37, however, manual differential showed platelet count of 97. Her white blood cell count was 6.4 and hemoglobin was 12.2. Her LDH is mildly elevated (261), vitamin B12 normal (154), normal folate (more than 20), negative hepatitis panel, antinuclear antibody was not detected and negative rheumatoid factor and normal TSH (2.4). Since all the workups have been within normal range, I believe her mild thrombocytopenia is most likely due to immune thrombocytopenia. Ms. Lina Diaz started following with Dr. Sabra Martins at [facility] for immune thrombocytopenia.  Since her platelet count dropped below 20,000 per cubic millimeter, she was treated with prednisone. She responded well to prednisone; however, her platelet is always in her 40,000 range when she started tapering off the prednisone. She discussed about the splenectomy and Ms. Juvencio Valles was not sure she really wanted to proceed with splenectomy at this moment. Since Ms. Diane's insurance does not cover [facility], she was referred back to me for continuation of care for her immune thrombocytopenia. Since Ms. Bowman thrombocytopenia has gotten better, I stopped prednisone since April 29, 2016. She has been under close observation since then. Ms. Juvencio Valles was found to have suspicious lesion in her mid back by Dermatologist, Dr. Joy Khan and she referred her to Dr. Anastacio Baldwin at [facility] for further evaluation. She initially underwent wide excision and sentinel lymph node biopsy on April 14, 2017. The initial pathology revealed 4 mm in Breslows depth, Charles level IV, non-ulcerated, with 2 mitosis/mm2. 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 Since she has stage IIB malignant melanoma, I recommend for close observation only. She has been under close observation since then. She had PET-CT scan and it showed mesenteric calcified mass. She was subsequently referred to the Gastroenterologist for EUS since she had history of carcinoid tumor. Ms. Juvencio Valles underwent endoscopic ultrasound by Dr. Tiara Galloway on May 10, 2017, and it showed a mass in the parapancreatic region, 3 cm in size. 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 The ki-67 was less than 2%. Tumor cells are positive for AE1/3, synaptophysin, and chromogranin.       She subsequently underwent nuclear PET neuroendocrine scan on July 21, 2017, and it showed a soft tissue mass in the pancreatic uncinate process with intense radiotracer activity, compatible with somatostatin receptor-avid malignancy. Paraesophageal lymph node in the chest at the level of the rafiq and aortocaval lymph node with intense radiotracer activity, compatible with somatostatin receptor-avid robbie metastasis. Since she was found to have metastatic lymph node involvement in intrathoracic and abdominal lymph nodes, she is not a surgical candidate. Tumor markers were reviewed and she was found to have mild elevation in chromogranin A (136) but her other tumor markers were within normal range. Since she has been having diarrhea (at least 5-6 times a day) and her tumors are octreotide avid, she was started with octreotide since September 1, 2017. She was started with hormone therapy, octreotide for tumor stabilization as well as for the symptom management (diarrhea). Since she lives in [facility], her case was subsequently referred to us for continuation of octreotide injection locally here in [facility]. Octreotide was started in our center since October 5, 2017. CT scan of the chest, abdomen and pelvis done on December 23, 2020 showed interval increase in size of a soft tissue mass with scattered calcifications posterior to the pancreatic head currently measuring up to 4 cm, previously 3.3 cm. No evidence of metastatic disease in the chest.    CT scan of the abdomen and pelvis done on April 27, 2021 showed a stable appearance of a pancreatic mass measuring 3.3 x 4 cm. CT chest, abdomen and pelvis done on 5/5/2022 showed no metastatic disease in the chest.  Essentially stable 36 mm mass in the retroperitoneum encasing the SMA with adjacent 18 mm calcified mesenteric nodule corresponding to the history of carcinoid. Otherwise no metastatic disease or adenopathy in the abdomen or pelvis. Chronic appearing mild to moderate T3 and L1 compression fractures. On May 19, 2022, she presented to me for followup. I have been following Ms. Bennett Heck for immune thrombocytopenia, history of stage IIB malignant melanoma, and the recurrent carcinoid tumor. 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 She stated that her diarrhea is getting worse lately. She continues to have significant diarrhea even though she is taking lomotil (~ 8 tablets daily). I reviewed with her findings on CT scan done on 5/5/22. She has at least stable disease noted on CT scan. She has been loosing weight a lot lately due to uncontrolled diarrhea. Since her carcinoid syndrome becomes refractory to lanreotide, I recommend to add telotristat 250 mg TID. I reviewed with her all the potential side effects as well as benefits telotristat. After long discussion with her and her daughter, she is in agreement to start telotristat. I will request telotristat today and I recommend that you start it as soon as she receives the medications. I will continue to follow her closely during therapy with lanreotide and telotristat. Malignancy associated pain - Her pain has been controlled well with Norco 5/325 mg every 6 hourly as needed basis. I will continue to follow her pain status closely. Her platelet count was 74,590/KDPI on 4/21/22. I will continue to monitor closely. Depression - stated that she still has depression. I recognized that she hasn't had bupropion and she is only on effexor. I will start bupropion today and sent the script for her. Hyperlipidemia - she is on lipitor. Hypertension, CAD - she is on aspirin, amlodipine and carvedilol. Recommend salt restriction. GERD - stable on pantoprazole. Diabetes - she is on metformin. Recommend ADA diet. Health maintenance - I recommend her to have age-appropriate cancer screening, exercise, low-fat and low-sodium diet. She has tiredness and fatigue. Besides that, she doesn't have any other significant new symptoms on today's visit.      PAST MEDICAL HISTORY:  Significant for:  1. Hyperlipidemia. 2.         Diabetes mellitus. 3.         Coronary artery disease. 4.         Gastroesophageal reflux disease. 5.         History of depression and adjustment disorder. 6.         Osteoporosis. 7.         History of small intestinal cancer, status post surgery by Dr. Estephanie Christian in [address]. PAST SURGICAL HISTORY:  Significant for:  1. Appendectomy in 1959.  2.         Coronary artery angioplasty in 1992. 3.         Small intestine cancer removal in 1998.  4.         Cholecystectomy in 1998.  5.         Open heart surgery in March 31, 2009. 6.         Melanoma surgery on October 10, 2011. 7.         Hysterectomy in 1996. FAMILY HISTORY:  Significant for melanoma in her brother and sister. No other family history of cancer disease. SOCIAL HISTORY:  She denies smoking, alcohol drinking, and illicit drug abuse. She is  for 55 years and has four children. She is retiree from the [employer]. ALLERGIES:  No known drug allergies. Review of Systems: \"Per interval history; otherwise 10 point ROS is negative. \"  Her energy level is fair and her sleep is fine. She doesn't have fever, chills, night sweats, cough, shortness of breath, chest pain, hemoptysis or palpitations. Her bowels and bladder functions are normal, except she has diarrhea. She denies nausea, vomiting, abdominal pain, constipation or dysuria. She has some loss of appetite and weight loss. She doesn't have neuropathy and she denies bleeding or clotting disorder. She doesn't have any pain in her body. Denies anxiety. She has depression. She denies any suicidal idea. The rests of the systems are unremarkable.      Vital Signs:  BP (!) 148/66 (Site: Left Upper Arm, Position: Sitting, Cuff Size: Medium Adult)   Pulse 67   Temp 98.3 °F (36.8 °C) (Temporal)   Resp 16   Ht 5' 2\" (1.575 m)   Wt 101 lb (45.8 kg) Comment: pt stated weight  SpO2 96%   BMI 18.47 kg/m²     Physical Exam:  CONSTITUTIONAL: alert, cooperative, awake, no apparent distress   EYES: pupils equal, round and reactive to light, sclera clear and conjunctiva normal  ENT: without obvious abnormality, normocephalic, atraumatic  NECK: supple, symmetrical, no jugular venous distension and no carotid bruits   HEMATOLOGIC/LYMPHATIC: no cervical, supraclavicular or axillary lymphadenopathy   LUNGS: VBS, no wheezes, no increased work of breathing, no rhonchi, clear to auscultation, no crackles,        CARDIOVASCULAR: regular rate and rhythm, normal S1 and S2, no murmur noted  ABDOMEN: soft, non-distended, normal bowel sounds x 4, non-tender, no masses palpated, no hepatosplenomegaly   MUSCULOSKELETAL: full range of motion noted, tone is normal  NEUROLOGIC: awake, alert, oriented to name, place and time. Motor skills grossly intact. SKIN: Normal skin color, texture, turgor and no jaundice.  appears intact   EXTREMITIES: no cyanosis, no leg swelling, no clubbing, no LE edema,      Labs:  Hematology:  Lab Results   Component Value Date    WBC 8.3 04/21/2022    RBC 4.00 (L) 04/21/2022    HGB 10.5 (L) 04/21/2022    HCT 34.3 (L) 04/21/2022    MCV 85.8 04/21/2022    MCH 26.3 (L) 04/21/2022    MCHC 30.6 (L) 04/21/2022    RDW 15.5 (H) 04/21/2022    PLT 61 (L) 04/21/2022    MPV 11.0 04/21/2022    BANDSPCT 3 (L) 04/20/2021    SEGSPCT 79.1 (H) 04/21/2022    EOSRELPCT 3.1 (H) 04/21/2022    BASOPCT 0.4 04/21/2022    LYMPHOPCT 10.9 (L) 04/21/2022    MONOPCT 6.5 (H) 04/21/2022    BANDABS 0.94 04/20/2021    SEGSABS 6.5 04/21/2022    EOSABS 0.3 04/21/2022    BASOSABS 0.0 04/21/2022    LYMPHSABS 0.9 04/21/2022    MONOSABS 0.5 04/21/2022    DIFFTYPE AUTOMATED DIFFERENTIAL 04/21/2022    ANISOCYTOSIS 1+ 05/27/2021    POLYCHROM 1+ 03/29/2017    WBCMORP  04/17/2021     SLIDE SCANNED  MANUAL DIFFERENTIAL APPEARS TO MATCH AUTOMATED DIFFERENTIAL WELL     PLTM DECREASED 04/27/2021     Lab Results   Component Value Date    ESR 2 11/09/2021 Chemistry:  Lab Results   Component Value Date     04/21/2022    K 3.9 04/21/2022     04/21/2022    CO2 23 04/21/2022    BUN 23 04/21/2022    CREATININE 1.2 (H) 05/05/2022    GLUCOSE 215 (H) 04/21/2022    CALCIUM 8.7 04/21/2022    PROT 5.8 (L) 04/21/2022    LABALBU 4.0 04/21/2022    BILITOT 0.5 04/21/2022    ALKPHOS 75 04/21/2022    AST 15 04/21/2022    ALT 12 04/21/2022    LABGLOM 46 (L) 05/05/2022    GFRAA 55 (L) 05/05/2022    PHOS 1.4 (L) 04/28/2021    MG 1.6 (L) 04/28/2021    POCGLU 185 (H) 04/28/2021     Lab Results   Component Value Date     11/09/2021    HOMOCYSTEINE (H) 10/15/2019     17.8  Risk of vascular disease   increases above 9 umol/L  and is significant >15 umol/L. No components found for: LD  Lab Results   Component Value Date    TSHHS 0.666 11/09/2021    T4FREE 1.13 11/24/2014    FT3 2.9 11/24/2014     Immunology:  Lab Results   Component Value Date    PROT 5.8 (L) 04/21/2022    SPEP  04/17/2021     INTERPRETATION - Decreased albumin and total proteins. LP.    ALBUMINELP 3.0 (L) 04/17/2021    LABALPH 0.2 04/17/2021    LABALPH 0.5 04/17/2021    LABBETA 0.8 04/17/2021    GAMGLOB 0.8 04/17/2021     No results found for: Saravanan Greg, KLFLCR  No results found for: B2M  Coagulation Panel:  Lab Results   Component Value Date    PROTIME 15.3 (H) 04/28/2021    INR 1.26 04/28/2021    APTT 33.1 04/28/2021     Anemia Panel:  Lab Results   Component Value Date    YJBTEZQS32 833.4 11/09/2021    FOLATE >20.0 (H) 11/09/2021     Tumor Markers:  No results found for: , CEA, , LABCA2, PSA  Observations:  No data recorded      Assessment: Thrombocytopenia  most likely immune thrombocytopenia. Stage IIB malignant melanoma. Recurrent/metastatic small intestine carcinoid tumor. Plan:  Ms. Maria T Gongora has been followed for immune thrombocytopenia, history of stage IIB malignant melanoma, and the recurrent carcinoid tumor.      CT scan of the chest, abdomen and pelvis done on December 23, 2020 showed interval increase in size of a soft tissue mass with scattered calcifications posterior to the pancreatic head currently measuring up to 4 cm, previously 3.3 cm. No evidence of metastatic disease in the chest.    CT scan of the abdomen and pelvis done on April 27, 2021 showed a stable appearance of a pancreatic mass measuring 3.3 x 4 cm. CT chest, abdomen and pelvis done on 5/5/2022 showed no metastatic disease in the chest.  Essentially stable 36 mm mass in the retroperitoneum encasing the SMA with adjacent 18 mm calcified mesenteric nodule corresponding to the history of carcinoid. Otherwise no metastatic disease or adenopathy in the abdomen or pelvis. Chronic appearing mild to moderate T3 and L1 compression fractures. On May 19, 2022, she presented to me for followup. I have been following Ms. Diane for immune thrombocytopenia, history of stage IIB malignant melanoma, and the recurrent carcinoid tumor. 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 She stated that her diarrhea is getting worse lately. She continues to have significant diarrhea even though she is taking lomotil (~ 8 tablets daily). I reviewed with her findings on CT scan done on 5/5/22. She has at least stable disease noted on CT scan. She has been loosing weight a lot lately due to uncontrolled diarrhea. Since her carcinoid syndrome becomes refractory to lanreotide, I recommend to add telotristat 250 mg TID. I reviewed with her all the potential side effects as well as benefits telotristat. After long discussion with her and her daughter, she is in agreement to start telotristat. I will request telotristat today and I recommend that you start it as soon as she receives the medications. I will continue to follow her closely during therapy with lanreotide and telotristat.      Malignancy associated pain - Her pain has been controlled well with Norco 5/325 mg every 6 hourly as needed basis. I will continue to follow her pain status closely. Her platelet count was 03,063/RGHR on 4/21/22. I will continue to monitor closely. Depression - stated that she still has depression. I recognized that she hasn't had bupropion and she is only on effexor. I will start bupropion today and sent the script for her. Hyperlipidemia - she is on lipitor. Hypertension, CAD - she is on aspirin, amlodipine and carvedilol. Recommend salt restriction. GERD - stable on pantoprazole. Diabetes - she is on metformin. Recommend ADA diet. Health maintenance - I recommend her to have age-appropriate cancer screening, exercise, low-fat and low-sodium diet. I answered all her questions and concerns for today. I asked her to follow up with her primary care physician on regular basis. Recent imaging and labs were reviewed and discussed with the patient.

## 2022-06-02 RX ORDER — DEXAMETHASONE 2 MG/1
TABLET ORAL
Qty: 30 TABLET | Refills: 0 | Status: SHIPPED | OUTPATIENT
Start: 2022-06-02 | End: 2022-07-12

## 2022-06-16 ENCOUNTER — TELEPHONE (OUTPATIENT)
Dept: INFUSION THERAPY | Age: 82
End: 2022-06-16

## 2022-06-16 NOTE — TELEPHONE ENCOUNTER
Arcelia Rubi called  & requested to cancel pt's injection today stating she is not feeling well. I returned the call but had to [facility]; stating pt is also scheduled today for an OV  at 2:00 & if she's not feeling well it would be good if she's able to keep this appt. Asked for a return call.

## 2022-06-23 ENCOUNTER — HOSPITAL ENCOUNTER (OUTPATIENT)
Dept: INFUSION THERAPY | Age: 82
End: 2022-06-23
Payer: MEDICARE

## 2022-06-23 ENCOUNTER — HOSPITAL ENCOUNTER (OUTPATIENT)
Dept: INFUSION THERAPY | Age: 82
Discharge: HOME | End: 2022-06-23
Payer: MEDICARE

## 2022-06-23 ENCOUNTER — OFFICE VISIT (OUTPATIENT)
Dept: ONCOLOGY | Age: 82
End: 2022-06-23
Payer: MEDICARE

## 2022-06-23 VITALS
DIASTOLIC BLOOD PRESSURE: 61 MMHG | SYSTOLIC BLOOD PRESSURE: 138 MMHG | WEIGHT: 102.2 LBS | HEART RATE: 66 BPM | OXYGEN SATURATION: 99 % | TEMPERATURE: 97.8 F | BODY MASS INDEX: 18.81 KG/M2 | HEIGHT: 62 IN

## 2022-06-23 DIAGNOSIS — E34.0 CARCINOID SYNDROME (HCC): Primary | ICD-10-CM

## 2022-06-23 LAB
ALBUMIN SERPL-MCNC: 3.9 GM/DL (ref 3.4–5)
ALP BLD-CCNC: 66 IU/L (ref 40–129)
ALT SERPL-CCNC: 7 U/L (ref 10–40)
ANION GAP SERPL CALCULATED.3IONS-SCNC: 11 MMOL/L (ref 4–16)
AST SERPL-CCNC: 10 IU/L (ref 15–37)
BASOPHILS ABSOLUTE: 0 K/CU MM
BASOPHILS RELATIVE PERCENT: 0.4 % (ref 0–1)
BILIRUB SERPL-MCNC: 0.2 MG/DL (ref 0–1)
BUN BLDV-MCNC: 37 MG/DL (ref 6–23)
CALCIUM SERPL-MCNC: 8.4 MG/DL (ref 8.3–10.6)
CHLORIDE BLD-SCNC: 101 MMOL/L (ref 99–110)
CO2: 22 MMOL/L (ref 21–32)
CREAT SERPL-MCNC: 1.2 MG/DL (ref 0.6–1.1)
DIFFERENTIAL TYPE: ABNORMAL
EOSINOPHILS ABSOLUTE: 0.2 K/CU MM
EOSINOPHILS RELATIVE PERCENT: 1.5 % (ref 0–3)
GFR AFRICAN AMERICAN: 52 ML/MIN/1.73M2
GFR NON-AFRICAN AMERICAN: 43 ML/MIN/1.73M2
GLUCOSE BLD-MCNC: 178 MG/DL (ref 70–99)
HCT VFR BLD CALC: 27.3 % (ref 37–47)
HEMOGLOBIN: 8.3 GM/DL (ref 12.5–16)
LYMPHOCYTES ABSOLUTE: 1 K/CU MM
LYMPHOCYTES RELATIVE PERCENT: 8.5 % (ref 24–44)
MCH RBC QN AUTO: 26.7 PG (ref 27–31)
MCHC RBC AUTO-ENTMCNC: 30.4 % (ref 32–36)
MCV RBC AUTO: 87.8 FL (ref 78–100)
MONOCYTES ABSOLUTE: 0.9 K/CU MM
MONOCYTES RELATIVE PERCENT: 7.5 % (ref 0–4)
PDW BLD-RTO: 15.3 % (ref 11.7–14.9)
PLATELET # BLD: 131 K/CU MM (ref 140–440)
PMV BLD AUTO: 10.8 FL (ref 7.5–11.1)
POTASSIUM SERPL-SCNC: 5.3 MMOL/L (ref 3.5–5.1)
RBC # BLD: 3.11 M/CU MM (ref 4.2–5.4)
SEGMENTED NEUTROPHILS ABSOLUTE COUNT: 9.3 K/CU MM
SEGMENTED NEUTROPHILS RELATIVE PERCENT: 82.1 % (ref 36–66)
SODIUM BLD-SCNC: 134 MMOL/L (ref 135–145)
TOTAL PROTEIN: 5.6 GM/DL (ref 6.4–8.2)
WBC # BLD: 11.3 K/CU MM (ref 4–10.5)

## 2022-06-23 PROCEDURE — 6360000002 HC RX W HCPCS: Performed by: INTERNAL MEDICINE

## 2022-06-23 PROCEDURE — 85025 COMPLETE CBC W/AUTO DIFF WBC: CPT

## 2022-06-23 PROCEDURE — G8420 CALC BMI NORM PARAMETERS: HCPCS | Performed by: INTERNAL MEDICINE

## 2022-06-23 PROCEDURE — 96372 THER/PROPH/DIAG INJ SC/IM: CPT

## 2022-06-23 PROCEDURE — 1123F ACP DISCUSS/DSCN MKR DOCD: CPT | Performed by: INTERNAL MEDICINE

## 2022-06-23 PROCEDURE — 36415 COLL VENOUS BLD VENIPUNCTURE: CPT

## 2022-06-23 PROCEDURE — 1090F PRES/ABSN URINE INCON ASSESS: CPT | Performed by: INTERNAL MEDICINE

## 2022-06-23 PROCEDURE — G8400 PT W/DXA NO RESULTS DOC: HCPCS | Performed by: INTERNAL MEDICINE

## 2022-06-23 PROCEDURE — G8427 DOCREV CUR MEDS BY ELIG CLIN: HCPCS | Performed by: INTERNAL MEDICINE

## 2022-06-23 PROCEDURE — 1036F TOBACCO NON-USER: CPT | Performed by: INTERNAL MEDICINE

## 2022-06-23 PROCEDURE — 99214 OFFICE O/P EST MOD 30 MIN: CPT | Performed by: INTERNAL MEDICINE

## 2022-06-23 PROCEDURE — 86316 IMMUNOASSAY TUMOR OTHER: CPT

## 2022-06-23 PROCEDURE — 80053 COMPREHEN METABOLIC PANEL: CPT

## 2022-06-23 RX ORDER — LANREOTIDE ACETATE 120 MG/.5ML
120 INJECTION SUBCUTANEOUS ONCE
Status: CANCELLED | OUTPATIENT
Start: 2022-07-14 | End: 2022-07-14

## 2022-06-23 RX ORDER — LANREOTIDE ACETATE 120 MG/.5ML
120 INJECTION SUBCUTANEOUS ONCE
Status: COMPLETED | OUTPATIENT
Start: 2022-06-23 | End: 2022-06-23

## 2022-06-23 RX ADMIN — LANREOTIDE ACETATE 120 MG: 120 INJECTION SUBCUTANEOUS at 15:06

## 2022-06-23 NOTE — PROGRESS NOTES
millimeter, she was treated with prednisone. She responded well to prednisone; however, her platelet is always in her 40,000 range when she started tapering off the prednisone. She discussed about the splenectomy and Ms. Yong Wright was not sure she really wanted to proceed with splenectomy at this moment. Since Ms. 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 Since Ms. Bowman thrombocytopenia has gotten better, I stopped prednisone since April 29, 2016. She has been under close observation since then. Ms. Yong Wright was found to have suspicious lesion in her mid back by Dermatologist, Dr. Rito Tran and she referred her to Dr. Chapis Earl at [facility] for further evaluation. She initially underwent wide excision and sentinel lymph node biopsy on April 14, 2017. The initial pathology revealed 4 mm in Breslows depth, Charles level IV, non-ulcerated, with 2 mitosis/mm2. 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 Since she has stage IIB malignant melanoma, I recommend for close observation only. She has been under close observation since then. She had PET-CT scan and it showed mesenteric calcified mass. She was subsequently referred to the Gastroenterologist for EUS since she had history of carcinoid tumor. Ms. Yong Wright underwent endoscopic ultrasound by Dr. Devon Rodriguez on May 10, 2017, and it showed a mass in the parapancreatic region, 3 cm in size. 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 The ki-67 was less than 2%. Tumor cells are positive for AE1/3, synaptophysin, and chromogranin.       She subsequently underwent nuclear PET neuroendocrine scan on July 21, 2017, and it showed a soft tissue mass in the pancreatic uncinate process with intense radiotracer activity, compatible with somatostatin receptor-avid malignancy. Paraesophageal lymph node in the chest at the level of the rafiq and aortocaval lymph node with intense radiotracer activity, compatible with somatostatin receptor-avid robbie metastasis. Since she was found to have metastatic lymph node involvement in intrathoracic and abdominal lymph nodes, she is not a surgical candidate. Tumor markers were reviewed and she was found to have mild elevation in chromogranin A (136) but her other tumor markers were within normal range. Since she has been having diarrhea (at least 5-6 times a day) and her tumors are octreotide avid, she was started with octreotide since September 1, 2017. She was started with hormone therapy, octreotide for tumor stabilization as well as for the symptom management (diarrhea). Since she lives in [facility], her case was subsequently referred to us for continuation of octreotide injection locally here in [facility]. Octreotide was started in our center since October 5, 2017. CT scan of the chest, abdomen and pelvis done on December 23, 2020 showed interval increase in size of a soft tissue mass with scattered calcifications posterior to the pancreatic head currently measuring up to 4 cm, previously 3.3 cm. No evidence of metastatic disease in the chest.    CT scan of the abdomen and pelvis done on April 27, 2021 showed a stable appearance of a pancreatic mass measuring 3.3 x 4 cm. CT chest, abdomen and pelvis done on 5/5/2022 showed no metastatic disease in the chest.  Essentially stable 36 mm mass in the retroperitoneum encasing the SMA with adjacent 18 mm calcified mesenteric nodule corresponding to the history of carcinoid. Otherwise no metastatic disease or adenopathy in the abdomen or pelvis. Chronic appearing mild to moderate T3 and L1 compression fractures. On June 23, 2022, she presented to me for followup. I have been following Ms. Bennett Heck for immune thrombocytopenia, history of stage IIB malignant melanoma, and the recurrent carcinoid tumor. 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 She stated that her diarrhea is getting worse lately. She continues to have significant diarrhea even though she is taking lomotil (~ 8 tablets daily). I reviewed with her findings on CT scan done on 5/5/22. She has at least stable disease noted on CT scan. She has been loosing weight a lot lately due to uncontrolled diarrhea. Since her carcinoid syndrome becomes refractory to lanreotide, I recommend to add telotristat 250 mg TID. Lanreotide and telotristat 250 mg TID was started since 5/25/22. Her diarrhea got better since adding telotristat. I recommend her to continue with lanreotide and telotristat for now. Malignancy associated pain - Her pain has been controlled well with Norco 5/325 mg every 6 hourly as needed basis. I will continue to follow her pain status closely. Her platelet count was 40,040/TFRY on 4/21/22. I will continue to monitor closely. Depression - stated that she still has depression. I recognized that she hasn't had bupropion and she is only on effexor. I will start bupropion today and sent the script for her. Hyperlipidemia - she is on lipitor. Hypertension, CAD - she is on aspirin, amlodipine and carvedilol. Recommend salt restriction. GERD - stable on pantoprazole. Diabetes - she is on metformin. Recommend ADA diet. Health maintenance - I recommend her to have age-appropriate cancer screening, exercise, low-fat and low-sodium diet. She doesn't have any significant new symptoms on today's visit. PAST MEDICAL HISTORY:  Significant for:  1. Hyperlipidemia. 2.         Diabetes mellitus. 3.         Coronary artery disease. 4.         Gastroesophageal reflux disease. 5.         History of depression and adjustment disorder. 6.         Osteoporosis.   7.         History of small intestinal cancer, status post surgery by Dr. Brittani Tillman in [address] [place] [address]. PAST SURGICAL HISTORY:  Significant for:  1. Appendectomy in 1959.  2.         Coronary artery angioplasty in 1992. 3.         Small intestine cancer removal in 1998.  4.         Cholecystectomy in 1998.  5.         Open heart surgery in March 31, 2009. 6.         Melanoma surgery on October 10, 2011. 7.         Hysterectomy in 1996. FAMILY HISTORY:  Significant for melanoma in her brother and sister. No other family history of cancer disease. SOCIAL HISTORY:  She denies smoking, alcohol drinking, and illicit drug abuse. She is  for 55 years and has four children. She is retiree from the [employer]. ALLERGIES:  No known drug allergies. Review of Systems: \"Per interval history; otherwise 10 point ROS is negative. \"  Her energy level is better and her sleep is fine. She doesn't have fever, chills, night sweats, cough, shortness of breath, chest pain, hemoptysis or palpitations. Her bowels and bladder functions are normal. She denies nausea, vomiting, abdominal pain, diarrhea, constipation or dysuria. She has some loss of appetite and weight loss. She doesn't have neuropathy and she denies bleeding or clotting disorder. She doesn't have any pain in her body. Denies anxiety. She has depression. She denies any suicidal idea. The rests of the systems are unremarkable.      Vital Signs:  /61 (Site: Right Upper Arm, Position: Sitting, Cuff Size: Medium Adult)   Pulse 66   Temp 97.8 °F (36.6 °C) (Infrared)   Ht 5' 2\" (1.575 m)   Wt 102 lb 3.2 oz (46.4 kg)   SpO2 99%   BMI 18.69 kg/m²     Physical Exam:  CONSTITUTIONAL: alert, cooperative, awake, no apparent distress   EYES: pupils equal, round and reactive to light, sclera clear and conjunctiva normal  ENT: without obvious abnormality, normocephalic, atraumatic  NECK: supple, symmetrical, no jugular venous distension and no carotid bruits HEMATOLOGIC/LYMPHATIC: no cervical, supraclavicular or axillary lymphadenopathy   LUNGS: VBS, no wheezes, no increased work of breathing, no rhonchi, clear to auscultation, no crackles,        CARDIOVASCULAR: regular rate and rhythm, normal S1 and S2, no murmur noted  ABDOMEN: soft, non-distended, normal bowel sounds x 4, non-tender, no masses palpated, no hepatosplenomegaly   MUSCULOSKELETAL: full range of motion noted, tone is normal  NEUROLOGIC: awake, alert, oriented to name, place and time. Motor skills grossly intact. SKIN: Normal skin color, texture, turgor and no jaundice.  appears intact   EXTREMITIES: no leg swelling, no clubbing, no cyanosis, no LE edema,      Labs:  Hematology:  Lab Results   Component Value Date    WBC 8.3 04/21/2022    RBC 4.00 (L) 04/21/2022    HGB 10.5 (L) 04/21/2022    HCT 34.3 (L) 04/21/2022    MCV 85.8 04/21/2022    MCH 26.3 (L) 04/21/2022    MCHC 30.6 (L) 04/21/2022    RDW 15.5 (H) 04/21/2022    PLT 61 (L) 04/21/2022    MPV 11.0 04/21/2022    BANDSPCT 3 (L) 04/20/2021    SEGSPCT 79.1 (H) 04/21/2022    EOSRELPCT 3.1 (H) 04/21/2022    BASOPCT 0.4 04/21/2022    LYMPHOPCT 10.9 (L) 04/21/2022    MONOPCT 6.5 (H) 04/21/2022    BANDABS 0.94 04/20/2021    SEGSABS 6.5 04/21/2022    EOSABS 0.3 04/21/2022    BASOSABS 0.0 04/21/2022    LYMPHSABS 0.9 04/21/2022    MONOSABS 0.5 04/21/2022    DIFFTYPE AUTOMATED DIFFERENTIAL 04/21/2022    ANISOCYTOSIS 1+ 05/27/2021    POLYCHROM 1+ 03/29/2017    WBCMORP  04/17/2021     SLIDE SCANNED  MANUAL DIFFERENTIAL APPEARS TO MATCH AUTOMATED DIFFERENTIAL WELL     PLTM DECREASED 04/27/2021     Lab Results   Component Value Date    ESR 2 11/09/2021     Chemistry:  Lab Results   Component Value Date     04/21/2022    K 3.9 04/21/2022     04/21/2022    CO2 23 04/21/2022    BUN 23 04/21/2022    CREATININE 1.2 (H) 05/05/2022    GLUCOSE 215 (H) 04/21/2022    CALCIUM 8.7 04/21/2022    PROT 5.8 (L) 04/21/2022    LABALBU 4.0 04/21/2022    BILITOT 0.5 04/21/2022    ALKPHOS 75 04/21/2022    AST 15 04/21/2022    ALT 12 04/21/2022    LABGLOM 46 (L) 05/05/2022    GFRAA 55 (L) 05/05/2022    PHOS 1.4 (L) 04/28/2021    MG 1.6 (L) 04/28/2021    POCGLU 185 (H) 04/28/2021     Lab Results   Component Value Date     11/09/2021    HOMOCYSTEINE (H) 10/15/2019     17.8  Risk of vascular disease   increases above 9 umol/L  and is significant >15 umol/L. No components found for: LD  Lab Results   Component Value Date    TSHHS 0.666 11/09/2021    T4FREE 1.13 11/24/2014    FT3 2.9 11/24/2014     Immunology:  Lab Results   Component Value Date    PROT 5.8 (L) 04/21/2022    SPEP  04/17/2021     INTERPRETATION - Decreased albumin and total proteins. LP.    ALBUMINELP 3.0 (L) 04/17/2021    LABALPH 0.2 04/17/2021    LABALPH 0.5 04/17/2021    LABBETA 0.8 04/17/2021    GAMGLOB 0.8 04/17/2021     No results found for: GIO DanielsonR  No results found for: B2M  Coagulation Panel:  Lab Results   Component Value Date    PROTIME 15.3 (H) 04/28/2021    INR 1.26 04/28/2021    APTT 33.1 04/28/2021     Anemia Panel:  Lab Results   Component Value Date    RGLKZMOL52 833.4 11/09/2021    FOLATE >20.0 (H) 11/09/2021     Tumor Markers:  No results found for: , CEA, , LABCA2, PSA  Observations:  No data recorded      Assessment: Thrombocytopenia - most likely immune thrombocytopenia. Stage IIB malignant melanoma. Recurrent/metastatic small intestine carcinoid tumor. Plan:  Ms. Susie Padilla has been followed for immune thrombocytopenia, history of stage IIB malignant melanoma, and the recurrent carcinoid tumor. CT scan of the chest, abdomen and pelvis done on December 23, 2020 showed interval increase in size of a soft tissue mass with scattered calcifications posterior to the pancreatic head currently measuring up to 4 cm, previously 3.3 cm.   No evidence of metastatic disease in the chest.    CT scan of the abdomen and pelvis done on April 27, 2021 showed a stable appearance of a pancreatic mass measuring 3.3 x 4 cm. CT chest, abdomen and pelvis done on 5/5/2022 showed no metastatic disease in the chest.  Essentially stable 36 mm mass in the retroperitoneum encasing the SMA with adjacent 18 mm calcified mesenteric nodule corresponding to the history of carcinoid. Otherwise no metastatic disease or adenopathy in the abdomen or pelvis. Chronic appearing mild to moderate T3 and L1 compression fractures. On June 23, 2022, she presented to me for followup. I have been following Ms. Diane for immune thrombocytopenia, history of stage IIB malignant melanoma, and the recurrent carcinoid tumor. 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 She stated that her diarrhea is getting worse lately. She continues to have significant diarrhea even though she is taking lomotil (~ 8 tablets daily). I reviewed with her findings on CT scan done on 5/5/22. She has at least stable disease noted on CT scan. She has been loosing weight a lot lately due to uncontrolled diarrhea. Since her carcinoid syndrome becomes refractory to lanreotide, I recommend to add telotristat 250 mg TID. Lanreotide and telotristat 250 mg TID was started since 5/25/22. Her diarrhea got better since adding telotristat. I recommend her to continue with lanreotide and telotristat for now. Malignancy associated pain - Her pain has been controlled well with Norco 5/325 mg every 6 hourly as needed basis. I will continue to follow her pain status closely. Her platelet count was 85,481/OQKZ on 4/21/22. I will continue to monitor closely. Depression - stated that she still has depression. I recognized that she hasn't had bupropion and she is only on effexor. I will start bupropion today and sent the script for her. Hyperlipidemia - she is on lipitor. Hypertension, CAD - she is on aspirin, amlodipine and carvedilol. Recommend salt restriction.      GERD - stable on pantoprazole. Diabetes - she is on metformin. Recommend ADA diet. Health maintenance - I recommend her to have age-appropriate cancer screening, exercise, low-fat and low-sodium diet. I answered all her questions and concerns for today. I asked her to follow up with her primary care physician on regular basis. Recent imaging and labs were reviewed and discussed with the patient.

## 2022-06-23 NOTE — PROGRESS NOTES
In clinic for office visit. Lanreotide injection administered as ordered in Lt Buttock. Tolerated well. Discharged in stable condition. Copy of AVS given.

## 2022-06-23 NOTE — PROGRESS NOTES
MA Rooming Questions  Patient: Dom Sweeney  MRN: [id number]    Date: 6/23/2022        1. Do you have any new issues?   no         2. Do you need any refills on medications?    no    3. Have you had any imaging done since your last visit?   no    4. Have you been hospitalized or seen in the emergency room since your last visit here?   no    5. Did the patient have a depression screening completed today?  No    No data recorded     PHQ-9 Given to (if applicable):               PHQ-9 Score (if applicable):                     [] Positive     []  Negative              Does question #9 need addressed (if applicable)                     [] Yes    []  No               Cheri Walker [facility]

## 2022-06-27 LAB — CHROMOGRANIN A: 370 NG/ML (ref 0–103)

## 2022-07-12 RX ORDER — LANOLIN ALCOHOL/MO/W.PET/CERES
CREAM (GRAM) TOPICAL
Qty: 90 TABLET | Refills: 1 | Status: SHIPPED | OUTPATIENT
Start: 2022-07-12

## 2022-07-12 RX ORDER — DEXAMETHASONE 2 MG/1
TABLET ORAL
Qty: 30 TABLET | Refills: 0 | Status: ON HOLD | OUTPATIENT
Start: 2022-07-12 | End: 2022-09-27 | Stop reason: SDUPTHER

## 2022-07-13 ENCOUNTER — CLINICAL DOCUMENTATION (OUTPATIENT)
Dept: ONCOLOGY | Age: 82
End: 2022-07-13

## 2022-07-13 ENCOUNTER — TELEPHONE (OUTPATIENT)
Dept: INFUSION THERAPY | Age: 82
End: 2022-07-13

## 2022-07-13 NOTE — PROGRESS NOTES
PA for Dexamethasone 2MG tablets initiated through [facility] [place]    [facility] is reviewing your PA request. Typically an electronic response will be received within 24-72 hours. To check for an update later, open this request from your dashboard. You may close this dialog and return to your dashboard to perform other tasks.

## 2022-07-21 ENCOUNTER — OFFICE VISIT (OUTPATIENT)
Dept: ONCOLOGY | Age: 82
End: 2022-07-21
Payer: MEDICARE

## 2022-07-21 ENCOUNTER — HOSPITAL ENCOUNTER (OUTPATIENT)
Dept: INFUSION THERAPY | Age: 82
Discharge: HOME | End: 2022-07-21
Payer: MEDICARE

## 2022-07-21 VITALS
SYSTOLIC BLOOD PRESSURE: 159 MMHG | DIASTOLIC BLOOD PRESSURE: 65 MMHG | RESPIRATION RATE: 16 BRPM | WEIGHT: 101.8 LBS | HEIGHT: 62 IN | OXYGEN SATURATION: 98 % | HEART RATE: 74 BPM | BODY MASS INDEX: 18.74 KG/M2 | TEMPERATURE: 98.8 F

## 2022-07-21 DIAGNOSIS — D64.9 ANEMIA, UNSPECIFIED TYPE: ICD-10-CM

## 2022-07-21 DIAGNOSIS — E34.0 CARCINOID SYNDROME (HCC): Primary | ICD-10-CM

## 2022-07-21 DIAGNOSIS — R53.83 FATIGUE, UNSPECIFIED TYPE: ICD-10-CM

## 2022-07-21 DIAGNOSIS — R10.32 LEFT LOWER QUADRANT ABDOMINAL PAIN: ICD-10-CM

## 2022-07-21 LAB
BANDED NEUTROPHILS ABSOLUTE COUNT: 0.07 K/CU MM
BANDED NEUTROPHILS RELATIVE PERCENT: 1 % (ref 5–11)
DIFFERENTIAL TYPE: ABNORMAL
EOSINOPHILS ABSOLUTE: 0.2 K/CU MM
EOSINOPHILS RELATIVE PERCENT: 3 % (ref 0–3)
HCT VFR BLD CALC: 29 % (ref 37–47)
HEMOGLOBIN: 8.7 GM/DL (ref 12.5–16)
LYMPHOCYTES ABSOLUTE: 1 K/CU MM
LYMPHOCYTES RELATIVE PERCENT: 15 % (ref 24–44)
MCH RBC QN AUTO: 26.9 PG (ref 27–31)
MCHC RBC AUTO-ENTMCNC: 30 % (ref 32–36)
MCV RBC AUTO: 89.5 FL (ref 78–100)
MONOCYTES ABSOLUTE: 0.3 K/CU MM
MONOCYTES RELATIVE PERCENT: 5 % (ref 0–4)
PDW BLD-RTO: 15.9 % (ref 11.7–14.9)
PLATELET # BLD: 35 K/CU MM (ref 140–440)
PMV BLD AUTO: 11 FL (ref 7.5–11.1)
RBC # BLD: 3.24 M/CU MM (ref 4.2–5.4)
SEGMENTED NEUTROPHILS ABSOLUTE COUNT: 5.1 K/CU MM
SEGMENTED NEUTROPHILS RELATIVE PERCENT: 76 % (ref 36–66)
WBC # BLD: 6.7 K/CU MM (ref 4–10.5)

## 2022-07-21 PROCEDURE — 36415 COLL VENOUS BLD VENIPUNCTURE: CPT

## 2022-07-21 PROCEDURE — 99214 OFFICE O/P EST MOD 30 MIN: CPT | Performed by: NURSE PRACTITIONER

## 2022-07-21 PROCEDURE — 1123F ACP DISCUSS/DSCN MKR DOCD: CPT | Performed by: NURSE PRACTITIONER

## 2022-07-21 PROCEDURE — 6360000002 HC RX W HCPCS: Performed by: INTERNAL MEDICINE

## 2022-07-21 PROCEDURE — 96372 THER/PROPH/DIAG INJ SC/IM: CPT

## 2022-07-21 PROCEDURE — 85027 COMPLETE CBC AUTOMATED: CPT

## 2022-07-21 PROCEDURE — 85007 BL SMEAR W/DIFF WBC COUNT: CPT

## 2022-07-21 RX ORDER — CEPHALEXIN 500 MG/1
CAPSULE ORAL
Status: ON HOLD | COMMUNITY
Start: 2022-07-15 | End: 2022-09-27 | Stop reason: HOSPADM

## 2022-07-21 RX ORDER — LANREOTIDE ACETATE 120 MG/.5ML
120 INJECTION SUBCUTANEOUS ONCE
Status: CANCELLED | OUTPATIENT
Start: 2022-08-18 | End: 2022-08-18

## 2022-07-21 RX ORDER — LANREOTIDE ACETATE 120 MG/.5ML
120 INJECTION SUBCUTANEOUS ONCE
Status: COMPLETED | OUTPATIENT
Start: 2022-07-21 | End: 2022-07-21

## 2022-07-21 RX ORDER — DICYCLOMINE HYDROCHLORIDE 10 MG/1
10 CAPSULE ORAL 4 TIMES DAILY
Qty: 60 CAPSULE | Refills: 3 | Status: ON HOLD | OUTPATIENT
Start: 2022-07-21 | End: 2022-09-27 | Stop reason: HOSPADM

## 2022-07-21 RX ADMIN — LANREOTIDE ACETATE 120 MG: 120 INJECTION SUBCUTANEOUS at 15:01

## 2022-07-21 NOTE — PROGRESS NOTES
Pt here for Lanreotide injection. Labs reviewed with Max Carmichael NP. Tx released. Injection given in right buttock. Tolerated well.  Left via wheelchair with daughter discharge instructions given

## 2022-07-21 NOTE — PROGRESS NOTES
MA Rooming Questions  Patient: Vic Fish  MRN: [id number]    Date: 7/21/2022        1. Do you have any new issues? yes - pain in back and abdomen, has been getting frequent UTI's, questions about blood work         2. Do you need any refills on medications?    no    3. Have you had any imaging done since your last visit? yes - CT    4. Have you been hospitalized or seen in the emergency room since your last visit here?   yes - ER    5. Did the patient have a depression screening completed today?  No    No data recorded     PHQ-9 Given to (if applicable):               PHQ-9 Score (if applicable):                     [] Positive     []  Negative              Does question #9 need addressed (if applicable)                     [] Yes    []  No               Leann Bell CMA

## 2022-07-21 NOTE — PROGRESS NOTES
Patient Name: Kyleigh Concepcion  Patient : 1940  Patient MRN: [id number]     Primary Oncologist: Francheska Lorenz MD  Referring Provider: Nilesh Schaefer MD     Date of Service: 2022      Chief Complaint:   No chief complaint on file. 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 Mario Villanueva is a 52-year-old very pleasant patient with medical history significant for hyperlipidemia, gastroesophageal reflux disease, coronary artery disease, depression and adjustment disorder, osteoporosis, diabetes mellitus, and history of small intestinal carcinoid tumor, status post surgery by Dr. Wilbern Essex in , initially referred to me on 2014, for evaluation of thrombocytopenia. She stated that she was found to have thrombocytopenia on routine blood tests done on 2014. Repeat blood tests on 2014, showed persistent thrombocytopenia and she was referred to me for evaluation. She does not have leucopenia or anemia. She complains of severe tiredness and fatigue. Besides that she does not have any other significant symptoms. She denies bleeding diathesis too. Laboratory workups done on 2014, showed platelet count of 37, however, manual differential showed platelet count of 97. Her white blood cell count was 6.4 and hemoglobin was 12.2. Her LDH is mildly elevated (261), vitamin B12 normal (154), normal folate (more than 20), negative hepatitis panel, antinuclear antibody was not detected and negative rheumatoid factor and normal TSH (2.4). Since all the workups have been within normal range, I believe her mild thrombocytopenia is most likely due to immune thrombocytopenia. Ms. Mario Villanueva started following with Dr. Lilia Blanco at [facility] for immune thrombocytopenia.  Since her platelet count dropped below 20,000 per cubic millimeter, she was treated with prednisone. She responded well to prednisone; however, her platelet is always in her 40,000 range when she started tapering off the prednisone. She discussed about the splenectomy and Ms. Barbara Gonzalez was not sure she really wanted to proceed with splenectomy at this moment. Since Ms. Diane's insurance does not cover [facility], she was referred back to me for continuation of care for her immune thrombocytopenia. Since Ms. Bowman thrombocytopenia has gotten better, I stopped prednisone since April 29, 2016. She has been under close observation since then. Ms. Barbara Gonzalez was found to have suspicious lesion in her mid back by Dermatologist, Dr. Marcus Gao and she referred her to Dr. Sarbjit Giles at [facility] for further evaluation. She initially underwent wide excision and sentinel lymph node biopsy on April 14, 2017. The initial pathology revealed 4 mm in Breslows depth, Charles level IV, non-ulcerated, with 2 mitosis/mm2. 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 Since she has stage IIB malignant melanoma, I recommend for close observation only. She has been under close observation since then. She had PET-CT scan and it showed mesenteric calcified mass. She was subsequently referred to the Gastroenterologist for EUS since she had history of carcinoid tumor. Ms. Barbara Gonzalez underwent endoscopic ultrasound by Dr. Stacey Kevin on May 10, 2017, and it showed a mass in the parapancreatic region, 3 cm in size. 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 The ki-67 was less than 2%. Tumor cells are positive for AE1/3, synaptophysin, and chromogranin.       She subsequently underwent nuclear PET neuroendocrine scan on July 21, 2017, and it showed a soft tissue mass in the pancreatic uncinate process with intense radiotracer activity, compatible with somatostatin receptor-avid malignancy. Paraesophageal lymph node in the chest at the level of the rafiq and aortocaval lymph node with intense radiotracer activity, compatible with somatostatin receptor-avid robbie metastasis. Since she was found to have metastatic lymph node involvement in intrathoracic and abdominal lymph nodes, she is not a surgical candidate. Tumor markers were reviewed and she was found to have mild elevation in chromogranin A (136) but her other tumor markers were within normal range. Since she has been having diarrhea (at least 5-6 times a day) and her tumors are octreotide avid, she was started with octreotide since September 1, 2017. She was started with hormone therapy, octreotide for tumor stabilization as well as for the symptom management (diarrhea). Since she lives in [facility], her case was subsequently referred to us for continuation of octreotide injection locally here in [facility]. Octreotide was started in our center since October 5, 2017. CT scan of the chest, abdomen and pelvis done on December 23, 2020 showed interval increase in size of a soft tissue mass with scattered calcifications posterior to the pancreatic head currently measuring up to 4 cm, previously 3.3 cm. No evidence of metastatic disease in the chest.    CT scan of the abdomen and pelvis done on April 27, 2021 showed a stable appearance of a pancreatic mass measuring 3.3 x 4 cm. CT chest, abdomen and pelvis done on 5/5/2022 showed no metastatic disease in the chest.  Essentially stable 36 mm mass in the retroperitoneum encasing the SMA with adjacent 18 mm calcified mesenteric nodule corresponding to the history of carcinoid. Otherwise no metastatic disease or adenopathy in the abdomen or pelvis. Chronic appearing mild to moderate T3 and L1 compression fractures. On July 21 2022, she presented to me for followup. I have been following Ms. Diane for immune thrombocytopenia, history of stage IIB malignant melanoma, and the recurrent carcinoid tumor. 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 She was seen at [facility] due to abdominal pain. She had CT abd/pelvis which showed mild mural thickening in the sigmoid colon and rectum, may indicate underlying infectious or inflammatory proctocolitis, and increased size of soft tissue mass in RUQ with associated calcification since prior exam. We did request comparison to most recent study at [facility]. She has ongoing abdominal discomfort. Did review with Dr. Alka Wilkins and we will begin Bentyl. Reports improvement of diarrhea since addition of telotristat 250 mg TID which was started 5/25/22. She is due for Lanreotide today. Weight is slightly improved. She is on keflex for UTI and plans to complete course of treatment today. Malignancy associated pain -  PCP prescribed oxycodone 10 mg. We will continue to monitor her pain closely. Her platelet count was 14,618/GBOZ on 7/21/22. I will continue to monitor closely. I have requested manual count. Depression - stated that she still has depression. I recognized that she hasn't had bupropion and she is only on effexor. I will start bupropion today and sent the script for her. Hyperlipidemia - she is on lipitor. Hypertension, CAD - she is on aspirin, amlodipine and carvedilol. Recommend salt restriction. GERD - stable on pantoprazole. Diabetes - she is on metformin. Recommend ADA diet. Health maintenance - I recommend her to have age-appropriate cancer screening, exercise, low-fat and low-sodium diet. She doesn't have any significant new symptoms on today's visit. PAST MEDICAL HISTORY:  Significant for:  1. Hyperlipidemia. 2.         Diabetes mellitus. 3.         Coronary artery disease. 4.         Gastroesophageal reflux disease. 5.         History of depression and adjustment disorder. 6.         Osteoporosis.   7.         History of small intestinal cancer, status post surgery by Dr. Jaylin Whitten in [address] [place] [address]. PAST SURGICAL HISTORY:  Significant for:  1. Appendectomy in 1959.  2.         Coronary artery angioplasty in 1992. 3.         Small intestine cancer removal in 1998.  4.         Cholecystectomy in 1998.  5.         Open heart surgery in March 31, 2009. 6.         Melanoma surgery on October 10, 2011. 7.         Hysterectomy in 1996. FAMILY HISTORY:  Significant for melanoma in her brother and sister. No other family history of cancer disease. SOCIAL HISTORY:  She denies smoking, alcohol drinking, and illicit drug abuse. She is  for 55 years and has four children. She is retiree from the [employer]. ALLERGIES:  No known drug allergies. Review of Systems: \"Per interval history; otherwise 10 point ROS is negative. \"  Denies fever, chills, night sweats, no dizziness, visual changes, or headache. Denies mucositis, dysphagia, no cough, chest pain, hemoptysis, shortness of breath, no nausea, vomiting, +diarrhea, no constipation, no melena or hematochezia, no dysuria, hematuria, no lower extremity edema or calf pain. Denies acute pain. No worsening depression. Abdominal pain that is worsened after BM, constant knowledge of discomfort with intermittent spikes of pain, no blood or mucous in stool. Vital Signs: There were no vitals taken for this visit.     Physical Exam:  CONSTITUTIONAL: alert, cooperative, awake, no apparent distress   EYES:  sclera clear and conjunctiva normal  ENT: without obvious abnormality, normocephalic, atraumatic  NECK: supple, symmetrical, no jugular venous distension and no carotid bruits   HEMATOLOGIC/LYMPHATIC: no cervical, supraclavicular or axillary lymphadenopathy   LUNGS: clear to auscultation  CARDIOVASCULAR: regular rate and rhythm, normal S1 and S2, no murmur noted  ABDOMEN: soft, non-distended, normal bowel sounds x 4, non-tender, no masses palpated, no hepatosplenomegaly MUSCULOSKELETAL: full range of motion noted, tone is normal  NEUROLOGIC: awake, alert, oriented to name, place and time. Motor skills grossly intact. SKIN: Normal skin color, texture, turgor and no jaundice.  appears intact   EXTREMITIES: no leg swelling, no clubbing, no cyanosis, no LE edema,      Labs:  Hematology:  Lab Results   Component Value Date    WBC 11.3 (H) 06/23/2022    RBC 3.11 (L) 06/23/2022    HGB 8.3 (L) 06/23/2022    HCT 27.3 (L) 06/23/2022    MCV 87.8 06/23/2022    MCH 26.7 (L) 06/23/2022    MCHC 30.4 (L) 06/23/2022    RDW 15.3 (H) 06/23/2022     (L) 06/23/2022    MPV 10.8 06/23/2022    BANDSPCT 3 (L) 04/20/2021    SEGSPCT 82.1 (H) 06/23/2022    EOSRELPCT 1.5 06/23/2022    BASOPCT 0.4 06/23/2022    LYMPHOPCT 8.5 (L) 06/23/2022    MONOPCT 7.5 (H) 06/23/2022    BANDABS 0.94 04/20/2021    SEGSABS 9.3 06/23/2022    EOSABS 0.2 06/23/2022    BASOSABS 0.0 06/23/2022    LYMPHSABS 1.0 06/23/2022    MONOSABS 0.9 06/23/2022    DIFFTYPE AUTOMATED DIFFERENTIAL 06/23/2022    ANISOCYTOSIS 1+ 05/27/2021    POLYCHROM 1+ 03/29/2017    WBCMORP  04/17/2021     SLIDE SCANNED  MANUAL DIFFERENTIAL APPEARS TO MATCH AUTOMATED DIFFERENTIAL WELL     PLTM DECREASED 04/27/2021     Lab Results   Component Value Date    ESR 2 11/09/2021     Chemistry:  Lab Results   Component Value Date     (L) 06/23/2022    K 5.3 (H) 06/23/2022     06/23/2022    CO2 22 06/23/2022    BUN 37 (H) 06/23/2022    CREATININE 1.2 (H) 06/23/2022    GLUCOSE 178 (H) 06/23/2022    CALCIUM 8.4 06/23/2022    PROT 5.6 (L) 06/23/2022    LABALBU 3.9 06/23/2022    BILITOT 0.2 06/23/2022    ALKPHOS 66 06/23/2022    AST 10 (L) 06/23/2022    ALT 7 (L) 06/23/2022    LABGLOM 43 (L) 06/23/2022    GFRAA 52 (L) 06/23/2022    PHOS 1.4 (L) 04/28/2021    MG 1.6 (L) 04/28/2021    POCGLU 185 (H) 04/28/2021     Lab Results   Component Value Date     11/09/2021    HOMOCYSTEINE (H) 10/15/2019     17.8  Risk of vascular disease   increases above 9 umol/L  and is significant >15 umol/L. No components found for: LD  Lab Results   Component Value Date    TSHHS 0.666 11/09/2021    T4FREE 1.13 11/24/2014    FT3 2.9 11/24/2014     Immunology:  Lab Results   Component Value Date    PROT 5.6 (L) 06/23/2022    SPEP  04/17/2021     INTERPRETATION - Decreased albumin and total proteins. LP.    ALBUMINELP 3.0 (L) 04/17/2021    LABALPH 0.2 04/17/2021    LABALPH 0.5 04/17/2021    LABBETA 0.8 04/17/2021    GAMGLOB 0.8 04/17/2021     No results found for: Kreg Pickup, KLFLCR  No results found for: B2M  Coagulation Panel:  Lab Results   Component Value Date    PROTIME 15.3 (H) 04/28/2021    INR 1.26 04/28/2021    APTT 33.1 04/28/2021     Anemia Panel:  Lab Results   Component Value Date    SGAZWLSC33 833.4 11/09/2021    FOLATE >20.0 (H) 11/09/2021     Tumor Markers:  No results found for: , CEA, , LABCA2, PSA  Observations:  No data recorded      Assessment: Thrombocytopenia - most likely immune thrombocytopenia. Stage IIB malignant melanoma. Recurrent/metastatic small intestine carcinoid tumor. Plan:  Ms. Bertha Esqueda has been followed for immune thrombocytopenia, history of stage IIB malignant melanoma, and the recurrent carcinoid tumor. CT scan of the chest, abdomen and pelvis done on December 23, 2020 showed interval increase in size of a soft tissue mass with scattered calcifications posterior to the pancreatic head currently measuring up to 4 cm, previously 3.3 cm. No evidence of metastatic disease in the chest.    CT scan of the abdomen and pelvis done on April 27, 2021 showed a stable appearance of a pancreatic mass measuring 3.3 x 4 cm. CT chest, abdomen and pelvis done on 5/5/2022 showed no metastatic disease in the chest.  Essentially stable 36 mm mass in the retroperitoneum encasing the SMA with adjacent 18 mm calcified mesenteric nodule corresponding to the history of carcinoid.   Otherwise no metastatic disease or adenopathy in the abdomen or pelvis. Chronic appearing mild to moderate T3 and L1 compression fractures. On July 21 2022, she presented to me for followup. I have been following Ms. Diane for immune thrombocytopenia, history of stage IIB malignant melanoma, and the recurrent carcinoid tumor. 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 She was seen at [facility] ER due to abdominal pain. She had CT abd/pelvis which showed mild mural thickening in the sigmoid colon and rectum, may indicate underlying infectious or inflammatory proctocolitis, and increased size of soft tissue mass in RUQ with associated calcification since prior exam. We did request comparison to most recent study at [facility]. Reports improvement of diarrhea since addition of telotristat 250 mg TID which was started 5/25/22. She is due for Lanreotide today. Weight is slightly improved. She is on keflex for UTI and plans to complete course of treatment today. Malignancy associated pain -  PCP prescribed oxycodone 10 mg. We will continue to monitor her pain closely. Her platelet count was 00,078/WFMU on 7/21/22. I will continue to monitor closely. I have requested manual count. Depression - stated that she still has depression. I recognized that she hasn't had bupropion and she is only on effexor. I will start bupropion today and sent the script for her. Hyperlipidemia - she is on lipitor. Hypertension, CAD - she is on aspirin, amlodipine and carvedilol. Recommend salt restriction. GERD - stable on pantoprazole. Diabetes - she is on metformin. Recommend ADA diet. Health maintenance - I recommend her to have age-appropriate cancer screening, exercise, low-fat and low-sodium diet. I answered all her questions and concerns for today. I asked her to follow up with her primary care physician on regular basis. Recent imaging and labs were reviewed and discussed with the patient.

## 2022-07-29 NOTE — PROGRESS NOTES
Patient returns to the office 3 months post operatively for a post operative follow up on her left wrist ORIF that was performed on 2/17/21. Patient has done well following surgery until a few days ago when she began to have severe, wrist pain along the ulnar and dorsal aspect of the left wrist with some mild swelling to the wrist. NKI reported with pain rated at a 5/10 today limiting her mobility of the wrist. She denies doing at home exercises, OTC pain relievers, or use of wrist brace at this time. She has full sensation within the hand and fingers without complication with performing ADLs. Faina Giron MD (PGY2): CT cervical and thoracic spine w/o acute findings. [place] J collar placed. Strict return precautions. Pain control with tylenol and motrin. spine follow-up.

## 2022-08-09 NOTE — DISCHARGE SUMMARY
3/31/21 NOTICED DECREASED [facility], LASTED 4 MONTHS. Discharge Summary    Name:  Alea Mendez /Age/Sex: 1940  (69 y.o. female)   MRN & CSN:  [id number] & [id number] Admission Date/Time: 3/28/2019  8:55 PM   Attending:  Viridiana Javier MD Discharging Physician: Viridiana Javier MD     Hospital Course: Alea Mendez is a 69 y.o.  female  who presents with Gastroenteritis    1. Urinary Tract Infection. UA. Urine culture with coliform. Discharged on Ciprofloxacin  2. Nausea/Vomiting: with associated electrolyte abnormalities. Could be from the underlying mass compressing the stomach. 3. Hyponatremia: could be from GI loss. resolved loss. continue IVF. Awaiting BMP today  4. Hypokalemia: replace accordingly. 5. Hypomagnesemia: replace accordingly  6. Hypertension: Blood pressure controlled. Continue medications. start CCB  7. Acute Kidney Injury: Likely from dehydration. Creatinine 1.6 on admission, now 1.2. Continue IVF. Avoid nephrotoxic agents. The patient expressed appropriate understanding of and agreement with the discharge recommendations, medications, and plan. Consults this admission:  IP CONSULT TO HOSPITALIST  IP CONSULT TO ONCOLOGY  IP CONSULT TO NEPHROLOGY    Discharge Instruction:   Follow up appointments: Nephrology, Oncology  Primary care physician:  within 2 weeks    Diet:  regular diet   Activity: activity as tolerated  Disposition: Discharged to:   [x]Home, [][facility], []SNF, []Acute Rehab, []Hospice   Condition on discharge: Stable    Discharge Medications:      Gerhardt Gully   [place] Medication Instructions MARIO:[id number]    Printed on:19 1611   Medication Information                      ALPRAZolam (XANAX) 0.5 MG tablet  Take 0.5 mg by mouth 2 times daily as needed for Sleep. amLODIPine (NORVASC) 5 MG tablet  Take 1 tablet by mouth daily             buPROPion (WELLBUTRIN XL) 150 MG XL tablet  Take 150 mg by mouth every morning.              carvedilol (COREG) 25 MG tablet  Take 1 tablet by mouth 2 times daily             ciprofloxacin (CIPRO) 500 MG tablet  Take 0.5 tablets by mouth 2 times daily for 3 days             cloNIDine (CATAPRES) 0.1 MG tablet  Take 0.5 tablets by mouth 2 times daily             cyanocobalamin 1000 MCG/ML injection  Inject 1,000 mcg into the muscle every 7 days. fenofibrate (TRICOR) 145 MG tablet  Take 145 mg by mouth daily. glipiZIDE (GLUCOTROL XL) 5 MG CR tablet  Take 5 mg by mouth daily. megestrol (MEGACE) 40 MG tablet  Take 1 tablet by mouth daily             Multiple Vitamins-Minerals (THERAPEUTIC MULTIVITAMIN-MINERALS) tablet  Take 1 tablet by mouth daily             ondansetron (ZOFRAN) 4 MG tablet  Take 1 tablet by mouth daily as needed for Nausea or Vomiting             pantoprazole (PROTONIX) 40 MG tablet  Take 1 tablet by mouth every morning (before breakfast)             pioglitazone (ACTOS) 45 MG tablet  Take 45 mg by mouth daily. potassium chloride (MICRO-K) 10 MEQ CR capsule  Take 10 mEq by mouth 2 times daily             predniSONE (DELTASONE) 20 MG tablet  Take 5 mg by mouth daily              pregabalin (LYRICA) 75 MG capsule  Take 75 mg by mouth 3 times daily. venlafaxine (EFFEXOR XR) 150 MG XR capsule  Take 150 mg by mouth daily. vitamin E 1000 UNITS capsule  Take 400 Units by mouth daily                 Objective Findings at Discharge:   BP (!) 154/65   Pulse 61   Temp 97.6 °F (36.4 °C) (Oral)   Resp 18   Ht 5' 2\" (1.575 m)   Wt 152 lb 3.2 oz (69 kg)   SpO2 92%   BMI 27.84 kg/m²            GEN    Awake female, sitting upright in bed in no apparent distress. Appears given age. EYES   Pupils are equally round. No scleral erythema, discharge, or conjunctivitis. HENT  Mucous membranes are moist. Oral pharynx without exudates, no evidence of thrush. NECK  Supple, no apparent thyromegaly or masses. RESP  Clear to auscultation, no wheezes, rales or rhonchi.   Symmetric chest movement while on room air. CARDIO/VASC           S1/S2 auscultated. Regular rate without appreciable murmurs, rubs, or gallops. No JVD or carotid bruits. Peripheral pulses equal bilaterally and palpable. No peripheral edema. GI        Abdomen is soft without significant tenderness, masses, or guarding. Bowel sounds are normoactive. Rectal exam deferred. MSK    No gross joint deformities. SKIN    Normal coloration, warm, dry. NEURO           Cranial nerves appear grossly intact, normal speech, no lateralizing weakness. PSYCH            Awake, alert, oriented x 4. Affect appropriate. BMP/CBC  Recent Labs     03/28/19  2110 03/30/19  1230   * 135   K 3.4* 3.2*   CL 93* 98*   CO2 22 23   BUN 26* 13   CREATININE 1.6* 1.2*   WBC 9.4 11.1*   HCT 40.0 37.8   PLT 70* 66*       IMAGING:  Ct Abdomen Pelvis Wo Contrast Additional Contrast? None    Result Date: 3/29/2019  EXAMINATION: CT OF THE ABDOMEN AND PELVIS WITHOUT CONTRAST 3/28/2019 10:28 pm TECHNIQUE: CT of the abdomen and pelvis was performed without the administration of intravenous contrast. Multiplanar reformatted images are provided for review. Dose modulation, iterative reconstruction, and/or weight based adjustment of the mA/kV was utilized to reduce the radiation dose to as low as reasonably achievable. COMPARISON: 11/06/2018. HISTORY: ORDERING SYSTEM PROVIDED HISTORY: abdominal pain TECHNOLOGIST PROVIDED HISTORY: Additional Contrast?->None Ordering Physician Provided Reason for Exam: abd pain, no N/V/D Acuity: Acute Relevant Medical/Surgical History: pancreas CA FINDINGS: Lower Chest: Mild atelectasis to the visualized lung bases, predominantly to visualized lingula of left upper lobe. No focal consolidations or pleural effusions. Small hiatal hernia. Organs: Unenhanced liver, spleen, pancreas, adrenal glands and kidneys are unremarkable. There is no evidence for urinary tract stone or urinary tract obstruction on either side. Prior cholecystectomy.

## 2022-08-11 ENCOUNTER — OFFICE VISIT (OUTPATIENT)
Dept: ONCOLOGY | Age: 82
End: 2022-08-11
Payer: MEDICARE

## 2022-08-11 ENCOUNTER — HOSPITAL ENCOUNTER (OUTPATIENT)
Dept: INFUSION THERAPY | Age: 82
End: 2022-08-11

## 2022-08-11 VITALS
WEIGHT: 98.8 LBS | HEART RATE: 84 BPM | HEIGHT: 62 IN | OXYGEN SATURATION: 100 % | DIASTOLIC BLOOD PRESSURE: 62 MMHG | SYSTOLIC BLOOD PRESSURE: 117 MMHG | TEMPERATURE: 98.5 F | BODY MASS INDEX: 18.18 KG/M2

## 2022-08-11 DIAGNOSIS — E34.0 CARCINOID SYNDROME (HCC): Primary | ICD-10-CM

## 2022-08-11 PROCEDURE — 99214 OFFICE O/P EST MOD 30 MIN: CPT | Performed by: INTERNAL MEDICINE

## 2022-08-11 PROCEDURE — G8419 CALC BMI OUT NRM PARAM NOF/U: HCPCS | Performed by: INTERNAL MEDICINE

## 2022-08-11 PROCEDURE — 1123F ACP DISCUSS/DSCN MKR DOCD: CPT | Performed by: INTERNAL MEDICINE

## 2022-08-11 PROCEDURE — G8427 DOCREV CUR MEDS BY ELIG CLIN: HCPCS | Performed by: INTERNAL MEDICINE

## 2022-08-11 PROCEDURE — 1090F PRES/ABSN URINE INCON ASSESS: CPT | Performed by: INTERNAL MEDICINE

## 2022-08-11 PROCEDURE — G8400 PT W/DXA NO RESULTS DOC: HCPCS | Performed by: INTERNAL MEDICINE

## 2022-08-11 PROCEDURE — 1036F TOBACCO NON-USER: CPT | Performed by: INTERNAL MEDICINE

## 2022-08-11 RX ORDER — TRIAMCINOLONE ACETONIDE 0.25 MG/G
OINTMENT TOPICAL
COMMUNITY
Start: 2022-07-27

## 2022-08-11 RX ORDER — EMPAGLIFLOZIN 25 MG/1
25 TABLET, FILM COATED ORAL DAILY
COMMUNITY
Start: 2022-07-28

## 2022-08-11 RX ORDER — DIPHENOXYLATE HYDROCHLORIDE AND ATROPINE SULFATE 2.5; .025 MG/1; MG/1
TABLET ORAL
Status: ON HOLD | COMMUNITY
Start: 2022-07-26 | End: 2022-09-22

## 2022-08-11 NOTE — PROGRESS NOTES
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 Flori Iglesias is a 56-year-old very pleasant patient with medical history significant for hyperlipidemia, gastroesophageal reflux disease, coronary artery disease, depression and adjustment disorder, osteoporosis, diabetes mellitus, and history of small intestinal carcinoid tumor, status post surgery by Dr. Omar Wild in , initially referred to me on 2014, for evaluation of thrombocytopenia. She stated that she was found to have thrombocytopenia on routine blood tests done on 2014. Repeat blood tests on 2014, showed persistent thrombocytopenia and she was referred to me for evaluation. She does not have leucopenia or anemia. She complains of severe tiredness and fatigue. Besides that she does not have any other significant symptoms. She denies bleeding diathesis too. Laboratory workups done on 2014, showed platelet count of 37, however, manual differential showed platelet count of 97. Her white blood cell count was 6.4 and hemoglobin was 12.2. Her LDH is mildly elevated (261), vitamin B12 normal (154), normal folate (more than 20), negative hepatitis panel, antinuclear antibody was not detected and negative rheumatoid factor and normal TSH (2.4). Since all the workups have been within normal range, I believe her mild thrombocytopenia is most likely due to immune thrombocytopenia. Ms. Flori Iglesias started following with Dr. Celeste Lew at [facility] for immune thrombocytopenia.  Since her platelet count dropped below 20,000 per cubic millimeter, she was treated with prednisone. She responded well to prednisone; however, her platelet is always in her 40,000 range when she started tapering off the prednisone. She discussed about the splenectomy and Ms. Florentin Kang was not sure she really wanted to proceed with splenectomy at this moment. Since Ms. Diane's insurance does not cover [facility], she was referred back to me for continuation of care for her immune thrombocytopenia. Since Ms. Bowman thrombocytopenia has gotten better, I stopped prednisone since April 29, 2016. She has been under close observation since then. Ms. Florentin Kang was found to have suspicious lesion in her mid back by Dermatologist, Dr. Mariely Lovell and she referred her to Dr. Bea Hay at [facility] for further evaluation. She initially underwent wide excision and sentinel lymph node biopsy on April 14, 2017. The initial pathology revealed 4 mm in Breslows depth, Charles level IV, non-ulcerated, with 2 mitosis/mm2. 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 Since she has stage IIB malignant melanoma, I recommend for close observation only. She has been under close observation since then. She had PET-CT scan and it showed mesenteric calcified mass. She was subsequently referred to the Gastroenterologist for EUS since she had history of carcinoid tumor. Ms. Florentin Kang underwent endoscopic ultrasound by Dr. Archie Castro on May 10, 2017, and it showed a mass in the parapancreatic region, 3 cm in size. 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 The ki-67 was less than 2%. Tumor cells are positive for AE1/3, synaptophysin, and chromogranin.       She subsequently underwent nuclear PET neuroendocrine scan on July 21, 2017, and it showed a soft tissue mass in the pancreatic uncinate process with intense radiotracer activity, in the sigmoid colon and rectum, may indicate underlying infectious or inflammatory proctocolitis, and increased size of soft tissue mass in RUQ with associated calcification since prior exam. We did request comparison to most recent study at [facility]. She has ongoing abdominal discomfort and we started Bentyl with improvement. Reports improvement of diarrhea since addition of telotristat 250 mg TID which was started 5/25/22. She stopped it on 7/20/22 after she had episodes of constipation. On August 11, 2022, she presented to me for followup. I have been following Ms. Diane for immune thrombocytopenia, history of stage IIB malignant melanoma, and the recurrent carcinoid tumor. 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 She stated that her diarrhea is getting worse lately. She continues to have significant diarrhea even though she is taking lomotil (~ 8 tablets daily). I reviewed with her findings on CT scan done on 5/5/22. She has at least stable disease noted on CT scan. She has been loosing weight a lot lately due to uncontrolled diarrhea. Since her carcinoid syndrome becomes refractory to lanreotide, I recommend to add telotristat 250 mg TID. Lanreotide and telotristat 250 mg TID was started since 5/25/22. Her diarrhea got better since adding telotristat. She has decided to stop telotristat on 7/20/22 after episodes of constipation. Reviewed CT findings at [facility]. Will plan to have repeat scan in 4 months. I recommend her to continue with lanreotide for now. Malignancy associated pain - Her pain has been controlled well with Norco 5/325 mg every 6 hourly as needed basis. I will continue to follow her pain status closely. Her platelet count was 29,464/EALE on 4/21/22. I will continue to monitor closely. Depression - stated that she still has depression. I recognized that she hasn't had bupropion and she is only on effexor.  I will start bupropion today and sent the script for her. Hyperlipidemia - she is on lipitor. Hypertension, CAD - she is on aspirin, amlodipine and carvedilol. Recommend salt restriction. GERD - stable on pantoprazole. Diabetes - she is on metformin. Recommend ADA diet. Health maintenance - I recommend her to have age-appropriate cancer screening, exercise, low-fat and low-sodium diet. She doesn't have any significant new symptoms on today's visit. PAST MEDICAL HISTORY:  Significant for:  1. Hyperlipidemia. 2.         Diabetes mellitus. 3.         Coronary artery disease. 4.         Gastroesophageal reflux disease. 5.         History of depression and adjustment disorder. 6.         Osteoporosis. 7.         History of small intestinal cancer, status post surgery by Dr. Lourdes Gaucher in [address] [place] [address]. PAST SURGICAL HISTORY:  Significant for:  1. Appendectomy in 1959.  2.         Coronary artery angioplasty in 1992. 3.         Small intestine cancer removal in 1998.  4.         Cholecystectomy in 1998.  5.         Open heart surgery in March 31, 2009. 6.         Melanoma surgery on October 10, 2011. 7.         Hysterectomy in 1996. FAMILY HISTORY:  Significant for melanoma in her brother and sister. No other family history of cancer disease. SOCIAL HISTORY:  She denies smoking, alcohol drinking, and illicit drug abuse. She is  for 56 years and has four children. She is retiree from the [employer]. ALLERGIES:  No known drug allergies. Review of Systems: \"Per interval history; otherwise 10 point ROS is negative. \"  Her energy level is fair and her sleep is fine. She doesn't have fever, chills, night sweats, cough, shortness of breath, chest pain, hemoptysis or palpitations. Her bowels and bladder functions are normal, except abdominal pain. She denies nausea, vomiting, diarrhea, constipation or dysuria. She has some loss of appetite and weight loss.  She doesn't have neuropathy and she denies bleeding or clotting disorder. She has abdominal pain. Denies anxiety. She has depression. She denies any suicidal idea. The rests of the systems are unremarkable. Vital Signs:  /62 (Site: Right Upper Arm, Position: Sitting, Cuff Size: Medium Adult)   Pulse 84   Temp 98.5 °F (36.9 °C) (Temporal)   Ht 5' 2\" (1.575 m)   Wt 98 lb 12.8 oz (44.8 kg)   SpO2 100%   BMI 18.07 kg/m²     Physical Exam:  CONSTITUTIONAL: alert, cooperative, awake, no apparent distress   EYES: pupils equal, round and reactive to light, sclera clear and conjunctiva normal  ENT: without obvious abnormality, normocephalic, atraumatic  NECK: supple, symmetrical, no jugular venous distension and no carotid bruits   HEMATOLOGIC/LYMPHATIC: no cervical, supraclavicular or axillary lymphadenopathy   LUNGS: VBS, no wheezes, clear to auscultation, no crackles, no increased work of breathing, no rhonchi,        CARDIOVASCULAR: regular rate and rhythm, normal S1 and S2, no murmur noted  ABDOMEN: soft, non-distended, normal bowel sounds x 4, non-tender, no masses palpated, no hepatosplenomegaly   MUSCULOSKELETAL: full range of motion noted, tone is normal  NEUROLOGIC: awake, alert, oriented to name, place and time. Motor skills grossly intact. SKIN: Normal skin color, texture, turgor and no jaundice.  appears intact   EXTREMITIES: no leg swelling, no clubbing, no cyanosis, no LE edema,      Labs:  Hematology:  Lab Results   Component Value Date    WBC 6.7 07/21/2022    RBC 3.24 (L) 07/21/2022    HGB 8.7 (L) 07/21/2022    HCT 29.0 (L) 07/21/2022    MCV 89.5 07/21/2022    MCH 26.9 (L) 07/21/2022    MCHC 30.0 (L) 07/21/2022    RDW 15.9 (H) 07/21/2022    PLT 35 (L) 07/21/2022    MPV 11.0 07/21/2022    BANDSPCT 1 (L) 07/21/2022    SEGSPCT 76.0 (H) 07/21/2022    EOSRELPCT 3.0 07/21/2022    BASOPCT 0.4 06/23/2022    LYMPHOPCT 15.0 (L) 07/21/2022    MONOPCT 5.0 (H) 07/21/2022    BANDABS 0.07 07/21/2022 SEGSABS 5.1 07/21/2022    EOSABS 0.2 07/21/2022    BASOSABS 0.0 06/23/2022    LYMPHSABS 1.0 07/21/2022    MONOSABS 0.3 07/21/2022    DIFFTYPE MANUAL DIFFERENTIAL 07/21/2022    ANISOCYTOSIS 1+ 05/27/2021    POLYCHROM 1+ 03/29/2017    WBCMORP  04/17/2021     SLIDE SCANNED  MANUAL DIFFERENTIAL APPEARS TO MATCH AUTOMATED DIFFERENTIAL WELL     PLTM DECREASED 04/27/2021     Lab Results   Component Value Date    ESR 2 11/09/2021     Chemistry:  Lab Results   Component Value Date     (L) 06/23/2022    K 5.3 (H) 06/23/2022     06/23/2022    CO2 22 06/23/2022    BUN 37 (H) 06/23/2022    CREATININE 1.2 (H) 06/23/2022    GLUCOSE 178 (H) 06/23/2022    CALCIUM 8.4 06/23/2022    PROT 5.6 (L) 06/23/2022    LABALBU 3.9 06/23/2022    BILITOT 0.2 06/23/2022    ALKPHOS 66 06/23/2022    AST 10 (L) 06/23/2022    ALT 7 (L) 06/23/2022    LABGLOM 43 (L) 06/23/2022    GFRAA 52 (L) 06/23/2022    PHOS 1.4 (L) 04/28/2021    MG 1.6 (L) 04/28/2021    POCGLU 185 (H) 04/28/2021     Lab Results   Component Value Date     11/09/2021    HOMOCYSTEINE (H) 10/15/2019     17.8  Risk of vascular disease   increases above 9 umol/L  and is significant >15 umol/L. No components found for: LD  Lab Results   Component Value Date    TSHHS 0.666 11/09/2021    T4FREE 1.13 11/24/2014    FT3 2.9 11/24/2014     Immunology:  Lab Results   Component Value Date    PROT 5.6 (L) 06/23/2022    SPEP  04/17/2021     INTERPRETATION - Decreased albumin and total proteins.   LP.    ALBUMINELP 3.0 (L) 04/17/2021    LABALPH 0.2 04/17/2021    LABALPH 0.5 04/17/2021    LABBETA 0.8 04/17/2021    GAMGLOB 0.8 04/17/2021     No results found for: FER Enciso  No results found for: B2M  Coagulation Panel:  Lab Results   Component Value Date    PROTIME 15.3 (H) 04/28/2021    INR 1.26 04/28/2021    APTT 33.1 04/28/2021     Anemia Panel:  Lab Results   Component Value Date    RUJUBUWD97 833.4 11/09/2021    FOLATE >20.0 (H) 11/09/2021     Tumor Markers:  No results found for: , CEA, , LABCA2, PSA  Observations:  No data recorded      Assessment: Thrombocytopenia - most likely immune thrombocytopenia. Stage IIB malignant melanoma. Recurrent/metastatic small intestine carcinoid tumor. Plan:  Ms. Romana Reveal has been followed for immune thrombocytopenia, history of stage IIB malignant melanoma, and the recurrent carcinoid tumor. CT scan of the chest, abdomen and pelvis done on December 23, 2020 showed interval increase in size of a soft tissue mass with scattered calcifications posterior to the pancreatic head currently measuring up to 4 cm, previously 3.3 cm. No evidence of metastatic disease in the chest.    CT scan of the abdomen and pelvis done on April 27, 2021 showed a stable appearance of a pancreatic mass measuring 3.3 x 4 cm. CT chest, abdomen and pelvis done on 5/5/2022 showed no metastatic disease in the chest.  Essentially stable 36 mm mass in the retroperitoneum encasing the SMA with adjacent 18 mm calcified mesenteric nodule corresponding to the history of carcinoid. Otherwise no metastatic disease or adenopathy in the abdomen or pelvis. Chronic appearing mild to moderate T3 and L1 compression fractures. She was seen at [facility] ER due to abdominal pain. She had CT abd/pelvis which showed mild mural thickening in the sigmoid colon and rectum, may indicate underlying infectious or inflammatory proctocolitis, and increased size of soft tissue mass in RUQ with associated calcification since prior exam. We did request comparison to most recent study at [facility]. She has ongoing abdominal discomfort and we started Bentyl with improvement. Reports improvement of diarrhea since addition of telotristat 250 mg TID which was started 5/25/22. She stopped it on 7/20/22 after she had episodes of constipation. On August 11, 2022, she presented to me for followup. I have been following Ms. Diane for immune thrombocytopenia, history of stage IIB malignant melanoma, and the recurrent carcinoid tumor. 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 She stated that her diarrhea is getting worse lately. She continues to have significant diarrhea even though she is taking lomotil (~ 8 tablets daily). I reviewed with her findings on CT scan done on 5/5/22. She has at least stable disease noted on CT scan. She has been loosing weight a lot lately due to uncontrolled diarrhea. Since her carcinoid syndrome becomes refractory to lanreotide, I recommend to add telotristat 250 mg TID. Lanreotide and telotristat 250 mg TID was started since 5/25/22. Her diarrhea got better since adding telotristat. She has decided to stop telotristat on 7/20/22 after episodes of constipation. Reviewed CT findings at [place]. Will plan to have repeat scan in 4 months. I recommend her to continue with lanreotide for now. Malignancy associated pain - Her pain has been controlled well with Norco 5/325 mg every 6 hourly as needed basis. I will continue to follow her pain status closely. Her platelet count was 25,542/MWXX on 4/21/22. I will continue to monitor closely. Depression - stated that she still has depression. I recognized that she hasn't had bupropion and she is only on effexor. I will start bupropion today and sent the script for her. Hyperlipidemia - she is on lipitor. Hypertension, CAD - she is on aspirin, amlodipine and carvedilol. Recommend salt restriction. GERD - stable on pantoprazole. Diabetes - she is on metformin. Recommend ADA diet. Health maintenance - I recommend her to have age-appropriate cancer screening, exercise, low-fat and low-sodium diet. I answered all her questions and concerns for today. Recent imaging and labs were reviewed and discussed with the patient.

## 2022-08-16 RX ORDER — LANREOTIDE ACETATE 120 MG/.5ML
120 INJECTION SUBCUTANEOUS ONCE
OUTPATIENT
Start: 2022-08-18 | End: 2022-08-18

## 2022-08-18 ENCOUNTER — TELEPHONE (OUTPATIENT)
Dept: INFUSION THERAPY | Age: 82
End: 2022-08-18

## 2022-09-22 ENCOUNTER — HOSPITAL ENCOUNTER (INPATIENT)
Age: 82
LOS: 5 days | Discharge: NURSING, REHAB OR LONG-TERM CARE | DRG: 682 | End: 2022-09-27
Attending: STUDENT IN AN ORGANIZED HEALTH CARE EDUCATION/TRAINING PROGRAM | Admitting: STUDENT IN AN ORGANIZED HEALTH CARE EDUCATION/TRAINING PROGRAM
Payer: MEDICARE

## 2022-09-22 DIAGNOSIS — G89.29 CHRONIC LOW BACK PAIN WITHOUT SCIATICA, UNSPECIFIED BACK PAIN LATERALITY: Primary | ICD-10-CM

## 2022-09-22 DIAGNOSIS — M54.50 CHRONIC LOW BACK PAIN WITHOUT SCIATICA, UNSPECIFIED BACK PAIN LATERALITY: Primary | ICD-10-CM

## 2022-09-22 PROBLEM — J18.9 PNEUMONIA OF LEFT LOWER LOBE DUE TO INFECTIOUS ORGANISM: Status: ACTIVE | Noted: 2022-09-22

## 2022-09-22 PROBLEM — N39.0 UTI (URINARY TRACT INFECTION): Status: ACTIVE | Noted: 2022-09-22

## 2022-09-22 LAB — GLUCOSE BLD-MCNC: 90 MG/DL (ref 70–99)

## 2022-09-22 PROCEDURE — 6360000002 HC RX W HCPCS: Performed by: STUDENT IN AN ORGANIZED HEALTH CARE EDUCATION/TRAINING PROGRAM

## 2022-09-22 PROCEDURE — 6370000000 HC RX 637 (ALT 250 FOR IP): Performed by: STUDENT IN AN ORGANIZED HEALTH CARE EDUCATION/TRAINING PROGRAM

## 2022-09-22 PROCEDURE — 2140000000 HC CCU INTERMEDIATE R&B

## 2022-09-22 PROCEDURE — 96372 THER/PROPH/DIAG INJ SC/IM: CPT

## 2022-09-22 PROCEDURE — 96375 TX/PRO/DX INJ NEW DRUG ADDON: CPT

## 2022-09-22 PROCEDURE — 2580000003 HC RX 258: Performed by: STUDENT IN AN ORGANIZED HEALTH CARE EDUCATION/TRAINING PROGRAM

## 2022-09-22 PROCEDURE — 82962 GLUCOSE BLOOD TEST: CPT

## 2022-09-22 RX ORDER — ATORVASTATIN CALCIUM 40 MG/1
40 TABLET, FILM COATED ORAL DAILY
Status: DISCONTINUED | OUTPATIENT
Start: 2022-09-23 | End: 2022-09-27 | Stop reason: HOSPADM

## 2022-09-22 RX ORDER — ENOXAPARIN SODIUM 100 MG/ML
30 INJECTION SUBCUTANEOUS DAILY
Status: DISCONTINUED | OUTPATIENT
Start: 2022-09-23 | End: 2022-09-22

## 2022-09-22 RX ORDER — VITAMIN B COMPLEX
1 CAPSULE ORAL DAILY
Status: DISCONTINUED | OUTPATIENT
Start: 2022-09-23 | End: 2022-09-27 | Stop reason: HOSPADM

## 2022-09-22 RX ORDER — BUPROPION HYDROCHLORIDE 150 MG/1
150 TABLET, EXTENDED RELEASE ORAL 2 TIMES DAILY
Status: DISCONTINUED | OUTPATIENT
Start: 2022-09-22 | End: 2022-09-27 | Stop reason: HOSPADM

## 2022-09-22 RX ORDER — INSULIN LISPRO 100 [IU]/ML
0-4 INJECTION, SOLUTION INTRAVENOUS; SUBCUTANEOUS NIGHTLY
Status: DISCONTINUED | OUTPATIENT
Start: 2022-09-22 | End: 2022-09-27 | Stop reason: HOSPADM

## 2022-09-22 RX ORDER — FAMOTIDINE 20 MG/1
20 TABLET, FILM COATED ORAL DAILY
Status: DISCONTINUED | OUTPATIENT
Start: 2022-09-23 | End: 2022-09-27 | Stop reason: HOSPADM

## 2022-09-22 RX ORDER — INSULIN LISPRO 100 [IU]/ML
0-4 INJECTION, SOLUTION INTRAVENOUS; SUBCUTANEOUS
Status: DISCONTINUED | OUTPATIENT
Start: 2022-09-23 | End: 2022-09-27 | Stop reason: HOSPADM

## 2022-09-22 RX ORDER — VENLAFAXINE HYDROCHLORIDE 150 MG/1
150 CAPSULE, EXTENDED RELEASE ORAL DAILY
Status: DISCONTINUED | OUTPATIENT
Start: 2022-09-23 | End: 2022-09-27 | Stop reason: HOSPADM

## 2022-09-22 RX ORDER — CARVEDILOL 6.25 MG/1
12.5 TABLET ORAL 2 TIMES DAILY WITH MEALS
Status: DISCONTINUED | OUTPATIENT
Start: 2022-09-23 | End: 2022-09-25

## 2022-09-22 RX ORDER — HEPARIN SODIUM 5000 [USP'U]/ML
5000 INJECTION, SOLUTION INTRAVENOUS; SUBCUTANEOUS 2 TIMES DAILY
Status: DISCONTINUED | OUTPATIENT
Start: 2022-09-22 | End: 2022-09-27 | Stop reason: HOSPADM

## 2022-09-22 RX ORDER — POLYETHYLENE GLYCOL 3350 17 G/17G
17 POWDER, FOR SOLUTION ORAL DAILY PRN
Status: DISCONTINUED | OUTPATIENT
Start: 2022-09-22 | End: 2022-09-27 | Stop reason: HOSPADM

## 2022-09-22 RX ORDER — SODIUM CHLORIDE 0.9 % (FLUSH) 0.9 %
5-40 SYRINGE (ML) INJECTION EVERY 12 HOURS SCHEDULED
Status: DISCONTINUED | OUTPATIENT
Start: 2022-09-22 | End: 2022-09-27 | Stop reason: HOSPADM

## 2022-09-22 RX ORDER — ASPIRIN 81 MG/1
81 TABLET ORAL DAILY
Status: DISCONTINUED | OUTPATIENT
Start: 2022-09-23 | End: 2022-09-27 | Stop reason: HOSPADM

## 2022-09-22 RX ORDER — ONDANSETRON 2 MG/ML
4 INJECTION INTRAMUSCULAR; INTRAVENOUS EVERY 6 HOURS PRN
Status: DISCONTINUED | OUTPATIENT
Start: 2022-09-22 | End: 2022-09-27 | Stop reason: HOSPADM

## 2022-09-22 RX ORDER — VALSARTAN 160 MG/1
320 TABLET ORAL DAILY
Status: DISCONTINUED | OUTPATIENT
Start: 2022-09-23 | End: 2022-09-27 | Stop reason: HOSPADM

## 2022-09-22 RX ORDER — ONDANSETRON 4 MG/1
4 TABLET, ORALLY DISINTEGRATING ORAL EVERY 8 HOURS PRN
Status: DISCONTINUED | OUTPATIENT
Start: 2022-09-22 | End: 2022-09-27 | Stop reason: HOSPADM

## 2022-09-22 RX ORDER — SODIUM CHLORIDE 9 MG/ML
INJECTION, SOLUTION INTRAVENOUS PRN
Status: DISCONTINUED | OUTPATIENT
Start: 2022-09-22 | End: 2022-09-27 | Stop reason: HOSPADM

## 2022-09-22 RX ORDER — HYDROCHLOROTHIAZIDE 25 MG/1
25 TABLET ORAL DAILY
Status: DISCONTINUED | OUTPATIENT
Start: 2022-09-23 | End: 2022-09-27 | Stop reason: HOSPADM

## 2022-09-22 RX ORDER — FERROUS SULFATE 325(65) MG
325 TABLET ORAL
Status: DISCONTINUED | OUTPATIENT
Start: 2022-09-23 | End: 2022-09-27 | Stop reason: HOSPADM

## 2022-09-22 RX ORDER — VALSARTAN AND HYDROCHLOROTHIAZIDE 320; 25 MG/1; MG/1
1 TABLET, FILM COATED ORAL DAILY
Status: DISCONTINUED | OUTPATIENT
Start: 2022-09-23 | End: 2022-09-22 | Stop reason: CLARIF

## 2022-09-22 RX ORDER — DEXTROSE AND SODIUM CHLORIDE 5; .9 G/100ML; G/100ML
INJECTION, SOLUTION INTRAVENOUS CONTINUOUS
Status: DISCONTINUED | OUTPATIENT
Start: 2022-09-22 | End: 2022-09-23

## 2022-09-22 RX ORDER — AMLODIPINE BESYLATE 10 MG/1
10 TABLET ORAL DAILY
Status: DISCONTINUED | OUTPATIENT
Start: 2022-09-23 | End: 2022-09-27 | Stop reason: HOSPADM

## 2022-09-22 RX ORDER — DEXTROSE MONOHYDRATE 100 MG/ML
INJECTION, SOLUTION INTRAVENOUS CONTINUOUS PRN
Status: DISCONTINUED | OUTPATIENT
Start: 2022-09-22 | End: 2022-09-27 | Stop reason: HOSPADM

## 2022-09-22 RX ORDER — SODIUM CHLORIDE 0.9 % (FLUSH) 0.9 %
5-40 SYRINGE (ML) INJECTION PRN
Status: DISCONTINUED | OUTPATIENT
Start: 2022-09-22 | End: 2022-09-27 | Stop reason: HOSPADM

## 2022-09-22 RX ORDER — FUROSEMIDE 10 MG/ML
20 INJECTION INTRAMUSCULAR; INTRAVENOUS ONCE
Status: COMPLETED | OUTPATIENT
Start: 2022-09-22 | End: 2022-09-22

## 2022-09-22 RX ADMIN — HEPARIN SODIUM 5000 UNITS: 5000 INJECTION INTRAVENOUS; SUBCUTANEOUS at 23:22

## 2022-09-22 RX ADMIN — FUROSEMIDE 20 MG: 10 INJECTION, SOLUTION INTRAVENOUS at 23:20

## 2022-09-22 RX ADMIN — BUPROPION HYDROCHLORIDE 150 MG: 150 TABLET, EXTENDED RELEASE ORAL at 23:21

## 2022-09-22 RX ADMIN — DEXTROSE AND SODIUM CHLORIDE: 5; 900 INJECTION, SOLUTION INTRAVENOUS at 23:32

## 2022-09-22 RX ADMIN — SODIUM CHLORIDE, PRESERVATIVE FREE 10 ML: 5 INJECTION INTRAVENOUS at 23:22

## 2022-09-22 ASSESSMENT — PAIN - FUNCTIONAL ASSESSMENT: PAIN_FUNCTIONAL_ASSESSMENT: PREVENTS OR INTERFERES WITH MANY ACTIVE NOT PASSIVE ACTIVITIES

## 2022-09-22 ASSESSMENT — PAIN DESCRIPTION - LOCATION: LOCATION: SHOULDER

## 2022-09-22 ASSESSMENT — PAIN DESCRIPTION - DESCRIPTORS: DESCRIPTORS: SHARP;ACHING

## 2022-09-22 ASSESSMENT — PAIN DESCRIPTION - ORIENTATION: ORIENTATION: LEFT

## 2022-09-22 ASSESSMENT — PAIN SCALES - GENERAL: PAINLEVEL_OUTOF10: 7

## 2022-09-22 ASSESSMENT — PAIN DESCRIPTION - FREQUENCY: FREQUENCY: CONTINUOUS

## 2022-09-22 ASSESSMENT — PAIN DESCRIPTION - PAIN TYPE: TYPE: ACUTE PAIN

## 2022-09-23 ENCOUNTER — APPOINTMENT (OUTPATIENT)
Dept: GENERAL RADIOLOGY | Age: 82
DRG: 682 | End: 2022-09-23
Attending: STUDENT IN AN ORGANIZED HEALTH CARE EDUCATION/TRAINING PROGRAM
Payer: MEDICARE

## 2022-09-23 ENCOUNTER — APPOINTMENT (OUTPATIENT)
Dept: ULTRASOUND IMAGING | Age: 82
DRG: 682 | End: 2022-09-23
Attending: STUDENT IN AN ORGANIZED HEALTH CARE EDUCATION/TRAINING PROGRAM
Payer: MEDICARE

## 2022-09-23 LAB
ALBUMIN SERPL-MCNC: 3.9 GM/DL (ref 3.4–5)
ALP BLD-CCNC: 42 IU/L (ref 40–128)
ALT SERPL-CCNC: 10 U/L (ref 10–40)
AMMONIA: 28 UMOL/L (ref 11–51)
ANION GAP SERPL CALCULATED.3IONS-SCNC: 15 MMOL/L (ref 4–16)
ANION GAP SERPL CALCULATED.3IONS-SCNC: 18 MMOL/L (ref 4–16)
ANION GAP SERPL CALCULATED.3IONS-SCNC: 19 MMOL/L (ref 4–16)
AST SERPL-CCNC: 19 IU/L (ref 15–37)
BASOPHILS ABSOLUTE: 0 K/CU MM
BASOPHILS ABSOLUTE: 0.1 K/CU MM
BASOPHILS RELATIVE PERCENT: 0.5 % (ref 0–1)
BASOPHILS RELATIVE PERCENT: 0.8 % (ref 0–1)
BILIRUB SERPL-MCNC: 0.3 MG/DL (ref 0–1)
BUN BLDV-MCNC: 37 MG/DL (ref 6–23)
BUN BLDV-MCNC: 46 MG/DL (ref 6–23)
BUN BLDV-MCNC: 46 MG/DL (ref 6–23)
CALCIUM SERPL-MCNC: 8.9 MG/DL (ref 8.3–10.6)
CALCIUM SERPL-MCNC: 8.9 MG/DL (ref 8.3–10.6)
CALCIUM SERPL-MCNC: 9.2 MG/DL (ref 8.3–10.6)
CHLORIDE BLD-SCNC: 102 MMOL/L (ref 99–110)
CHLORIDE BLD-SCNC: 103 MMOL/L (ref 99–110)
CHLORIDE BLD-SCNC: 95 MMOL/L (ref 99–110)
CO2: 18 MMOL/L (ref 21–32)
CO2: 18 MMOL/L (ref 21–32)
CO2: 23 MMOL/L (ref 21–32)
CORTISOL, PLASMA: 27.9
CREAT SERPL-MCNC: 1.2 MG/DL (ref 0.6–1.1)
CREAT SERPL-MCNC: 1.5 MG/DL (ref 0.6–1.1)
CREAT SERPL-MCNC: 1.5 MG/DL (ref 0.6–1.1)
DIFFERENTIAL TYPE: ABNORMAL
DIFFERENTIAL TYPE: ABNORMAL
EKG ATRIAL RATE: 63 BPM
EKG DIAGNOSIS: NORMAL
EKG P AXIS: 5 DEGREES
EKG P-R INTERVAL: 160 MS
EKG Q-T INTERVAL: 486 MS
EKG QRS DURATION: 116 MS
EKG QTC CALCULATION (BAZETT): 497 MS
EKG R AXIS: 38 DEGREES
EKG T AXIS: 34 DEGREES
EKG VENTRICULAR RATE: 63 BPM
EOSINOPHILS ABSOLUTE: 0.3 K/CU MM
EOSINOPHILS ABSOLUTE: 0.6 K/CU MM
EOSINOPHILS RELATIVE PERCENT: 5.4 % (ref 0–3)
EOSINOPHILS RELATIVE PERCENT: 7 % (ref 0–3)
ERYTHROCYTE SEDIMENTATION RATE: 53 MM/HR (ref 0–30)
FOLATE: >20 NG/ML (ref 3.1–17.5)
GFR AFRICAN AMERICAN: 40 ML/MIN/1.73M2
GFR AFRICAN AMERICAN: 40 ML/MIN/1.73M2
GFR AFRICAN AMERICAN: 52 ML/MIN/1.73M2
GFR NON-AFRICAN AMERICAN: 33 ML/MIN/1.73M2
GFR NON-AFRICAN AMERICAN: 33 ML/MIN/1.73M2
GFR NON-AFRICAN AMERICAN: 43 ML/MIN/1.73M2
GLUCOSE BLD-MCNC: 120 MG/DL (ref 70–99)
GLUCOSE BLD-MCNC: 127 MG/DL (ref 70–99)
GLUCOSE BLD-MCNC: 128 MG/DL (ref 70–99)
GLUCOSE BLD-MCNC: 145 MG/DL (ref 70–99)
GLUCOSE BLD-MCNC: 148 MG/DL (ref 70–99)
GLUCOSE BLD-MCNC: 150 MG/DL (ref 70–99)
GLUCOSE BLD-MCNC: 151 MG/DL (ref 70–99)
HCT VFR BLD CALC: 31.5 % (ref 37–47)
HCT VFR BLD CALC: 32 % (ref 37–47)
HEMOGLOBIN: 9.3 GM/DL (ref 12.5–16)
HEMOGLOBIN: 9.9 GM/DL (ref 12.5–16)
HIGH SENSITIVE C-REACTIVE PROTEIN: 14.7 MG/L (ref 0–5)
IMMATURE NEUTROPHIL %: 0.3 % (ref 0–0.43)
IMMATURE NEUTROPHIL %: 0.5 % (ref 0–0.43)
LACTATE: 0.8 MMOL/L (ref 0.4–2)
LV EF: 58 %
LVEF MODALITY: NORMAL
LYMPHOCYTES ABSOLUTE: 0.9 K/CU MM
LYMPHOCYTES ABSOLUTE: 1.1 K/CU MM
LYMPHOCYTES RELATIVE PERCENT: 13.9 % (ref 24–44)
LYMPHOCYTES RELATIVE PERCENT: 14 % (ref 24–44)
MAGNESIUM: 2 MG/DL (ref 1.8–2.4)
MCH RBC QN AUTO: 24.5 PG (ref 27–31)
MCH RBC QN AUTO: 25.1 PG (ref 27–31)
MCHC RBC AUTO-ENTMCNC: 29.5 % (ref 32–36)
MCHC RBC AUTO-ENTMCNC: 30.9 % (ref 32–36)
MCV RBC AUTO: 81.2 FL (ref 78–100)
MCV RBC AUTO: 83.1 FL (ref 78–100)
MONOCYTES ABSOLUTE: 0.4 K/CU MM
MONOCYTES ABSOLUTE: 0.6 K/CU MM
MONOCYTES RELATIVE PERCENT: 6.9 % (ref 0–4)
MONOCYTES RELATIVE PERCENT: 7.9 % (ref 0–4)
NUCLEATED RBC %: 0 %
NUCLEATED RBC %: 0 %
PDW BLD-RTO: 14.4 % (ref 11.7–14.9)
PDW BLD-RTO: 14.7 % (ref 11.7–14.9)
PHOSPHORUS: 3.8 MG/DL (ref 2.5–4.9)
PLATELET # BLD: 63 K/CU MM (ref 140–440)
PLATELET # BLD: 80 K/CU MM (ref 140–440)
PMV BLD AUTO: 11.1 FL (ref 7.5–11.1)
PMV BLD AUTO: 11.7 FL (ref 7.5–11.1)
POTASSIUM SERPL-SCNC: 3.6 MMOL/L (ref 3.5–5.1)
PRO-BNP: 665.4 PG/ML
PROCALCITONIN: 0.17
RBC # BLD: 3.79 M/CU MM (ref 4.2–5.4)
RBC # BLD: 3.94 M/CU MM (ref 4.2–5.4)
SEGMENTED NEUTROPHILS ABSOLUTE COUNT: 4.5 K/CU MM
SEGMENTED NEUTROPHILS ABSOLUTE COUNT: 5.6 K/CU MM
SEGMENTED NEUTROPHILS RELATIVE PERCENT: 70 % (ref 36–66)
SEGMENTED NEUTROPHILS RELATIVE PERCENT: 72.8 % (ref 36–66)
SODIUM BLD-SCNC: 133 MMOL/L (ref 135–145)
SODIUM BLD-SCNC: 139 MMOL/L (ref 135–145)
SODIUM BLD-SCNC: 139 MMOL/L (ref 135–145)
TOTAL IMMATURE NEUTOROPHIL: 0.02 K/CU MM
TOTAL IMMATURE NEUTOROPHIL: 0.03 K/CU MM
TOTAL NUCLEATED RBC: 0 K/CU MM
TOTAL NUCLEATED RBC: 0 K/CU MM
TOTAL PROTEIN: 6.4 GM/DL (ref 6.4–8.2)
TSH HIGH SENSITIVITY: 1.88 UIU/ML (ref 0.27–4.2)
VITAMIN B-12: 450.2 PG/ML (ref 211–911)
WBC # BLD: 6.1 K/CU MM (ref 4–10.5)
WBC # BLD: 8 K/CU MM (ref 4–10.5)

## 2022-09-23 PROCEDURE — 86141 C-REACTIVE PROTEIN HS: CPT

## 2022-09-23 PROCEDURE — 94761 N-INVAS EAR/PLS OXIMETRY MLT: CPT

## 2022-09-23 PROCEDURE — 1200000000 HC SEMI PRIVATE

## 2022-09-23 PROCEDURE — 36415 COLL VENOUS BLD VENIPUNCTURE: CPT

## 2022-09-23 PROCEDURE — 87899 AGENT NOS ASSAY W/OPTIC: CPT

## 2022-09-23 PROCEDURE — 84145 PROCALCITONIN (PCT): CPT

## 2022-09-23 PROCEDURE — 85025 COMPLETE CBC W/AUTO DIFF WBC: CPT

## 2022-09-23 PROCEDURE — 96372 THER/PROPH/DIAG INJ SC/IM: CPT

## 2022-09-23 PROCEDURE — 87081 CULTURE SCREEN ONLY: CPT

## 2022-09-23 PROCEDURE — 93005 ELECTROCARDIOGRAM TRACING: CPT | Performed by: STUDENT IN AN ORGANIZED HEALTH CARE EDUCATION/TRAINING PROGRAM

## 2022-09-23 PROCEDURE — 87449 NOS EACH ORGANISM AG IA: CPT

## 2022-09-23 PROCEDURE — 82746 ASSAY OF FOLIC ACID SERUM: CPT

## 2022-09-23 PROCEDURE — 6360000002 HC RX W HCPCS: Performed by: STUDENT IN AN ORGANIZED HEALTH CARE EDUCATION/TRAINING PROGRAM

## 2022-09-23 PROCEDURE — 84443 ASSAY THYROID STIM HORMONE: CPT

## 2022-09-23 PROCEDURE — 71045 X-RAY EXAM CHEST 1 VIEW: CPT

## 2022-09-23 PROCEDURE — 97116 GAIT TRAINING THERAPY: CPT

## 2022-09-23 PROCEDURE — 83735 ASSAY OF MAGNESIUM: CPT

## 2022-09-23 PROCEDURE — 93880 EXTRACRANIAL BILAT STUDY: CPT

## 2022-09-23 PROCEDURE — 96375 TX/PRO/DX INJ NEW DRUG ADDON: CPT

## 2022-09-23 PROCEDURE — 84100 ASSAY OF PHOSPHORUS: CPT

## 2022-09-23 PROCEDURE — 97166 OT EVAL MOD COMPLEX 45 MIN: CPT

## 2022-09-23 PROCEDURE — 82140 ASSAY OF AMMONIA: CPT

## 2022-09-23 PROCEDURE — 83605 ASSAY OF LACTIC ACID: CPT

## 2022-09-23 PROCEDURE — 87086 URINE CULTURE/COLONY COUNT: CPT

## 2022-09-23 PROCEDURE — 97162 PT EVAL MOD COMPLEX 30 MIN: CPT

## 2022-09-23 PROCEDURE — 6370000000 HC RX 637 (ALT 250 FOR IP): Performed by: STUDENT IN AN ORGANIZED HEALTH CARE EDUCATION/TRAINING PROGRAM

## 2022-09-23 PROCEDURE — 93306 TTE W/DOPPLER COMPLETE: CPT

## 2022-09-23 PROCEDURE — 80076 HEPATIC FUNCTION PANEL: CPT

## 2022-09-23 PROCEDURE — 97530 THERAPEUTIC ACTIVITIES: CPT

## 2022-09-23 PROCEDURE — 82962 GLUCOSE BLOOD TEST: CPT

## 2022-09-23 PROCEDURE — 80048 BASIC METABOLIC PNL TOTAL CA: CPT

## 2022-09-23 PROCEDURE — 2580000003 HC RX 258: Performed by: STUDENT IN AN ORGANIZED HEALTH CARE EDUCATION/TRAINING PROGRAM

## 2022-09-23 PROCEDURE — 93010 ELECTROCARDIOGRAM REPORT: CPT | Performed by: INTERNAL MEDICINE

## 2022-09-23 PROCEDURE — 87040 BLOOD CULTURE FOR BACTERIA: CPT

## 2022-09-23 PROCEDURE — 96365 THER/PROPH/DIAG IV INF INIT: CPT

## 2022-09-23 PROCEDURE — 83880 ASSAY OF NATRIURETIC PEPTIDE: CPT

## 2022-09-23 PROCEDURE — 80053 COMPREHEN METABOLIC PANEL: CPT

## 2022-09-23 PROCEDURE — 82607 VITAMIN B-12: CPT

## 2022-09-23 PROCEDURE — 83036 HEMOGLOBIN GLYCOSYLATED A1C: CPT

## 2022-09-23 PROCEDURE — 2580000003 HC RX 258: Performed by: INTERNAL MEDICINE

## 2022-09-23 PROCEDURE — 85652 RBC SED RATE AUTOMATED: CPT

## 2022-09-23 PROCEDURE — 82533 TOTAL CORTISOL: CPT

## 2022-09-23 RX ORDER — SODIUM CHLORIDE 9 MG/ML
INJECTION, SOLUTION INTRAVENOUS CONTINUOUS
Status: DISPENSED | OUTPATIENT
Start: 2022-09-23 | End: 2022-09-23

## 2022-09-23 RX ORDER — MORPHINE SULFATE 2 MG/ML
1 INJECTION, SOLUTION INTRAMUSCULAR; INTRAVENOUS EVERY 6 HOURS PRN
Status: DISCONTINUED | OUTPATIENT
Start: 2022-09-23 | End: 2022-09-27 | Stop reason: HOSPADM

## 2022-09-23 RX ORDER — ZIPRASIDONE MESYLATE 20 MG/ML
10 INJECTION, POWDER, LYOPHILIZED, FOR SOLUTION INTRAMUSCULAR ONCE
Status: DISCONTINUED | OUTPATIENT
Start: 2022-09-23 | End: 2022-09-27 | Stop reason: HOSPADM

## 2022-09-23 RX ADMIN — HEPARIN SODIUM 5000 UNITS: 5000 INJECTION INTRAVENOUS; SUBCUTANEOUS at 10:12

## 2022-09-23 RX ADMIN — SODIUM CHLORIDE: 9 INJECTION, SOLUTION INTRAVENOUS at 16:05

## 2022-09-23 RX ADMIN — SODIUM CHLORIDE, PRESERVATIVE FREE 10 ML: 5 INJECTION INTRAVENOUS at 21:52

## 2022-09-23 RX ADMIN — BUPROPION HYDROCHLORIDE 150 MG: 150 TABLET, EXTENDED RELEASE ORAL at 10:12

## 2022-09-23 RX ADMIN — CARVEDILOL 12.5 MG: 6.25 TABLET, FILM COATED ORAL at 10:10

## 2022-09-23 RX ADMIN — HYDROCHLOROTHIAZIDE 25 MG: 25 TABLET ORAL at 10:12

## 2022-09-23 RX ADMIN — ASPIRIN 81 MG: 81 TABLET, COATED ORAL at 10:12

## 2022-09-23 RX ADMIN — BUPROPION HYDROCHLORIDE 150 MG: 150 TABLET, EXTENDED RELEASE ORAL at 21:52

## 2022-09-23 RX ADMIN — MORPHINE SULFATE 1 MG: 2 INJECTION, SOLUTION INTRAMUSCULAR; INTRAVENOUS at 21:52

## 2022-09-23 RX ADMIN — ATORVASTATIN CALCIUM 40 MG: 40 TABLET, FILM COATED ORAL at 10:11

## 2022-09-23 RX ADMIN — FAMOTIDINE 20 MG: 20 TABLET ORAL at 10:12

## 2022-09-23 RX ADMIN — HEPARIN SODIUM 5000 UNITS: 5000 INJECTION INTRAVENOUS; SUBCUTANEOUS at 21:51

## 2022-09-23 RX ADMIN — SODIUM CHLORIDE, PRESERVATIVE FREE 10 ML: 5 INJECTION INTRAVENOUS at 10:13

## 2022-09-23 RX ADMIN — FERROUS SULFATE TAB 325 MG (65 MG ELEMENTAL FE) 325 MG: 325 (65 FE) TAB at 10:12

## 2022-09-23 RX ADMIN — CARVEDILOL 12.5 MG: 6.25 TABLET, FILM COATED ORAL at 16:10

## 2022-09-23 RX ADMIN — VALSARTAN 320 MG: 160 TABLET, FILM COATED ORAL at 10:11

## 2022-09-23 RX ADMIN — Medication 1 CAPSULE: at 10:12

## 2022-09-23 RX ADMIN — VENLAFAXINE HYDROCHLORIDE 150 MG: 150 CAPSULE, EXTENDED RELEASE ORAL at 10:11

## 2022-09-23 RX ADMIN — CEFTRIAXONE SODIUM 1000 MG: 1 INJECTION, POWDER, FOR SOLUTION INTRAMUSCULAR; INTRAVENOUS at 16:08

## 2022-09-23 RX ADMIN — AMLODIPINE BESYLATE 10 MG: 10 TABLET ORAL at 10:11

## 2022-09-23 ASSESSMENT — ENCOUNTER SYMPTOMS
CHEST TIGHTNESS: 0
EYE REDNESS: 0
PHOTOPHOBIA: 0
EYE ITCHING: 0
COUGH: 0
ABDOMINAL DISTENTION: 0
BLOOD IN STOOL: 0
SORE THROAT: 0
TROUBLE SWALLOWING: 0
CONSTIPATION: 0
EYE PAIN: 0
DIARRHEA: 0
VOICE CHANGE: 0
SHORTNESS OF BREATH: 0
RHINORRHEA: 0
VOMITING: 0
SINUS PAIN: 0
EYE DISCHARGE: 0
BACK PAIN: 0
NAUSEA: 0
ABDOMINAL PAIN: 0

## 2022-09-23 NOTE — PROGRESS NOTES
In-Patient Progress Note    Patient:  Afshin Mark 81 y.o. female MRN: [id number]     Date of Service: 9/23/2022    Hospital Day: 2      Chief complaint: had no chief complaint listed for this encounter. Assessment and [address] [place] Faria, a 81 y.o. female, with a history of CKD, DM, Anemia, was admitted on 9/22/2022 with complaints of confusion and frequent falls. Patient initially presented to [address] [place] [address] with complaints of confusion and frequent falls. CT lumbar spine, thoracic spine, and cervical spine, and CT head without contrast were negative for any acute fractures/deformities; she did have stable chronic wedge compression deformity of L1. Urinalysis shows pyuria with urine white blood cells 10-20 per high-power field, urine nitrite negative. KENZIE with creatinine of 1.7, non-anion gap metabolic acidosis with bicarb of 20. No significant leukocytosis. Patient was transferred to [facility] for further care and management. Assessment  UTI, pyuria (10-20 wbc/HPF) on urinalysis, negative nitrite  -Continue ceftriaxone 1 g daily  -Follow urine culture, blood culture    KENZIE on CKD III, likely volume dependent prerenal KENZIE.  BUN/creatinine 53/1.7 in [place] ED.  -Decrease IVF to normal saline at 40 mL an hour for the next 6 hours  -Strict input output  -Monitor renal function    Acute metabolic encephalopathy likely secondary to above  -Check TSH, B12, folate, cortisol  -Continue to monitor    Non-insulin-dependent diabetes mellitus  -Low-dose sliding scale  -POCT glucose AC/at bedtime  -Hypoglycemia treatment order    Essential hypertension  -Continue home valsartan 320 Mg daily, hydrochlorothiazide 25 mg daily, amlodipine 10 mg daily, carvedilol 12.5 Mg twice daily    Chronic anemia and thrombocytopenia, hemoglobin 9.3 and platelet 80 at presentation  -Continue to monitor daily CBC    Anxiety/depression  -Continue Effexor  Mg daily          # Peptic ulcer prophylaxis: Famotidine 20 mg daily  # DVT Prophylaxis: Heparin  #CODE STATUS: Full      Current living situation: With daughter  Expected Disposition: SNF  Estimated discharge date: 2 days      Review of System     Review of Systems   Constitutional:  Negative for activity change, appetite change, chills, fatigue and fever. HENT:  Negative for congestion, ear discharge, ear pain, hearing loss, nosebleeds, postnasal drip, rhinorrhea, sinus pain, sore throat, trouble swallowing and voice change. Eyes:  Negative for photophobia, pain, discharge, redness and itching. Respiratory:  Negative for cough, chest tightness and shortness of breath. Cardiovascular:  Negative for chest pain, palpitations and leg swelling. Gastrointestinal:  Negative for abdominal distention, abdominal pain, blood in stool, constipation, diarrhea, nausea and vomiting. Endocrine: Negative for cold intolerance, heat intolerance, polydipsia, polyphagia and polyuria. Genitourinary:  Negative for difficulty urinating, dysuria, flank pain, frequency, hematuria and urgency. Musculoskeletal:  Negative for arthralgias, back pain, gait problem, joint swelling and myalgias. Skin:  Negative for pallor, rash and wound. Allergic/Immunologic: Negative for environmental allergies and food allergies. Neurological:  Negative for dizziness, tremors, seizures, syncope, speech difficulty, weakness, light-headedness, numbness and headaches. Hematological:  Negative for adenopathy. Does not bruise/bleed easily. Psychiatric/Behavioral:  Negative for agitation, confusion, decreased concentration, hallucinations, self-injury, sleep disturbance and suicidal ideas. The patient is not nervous/anxious and is not hyperactive. I have reviewed all pertinent PMHx, PSHx, FamHx, SocialHx, medications, and allergies and updated history as appropriate.     Physical Exam   VITAL SIGNS:  BP (!) 169/68   Pulse 62   Temp 97.4 °F (36.3 °C) (Axillary) Resp 20   Ht 5' 2\" (1.575 m)   Wt 96 lb 1.9 oz (43.6 kg)   SpO2 99%   BMI 17.58 kg/m²   Tmax over 24 hours:  Temp (24hrs), Av.5 °F (36.4 °C), Min:97.4 °F (36.3 °C), Max:97.6 °F (36.4 °C)      Patient Vitals for the past 6 hrs:   BP Pulse   22 1010 (!) 169/68 62         Intake/Output Summary (Last 24 hours) at 2022 1447  Last data filed at 2022 1437  Gross per 24 hour   Intake 240 ml   Output 900 ml   Net -660 ml     Wt Readings from Last 2 Encounters:   22 96 lb 1.9 oz (43.6 kg)   22 98 lb 12.8 oz (44.8 kg)     Body mass index is 17.58 kg/m². Physical Exam  Constitutional:       General: She is not in acute distress. Appearance: She is well-developed. HENT:      Head: Normocephalic and atraumatic. Right Ear: External ear normal.      Left Ear: External ear normal.      Nose: Nose normal.   Eyes:      General: No scleral icterus. Right eye: No discharge. Left eye: No discharge. Conjunctiva/sclera: Conjunctivae normal.      Pupils: Pupils are equal, round, and reactive to light. Neck:      Thyroid: No thyromegaly. Vascular: No JVD. Trachea: No tracheal deviation. Cardiovascular:      Rate and Rhythm: Normal rate and regular rhythm. Heart sounds: Normal heart sounds. No murmur heard. No friction rub. No gallop. Pulmonary:      Effort: Pulmonary effort is normal. No respiratory distress. Breath sounds: Normal breath sounds. No stridor. No wheezing or rales. Chest:      Chest wall: No tenderness. Abdominal:      General: Bowel sounds are normal. There is no distension. Palpations: Abdomen is soft. There is no mass. Tenderness: There is no abdominal tenderness. There is no guarding or rebound. Hernia: No hernia is present. Genitourinary:     Vagina: Normal. No vaginal discharge. Rectum: Guaiac result negative. Musculoskeletal:         General: No tenderness or deformity. Normal range of motion. and bony thorax are unchanged. Lungs are clear without focal consolidation or pulmonary edema. No evidence of pleural effusion or pneumothorax. Stable exam without acute focal process. No evidence of pulmonary edema.        Recent Results (from the past 24 hour(s))   POCT Glucose    Collection Time: 09/22/22 11:14 PM   Result Value Ref Range    POC Glucose 90 70 - 99 MG/DL   Basic Metabolic Panel w/ Reflex to MG    Collection Time: 09/23/22  2:09 AM   Result Value Ref Range    Sodium 139 135 - 145 MMOL/L    Potassium 3.6 3.5 - 5.1 MMOL/L    Chloride 102 99 - 110 mMol/L    CO2 18 (L) 21 - 32 MMOL/L    Anion Gap 19 (H) 4 - 16    BUN 46 (H) 6 - 23 MG/DL    Creatinine 1.5 (H) 0.6 - 1.1 MG/DL    Glucose 127 (H) 70 - 99 MG/DL    Calcium 8.9 8.3 - 10.6 MG/DL    GFR Non- 33 (L) >60 mL/min/1.73m2    GFR  40 (L) >60 mL/min/1.73m2   CBC with Auto Differential    Collection Time: 09/23/22  2:09 AM   Result Value Ref Range    WBC 8.0 4.0 - 10.5 K/CU MM    RBC 3.79 (L) 4.2 - 5.4 M/CU MM    Hemoglobin 9.3 (L) 12.5 - 16.0 GM/DL    Hematocrit 31.5 (L) 37 - 47 %    MCV 83.1 78 - 100 FL    MCH 24.5 (L) 27 - 31 PG    MCHC 29.5 (L) 32.0 - 36.0 %    RDW 14.7 11.7 - 14.9 %    Platelets 80 (L) 991 - 440 K/CU MM    MPV 11.7 (H) 7.5 - 11.1 FL    Differential Type AUTOMATED DIFFERENTIAL     Segs Relative 70.0 (H) 36 - 66 %    Lymphocytes % 14.0 (L) 24 - 44 %    Monocytes % 7.9 (H) 0 - 4 %    Eosinophils % 7.0 (H) 0 - 3 %    Basophils % 0.8 0 - 1 %    Segs Absolute 5.6 K/CU MM    Lymphocytes Absolute 1.1 K/CU MM    Monocytes Absolute 0.6 K/CU MM    Eosinophils Absolute 0.6 K/CU MM    Basophils Absolute 0.1 K/CU MM    Nucleated RBC % 0.0 %    Total Nucleated RBC 0.0 K/CU MM    Total Immature Neutrophil 0.02 K/CU MM    Immature Neutrophil % 0.3 0 - 0.43 %   Lactic Acid    Collection Time: 09/23/22  2:09 AM   Result Value Ref Range    Lactate 0.8 0.4 - 2.0 mMOL/L   Sedimentation Rate    Collection Time: 09/23/22  2:09 AM   Result Value Ref Range    Sed Rate 53 (H) 0 - 30 MM/HR   Comprehensive Metabolic Panel w/ Reflex to MG    Collection Time: 09/23/22  2:09 AM   Result Value Ref Range    Sodium 139 135 - 145 MMOL/L    Potassium 3.6 3.5 - 5.1 MMOL/L    Chloride 103 99 - 110 mMol/L    CO2 18 (L) 21 - 32 MMOL/L    BUN 46 (H) 6 - 23 MG/DL    Creatinine 1.5 (H) 0.6 - 1.1 MG/DL    Glucose 128 (H) 70 - 99 MG/DL    Calcium 8.9 8.3 - 10.6 MG/DL    Albumin 3.9 3.4 - 5.0 GM/DL    Total Protein 6.4 6.4 - 8.2 GM/DL    Total Bilirubin 0.3 0.0 - 1.0 MG/DL    ALT 10 10 - 40 U/L    AST 19 15 - 37 IU/L    Alkaline Phosphatase 42 40 - 128 IU/L    GFR Non- 33 (L) >60 mL/min/1.73m2    GFR  40 (L) >60 mL/min/1.73m2    Anion Gap 18 (H) 4 - 16   Brain Natriuretic Peptide    Collection Time: 09/23/22  2:09 AM   Result Value Ref Range    Pro-.4 (H) <300 PG/ML   EKG 12 Lead    Collection Time: 09/23/22  2:36 AM   Result Value Ref Range    Ventricular Rate 63 BPM    Atrial Rate 63 BPM    P-R Interval 160 ms    QRS Duration 116 ms    Q-T Interval 486 ms    QTc Calculation (Bazett) 497 ms    P Axis 5 degrees    R Axis 38 degrees    T Axis 34 degrees    Diagnosis       Normal sinus rhythm  Prolonged QT  Abnormal ECG  When compared with ECG of 27-APR-2021 04:42,  Sinus rhythm has replaced Atrial fibrillation  Criteria for Anterior infarct are no longer present     POCT Glucose    Collection Time: 09/23/22  6:38 AM   Result Value Ref Range    POC Glucose 148 (H) 70 - 99 MG/DL   POCT Glucose    Collection Time: 09/23/22 12:18 PM   Result Value Ref Range    POC Glucose 151 (H) 70 - 99 MG/DL   Vitamin B12 & Folate    Collection Time: 09/23/22 12:51 PM   Result Value Ref Range    Vitamin B-12 450.2 211 - 911 pg/ml    Folate >20.0 (H) 3.1 - 17.5 NG/ML   TSH    Collection Time: 09/23/22 12:51 PM   Result Value Ref Range    TSH, High Sensitivity 1.880 0.270 - 4.20 uIu/ml   Ammonia    Collection Time: 09/23/22 12:51 PM   Result Value Ref Range    Ammonia 28 11 - 51 UMOL/L   Cortisol Total    Collection Time: 09/23/22 12:51 PM   Result Value Ref Range    Cortisol, Plasma 27.90            Electronically signed by Viola Hinojosa MD on 9/23/2022 at 2:47 PM

## 2022-09-23 NOTE — H&P
V2.0  History and Physical      Name:  Renay Ng /Age/Sex: 1940  (81 y.o. female)   MRN & CSN:  [id number] & [id number] Encounter Date/Time: 2022 12:25 AM EDT   Location:  [facility] PCP: Obdulio Arzate MD       Hospital Day: 2    Assessment and Plan:   Renay Ng is a 81 y.o. female with a pmh of CKD, DM, Anemia,  who presents with increased confusion and frequent falls. Hospital Problems             Last Modified POA    * (Principal) Pneumonia of left lower lobe due to infectious organism 2022 Yes    UTI (urinary tract infection) 2022 Yes     UTI - Confusion +, Dirty UA. Started on Ceftriaxone. Bx, Ux, LA, ordered. Frequent fall with AMS - likely due to UTI. CT head -VE. Will order Echo, carotid doppler to r/o cardiogenic cause. Neruology consult if patient fails to improve    KENZIE on CKD - Base cr 1.2. Currently 1.7. Started on gentle IV hydration due to pulmonary edema on chest xray. Patient following with Dr. Stan Mayers     Mild Pulmonary edema- on chest xray. IV diuresis. DM - placed on insulin scale    HTN - continue home meds    Anemia and thrombocytopenia - stable. Will continue to monitor        Disposition:   Current Living situation: Home  Expected Disposition: Home  Estimated D/C: 2    Diet ADULT DIET; Regular; 4 carb choices (60 gm/meal); Low Sodium (2 gm)   DVT Prophylaxis [] Lovenox, []  Heparin, [] SCDs, [] Ambulation,  [] Eliquis, [] Xarelto, [] Coumadin   Code Status Full Code   Surrogate Decision Maker/ POA Daughter     History from:     patient    History of Present Illness:     Chief Complaint: Pneumonia of left lower lobe due to infectious organism  Renay Ng is a 81 y.o. female with pmh as mentioned above who presents with increased confusion and frequent falls. Review of Systems: Need 10 Elements   Review of Systems    Patient was [facility] to [facility] ER for frequent falls and increasing confusion which has been going on since Saturday.   As per the report patient is AO x3 in the past.  On examination patient looks confused. reports she has generalized pain. answers to most of the questions with no. Daughter was contacted for further information but could not be reached. Voice message was left for her to call back. Objective:   No intake or output data in the 24 hours ending 09/23/22 0025   Vitals:   Vitals:    09/22/22 2145   BP: (!) 176/64   Pulse: 64   Resp: 18   SpO2: 99%   Weight: 96 lb 1.9 oz (43.6 kg)   Height: 5' 2\" (1.575 m)       Medications Prior to Admission     Prior to Admission medications    Medication Sig Start Date End Date Taking? Authorizing Provider   JARDIANCE 25 MG tablet TAKE 1 TABLET BY MOUTH EVERY DAY 7/28/22   Historical Provider, MD   triamcinolone (KENALOG) 0.025 % ointment APPLY TO AFFECTED AREA TWICE A DAY 7/27/22   Historical Provider, MD   cephALEXin (KEFLEX) 500 MG capsule TAKE 1 CAPSULE (500 MG TOTAL) BY MOUTH EVERY 8 HOURS FOR 7 DAYS. 7/15/22   Historical Provider, MD   dicyclomine (BENTYL) 10 MG capsule Take 1 capsule by mouth in the morning and 1 capsule at noon and 1 capsule in the evening and 1 capsule before bedtime. Patient not taking: Reported on 8/11/2022 7/21/22   Marie Brothers, APRN - CNP   ferrous sulfate (FE TABS 325) 325 (65 Fe) MG EC tablet TAKE 1 TABLET BY MOUTH EVERY DAY WITH BREAKFAST 7/12/22   Liliam Gale MD   dexamethasone (DECADRON) 2 MG tablet TAKE 1 TABLET BY MOUTH EVERY DAY WITH BREAKFAST 7/12/22   Liliam Gale MD   Telotristat Ethyl,as Etiprate, 250 MG TABS Take 1 tablet (250 mg) by mouth three times daily. 5/19/22   Liliam Gale MD   buPROPion (WELLBUTRIN SR) 150 MG extended release tablet TAKE 1 TABLET BY MOUTH TWICE A DAY 9/24/21   Liliam Gale MD   acyclovir (ZOVIRAX) 800 MG tablet TAKE 1 TABLET (800 MG TOTAL) BY MOUTH 3 TIMES DAILY AS NEEDED (FOR COLD SORES).  7/13/21   Historical Provider, MD   famotidine (PEPCID) 20 MG tablet TAKE ONE TABLET BY MOUTH THREE TIMES PER DAY FOR STOMACH 6/10/21   Historical Provider, MD   promethazine (PHENERGAN) 25 MG tablet Take 25 mg by mouth every 6 hours as needed 8/12/21   Historical Provider, MD   oxyCODONE (ROXICODONE) 5 MG immediate release tablet Take 1 tablet by mouth 4 times daily. 5/13/21   Historical Provider, MD   atorvastatin (LIPITOR) 40 MG tablet Take 1 tablet by mouth daily 4/28/21   Rolf Rodarte MD   aspirin 81 MG EC tablet Take 1 tablet by mouth daily 4/26/21   Nuvia Pérez MD   carvedilol (COREG) 12.5 MG tablet Take 1 tablet by mouth 2 times daily (with meals) 4/26/21   Nuvia Pérez MD   lactulose (CHRONULAC) 10 GM/15ML solution Take 30 mLs by mouth 3 times daily 4/26/21   Nuvia Pérez MD   amLODIPine (NORVASC) 10 MG tablet TAKE 1 TABLET BY MOUTH EVERY DAY 3/5/21   Historical Provider, MD   FREESTYLE LITE strip TEST BLOOD GLUCOSE 3 TIMES DAILY (BEFORE MEALS). 3/26/21   Historical Provider, MD   methocarbamol (ROBAXIN) 750 MG tablet Take 750 mg by mouth 4 times daily as needed 4/1/21   Historical Provider, MD   INVOKANA 300 MG TABS tablet Take 300 mg by mouth every morning (before breakfast)  8/7/20   Historical Provider, MD   venlafaxine (EFFEXOR XR) 150 MG extended release capsule Take 1 capsule by mouth daily  Patient taking differently: Take 225 mg by mouth in the morning. 10/18/19   Cheril Dakins, MD   glipiZIDE (GLUCOTROL XL) 5 MG extended release tablet Take 2 tablets by mouth daily  Patient taking differently: Take 5 mg by mouth in the morning.  10/17/19   Cheril Dakins, MD   metFORMIN (GLUCOPHAGE) 500 MG tablet Take 1 tablet by mouth 2 times daily (with meals) 10/17/19   Cheril Dakins, MD   folic acid-pyridoxine-cyancobalamin (FOLTX) 1.13-25-2 MG TABS Take 1 tablet by mouth daily 10/18/19   Cheril Dakins, MD   valsartan-hydroCHLOROthiazide (DIOVAN-HCT) 320-25 MG per tablet Take 1 tablet by mouth daily  7/27/19   Historical Provider, MD   pantoprazole (PROTONIX) 40 MG tablet Take 1 tablet by mouth every morning (before breakfast) 3/30/19   Yasmin Bocanegra MD   potassium chloride (MICRO-K) 10 MEQ CR capsule Take 10 mEq by mouth 2 times daily    Historical Provider, MD   Multiple Vitamins-Minerals (THERAPEUTIC MULTIVITAMIN-MINERALS) tablet Take 1 tablet by mouth daily    Historical Provider, MD   vitamin E 1000 UNITS capsule Take 400 Units by mouth daily    Historical Provider, MD   ALPANDREYZolam Hugo Files) 1 MG tablet Take 1 mg by mouth 2 times daily as needed for Sleep. Historical Provider, MD   fenofibrate (TRICOR) 145 MG tablet Take 145 mg by mouth daily. Historical Provider, MD   cyanocobalamin 1000 MCG/ML injection Inject 1,000 mcg into the muscle every 7 days. 9/22/22  Historical Provider, MD       Physical Exam: Need 8 Elements   Physical Exam     General: Looks in mild distress. Eyes: EOMI  ENT: neck supple  Cardiovascular: Regular rate. Respiratory: B/l Crackels. Gastrointestinal: Soft, non tender  Genitourinary: no suprapubic tenderness  Musculoskeletal: No edema  Skin: warm, dry  Neuro: Awake and alert but confused. Seen to move all her ext. Psych: Mood appropriate. Past Medical History:   PMHx   Past Medical History:   Diagnosis Date    Acute anterior wall MI ([address] 2007    CAD (coronary artery disease)     Dr. Magdaleno Rueda 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  Diabetes mellitus ([address]     PCP    H/O cardiovascular stress test 5/02    EF 74 %,normal perfusion    H/O echocardiogram 11/02    EF 60%, mild to mod MR,    Hyperlipidemia     Hypertension     PCP    Protein-calorie malnutrition, moderate ([address] 8/12/2015     PSHX:  has a past surgical history that includes Coronary artery bypass graft (5/07); Diagnostic Cardiac Cath Lab Procedure (3/30/07);  Abdomen surgery; other surgical history (08 11 2015); skin biopsy; fracture surgery (Left, ); fracture surgery (Right, ); Hysterectomy (); Wrist fracture surgery (Left, 2021); Upper gastrointestinal endoscopy (N/A, 2021); Colonoscopy (N/A, 2021); and Pacemaker insertion (N/A, 2021). Allergies: Allergies   Allergen Reactions    Tramadol Itching    Vicodin [Hydrocodone-Acetaminophen] Itching     Fam HX:  family history includes Cancer in her father; Heart Attack in her mother; Heart Disease in her brother and mother.   Soc HX:   Social History     Socioeconomic History    Marital status:    Tobacco Use    Smoking status: Former     Packs/day: 1.00     Years: 30.00     Pack years: 30.00     Types: Cigarettes     Start date:      Quit date:      Years since quittin.7    Smokeless tobacco: Never   Vaping Use    Vaping Use: Never used   Substance and Sexual Activity    Alcohol use: No    Drug use: No       Medications:   Medications:    sodium chloride flush  5-40 mL IntraVENous 2 times per day    amLODIPine  10 mg Oral Daily    aspirin  81 mg Oral Daily    atorvastatin  40 mg Oral Daily    buPROPion  150 mg Oral BID    carvedilol  12.5 mg Oral BID WC    famotidine  20 mg Oral Daily    ferrous sulfate  325 mg Oral Daily with breakfast    b complex vitamins  1 capsule Oral Daily    venlafaxine  150 mg Oral Daily    heparin (porcine)  5,000 Units SubCUTAneous BID    insulin lispro  0-4 Units SubCUTAneous TID WC    insulin lispro  0-4 Units SubCUTAneous Nightly    valsartan  320 mg Oral Daily    And    hydroCHLOROthiazide  25 mg Oral Daily    cefTRIAXone (ROCEPHIN) IV  1,000 mg IntraVENous Q24H      Infusions:    sodium chloride      dextrose      dextrose 5 % and 0.9 % NaCl 60 mL/hr at 22 2332     PRN Meds: sodium chloride flush, 5-40 mL, PRN  sodium chloride, , PRN  ondansetron, 4 mg, Q8H PRN   Or  ondansetron, 4 mg, Q6H PRN  polyethylene glycol, 17 g, Daily PRN  glucose, 4 tablet, PRN  dextrose bolus, 125 mL, PRN Or  dextrose bolus, 250 mL, PRN  glucagon (rDNA), 1 mg, PRN  dextrose, , Continuous PRN        Labs      CBC: No results for input(s): WBC, HGB, PLT in the last 72 hours. BMP:  No results for input(s): NA, K, CL, CO2, BUN, CREATININE, GLUCOSE in the last 72 hours. Hepatic: No results for input(s): AST, ALT, ALB, BILITOT, ALKPHOS in the last 72 hours. Lipids:   Lab Results   Component Value Date/Time    CHOL 84 10/15/2019 05:30 AM    HDL 32 10/15/2019 05:30 AM    TRIG 182 10/15/2019 05:30 AM     Hemoglobin A1C:   Lab Results   Component Value Date/Time    LABA1C 6.5 04/27/2021 10:57 AM     TSH: No results found for: TSH  Troponin:   Lab Results   Component Value Date/Time    TROPONINT <0.010 04/27/2021 04:25 AM    TROPONINT <0.010 04/17/2021 06:56 PM    TROPONINT <0.010 04/17/2021 04:42 AM     Lactic Acid: No results for input(s): LACTA in the last 72 hours. BNP: No results for input(s): PROBNP in the last 72 hours. UA:  Lab Results   Component Value Date/Time    NITRU NEGATIVE 04/27/2021 06:33 AM    COLORU YELLOW 04/27/2021 06:33 AM    WBCUA 2 04/27/2021 06:33 AM    RBCUA 7 04/27/2021 06:33 AM    MUCUS RARE 04/27/2021 06:33 AM    TRICHOMONAS NONE SEEN 04/27/2021 06:33 AM    YEAST MANY 04/27/2021 06:33 AM    BACTERIA NEGATIVE 04/27/2021 06:33 AM    CLARITYU HAZY 04/27/2021 06:33 AM    SPECGRAV 1.019 04/27/2021 06:33 AM    LEUKOCYTESUR NEGATIVE 04/27/2021 06:33 AM    UROBILINOGEN NEGATIVE 04/27/2021 06:33 AM    BILIRUBINUR NEGATIVE 04/27/2021 06:33 AM    BLOODU SMALL 04/27/2021 06:33 AM    KETUA NEGATIVE 04/27/2021 06:33 AM     Urine Cultures: No results found for: LABURIN  Blood Cultures: No results found for: BC  No results found for: BLOODCULT2  Organism: No results found for: ORG    Imaging/Diagnostics Last 24 Hours   No results found. Personally reviewed Lab Studies, Imaging.     Electronically signed by Mayda Marquis MD on 9/23/2022 at 12:25 AM

## 2022-09-23 NOTE — PLAN OF CARE
9/23/22 Spoke with RN @ 01:46 hrs. Patient is currently sleeping. Ultrasound done best with patient awake and able to stay awake.  Exam to be done later in the morning after 0800 hours

## 2022-09-23 NOTE — CONSULTS
[facility] MARY Diane, 1940, 3106/3106-A, 9/23/2022    History  [place]:  There were no encounter diagnoses. Patient  has a past medical history of Acute anterior wall MI ([facility] [address] CAD (coronary artery disease), Cancer ([facility] [address] Cancer ([facility] [address] Carotid artery stenosis, Diabetes mellitus ([facility] [address] H/O cardiovascular stress test, H/O echocardiogram, Hyperlipidemia, Hypertension, and Protein-calorie malnutrition, moderate ([place] [address] Patient  has a past surgical history that includes Coronary artery bypass graft (5/07); Diagnostic Cardiac Cath Lab Procedure (3/30/07); Abdomen surgery; other surgical history (08 11 2015); skin biopsy; fracture surgery (Left, 2001); fracture surgery (Right, 2017); Hysterectomy (1996); Wrist fracture surgery (Left, 2/17/2021); Upper gastrointestinal endoscopy (N/A, 4/18/2021); Colonoscopy (N/A, 4/18/2021); and Pacemaker insertion (N/A, 4/20/2021). Subjective:  Patient states:  \"I can get up. \"    Pain:  denies pain.     Communication with other providers:  Handoff to RN, OT  Restrictions: general precautions, fall risk, telesitter    Home Setup/Prior level of function  Social/Functional History  Lives With: Daughter  Type of Home: House  Home Layout: Two level, Able to Live on Main level with bedroom/bathroom, Performs ADL's on one level  Home Access: Stairs to enter with rails  Entrance Stairs - Number of Steps: 2  Bathroom Toilet: Bedside commode  Home Equipment: Walker, rolling (PRN use of RW, daughter tries to encourage all the time)  ADL Assistance: Independent  Homemaking Assistance: Needs assistance  Homemaking Responsibilities: No  Ambulation Assistance: Needs assistance (daughter supervises)  Transfer Assistance: Needs assistance (daughter supervises)  Active : No  Occupation: Retired    Examination of body systems (includes body structures/functions, activity/participation limitations):  Observation:  Pt supine in bed with daughter present upon arrival and agreeable to therapy  Vision:  WFL  Hearing:  WFL  Cardiopulmonary:  no O2 needs  Cognition: impaired, see OT/SLP note for further evaluation. Musculoskeletal  ROM R/L:  WFL. Strength R/L:  4/5, moderate impairment in function and endurance. Neuro:  WFL      Mobility:  Rolling L/R:  SBA  Supine to sit:  min A with assist at trunk  Sit to supine: SBA with cues for sequencing  Transfers: Pt completed STS to/from EOB min A with cues for sequencing. Pt with one failed attempt prior  Sitting balance:  fair, pt sat EOB ~5 minutes with one LOB to left while donning socks requiring mod A to correct. Standing balance:  fair. Gait: pt ambulated 13' with RW CGA with decreased joellen and maintained walker too far forward. Max cues provided for walker management and pathway negotiation throughout  Education: pt's daughter with many questions regarding care and further therapy. All questions answered    [facility] 6 Clicks Inpatient Mobility:  AM-PAC Inpatient Mobility Raw Score : 16    Safety: patient left supine in bed (pt refused chair) with daughter present, telesitter on, call light within reach, RN notified, gait belt used. Assessment:  Pt is an 81 y.o. female admitted to the hospital for pneumonia of the left lower lobe due to infectious organism. Pt is typically transferring and ambulating with PRN use of RW and supervision. Pt is currently performing bed mobility min A, transferring min A, and ambulating 20' with RW CGA. Pt is presenting with decreased endurance, impaired transfers, impaired gait, decreased strength, impaired balance, and impaired cognition. Pt would benefit from continued acute care PT as well as SNF placement to continue to address impairments. Complexity: moderate    Prognosis: Good, no significant barriers to participation at this time.      Plan: 3-5 times per week/week     Equipment: TBD at next level of care    Goals:  Short Term Goals  Time Frame for Short term goals: 1 week  Short term goal 1: Pt to complete all bed mobility SBA  Short term goal 2: Pt to complete all STS transfers to/from bed, commode, and chair SBA  Short term goal 3: pt to ambulate 48' with LRAD CGA       Treatment plan:  Bed mobility, transfers, balance, gait, TA, TX    Recommendations for NURSING mobility: amb with gait belt and RW    Time:   Time in: 0918  Time out: 0947  Timed treatment minutes: 15  Total time: 29    Electronically signed by:    Kemar Sosa PT  9/23/2022, 12:28 PM

## 2022-09-23 NOTE — CARE COORDINATION
.CM met with pt's daughter/Teagan for d/c planning. Pt is confused. Introduced self and updated white board. Pt lives with daughter/Teagan and requires assistance with ADL's. Daughter provides her transportation. She has a PCP, has insurance, and is able to afford her medication. Pt has walker, w/c, and a BSC at home. Discussed the PT/OT recommendation for SNF and daughter is agreeable. SNF list provided. Her first choice is [place] and her second choice is [facility]. Referral made to [facility] via confidential VM. CM requested that she start the pre-cert if they are able to accept pt.   Will place on CM weekend f/u list.  TE

## 2022-09-23 NOTE — DISCHARGE INSTR - COC
Continuity of Care Form    Patient Name: Trent Manzano   :  1940  MRN:  [id number]    Admit date:  2022  Discharge date:  ***    Code Status Order: Full Code   Advance Directives:     Admitting Physician:  Ligia Simons MD  PCP: Terrence Ngo MD    Discharging Nurse: [facility] Unit/Room#: 3106/3106-A  Discharging Unit Phone Number: ***    Emergency Contact:   Extended Emergency Contact Information  Primary Emergency Contact: Yadiel Pena, [address] Phone: [phone]  Relation: Child    Past Surgical History:  Past Surgical History:   Procedure Laterality Date    ABDOMEN SURGERY      COLONOSCOPY N/A 2021    COLONOSCOPY DIAGNOSTIC performed by Harshal Kang MD at [address] [place] [address]      CABG X 3, L mammary artery to the LAD, saphenous vein graft to RCA.  a saphenous vein graft to Cx marginal artery    DIAGNOSTIC CARDIAC CATH LAB PROCEDURE  3/30/07    PTCA to LAD    FRACTURE SURGERY Left     patella    FRACTURE SURGERY Right     wrist    HYSTERECTOMY (CERVIX STATUS UNKNOWN)      OTHER SURGICAL HISTORY  2015    exp lap, right colon resection    PACEMAKER INSERTION N/A 2021    PACEMAKER INSERTION PERMANENT performed by Ankit Justin MD at [address] [facility] N/A 2021    EGD BIOPSY performed by Harshal Kang MD at [address] [facility] Left 2021    LEFT WRIST OPEN REDUCTION INTERNAL FIXATION performed by Lamberto Cotton MD at [facility] OR       Immunization History:   Immunization History   Administered Date(s) Administered    COVID-19, MODERNA BLUE border, Primary or Immunocompromised, (age 12y+), IM, 100 mcg/0.5mL 2021    COVID-19, PFIZER PURPLE top, DILUTE for use, (age 15 y+), 30mcg/0.3mL 2021    Pneumococcal Polysaccharide (Vejpcolvo27) 2014       Active Problems:  Patient Active Problem List   Diagnosis Code    Diabetes mellitus ([facility] [address] E11.9 CAD (coronary artery disease) I25.10    Hyperlipidemia E78.5    Carotid artery stenosis I65.29    S/P CABG x 3 Z95.1    Ischemia, bowel (HCC) K55.9    Protein-calorie malnutrition, moderate (HCC) E44.0    HTN (hypertension) I10    Hypokalemia E87.6    Anxiety F41.9    Gastroenteritis K52.9    Stroke-like symptoms R29.90    Peripheral polyneuropathy G62.9    Ataxia R27.0    Urinary tract infection without hematuria N39.0    Carcinoid syndrome (HCC) E34.0    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    Closed fracture of left distal radius S52.502A    GI bleed K92.2    Stage 3 chronic kidney disease (HCC) N18.30    Abdominal pain R10.9    Anemia D64.9    Pneumonia of left lower lobe due to infectious organism J18.9    UTI (urinary tract infection) N39.0       Isolation/Infection:   Isolation            No Isolation          Patient Infection Status       Infection Onset Added Last Indicated Last Indicated By Review Planned Expiration Resolved Resolved By    None active    Resolved    C-diff Rule Out 21 C difficile Molecular/PCR (Ordered)   22 Dandre Arango RN    COVID-19 (Rule Out) 02/15/21 02/15/21 02/15/21 Covid-19 Ambulatory (Ordered)   21     COVID-19 (Rule Out) 21 Covid-19 Ambulatory (Ordered)   21 Rule-Out Test Resulted            Nurse Assessment:  Last Vital Signs: BP (!) 169/68   Pulse 62   Temp 97.4 °F (36.3 °C) (Axillary)   Resp 20   Ht 5' 2\" (1.575 m)   Wt 96 lb 1.9 oz (43.6 kg)   SpO2 99%   BMI 17.58 kg/m²     Last documented pain score (0-10 scale): Pain Level: 7  Last Weight:   Wt Readings from Last 1 Encounters:   22 96 lb 1.9 oz (43.6 kg)     Mental Status:  oriented, alert, coherent, logical, thought processes intact, and able to concentrate and follow conversation    IV Access:  - None    Nursing Mobility/ADLs:  Walking   Assisted  Transfer Assisted  Bathing  Assisted  Dressing  Assisted  Toileting  Assisted  Feeding  Independent  Med Admin  Assisted  Med Delivery   whole    Wound Care Documentation and Therapy:  Wound 09/22/22 Pelvis Anterior (Active)   Dressing Status Old drainage noted; Other (Comment) 09/22/22 2145   Dressing/Treatment Open to air 09/23/22 0200   Wound Assessment Pink/red 09/23/22 0200   Drainage Amount Small 09/23/22 0200   Drainage Description Yellow;Purulent 09/23/22 0200   Odor None 09/23/22 0200   Margins Defined edges 09/23/22 0200   Number of days: 0       Wound 09/22/22 Elbow Right;Lateral scabbed skin tear (Active)   Dressing/Treatment Open to air 09/23/22 0200   Wound Assessment Other (Comment) 09/23/22 0200   Drainage Amount None 09/23/22 0200   [place]-wound Assessment Warm; Intact 09/23/22 0200   Margins Defined edges 09/23/22 0200   Number of days: 0        Elimination:  Continence:    Bowel: Yes and No  Bladder: Yes and No  Urinary Catheter: None   Colostomy/Ileostomy/Ileal Conduit: No       Date of Last BM: 9/26/2022    Intake/Output Summary (Last 24 hours) at 9/23/2022 1308  Last data filed at 9/23/2022 0200  Gross per 24 hour   Intake --   Output 300 ml   Net -300 ml     I/O last 3 completed shifts:  In: -   Out: 300 [Urine:300]    Safety Concerns:     History of Falls (last 30 days)    Impairments/Disabilities:      Vision and Hearing    NPatient's personal belongings (please select all that are sent with patient):  Sonam    RN SIGNATURE:  Electronically signed by Aleshia Varma LPN on 8/24/25 at 5:94 PM EDT    CASE MANAGEMENT/SOCIAL WORK SECTION    Inpatient Status Date: ***    Readmission Risk Assessment Score:  Readmission Risk              Risk of Unplanned Readmission:  21           Discharging to Facility/ Agency   Name:   Address:  Phone:  Fax:    Dialysis Facility (if applicable)   Name:  Address:  Dialysis Schedule:  Phone:  Fax:    / signature: {Esignature:[id number]}    PHYSICIAN SECTION    Nutrition Therapy:  Current Nutrition Therapy:   - Oral Diet:  [address] ARNEL Oral YLWL:[id number]}    Routes of Feeding: {CHP DME Other Feedings:573640279}  Liquids: {Slp liquid thickness:33723}  Daily Fluid Restriction: {CHP DME Yes amt example:260812338}  Last Modified Barium Swallow with Video (Video Swallowing Test): {Done Not Done RMSV:125350479}    Treatments at the Time of Hospital Discharge:   Respiratory Treatments: ***  Oxygen Therapy:  {Therapy; copd oxygen:04454}  Ventilator:    {[facility] Vent XJLJ:133809499}    Rehab Therapies: {THERAPEUTIC INTERVENTION:6258407759}  Weight Bearing Status/Restrictions: {[facility] Weight Bearin}  Other Medical Equipment (for information only, NOT a DME order):  {EQUIPMENT:848877915}  Other Treatments: ***  Prognosis: Guarded    Condition at Discharge: Stable    Rehab Potential (if transferring to Rehab): Guarded    Recommended Labs or Other Treatments After Discharge: none    Physician Certification: I certify the above information and transfer of Kenneth Woodard  is necessary for the continuing treatment of the diagnosis listed and that she requires [facility] for greater 30 days.      Update Admission H&P: No change in H&P    PHYSICIAN SIGNATURE:  Electronically signed by Adarsh Marquis MD, MD on 22 at 1:50 PM EDT

## 2022-09-23 NOTE — PROGRESS NOTES
[address] [place], 1940, 3106/3106-A, 9/23/2022    Discharge Recommendation: SNF    History:  [place]:  pneumonia of L lower lobe    Subjective:  Patient states: \"I can get up! \"  Pain: denies  Communication with other providers: coeval with PT Thersia Essex  Restrictions: general precautions, fall risk,   Pt's daughter/caregiver at bedside    Home Setup/Prior level of function:  Social/Functional History  Lives With: Daughter  Type of Home: House  Home Layout: Two level, Able to Live on Main level with bedroom/bathroom, Performs ADL's on one level  Home Access: Stairs to enter with rails  Entrance Stairs - Number of Steps: 2  Bathroom Toilet: Bedside commode  Home Equipment: Walker, rolling (PRN use of RW, daughter tries to encourage all the time)  ADL Assistance: Independent  Homemaking Assistance: Needs assistance  Homemaking Responsibilities: No  Ambulation Assistance: Needs assistance (daughter supervises)  Transfer Assistance: Needs assistance (daughter supervises)  Active : No  Occupation: Retired    Examination:  Observation: Pt was supine in bed upon arrival, agreeable to session. NATHALY, tele, PIV  Vision: WFL  Hearing: WFL  Objective Measures: stable    Body Systems and functions:  ROM: WFL in BUE  Strength: 3+/5 in BUE  Sensation: WFL  Tone: normotonic in BUE  Coordination: movements fluid and coordinated  Activity Tolerance: fair    Activities of Daily Living (ADLs):  Feeding: setup/SBA  Grooming: setup/SBA seated  Toileting: CGA-SBA  UB dressing/bathing: SBA  LB dressing/bathing: CGA-SBA seated    *pt ADL function inferred from gross functional assessment of mobility, balance, posture, safety awareness, activity tolerance (unless otherwise indicated)    Cognitive and Psychosocial Functioning:  Overall cognitive status: A/Ox3, confused throughout session. Poor attention to task, safety awareness, and insight.  Frequent redirection to task, cueing for processing/sequencing tasks and for safety. Affect: pleasantly confused    Functional Mobility:  Bed Mobility: SBA  Sit <> Stand: min A    Ambulation: CGA-min A using FWW      AM-PAC 6 click short form for inpatient daily activity:   How much help from another person does the patient currently need. .. Unable  Dep A Lot  Max A A Lot   Mod A A Little  Min A A Little   CGA  SBA None   Mod I  Indep  Sup   1. Putting on and taking off regular lower body clothing? [] 1    [] 2   [] 2   [] 3   [x] 3   [] 4      2. Bathing (including washing, rinsing, drying)? [] 1   [] 2   [] 2 [] 3 [x] 3 [] 4   3. Toileting, which includes using toilet, bedpan, or urinal? [] 1    [] 2   [] 2   [] 3   [x] 3   [] 4     4. Putting on and taking off regular upper body clothing? [] 1   [] 2   [] 2   [] 3   [x] 3    [] 4      5. Taking care of personal grooming such as brushing teeth? [] 1   [] 2    [] 2 [] 3    [x] 3   [] 4      6. Eating meals? [] 1   [] 2   [] 2   [] 3   [x] 3   [] 4        Raw Score:  18      24/24 = unimpaired  23/24 = 1-20% impaired   20/24-22/24 = 21-40% impaired  15/24-19/24 = 41-59% impaired   10/24-14/24 = 60%-79% impaired  7/24-9/24 = 80%-99% impaired  6/24 = 100% impaired     Treatment:    Therapeutic Activity Training (14 minutes): Therapeutic activity training was instructed today. Cues were given for safety, sequence, UE/LE placement, visual cues, and balance. Activities performed today included pt demo supine to sit EOB with SBA. Pt donning socks with minimal cueing for sequencing with SBA while seated. Pt then standing x1 failed attempt with CGA, second attempt successful with min A. Pt ambulating to door and back with CGA-min A for manipulating walker and cueing. Pt returning to bedside, deferring sitting up in chair. Pt sitting EOB with CGA, returning to supine with SBA.  Pt's daughter in room, had several questions regarding pt's safety, discharge recommendations, therapy POC, overall therapeutic prognosis. Provided edu and emotional support. Education: Role of OT, OT POC, discharge needs, safety, benefits of EOB/OOB activity, AD/DME needs, Home safety  Safety Measures: Gait belt used for safety of pt and therapist, Left in supine in bed, Alarm in place, call light and phone within reach, lines managed    Assessment:  Pt is a 81 yr old female from home with daughter who presents with pneumonia. Prior to admission, pt was ind with ADLs, requiring assistance/supervision for IADLs and mobility with PRN use of walker. Pt currently below baseline. Pt does well functionally, CGA-min A for mobility using FWW, ind-CGA for ADLs, however pt with impaired cognition/safety, impaired strength and endurance. Pt with recent inc in falls at home, pt's daughter feels that she is unable to physically support and assist pt on her own with pt's current condition. Pt would benefit from continued IP OT services during their stay, and discharge to SNF.     Complexity: mod  Prognosis: fair  Barriers: cognition, safety, deconditioning    Plan:  Plan: 3+/week    Treatment to include: Strengthening, ROM, Balance Training, Functional Mobility Training, Endurance Training, Gait Training, Pain Management, Safety Education and Training, Patient+Caregiver Education and Training, Equipment Evaluation Education and Procurement, Positioning, Self Care Training, Home Management Training, Coordination Training, cognitive reorientation, cognitive/perceptual training    Pt Would Benefit from Continued Edu on none  Adaptive Equipment Recommendations: none    Goals:  Time frame for goals: 2 weeks  Pt will complete feeding tasks with ind  Pt will complete grooming tasks with mod I standing at sink  Pt will complete toileting tasks with mod I using standard commode  Pt will complete UB dressing and bathing tasks with ind  Pt will complete LB dressing and bathing tasks with mod I  Pt will complete therapeutic exercise/activity to increase independence in ADL/IADL function  Pt will practice functional transfers and mobility with AD for increased safety and independence    Time:   Time in: 0922  Time out: 0946  Treatment Minutes: 14  Evaluation Minutes: 10  Total time: 24    Electronically signed by:      HALINA Ho Chi  9/23/2022, 2:21 PM

## 2022-09-23 NOTE — PROGRESS NOTES
Call initiated by: Nursing staff:  Adela  Call addressed around: 9/23/2022 7:27 PM      Reason for call: \"Patient extremely aggitated and in trying to get out of bed. Can we try something to help calm him? Redirection is not working. She is slightly combative as well\"      Orders placed: Low-dose Geodon ordered.         Christina Murillo, APRN - CNP

## 2022-09-24 PROBLEM — E44.0 MODERATE MALNUTRITION (HCC): Status: ACTIVE | Noted: 2022-09-24

## 2022-09-24 LAB
ANION GAP SERPL CALCULATED.3IONS-SCNC: 17 MMOL/L (ref 4–16)
BASOPHILS ABSOLUTE: 0.1 K/CU MM
BASOPHILS RELATIVE PERCENT: 0.7 % (ref 0–1)
BUN BLDV-MCNC: 35 MG/DL (ref 6–23)
CALCIUM SERPL-MCNC: 9.3 MG/DL (ref 8.3–10.6)
CHLORIDE BLD-SCNC: 96 MMOL/L (ref 99–110)
CO2: 21 MMOL/L (ref 21–32)
CREAT SERPL-MCNC: 1.3 MG/DL (ref 0.6–1.1)
CULTURE: NORMAL
DIFFERENTIAL TYPE: ABNORMAL
EOSINOPHILS ABSOLUTE: 0.3 K/CU MM
EOSINOPHILS RELATIVE PERCENT: 4 % (ref 0–3)
ESTIMATED AVERAGE GLUCOSE: 134 MG/DL
GFR AFRICAN AMERICAN: 48 ML/MIN/1.73M2
GFR NON-AFRICAN AMERICAN: 39 ML/MIN/1.73M2
GLUCOSE BLD-MCNC: 106 MG/DL (ref 70–99)
GLUCOSE BLD-MCNC: 116 MG/DL (ref 70–99)
GLUCOSE BLD-MCNC: 121 MG/DL (ref 70–99)
GLUCOSE BLD-MCNC: 132 MG/DL (ref 70–99)
GLUCOSE BLD-MCNC: 132 MG/DL (ref 70–99)
HBA1C MFR BLD: 6.3 % (ref 4.2–6.3)
HCT VFR BLD CALC: 36.3 % (ref 37–47)
HEMOGLOBIN: 10.7 GM/DL (ref 12.5–16)
IMMATURE NEUTROPHIL %: 0.2 % (ref 0–0.43)
LEGIONELLA URINARY AG: NEGATIVE
LYMPHOCYTES ABSOLUTE: 0.9 K/CU MM
LYMPHOCYTES RELATIVE PERCENT: 10.5 % (ref 24–44)
Lab: NORMAL
MAGNESIUM: 2 MG/DL (ref 1.8–2.4)
MCH RBC QN AUTO: 24.9 PG (ref 27–31)
MCHC RBC AUTO-ENTMCNC: 29.5 % (ref 32–36)
MCV RBC AUTO: 84.4 FL (ref 78–100)
MONOCYTES ABSOLUTE: 0.6 K/CU MM
MONOCYTES RELATIVE PERCENT: 6.9 % (ref 0–4)
NUCLEATED RBC %: 0 %
PDW BLD-RTO: 14.6 % (ref 11.7–14.9)
PHOSPHORUS: 4.9 MG/DL (ref 2.5–4.9)
PLATELET # BLD: 73 K/CU MM (ref 140–440)
PMV BLD AUTO: 11.2 FL (ref 7.5–11.1)
POTASSIUM SERPL-SCNC: 4.5 MMOL/L (ref 3.5–5.1)
RBC # BLD: 4.3 M/CU MM (ref 4.2–5.4)
SEGMENTED NEUTROPHILS ABSOLUTE COUNT: 6.4 K/CU MM
SEGMENTED NEUTROPHILS RELATIVE PERCENT: 77.7 % (ref 36–66)
SODIUM BLD-SCNC: 134 MMOL/L (ref 135–145)
SPECIMEN: NORMAL
STREP PNEUMONIAE ANTIGEN: NORMAL
TOTAL IMMATURE NEUTOROPHIL: 0.02 K/CU MM
TOTAL NUCLEATED RBC: 0 K/CU MM
WBC # BLD: 8.3 K/CU MM (ref 4–10.5)

## 2022-09-24 PROCEDURE — 80048 BASIC METABOLIC PNL TOTAL CA: CPT

## 2022-09-24 PROCEDURE — 96375 TX/PRO/DX INJ NEW DRUG ADDON: CPT

## 2022-09-24 PROCEDURE — 96372 THER/PROPH/DIAG INJ SC/IM: CPT

## 2022-09-24 PROCEDURE — 97530 THERAPEUTIC ACTIVITIES: CPT

## 2022-09-24 PROCEDURE — 94761 N-INVAS EAR/PLS OXIMETRY MLT: CPT

## 2022-09-24 PROCEDURE — 83735 ASSAY OF MAGNESIUM: CPT

## 2022-09-24 PROCEDURE — 6360000002 HC RX W HCPCS: Performed by: INTERNAL MEDICINE

## 2022-09-24 PROCEDURE — 6370000000 HC RX 637 (ALT 250 FOR IP): Performed by: STUDENT IN AN ORGANIZED HEALTH CARE EDUCATION/TRAINING PROGRAM

## 2022-09-24 PROCEDURE — 96366 THER/PROPH/DIAG IV INF ADDON: CPT

## 2022-09-24 PROCEDURE — 6370000000 HC RX 637 (ALT 250 FOR IP): Performed by: INTERNAL MEDICINE

## 2022-09-24 PROCEDURE — 96376 TX/PRO/DX INJ SAME DRUG ADON: CPT

## 2022-09-24 PROCEDURE — 84100 ASSAY OF PHOSPHORUS: CPT

## 2022-09-24 PROCEDURE — 6360000002 HC RX W HCPCS: Performed by: STUDENT IN AN ORGANIZED HEALTH CARE EDUCATION/TRAINING PROGRAM

## 2022-09-24 PROCEDURE — 97116 GAIT TRAINING THERAPY: CPT

## 2022-09-24 PROCEDURE — 1200000000 HC SEMI PRIVATE

## 2022-09-24 PROCEDURE — 2580000003 HC RX 258: Performed by: STUDENT IN AN ORGANIZED HEALTH CARE EDUCATION/TRAINING PROGRAM

## 2022-09-24 PROCEDURE — 85025 COMPLETE CBC W/AUTO DIFF WBC: CPT

## 2022-09-24 PROCEDURE — 82962 GLUCOSE BLOOD TEST: CPT

## 2022-09-24 RX ORDER — ALPRAZOLAM 0.5 MG/1
1 TABLET ORAL NIGHTLY PRN
Status: DISCONTINUED | OUTPATIENT
Start: 2022-09-24 | End: 2022-09-27 | Stop reason: HOSPADM

## 2022-09-24 RX ORDER — PROMETHAZINE HYDROCHLORIDE 25 MG/ML
12.5 INJECTION, SOLUTION INTRAMUSCULAR; INTRAVENOUS ONCE
Status: COMPLETED | OUTPATIENT
Start: 2022-09-24 | End: 2022-09-24

## 2022-09-24 RX ORDER — QUETIAPINE FUMARATE 25 MG/1
25 TABLET, FILM COATED ORAL NIGHTLY
Status: DISCONTINUED | OUTPATIENT
Start: 2022-09-24 | End: 2022-09-27 | Stop reason: HOSPADM

## 2022-09-24 RX ADMIN — VENLAFAXINE HYDROCHLORIDE 150 MG: 150 CAPSULE, EXTENDED RELEASE ORAL at 09:19

## 2022-09-24 RX ADMIN — BUPROPION HYDROCHLORIDE 150 MG: 150 TABLET, EXTENDED RELEASE ORAL at 22:54

## 2022-09-24 RX ADMIN — MORPHINE SULFATE 1 MG: 2 INJECTION, SOLUTION INTRAMUSCULAR; INTRAVENOUS at 08:45

## 2022-09-24 RX ADMIN — PROMETHAZINE HYDROCHLORIDE 12.5 MG: 25 INJECTION INTRAMUSCULAR; INTRAVENOUS at 13:39

## 2022-09-24 RX ADMIN — ASPIRIN 81 MG: 81 TABLET, COATED ORAL at 09:18

## 2022-09-24 RX ADMIN — CARVEDILOL 12.5 MG: 6.25 TABLET, FILM COATED ORAL at 10:50

## 2022-09-24 RX ADMIN — AMLODIPINE BESYLATE 10 MG: 10 TABLET ORAL at 10:50

## 2022-09-24 RX ADMIN — Medication 1 CAPSULE: at 11:21

## 2022-09-24 RX ADMIN — QUETIAPINE FUMARATE 25 MG: 25 TABLET ORAL at 22:53

## 2022-09-24 RX ADMIN — CARVEDILOL 12.5 MG: 6.25 TABLET, FILM COATED ORAL at 17:01

## 2022-09-24 RX ADMIN — ONDANSETRON 4 MG: 2 INJECTION INTRAMUSCULAR; INTRAVENOUS at 07:50

## 2022-09-24 RX ADMIN — VALSARTAN 320 MG: 160 TABLET, FILM COATED ORAL at 09:18

## 2022-09-24 RX ADMIN — FERROUS SULFATE TAB 325 MG (65 MG ELEMENTAL FE) 325 MG: 325 (65 FE) TAB at 09:18

## 2022-09-24 RX ADMIN — SODIUM CHLORIDE, PRESERVATIVE FREE 10 ML: 5 INJECTION INTRAVENOUS at 22:55

## 2022-09-24 RX ADMIN — CEFTRIAXONE SODIUM 1000 MG: 1 INJECTION, POWDER, FOR SOLUTION INTRAMUSCULAR; INTRAVENOUS at 17:06

## 2022-09-24 RX ADMIN — FAMOTIDINE 20 MG: 20 TABLET ORAL at 09:18

## 2022-09-24 RX ADMIN — HYDROCHLOROTHIAZIDE 25 MG: 25 TABLET ORAL at 09:18

## 2022-09-24 RX ADMIN — BUPROPION HYDROCHLORIDE 150 MG: 150 TABLET, EXTENDED RELEASE ORAL at 09:19

## 2022-09-24 RX ADMIN — HEPARIN SODIUM 5000 UNITS: 5000 INJECTION INTRAVENOUS; SUBCUTANEOUS at 22:54

## 2022-09-24 RX ADMIN — ATORVASTATIN CALCIUM 40 MG: 40 TABLET, FILM COATED ORAL at 09:19

## 2022-09-24 RX ADMIN — HEPARIN SODIUM 5000 UNITS: 5000 INJECTION INTRAVENOUS; SUBCUTANEOUS at 09:19

## 2022-09-24 RX ADMIN — SODIUM CHLORIDE, PRESERVATIVE FREE 10 ML: 5 INJECTION INTRAVENOUS at 09:20

## 2022-09-24 ASSESSMENT — ENCOUNTER SYMPTOMS
VOICE CHANGE: 0
TROUBLE SWALLOWING: 0
BLOOD IN STOOL: 0
ABDOMINAL DISTENTION: 0
CONSTIPATION: 0
SORE THROAT: 0
ABDOMINAL PAIN: 0
NAUSEA: 0
RHINORRHEA: 0
EYE DISCHARGE: 0
COUGH: 0
PHOTOPHOBIA: 0
EYE PAIN: 0
EYE REDNESS: 0
SHORTNESS OF BREATH: 0
SINUS PAIN: 0
EYE ITCHING: 0
BACK PAIN: 0
DIARRHEA: 0
VOMITING: 0
CHEST TIGHTNESS: 0

## 2022-09-24 ASSESSMENT — PAIN SCALES - WONG BAKER
WONGBAKER_NUMERICALRESPONSE: 0

## 2022-09-24 ASSESSMENT — PAIN - FUNCTIONAL ASSESSMENT: PAIN_FUNCTIONAL_ASSESSMENT: ACTIVITIES ARE NOT PREVENTED

## 2022-09-24 ASSESSMENT — PAIN DESCRIPTION - DESCRIPTORS
DESCRIPTORS: NAGGING
DESCRIPTORS: PRESSURE;THROBBING

## 2022-09-24 ASSESSMENT — PAIN SCALES - GENERAL
PAINLEVEL_OUTOF10: 7
PAINLEVEL_OUTOF10: 5
PAINLEVEL_OUTOF10: 4

## 2022-09-24 ASSESSMENT — PAIN DESCRIPTION - LOCATION
LOCATION: LEG;HEAD
LOCATION: LEG;HEAD

## 2022-09-24 NOTE — PROGRESS NOTES
Comprehensive Nutrition Assessment    Type and Reason for Visit:  Initial (low BMI)    Nutrition Recommendations/Plan:   Continue carb controlled diet at this time   Will offer oral nutrition supplements with meals  Assist/supervise meals as needed, encourage intake  Will continue to follow up during stay      Malnutrition Assessment:  Malnutrition Status: Moderate malnutrition (09/24/22 9769)    Context:      chronic illness   Findings of the 6 clinical characteristics of malnutrition:  Energy Intake:  Unable to assess  Weight Loss:  Mild weight loss (specify amount and time period) (progressive weight loss, ~ 13% in past year, underweight)     Body Fat Loss:   (mild/moderate) Buccal region, Orbital   Muscle Mass Loss:  Severe muscle mass loss (moderate/severe) Temples (temporalis), Clavicles (pectoralis & deltoids), Calf (gastrocnemius)  Fluid Accumulation:  No significant fluid accumulation Extremities   Strength:  Not Performed    Nutrition Assessment:    Admit with hx falls, confusion, pneumonia, UTI. Currently on carb controlled, low sodium diet. Noted hx DM. Progressive weight loss in past 1-2 years noted, underweight. Followed by oncology for carcinoid syndrome. Did not want to eat breakfast today. Agreed to drink some water and try oral nutrition supplement with encouragement from nurse. Meeting criteria for moderate malnutrition in chronic illness. Will follow at high nutrition risk at this time. Nutrition Related Findings:    sitting up in chair on visit with nursing in room, has been agitated during stay but more pleasant this morning but does repeat desire to go home. 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  Weight Source: Bed Scale  Current BMI (kg/m2): 17.6  Usual Body Weight: 111 lb 8.8 oz (50.6 kg) (August 2021)  % Weight Change (Calculated): -13.8  Weight Adjustment For: No Adjustment                 BMI Categories: Underweight (BMI less than 18.5)    Estimated Daily Nutrient Needs:  Energy Requirements Based On: Kcal/kg  Weight Used for Energy Requirements: Current  Energy (kcal/day): 9904-0165 (30-35 camron/kg)  Weight Used for Protein Requirements: Current  Protein (g/day): 52-61 (1.2-1.4 g/kg)  Method Used for Fluid Requirements: 1 ml/kcal  Fluid (ml/day): 1500    Nutrition Diagnosis:   Moderate malnutrition, In context of chronic illness related to cognitive or neurological impairment, inadequate protein-energy intake as evidenced by moderate loss of subcutaneous fat, moderate muscle loss, severe muscle loss    Nutrition Interventions:   Food and/or Nutrient Delivery: Continue Current Diet, Start Oral Nutrition Supplement  Nutrition Education/Counseling: No recommendation at this time  Coordination of Nutrition Care: Continue to monitor while inpatient, Feeding Assistance/Environment Change  Plan of Care discussed with: patient/nursing    Goals:     Goals: PO intake 50% or greater, by next RD assessment       Nutrition Monitoring and Evaluation:   Behavioral-Environmental Outcomes: None Identified  Food/Nutrient Intake Outcomes: Food and Nutrient Intake, Supplement Intake  Physical Signs/Symptoms Outcomes: Biochemical Data, Meal Time Behavior, Skin, Weight    Discharge Planning:    Continue Oral Nutrition Supplement     Chepe Abreu RD, LD  Contact: [phone]

## 2022-09-24 NOTE — PROGRESS NOTES
Physical Therapy    Physical Therapy Treatment Note  Name: Sherwin Prescott MRN: [id number] :   1940   Date:  2022   Admission Date: 2022 Room:  [address] [place] [address]   Restrictions/Precautions:          fall risk, gen prec, telesitter  Communication with other providers:  nurse states pt ok to treat  Subjective:  Patient states:  agreeable to PT, states she is tired  Pain:   Location, Type, Intensity (0/10 to 10/10):  pain in knees but does not rate numerically      Objective:    Observation:  semi fowlers in bed upon entry  Treatment, including education/measures:  Transfers   Rolling: SBA  Supine to sit :SBA  Scooting :SBA (Darci for repositioning at end of session but mostly due to pt not feeling well and began vomiting)  Sit to stand :Darci  Stand to sit :Darci  SPT:CGA-Darci  Vc's and Darci  required to ensure safe stand>sit descent. Gait:  Pt amb with FWW for ~12', then ~30' CGA assist  Pt needed VC's for safety and sequencing. Shuffled gait pattern /c reduced step length/ht. At end of session, pt sat on EOB and began vomiting. Pt nurse present and following during gait /c chair follow. Pt left in care of nurse. Safety  Patient left safely in the bed, with call light/phone in reach with alarm applied. Gait belt and mask were used for transfers and gait.       Assessment / Impression:    Pt increased gait tolerance on this date  Patient's tolerance of treatment:  good   Adverse Reaction: na  Significant change in status and impact:  na  Barriers to improvement:  weakness  Plan for Next Session:    Cont POC and focus on increasing gait tolerance  Time in:  1130  Time out:  1153  Timed treatment minutes:  23  Total treatment time:  23    Previously filed items:  Social/Functional History  Lives With: Daughter  Type of Home: House  Home Layout: Two level, Able to Live on Main level with bedroom/bathroom, Performs ADL's on one level  Home Access: Stairs to enter with rails  Entrance Stairs - Number of Steps: 2  Bathroom Toilet: Bedside commode  Home Equipment: Walker, rolling (PRN use of RW, daughter tries to encourage all the time)  ADL Assistance: Independent  Homemaking Assistance: Needs assistance  Homemaking Responsibilities: No  Ambulation Assistance: Needs assistance (daughter supervises)  Transfer Assistance: Needs assistance (daughter supervises)  Active : No  Occupation: Retired        Short Term Goals  Time Frame for Gerardine Matar term goals: 1 week  Short term goal 1: Pt to complete all bed mobility SBA  Short term goal 2: Pt to complete all STS transfers to/from bed, commode, and chair SBA  Short term goal 3: pt to ambulate 48' with LRAD CGA    Electronically signed by:    Tai Palacios PTA  9/24/2022, 12:04 PM

## 2022-09-24 NOTE — PROGRESS NOTES
In-Patient Progress Note    Patient:  Afshin Mark 81 y.o. female MRN: [id number]     Date of Service: 9/24/2022    Hospital Day: 3      Chief complaint: had no chief complaint listed for this encounter. Assessment and [address] Faria, a 81 y.o. female, with a history of CKD, DM, Anemia, was admitted on 9/22/2022 with complaints of confusion and frequent falls. Patient initially presented to [address] [place] [address] with complaints of confusion and frequent falls. CT lumbar spine, thoracic spine, and cervical spine, and CT head without contrast were negative for any acute fractures/deformities; she did have stable chronic wedge compression deformity of L1. Urinalysis shows pyuria with urine white blood cells 10-20 per high-power field, urine nitrite negative. KENZIE with creatinine of 1.7, non-anion gap metabolic acidosis with bicarb of 20. No significant leukocytosis. Patient was transferred to [facility] for further care and management. Assessment  UTI, pyuria (10-20 wbc/HPF) on urinalysis, negative nitrite  -Continue ceftriaxone 1 g daily  -Follow final urine culture, blood culture    KENZIE on CKD III, likely volume dependent prerenal KENZIE.  BUN/creatinine 53/1.7 in [facility].  -Strict input output  -Monitor renal function    Acute metabolic encephalopathy likely secondary to above  -Check TSH, B12, folate, cortisol  -Continue to monitor    Non-insulin-dependent diabetes mellitus  -Low-dose sliding scale  -POCT glucose AC/at bedtime  -Hypoglycemia treatment order    Essential hypertension  -Continue home valsartan 320 Mg daily, hydrochlorothiazide 25 mg daily, amlodipine 10 mg daily, carvedilol 12.5 Mg twice daily    Chronic anemia and thrombocytopenia, hemoglobin 9.3 and platelet 80 at presentation  -Continue to monitor daily CBC    Anxiety/depression  -Continue Effexor  Mg daily          # Peptic ulcer prophylaxis: Famotidine 20 mg daily  # DVT Prophylaxis: Heparin  #CODE STATUS: Full      Current living situation: With daughter  Expected Disposition: SNF  Estimated discharge date: 2 days      Review of System     Review of Systems   Constitutional:  Negative for activity change, appetite change, chills, fatigue and fever. HENT:  Negative for congestion, ear discharge, ear pain, hearing loss, nosebleeds, postnasal drip, rhinorrhea, sinus pain, sore throat, trouble swallowing and voice change. Eyes:  Negative for photophobia, pain, discharge, redness and itching. Respiratory:  Negative for cough, chest tightness and shortness of breath. Cardiovascular:  Negative for chest pain, palpitations and leg swelling. Gastrointestinal:  Negative for abdominal distention, abdominal pain, blood in stool, constipation, diarrhea, nausea and vomiting. Endocrine: Negative for cold intolerance, heat intolerance, polydipsia, polyphagia and polyuria. Genitourinary:  Negative for difficulty urinating, dysuria, flank pain, frequency, hematuria and urgency. Musculoskeletal:  Negative for arthralgias, back pain, gait problem, joint swelling and myalgias. Skin:  Negative for pallor, rash and wound. Allergic/Immunologic: Negative for environmental allergies and food allergies. Neurological:  Negative for dizziness, tremors, seizures, syncope, speech difficulty, weakness, light-headedness, numbness and headaches. Hematological:  Negative for adenopathy. Does not bruise/bleed easily. Psychiatric/Behavioral:  Negative for agitation, confusion, decreased concentration, hallucinations, self-injury, sleep disturbance and suicidal ideas. The patient is not nervous/anxious and is not hyperactive. I have reviewed all pertinent PMHx, PSHx, FamHx, SocialHx, medications, and allergies and updated history as appropriate.     Physical Exam   VITAL SIGNS:  BP (!) 153/5   Pulse 71   Temp 98.2 °F (36.8 °C)   Resp 14   Ht 5' 2\" (1.575 m)   Wt 96 lb 1.9 oz (43.6 kg)   SpO2 100%   BMI 17.58 kg/m²   Tmax over 24 hours:  Temp (24hrs), Av.2 °F (36.8 °C), Min:98.2 °F (36.8 °C), Max:98.2 °F (36.8 °C)      Patient Vitals for the past 6 hrs:   BP Temp Pulse Resp SpO2   22 1701 (!) 153/5 -- 71 -- --   22 1616 (!) 160/69 98.2 °F (36.8 °C) 73 14 100 %           Intake/Output Summary (Last 24 hours) at 2022 1743  Last data filed at 2022 2145  Gross per 24 hour   Intake 120 ml   Output 200 ml   Net -80 ml       Wt Readings from Last 2 Encounters:   22 96 lb 1.9 oz (43.6 kg)   22 98 lb 12.8 oz (44.8 kg)     Body mass index is 17.58 kg/m². Physical Exam  Constitutional:       General: She is not in acute distress. Appearance: She is well-developed. HENT:      Head: Normocephalic and atraumatic. Right Ear: External ear normal.      Left Ear: External ear normal.      Nose: Nose normal.   Eyes:      General: No scleral icterus. Right eye: No discharge. Left eye: No discharge. Conjunctiva/sclera: Conjunctivae normal.      Pupils: Pupils are equal, round, and reactive to light. Neck:      Thyroid: No thyromegaly. Vascular: No JVD. Trachea: No tracheal deviation. Cardiovascular:      Rate and Rhythm: Normal rate and regular rhythm. Heart sounds: Normal heart sounds. No murmur heard. No friction rub. No gallop. Pulmonary:      Effort: Pulmonary effort is normal. No respiratory distress. Breath sounds: Normal breath sounds. No stridor. No wheezing or rales. Chest:      Chest wall: No tenderness. Abdominal:      General: Bowel sounds are normal. There is no distension. Palpations: Abdomen is soft. There is no mass. Tenderness: There is no abdominal tenderness. There is no guarding or rebound. Hernia: No hernia is present. Genitourinary:     Vagina: Normal. No vaginal discharge. Rectum: Guaiac result negative. Musculoskeletal:         General: No tenderness or deformity.  Normal range of motion. Cervical back: Normal range of motion and neck supple. Lymphadenopathy:      Cervical: No cervical adenopathy. Skin:     General: Skin is warm and dry. Capillary Refill: Capillary refill takes less than 2 seconds. Coloration: Skin is not pale. Findings: No erythema or rash. Neurological:      Mental Status: She is alert and oriented to person, place, and time. Cranial Nerves: No cranial nerve deficit. Motor: No abnormal muscle tone. Coordination: Coordination normal.      Deep Tendon Reflexes: Reflexes normal.   Psychiatric:         Behavior: Behavior normal.         Thought Content: Thought content normal.         Judgment: Judgment normal.         Current Medications      QUEtiapine  25 mg Oral Nightly    ziprasidone  10 mg IntraMUSCular Once    sodium chloride flush  5-40 mL IntraVENous 2 times per day    amLODIPine  10 mg Oral Daily    aspirin  81 mg Oral Daily    atorvastatin  40 mg Oral Daily    buPROPion  150 mg Oral BID    carvedilol  12.5 mg Oral BID WC    famotidine  20 mg Oral Daily    ferrous sulfate  325 mg Oral Daily with breakfast    b complex vitamins  1 capsule Oral Daily    venlafaxine  150 mg Oral Daily    heparin (porcine)  5,000 Units SubCUTAneous BID    insulin lispro  0-4 Units SubCUTAneous TID WC    insulin lispro  0-4 Units SubCUTAneous Nightly    valsartan  320 mg Oral Daily    And    hydroCHLOROthiazide  25 mg Oral Daily    cefTRIAXone (ROCEPHIN) IV  1,000 mg IntraVENous Q24H         Labs and Imaging Studies   Laboratory findings:  XR CHEST PORTABLE    Result Date: 9/23/2022  EXAMINATION: ONE XRAY VIEW OF THE CHEST 9/23/2022 9:32 am COMPARISON: 09/22/2022 HISTORY: ORDERING SYSTEM PROVIDED HISTORY: ?pulmonary edema vs infiltrates TECHNOLOGIST PROVIDED HISTORY: Reason for exam:->?pulmonary edema vs infiltrates Reason for Exam: pulmonary edema vs infiltrates FINDINGS: Pacer device is stable with leads inspected positions.  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 Cardiomediastinal silhouette and bony thorax are unchanged. Lungs are clear without focal consolidation or pulmonary edema. No evidence of pleural effusion or pneumothorax. Stable exam without acute focal process. No evidence of pulmonary edema.        Recent Results (from the past 24 hour(s))   POCT Glucose    Collection Time: 09/23/22  7:51 PM   Result Value Ref Range    POC Glucose 120 (H) 70 - 99 MG/DL   Basic Metabolic Panel    Collection Time: 09/23/22 10:39 PM   Result Value Ref Range    Sodium 133 (L) 135 - 145 MMOL/L    Potassium 3.6 3.5 - 5.1 MMOL/L    Chloride 95 (L) 99 - 110 mMol/L    CO2 23 21 - 32 MMOL/L    Anion Gap 15 4 - 16    BUN 37 (H) 6 - 23 MG/DL    Creatinine 1.2 (H) 0.6 - 1.1 MG/DL    Glucose 150 (H) 70 - 99 MG/DL    Calcium 9.2 8.3 - 10.6 MG/DL    GFR Non- 43 (L) >60 mL/min/1.73m2    GFR  52 (L) >60 mL/min/1.73m2   Magnesium    Collection Time: 09/23/22 10:39 PM   Result Value Ref Range    Magnesium 2.0 1.8 - 2.4 mg/dl   Phosphorus    Collection Time: 09/23/22 10:39 PM   Result Value Ref Range    Phosphorus 3.8 2.5 - 4.9 MG/DL   CBC with Auto Differential    Collection Time: 09/23/22 10:39 PM   Result Value Ref Range    WBC 6.1 4.0 - 10.5 K/CU MM    RBC 3.94 (L) 4.2 - 5.4 M/CU MM    Hemoglobin 9.9 (L) 12.5 - 16.0 GM/DL    Hematocrit 32.0 (L) 37 - 47 %    MCV 81.2 78 - 100 FL    MCH 25.1 (L) 27 - 31 PG    MCHC 30.9 (L) 32.0 - 36.0 %    RDW 14.4 11.7 - 14.9 %    Platelets 63 (L) 638 - 440 K/CU MM    MPV 11.1 7.5 - 11.1 FL    Differential Type AUTOMATED DIFFERENTIAL     Segs Relative 72.8 (H) 36 - 66 %    Lymphocytes % 13.9 (L) 24 - 44 %    Monocytes % 6.9 (H) 0 - 4 %    Eosinophils % 5.4 (H) 0 - 3 %    Basophils % 0.5 0 - 1 %    Segs Absolute 4.5 K/CU MM    Lymphocytes Absolute 0.9 K/CU MM    Monocytes Absolute 0.4 K/CU MM    Eosinophils Absolute 0.3 K/CU MM    Basophils Absolute 0.0 K/CU MM Nucleated RBC % 0.0 %    Total Nucleated RBC 0.0 K/CU MM    Total Immature Neutrophil 0.03 K/CU MM    Immature Neutrophil % 0.5 (H) 0 - 0.43 %   Hemoglobin A1C    Collection Time: 09/23/22 10:39 PM   Result Value Ref Range    Hemoglobin A1C 6.3 4.2 - 6.3 %    eAG 134 mg/dL   POCT Glucose    Collection Time: 09/24/22  8:14 AM   Result Value Ref Range    POC Glucose 121 (H) 70 - 99 MG/DL   Basic Metabolic Panel    Collection Time: 09/24/22  9:11 AM   Result Value Ref Range    Sodium 134 (L) 135 - 145 MMOL/L    Potassium 4.5 3.5 - 5.1 MMOL/L    Chloride 96 (L) 99 - 110 mMol/L    CO2 21 21 - 32 MMOL/L    Anion Gap 17 (H) 4 - 16    BUN 35 (H) 6 - 23 MG/DL    Creatinine 1.3 (H) 0.6 - 1.1 MG/DL    Glucose 132 (H) 70 - 99 MG/DL    Calcium 9.3 8.3 - 10.6 MG/DL    GFR Non- 39 (L) >60 mL/min/1.73m2    GFR  48 (L) >60 mL/min/1.73m2   Magnesium    Collection Time: 09/24/22  9:11 AM   Result Value Ref Range    Magnesium 2.0 1.8 - 2.4 mg/dl   Phosphorus    Collection Time: 09/24/22  9:11 AM   Result Value Ref Range    Phosphorus 4.9 2.5 - 4.9 MG/DL   CBC with Auto Differential    Collection Time: 09/24/22  9:11 AM   Result Value Ref Range    WBC 8.3 4.0 - 10.5 K/CU MM    RBC 4.30 4.2 - 5.4 M/CU MM    Hemoglobin 10.7 (L) 12.5 - 16.0 GM/DL    Hematocrit 36.3 (L) 37 - 47 %    MCV 84.4 78 - 100 FL    MCH 24.9 (L) 27 - 31 PG    MCHC 29.5 (L) 32.0 - 36.0 %    RDW 14.6 11.7 - 14.9 %    Platelets 73 (L) 074 - 440 K/CU MM    MPV 11.2 (H) 7.5 - 11.1 FL    Differential Type AUTOMATED DIFFERENTIAL     Segs Relative 77.7 (H) 36 - 66 %    Lymphocytes % 10.5 (L) 24 - 44 %    Monocytes % 6.9 (H) 0 - 4 %    Eosinophils % 4.0 (H) 0 - 3 %    Basophils % 0.7 0 - 1 %    Segs Absolute 6.4 K/CU MM    Lymphocytes Absolute 0.9 K/CU MM    Monocytes Absolute 0.6 K/CU MM    Eosinophils Absolute 0.3 K/CU MM    Basophils Absolute 0.1 K/CU MM    Nucleated RBC % 0.0 %    Total Nucleated RBC 0.0 K/CU MM    Total Immature Neutrophil 0.02 K/CU MM    Immature Neutrophil % 0.2 0 - 0.43 %   POCT Glucose    Collection Time: 09/24/22  1:22 PM   Result Value Ref Range    POC Glucose 132 (H) 70 - 99 MG/DL   POCT Glucose    Collection Time: 09/24/22  5:07 PM   Result Value Ref Range    POC Glucose 106 (H) 70 - 99 MG/DL           Electronically signed by Lacy Cervantes MD on 9/24/2022 at 5:43 PM

## 2022-09-24 NOTE — CARE COORDINATION
Call to [facility]. LSW left a voice mail to see if they received the referral. LSW left message asking for a return call.

## 2022-09-25 LAB
ANION GAP SERPL CALCULATED.3IONS-SCNC: 19 MMOL/L (ref 4–16)
BASOPHILS ABSOLUTE: 0.1 K/CU MM
BASOPHILS RELATIVE PERCENT: 0.7 % (ref 0–1)
BUN BLDV-MCNC: 40 MG/DL (ref 6–23)
CALCIUM SERPL-MCNC: 8.9 MG/DL (ref 8.3–10.6)
CHLORIDE BLD-SCNC: 95 MMOL/L (ref 99–110)
CO2: 22 MMOL/L (ref 21–32)
CREAT SERPL-MCNC: 1.6 MG/DL (ref 0.6–1.1)
CULTURE: NORMAL
DIFFERENTIAL TYPE: ABNORMAL
EOSINOPHILS ABSOLUTE: 0.3 K/CU MM
EOSINOPHILS RELATIVE PERCENT: 3.6 % (ref 0–3)
GFR AFRICAN AMERICAN: 37 ML/MIN/1.73M2
GFR NON-AFRICAN AMERICAN: 31 ML/MIN/1.73M2
GLUCOSE BLD-MCNC: 111 MG/DL (ref 70–99)
GLUCOSE BLD-MCNC: 113 MG/DL (ref 70–99)
GLUCOSE BLD-MCNC: 141 MG/DL (ref 70–99)
GLUCOSE BLD-MCNC: 78 MG/DL (ref 70–99)
GLUCOSE BLD-MCNC: 83 MG/DL (ref 70–99)
HCT VFR BLD CALC: 32.8 % (ref 37–47)
HEMOGLOBIN: 9.8 GM/DL (ref 12.5–16)
IMMATURE NEUTROPHIL %: 0.3 % (ref 0–0.43)
LYMPHOCYTES ABSOLUTE: 1.6 K/CU MM
LYMPHOCYTES RELATIVE PERCENT: 17.8 % (ref 24–44)
Lab: NORMAL
MAGNESIUM: 2.1 MG/DL (ref 1.8–2.4)
MCH RBC QN AUTO: 24.9 PG (ref 27–31)
MCHC RBC AUTO-ENTMCNC: 29.9 % (ref 32–36)
MCV RBC AUTO: 83.5 FL (ref 78–100)
MONOCYTES ABSOLUTE: 0.9 K/CU MM
MONOCYTES RELATIVE PERCENT: 9.5 % (ref 0–4)
NUCLEATED RBC %: 0 %
PDW BLD-RTO: 14.6 % (ref 11.7–14.9)
PHOSPHORUS: 5.9 MG/DL (ref 2.5–4.9)
PLATELET # BLD: 74 K/CU MM (ref 140–440)
PMV BLD AUTO: 12.4 FL (ref 7.5–11.1)
POTASSIUM SERPL-SCNC: 4 MMOL/L (ref 3.5–5.1)
RBC # BLD: 3.93 M/CU MM (ref 4.2–5.4)
SEGMENTED NEUTROPHILS ABSOLUTE COUNT: 6.1 K/CU MM
SEGMENTED NEUTROPHILS RELATIVE PERCENT: 68.1 % (ref 36–66)
SODIUM BLD-SCNC: 136 MMOL/L (ref 135–145)
SPECIMEN: NORMAL
TOTAL IMMATURE NEUTOROPHIL: 0.03 K/CU MM
TOTAL NUCLEATED RBC: 0 K/CU MM
WBC # BLD: 9 K/CU MM (ref 4–10.5)

## 2022-09-25 PROCEDURE — 85025 COMPLETE CBC W/AUTO DIFF WBC: CPT

## 2022-09-25 PROCEDURE — 2580000003 HC RX 258: Performed by: INTERNAL MEDICINE

## 2022-09-25 PROCEDURE — 84100 ASSAY OF PHOSPHORUS: CPT

## 2022-09-25 PROCEDURE — 6370000000 HC RX 637 (ALT 250 FOR IP): Performed by: STUDENT IN AN ORGANIZED HEALTH CARE EDUCATION/TRAINING PROGRAM

## 2022-09-25 PROCEDURE — 76937 US GUIDE VASCULAR ACCESS: CPT

## 2022-09-25 PROCEDURE — 80048 BASIC METABOLIC PNL TOTAL CA: CPT

## 2022-09-25 PROCEDURE — 96376 TX/PRO/DX INJ SAME DRUG ADON: CPT

## 2022-09-25 PROCEDURE — 82962 GLUCOSE BLOOD TEST: CPT

## 2022-09-25 PROCEDURE — 83735 ASSAY OF MAGNESIUM: CPT

## 2022-09-25 PROCEDURE — 6360000002 HC RX W HCPCS: Performed by: INTERNAL MEDICINE

## 2022-09-25 PROCEDURE — 96366 THER/PROPH/DIAG IV INF ADDON: CPT

## 2022-09-25 PROCEDURE — 97530 THERAPEUTIC ACTIVITIES: CPT

## 2022-09-25 PROCEDURE — 6360000002 HC RX W HCPCS: Performed by: STUDENT IN AN ORGANIZED HEALTH CARE EDUCATION/TRAINING PROGRAM

## 2022-09-25 PROCEDURE — 94761 N-INVAS EAR/PLS OXIMETRY MLT: CPT

## 2022-09-25 PROCEDURE — 96372 THER/PROPH/DIAG INJ SC/IM: CPT

## 2022-09-25 PROCEDURE — 2580000003 HC RX 258: Performed by: STUDENT IN AN ORGANIZED HEALTH CARE EDUCATION/TRAINING PROGRAM

## 2022-09-25 PROCEDURE — 6370000000 HC RX 637 (ALT 250 FOR IP): Performed by: INTERNAL MEDICINE

## 2022-09-25 PROCEDURE — 1200000000 HC SEMI PRIVATE

## 2022-09-25 PROCEDURE — 36415 COLL VENOUS BLD VENIPUNCTURE: CPT

## 2022-09-25 RX ORDER — CARVEDILOL 25 MG/1
25 TABLET ORAL 2 TIMES DAILY WITH MEALS
Status: DISCONTINUED | OUTPATIENT
Start: 2022-09-25 | End: 2022-09-27 | Stop reason: HOSPADM

## 2022-09-25 RX ORDER — SODIUM CHLORIDE 9 MG/ML
INJECTION, SOLUTION INTRAVENOUS CONTINUOUS
Status: DISPENSED | OUTPATIENT
Start: 2022-09-25 | End: 2022-09-25

## 2022-09-25 RX ADMIN — SODIUM CHLORIDE: 9 INJECTION, SOLUTION INTRAVENOUS at 12:36

## 2022-09-25 RX ADMIN — FAMOTIDINE 20 MG: 20 TABLET ORAL at 09:32

## 2022-09-25 RX ADMIN — HEPARIN SODIUM 5000 UNITS: 5000 INJECTION INTRAVENOUS; SUBCUTANEOUS at 09:31

## 2022-09-25 RX ADMIN — MORPHINE SULFATE 1 MG: 2 INJECTION, SOLUTION INTRAMUSCULAR; INTRAVENOUS at 16:04

## 2022-09-25 RX ADMIN — QUETIAPINE FUMARATE 25 MG: 25 TABLET ORAL at 21:43

## 2022-09-25 RX ADMIN — VENLAFAXINE HYDROCHLORIDE 150 MG: 150 CAPSULE, EXTENDED RELEASE ORAL at 09:31

## 2022-09-25 RX ADMIN — HEPARIN SODIUM 5000 UNITS: 5000 INJECTION INTRAVENOUS; SUBCUTANEOUS at 21:40

## 2022-09-25 RX ADMIN — Medication 1 CAPSULE: at 09:31

## 2022-09-25 RX ADMIN — CARVEDILOL 12.5 MG: 6.25 TABLET, FILM COATED ORAL at 09:43

## 2022-09-25 RX ADMIN — CARVEDILOL 25 MG: 25 TABLET, FILM COATED ORAL at 16:32

## 2022-09-25 RX ADMIN — SODIUM CHLORIDE, PRESERVATIVE FREE 10 ML: 5 INJECTION INTRAVENOUS at 21:43

## 2022-09-25 RX ADMIN — BUPROPION HYDROCHLORIDE 150 MG: 150 TABLET, EXTENDED RELEASE ORAL at 09:33

## 2022-09-25 RX ADMIN — AMLODIPINE BESYLATE 10 MG: 10 TABLET ORAL at 09:32

## 2022-09-25 RX ADMIN — ASPIRIN 81 MG: 81 TABLET, COATED ORAL at 09:32

## 2022-09-25 RX ADMIN — CEFTRIAXONE SODIUM 1000 MG: 1 INJECTION, POWDER, FOR SOLUTION INTRAMUSCULAR; INTRAVENOUS at 16:38

## 2022-09-25 RX ADMIN — ATORVASTATIN CALCIUM 40 MG: 40 TABLET, FILM COATED ORAL at 09:32

## 2022-09-25 RX ADMIN — BUPROPION HYDROCHLORIDE 150 MG: 150 TABLET, EXTENDED RELEASE ORAL at 21:43

## 2022-09-25 RX ADMIN — FERROUS SULFATE TAB 325 MG (65 MG ELEMENTAL FE) 325 MG: 325 (65 FE) TAB at 09:32

## 2022-09-25 ASSESSMENT — ENCOUNTER SYMPTOMS
EYE PAIN: 0
SINUS PAIN: 0
COUGH: 0
ABDOMINAL PAIN: 0
BLOOD IN STOOL: 0
EYE DISCHARGE: 0
TROUBLE SWALLOWING: 0
CHEST TIGHTNESS: 0
DIARRHEA: 0
NAUSEA: 0
PHOTOPHOBIA: 0
SORE THROAT: 0
EYE REDNESS: 0
VOICE CHANGE: 0
EYE ITCHING: 0
VOMITING: 0
SHORTNESS OF BREATH: 0
RHINORRHEA: 0
ABDOMINAL DISTENTION: 0
CONSTIPATION: 0
BACK PAIN: 0

## 2022-09-25 ASSESSMENT — PAIN DESCRIPTION - PAIN TYPE
TYPE: ACUTE PAIN

## 2022-09-25 ASSESSMENT — PAIN SCALES - GENERAL
PAINLEVEL_OUTOF10: 10
PAINLEVEL_OUTOF10: 0
PAINLEVEL_OUTOF10: 4
PAINLEVEL_OUTOF10: 0
PAINLEVEL_OUTOF10: 10

## 2022-09-25 ASSESSMENT — PAIN SCALES - WONG BAKER
WONGBAKER_NUMERICALRESPONSE: 10
WONGBAKER_NUMERICALRESPONSE: 0
WONGBAKER_NUMERICALRESPONSE: 10
WONGBAKER_NUMERICALRESPONSE: 0

## 2022-09-25 ASSESSMENT — PAIN DESCRIPTION - ORIENTATION
ORIENTATION: MID;UPPER

## 2022-09-25 ASSESSMENT — PAIN - FUNCTIONAL ASSESSMENT
PAIN_FUNCTIONAL_ASSESSMENT: PREVENTS OR INTERFERES SOME ACTIVE ACTIVITIES AND ADLS

## 2022-09-25 ASSESSMENT — PAIN DESCRIPTION - LOCATION
LOCATION: BACK
LOCATION: BACK
LOCATION: ABDOMEN

## 2022-09-25 ASSESSMENT — PAIN DESCRIPTION - DESCRIPTORS
DESCRIPTORS: ACHING

## 2022-09-25 ASSESSMENT — PAIN DESCRIPTION - FREQUENCY
FREQUENCY: INTERMITTENT
FREQUENCY: INTERMITTENT

## 2022-09-25 NOTE — PROGRESS NOTES
Occupational Therapy    Occupational Therapy Treatment Note    Name: Anibal Forde MRN: [id number] :   1940   Date:  2022   Admission Date: 2022 Room:  [address] [place], [place] [id number]-     Restrictions/Precautions:          fall risk, general precautions, telesitter    Communication with other providers: handoff to RN    Subjective:  Patient states:  \"I woke up this morning and my bed was full of feathers. It was the strangest thing\"  Pain:   Location, Type, Intensity (0/10 to 10/10):  denies    Objective:    Observation: Supine in bed, agreeable   Objective Measures:  Vitals WNL on room air    Treatment, including education:    Therapeutic Activity Training:   Therapeutic activity training was instructed today. Cues were given for safety, sequence, UE/LE placement, awareness, and balance. Activities performed today included bed mobility training, sup-sit, sit-stand. Supine to sitting EOB w/ SBA. Reported mild nausea with activity. STS from EOB w/ SBA. Reported significantly increased nausea upon standing, stating \"I'm going to throw up. I can't do this. \"   Stand to sit w/ SBA. Sit to supine w/ SBA. Left supine w/ all needs met, call light within reach. Bed alarm on. Nurse notified of nausea limiting activity.        Assessment / Impression:    Adverse Reaction: nausea   Significant change in status and impact:  none  Barriers to improvement: cognition, safety, activity tolerance      Plan for Next Session:    Cont w/ OT POC and functional goals, address safety/independence with ADLs and transfers/mobility    Time in:  814  Time out:  828  Timed treatment minutes:  14  Total treatment time:  14      Electronically signed by:    Bing Harrison OT,   2022, 8:21 AM

## 2022-09-25 NOTE — CONSULTS
IV Consult complete. Nexiva 20g 1.75\" inserted in left FA x 1 attempt with ultrasound guidance. Brisk blood return. Flushes easy.

## 2022-09-25 NOTE — PROGRESS NOTES
In-Patient Progress Note    Patient:  Terry Bobby 81 y.o. female MRN: [id number]     Date of Service: 9/25/2022    Hospital Day: 4      Chief complaint: had no chief complaint listed for this encounter. Assessment and [address], a 81 y.o. female, with a history of CKD, DM, Anemia, was admitted on 9/22/2022 with complaints of confusion and frequent falls. Patient initially presented to [address] [place] [address] with complaints of confusion and frequent falls. CT lumbar spine, thoracic spine, and cervical spine, and CT head without contrast were negative for any acute fractures/deformities; she did have stable chronic wedge compression deformity of L1. Urinalysis shows pyuria with urine white blood cells 10-20 per high-power field, urine nitrite negative. KENZIE with creatinine of 1.7, non-anion gap metabolic acidosis with bicarb of 20. No significant leukocytosis. Patient was transferred to [facility] for further care and management. Assessment  UTI, pyuria (10-20 wbc/HPF) on urinalysis, negative nitrite  -Continue ceftriaxone 1 g daily for 3 days - last day today 9/25  -Blood culture negative, urine culture negative    KENZIE on CKD III, likely volume dependent prerenal KENZIE. BUN/creatinine 53/1.7 in [facility] ED. Likely complicated by diuresis and ARB.   -Strict input output  -Monitor renal function  -hold valsartan/HCTZ  -NaCL infusion at 50ml/hr for next 10 hours today    Acute metabolic encephalopathy likely secondary to above, resolved to baseline mentation  -TSH, B12, folate, cortisol - all wnl  -Continue to monitor    Non-insulin-dependent diabetes mellitus  -Low-dose sliding scale  -POCT glucose AC/at bedtime  -Hypoglycemia treatment order    Essential hypertension  -was continued on home valsartan 320 Mg daily, hydrochlorothiazide 25 mg daily, amlodipine 10 mg daily, carvedilol 12.5 Mg twice daily  -hold HCTZ/valsartan in view of worsening Cr again  -increase carvedilol to 25 mg - monitor HR  -will add hydralazine if needed    Chronic anemia and thrombocytopenia, hemoglobin 9.3 and platelet 80 at presentation  -Continue to monitor daily CBC    Anxiety/depression  -Continue Effexor  Mg daily          # Peptic ulcer prophylaxis: Famotidine 20 mg daily  # DVT Prophylaxis: Heparin  #CODE STATUS: Full      Current living situation: With daughter  Expected Disposition: SNF/ pending precertification  Estimated discharge date: 2 days      Review of System     Review of Systems   Constitutional:  Negative for activity change, appetite change, chills, fatigue and fever. HENT:  Negative for congestion, ear discharge, ear pain, hearing loss, nosebleeds, postnasal drip, rhinorrhea, sinus pain, sore throat, trouble swallowing and voice change. Eyes:  Negative for photophobia, pain, discharge, redness and itching. Respiratory:  Negative for cough, chest tightness and shortness of breath. Cardiovascular:  Negative for chest pain, palpitations and leg swelling. Gastrointestinal:  Negative for abdominal distention, abdominal pain, blood in stool, constipation, diarrhea, nausea and vomiting. Endocrine: Negative for cold intolerance, heat intolerance, polydipsia, polyphagia and polyuria. Genitourinary:  Negative for difficulty urinating, dysuria, flank pain, frequency, hematuria and urgency. Musculoskeletal:  Negative for arthralgias, back pain, gait problem, joint swelling and myalgias. Skin:  Negative for pallor, rash and wound. Allergic/Immunologic: Negative for environmental allergies and food allergies. Neurological:  Negative for dizziness, tremors, seizures, syncope, speech difficulty, weakness, light-headedness, numbness and headaches. Hematological:  Negative for adenopathy. Does not bruise/bleed easily.    Psychiatric/Behavioral:  Negative for agitation, confusion, decreased concentration, hallucinations, self-injury, sleep disturbance and suicidal guarding or rebound. Hernia: No hernia is present. Genitourinary:     Vagina: Normal. No vaginal discharge. Rectum: Guaiac result negative. Musculoskeletal:         General: No tenderness or deformity. Normal range of motion. Cervical back: Normal range of motion and neck supple. Lymphadenopathy:      Cervical: No cervical adenopathy. Skin:     General: Skin is warm and dry. Capillary Refill: Capillary refill takes less than 2 seconds. Coloration: Skin is not pale. Findings: No erythema or rash. Neurological:      Mental Status: She is alert and oriented to person, place, and time. Cranial Nerves: No cranial nerve deficit. Motor: No abnormal muscle tone. Coordination: Coordination normal.      Deep Tendon Reflexes: Reflexes normal.   Psychiatric:         Behavior: Behavior normal.         Thought Content:  Thought content normal.         Judgment: Judgment normal.         Current Medications      QUEtiapine  25 mg Oral Nightly    ziprasidone  10 mg IntraMUSCular Once    sodium chloride flush  5-40 mL IntraVENous 2 times per day    amLODIPine  10 mg Oral Daily    aspirin  81 mg Oral Daily    atorvastatin  40 mg Oral Daily    buPROPion  150 mg Oral BID    carvedilol  12.5 mg Oral BID WC    famotidine  20 mg Oral Daily    ferrous sulfate  325 mg Oral Daily with breakfast    b complex vitamins  1 capsule Oral Daily    venlafaxine  150 mg Oral Daily    heparin (porcine)  5,000 Units SubCUTAneous BID    insulin lispro  0-4 Units SubCUTAneous TID WC    insulin lispro  0-4 Units SubCUTAneous Nightly    [Held by provider] valsartan  320 mg Oral Daily    And    [Held by provider] hydroCHLOROthiazide  25 mg Oral Daily    cefTRIAXone (ROCEPHIN) IV  1,000 mg IntraVENous Q24H         Labs and Imaging Studies   Laboratory findings:  XR CHEST PORTABLE    Result Date: 9/23/2022  EXAMINATION: ONE XRAY VIEW OF THE CHEST 9/23/2022 9:32 am COMPARISON: 09/22/2022 HISTORY: ORDERING SYSTEM PROVIDED HISTORY: ?pulmonary edema vs infiltrates TECHNOLOGIST PROVIDED HISTORY: Reason for exam:->?pulmonary edema vs infiltrates Reason for Exam: pulmonary edema vs infiltrates FINDINGS: Pacer device is stable with leads inspected positions. 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 Cardiomediastinal silhouette and bony thorax are unchanged. Lungs are clear without focal consolidation or pulmonary edema. No evidence of pleural effusion or pneumothorax. Stable exam without acute focal process. No evidence of pulmonary edema.        Recent Results (from the past 24 hour(s))   POCT Glucose    Collection Time: 09/24/22  1:22 PM   Result Value Ref Range    POC Glucose 132 (H) 70 - 99 MG/DL   POCT Glucose    Collection Time: 09/24/22  5:07 PM   Result Value Ref Range    POC Glucose 106 (H) 70 - 99 MG/DL   POCT Glucose    Collection Time: 09/24/22  8:22 PM   Result Value Ref Range    POC Glucose 116 (H) 70 - 99 MG/DL   Basic Metabolic Panel    Collection Time: 09/25/22  5:26 AM   Result Value Ref Range    Sodium 136 135 - 145 MMOL/L    Potassium 4.0 3.5 - 5.1 MMOL/L    Chloride 95 (L) 99 - 110 mMol/L    CO2 22 21 - 32 MMOL/L    Anion Gap 19 (H) 4 - 16    BUN 40 (H) 6 - 23 MG/DL    Creatinine 1.6 (H) 0.6 - 1.1 MG/DL    Glucose 78 70 - 99 MG/DL    Calcium 8.9 8.3 - 10.6 MG/DL    GFR Non- 31 (L) >60 mL/min/1.73m2    GFR  37 (L) >60 mL/min/1.73m2   Magnesium    Collection Time: 09/25/22  5:26 AM   Result Value Ref Range    Magnesium 2.1 1.8 - 2.4 mg/dl   Phosphorus    Collection Time: 09/25/22  5:26 AM   Result Value Ref Range    Phosphorus 5.9 (H) 2.5 - 4.9 MG/DL   CBC with Auto Differential    Collection Time: 09/25/22  5:26 AM   Result Value Ref Range    WBC 9.0 4.0 - 10.5 K/CU MM    RBC 3.93 (L) 4.2 - 5.4 M/CU MM    Hemoglobin 9.8 (L) 12.5 - 16.0 GM/DL    Hematocrit 32.8 (L) 37 - 47 %    MCV 83.5 78 - 100 FL    MCH 24.9 (L) 27 - 31 PG    MCHC 29.9 (L) 32.0 - 36.0 %    RDW 14.6 11.7 - 14.9 %    Platelets 74 (L) 215 - 440 K/CU MM    MPV 12.4 (H) 7.5 - 11.1 FL    Differential Type AUTOMATED DIFFERENTIAL     Segs Relative 68.1 (H) 36 - 66 %    Lymphocytes % 17.8 (L) 24 - 44 %    Monocytes % 9.5 (H) 0 - 4 %    Eosinophils % 3.6 (H) 0 - 3 %    Basophils % 0.7 0 - 1 %    Segs Absolute 6.1 K/CU MM    Lymphocytes Absolute 1.6 K/CU MM    Monocytes Absolute 0.9 K/CU MM    Eosinophils Absolute 0.3 K/CU MM    Basophils Absolute 0.1 K/CU MM    Nucleated RBC % 0.0 %    Total Nucleated RBC 0.0 K/CU MM    Total Immature Neutrophil 0.03 K/CU MM    Immature Neutrophil % 0.3 0 - 0.43 %   POCT Glucose    Collection Time: 09/25/22  8:13 AM   Result Value Ref Range    POC Glucose 83 70 - 99 MG/DL           Electronically signed by Lacy Cervantes MD on 9/25/2022 at 9:57 AM

## 2022-09-25 NOTE — CARE COORDINATION
Call from Julisa. The voicemail must have been left on her office phone from the previous CM. LSW faxed her a face sheet and she will look at it on Monday.

## 2022-09-25 NOTE — PLAN OF CARE
Problem: Discharge Planning  Goal: Discharge to home or other facility with appropriate resources  Outcome: Progressing  Flowsheets (Taken 9/25/2022 0915)  Discharge to home or other facility with appropriate resources: Identify barriers to discharge with patient and caregiver     Problem: Pain  Goal: Verbalizes/displays adequate comfort level or baseline comfort level  Outcome: Progressing  Flowsheets (Taken 9/25/2022 0915)  Verbalizes/displays adequate comfort level or baseline comfort level: Encourage patient to monitor pain and request assistance     Problem: Confusion  Goal: Confusion, delirium, dementia, or psychosis is improved or at baseline  Description: INTERVENTIONS:  1. Assess for possible contributors to thought disturbance, including medications, impaired vision or hearing, underlying metabolic abnormalities, dehydration, psychiatric diagnoses, and notify attending LIP  2. [place] high risk fall precautions, as indicated  3. Provide frequent short contacts to provide reality reorientation, refocusing and direction  4. Decrease environmental stimuli, including noise as appropriate  5. Monitor and intervene to maintain adequate nutrition, hydration, elimination, sleep and activity  6. If unable to ensure safety without constant attention obtain sitter and review sitter guidelines with assigned personnel  7.  Initiate Psychosocial CNS and Spiritual Care consult, as indicated  Outcome: Progressing  Flowsheets (Taken 9/25/2022 0915)  Effect of thought disturbance (confusion, delirium, dementia, or psychosis) are managed with adequate functional status:   Monitor and intervene to maintain adequate nutrition, hydration, elimination, sleep and activity   Decrease environmental stimuli, including noise as appropriate   Provide frequent short contacts to provide reality reorientation, refocusing and direction   [place] high risk fall precautions, as indicated   Assess for contributors to thought disturbance, including medications, impaired vision or hearing, underlying metabolic abnormalities, dehydration, psychiatric diagnoses, notify LIP   If unable to ensure safety without constant attention obtain sitter and review sitter guidelines with assigned personnel     Problem: Chronic Conditions and Co-morbidities  Goal: Patient's chronic conditions and co-morbidity symptoms are monitored and maintained or improved  Outcome: Progressing  Flowsheets (Taken 9/25/2022 7071)  Care Plan - Patient's Chronic Conditions and Co-Morbidity Symptoms are Monitored and Maintained or Improved: Monitor and assess patient's chronic conditions and comorbid symptoms for stability, deterioration, or improvement     Problem: Skin/Tissue Integrity  Goal: Absence of new skin breakdown  Description: 1. Monitor for areas of redness and/or skin breakdown  2. Assess vascular access sites hourly  3. Every 4-6 hours minimum:  Change oxygen saturation probe site  4. Every 4-6 hours:  If on nasal continuous positive airway pressure, respiratory therapy assess nares and determine need for appliance change or resting period.   Outcome: Progressing     Problem: Nutrition Deficit:  Goal: Optimize nutritional status  Outcome: Progressing

## 2022-09-26 LAB
GLUCOSE BLD-MCNC: 108 MG/DL (ref 70–99)
GLUCOSE BLD-MCNC: 135 MG/DL (ref 70–99)
GLUCOSE BLD-MCNC: 146 MG/DL (ref 70–99)
GLUCOSE BLD-MCNC: 163 MG/DL (ref 70–99)
GLUCOSE BLD-MCNC: 83 MG/DL (ref 70–99)

## 2022-09-26 PROCEDURE — 6370000000 HC RX 637 (ALT 250 FOR IP): Performed by: INTERNAL MEDICINE

## 2022-09-26 PROCEDURE — 6370000000 HC RX 637 (ALT 250 FOR IP): Performed by: STUDENT IN AN ORGANIZED HEALTH CARE EDUCATION/TRAINING PROGRAM

## 2022-09-26 PROCEDURE — 96372 THER/PROPH/DIAG INJ SC/IM: CPT

## 2022-09-26 PROCEDURE — 94761 N-INVAS EAR/PLS OXIMETRY MLT: CPT

## 2022-09-26 PROCEDURE — 97530 THERAPEUTIC ACTIVITIES: CPT

## 2022-09-26 PROCEDURE — 6360000002 HC RX W HCPCS: Performed by: STUDENT IN AN ORGANIZED HEALTH CARE EDUCATION/TRAINING PROGRAM

## 2022-09-26 PROCEDURE — 96376 TX/PRO/DX INJ SAME DRUG ADON: CPT

## 2022-09-26 PROCEDURE — 82962 GLUCOSE BLOOD TEST: CPT

## 2022-09-26 PROCEDURE — 2580000003 HC RX 258: Performed by: STUDENT IN AN ORGANIZED HEALTH CARE EDUCATION/TRAINING PROGRAM

## 2022-09-26 PROCEDURE — 1200000000 HC SEMI PRIVATE

## 2022-09-26 RX ORDER — OXYCODONE HYDROCHLORIDE 5 MG/1
2.5 TABLET ORAL EVERY 4 HOURS PRN
Status: DISCONTINUED | OUTPATIENT
Start: 2022-09-26 | End: 2022-09-27 | Stop reason: HOSPADM

## 2022-09-26 RX ADMIN — ASPIRIN 81 MG: 81 TABLET, COATED ORAL at 07:58

## 2022-09-26 RX ADMIN — FAMOTIDINE 20 MG: 20 TABLET ORAL at 07:58

## 2022-09-26 RX ADMIN — BUPROPION HYDROCHLORIDE 150 MG: 150 TABLET, EXTENDED RELEASE ORAL at 07:58

## 2022-09-26 RX ADMIN — CARVEDILOL 25 MG: 25 TABLET, FILM COATED ORAL at 07:58

## 2022-09-26 RX ADMIN — FERROUS SULFATE TAB 325 MG (65 MG ELEMENTAL FE) 325 MG: 325 (65 FE) TAB at 07:58

## 2022-09-26 RX ADMIN — QUETIAPINE FUMARATE 25 MG: 25 TABLET ORAL at 20:52

## 2022-09-26 RX ADMIN — HEPARIN SODIUM 5000 UNITS: 5000 INJECTION INTRAVENOUS; SUBCUTANEOUS at 20:52

## 2022-09-26 RX ADMIN — ONDANSETRON 4 MG: 2 INJECTION INTRAMUSCULAR; INTRAVENOUS at 09:46

## 2022-09-26 RX ADMIN — BUPROPION HYDROCHLORIDE 150 MG: 150 TABLET, EXTENDED RELEASE ORAL at 20:52

## 2022-09-26 RX ADMIN — VENLAFAXINE HYDROCHLORIDE 150 MG: 150 CAPSULE, EXTENDED RELEASE ORAL at 07:58

## 2022-09-26 RX ADMIN — AMLODIPINE BESYLATE 10 MG: 10 TABLET ORAL at 07:58

## 2022-09-26 RX ADMIN — ATORVASTATIN CALCIUM 40 MG: 40 TABLET, FILM COATED ORAL at 07:58

## 2022-09-26 RX ADMIN — SODIUM CHLORIDE, PRESERVATIVE FREE 10 ML: 5 INJECTION INTRAVENOUS at 20:53

## 2022-09-26 RX ADMIN — HEPARIN SODIUM 5000 UNITS: 5000 INJECTION INTRAVENOUS; SUBCUTANEOUS at 07:58

## 2022-09-26 RX ADMIN — ONDANSETRON 4 MG: 2 INJECTION INTRAMUSCULAR; INTRAVENOUS at 17:32

## 2022-09-26 RX ADMIN — Medication 1 CAPSULE: at 08:12

## 2022-09-26 RX ADMIN — CARVEDILOL 25 MG: 25 TABLET, FILM COATED ORAL at 17:16

## 2022-09-26 ASSESSMENT — ENCOUNTER SYMPTOMS
ABDOMINAL DISTENTION: 0
NAUSEA: 0
TROUBLE SWALLOWING: 0
CHEST TIGHTNESS: 0
DIARRHEA: 0
COUGH: 0
ABDOMINAL PAIN: 0
CONSTIPATION: 0
PHOTOPHOBIA: 0
BACK PAIN: 0
VOMITING: 0
EYE DISCHARGE: 0
RHINORRHEA: 0
EYE PAIN: 0
SHORTNESS OF BREATH: 0
BLOOD IN STOOL: 0
SORE THROAT: 0
SINUS PAIN: 0
EYE ITCHING: 0
EYE REDNESS: 0
VOICE CHANGE: 0

## 2022-09-26 ASSESSMENT — PAIN SCALES - GENERAL: PAINLEVEL_OUTOF10: 2

## 2022-09-26 ASSESSMENT — PAIN DESCRIPTION - LOCATION: LOCATION: GENERALIZED

## 2022-09-26 ASSESSMENT — PAIN DESCRIPTION - PAIN TYPE: TYPE: ACUTE PAIN

## 2022-09-26 ASSESSMENT — PAIN DESCRIPTION - DESCRIPTORS: DESCRIPTORS: ACHING

## 2022-09-26 NOTE — PROGRESS NOTES
Physical Therapy Treatment Note  Name: Paddy Kenyon MRN: [id number] :   1940   Date:  2022   Admission Date: 2022 Room:  [address] [place] [address]     Restrictions/Precautions:          general precautions, fall risk    Communication with other providers:  RN    Subjective:  Patient states:  \"I can try but I'm tired\"  Pain:   Location, Type, Intensity (0/10 to 10/10):  denies pain    Objective:    Observation:  Pt supine in bed with daughter present. Pt agreeable to session    Treatment, including education/measures:    Bed mobility: pt completed supine to sitting EOB SBA with increased time to complete. Pt completed sit to supine min A with assist at LEs. Transfer: pt completed STS to/from EOB CGA with cues for sequencing    Balance: Pt stood EOB ~1 minute min A with retropulsion. Cues provided for anterior weightshifting. Duration limited due to pt becoming lightheaded and requesting to return to supine    Assessment / Impression:    Pt supine in bed at end of session with needs in reach and alarm on.  Daughter present    Patient's tolerance of treatment:  fair   Adverse Reaction: n/a  Significant change in status and impact:  n/a  Barriers to improvement:  decreased endurance, impaired bed mobility, impaired gait    Plan for Next Session:    Continue to address transfer training and gait training in future sessions    Time in:  1005  Time out:  1017  Timed treatment minutes:  12  Total treatment time:  12    Previously filed items:  Social/Functional History  Lives With: Daughter  Type of Home: House  Home Layout: Two level, Able to Live on Main level with bedroom/bathroom, Performs ADL's on one level  Home Access: Stairs to enter with rails  Entrance Stairs - Number of Steps: 2  Bathroom Toilet: Bedside commode  Home Equipment: Walker, rolling (PRN use of RW, daughter tries to encourage all the time)  ADL Assistance: [address] [place] [address] Avenue: Needs assistance  Homemaking Responsibilities: No  Ambulation Assistance: Needs assistance (daughter supervises)  Transfer Assistance: Needs assistance (daughter supervises)  Active : No  Occupation: Retired        Short Term Goals  Time Frame for [employer] term goals: 1 week  Short term goal 1: Pt to complete all bed mobility SBA  Short term goal 2: Pt to complete all STS transfers to/from bed, commode, and chair SBA  Short term goal 3: pt to ambulate 48' with LRAD CGA    Electronically signed by:    Cherie Hayes, PT  9/26/2022, 11:35 AM

## 2022-09-26 NOTE — PROGRESS NOTES
In-Patient Progress Note    Patient:  Damián Washburn 81 y.o. female MRN: [id number]     Date of Service: 9/26/2022    Hospital Day: 5      Chief complaint: had no chief complaint listed for this encounter. Assessment and [address], a 81 y.o. female, with a history of CKD, DM, Anemia, was admitted on 9/22/2022 with complaints of confusion and frequent falls. Patient initially presented to [address] [place] [address] with complaints of confusion and frequent falls. CT lumbar spine, thoracic spine, and cervical spine, and CT head without contrast were negative for any acute fractures/deformities; she did have stable chronic wedge compression deformity of L1. Urinalysis shows pyuria with urine white blood cells 10-20 per high-power field, urine nitrite negative. KENZIE with creatinine of 1.7, non-anion gap metabolic acidosis with bicarb of 20. No significant leukocytosis. Patient was transferred to [facility] for further care and management. Assessment  UTI, pyuria (10-20 wbc/HPF) on urinalysis, negative nitrite  -completed ceftriaxone 1 g daily for 3 days  -Blood culture negative, urine culture negative    KENZIE on CKD III, likely volume dependent prerenal KENZIE. BUN/creatinine 53/1.7 in [facility] ED. Likely complicated by diuresis and ARB.   -Strict input output  -Monitor renal function  -hold valsartan/HCTZ  -NaCL infusion at 50ml/hr for next 10 hours today    Acute metabolic encephalopathy likely secondary to above, resolved to baseline mentation  -TSH, B12, folate, cortisol - all wnl  -Continue to monitor    Low back pain 2/2 fall, no fracture per CT at [facility]  -was on oxycodone 5 mg for 15 days as of Aug 2022  -started on oxycodone 2.5 mg q4hr on 9/26    Non-insulin-dependent diabetes mellitus  -Low-dose sliding scale  -POCT glucose AC/at bedtime  -Hypoglycemia treatment order    Essential hypertension  -was continued on home valsartan 320 Mg daily, hydrochlorothiazide 25 mg daily, amlodipine 10 mg daily, carvedilol 12.5 Mg twice daily  -hold HCTZ/valsartan in view of worsening Cr again  -increase carvedilol to 25 mg - monitor HR  -will add hydralazine if needed    Chronic anemia and thrombocytopenia, hemoglobin 9.3 and platelet 80 at presentation  -Continue to monitor daily CBC    Anxiety/depression  -Continue Effexor  Mg daily          # Peptic ulcer prophylaxis: Famotidine 20 mg daily  # DVT Prophylaxis: Heparin  #CODE STATUS: Full      Current living situation: With daughter  Expected Disposition: SNF/ pending precertification  Estimated discharge date: 2 days      Review of System     Review of Systems   Constitutional:  Negative for activity change, appetite change, chills, fatigue and fever. HENT:  Negative for congestion, ear discharge, ear pain, hearing loss, nosebleeds, postnasal drip, rhinorrhea, sinus pain, sore throat, trouble swallowing and voice change. Eyes:  Negative for photophobia, pain, discharge, redness and itching. Respiratory:  Negative for cough, chest tightness and shortness of breath. Cardiovascular:  Negative for chest pain, palpitations and leg swelling. Gastrointestinal:  Negative for abdominal distention, abdominal pain, blood in stool, constipation, diarrhea, nausea and vomiting. Endocrine: Negative for cold intolerance, heat intolerance, polydipsia, polyphagia and polyuria. Genitourinary:  Negative for difficulty urinating, dysuria, flank pain, frequency, hematuria and urgency. Musculoskeletal:  Negative for arthralgias, back pain, gait problem, joint swelling and myalgias. Skin:  Negative for pallor, rash and wound. Allergic/Immunologic: Negative for environmental allergies and food allergies. Neurological:  Negative for dizziness, tremors, seizures, syncope, speech difficulty, weakness, light-headedness, numbness and headaches. Hematological:  Negative for adenopathy. Does not bruise/bleed easily. Psychiatric/Behavioral:  Negative for agitation, confusion, decreased concentration, hallucinations, self-injury, sleep disturbance and suicidal ideas. The patient is not nervous/anxious and is not hyperactive. I have reviewed all pertinent PMHx, PSHx, FamHx, SocialHx, medications, and allergies and updated history as appropriate. Physical Exam   VITAL SIGNS:  BP (!) 121/41   Pulse 52   Temp 97.5 °F (36.4 °C)   Resp 18   Ht 5' 2\" (1.575 m)   Wt 96 lb 1.9 oz (43.6 kg)   SpO2 (!) 60%   BMI 17.58 kg/m²   Tmax over 24 hours:  Temp (24hrs), Av.6 °F (36.4 °C), Min:97.3 °F (36.3 °C), Max:97.9 °F (36.6 °C)      Patient Vitals for the past 6 hrs:   BP Temp Pulse SpO2   22 1331 (!) 121/41 97.5 °F (36.4 °C) 52 (!) 60 %         No intake or output data in the 24 hours ending 22 1539    Wt Readings from Last 2 Encounters:   22 96 lb 1.9 oz (43.6 kg)   22 98 lb 12.8 oz (44.8 kg)     Body mass index is 17.58 kg/m². Physical Exam  Constitutional:       General: She is not in acute distress. Appearance: She is well-developed. HENT:      Head: Normocephalic and atraumatic. Right Ear: External ear normal.      Left Ear: External ear normal.      Nose: Nose normal.   Eyes:      General: No scleral icterus. Right eye: No discharge. Left eye: No discharge. Conjunctiva/sclera: Conjunctivae normal.      Pupils: Pupils are equal, round, and reactive to light. Neck:      Thyroid: No thyromegaly. Vascular: No JVD. Trachea: No tracheal deviation. Cardiovascular:      Rate and Rhythm: Normal rate and regular rhythm. Heart sounds: Normal heart sounds. No murmur heard. No friction rub. No gallop. Pulmonary:      Effort: Pulmonary effort is normal. No respiratory distress. Breath sounds: Normal breath sounds. No stridor. No wheezing or rales. Chest:      Chest wall: No tenderness.    Abdominal:      General: Bowel sounds are normal. There is no distension. Palpations: Abdomen is soft. There is no mass. Tenderness: There is no abdominal tenderness. There is no guarding or rebound. Hernia: No hernia is present. Genitourinary:     Vagina: Normal. No vaginal discharge. Rectum: Guaiac result negative. Musculoskeletal:         General: No tenderness or deformity. Normal range of motion. Cervical back: Normal range of motion and neck supple. Lymphadenopathy:      Cervical: No cervical adenopathy. Skin:     General: Skin is warm and dry. Capillary Refill: Capillary refill takes less than 2 seconds. Coloration: Skin is not pale. Findings: No erythema or rash. Neurological:      Mental Status: She is alert and oriented to person, place, and time. Cranial Nerves: No cranial nerve deficit. Motor: No abnormal muscle tone. Coordination: Coordination normal.      Deep Tendon Reflexes: Reflexes normal.   Psychiatric:         Behavior: Behavior normal.         Thought Content:  Thought content normal.         Judgment: Judgment normal.         Current Medications      carvedilol  25 mg Oral BID WC    QUEtiapine  25 mg Oral Nightly    ziprasidone  10 mg IntraMUSCular Once    sodium chloride flush  5-40 mL IntraVENous 2 times per day    amLODIPine  10 mg Oral Daily    aspirin  81 mg Oral Daily    atorvastatin  40 mg Oral Daily    buPROPion  150 mg Oral BID    famotidine  20 mg Oral Daily    ferrous sulfate  325 mg Oral Daily with breakfast    b complex vitamins  1 capsule Oral Daily    venlafaxine  150 mg Oral Daily    heparin (porcine)  5,000 Units SubCUTAneous BID    insulin lispro  0-4 Units SubCUTAneous TID WC    insulin lispro  0-4 Units SubCUTAneous Nightly    [Held by provider] valsartan  320 mg Oral Daily    And    [Held by provider] hydroCHLOROthiazide  25 mg Oral Daily         Labs and Imaging Studies   Laboratory findings:  XR CHEST PORTABLE    Result Date: 9/23/2022  EXAMINATION: ONE XRAY VIEW OF THE CHEST 9/23/2022 9:32 am COMPARISON: 09/22/2022 HISTORY: ORDERING SYSTEM PROVIDED HISTORY: ?pulmonary edema vs infiltrates TECHNOLOGIST PROVIDED HISTORY: Reason for exam:->?pulmonary edema vs infiltrates Reason for Exam: pulmonary edema vs infiltrates FINDINGS: Pacer device is stable with leads inspected positions. 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 Cardiomediastinal silhouette and bony thorax are unchanged. Lungs are clear without focal consolidation or pulmonary edema. No evidence of pleural effusion or pneumothorax. Stable exam without acute focal process. No evidence of pulmonary edema.        Recent Results (from the past 24 hour(s))   POCT Glucose    Collection Time: 09/25/22  5:24 PM   Result Value Ref Range    POC Glucose 113 (H) 70 - 99 MG/DL   POCT Glucose    Collection Time: 09/25/22  8:36 PM   Result Value Ref Range    POC Glucose 111 (H) 70 - 99 MG/DL   POCT Glucose    Collection Time: 09/26/22  5:40 AM   Result Value Ref Range    POC Glucose 83 70 - 99 MG/DL   POCT Glucose    Collection Time: 09/26/22  8:03 AM   Result Value Ref Range    POC Glucose 108 (H) 70 - 99 MG/DL   POCT Glucose    Collection Time: 09/26/22 11:52 AM   Result Value Ref Range    POC Glucose 135 (H) 70 - 99 MG/DL           Electronically signed by Erika Villalpando MD on 9/26/2022 at 3:39 PM

## 2022-09-26 NOTE — CARE COORDINATION
Spoke with Karine at [facility] she is working on prior [facility].

## 2022-09-26 NOTE — PROGRESS NOTES
Walked into patient room and left forearm iv noted infilitrated. IV removed. This nurse attempted twice for another insertion but blew both times. Patient tolerated well. Iv consult placed.

## 2022-09-27 VITALS
RESPIRATION RATE: 18 BRPM | DIASTOLIC BLOOD PRESSURE: 82 MMHG | TEMPERATURE: 98.1 F | HEART RATE: 81 BPM | SYSTOLIC BLOOD PRESSURE: 153 MMHG | OXYGEN SATURATION: 98 % | BODY MASS INDEX: 17.69 KG/M2 | HEIGHT: 62 IN | WEIGHT: 96.12 LBS

## 2022-09-27 PROBLEM — J18.9 PNEUMONIA OF LEFT LOWER LOBE DUE TO INFECTIOUS ORGANISM: Status: RESOLVED | Noted: 2022-09-22 | Resolved: 2022-09-27

## 2022-09-27 PROBLEM — N39.0 UTI (URINARY TRACT INFECTION): Status: RESOLVED | Noted: 2022-09-22 | Resolved: 2022-09-27

## 2022-09-27 LAB
ANION GAP SERPL CALCULATED.3IONS-SCNC: 14 MMOL/L (ref 4–16)
BASOPHILS ABSOLUTE: 0.1 K/CU MM
BASOPHILS RELATIVE PERCENT: 0.8 % (ref 0–1)
BUN BLDV-MCNC: 31 MG/DL (ref 6–23)
CALCIUM SERPL-MCNC: 9.1 MG/DL (ref 8.3–10.6)
CHLORIDE BLD-SCNC: 96 MMOL/L (ref 99–110)
CO2: 25 MMOL/L (ref 21–32)
CREAT SERPL-MCNC: 1.2 MG/DL (ref 0.6–1.1)
DIFFERENTIAL TYPE: ABNORMAL
EOSINOPHILS ABSOLUTE: 0.2 K/CU MM
EOSINOPHILS RELATIVE PERCENT: 2.3 % (ref 0–3)
GFR AFRICAN AMERICAN: 52 ML/MIN/1.73M2
GFR NON-AFRICAN AMERICAN: 43 ML/MIN/1.73M2
GLUCOSE BLD-MCNC: 105 MG/DL (ref 70–99)
GLUCOSE BLD-MCNC: 113 MG/DL (ref 70–99)
GLUCOSE BLD-MCNC: 154 MG/DL (ref 70–99)
HCT VFR BLD CALC: 32.1 % (ref 37–47)
HEMOGLOBIN: 9.7 GM/DL (ref 12.5–16)
IMMATURE NEUTROPHIL %: 0.3 % (ref 0–0.43)
LYMPHOCYTES ABSOLUTE: 1 K/CU MM
LYMPHOCYTES RELATIVE PERCENT: 10.9 % (ref 24–44)
MAGNESIUM: 2.8 MG/DL (ref 1.8–2.4)
MCH RBC QN AUTO: 24.7 PG (ref 27–31)
MCHC RBC AUTO-ENTMCNC: 30.2 % (ref 32–36)
MCV RBC AUTO: 81.7 FL (ref 78–100)
MONOCYTES ABSOLUTE: 0.7 K/CU MM
MONOCYTES RELATIVE PERCENT: 8.2 % (ref 0–4)
NUCLEATED RBC %: 0 %
PDW BLD-RTO: 14.6 % (ref 11.7–14.9)
PHOSPHORUS: 3.6 MG/DL (ref 2.5–4.9)
PLATELET # BLD: 111 K/CU MM (ref 140–440)
PMV BLD AUTO: 11.7 FL (ref 7.5–11.1)
POTASSIUM SERPL-SCNC: 3.4 MMOL/L (ref 3.5–5.1)
RBC # BLD: 3.93 M/CU MM (ref 4.2–5.4)
SARS-COV-2, NAAT: NOT DETECTED
SEGMENTED NEUTROPHILS ABSOLUTE COUNT: 6.8 K/CU MM
SEGMENTED NEUTROPHILS RELATIVE PERCENT: 77.5 % (ref 36–66)
SODIUM BLD-SCNC: 135 MMOL/L (ref 135–145)
SOURCE: NORMAL
TOTAL IMMATURE NEUTOROPHIL: 0.03 K/CU MM
TOTAL NUCLEATED RBC: 0 K/CU MM
WBC # BLD: 8.8 K/CU MM (ref 4–10.5)

## 2022-09-27 PROCEDURE — 6370000000 HC RX 637 (ALT 250 FOR IP): Performed by: STUDENT IN AN ORGANIZED HEALTH CARE EDUCATION/TRAINING PROGRAM

## 2022-09-27 PROCEDURE — 80048 BASIC METABOLIC PNL TOTAL CA: CPT

## 2022-09-27 PROCEDURE — 6370000000 HC RX 637 (ALT 250 FOR IP): Performed by: INTERNAL MEDICINE

## 2022-09-27 PROCEDURE — 97535 SELF CARE MNGMENT TRAINING: CPT

## 2022-09-27 PROCEDURE — 6360000002 HC RX W HCPCS: Performed by: STUDENT IN AN ORGANIZED HEALTH CARE EDUCATION/TRAINING PROGRAM

## 2022-09-27 PROCEDURE — 84100 ASSAY OF PHOSPHORUS: CPT

## 2022-09-27 PROCEDURE — 85025 COMPLETE CBC W/AUTO DIFF WBC: CPT

## 2022-09-27 PROCEDURE — 94761 N-INVAS EAR/PLS OXIMETRY MLT: CPT

## 2022-09-27 PROCEDURE — 83735 ASSAY OF MAGNESIUM: CPT

## 2022-09-27 PROCEDURE — 96372 THER/PROPH/DIAG INJ SC/IM: CPT

## 2022-09-27 PROCEDURE — 82962 GLUCOSE BLOOD TEST: CPT

## 2022-09-27 PROCEDURE — 97116 GAIT TRAINING THERAPY: CPT

## 2022-09-27 PROCEDURE — 36415 COLL VENOUS BLD VENIPUNCTURE: CPT

## 2022-09-27 PROCEDURE — 87635 SARS-COV-2 COVID-19 AMP PRB: CPT

## 2022-09-27 RX ORDER — DEXAMETHASONE 2 MG/1
TABLET ORAL
Qty: 30 TABLET | Refills: 0 | Status: SHIPPED | OUTPATIENT
Start: 2022-09-27 | End: 2022-12-19

## 2022-09-27 RX ORDER — QUETIAPINE FUMARATE 25 MG/1
25 TABLET, FILM COATED ORAL NIGHTLY
Qty: 30 TABLET | Refills: 0 | Status: SHIPPED | OUTPATIENT
Start: 2022-09-27 | End: 2022-12-19

## 2022-09-27 RX ORDER — VENLAFAXINE HYDROCHLORIDE 75 MG/1
225 CAPSULE, EXTENDED RELEASE ORAL DAILY
Qty: 60 CAPSULE | Refills: 0 | Status: SHIPPED | OUTPATIENT
Start: 2022-09-27 | End: 2023-04-13

## 2022-09-27 RX ORDER — CARVEDILOL 25 MG/1
25 TABLET ORAL 2 TIMES DAILY WITH MEALS
Qty: 60 TABLET | Refills: 3 | Status: SHIPPED | OUTPATIENT
Start: 2022-09-27 | End: 2022-12-19

## 2022-09-27 RX ORDER — PANTOPRAZOLE SODIUM 40 MG/1
40 TABLET, DELAYED RELEASE ORAL
Qty: 90 TABLET | Refills: 1 | Status: SHIPPED | OUTPATIENT
Start: 2022-09-27 | End: 2022-12-19

## 2022-09-27 RX ORDER — OXYCODONE HYDROCHLORIDE 5 MG/1
5 TABLET ORAL 4 TIMES DAILY
Qty: 12 TABLET | Refills: 0 | Status: SHIPPED | OUTPATIENT
Start: 2022-09-27 | End: 2022-09-30

## 2022-09-27 RX ORDER — ONDANSETRON 2 MG/ML
4 INJECTION INTRAMUSCULAR; INTRAVENOUS ONCE
Status: DISCONTINUED | OUTPATIENT
Start: 2022-09-27 | End: 2022-09-27 | Stop reason: HOSPADM

## 2022-09-27 RX ORDER — PROMETHAZINE HYDROCHLORIDE 25 MG/1
25 TABLET ORAL EVERY 6 HOURS PRN
Qty: 60 TABLET | Refills: 0 | Status: SHIPPED | OUTPATIENT
Start: 2022-09-27 | End: 2022-10-12

## 2022-09-27 RX ORDER — BUPROPION HYDROCHLORIDE 150 MG/1
TABLET, EXTENDED RELEASE ORAL
Qty: 180 TABLET | Refills: 0 | Status: SHIPPED | OUTPATIENT
Start: 2022-09-27

## 2022-09-27 RX ORDER — ATORVASTATIN CALCIUM 40 MG/1
40 TABLET, FILM COATED ORAL DAILY
Qty: 30 TABLET | Refills: 3 | Status: SHIPPED | OUTPATIENT
Start: 2022-09-27 | End: 2022-12-19

## 2022-09-27 RX ORDER — AMLODIPINE BESYLATE 10 MG/1
TABLET ORAL
Qty: 30 TABLET | Refills: 3 | Status: SHIPPED | OUTPATIENT
Start: 2022-09-27

## 2022-09-27 RX ORDER — ASPIRIN 81 MG/1
81 TABLET ORAL DAILY
Qty: 30 TABLET | Refills: 0 | Status: SHIPPED | OUTPATIENT
Start: 2022-09-27 | End: 2022-12-19

## 2022-09-27 RX ADMIN — ASPIRIN 81 MG: 81 TABLET, COATED ORAL at 09:07

## 2022-09-27 RX ADMIN — CARVEDILOL 25 MG: 25 TABLET, FILM COATED ORAL at 09:03

## 2022-09-27 RX ADMIN — OXYCODONE HYDROCHLORIDE 2.5 MG: 5 TABLET ORAL at 09:44

## 2022-09-27 RX ADMIN — FAMOTIDINE 20 MG: 20 TABLET ORAL at 09:23

## 2022-09-27 RX ADMIN — FERROUS SULFATE TAB 325 MG (65 MG ELEMENTAL FE) 325 MG: 325 (65 FE) TAB at 09:05

## 2022-09-27 RX ADMIN — BUPROPION HYDROCHLORIDE 150 MG: 150 TABLET, EXTENDED RELEASE ORAL at 09:03

## 2022-09-27 RX ADMIN — Medication 1 CAPSULE: at 09:03

## 2022-09-27 RX ADMIN — HEPARIN SODIUM 5000 UNITS: 5000 INJECTION INTRAVENOUS; SUBCUTANEOUS at 09:09

## 2022-09-27 RX ADMIN — ATORVASTATIN CALCIUM 40 MG: 40 TABLET, FILM COATED ORAL at 09:08

## 2022-09-27 RX ADMIN — ONDANSETRON 4 MG: 4 TABLET, ORALLY DISINTEGRATING ORAL at 15:06

## 2022-09-27 RX ADMIN — VENLAFAXINE HYDROCHLORIDE 150 MG: 150 CAPSULE, EXTENDED RELEASE ORAL at 09:07

## 2022-09-27 RX ADMIN — AMLODIPINE BESYLATE 10 MG: 10 TABLET ORAL at 09:06

## 2022-09-27 RX ADMIN — ONDANSETRON 4 MG: 4 TABLET, ORALLY DISINTEGRATING ORAL at 09:24

## 2022-09-27 ASSESSMENT — PAIN DESCRIPTION - PAIN TYPE: TYPE: ACUTE PAIN

## 2022-09-27 ASSESSMENT — PAIN SCALES - GENERAL
PAINLEVEL_OUTOF10: 2
PAINLEVEL_OUTOF10: 10
PAINLEVEL_OUTOF10: 7

## 2022-09-27 ASSESSMENT — PAIN DESCRIPTION - LOCATION
LOCATION: BACK
LOCATION: BACK

## 2022-09-27 ASSESSMENT — PAIN DESCRIPTION - DESCRIPTORS
DESCRIPTORS: SHOOTING;SPASM;ACHING
DESCRIPTORS: DISCOMFORT;SHOOTING

## 2022-09-27 ASSESSMENT — PAIN DESCRIPTION - ORIENTATION
ORIENTATION: MID
ORIENTATION: LOWER

## 2022-09-27 ASSESSMENT — PAIN - FUNCTIONAL ASSESSMENT: PAIN_FUNCTIONAL_ASSESSMENT: ACTIVITIES ARE NOT PREVENTED

## 2022-09-27 NOTE — PLAN OF CARE
Problem: Discharge Planning  Goal: Discharge to home or other facility with appropriate resources  9/27/2022 0953 by Jamin Covington  Outcome: Progressing  9/27/2022 0227 by Arun Fitzgerald LPN  Outcome: Progressing     Problem: Pain  Goal: Verbalizes/displays adequate comfort level or baseline comfort level  9/27/2022 0953 by Jamin Covington  Outcome: Progressing  9/27/2022 0227 by Arun Fitzgerald LPN  Outcome: Progressing     Problem: Confusion  Goal: Confusion, delirium, dementia, or psychosis is improved or at baseline  Description: INTERVENTIONS:  1. Assess for possible contributors to thought disturbance, including medications, impaired vision or hearing, underlying metabolic abnormalities, dehydration, psychiatric diagnoses, and notify attending LIP  2. [place] high risk fall precautions, as indicated  3. Provide frequent short contacts to provide reality reorientation, refocusing and direction  4. Decrease environmental stimuli, including noise as appropriate  5. Monitor and intervene to maintain adequate nutrition, hydration, elimination, sleep and activity  6. If unable to ensure safety without constant attention obtain sitter and review sitter guidelines with assigned personnel  7. Initiate Psychosocial CNS and Spiritual Care consult, as indicated  9/27/2022 0953 by Jamin Covington  Outcome: Progressing  9/27/2022 0227 by Arun Fitzgerald LPN  Outcome: Progressing     Problem: Chronic Conditions and Co-morbidities  Goal: Patient's chronic conditions and co-morbidity symptoms are monitored and maintained or improved  9/27/2022 0953 by Jamin Covington  Outcome: Progressing  9/27/2022 0227 by Arun Fitzgerald LPN  Outcome: Progressing     Problem: Skin/Tissue Integrity  Goal: Absence of new skin breakdown  Description: 1. Monitor for areas of redness and/or skin breakdown  2. Assess vascular access sites hourly  3. Every 4-6 hours minimum:  Change oxygen saturation probe site  4.   Every 4-6 hours:  If on nasal continuous positive airway pressure, respiratory therapy assess nares and determine need for appliance change or resting period.   9/27/2022 0953 by Judy Robb  Outcome: Progressing  9/27/2022 0227 by Marianne Rivas LPN  Outcome: Progressing     Problem: Nutrition Deficit:  Goal: Optimize nutritional status  9/27/2022 0953 by Judy Robb  Outcome: Progressing  9/27/2022 0227 by Marianne Rivas LPN  Outcome: Progressing

## 2022-09-27 NOTE — PROGRESS NOTES
No IV to be placed per Dr. Johnnie Sanchez due to the possibility the patient may be discharged today.

## 2022-09-27 NOTE — DISCHARGE SUMMARY
V2.0  Discharge Summary    Name:  Carmelina Wright /Age/Sex: 1940 (11 y.o. female)   Admit Date: 2022  Discharge Date: 22    MRN & CSN:  [id number] & [id number] Encounter Date and Time 22 2:09 PM EDT    Attending:  George Mckee MD Discharging Provider: George Mckee MD, MD       Hospital Course:     Brief HPI:     Brief Problem Based Course:   79-year-old female with history of CKD, diabetes, anemia admitted to the hospital on 2022 with a complaint of frequent falls and confusion. Patient initially went to [facility] and was transferred here. Patient was managed over here for UTI with pyuria completed a course of 3 days of ceftriaxone 1 g daily. Blood cultures were negative. Urine culture was negative. So the antibiotics were stopped. Patient was also found to have KENZIE on CKD likely related to overdiuresis and ARB use. Stable now can be resumed. Over the course of stay in the hospital the metabolic encephalopathy improved. TSH B12 folate and cortisol level were all normal during hospital assessment. Patient was also complains of low back pain for which patient was on oxycodone 5 mg daily. Rest of the chronic problems were managed during the hospital stay. Patient has been approved for SNF facility for which all the paperwork has been completed. Patient is deemed to be stable for discharge with recommendation to follow-up with a primary care physician on the outpatient basis. The patient expressed appropriate understanding of, and agreement with the discharge recommendations, medications, and plan.      Consults this admission:  IP CONSULT TO IV TEAM  IP CONSULT TO IV TEAM    Discharge Diagnosis:   Pneumonia of left lower lobe due to infectious organism      Discharge Instruction:   Follow up appointments: PCP   Primary care physician: Mony Yu MD within 2 weeks  Diet: regular diet   Activity: activity as tolerated  Disposition: Discharged to:   []Home, [][facility], [x]SNF, []Acute Rehab, []Hospice   Condition on discharge: Stable  Labs and Tests to be Followed up as an outpatient by PCP or Specialist:     Discharge Medications:        Medication List        START taking these medications      QUEtiapine 25 MG tablet  Commonly known as: SEROQUEL  Take 1 tablet by mouth nightly            CHANGE how you take these medications      carvedilol 25 MG tablet  Commonly known as: COREG  Take 1 tablet by mouth 2 times daily (with meals)  What changed:   medication strength  how much to take     glipiZIDE 5 MG extended release tablet  Commonly known as: Glucotrol XL  Take 2 tablets by mouth daily  What changed: how much to take     promethazine 25 MG tablet  Commonly known as: PHENERGAN  Take 1 tablet by mouth every 6 hours as needed for Nausea  What changed: reasons to take this     venlafaxine 75 MG extended release capsule  Commonly known as: Effexor XR  Take 3 capsules by mouth daily for 20 days  What changed:   medication strength  how much to take            CONTINUE taking these medications      amLODIPine 10 MG tablet  Commonly known as: NORVASC  TAKE 1 TABLET BY MOUTH EVERY DAY     aspirin 81 MG EC tablet  Take 1 tablet by mouth daily     atorvastatin 40 MG tablet  Commonly known as: LIPITOR  Take 1 tablet by mouth daily     buPROPion 150 MG extended release tablet  Commonly known as: WELLBUTRIN SR  TAKE 1 TABLET BY MOUTH TWICE A DAY     dexamethasone 2 MG tablet  Commonly known as: DECADRON  TAKE 1 TABLET BY MOUTH EVERY DAY WITH BREAKFAST     fenofibrate 145 MG tablet  Commonly known as: TRICOR     ferrous sulfate 325 (65 Fe) MG EC tablet  Commonly known as: FE TABS 325  TAKE 1 TABLET BY MOUTH EVERY DAY WITH BREAKFAST     folic acid-pyridoxine-cyancobalamin 1.13-25-2 MG Tabs  Take 1 tablet by mouth daily     FREESTYLE LITE strip  Generic drug: blood glucose test strips     Jardiance 25 MG tablet  Generic drug: empagliflozin     metFORMIN 500 MG tablet  Commonly known as: GLUCOPHAGE  Take 1 tablet by mouth 2 times daily (with meals)     oxyCODONE 5 MG immediate release tablet  Commonly known as: ROXICODONE     pantoprazole 40 MG tablet  Commonly known as: PROTONIX  Take 1 tablet by mouth every morning (before breakfast)     potassium chloride 10 MEQ extended release capsule  Commonly known as: MICRO-K     Telotristat Ethyl(as Etiprate) 250 MG Tabs  Take 1 tablet (250 mg) by mouth three times daily.      therapeutic multivitamin-minerals tablet     triamcinolone 0.025 % ointment  Commonly known as: KENALOG     valsartan-hydroCHLOROthiazide 320-25 MG per tablet  Commonly known as: DIOVAN-HCT            STOP taking these medications      acyclovir 800 MG tablet  Commonly known as: ZOVIRAX     ALPRAZolam 1 MG tablet  Commonly known as: XANAX     cephALEXin 500 MG capsule  Commonly known as: KEFLEX     cyanocobalamin 1000 MCG/ML injection     dicyclomine 10 MG capsule  Commonly known as: Bentyl     diphenoxylate-atropine 2.5-0.025 MG per tablet  Commonly known as: LOMOTIL     famotidine 20 MG tablet  Commonly known as: PEPCID     Invokana 300 MG Tabs tablet  Generic drug: canagliflozin     lactulose 10 GM/15ML solution  Commonly known as: CHRONULAC     methocarbamol 750 MG tablet  Commonly known as: ROBAXIN     vitamin E 1000 units capsule               Where to Get Your Medications        These medications were sent to [address] [place] [address] 25, [address] [facility] [id number]      Phone: [phone]   atorvastatin 40 MG tablet  buPROPion 150 MG extended release tablet  carvedilol 25 MG tablet  promethazine 25 MG tablet  QUEtiapine 25 MG tablet  venlafaxine 75 MG extended release capsule       You can get these medications from any pharmacy    Bring a paper prescription for each of these medications  amLODIPine 10 MG tablet  aspirin 81 MG EC tablet  dexamethasone 2 MG tablet  pantoprazole 40 MG tablet        Objective Findings at Discharge:   BP (!) 153/82   Pulse 81   Temp 98.1 °F (36.7 °C) (Oral)   Resp 18   Ht 5' 2\" (1.575 m)   Wt 96 lb 1.9 oz (43.6 kg)   SpO2 98%   BMI 17.58 kg/m²       Physical Exam:   Physical Exam  Vitals reviewed. Constitutional:       Appearance: Normal appearance. She is normal weight. HENT:      Head: Normocephalic. Right Ear: Tympanic membrane normal.      Left Ear: Tympanic membrane normal.      Nose: Nose normal.      Mouth/Throat:      Mouth: Mucous membranes are moist.   Eyes:      Conjunctiva/sclera: Conjunctivae normal.      Pupils: Pupils are equal, round, and reactive to light. Cardiovascular:      Rate and Rhythm: Normal rate and regular rhythm. Pulses: Normal pulses. Heart sounds: Normal heart sounds. No murmur heard. Pulmonary:      Effort: Pulmonary effort is normal.      Breath sounds: Normal breath sounds. No wheezing, rhonchi or rales. Abdominal:      General: Abdomen is flat. Bowel sounds are normal. There is distension. Palpations: Abdomen is soft. Tenderness: There is no abdominal tenderness. Musculoskeletal:         General: No deformity. Normal range of motion. Cervical back: Normal range of motion and neck supple. Right lower leg: No edema. Left lower leg: No edema. Skin:     Coloration: Skin is not jaundiced or pale. Neurological:      General: No focal deficit present. Mental Status: She is alert. Mental status is at baseline. Motor: Weakness present.       Coordination: Coordination abnormal.   Psychiatric:         Mood and Affect: Mood normal.         Behavior: Behavior normal.            Labs and Imaging   Echocardiogram complete 2D with doppler with color    Result Date: 9/23/2022  Transthoracic Echocardiography Report (TTE)  Demographics   Patient Name        Gali HERNANDEZ Date of Study          09/23/2022   Date of Birth       1940      Gender Female   Age                 81 year(s)      Race                      Patient Number      [id number]      Room Number            3106   Visit Number        [id number]   Corporate ID        [id number]   Accession Number    [id number]      BETO Niño   Ordering Physician                  Interpreting Physician Liya Crowell MD  Procedure Type of Study   TTE procedure:ECHOCARDIOGRAM COMPLETE 2D W DOPPLER W COLOR. Procedure Date Date: 09/23/2022 Start: 08:43 AM Study Location: Portable Technical Quality: Technically difficult study Indications:Congestive heart failure. Height: 62 inches Weight: 96 pounds BSA: 1.4 m2 BMI: 17.56 kg/m2 HR: 63 bpm BP: 145/62 mmHg  Conclusions   Summary  Left ventricular systolic function is normal.  Ejection fraction is visually estimated at 55-60%. Moderate left ventricular hypertrophy. Grade I diastolic dysfunction. Sclerotic, but non-stenotic aortic valve. Mild to moderate tricuspid regurgitation; RVSP: 28 mmHg. No evidence of any pericardial effusion. Moderate MAC noted  moderate MR and TR noted  Pacer wire noted on the right side of heart. Grade I diastolic dysfunction . Signature   ------------------------------------------------------------------  Electronically signed by Liya Crowell MD (Interpreting  physician) on 09/23/2022 at 02:54 PM  ------------------------------------------------------------------   Findings   Left Ventricle  Left ventricular systolic function is normal.  Ejection fraction is visually estimated at 55-60%. Moderate left ventricular hypertrophy. Grade I diastolic dysfunction. Left Atrium  Mildly dilated left atrium. Right Atrium  PPM wiring visualized in right atrium. Essentially normal right atrium. Right Ventricle  PPM wiring visualized within the right ventricle. Aortic Valve  Sclerotic, but non-stenotic aortic valve. Aortic valve appears tricuspid.    Mitral Valve  Mitral annular calcification is present. Mean gradient through valve 4 mmHg. Moderate mitral regurgitation. Tricuspid Valve  Mild to moderate tricuspid regurgitation; RVSP: 28 mmHg. Pulmonic Valve  The pulmonic valve was not well visualized. Pericardial Effusion  No evidence of any pericardial effusion. Pleural Effusion  No evidence of pleural effusion. Miscellaneous  IVC is within normal limits.   Abdominal Aorta not visual.  M-Mode/2D Measurements & Calculations   LV Diastolic Dimension:  LV Systolic Dimension:  LA Dimension: 4 cmAO Root  2.84 cm                  1.82 cm                 Dimension: 3.2 cmLA Area:  LV FS:35.9 %             LV Volume Diastolic: 45 56.4 cm2  LV PW Diastolic: 2.84 cm ml  LV PW Systolic: 0.10 cm  LV Volume Systolic: 11  Septum Diastolic: 5.26   ml  cm                       LV EDV/LV EDV Index: 45  Septum Systolic: 3.25 cm KH/88 W7MJ ESV/LV ESV   LA/Aorta: 1.25  CO: 4.41 l/min           Index: 11 ml/8 m2  CI: 3.15 l/m*m2          EF Calculated (A4C):    LA volume/Index: 42 ml                           75.6 %                  /75J5  LV Area Diastolic: 37.3  EF Calculated (2D):  cm2                      67.3 %  LV Area Systolic: 2.88  cm2                      LV Length: 5.84 cm                            LVOT: 1.9 cm  Doppler Measurements & Calculations   MV Peak E-Wave: 110     AV Peak Velocity: 146 cm/s  LVOT Peak Velocity: 88.4  cm/s                    AV Peak Gradient: 8.53 mmHg cm/s  MV Peak A-Wave: 152     AV Mean Velocity: 98.8 cm/s LVOT Mean Velocity: 60  cm/s                    AV Mean Gradient: 4 mmHg    cm/s  MV E/A Ratio: 0.72      AV VTI: 31.5 cm             LVOT Peak Gradient: 3  MV Peak Gradient: 4.84  AV Area (Continuity):2.22   mmHgLVOT Mean Gradient:  mmHg                    cm2                         2 mmHg  MV Mean Gradient: 4                                 Estimated RVSP: 28 mmHg  mmHg                    LVOT VTI: 24.7 cm           Estimated RAP:3 mmHg  MV Mean Velocity: artery demonstrates the systolic velocities of 149, 117, 114 cm/sec in the proximal, mid and distal segments respectively. The external carotid artery is patent. The vertebral artery demonstrates normal antegrade flow. Extensive shadowing calcific plaque, which limits evaluation. ICA/CCA ratio of 2.3 LEFT: The left common carotid artery demonstrates peak systolic velocities of 50, 61 cm/sec in the proximal and distal segments respectively. The left internal carotid artery demonstrates the systolic velocities of 413, 80, 88 cm/sec in the proximal, mid and distal segments respectively. The external carotid artery is patent. The vertebral artery demonstrates normal antegrade flow. Extensive plaque is visible on grayscale ultrasound in the proximal ICA. ICA/CCA ratio of 1.3     The right internal carotid artery demonstrates 0-50% stenosis. The left internal carotid artery demonstrates 0-50% stenosis. Bilateral vertebral arteries are patent with flow in the normal direction. Patient motion and extensive calcific plaque which obscures visualization limits the exam.  Consider further evaluation with CT angiogram of the neck. CBC:   Recent Labs     09/25/22  0526 09/27/22  0716   WBC 9.0 8.8   HGB 9.8* 9.7*   PLT 74* 111*     BMP:    Recent Labs     09/25/22  0526 09/27/22  0716    135   K 4.0 3.4*   CL 95* 96*   CO2 22 25   BUN 40* 31*   CREATININE 1.6* 1.2*   GLUCOSE 78 105*     Hepatic: No results for input(s): AST, ALT, ALB, BILITOT, ALKPHOS in the last 72 hours.   Lipids:   Lab Results   Component Value Date/Time    CHOL 84 10/15/2019 05:30 AM    HDL 32 10/15/2019 05:30 AM    TRIG 182 10/15/2019 05:30 AM     Hemoglobin A1C:   Lab Results   Component Value Date/Time    LABA1C 6.3 09/23/2022 10:39 PM     TSH: No results found for: TSH  Troponin:   Lab Results   Component Value Date/Time    TROPONINT <0.010 04/27/2021 04:25 AM    TROPONINT <0.010 04/17/2021 06:56 PM    TROPONINT <0.010 04/17/2021 04:42 AM Lactic Acid: No results for input(s): LACTA in the last 72 hours. BNP: No results for input(s): PROBNP in the last 72 hours.   UA:  Lab Results   Component Value Date/Time    NITRU NEGATIVE 04/27/2021 06:33 AM    COLORU YELLOW 04/27/2021 06:33 AM    WBCUA 2 04/27/2021 06:33 AM    RBCUA 7 04/27/2021 06:33 AM    MUCUS RARE 04/27/2021 06:33 AM    TRICHOMONAS NONE SEEN 04/27/2021 06:33 AM    YEAST MANY 04/27/2021 06:33 AM    BACTERIA NEGATIVE 04/27/2021 06:33 AM    CLARITYU HAZY 04/27/2021 06:33 AM    SPECGRAV 1.019 04/27/2021 06:33 AM    LEUKOCYTESUR NEGATIVE 04/27/2021 06:33 AM    UROBILINOGEN NEGATIVE 04/27/2021 06:33 AM    BILIRUBINUR NEGATIVE 04/27/2021 06:33 AM    BLOODU SMALL 04/27/2021 06:33 AM    KETUA NEGATIVE 04/27/2021 06:33 AM     Urine Cultures: No results found for: LABURIN  Blood Cultures: No results found for: BC  No results found for: BLOODCULT2  Organism: No results found for: ORG    Time Spent Discharging patient 35 minutes    Electronically signed by Kalyan Christopher MD, MD on 9/27/2022 at 2:09 PM

## 2022-09-27 NOTE — CARE COORDINATION
Received call from Flavio at [facility], pt approved for today. PS to Dr José Sargent. 1400 Pt on discharge to [facility] with [facility] for 1500. Pt/daughter agreeable with plan. Updated nursing and Maki at [facility].      1510 EPASSR completed.

## 2022-09-27 NOTE — PLAN OF CARE
Problem: Discharge Planning  Goal: Discharge to home or other facility with appropriate resources  9/27/2022 1341 by Salazar Wilson LPN  Outcome: Progressing  9/27/2022 0953 by Tawana Orr  Outcome: Progressing  9/27/2022 0227 by Daryl Burkett LPN  Outcome: Progressing     Problem: Pain  Goal: Verbalizes/displays adequate comfort level or baseline comfort level  9/27/2022 1341 by Salazar Wilson LPN  Outcome: Progressing  9/27/2022 0953 by Tawana Orr  Outcome: Progressing  9/27/2022 0227 by Daryl Burkett LPN  Outcome: Progressing     Problem: Confusion  Goal: Confusion, delirium, dementia, or psychosis is improved or at baseline  Description: INTERVENTIONS:  1. Assess for possible contributors to thought disturbance, including medications, impaired vision or hearing, underlying metabolic abnormalities, dehydration, psychiatric diagnoses, and notify attending LIP  2. [place] high risk fall precautions, as indicated  3. Provide frequent short contacts to provide reality reorientation, refocusing and direction  4. Decrease environmental stimuli, including noise as appropriate  5. Monitor and intervene to maintain adequate nutrition, hydration, elimination, sleep and activity  6. If unable to ensure safety without constant attention obtain sitter and review sitter guidelines with assigned personnel  7.  Initiate Psychosocial CNS and Spiritual Care consult, as indicated  9/27/2022 1341 by Salazar Wilson LPN  Outcome: Progressing  9/27/2022 0953 by Tawana Orr  Outcome: Progressing  9/27/2022 0227 by Daryl Burkett LPN  Outcome: Progressing     Problem: Chronic Conditions and Co-morbidities  Goal: Patient's chronic conditions and co-morbidity symptoms are monitored and maintained or improved  9/27/2022 1341 by Salazar Wilson LPN  Outcome: Progressing  9/27/2022 0953 by Tawana Orr  Outcome: Progressing  9/27/2022 0227 by Daryl Burkett LPN  Outcome: Progressing     Problem: Skin/Tissue Integrity  Goal: Absence of new skin breakdown  Description: 1. Monitor for areas of redness and/or skin breakdown  2. Assess vascular access sites hourly  3. Every 4-6 hours minimum:  Change oxygen saturation probe site  4. Every 4-6 hours:  If on nasal continuous positive airway pressure, respiratory therapy assess nares and determine need for appliance change or resting period.   9/27/2022 1341 by Bryan Valdivia LPN  Outcome: Progressing  9/27/2022 0953 by Jai Munguia  Outcome: Progressing  9/27/2022 0227 by Basia Mulligan LPN  Outcome: Progressing     Problem: Nutrition Deficit:  Goal: Optimize nutritional status  9/27/2022 1341 by Bryan Valdivia LPN  Outcome: Progressing  9/27/2022 0953 by Jai Munguia  Outcome: Progressing  9/27/2022 0227 by Basia Mulligan LPN  Outcome: Progressing     Problem: Safety - Adult  Goal: Free from fall injury  Outcome: Progressing

## 2022-09-27 NOTE — PROGRESS NOTES
Pt discharged to [facility]. Pt transported via stretcher by [facility] transport. Discharge instructions, ARNEL, and prescriptions sent with transporters. Report called to [facility].

## 2022-09-27 NOTE — PROGRESS NOTES
Occupational Therapy Treatment Note  Name: Damián Washburn MRN: [id number] :   1940   Date:  2022   Admission Date: 2022 Room:  [address] [place] [address]-A     Primary Problem: Moderate Malnutrition    Communication with other providers:  handoff to RN, cotx with PT Linh Bland for pt tolerance, safety    Subjective:  Patient states: \"My stomach. It's hurty. \"  Pain: doesn't rate but frequent c/o stomach/back pain  Restrictions: general precautions, fall risk, , cog    Objective:    Observation:  pt was supine in bed upon arrival, agreeable to session.  only. Objective Measures:  stable    Treatment, including education:  Self Care Training (16 minutes):   Self care training was performed today. Cues were given for safety, sequence, UE/LE placement, visual cues, and balance. Activities performed today included pt demo supine to sit EOB with SBA, standing with CGA. Pt ambulating to bathroom with min A and hand held assistance with x2 LOB. Pt sitting on toilet with min A for controlled descent. Pt with prolonged attempt to void/have BM, pt with frequent c/o pain in stomach/back. Pt unable to void, standing with min A, however unable to fully stand d/t pain and lowered back onto toilet with mod A. Pt taking rest break to recover, standing again with CGA using grab bar. Pt ambulating back to room with min A/HHA with additional LOB. Pt sitting EOB, returning to supine with SBA. Pt left in care of nursing staff. Education: Role of OT, OT POC, safety, benefits of EOB/OOB activity, rationale for treatment  Safety Measures: Gait belt used for safety of pt and therapist, Left in supine in bed, Alarm in place, call light and phone within reach, lines managed    Assessment / Impression:    Pt tolerating limited tx today, inc pain and confusion today with few LOB ambulating to bathroom and back.     Patient's tolerance of treatment: fair  Adverse Reaction: inc pain, nausea  Significant change in status and impact: none  Barriers to improvement: cognition, deconditioning, pain    Plan for Next Session:    Continue OT POC    Time in:  1344  Time out:  1400  Timed treatment minutes:  16  Total treatment time:  16    Electronically signed by:    Dustin Tatum OT, OTR/L  9/27/2022, 2:46 PM

## 2022-09-27 NOTE — PLAN OF CARE
Problem: Discharge Planning  Goal: Discharge to home or other facility with appropriate resources  9/27/2022 1529 by Anthony Walker LPN  Outcome: Completed  9/27/2022 1341 by Anthony Walker LPN  Outcome: Progressing  9/27/2022 0953 by Derrel Lesches  Outcome: Progressing  9/27/2022 0227 by Lindsay Garcia LPN  Outcome: Progressing     Problem: Pain  Goal: Verbalizes/displays adequate comfort level or baseline comfort level  9/27/2022 1529 by Anthony Walker LPN  Outcome: Completed  9/27/2022 1341 by Anthony Walker LPN  Outcome: Progressing  9/27/2022 0953 by Derrel Lesches  Outcome: Progressing  9/27/2022 0227 by Lindsay Garcia LPN  Outcome: Progressing     Problem: Confusion  Goal: Confusion, delirium, dementia, or psychosis is improved or at baseline  Description: INTERVENTIONS:  1. Assess for possible contributors to thought disturbance, including medications, impaired vision or hearing, underlying metabolic abnormalities, dehydration, psychiatric diagnoses, and notify attending LIP  2. Portal high risk fall precautions, as indicated  3. Provide frequent short contacts to provide reality reorientation, refocusing and direction  4. Decrease environmental stimuli, including noise as appropriate  5. Monitor and intervene to maintain adequate nutrition, hydration, elimination, sleep and activity  6. If unable to ensure safety without constant attention obtain sitter and review sitter guidelines with assigned personnel  7.  Initiate Psychosocial CNS and Spiritual Care consult, as indicated  9/27/2022 1529 by Anthony Walker LPN  Outcome: Completed  9/27/2022 1341 by Anthony Walker LPN  Outcome: Progressing  9/27/2022 0953 by Derrel Lesches  Outcome: Progressing  9/27/2022 0227 by Lindsay Garcia LPN  Outcome: Progressing     Problem: Chronic Conditions and Co-morbidities  Goal: Patient's chronic conditions and co-morbidity symptoms are monitored and maintained or improved  9/27/2022 1529 by Anthony Walker LPN  Outcome: Completed  9/27/2022 1341 by Jaycob Fabian LPN  Outcome: Progressing  9/27/2022 0953 by Caitlyn Escobar  Outcome: Progressing  9/27/2022 0227 by Kash Zamarripa LPN  Outcome: Progressing     Problem: Skin/Tissue Integrity  Goal: Absence of new skin breakdown  Description: 1. Monitor for areas of redness and/or skin breakdown  2. Assess vascular access sites hourly  3. Every 4-6 hours minimum:  Change oxygen saturation probe site  4. Every 4-6 hours:  If on nasal continuous positive airway pressure, respiratory therapy assess nares and determine need for appliance change or resting period.   9/27/2022 1529 by Jaycob Fabian LPN  Outcome: Completed  9/27/2022 1341 by Jaycob Fabian LPN  Outcome: Progressing  9/27/2022 0953 by Caitlyn Escobar  Outcome: Progressing  9/27/2022 0227 by Kash Zamarripa LPN  Outcome: Progressing     Problem: Nutrition Deficit:  Goal: Optimize nutritional status  9/27/2022 1529 by Jaycob Fabian LPN  Outcome: Completed  9/27/2022 1341 by Jaycob Fabian LPN  Outcome: Progressing  9/27/2022 0953 by Caitlyn Escobar  Outcome: Progressing  9/27/2022 0227 by Kash Zamarripa LPN  Outcome: Progressing     Problem: Safety - Adult  Goal: Free from fall injury  9/27/2022 1529 by Jaycob Fabian LPN  Outcome: Completed  9/27/2022 1341 by Jaycob Fabian LPN  Outcome: Progressing

## 2022-09-27 NOTE — PROGRESS NOTES
Physical Therapy Treatment Note  Name: Damon Blevins MRN: [id number] :   1940   Date:  2022   Admission Date: 2022 Room:  [address] [place] [address]     Restrictions/Precautions:          general precautions, telesitter, fall risk    Communication with other providers:  OT, RN    Subjective:  Patient states:  \"I have to use the bathroom\"  Pain:   Location, Type, Intensity (0/10 to 10/10):  states stomach pain but does not rate    Objective:    Observation:  Pt supine in bed and agreeable to session    Treatment, including education/measures:    Bed mobility: Pt completed supine <-> sitting EOB SBA with increased time to complete and cues for sequencing    Transfers: Pt completed STS to/from EOB CGA and to/from commode x2 trials min A with cues for sequencing    Gait: pt ambulated 10' + 10' with bilateral HHA min A with decreased joellen, narrow IZZY, and decreased step length bilaterally. Cues provided for hand placement throughout. Assessment / Impression:    Pt supine in bed at end of session with needs in reach, alarm on, and RN in room.      Patient's tolerance of treatment:  fair   Adverse Reaction: n/a  Significant change in status and impact:  n/a  Barriers to improvement:  decreased endurance, impaired balance, impaired gait    Plan for Next Session:    Continue to address transfer training and gait training in future sessions    Time in:  1344  Time out:  1400  Timed treatment minutes:  16  Total treatment time:  16    Previously filed items:  Social/Functional History  Lives With: Daughter  Type of Home: House  Home Layout: Two level, Able to Live on Main level with bedroom/bathroom, Performs ADL's on one level  Home Access: Stairs to enter with rails  Entrance Stairs - Number of Steps: 2  Bathroom Toilet: Bedside commode  Home Equipment: Walker, rolling (PRN use of RW, daughter tries to encourage all the time)  ADL Assistance: [address] [place] [address] Avenue: Needs assistance  Homemaking Responsibilities: No  Ambulation Assistance: Needs assistance (daughter supervises)  Transfer Assistance: Needs assistance (daughter supervises)  Active : No  Occupation: Retired        Short Term Goals  Time Frame for Ehsansuzy Dowellce term goals: 1 week  Short term goal 1: Pt to complete all bed mobility SBA  Short term goal 2: Pt to complete all STS transfers to/from bed, commode, and chair SBA  Short term goal 3: pt to ambulate 48' with LRAD CGA    Electronically signed by:    Keo Barajas, SAMUEL  9/27/2022, 3:40 PM

## 2022-09-28 LAB
CULTURE: NORMAL
CULTURE: NORMAL
Lab: NORMAL
Lab: NORMAL
SPECIMEN: NORMAL
SPECIMEN: NORMAL

## 2022-09-30 ENCOUNTER — HOSPITAL ENCOUNTER (OUTPATIENT)
Age: 82
Setting detail: SPECIMEN
Discharge: HOME | End: 2022-09-30

## 2022-09-30 LAB
ALBUMIN SERPL-MCNC: 3.9 GM/DL (ref 3.4–5)
ALP BLD-CCNC: 66 IU/L (ref 40–128)
ALT SERPL-CCNC: 17 U/L (ref 10–40)
ANION GAP SERPL CALCULATED.3IONS-SCNC: 14 MMOL/L (ref 4–16)
AST SERPL-CCNC: 19 IU/L (ref 15–37)
BASOPHILS ABSOLUTE: 0.1 K/CU MM
BASOPHILS RELATIVE PERCENT: 0.5 % (ref 0–1)
BILIRUB SERPL-MCNC: 0.6 MG/DL (ref 0–1)
BUN BLDV-MCNC: 35 MG/DL (ref 6–23)
CALCIUM SERPL-MCNC: 8.6 MG/DL (ref 8.3–10.6)
CHLORIDE BLD-SCNC: 96 MMOL/L (ref 99–110)
CO2: 22 MMOL/L (ref 21–32)
CREAT SERPL-MCNC: 1.5 MG/DL (ref 0.6–1.1)
DIFFERENTIAL TYPE: ABNORMAL
EOSINOPHILS ABSOLUTE: 0.5 K/CU MM
EOSINOPHILS RELATIVE PERCENT: 3 % (ref 0–3)
GFR AFRICAN AMERICAN: 40 ML/MIN/1.73M2
GFR NON-AFRICAN AMERICAN: 33 ML/MIN/1.73M2
GLUCOSE BLD-MCNC: 120 MG/DL (ref 70–99)
HCT VFR BLD CALC: 30 % (ref 37–47)
HEMOGLOBIN: 8.8 GM/DL (ref 12.5–16)
IMMATURE NEUTROPHIL %: 0.7 % (ref 0–0.43)
LYMPHOCYTES ABSOLUTE: 1 K/CU MM
LYMPHOCYTES RELATIVE PERCENT: 6.9 % (ref 24–44)
MCH RBC QN AUTO: 25 PG (ref 27–31)
MCHC RBC AUTO-ENTMCNC: 29.3 % (ref 32–36)
MCV RBC AUTO: 85.2 FL (ref 78–100)
MONOCYTES ABSOLUTE: 1.3 K/CU MM
MONOCYTES RELATIVE PERCENT: 8.4 % (ref 0–4)
NUCLEATED RBC %: 0 %
PDW BLD-RTO: 15.8 % (ref 11.7–14.9)
PLATELET # BLD: 117 K/CU MM (ref 140–440)
PMV BLD AUTO: 13 FL (ref 7.5–11.1)
POTASSIUM SERPL-SCNC: 3.9 MMOL/L (ref 3.5–5.1)
RBC # BLD: 3.52 M/CU MM (ref 4.2–5.4)
SEGMENTED NEUTROPHILS ABSOLUTE COUNT: 12.1 K/CU MM
SEGMENTED NEUTROPHILS RELATIVE PERCENT: 80.5 % (ref 36–66)
SODIUM BLD-SCNC: 132 MMOL/L (ref 135–145)
TOTAL IMMATURE NEUTOROPHIL: 0.1 K/CU MM
TOTAL NUCLEATED RBC: 0 K/CU MM
TOTAL PROTEIN: 5.9 GM/DL (ref 6.4–8.2)
WBC # BLD: 15 K/CU MM (ref 4–10.5)

## 2022-09-30 PROCEDURE — 85025 COMPLETE CBC W/AUTO DIFF WBC: CPT

## 2022-09-30 PROCEDURE — 80053 COMPREHEN METABOLIC PANEL: CPT

## 2022-09-30 PROCEDURE — 86480 TB TEST CELL IMMUN MEASURE: CPT

## 2022-09-30 PROCEDURE — 36415 COLL VENOUS BLD VENIPUNCTURE: CPT

## 2022-10-04 ENCOUNTER — APPOINTMENT (OUTPATIENT)
Dept: GENERAL RADIOLOGY | Age: 82
DRG: 480 | End: 2022-10-04
Payer: MEDICARE

## 2022-10-04 ENCOUNTER — APPOINTMENT (OUTPATIENT)
Dept: CT IMAGING | Age: 82
DRG: 480 | End: 2022-10-04
Payer: MEDICARE

## 2022-10-04 ENCOUNTER — HOSPITAL ENCOUNTER (INPATIENT)
Age: 82
LOS: 8 days | Discharge: NURSING, REHAB OR LONG-TERM CARE | DRG: 480 | End: 2022-10-12
Attending: STUDENT IN AN ORGANIZED HEALTH CARE EDUCATION/TRAINING PROGRAM | Admitting: STUDENT IN AN ORGANIZED HEALTH CARE EDUCATION/TRAINING PROGRAM
Payer: MEDICARE

## 2022-10-04 DIAGNOSIS — S72.002A CLOSED LEFT HIP FRACTURE, INITIAL ENCOUNTER (HCC): Primary | ICD-10-CM

## 2022-10-04 DIAGNOSIS — E87.1 HYPONATREMIA: ICD-10-CM

## 2022-10-04 LAB
ALBUMIN SERPL-MCNC: 3.8 GM/DL (ref 3.4–5)
ALP BLD-CCNC: 80 IU/L (ref 40–129)
ALT SERPL-CCNC: 12 U/L (ref 10–40)
ANION GAP SERPL CALCULATED.3IONS-SCNC: 10 MMOL/L (ref 4–16)
ANION GAP SERPL CALCULATED.3IONS-SCNC: 8 MMOL/L (ref 4–16)
APTT: 27.3 SECONDS (ref 25.1–37.1)
AST SERPL-CCNC: 14 IU/L (ref 15–37)
BACTERIA: NEGATIVE /HPF
BASOPHILS ABSOLUTE: 0 K/CU MM
BASOPHILS RELATIVE PERCENT: 0.1 % (ref 0–1)
BILIRUB SERPL-MCNC: 0.6 MG/DL (ref 0–1)
BILIRUBIN URINE: NEGATIVE
BLOOD, URINE: NEGATIVE
BUN BLDV-MCNC: 28 MG/DL (ref 6–23)
BUN BLDV-MCNC: 33 MG/DL (ref 6–23)
CALCIUM SERPL-MCNC: 8.1 MG/DL (ref 8.3–10.6)
CALCIUM SERPL-MCNC: 8.9 MG/DL (ref 8.3–10.6)
CHLORIDE BLD-SCNC: 95 MMOL/L (ref 99–110)
CHLORIDE BLD-SCNC: 99 MMOL/L (ref 99–110)
CLARITY: NORMAL
CO2: 21 MMOL/L (ref 21–32)
CO2: 22 MMOL/L (ref 21–32)
COLOR: YELLOW
CREAT SERPL-MCNC: 1.2 MG/DL (ref 0.6–1.1)
CREAT SERPL-MCNC: 1.4 MG/DL (ref 0.6–1.1)
DIFFERENTIAL TYPE: ABNORMAL
EOSINOPHILS ABSOLUTE: 0.1 K/CU MM
EOSINOPHILS RELATIVE PERCENT: 0.4 % (ref 0–3)
GFR AFRICAN AMERICAN: 44 ML/MIN/1.73M2
GFR AFRICAN AMERICAN: 52 ML/MIN/1.73M2
GFR NON-AFRICAN AMERICAN: 36 ML/MIN/1.73M2
GFR NON-AFRICAN AMERICAN: 43 ML/MIN/1.73M2
GLUCOSE BLD-MCNC: 116 MG/DL (ref 70–99)
GLUCOSE BLD-MCNC: 140 MG/DL (ref 70–99)
GLUCOSE BLD-MCNC: 180 MG/DL (ref 70–99)
GLUCOSE, URINE: 500 MG/DL
HCT VFR BLD CALC: 30.2 % (ref 37–47)
HEMOGLOBIN: 9.3 GM/DL (ref 12.5–16)
IMMATURE NEUTROPHIL %: 1.1 % (ref 0–0.43)
INR BLD: 0.9 INDEX
KETONES, URINE: NEGATIVE MG/DL
LEUKOCYTE ESTERASE, URINE: NEGATIVE
LYMPHOCYTES ABSOLUTE: 0.9 K/CU MM
LYMPHOCYTES RELATIVE PERCENT: 5.4 % (ref 24–44)
MAGNESIUM: 2.5 MG/DL (ref 1.8–2.4)
MCH RBC QN AUTO: 25.6 PG (ref 27–31)
MCHC RBC AUTO-ENTMCNC: 30.8 % (ref 32–36)
MCV RBC AUTO: 83.2 FL (ref 78–100)
MONOCYTES ABSOLUTE: 1.1 K/CU MM
MONOCYTES RELATIVE PERCENT: 6.6 % (ref 0–4)
NITRITE URINE, QUANTITATIVE: NEGATIVE
NUCLEATED RBC %: 0 %
PDW BLD-RTO: 17.1 % (ref 11.7–14.9)
PH, URINE: 6
PLATELET # BLD: 339 K/CU MM (ref 140–440)
PMV BLD AUTO: 10.7 FL (ref 7.5–11.1)
POTASSIUM SERPL-SCNC: 4.2 MMOL/L (ref 3.5–5.1)
POTASSIUM SERPL-SCNC: 5.3 MMOL/L (ref 3.5–5.1)
PROTEIN UA: NEGATIVE MG/DL
PROTHROMBIN TIME: 11.6 SECONDS (ref 11.7–14.5)
RBC # BLD: 3.63 M/CU MM (ref 4.2–5.4)
RBC URINE: NORMAL /HPF
SEGMENTED NEUTROPHILS ABSOLUTE COUNT: 14 K/CU MM
SEGMENTED NEUTROPHILS RELATIVE PERCENT: 86.4 % (ref 36–66)
SODIUM BLD-SCNC: 127 MMOL/L (ref 135–145)
SODIUM BLD-SCNC: 128 MMOL/L (ref 135–145)
SPECIFIC GRAVITY UA: 1.01
SQUAMOUS EPITHELIAL: 1 /HPF
TOTAL IMMATURE NEUTOROPHIL: 0.18 K/CU MM
TOTAL NUCLEATED RBC: 0 K/CU MM
TOTAL PROTEIN: 6.5 GM/DL (ref 6.4–8.2)
TRICHOMONAS: NORMAL /HPF
UROBILINOGEN, URINE: 0.2 MG/DL
WBC # BLD: 16.2 K/CU MM (ref 4–10.5)
WBC UA: NORMAL /HPF
YEAST: NORMAL /HPF

## 2022-10-04 PROCEDURE — 51702 INSERT TEMP BLADDER CATH: CPT

## 2022-10-04 PROCEDURE — 85025 COMPLETE CBC W/AUTO DIFF WBC: CPT

## 2022-10-04 PROCEDURE — 94761 N-INVAS EAR/PLS OXIMETRY MLT: CPT

## 2022-10-04 PROCEDURE — 71045 X-RAY EXAM CHEST 1 VIEW: CPT

## 2022-10-04 PROCEDURE — 70450 CT HEAD/BRAIN W/O DYE: CPT

## 2022-10-04 PROCEDURE — 85730 THROMBOPLASTIN TIME PARTIAL: CPT

## 2022-10-04 PROCEDURE — 99285 EMERGENCY DEPT VISIT HI MDM: CPT

## 2022-10-04 PROCEDURE — 81001 URINALYSIS AUTO W/SCOPE: CPT

## 2022-10-04 PROCEDURE — 6370000000 HC RX 637 (ALT 250 FOR IP): Performed by: NURSE PRACTITIONER

## 2022-10-04 PROCEDURE — 1200000000 HC SEMI PRIVATE

## 2022-10-04 PROCEDURE — 73502 X-RAY EXAM HIP UNI 2-3 VIEWS: CPT

## 2022-10-04 PROCEDURE — 6360000002 HC RX W HCPCS: Performed by: PHYSICIAN ASSISTANT

## 2022-10-04 PROCEDURE — 2580000003 HC RX 258: Performed by: NURSE PRACTITIONER

## 2022-10-04 PROCEDURE — 73700 CT LOWER EXTREMITY W/O DYE: CPT

## 2022-10-04 PROCEDURE — 96374 THER/PROPH/DIAG INJ IV PUSH: CPT

## 2022-10-04 PROCEDURE — 36415 COLL VENOUS BLD VENIPUNCTURE: CPT

## 2022-10-04 PROCEDURE — 85610 PROTHROMBIN TIME: CPT

## 2022-10-04 PROCEDURE — 80048 BASIC METABOLIC PNL TOTAL CA: CPT

## 2022-10-04 PROCEDURE — 2580000003 HC RX 258: Performed by: PHYSICIAN ASSISTANT

## 2022-10-04 PROCEDURE — 83036 HEMOGLOBIN GLYCOSYLATED A1C: CPT

## 2022-10-04 PROCEDURE — 80053 COMPREHEN METABOLIC PANEL: CPT

## 2022-10-04 PROCEDURE — 82962 GLUCOSE BLOOD TEST: CPT

## 2022-10-04 PROCEDURE — 83735 ASSAY OF MAGNESIUM: CPT

## 2022-10-04 PROCEDURE — 93005 ELECTROCARDIOGRAM TRACING: CPT | Performed by: PHYSICIAN ASSISTANT

## 2022-10-04 RX ORDER — SODIUM CHLORIDE 0.9 % (FLUSH) 0.9 %
5-40 SYRINGE (ML) INJECTION EVERY 12 HOURS SCHEDULED
Status: DISCONTINUED | OUTPATIENT
Start: 2022-10-04 | End: 2022-10-06

## 2022-10-04 RX ORDER — GLIPIZIDE 5 MG/1
10 TABLET ORAL DAILY
COMMUNITY

## 2022-10-04 RX ORDER — CARVEDILOL 25 MG/1
25 TABLET ORAL 2 TIMES DAILY WITH MEALS
Status: DISCONTINUED | OUTPATIENT
Start: 2022-10-04 | End: 2022-10-12 | Stop reason: HOSPADM

## 2022-10-04 RX ORDER — QUETIAPINE FUMARATE 25 MG/1
25 TABLET, FILM COATED ORAL NIGHTLY
Status: DISCONTINUED | OUTPATIENT
Start: 2022-10-04 | End: 2022-10-12 | Stop reason: HOSPADM

## 2022-10-04 RX ORDER — SODIUM CHLORIDE 0.9 % (FLUSH) 0.9 %
5-40 SYRINGE (ML) INJECTION PRN
Status: DISCONTINUED | OUTPATIENT
Start: 2022-10-04 | End: 2022-10-12 | Stop reason: HOSPADM

## 2022-10-04 RX ORDER — ATORVASTATIN CALCIUM 40 MG/1
40 TABLET, FILM COATED ORAL NIGHTLY
Status: DISCONTINUED | OUTPATIENT
Start: 2022-10-04 | End: 2022-10-12 | Stop reason: HOSPADM

## 2022-10-04 RX ORDER — ONDANSETRON 2 MG/ML
4 INJECTION INTRAMUSCULAR; INTRAVENOUS EVERY 6 HOURS PRN
Status: DISCONTINUED | OUTPATIENT
Start: 2022-10-04 | End: 2022-10-12 | Stop reason: HOSPADM

## 2022-10-04 RX ORDER — BUPROPION HYDROCHLORIDE 150 MG/1
150 TABLET, EXTENDED RELEASE ORAL 2 TIMES DAILY
Status: DISCONTINUED | OUTPATIENT
Start: 2022-10-04 | End: 2022-10-12 | Stop reason: HOSPADM

## 2022-10-04 RX ORDER — DEXAMETHASONE 4 MG/1
2 TABLET ORAL
Status: DISCONTINUED | OUTPATIENT
Start: 2022-10-05 | End: 2022-10-12 | Stop reason: HOSPADM

## 2022-10-04 RX ORDER — SODIUM CHLORIDE 9 MG/ML
25 INJECTION, SOLUTION INTRAVENOUS PRN
Status: DISCONTINUED | OUTPATIENT
Start: 2022-10-04 | End: 2022-10-06

## 2022-10-04 RX ORDER — INSULIN LISPRO 100 [IU]/ML
0-4 INJECTION, SOLUTION INTRAVENOUS; SUBCUTANEOUS
Status: DISCONTINUED | OUTPATIENT
Start: 2022-10-04 | End: 2022-10-12 | Stop reason: HOSPADM

## 2022-10-04 RX ORDER — OXYCODONE HYDROCHLORIDE 5 MG/1
5 TABLET ORAL EVERY 4 HOURS PRN
Status: DISCONTINUED | OUTPATIENT
Start: 2022-10-04 | End: 2022-10-12 | Stop reason: HOSPADM

## 2022-10-04 RX ORDER — MORPHINE SULFATE 2 MG/ML
2 INJECTION, SOLUTION INTRAMUSCULAR; INTRAVENOUS
Status: ACTIVE | OUTPATIENT
Start: 2022-10-04 | End: 2022-10-05

## 2022-10-04 RX ORDER — SODIUM CHLORIDE 9 MG/ML
INJECTION, SOLUTION INTRAVENOUS CONTINUOUS
Status: DISCONTINUED | OUTPATIENT
Start: 2022-10-04 | End: 2022-10-04

## 2022-10-04 RX ORDER — SODIUM CHLORIDE 9 MG/ML
INJECTION, SOLUTION INTRAVENOUS CONTINUOUS
Status: DISPENSED | OUTPATIENT
Start: 2022-10-04 | End: 2022-10-05

## 2022-10-04 RX ORDER — M-VIT,TX,IRON,MINS/CALC/FOLIC 27MG-0.4MG
1 TABLET ORAL DAILY
Status: DISCONTINUED | OUTPATIENT
Start: 2022-10-05 | End: 2022-10-12 | Stop reason: HOSPADM

## 2022-10-04 RX ORDER — ACETAMINOPHEN 325 MG/1
650 TABLET ORAL EVERY 6 HOURS PRN
Status: DISCONTINUED | OUTPATIENT
Start: 2022-10-04 | End: 2022-10-12 | Stop reason: HOSPADM

## 2022-10-04 RX ORDER — ASPIRIN 81 MG/1
81 TABLET ORAL DAILY
Status: DISCONTINUED | OUTPATIENT
Start: 2022-10-05 | End: 2022-10-12 | Stop reason: HOSPADM

## 2022-10-04 RX ORDER — DEXTROSE MONOHYDRATE 100 MG/ML
INJECTION, SOLUTION INTRAVENOUS CONTINUOUS PRN
Status: DISCONTINUED | OUTPATIENT
Start: 2022-10-04 | End: 2022-10-12 | Stop reason: HOSPADM

## 2022-10-04 RX ORDER — ONDANSETRON 2 MG/ML
4 INJECTION INTRAMUSCULAR; INTRAVENOUS EVERY 30 MIN PRN
Status: DISCONTINUED | OUTPATIENT
Start: 2022-10-04 | End: 2022-10-12 | Stop reason: HOSPADM

## 2022-10-04 RX ORDER — LANOLIN ALCOHOL/MO/W.PET/CERES
325 CREAM (GRAM) TOPICAL
Status: DISCONTINUED | OUTPATIENT
Start: 2022-10-05 | End: 2022-10-05 | Stop reason: SDUPTHER

## 2022-10-04 RX ORDER — ACETAMINOPHEN 650 MG/1
650 SUPPOSITORY RECTAL EVERY 6 HOURS PRN
Status: DISCONTINUED | OUTPATIENT
Start: 2022-10-04 | End: 2022-10-12 | Stop reason: HOSPADM

## 2022-10-04 RX ORDER — OXYCODONE HYDROCHLORIDE 5 MG/1
5 TABLET ORAL EVERY 4 HOURS PRN
COMMUNITY

## 2022-10-04 RX ORDER — PANTOPRAZOLE SODIUM 40 MG/1
40 TABLET, DELAYED RELEASE ORAL
Status: DISCONTINUED | OUTPATIENT
Start: 2022-10-05 | End: 2022-10-12 | Stop reason: HOSPADM

## 2022-10-04 RX ORDER — FENTANYL CITRATE 50 UG/ML
25 INJECTION, SOLUTION INTRAMUSCULAR; INTRAVENOUS ONCE
Status: COMPLETED | OUTPATIENT
Start: 2022-10-04 | End: 2022-10-04

## 2022-10-04 RX ORDER — AMLODIPINE BESYLATE 10 MG/1
10 TABLET ORAL DAILY
Status: DISCONTINUED | OUTPATIENT
Start: 2022-10-04 | End: 2022-10-12 | Stop reason: HOSPADM

## 2022-10-04 RX ORDER — INSULIN LISPRO 100 [IU]/ML
0-4 INJECTION, SOLUTION INTRAVENOUS; SUBCUTANEOUS NIGHTLY
Status: DISCONTINUED | OUTPATIENT
Start: 2022-10-04 | End: 2022-10-12 | Stop reason: HOSPADM

## 2022-10-04 RX ORDER — POLYETHYLENE GLYCOL 3350 17 G/17G
17 POWDER, FOR SOLUTION ORAL DAILY PRN
Status: DISCONTINUED | OUTPATIENT
Start: 2022-10-04 | End: 2022-10-12 | Stop reason: HOSPADM

## 2022-10-04 RX ORDER — FENOFIBRATE 160 MG/1
160 TABLET ORAL DAILY
Status: DISCONTINUED | OUTPATIENT
Start: 2022-10-05 | End: 2022-10-12 | Stop reason: HOSPADM

## 2022-10-04 RX ORDER — ONDANSETRON 4 MG/1
4 TABLET, ORALLY DISINTEGRATING ORAL EVERY 8 HOURS PRN
Status: DISCONTINUED | OUTPATIENT
Start: 2022-10-04 | End: 2022-10-12 | Stop reason: HOSPADM

## 2022-10-04 RX ORDER — ACETAMINOPHEN 325 MG/1
650 TABLET ORAL EVERY 6 HOURS PRN
COMMUNITY

## 2022-10-04 RX ADMIN — FENTANYL CITRATE 25 MCG: 50 INJECTION, SOLUTION INTRAMUSCULAR; INTRAVENOUS at 12:32

## 2022-10-04 RX ADMIN — QUETIAPINE FUMARATE 25 MG: 25 TABLET ORAL at 19:57

## 2022-10-04 RX ADMIN — ATORVASTATIN CALCIUM 40 MG: 40 TABLET, FILM COATED ORAL at 19:57

## 2022-10-04 RX ADMIN — SODIUM CHLORIDE: 9 INJECTION, SOLUTION INTRAVENOUS at 12:32

## 2022-10-04 RX ADMIN — AMLODIPINE BESYLATE 10 MG: 10 TABLET ORAL at 19:18

## 2022-10-04 RX ADMIN — SODIUM CHLORIDE: 9 INJECTION, SOLUTION INTRAVENOUS at 14:49

## 2022-10-04 RX ADMIN — BUPROPION HYDROCHLORIDE 150 MG: 150 TABLET, EXTENDED RELEASE ORAL at 21:21

## 2022-10-04 RX ADMIN — CARVEDILOL 25 MG: 25 TABLET, FILM COATED ORAL at 19:18

## 2022-10-04 ASSESSMENT — ENCOUNTER SYMPTOMS
SHORTNESS OF BREATH: 0
ABDOMINAL PAIN: 0
NAUSEA: 0
VOMITING: 0
COUGH: 0

## 2022-10-04 ASSESSMENT — PAIN SCALES - WONG BAKER
WONGBAKER_NUMERICALRESPONSE: 0

## 2022-10-04 ASSESSMENT — PAIN SCALES - GENERAL
PAINLEVEL_OUTOF10: 0
PAINLEVEL_OUTOF10: 4

## 2022-10-04 NOTE — PROGRESS NOTES
Admitted to room 1128. Oriented to room and surroundings  Assessments completed and daughter aware of new room and admission. Perfectserve sent to hospitalist.  Resting in bed at this time.

## 2022-10-04 NOTE — ED NOTES
ED TO INPATIENT SBAR HANDOFF    Patient Name: Namita Gregory   :  1940  81 y.o. MRN:  [id number]  Preferred Name    ED Room #:  ED33/ED-33  Caregiver Present no   Restraints no   Sitter no   Sepsis Risk Score Sepsis Risk Score: 2.75    Situation  Code Status: Prior Limited Code details: Intubation/Re-intubation No Comment; Defibrillation/Cardioversion No Comment; Chest Compressions No Comment; Resuscitative Medications No Comment; Other No Comment. Allergies: Tramadol and Vicodin [hydrocodone-acetaminophen]  Weight: No data found. Arrived from: nursing home  Chief Complaint:   Chief Complaint   Patient presents with    Hip Pain     Hospital Problem/Diagnosis:  Active Problems:    * No active hospital problems. *  Resolved Problems:    * No resolved hospital problems. *    Imaging:   CT HEAD WO CONTRAST   Final Result   1. Mild high left frontal scalp soft tissue swelling but no acute   intracranial abnormality. XR HIP 2-3 VW W PELVIS LEFT   Preliminary Result   Acute mildly displaced fracture of the left proximal femur involving the left   femoral neck versus intertrochanteric region.            Abnormal labs:   Abnormal Labs Reviewed   CBC WITH AUTO DIFFERENTIAL - Abnormal; Notable for the following components:       Result Value    WBC 16.2 (*)     RBC 3.63 (*)     Hemoglobin 9.3 (*)     Hematocrit 30.2 (*)     MCH 25.6 (*)     MCHC 30.8 (*)     RDW 17.1 (*)     Segs Relative 86.4 (*)     Lymphocytes % 5.4 (*)     Monocytes % 6.6 (*)     Immature Neutrophil % 1.1 (*)     All other components within normal limits   COMPREHENSIVE METABOLIC PANEL - Abnormal; Notable for the following components:    Sodium 127 (*)     Potassium 5.3 (*)     Chloride 95 (*)     BUN 33 (*)     Creatinine 1.4 (*)     Glucose 180 (*)     AST 14 (*)     GFR Non- 36 (*)     GFR  44 (*)     All other components within normal limits   PROTIME/INR & PTT - Abnormal; Notable for the following components:    Protime 11.6 (*)     All other components within normal limits     Critical values: no     Abnormal Assessment Findings: left hip pain     Background  Hospital admissions in last 30 days? yes   History:   Past Medical History:   Diagnosis Date    Acute anterior wall MI ([facility] 2007    CAD (coronary artery disease)     Dr. 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 Diabetes mellitus ([facility]     PCP    H/O cardiovascular stress test 5/02    EF 74 %,normal perfusion    H/O echocardiogram 11/02    EF 60%, mild to mod MR,    Hyperlipidemia     Hypertension     PCP    Protein-calorie malnutrition, moderate ([facility] 8/12/2015       Assessment    Vitals/MEWS:        Vitals:    10/04/22 1039 10/04/22 1040   BP: (!) 145/63    Pulse: 66 65   Resp:  15   Temp: 98.3 °F (36.8 °C)    TempSrc: Oral    SpO2:  100%     FiO2 (%):   O2 Flow Rate:      Cardiac Rhythm:    Pain Assessment:    [x] Verbal [] Remigio Roberts Scale  Pain Scale:    Last documented pain score (0-10 scale)    Last documented pain medication administered: 1232  Mental Status: oriented and alert  C-SSRS: Risk of Suicide: No Risk  Bedside swallow:    [place] Coma Scale (GCS): Active LDA's:   Peripheral IV 10/04/22 Left Antecubital (Active)     PO Status: Regular  Pertinent or High Risk Medications/Drips: no   If Yes, please provide details:   Pending Blood Product Administration: no     Blood Cultures: see ED pt care timeline or ED Event log    Recommendation    Pending orders   Plan for Discharge (if known):    Additional Comments:  If any further questions, please [place] Sending RN at [phone]    Electronically signed by: Electronically signed by Shanell Flores RN on 10/4/2022 at 12:56 PM       Trevor Turner RN  10/04/22 3517

## 2022-10-04 NOTE — H&P
History and Physical      Name:  Danna Jones /Age/Sex: 1940  (81 y.o. female)   MRN & CSN:  [id number] & [id number] Admission Date/Time: 10/4/2022 10:32 AM   Location:  ED33/ED-33 PCP: Farshad Anderson MD       Hospital Day: 1     Attending physician: Dr. Essie Beach and Plan:   Danna Jones is a 81 y.o.  female  who presents with Left hip pain for the past 3 days. Outpatient x-ray demonstrated left hip fracture sent in from rehab center. Assessment and plan:     Left intertrochanteric proximal femur fracture-  fall 3 days ago  left proximal femur involving the femoral neck versus intertrochanteric region. Denies a fall to injury states has had pain the past 3 days during PT. CT of left hip demonstrate acute impacted nondisplaced intertrochanteric left proximal femur fracture. -Inpatient  -CT left hip with reconstructions pending  -Orthopedics consulted-Dr. Evelyn Ray  -Analgesics antiemetics  -Place Harvey catheter  -Bedrest until further evaluation  -Neurochecks left lower extremity  -N.p.o. at midnight  -Possible surgery in a.m. pending further evaluation    Hyponatremia: 127   Hyperkalemia 5.3  Hypermagnesemia 2.5  -MIVF  -Recheck BMP after fluids,  follow daily    Leukocytosis: 16.2 no episodes of infection. UA bland  -Chest x-ray pending    CKD stage III: Baseline creatinine around 1.2-creatinine mildly elevated at 1.4.  -Daily labs  -Avoid toxic agents  -MIVF    Diabetes Mellitus type II -with hyperglycemia - without long tern use of insulin.  managed on metformin, glipizide, Jardiance, hold long acting antiglycemics,  last Hgb A1C-6.3, Blood glucose on admission 180.   - SSI  - held home PO meds  - POCT glucose qACHS  - hypoglycemia management protocol   - target blood glucose 100-180  - ADA carb controlled diet  -N.p.o. after midnight    Carcinoid syndrome  Malignancy associated pain: Controlled with oxycodone, active prescription verified on [facility]  follows oncology  [facility]    Hyperlipidemia: Continue statin therapy    Hypertension  CAD  Managed on aspirin, amlodipine and carvedilol    Chronic Conditions: Continue all home medications except as stated above or contraindicated. Underweight BMI 17.56: kg/m2 likely secondary to protein calorie malnutrition  -Nutrition consult    Disposition: Inpatient. Patient was accepted for continue evaluation and treatment and improvement of clinical symptoms. Discussed assessment and treatment plan with the admitting and supervising physician who agrees with the plan. Diet ADULT DIET; Regular; 4 carb choices (60 gm/meal)  Diet NPO   DVT Prophylaxis [] Lovenox, []  Heparin, [] SCDs, [] Warfarin  [] NOAC     GI Prophylaxis [] PPI,  [] H2 Blocker,  [] Carafate,  [] Diet/Tube Feeds   Code Status Full Code     History of Present Illness:     Chief Complaint: Closed left hip fracture, initial encounter ([facility] [address]  Abby Kitchen is a 81 y.o.  female, with past medical history significant for carcinoid syndrome, diabetes mellitus, hypertension, CAD, carotid artery stenosis, history of MI hyperlipidemia, who presented to the emergency room with complaint of pain and unable to bear weight. The present condition started 3 days ago after reported fall. States she has not been able to participate in PT the past 3 days she is unable to bear weight on her left leg. She is currently a resident at [facility] due to her complaint declined outpatient x-ray at that was abnormal and sent her in for further evaluation. At time of admission there is no family members present at bedside. Patient is awake alert oriented to herself and cannot provide some history. She does not recall falling states it hurts to walk. She denies any pain with palpation of hips or pelvis. ER provider patient [facility], altered mental status and falls patient was initially evaluated care emergency and was transferred for admission. Daughter stated to them that patient has not been able to get out of bed for the past few days due to the pain. On exam patient denies any dysuria, urgency or frequency. She denies any pain in her knees, right hip or arms. No numbness or tingling or weaknesses in extremities. At the ED, vital signs;T98.3,HR 66, RR 15, /63 mm/hg,SPO2 100% RA Significant laboratories were the following: BUN 27, potassium 5.3, BUN 33, creatinine 1.4, GFR 26, magnesium 2.5, glucose 180, WBC 16.2, H&H-9.3/30.2  UA bland  EKG-sinus rhythm heart rate 67, QTc 490 no ischemic changes or ectopy is noted  The patient was brought to the ED and was subsequently admitted for  further evaluation. and management          Review of Systems   Constitutional:  Negative for fatigue and fever. HENT:  Negative for congestion. Respiratory:  Negative for cough and shortness of breath. Cardiovascular:  Negative for chest pain. Gastrointestinal:  Negative for abdominal pain, nausea and vomiting. Genitourinary:  Negative for dysuria. Musculoskeletal:  Positive for arthralgias. Negative for joint swelling. Left hip pain, unable to bear weight   Skin: Negative. Neurological:  Negative for dizziness and light-headedness. Hematological: Negative. Psychiatric/Behavioral: Negative. Objective:   No intake or output data in the 24 hours ending 10/04/22 1405   Vitals:   Vitals:    10/04/22 1401   BP: (!) 128/56   Pulse: 67   Resp: 19   Temp:    SpO2:      Physical Exam:   GEN- Awake female, lying in bed, appears to be stated age. EYES- Pupils are equally round. HENT- Mucous membranes are moist.  Resolving contusion to left forehead   NECK- Supple, no apparent thyromegaly or masses. RESP-Clear to auscultation, no wheezes, rales or rhonchi. Symmetric chest movement while on room air. CARDIO/VASC- Regular rate and rhythm,  Peripheral pulses equal bilaterally and palpable.    GI- Abdomen is soft, no tenderness, masses, or guarding. Bowel sounds present. MSK- No gross joint deformities. Left lower extremity noted to be shortened  EXTREMITIES- pulses intact, no swelling, no calf tenderness, left lower leg shortening, no rotation  SKIN- Normal coloration, warm, dry. NEURO normal speech, no lateralizing weakness. PSYCH-Awake, alert, oriented x3    Past Medical History:      Past Medical History:   Diagnosis Date    Acute anterior wall MI ([facility] [address] 2007    CAD (coronary artery disease)     Dr. 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 Diabetes mellitus ([facility] [address]     PCP    H/O cardiovascular stress test 5/02    EF 74 %,normal perfusion    H/O echocardiogram 11/02    EF 60%, mild to mod MR,    Hyperlipidemia     Hypertension     PCP    Protein-calorie malnutrition, moderate ([facility] [address] 8/12/2015     PSHX:  has a past surgical history that includes Coronary artery bypass graft (5/07); Diagnostic Cardiac Cath Lab Procedure (3/30/07); Abdomen surgery; other surgical history (08 11 2015); skin biopsy; fracture surgery (Left, 2001); fracture surgery (Right, 2017); Hysterectomy (1996); Wrist fracture surgery (Left, 2/17/2021); Upper gastrointestinal endoscopy (N/A, 4/18/2021); Colonoscopy (N/A, 4/18/2021); and Pacemaker insertion (N/A, 4/20/2021). Allergies: Allergies   Allergen Reactions    Tramadol Itching    Vicodin [Hydrocodone-Acetaminophen] Itching       FAM HX: family history includes Cancer in her father; Heart Attack in her mother; Heart Disease in her brother and mother.   Soc HX:   Social History     Socioeconomic History    Marital status:      Spouse name: None    Number of children: None    Years of education: None    Highest education level: None   Tobacco Use    Smoking status: Former     Packs/day: 1.00     Years: 30.00     Pack years: 30.00 Types: Cigarettes     Start date: 66     Quit date:      Years since quittin.7    Smokeless tobacco: Never   Vaping Use    Vaping Use: Never used   Substance and Sexual Activity    Alcohol use: No    Drug use: No       Social and family history reviewed with patient/family. Medications:     Home Medication   Prior to Admission medications    Medication Sig Start Date End Date Taking? Authorizing Provider   acetaminophen (TYLENOL) 325 MG tablet Take 650 mg by mouth every 6 hours as needed for Pain   Yes Historical Provider, MD   glipiZIDE (GLUCOTROL) 5 MG tablet Take 10 mg by mouth daily   Yes Historical Provider, MD   oxyCODONE (ROXICODONE) 5 MG immediate release tablet Take 5 mg by mouth every 4 hours as needed for Pain.    Yes Historical Provider, MD   amLODIPine (NORVASC) 10 MG tablet TAKE 1 TABLET BY MOUTH EVERY DAY  Patient taking differently: Take 10 mg by mouth daily 22   Gissel Luna MD   aspirin 81 MG EC tablet Take 1 tablet by mouth daily 9/27/22 10/27/22  Gissel Luna MD   atorvastatin (LIPITOR) 40 MG tablet Take 1 tablet by mouth daily  Patient taking differently: Take 40 mg by mouth nightly 9/27/22 10/27/22  Gissel Luna MD   buPROPion (WELLBUTRIN SR) 150 MG extended release tablet TAKE 1 TABLET BY MOUTH TWICE A DAY  Patient taking differently: Take 150 mg by mouth 2 times daily 22   Gissel Luna MD   carvedilol (COREG) 25 MG tablet Take 1 tablet by mouth 2 times daily (with meals) 9/27/22 10/27/22  Gissel Luna MD   dexamethasone (DECADRON) 2 MG tablet TAKE 1 TABLET BY MOUTH EVERY DAY WITH BREAKFAST  Patient taking differently: Take 2 mg by mouth daily (with breakfast) 22   Gissel Luna MD   pantoprazole (PROTONIX) 40 MG tablet Take 1 tablet by mouth every morning (before breakfast) 9/27/22 10/27/22  Gissel Luna MD   promethazine (PHENERGAN) 25 MG tablet Take 1 tablet by mouth every 6 hours as needed for Nausea 9/27/22 10/12/22  Gissel Luna MD   QUEtiapine (SEROQUEL) 25 MG tablet Take 1 tablet by mouth nightly 9/27/22 10/27/22  Gissel Luna MD   venlafaxine (EFFEXOR XR) 75 MG extended release capsule Take 3 capsules by mouth daily for 20 days 9/27/22 10/17/22  Gissel Luna MD   JARDIANCE 25 MG tablet Take 25 mg by mouth daily 7/28/22   Historical Provider, MD   triamcinolone (KENALOG) 0.025 % ointment APPLY TO AFFECTED AREA TWICE A DAY 7/27/22   Historical Provider, MD   ferrous sulfate (FE TABS 325) 325 (65 Fe) MG EC tablet TAKE 1 TABLET BY MOUTH EVERY DAY WITH BREAKFAST  Patient taking differently: Take 325 mg by mouth daily (with breakfast) 7/12/22   Shilpa Russo MD   metFORMIN (GLUCOPHAGE) 500 MG tablet Take 1 tablet by mouth 2 times daily (with meals) 10/17/19   Helen Reddy MD   valsartan-hydroCHLOROthiazide (DIOVAN-HCT) 320-25 MG per tablet Take 1 tablet by mouth daily  7/27/19   Historical Provider, MD   potassium chloride (MICRO-K) 10 MEQ CR capsule Take 10 mEq by mouth 2 times daily    Historical Provider, MD   Multiple Vitamins-Minerals (THERAPEUTIC MULTIVITAMIN-MINERALS) tablet Take 1 tablet by mouth daily    Historical Provider, MD   fenofibrate (TRICOR) 145 MG tablet Take 145 mg by mouth daily. Historical Provider, MD   cyanocobalamin 1000 MCG/ML injection Inject 1,000 mcg into the muscle every 7 days. 9/22/22  Historical Provider, MD     Medications:    Infusions:    sodium chloride 100 mL/hr at 10/04/22 1232     PRN Meds: ondansetron, 4 mg, Q30 Min PRN        Recent Labs     10/04/22  1055   WBC 16.2*   HGB 9.3*   HCT 30.2*         Recent Labs     10/04/22  1055   *   K 5.3*   CL 95*   CO2 22   BUN 33*   CREATININE 1.4*     Recent Labs     10/04/22  1055   AST 14*   ALT 12   BILITOT 0.6   ALKPHOS 80     Recent Labs     10/04/22  1055   INR 0.90     No results for input(s): CKTOTAL, CKMB, CKMBINDEX, TROPONINT in the last 72 hours.      Imaging reviewed  Echocardiogram complete 2D with doppler with color    Result Date: 9/23/2022  Transthoracic Echocardiography Report (TTE)  Demographics   Patient Name        Young HERNANDEZ Date of Study          09/23/2022   Date of Birth       1940      Gender                 Female   Age                 81 year(s)      Race                      Patient Number      [id number]      Room Number            3106   Visit Number        [id number]   Corporate ID        [id number]   Accession Number    [id number]      BETO Fuller   Ordering Physician                  Interpreting Physician Jana Lea MD  Procedure Type of Study   TTE procedure:ECHOCARDIOGRAM COMPLETE 2D W DOPPLER W COLOR. Procedure Date Date: 09/23/2022 Start: 08:43 AM Study Location: Portable Technical Quality: Technically difficult study Indications:Congestive heart failure. Height: 62 inches Weight: 96 pounds BSA: 1.4 m2 BMI: 17.56 kg/m2 HR: 63 bpm BP: 145/62 mmHg  Conclusions   Summary  Left ventricular systolic function is normal.  Ejection fraction is visually estimated at 55-60%. Moderate left ventricular hypertrophy. Grade I diastolic dysfunction. Sclerotic, but non-stenotic aortic valve. Mild to moderate tricuspid regurgitation; RVSP: 28 mmHg. No evidence of any pericardial effusion. Moderate MAC noted  moderate MR and TR noted  Pacer wire noted on the right side of heart. Grade I diastolic dysfunction . Signature   ------------------------------------------------------------------  Electronically signed by Jana Lea MD (Interpreting  physician) on 09/23/2022 at 02:54 PM  ------------------------------------------------------------------   Findings   Left Ventricle  Left ventricular systolic function is normal.  Ejection fraction is visually estimated at 55-60%. Moderate left ventricular hypertrophy. Grade I diastolic dysfunction. Left Atrium  Mildly dilated left atrium. Right Atrium  PPM wiring visualized in right atrium. Essentially normal right atrium.    Right Ventricle  PPM wiring visualized within the right ventricle. Aortic Valve  Sclerotic, but non-stenotic aortic valve. Aortic valve appears tricuspid. Mitral Valve  Mitral annular calcification is present. Mean gradient through valve 4 mmHg. Moderate mitral regurgitation. Tricuspid Valve  Mild to moderate tricuspid regurgitation; RVSP: 28 mmHg. Pulmonic Valve  The pulmonic valve was not well visualized. Pericardial Effusion  No evidence of any pericardial effusion. Pleural Effusion  No evidence of pleural effusion. Miscellaneous  IVC is within normal limits.   Abdominal Aorta not visual.  M-Mode/2D Measurements & Calculations   LV Diastolic Dimension:  LV Systolic Dimension:  LA Dimension: 4 cmAO Root  2.84 cm                  1.82 cm                 Dimension: 3.2 cmLA Area:  LV FS:35.9 %             LV Volume Diastolic: 45 67.1 cm2  LV PW Diastolic: 6.58 cm ml  LV PW Systolic: 1.70 cm  LV Volume Systolic: 11  Septum Diastolic: 2.24   ml  cm                       LV EDV/LV EDV Index: 45  Septum Systolic: 4.00 cm UN/75 H6JI ESV/LV ESV   LA/Aorta: 1.25  CO: 4.41 l/min           Index: 11 ml/8 m2  CI: 3.15 l/m*m2          EF Calculated (A4C):    LA volume/Index: 42 ml                           75.6 %                  /83H0  LV Area Diastolic: 98.8  EF Calculated (2D):  cm2                      67.3 %  LV Area Systolic: 9.95  cm2                      LV Length: 5.84 cm                            LVOT: 1.9 cm  Doppler Measurements & Calculations   MV Peak E-Wave: 110     AV Peak Velocity: 146 cm/s  LVOT Peak Velocity: 88.4  cm/s                    AV Peak Gradient: 8.53 mmHg cm/s  MV Peak A-Wave: 152     AV Mean Velocity: 98.8 cm/s LVOT Mean Velocity: 60  cm/s                    AV Mean Gradient: 4 mmHg    cm/s  MV E/A Ratio: 0.72      AV VTI: 31.5 cm             LVOT Peak Gradient: 3  MV Peak Gradient: 4.84  AV Area (Continuity):2.22   mmHgLVOT Mean Gradient:  mmHg                    cm2                         2 mmHg  MV Mean Gradient: 4                                 Estimated RVSP: 28 mmHg  mmHg                    LVOT VTI: 24.7 cm           Estimated RAP:3 mmHg  MV Mean Velocity: 89.3  cm/s                    Estimated PASP: 23.79 mmHg  MV P1/2t: 94 msec                                   TR Velocity:228 cm/s  MVA by PHT:2.34 cm2                                 TR Gradient:20.79 mmHg  MV Area (continuity):                               PV Peak Velocity: 85.5  1.5 cm2                                             cm/s  MV E' Septal Velocity:                              PV Peak Gradient: 2.92  3.51 cm/s                                           mmHg  MV E' Lateral Velocity:  6.03 cm/s  MV E/E' septal: 31.34  MV E/E' lateral: 18.24  MR Velocity: 641 cm/s  MR VTI: 189 cm      CT HEAD WO CONTRAST    Result Date: 10/4/2022  EXAMINATION: CT OF THE HEAD WITHOUT CONTRAST  10/4/2022 12:05 pm TECHNIQUE: CT of the head was performed without the administration of intravenous contrast. Automated exposure control, iterative reconstruction, and/or weight based adjustment of the mA/kV was utilized to reduce the radiation dose to as low as reasonably achievable. COMPARISON: 09/22/2022. HISTORY: ORDERING SYSTEM PROVIDED HISTORY: fall head injury TECHNOLOGIST PROVIDED HISTORY: Has a \"code stroke\" or \"stroke alert\" been called? ->No Reason for exam:->fall head injury Decision Support Exception - unselect if not a suspected or confirmed emergency medical condition->Emergency Medical Condition (MA) Reason for Exam: trauma/ fall, head injury FINDINGS: BRAIN/VENTRICLES: There is no acute intracranial hemorrhage, mass effect or midline shift. No abnormal extra-axial fluid collection. The gray-white differentiation is maintained without evidence of an acute infarct. There is prominence of the ventricles and sulci due to global parenchymal volume loss.  There are nonspecific areas of hypoattenuation within the periventricular and subcortical white matter, which likely represent chronic microvascular ischemic change. ORBITS: The visualized portion of the orbits demonstrate no acute abnormality. SINUSES: The visualized paranasal sinuses and mastoid air cells demonstrate no acute abnormality. SOFT TISSUES/SKULL: There is mild high left frontal scalp soft tissue swelling. 1. Mild high left frontal scalp soft tissue swelling but no acute intracranial abnormality. XR CHEST PORTABLE    Result Date: 9/23/2022  EXAMINATION: ONE XRAY VIEW OF THE CHEST 9/23/2022 9:32 am COMPARISON: 09/22/2022 HISTORY: ORDERING SYSTEM PROVIDED HISTORY: ?pulmonary edema vs infiltrates TECHNOLOGIST PROVIDED HISTORY: Reason for exam:->?pulmonary edema vs infiltrates Reason for Exam: pulmonary edema vs infiltrates FINDINGS: Pacer device is stable with leads inspected positions. 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 Cardiomediastinal silhouette and bony thorax are unchanged. Lungs are clear without focal consolidation or pulmonary edema. No evidence of pleural effusion or pneumothorax. Stable exam without acute focal process. No evidence of pulmonary edema. VL DUP CAROTID BILATERAL    Result Date: 9/23/2022  EXAMINATION: ULTRASOUND EVALUATION OF THE CAROTID ARTERIES 9/23/2022 TECHNIQUE: Duplex ultrasound using B-mode/gray scaled imaging, Doppler spectral analysis and color flow Doppler was obtained of the carotid arteries. COMPARISON: 10/14/2019 HISTORY: ORDERING SYSTEM PROVIDED HISTORY: syncope TECHNOLOGIST PROVIDED HISTORY: Reason for exam:->syncope FINDINGS: Patient motion limits evaluation. RIGHT: The right common carotid artery demonstrates peak systolic velocities of 52, 50 cm/sec in the proximal and distal segments respectively. The right internal carotid artery demonstrates the systolic velocities of 067, 117, 114 cm/sec in the proximal, mid and distal segments respectively. The external carotid artery is patent.   The vertebral artery demonstrates normal antegrade flow. Extensive shadowing calcific plaque, which limits evaluation. ICA/CCA ratio of 2.3 LEFT: The left common carotid artery demonstrates peak systolic velocities of 50, 61 cm/sec in the proximal and distal segments respectively. The left internal carotid artery demonstrates the systolic velocities of 813, 80, 88 cm/sec in the proximal, mid and distal segments respectively. The external carotid artery is patent. The vertebral artery demonstrates normal antegrade flow. Extensive plaque is visible on grayscale ultrasound in the proximal ICA. ICA/CCA ratio of 1.3     The right internal carotid artery demonstrates 0-50% stenosis. The left internal carotid artery demonstrates 0-50% stenosis. Bilateral vertebral arteries are patent with flow in the normal direction. Patient motion and extensive calcific plaque which obscures visualization limits the exam.  Consider further evaluation with CT angiogram of the neck. XR HIP 2-3 VW W PELVIS LEFT    Result Date: 10/4/2022  EXAMINATION: ONE XRAY VIEW OF THE PELVIS AND TWO XRAY VIEWS LEFT HIP 10/4/2022 11:05 am COMPARISON: 12/23/2018 HISTORY: ORDERING SYSTEM PROVIDED HISTORY: fall three days ago TECHNOLOGIST PROVIDED HISTORY: Reason for exam:->fall three days ago Reason for Exam: fall three days ago Initial encounter FINDINGS: Osteopenia. There is an acute mildly displaced fracture of the left proximal femur likely involving the left femoral neck and possibly the intertrochanteric region. Mild-to-moderate degenerative changes of the hip joints. The pelvic ring is otherwise intact. No other acute fracture or dislocation. Acute mildly displaced fracture of the left proximal femur involving the left femoral neck versus intertrochanteric region.           Electronically signed by TEMITOPE Joaquin CNP on 10/4/2022 at 2:05 PM

## 2022-10-04 NOTE — PROGRESS NOTES
4 Eyes Skin Assessment     NAME:  Stephanie Larson  YOB: 1940  MEDICAL RECORD NUMBER:  [id number]    The patient is being assess for  Admission    I agree that 2 RN's have performed a thorough Head to Toe Skin Assessment on the patient. ALL assessment sites listed below have been assessed. Areas assessed by both nurses:    Head, Face, Ears, Shoulders, Back, Chest, Arms, Elbows, Hands, Sacrum. Buttock, Coccyx, Ischium, and Legs. Feet and Heels        Does the Patient have a Wound?  No noted wound(s)       Kannan Prevention initiated:  Yes   Wound Care Orders initiated:  No    Pressure Injury (Stage 3,4, Unstageable, DTI, NWPT, and Complex wounds) if present place referral/consult order under [de-identified] No    New and Established Ostomies if present place consult order under : No      Nurse 1 eSignature: Electronically signed by Myriam Osullivan on 10/4/22 at 6:44 PM EDT    **SHARE this note so that the co-signing nurse is able to place an eSignature**    Nurse 2 eSignature:   Electronically signed by Roberta Britt LPN on 31/9/48 at 9:02 PM EDT

## 2022-10-04 NOTE — ED NOTES
Pt presents to ED by EMS from [facility] due to a fall three days ago which resulted in a left hip fracture and a bruise to the face. Pt presents AxO x2 and reports 10/10 pain.       Bee Oconnor RN  10/04/22 4742

## 2022-10-04 NOTE — ED NOTES
[id number] paged Dr Emilee Cloud  10/04/22 1242 [id number] Dr Win Sanabria returned call      [facility]  10/04/22 9947

## 2022-10-04 NOTE — ED PROVIDER NOTES
EMERGENCY DEPARTMENT ENCOUNTER    [facility] EMERGENCY DEPARTMENT        TRIAGE CHIEF COMPLAINT:   Hip Pain      [place]:  Analia Ritchie is a 81 y.o. female that presents with daughter via EMS from [facility] for left-sided hip pain abnormal x-ray. Context as per daughter, patient was recently evaluated at [facility] emergency department and eventually transferred to [facility] for admission for UTI, fall and altered mental status. Is have a baseline history of dementia. Patient did well during her admission, was discharged to rehab facility prior to being returned home where she lives with her daughter normally. Over the last 3 days, patient has been complaining of increasing left hip pain. Daughter states normally she is able to ambulate with use of a walker but is not been able to get out of bed for the last 3 days secondary to pain. Patient abdomen he denies any fall or blunt trauma and believes she injured her hip well doing PT exercises. Family does state that she had a left hip x-ray today which did show positive findings for hip fracture. Patient is established with Dr. Lepe Shown. 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 She does have a resolving contusion to the left forehead from a fall at home several weeks ago but no new head injuries. Patient is otherwise acting normal baseline mental status per daughter at bedside, is only complaining of hip pain today. No other abdominal pain dysuria hematuria chest pain shortness of breath or lower back pain. Patient has no previous history of left hip fracture surgery. She is denying any ipsilateral knee, ankle or foot pain or numbness or tingling down the left lower leg.     ROS:  General:  No fevers, no chills  Cardiovascular:  No chest pain, no palpitations  Respiratory:  No shortness of breath, no cough, no wheezing  Gastrointestinal:  No pain, no nausea, no vomiting, no diarrhea  Musculoskeletal: See PI  Skin:  No rash, no pruritis, no easy bruising  Neurologic:  No speech problems, no headache, no extremity numbness, no extremity tingling, no extremity weakness  Psychiatric:  No anxiety  Genitourinary:  No dysuria, no hematuria  Extremities: See HPI    Past Medical History:   Diagnosis Date    Acute anterior wall MI ([address] 2007    CAD (coronary artery disease)     Dr. 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 Diabetes mellitus ([address]     PCP    H/O cardiovascular stress test 5/02    EF 74 %,normal perfusion    H/O echocardiogram 11/02    EF 60%, mild to mod MR,    Hyperlipidemia     Hypertension     PCP    Protein-calorie malnutrition, moderate ([address] 8/12/2015     Past Surgical History:   Procedure Laterality Date    ABDOMEN SURGERY      COLONOSCOPY N/A 4/18/2021    COLONOSCOPY DIAGNOSTIC performed by Juanis Johnson MD at [facility]  5/07    CABG X 3, L mammary artery to the LAD, saphenous vein graft to RCA.  a saphenous vein graft to Cx marginal artery    DIAGNOSTIC CARDIAC CATH LAB PROCEDURE  3/30/07    PTCA to LAD    FRACTURE SURGERY Left 2001    patella    FRACTURE SURGERY Right 2017    wrist    HYSTERECTOMY (CERVIX STATUS UNKNOWN)  1996    OTHER SURGICAL HISTORY  08 11 2015    exp lap, right colon resection    PACEMAKER INSERTION N/A 4/20/2021    PACEMAKER INSERTION PERMANENT performed by Shante Her MD at [address] [facility] N/A 4/18/2021    EGD BIOPSY performed by Juanis Johnson MD at [address] [facility] Left 2/17/2021    LEFT WRIST OPEN REDUCTION INTERNAL FIXATION performed by Frank Funes MD at [address] [facility] OR     Family History   Problem Relation Age of Onset    Heart Disease Mother Heart Attack Mother     Cancer Father     Heart Disease Brother      Social History     Socioeconomic History    Marital status:      Spouse name: Not on file    Number of children: Not on file    Years of education: Not on file    Highest education level: Not on file   Occupational History    Not on file   Tobacco Use    Smoking status: Former     Packs/day: 1.00     Years: 30.00     Pack years: 30.00     Types: Cigarettes     Start date: 66     Quit date:      Years since quittin.7    Smokeless tobacco: Never   Vaping Use    Vaping Use: Never used   Substance and Sexual Activity    Alcohol use: No    Drug use: No    Sexual activity: Not on file   Other Topics Concern    Not on file   Social History Narrative    Not on file     Social Determinants of Health     Financial Resource Strain: Not on file   Food Insecurity: Not on file   Transportation Needs: Not on file   Physical Activity: Not on file   Stress: Not on file   Social Connections: Not on file   Intimate Partner Violence: Not on file   Housing Stability: Not on file     Current Facility-Administered Medications   Medication Dose Route Frequency Provider Last Rate Last Admin    ondansetron (ZOFRAN) injection 4 mg  4 mg IntraVENous Q30 Min PRN Korinne Lusterio, PA-C        0.9 % sodium chloride infusion   IntraVENous Continuous Korinne Lusterio, PA-C 100 mL/hr at 10/04/22 1232 New Bag at 10/04/22 1232     Current Outpatient Medications   Medication Sig Dispense Refill    acetaminophen (TYLENOL) 325 MG tablet Take 650 mg by mouth every 6 hours as needed for Pain      glipiZIDE (GLUCOTROL) 5 MG tablet Take 10 mg by mouth daily      oxyCODONE (ROXICODONE) 5 MG immediate release tablet Take 5 mg by mouth every 4 hours as needed for Pain.       amLODIPine (NORVASC) 10 MG tablet TAKE 1 TABLET BY MOUTH EVERY DAY (Patient taking differently: Take 10 mg by mouth daily) 30 tablet 3    aspirin 81 MG EC tablet Take 1 tablet by mouth daily 30 tablet 0    atorvastatin (LIPITOR) 40 MG tablet Take 1 tablet by mouth daily (Patient taking differently: Take 40 mg by mouth nightly) 30 tablet 3    buPROPion (WELLBUTRIN SR) 150 MG extended release tablet TAKE 1 TABLET BY MOUTH TWICE A DAY (Patient taking differently: Take 150 mg by mouth 2 times daily) 180 tablet 0    carvedilol (COREG) 25 MG tablet Take 1 tablet by mouth 2 times daily (with meals) 60 tablet 3    dexamethasone (DECADRON) 2 MG tablet TAKE 1 TABLET BY MOUTH EVERY DAY WITH BREAKFAST (Patient taking differently: Take 2 mg by mouth daily (with breakfast)) 30 tablet 0    pantoprazole (PROTONIX) 40 MG tablet Take 1 tablet by mouth every morning (before breakfast) 90 tablet 1    promethazine (PHENERGAN) 25 MG tablet Take 1 tablet by mouth every 6 hours as needed for Nausea 60 tablet 0    QUEtiapine (SEROQUEL) 25 MG tablet Take 1 tablet by mouth nightly 30 tablet 0    venlafaxine (EFFEXOR XR) 75 MG extended release capsule Take 3 capsules by mouth daily for 20 days 60 capsule 0    JARDIANCE 25 MG tablet Take 25 mg by mouth daily      triamcinolone (KENALOG) 0.025 % ointment APPLY TO AFFECTED AREA TWICE A DAY      ferrous sulfate (FE TABS 325) 325 (65 Fe) MG EC tablet TAKE 1 TABLET BY MOUTH EVERY DAY WITH BREAKFAST (Patient taking differently: Take 325 mg by mouth daily (with breakfast)) 90 tablet 1    metFORMIN (GLUCOPHAGE) 500 MG tablet Take 1 tablet by mouth 2 times daily (with meals) 60 tablet 0    valsartan-hydroCHLOROthiazide (DIOVAN-HCT) 320-25 MG per tablet Take 1 tablet by mouth daily   3    potassium chloride (MICRO-K) 10 MEQ CR capsule Take 10 mEq by mouth 2 times daily      Multiple Vitamins-Minerals (THERAPEUTIC MULTIVITAMIN-MINERALS) tablet Take 1 tablet by mouth daily      fenofibrate (TRICOR) 145 MG tablet Take 145 mg by mouth daily.        Allergies   Allergen Reactions    Tramadol Itching    Vicodin [Hydrocodone-Acetaminophen] Itching       Nursing Notes Reviewed  PHYSICAL EXAM    VITAL SIGNS: BP (!) 128/56   Pulse 67   Temp 98.3 °F (36.8 °C) (Oral)   Resp 19   SpO2 100%    Constitutional:  Well developed, thin cachetic elderly female,   Head:  Normocephalic, Atraumatic. Resolving dark ecchymotic bruising to the left lateral scalp/forehead without tenderness or step-offs. Eyes:  CRIS. Sclera clear. Conjunctiva normal, No discharge. Neck/Lymphatics: Supple, no JVD, No lymphadenopathy  Cardiovascular:  RRR, Normal S1 & S2    Peripheral Vascular: Distal pulses 2+, Capillary refill <2seconds  Respiratory:  Respirations nonnlabored, Clear to auscultation bilaterally, No retractions  Abdomen: Bowel sounds normal in all quadrants, Soft, Non tender/Nondistended, No palpable abdominal masses. Musculoskeletal: BUE/BLE symmetrical without atrophy or deformities. Small fusion to the left lateral hip without other obvious deformities limb shortening or rotation of the left lower leg compared to the right. No laxity on pelvic rock. Mild to moderate tenderness over the anterolateral left hip, greatest over the greater trochanteric region without palpable any or soft tissue defects. Limited range of motion actively and passively left hip secondary to pain. No other localized tenderness palpation or joint effusion of left knee or ankle. Pedal pulses 2+. Brisk capillary refill. No dropfoot. Equal 4/5 dorsi/plantar flexion against resistance. Integument:  Warm, Dry, Intact, Skin turgor and texture normal  Neurologic:  Alert & oriented x3 , No focal deficits noted. Cranial nerves II through XII grossly intact. No slurred speech. No facial droop. Normal gross motor coordination & motor strength bilateral upper and lower extremities other than decreased range of motion and strength of the left hip secondary to injury as noted above. Sensation intact No pronator drift.   Psychiatric:  Affect appropriate      I have reviewed and interpreted all of the currently available lab results from this visit (if applicable):  Results for orders placed or performed during the hospital encounter of 10/04/22   CBC with Auto Differential   Result Value Ref Range    WBC 16.2 (H) 4.0 - 10.5 K/CU MM    RBC 3.63 (L) 4.2 - 5.4 M/CU MM    Hemoglobin 9.3 (L) 12.5 - 16.0 GM/DL    Hematocrit 30.2 (L) 37 - 47 %    MCV 83.2 78 - 100 FL    MCH 25.6 (L) 27 - 31 PG    MCHC 30.8 (L) 32.0 - 36.0 %    RDW 17.1 (H) 11.7 - 14.9 %    Platelets 966 863 - 573 K/CU MM    MPV 10.7 7.5 - 11.1 FL    Differential Type AUTOMATED DIFFERENTIAL     Segs Relative 86.4 (H) 36 - 66 %    Lymphocytes % 5.4 (L) 24 - 44 %    Monocytes % 6.6 (H) 0 - 4 %    Eosinophils % 0.4 0 - 3 %    Basophils % 0.1 0 - 1 %    Segs Absolute 14.0 K/CU MM    Lymphocytes Absolute 0.9 K/CU MM    Monocytes Absolute 1.1 K/CU MM    Eosinophils Absolute 0.1 K/CU MM    Basophils Absolute 0.0 K/CU MM    Nucleated RBC % 0.0 %    Total Nucleated RBC 0.0 K/CU MM    Total Immature Neutrophil 0.18 K/CU MM    Immature Neutrophil % 1.1 (H) 0 - 0.43 %   Comprehensive Metabolic Panel   Result Value Ref Range    Sodium 127 (L) 135 - 145 MMOL/L    Potassium 5.3 (H) 3.5 - 5.1 MMOL/L    Chloride 95 (L) 99 - 110 mMol/L    CO2 22 21 - 32 MMOL/L    BUN 33 (H) 6 - 23 MG/DL    Creatinine 1.4 (H) 0.6 - 1.1 MG/DL    Glucose 180 (H) 70 - 99 MG/DL    Calcium 8.9 8.3 - 10.6 MG/DL    Albumin 3.8 3.4 - 5.0 GM/DL    Total Protein 6.5 6.4 - 8.2 GM/DL    Total Bilirubin 0.6 0.0 - 1.0 MG/DL    ALT 12 10 - 40 U/L    AST 14 (L) 15 - 37 IU/L    Alkaline Phosphatase 80 40 - 129 IU/L    GFR Non- 36 (L) >60 mL/min/1.73m2    GFR  44 (L) >60 mL/min/1.73m2    Anion Gap 10 4 - 16   Protime/INR & PTT   Result Value Ref Range    Protime 11.6 (L) 11.7 - 14.5 SECONDS    INR 0.90 INDEX    aPTT 27.3 25.1 - 37.1 SECONDS   EKG 12 Lead   Result Value Ref Range    Ventricular Rate 67 BPM    Atrial Rate 67 BPM    P-R Interval 184 ms    QRS Duration 96 ms    Q-T Interval 464 ms    QTc Calculation (Bazett) 490 ms    P Axis 74 degrees    R Axis 52 degrees    T Axis 61 degrees    Diagnosis       Normal sinus rhythm  Possible Anterior infarct , age undetermined  Abnormal ECG  When compared with ECG of 23-SEP-2022 02:36,  Borderline criteria for Anterior infarct are now present  ST elevation now present in Inferior leads          Radiographs (if obtained):  [] The following radiograph was interpreted by myself in the absence of a radiologist:   [] Radiologist's Report Reviewed:  CT HEAD WO CONTRAST   Final Result   1. Mild high left frontal scalp soft tissue swelling but no acute   intracranial abnormality. XR HIP 2-3 VW W PELVIS LEFT   Preliminary Result   Acute mildly displaced fracture of the left proximal femur involving the left   femoral neck versus intertrochanteric region. CT HIP LEFT WO CONTRAST    (Results Pending)           CT HEAD WO CONTRAST (Final result)  Result time 10/04/22 12:11:49  Final result by William Ayala MD (10/04/22 12:11:49)                Impression:    1. Mild high left frontal scalp soft tissue swelling but no acute   intracranial abnormality. Narrative:    EXAMINATION:   CT OF THE HEAD WITHOUT CONTRAST  10/4/2022 12:05 pm     TECHNIQUE:   CT of the head was performed without the administration of intravenous   contrast. Automated exposure control, iterative reconstruction, and/or weight   based adjustment of the mA/kV was utilized to reduce the radiation dose to as   low as reasonably achievable. COMPARISON:   09/22/2022. HISTORY:   ORDERING SYSTEM PROVIDED HISTORY: fall head injury   TECHNOLOGIST PROVIDED HISTORY:   Has a \"code stroke\" or \"stroke alert\" been called? ->No   Reason for exam:->fall head injury   Decision Support Exception - unselect if not a suspected or confirmed   emergency medical condition->Emergency Medical Condition (MA)   Reason for Exam: trauma/ fall, head injury     FINDINGS:   BRAIN/VENTRICLES: There is no acute intracranial hemorrhage, mass effect or   midline shift. No abnormal extra-axial fluid collection. The gray-white   differentiation is maintained without evidence of an acute infarct. There is   prominence of the ventricles and sulci due to global parenchymal volume loss. There are nonspecific areas of hypoattenuation within the periventricular and   subcortical white matter, which likely represent chronic microvascular   ischemic change. ORBITS: The visualized portion of the orbits demonstrate no acute abnormality. SINUSES: The visualized paranasal sinuses and mastoid air cells demonstrate   no acute abnormality. SOFT TISSUES/SKULL: There is mild high left frontal scalp soft tissue   swelling. XR HIP 2-3 VW W PELVIS LEFT (Preliminary result)  Result time 10/04/22 12:09:46  Preliminary result by Celeste Pham MD (10/04/22 12:09:46)                Impression:    Acute mildly displaced fracture of the left proximal femur involving the left   femoral neck versus intertrochanteric region. Narrative:    EXAMINATION:   ONE XRAY VIEW OF THE PELVIS AND TWO XRAY VIEWS LEFT HIP     10/4/2022 11:05 am     COMPARISON:   12/23/2018     HISTORY:   ORDERING SYSTEM PROVIDED HISTORY: fall three days ago   TECHNOLOGIST PROVIDED HISTORY:   Reason for exam:->fall three days ago   Reason for Exam: fall three days ago     Initial encounter     FINDINGS:   Osteopenia. There is an acute mildly displaced fracture of the left proximal   femur likely involving the left femoral neck and possibly the   intertrochanteric region. Mild-to-moderate degenerative changes of the hip   joints. The pelvic ring is otherwise intact. No other acute fracture or   dislocation. Preliminary result by Shabnam Ruvalcaba (10/04/22 12:08:19)                Impression:    Acute mildly displaced fracture of the left proximal femur either involving   the left femoral neck versus intertrochanteric region. EKG Interpretation  Please see ED physician's note - Dr. Enedina Hanks - for EKG interpretation      Chart review shows recent radiographs:  Echocardiogram complete 2D with doppler with color    Result Date: 9/23/2022  Transthoracic Echocardiography Report (TTE)  Demographics   Patient Name        Obdulio HERNANDEZ Date of Study          09/23/2022   Date of Birth       1940      Gender                 Female   Age                 81 year(s)      Race                      Patient Number      [id number]      Room Number            3106   Visit Number        [id number]   Corporate ID        [id number]   Accession Number    [id number]      BETO Mosley   Ordering Physician                  Interpreting Physician Beatrice Chung MD  Procedure Type of Study   TTE procedure:ECHOCARDIOGRAM COMPLETE 2D W DOPPLER W COLOR. Procedure Date Date: 09/23/2022 Start: 08:43 AM Study Location: Portable Technical Quality: Technically difficult study Indications:Congestive heart failure. Height: 62 inches Weight: 96 pounds BSA: 1.4 m2 BMI: 17.56 kg/m2 HR: 63 bpm BP: 145/62 mmHg  Conclusions   Summary  Left ventricular systolic function is normal.  Ejection fraction is visually estimated at 55-60%. Moderate left ventricular hypertrophy. Grade I diastolic dysfunction. Sclerotic, but non-stenotic aortic valve. Mild to moderate tricuspid regurgitation; RVSP: 28 mmHg. No evidence of any pericardial effusion. Moderate MAC noted  moderate MR and TR noted  Pacer wire noted on the right side of heart. Grade I diastolic dysfunction .    Signature   ------------------------------------------------------------------  Electronically signed by Beatrice Chung MD (Interpreting  physician) on 09/23/2022 at 02:54 PM  ------------------------------------------------------------------   Findings   Left Ventricle  Left ventricular systolic function is normal.  Ejection fraction is visually estimated at 55-60%. Moderate left ventricular hypertrophy. Grade I diastolic dysfunction. Left Atrium  Mildly dilated left atrium. Right Atrium  PPM wiring visualized in right atrium. Essentially normal right atrium. Right Ventricle  PPM wiring visualized within the right ventricle. Aortic Valve  Sclerotic, but non-stenotic aortic valve. Aortic valve appears tricuspid. Mitral Valve  Mitral annular calcification is present. Mean gradient through valve 4 mmHg. Moderate mitral regurgitation. Tricuspid Valve  Mild to moderate tricuspid regurgitation; RVSP: 28 mmHg. Pulmonic Valve  The pulmonic valve was not well visualized. Pericardial Effusion  No evidence of any pericardial effusion. Pleural Effusion  No evidence of pleural effusion. Miscellaneous  IVC is within normal limits.   Abdominal Aorta not visual.  M-Mode/2D Measurements & Calculations   LV Diastolic Dimension:  LV Systolic Dimension:  LA Dimension: 4 cmAO Root  2.84 cm                  1.82 cm                 Dimension: 3.2 cmLA Area:  LV FS:35.9 %             LV Volume Diastolic: 45 39.2 cm2  LV PW Diastolic: 3.43 cm ml  LV PW Systolic: 1.24 cm  LV Volume Systolic: 11  Septum Diastolic: 7.41   ml  cm                       LV EDV/LV EDV Index: 45  Septum Systolic: 8.72 cm AT/15 D1PY ESV/LV ESV   LA/Aorta: 1.25  CO: 4.41 l/min           Index: 11 ml/8 m2  CI: 3.15 l/m*m2          EF Calculated (A4C):    LA volume/Index: 42 ml                           75.6 %                  /06S9  LV Area Diastolic: 54.8  EF Calculated (2D):  cm2                      67.3 %  LV Area Systolic: 3.90  cm2                      LV Length: 5.84 cm                            LVOT: 1.9 cm  Doppler Measurements & Calculations   MV Peak E-Wave: 110     AV Peak Velocity: 146 cm/s  LVOT Peak Velocity: 88.4  cm/s                    AV Peak Gradient: 8.53 mmHg cm/s  MV Peak A-Wave: 152     AV Mean Velocity: 98.8 cm/s LVOT Mean Velocity: 60  cm/s                    AV Mean Gradient: 4 mmHg    cm/s  MV E/A Ratio: 0.72      AV VTI: 31.5 cm             LVOT Peak Gradient: 3  MV Peak Gradient: 4.84  AV Area (Continuity):2.22   mmHgLVOT Mean Gradient:  mmHg                    cm2                         2 mmHg  MV Mean Gradient: 4                                 Estimated RVSP: 28 mmHg  mmHg                    LVOT VTI: 24.7 cm           Estimated RAP:3 mmHg  MV Mean Velocity: 89.3  cm/s                    Estimated PASP: 23.79 mmHg  MV P1/2t: 94 msec                                   TR Velocity:228 cm/s  MVA by PHT:2.34 cm2                                 TR Gradient:20.79 mmHg  MV Area (continuity):                               PV Peak Velocity: 85.5  1.5 cm2                                             cm/s  MV E' Septal Velocity:                              PV Peak Gradient: 2.92  3.51 cm/s                                           mmHg  MV E' Lateral Velocity:  6.03 cm/s  MV E/E' septal: 31.34  MV E/E' lateral: 18.24  MR Velocity: 641 cm/s  MR VTI: 189 cm      XR CHEST PORTABLE    Result Date: 9/23/2022  EXAMINATION: ONE XRAY VIEW OF THE CHEST 9/23/2022 9:32 am COMPARISON: 09/22/2022 HISTORY: ORDERING SYSTEM PROVIDED HISTORY: ?pulmonary edema vs infiltrates TECHNOLOGIST PROVIDED HISTORY: Reason for exam:->?pulmonary edema vs infiltrates Reason for Exam: pulmonary edema vs infiltrates FINDINGS: Pacer device is stable with leads inspected positions. 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 Cardiomediastinal silhouette and bony thorax are unchanged. Lungs are clear without focal consolidation or pulmonary edema. No evidence of pleural effusion or pneumothorax. Stable exam without acute focal process. No evidence of pulmonary edema.      VL DUP CAROTID BILATERAL    Result Date: 9/23/2022  EXAMINATION: ULTRASOUND EVALUATION OF THE CAROTID ARTERIES 9/23/2022 TECHNIQUE: Duplex ultrasound using B-mode/gray scaled imaging, Doppler spectral analysis and color flow Doppler was obtained of the carotid arteries. COMPARISON: 10/14/2019 HISTORY: ORDERING SYSTEM PROVIDED HISTORY: syncope TECHNOLOGIST PROVIDED HISTORY: Reason for exam:->syncope FINDINGS: Patient motion limits evaluation. RIGHT: The right common carotid artery demonstrates peak systolic velocities of 52, 50 cm/sec in the proximal and distal segments respectively. The right internal carotid artery demonstrates the systolic velocities of 559, 117, 114 cm/sec in the proximal, mid and distal segments respectively. The external carotid artery is patent. The vertebral artery demonstrates normal antegrade flow. Extensive shadowing calcific plaque, which limits evaluation. ICA/CCA ratio of 2.3 LEFT: The left common carotid artery demonstrates peak systolic velocities of 50, 61 cm/sec in the proximal and distal segments respectively. The left internal carotid artery demonstrates the systolic velocities of 166, 80, 88 cm/sec in the proximal, mid and distal segments respectively. The external carotid artery is patent. The vertebral artery demonstrates normal antegrade flow. Extensive plaque is visible on grayscale ultrasound in the proximal ICA. ICA/CCA ratio of 1.3     The right internal carotid artery demonstrates 0-50% stenosis. The left internal carotid artery demonstrates 0-50% stenosis. Bilateral vertebral arteries are patent with flow in the normal direction. Patient motion and extensive calcific plaque which obscures visualization limits the exam.  Consider further evaluation with CT angiogram of the neck. ED COURSE & MEDICAL DECISION MAKING       Vital signs and nursing notes reviewed during ED course. I have independently evaluated this patient . Supervising physician - Dr. Hammad Haskins - was present in ED and available for consultation throughout entirety of patient's care. All pertinent Lab data and radiographic results reviewed with patient at bedside.        The patient and/or the family were informed of the results of any tests/labs/imaging, the treatment plan, and time was allotted to answer questions. Clinical Impression:  1. Closed left hip fracture, initial encounter ([facility] [address] [address]    2. Hyponatremia        Patient presents with with left hip pain and abnormal outpatient x-ray concerning for fracture. On exam, well-appearing nontoxic 28-year-old female, awake and alert x3, acting normal baseline mental status per family member at bedside. Does not have resolving ecchymotic bruising to the left lateral forehead and scalp without evidence of new trauma or injury. No evidence of other traumatic injury to chest wall, abdomen. Lungs are clear auscultation. Pelvis is stable without abnormal rotation or limb shortening of the left lower leg compared to the right. There is tenderness over the lateral hip, greatest over the greater trochanteric region patient is otherwise neurovascular intact with soft compartments. No foot drop. No other tenderness to the left knee or ankle. Patient given Zofran IV fentanyl and gentle IV fluids. CBC with leukocytosis of 16.2 with left shift. Hemoglobin is 9.3, up trended compared to previous. CMP does show mild hyperkalemia 5.3, sodium of 127 which is new. BUN/creatinine 33/1.4, stable to baseline. CT head shows high left frontal scalp soft tissue swelling without evidence of bony skull fracture or intracranial process. X-ray of left hip with pelvic suspension shows an acute mildly displaced fracture of the proximal femur involving either the left femoral neck versus intertrochanteric region. At this time, discussed with patient and family plan for admission, consult with orthopedist for possible surgical management. They are comfortable and agreeable this plan however family was concerned as they have been told by patient's orthopedist in the past that further orthopedic surgeries cannot be performed given patient's history of carcinoid syndrome.   We will add on UA given patient's noted leukocytosis recent UTI evaluation as well as Harvey catheter placement. I did consult with Dr. Sahra Mix - orthopedics - and discussed patient's history, ED presentation/course including any pertinent laboratory findings and imaging study findings. He/she does agree with plan for admission, requesting CT noncontrast imaging of left hip with 3D reconstruction and will speak with patient/family tomorrow regarding surgical options       I then spoke with hospitalist, SOREN Gonsalez CNP, agrees to hospital admission. Patient is admitted to the hospital in stable condition. In consideration of current COVID19 pandemic, with effort to minimize unnecessary provider exposure, this patient was seen at bedside by me independently. However, in compliance with current hospital LAUR[facility]/ED protocol, prior to admission I did discuss this patient case with emergency department physician, Dr. Channing Weiss, who did agree with ED workup/evaluation and plan for admission however but ED attending physician did not independently evaluate the patient. Of note, this Pt was NOT admitted to the ICU. Diagnosis and plan discussed in detail with patient who understands and agrees. Disposition referral (if applicable):  No follow-up provider specified.     Disposition medications (if applicable):  New Prescriptions    No medications on file         (Please note that portions of this note may have been completed with a voice recognition program. Efforts were made to edit the dictations but occasionally words are mis-transcribed.)          Susan Simeon PA-C  10/04/22 [id number]

## 2022-10-04 NOTE — PROGRESS NOTES
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

## 2022-10-04 NOTE — ED NOTES
131 paged hospitalist     Toshia Jones  10/04/22 1312 [id number] [address] Morrow  NP with [facility] group returned call      Toshia Jones  10/04/22 [id number]

## 2022-10-04 NOTE — CARE COORDINATION
Patient identified as potential readmission. Last admission 9/22-9/27 for pneumonia. Patient here today for left hip pain. Patient from [place]. Prior to [place] admission, patient was living with her daughter. Patient has a PCP and insurance. Patient found today to have left hip fracture. Patient is requiring readmission and there were no alternatives to admission to explore at this time. CM met with patient and daughter to begin discharge planning. CM introduced self and CM role. Patient's daughter Ada Irvin states patient will need to be stronger prior to returning home. Ada Irvin reports that she is patient's primary caregiver. Patient typically able to ambulate with walker. Patient and Ada Irvin in agreement with patient returning to [place] upon discharge. Patient will need pre cert to return to [place].

## 2022-10-04 NOTE — ED NOTES
Medication History  [facility]    Patient Name: Juaquin Urrutia 1940     Medication history has been completed by: Colette Dutta CPhT    Source(s) of information: Medication list provided by [facility]     Primary Care Physician: Margoth Nathan MD     Pharmacy:     Allergies as of 10/04/2022 - Fully Reviewed 10/04/2022   Allergen Reaction Noted    Tramadol Itching 03/26/2018    Vicodin [hydrocodone-acetaminophen] Itching 03/26/2018        Prior to Admission medications    Medication Sig Start Date End Date Taking? Authorizing Provider   acetaminophen (TYLENOL) 325 MG tablet Take 650 mg by mouth every 6 hours as needed for Pain   Yes Historical Provider, MD   glipiZIDE (GLUCOTROL) 5 MG tablet Take 10 mg by mouth daily   Yes Historical Provider, MD   oxyCODONE (ROXICODONE) 5 MG immediate release tablet Take 5 mg by mouth every 4 hours as needed for Pain.    Yes Historical Provider, MD   amLODIPine (NORVASC) 10 MG tablet  Take 10 mg by mouth daily 9/27/22   Gissel Luna MD   aspirin 81 MG EC tablet Take 1 tablet by mouth daily 9/27/22 10/27/22  Gissel Luna MD   atorvastatin (LIPITOR) 40 MG tablet  Take 40 mg by mouth nightly 9/27/22 10/27/22  Gissel Luna MD   buPROPion (WELLBUTRIN SR) 150 MG extended release tablet  Take 150 mg mouth 2 times daily 9/27/22   Gissel Luna MD   carvedilol (COREG) 25 MG tablet Take 1 tablet by mouth 2 times daily (with meals) 9/27/22 10/27/22  Gissel Luna MD   dexamethasone (DECADRON) 2 MG tablet  Take 2 mg by mouth daily (with breakfast) 9/27/22   Gissel Luna MD   pantoprazole (PROTONIX) 40 MG tablet Take 1 tablet by mouth every morning (before breakfast) 9/27/22 10/27/22  Gissel Luna MD   promethazine (PHENERGAN) 25 MG tablet Take 1 tablet by mouth every 6 hours as needed for Nausea 9/27/22 10/12/22  Gissel Luna MD   QUEtiapine (SEROQUEL) 25 MG tablet Take 1 tablet by mouth nightly 9/27/22 10/27/22  Gissel Luna MD   venlafaxine (EFFEXOR XR) 75 MG extended release capsule Take 3 capsules by mouth daily for 20 days 9/27/22 10/17/22  Gissel Luna MD   JARDIANCE 25 MG tablet Take 25 mg by mouth daily 7/28/22   Historical Provider, MD   triamcinolone (KENALOG) 0.025 % ointment APPLY TO AFFECTED AREA TWICE A DAY 7/27/22   Historical Provider, MD   ferrous sulfate (FE TABS 325) 325 (65 Fe) MG EC tablet  Take 325 mg by mouth daily (with breakfast) 7/12/22   Arjun Ac MD   metFORMIN (GLUCOPHAGE) 500 MG tablet Take 1 tablet by mouth 2 times daily (with meals) 10/17/19   Cuca Lee MD   valsartan-hydroCHLOROthiazide (DIOVAN-HCT) 320-25 MG per tablet Take 1 tablet by mouth daily  7/27/19   Historical Provider, MD   potassium chloride (MICRO-K) 10 MEQ CR capsule Take 10 mEq by mouth 2 times daily    Historical Provider, MD   Multiple Vitamins-Minerals (THERAPEUTIC MULTIVITAMIN-MINERALS) tablet Take 1 tablet by mouth daily    Historical Provider, MD   fenofibrate (TRICOR) 145 MG tablet Take 145 mg by mouth daily. Historical Provider, MD     Medications added or changed (ex.  new medication, dosage change, interval change, formulation change):  Acetaminophen (added)  Glipizide ER changed to IR   Oxycodone (added)    Medications removed from list (include reason, ex. noncompliance, medication cost, therapy complete etc.):   Cyanocobalamin inj discontinued by another discipline  Foltx Not on med list per nursing facility  Telotristat ethyl, etipate Not on med list per nursing facility  Free style strips clean up list    Comments:   Medication list provided by [facility]     To my knowledge the above medication history is accurate as of 10/4/2022 11:35 AM.   Isrrael Yang CPhT   10/4/2022 11:35 AM

## 2022-10-05 ENCOUNTER — ANESTHESIA EVENT (OUTPATIENT)
Dept: OPERATING ROOM | Age: 82
DRG: 480 | End: 2022-10-05
Payer: MEDICARE

## 2022-10-05 ENCOUNTER — APPOINTMENT (OUTPATIENT)
Dept: GENERAL RADIOLOGY | Age: 82
DRG: 480 | End: 2022-10-05
Payer: MEDICARE

## 2022-10-05 LAB
ANION GAP SERPL CALCULATED.3IONS-SCNC: 10 MMOL/L (ref 4–16)
BASOPHILS ABSOLUTE: 0 K/CU MM
BASOPHILS RELATIVE PERCENT: 0.2 % (ref 0–1)
BUN BLDV-MCNC: 23 MG/DL (ref 6–23)
CALCIUM SERPL-MCNC: 8 MG/DL (ref 8.3–10.6)
CHLORIDE BLD-SCNC: 102 MMOL/L (ref 99–110)
CO2: 20 MMOL/L (ref 21–32)
CREAT SERPL-MCNC: 0.9 MG/DL (ref 0.6–1.1)
DIFFERENTIAL TYPE: ABNORMAL
EKG ATRIAL RATE: 67 BPM
EKG DIAGNOSIS: NORMAL
EKG P AXIS: 74 DEGREES
EKG P-R INTERVAL: 184 MS
EKG Q-T INTERVAL: 464 MS
EKG QRS DURATION: 96 MS
EKG QTC CALCULATION (BAZETT): 490 MS
EKG R AXIS: 52 DEGREES
EKG T AXIS: 61 DEGREES
EKG VENTRICULAR RATE: 67 BPM
EOSINOPHILS ABSOLUTE: 0.3 K/CU MM
EOSINOPHILS RELATIVE PERCENT: 1.9 % (ref 0–3)
ESTIMATED AVERAGE GLUCOSE: 134 MG/DL
GFR AFRICAN AMERICAN: >60 ML/MIN/1.73M2
GFR NON-AFRICAN AMERICAN: >60 ML/MIN/1.73M2
GLUCOSE BLD-MCNC: 226 MG/DL (ref 70–99)
GLUCOSE BLD-MCNC: 238 MG/DL (ref 70–99)
GLUCOSE BLD-MCNC: 250 MG/DL (ref 70–99)
GLUCOSE BLD-MCNC: 94 MG/DL (ref 70–99)
HBA1C MFR BLD: 6.3 % (ref 4.2–6.3)
HCT VFR BLD CALC: 28.1 % (ref 37–47)
HEMOGLOBIN: 8.4 GM/DL (ref 12.5–16)
IMMATURE NEUTROPHIL %: 0.9 % (ref 0–0.43)
LYMPHOCYTES ABSOLUTE: 1.4 K/CU MM
LYMPHOCYTES RELATIVE PERCENT: 10.3 % (ref 24–44)
MCH RBC QN AUTO: 25.9 PG (ref 27–31)
MCHC RBC AUTO-ENTMCNC: 29.9 % (ref 32–36)
MCV RBC AUTO: 86.7 FL (ref 78–100)
MONOCYTES ABSOLUTE: 1.6 K/CU MM
MONOCYTES RELATIVE PERCENT: 11.8 % (ref 0–4)
NUCLEATED RBC %: 0 %
PDW BLD-RTO: 17.2 % (ref 11.7–14.9)
PLATELET # BLD: 284 K/CU MM (ref 140–440)
PMV BLD AUTO: 10.4 FL (ref 7.5–11.1)
POTASSIUM SERPL-SCNC: 4 MMOL/L (ref 3.5–5.1)
RBC # BLD: 3.24 M/CU MM (ref 4.2–5.4)
SEGMENTED NEUTROPHILS ABSOLUTE COUNT: 9.9 K/CU MM
SEGMENTED NEUTROPHILS RELATIVE PERCENT: 74.9 % (ref 36–66)
SODIUM BLD-SCNC: 132 MMOL/L (ref 135–145)
TOTAL IMMATURE NEUTOROPHIL: 0.12 K/CU MM
TOTAL NUCLEATED RBC: 0 K/CU MM
WBC # BLD: 13.2 K/CU MM (ref 4–10.5)

## 2022-10-05 PROCEDURE — 94761 N-INVAS EAR/PLS OXIMETRY MLT: CPT

## 2022-10-05 PROCEDURE — 6370000000 HC RX 637 (ALT 250 FOR IP): Performed by: NURSE PRACTITIONER

## 2022-10-05 PROCEDURE — 6360000002 HC RX W HCPCS: Performed by: NURSE PRACTITIONER

## 2022-10-05 PROCEDURE — 36415 COLL VENOUS BLD VENIPUNCTURE: CPT

## 2022-10-05 PROCEDURE — 85025 COMPLETE CBC W/AUTO DIFF WBC: CPT

## 2022-10-05 PROCEDURE — 2580000003 HC RX 258: Performed by: NURSE PRACTITIONER

## 2022-10-05 PROCEDURE — 82962 GLUCOSE BLOOD TEST: CPT

## 2022-10-05 PROCEDURE — 1200000000 HC SEMI PRIVATE

## 2022-10-05 PROCEDURE — 93010 ELECTROCARDIOGRAM REPORT: CPT | Performed by: INTERNAL MEDICINE

## 2022-10-05 PROCEDURE — 80048 BASIC METABOLIC PNL TOTAL CA: CPT

## 2022-10-05 RX ORDER — FERROUS SULFATE 325(65) MG
325 TABLET ORAL
Status: DISCONTINUED | OUTPATIENT
Start: 2022-10-05 | End: 2022-10-12 | Stop reason: HOSPADM

## 2022-10-05 RX ADMIN — MULTIPLE VITAMINS W/ MINERALS TAB 1 TABLET: TAB at 10:22

## 2022-10-05 RX ADMIN — CARVEDILOL 25 MG: 25 TABLET, FILM COATED ORAL at 10:21

## 2022-10-05 RX ADMIN — QUETIAPINE FUMARATE 25 MG: 25 TABLET ORAL at 21:08

## 2022-10-05 RX ADMIN — BUPROPION HYDROCHLORIDE 150 MG: 150 TABLET, EXTENDED RELEASE ORAL at 21:08

## 2022-10-05 RX ADMIN — FERROUS SULFATE TAB 325 MG (65 MG ELEMENTAL FE) 325 MG: 325 (65 FE) TAB at 10:21

## 2022-10-05 RX ADMIN — DEXAMETHASONE 2 MG: 4 TABLET ORAL at 10:20

## 2022-10-05 RX ADMIN — BUPROPION HYDROCHLORIDE 150 MG: 150 TABLET, EXTENDED RELEASE ORAL at 10:22

## 2022-10-05 RX ADMIN — SODIUM CHLORIDE, PRESERVATIVE FREE 10 ML: 5 INJECTION INTRAVENOUS at 23:18

## 2022-10-05 RX ADMIN — CARVEDILOL 25 MG: 25 TABLET, FILM COATED ORAL at 19:09

## 2022-10-05 RX ADMIN — ASPIRIN 81 MG: 81 TABLET, COATED ORAL at 10:22

## 2022-10-05 RX ADMIN — SODIUM CHLORIDE, PRESERVATIVE FREE 10 ML: 5 INJECTION INTRAVENOUS at 10:00

## 2022-10-05 RX ADMIN — FENOFIBRATE 160 MG: 160 TABLET ORAL at 10:20

## 2022-10-05 RX ADMIN — VENLAFAXINE HYDROCHLORIDE 225 MG: 150 CAPSULE, EXTENDED RELEASE ORAL at 10:24

## 2022-10-05 RX ADMIN — INSULIN LISPRO 1 UNITS: 100 INJECTION, SOLUTION INTRAVENOUS; SUBCUTANEOUS at 14:04

## 2022-10-05 RX ADMIN — AMLODIPINE BESYLATE 10 MG: 10 TABLET ORAL at 10:22

## 2022-10-05 RX ADMIN — PANTOPRAZOLE SODIUM 40 MG: 40 TABLET, DELAYED RELEASE ORAL at 06:12

## 2022-10-05 RX ADMIN — INSULIN LISPRO 2 UNITS: 100 INJECTION, SOLUTION INTRAVENOUS; SUBCUTANEOUS at 19:05

## 2022-10-05 RX ADMIN — ATORVASTATIN CALCIUM 40 MG: 40 TABLET, FILM COATED ORAL at 21:08

## 2022-10-05 ASSESSMENT — PAIN SCALES - WONG BAKER
WONGBAKER_NUMERICALRESPONSE: 0

## 2022-10-05 NOTE — PLAN OF CARE
Problem: Discharge Planning  Goal: Discharge to home or other facility with appropriate resources  10/4/2022 2303 by Chery Law RN  Outcome: Progressing  10/4/2022 1853 by Chaya Dean RN  Outcome: Progressing  Flowsheets (Taken 10/4/2022 1821 by Marc Ng)  Discharge to home or other facility with appropriate resources: (unknown at this time. ) --  10/4/2022 1743 by Marc Ng  Outcome: Progressing     Problem: Skin/Tissue Integrity  Goal: Absence of new skin breakdown  Description: 1. Monitor for areas of redness and/or skin breakdown  2. Assess vascular access sites hourly  3. Every 4-6 hours minimum:  Change oxygen saturation probe site  4. Every 4-6 hours:  If on nasal continuous positive airway pressure, respiratory therapy assess nares and determine need for appliance change or resting period.   10/4/2022 2303 by Chery Law RN  Outcome: Progressing  10/4/2022 1853 by Chaya Dean RN  Outcome: Progressing     Problem: Safety - Adult  Goal: Free from fall injury  10/4/2022 2303 by Chery Law RN  Outcome: Progressing  10/4/2022 1853 by Chaya Dean RN  Outcome: Progressing  Flowsheets (Taken 10/4/2022 1811 by Marc Ng)  Free From Fall Injury: Based on caregiver fall risk screen, instruct family/caregiver to ask for assistance with transferring infant if caregiver noted to have fall risk factors     Problem: ABCDS Injury Assessment  Goal: Absence of physical injury  10/4/2022 2303 by Chery Law RN  Outcome: Progressing  10/4/2022 1853 by Chaya Dean RN  Outcome: Progressing  Flowsheets (Taken 10/4/2022 1811 by Marc Ng)  Absence of Physical Injury: Implement safety measures based on patient assessment     Problem: Pain  Goal: Verbalizes/displays adequate comfort level or baseline comfort level  Outcome: Progressing

## 2022-10-05 NOTE — ANESTHESIA PRE PROCEDURE
Department of Anesthesiology  Preprocedure Note       Name:  Julee Celestin   Age:  81 y.o.  :  1940                                          MRN:  [id number]         Date:  10/5/2022      Surgeon: Haylie Vivar):  Jenny Frazier MD    Procedure: Procedure(s):  HIP OPEN REDUCTION INTERNAL FIXATION    Medications prior to admission:   Prior to Admission medications    Medication Sig Start Date End Date Taking? Authorizing Provider   acetaminophen (TYLENOL) 325 MG tablet Take 650 mg by mouth every 6 hours as needed for Pain    Historical Provider, MD   glipiZIDE (GLUCOTROL) 5 MG tablet Take 10 mg by mouth daily    Historical Provider, MD   oxyCODONE (ROXICODONE) 5 MG immediate release tablet Take 5 mg by mouth every 4 hours as needed for Pain.     Historical Provider, MD   amLODIPine (NORVASC) 10 MG tablet TAKE 1 TABLET BY MOUTH EVERY DAY  Patient taking differently: Take 10 mg by mouth daily 22   Gissel Luna MD   aspirin 81 MG EC tablet Take 1 tablet by mouth daily 9/27/22 10/27/22  Gissel Luna MD   atorvastatin (LIPITOR) 40 MG tablet Take 1 tablet by mouth daily  Patient taking differently: Take 40 mg by mouth nightly 9/27/22 10/27/22  Gissel Luna MD   buPROPion (WELLBUTRIN SR) 150 MG extended release tablet TAKE 1 TABLET BY MOUTH TWICE A DAY  Patient taking differently: Take 150 mg by mouth 2 times daily 22   Gissel Luna MD   carvedilol (COREG) 25 MG tablet Take 1 tablet by mouth 2 times daily (with meals) 9/27/22 10/27/22  Gissel Luna MD   dexamethasone (DECADRON) 2 MG tablet TAKE 1 TABLET BY MOUTH EVERY DAY WITH BREAKFAST  Patient taking differently: Take 2 mg by mouth daily (with breakfast) 22   Gissel Luna MD   pantoprazole (PROTONIX) 40 MG tablet Take 1 tablet by mouth every morning (before breakfast) 9/27/22 10/27/22  Gissel Luna MD   promethazine (PHENERGAN) 25 MG tablet Take 1 tablet by mouth every 6 hours as needed for Nausea 9/27/22 10/12/22  Sony Sweeney MD   QUEtiapine (SEROQUEL) 25 MG tablet Take 1 tablet by mouth nightly 9/27/22 10/27/22  Gissel Luna MD   venlafaxine (EFFEXOR XR) 75 MG extended release capsule Take 3 capsules by mouth daily for 20 days 9/27/22 10/17/22  Gissel Luna MD   JARDIANCE 25 MG tablet Take 25 mg by mouth daily 7/28/22   Historical Provider, MD   triamcinolone (KENALOG) 0.025 % ointment APPLY TO AFFECTED AREA TWICE A DAY 7/27/22   Historical Provider, MD   ferrous sulfate (FE TABS 325) 325 (65 Fe) MG EC tablet TAKE 1 TABLET BY MOUTH EVERY DAY WITH BREAKFAST  Patient taking differently: Take 325 mg by mouth daily (with breakfast) 7/12/22   Stefani Nino MD   metFORMIN (GLUCOPHAGE) 500 MG tablet Take 1 tablet by mouth 2 times daily (with meals) 10/17/19   Coleen Coronado MD   valsartan-hydroCHLOROthiazide (DIOVAN-HCT) 320-25 MG per tablet Take 1 tablet by mouth daily  7/27/19   Historical Provider, MD   potassium chloride (MICRO-K) 10 MEQ CR capsule Take 10 mEq by mouth 2 times daily    Historical Provider, MD   Multiple Vitamins-Minerals (THERAPEUTIC MULTIVITAMIN-MINERALS) tablet Take 1 tablet by mouth daily    Historical Provider, MD   fenofibrate (TRICOR) 145 MG tablet Take 145 mg by mouth daily. Historical Provider, MD   cyanocobalamin 1000 MCG/ML injection Inject 1,000 mcg into the muscle every 7 days. 9/22/22  Historical Provider, MD       Current medications:    No current facility-administered medications for this visit. No current outpatient medications on file.      Facility-Administered Medications Ordered in Other Visits   Medication Dose Route Frequency Provider Last Rate Last Admin    ferrous sulfate (IRON 325) tablet 325 mg  325 mg Oral Daily with breakfast TEMITOPE Mckeon CNP        ondansetron [facility]) injection 4 mg  4 mg IntraVENous Q30 Min PRN Korinne Lusterio, PA-C        amLODIPine (NORVASC) tablet 10 mg  10 mg Oral Daily TEMITOPE Mckeon CNP   10 mg at 10/04/22 1918    aspirin EC tablet 81 mg  81 mg Oral Daily Yuly Gaster, APRN - CNP        atorvastatin (LIPITOR) tablet 40 mg  40 mg Oral Nightly Yuly Gaster, APRN - CNP   40 mg at 10/04/22 1957    buPROPion [facility]) extended release tablet 150 mg  150 mg Oral BID Yuly Gaster, APRN - CNP   150 mg at 10/04/22 2121    carvedilol (COREG) tablet 25 mg  25 mg Oral BID WC Yuly Gastrick, APRN - CNP   25 mg at 10/04/22 1918    dexamethasone (DECADRON) tablet 2 mg  2 mg Oral Daily with breakfast Yuly Gaster, APRN - CNP        fenofibrate (TRIGLIDE) tablet 160 mg  160 mg Oral Daily Yuly Gaster, APRN - CNP        therapeutic multivitamin-minerals 1 tablet  1 tablet Oral Daily Yuly Gaster, APRN - CNP        oxyCODONE (ROXICODONE) immediate release tablet 5 mg  5 mg Oral Q4H PRN Yuly Gaster, APRN - CNP        pantoprazole (PROTONIX) tablet 40 mg  40 mg Oral QAM AC Yuly Gaster, APRN - CNP   40 mg at 10/05/22 0612    QUEtiapine (SEROQUEL) tablet 25 mg  25 mg Oral Nightly Yuly Gaster, APRN - CNP   25 mg at 10/04/22 1957    venlafaxine (EFFEXOR XR) extended release capsule 225 mg  225 mg Oral Daily Yuly Gaster, APRN - CNP        glucose chewable tablet 16 g  4 tablet Oral PRN Yuly Gaster, APRN - CNP        dextrose bolus 10% 125 mL  125 mL IntraVENous PRN Yuly Gaster, APRN - CNP        Or    dextrose bolus 10% 250 mL  250 mL IntraVENous PRN Yuly Gaster, APRN - CNP        glucagon (rDNA) injection 1 mg  1 mg SubCUTAneous PRN Yuly Gaster, APRN - CNP        dextrose 10 % infusion   IntraVENous Continuous PRN Yuly Gaster, APRN - CNP        sodium chloride flush 0.9 % injection 5-40 mL  5-40 mL IntraVENous 2 times per day Yuly Gaster, APRN - CNP        sodium chloride flush 0.9 % injection 5-40 mL  5-40 mL IntraVENous PRN Yuly Gaster, APRN - CNP        0.9 % sodium chloride infusion  25 mL IntraVENous PRN Yuly Gaster, APRN - CNP        ondansetron (ZOFRAN-ODT) disintegrating tablet 4 mg  4 mg Oral Q8H PRN Sharonda Hawkins Sunshine, APRN - CNP        Or    ondansetron (ZOFRAN) injection 4 mg  4 mg IntraVENous Q6H PRN Polo Marrow, APRN - CNP        polyethylene glycol (GLYCOLAX) packet 17 g  17 g Oral Daily PRN Polo Marrow, APRN - CNP        acetaminophen (TYLENOL) tablet 650 mg  650 mg Oral Q6H PRN Polo Marrow, APRN - CNP        Or    acetaminophen (TYLENOL) suppository 650 mg  650 mg Rectal Q6H PRN Polo Marrow, APRN - CNP        insulin lispro (HUMALOG) injection vial 0-4 Units  0-4 Units SubCUTAneous TID WC Polo Marrow, APRN - CNP        insulin lispro (HUMALOG) injection vial 0-4 Units  0-4 Units SubCUTAneous Nightly Polo Marrow, APRN - CNP        morphine (PF) injection 2 mg  2 mg IntraVENous Q3H PRN Polo Marrow, APRN - CNP           Allergies:     Allergies   Allergen Reactions    Tramadol Itching    Vicodin [Hydrocodone-Acetaminophen] Itching       Problem List:    Patient Active Problem List   Diagnosis Code    Diabetes mellitus ([facility] E11.9    CAD (coronary artery disease) I25.10    Hyperlipidemia E78.5    Carotid artery stenosis I65.29    S/P CABG x 3 Z95.1    Ischemia, bowel (HCC) K55.9    Protein-calorie malnutrition, moderate (HCC) E44.0    HTN (hypertension) I10    Hypokalemia E87.6    Anxiety F41.9    Gastroenteritis K52.9    Stroke-like symptoms R29.90    Peripheral polyneuropathy G62.9    Ataxia R27.0    Urinary tract infection without hematuria N39.0    Carcinoid syndrome (HCC) E34.0    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    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    Closed fracture of left distal radius S52.502A    GI bleed K92.2    Stage 3 chronic kidney disease (HCC) N18.30    Abdominal pain R10.9    Anemia D64.9    Moderate malnutrition (HCC) E44.0    Closed left hip fracture, initial encounter ([facility] S72.002A       Past Medical History:        Diagnosis Date    [facility] anterior wall MI ([facility] 2007    CAD (coronary artery disease)      [place]    Cancer [facility])     melanoma on back    Cancer ([facility] 2019    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  Diabetes mellitus ([facility]     PCP    H/O cardiovascular stress test     EF 74 %,normal perfusion    H/O echocardiogram     EF 60%, mild to mod MR,    Hyperlipidemia     Hypertension     PCP    Protein-calorie malnutrition, moderate ([facility] 2015       Past Surgical History:        Procedure Laterality Date    ABDOMEN SURGERY      COLONOSCOPY N/A 2021    COLONOSCOPY DIAGNOSTIC performed by Gill Alexander MD at [address] [place] [address]      CABG X 3, L mammary artery to the LAD, saphenous vein graft to RCA. a saphenous vein graft to Cx marginal artery    DIAGNOSTIC CARDIAC CATH LAB PROCEDURE  3/30/07    PTCA to LAD    FRACTURE SURGERY Left     patella    FRACTURE SURGERY Right     wrist    HYSTERECTOMY (CERVIX STATUS UNKNOWN)      OTHER SURGICAL HISTORY  2015    exp lap, right colon resection    PACEMAKER INSERTION N/A 2021    PACEMAKER INSERTION PERMANENT performed by Kenna Frazier MD at [address] [facility] ENDOSCOPY N/A 2021    EGD BIOPSY performed by Gill Alexander MD at [address] [facility] Left 2021    LEFT WRIST OPEN REDUCTION INTERNAL FIXATION performed by Dewitt Nageotte, MD at [facility] OR       Social History:    Social History     Tobacco Use    Smoking status: Former     Packs/day: 1.00     Years: 30.00     Pack years: 30.00     Types: Cigarettes     Start date:      Quit date:      Years since quittin.7    Smokeless tobacco: Never   Substance Use Topics    Alcohol use: No                                Counseling given: Not Answered      Vital Signs (Current): There were no vitals filed for this visit. BP Readings from Last 3 Encounters:   10/05/22 (!) 159/103   09/27/22 (!) 153/82   08/11/22 117/62       NPO Status:                                                                                 BMI:   Wt Readings from Last 3 Encounters:   10/04/22 96 lb (43.5 kg)   09/25/22 96 lb 1.9 oz (43.6 kg)   08/11/22 98 lb 12.8 oz (44.8 kg)     There is no height or weight on file to calculate BMI.    CBC:   Lab Results   Component Value Date/Time    WBC 13.2 10/05/2022 05:01 AM    RBC 3.24 10/05/2022 05:01 AM    HGB 8.4 10/05/2022 05:01 AM    HCT 28.1 10/05/2022 05:01 AM    MCV 86.7 10/05/2022 05:01 AM    RDW 17.2 10/05/2022 05:01 AM     10/05/2022 05:01 AM       CMP:   Lab Results   Component Value Date/Time     10/05/2022 05:01 AM    K 4.0 10/05/2022 05:01 AM     10/05/2022 05:01 AM    CO2 20 10/05/2022 05:01 AM    BUN 23 10/05/2022 05:01 AM    CREATININE 0.9 10/05/2022 05:01 AM    GFRAA >60 10/05/2022 05:01 AM    LABGLOM >60 10/05/2022 05:01 AM    GLUCOSE 94 10/05/2022 05:01 AM    PROT 6.5 10/04/2022 10:55 AM    PROT 7.2 01/14/2011 10:10 PM    CALCIUM 8.0 10/05/2022 05:01 AM    BILITOT 0.6 10/04/2022 10:55 AM    ALKPHOS 80 10/04/2022 10:55 AM    AST 14 10/04/2022 10:55 AM    ALT 12 10/04/2022 10:55 AM       POC Tests:   Recent Labs     10/04/22  2246   POCGLU 116*       Coags:   Lab Results   Component Value Date/Time    PROTIME 11.6 10/04/2022 10:55 AM    INR 0.90 10/04/2022 10:55 AM    APTT 27.3 10/04/2022 10:55 AM       HCG (If Applicable): No results found for: PREGTESTUR, PREGSERUM, HCG, HCGQUANT     ABGs:   Lab Results   Component Value Date/Time    PO2ART 112 08/12/2015 06:00 AM    ETP3FEQ 24.0 08/12/2015 06:00 AM    SEM6UIK 21.5 08/12/2015 06:00 AM        Type & Screen (If Applicable):  No results found for: LABABO, LABRH    Drug/Infectious Status (If Applicable):  Lab Results   Component Value Date/Time    HEPCAB 0.02 11/24/2014 02:05 PM       COVID-19 Screening (If Applicable): Lab Results   Component Value Date/Time    COVID19 NOT DETECTED 2022 02:00 PM    COVID19 NOT DETECTED 2021 09:00 AM           Anesthesia Evaluation  Patient summary reviewed no history of anesthetic complications:   Airway: Mallampati: I          Dental:    (+) upper dentures  Comment: Denies loose teeth    Pulmonary: breath sounds clear to auscultation                            ROS comment: Former Smoker - 30 pack years  Quit Smokin89       Cardiovascular:  Exercise tolerance: good (>4 METS),   (+) hypertension:, valvular problems/murmurs: MR and AS, pacemaker: pacemaker, past MI: > 6 months, CAD: no interval change, CABG/stent: no interval change, dysrhythmias (complete heart block): paced rhythm, hyperlipidemia        Rhythm: regular    Echocardiogram reviewed         Beta Blocker:  No for medical reason      ROS comment:    Normal sinus rhythm   Possible Anterior infarct , age undetermined   Abnormal ECG   When compared with ECG of 23-SEP-2022 02:36,   Borderline criteria for Anterior infarct are now present   ST elevation now present in Inferior leads   10/4/22     Summary   Left ventricular systolic function is normal.   Ejection fraction is visually estimated at 55-60%. Moderate left ventricular hypertrophy. Grade I diastolic dysfunction. Sclerotic, but non-stenotic aortic valve. Mild to moderate tricuspid regurgitation; RVSP: 28 mmHg. No evidence of any pericardial effusion. Moderate MAC noted   moderate MR and TR noted   Pacer wire noted on the right side of heart. Grade I diastolic dysfunction .       Signature      ------------------------------------------------------------------   Electronically signed by Inessa Turpin MD (Interpreting   physician) on 2022 at 02:54 PM   ------------------------------------------------------------------     Neuro/Psych:   (+) psychiatric history:depression/anxiety             GI/Hepatic/Renal:   (+) renal disease: CRI, bowel prep,          ROS comment: Lower GI bleed and hematuria. Endo/Other:    (+) DiabetesType II DM, , blood dyscrasia: anemia and thrombocytopenia:., malignancy/cancer. 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 Other Findings:             Anesthesia Plan      general     ASA 4     (Chronic back pain  Carcinoid)  Induction: intravenous. MIPS: Postoperative opioids intended. Anesthetic plan and risks discussed with patient, healthcare power of  and child/children. Plan discussed with CRNA. Attending anesthesiologist reviewed and agrees with Preprocedure content                TEMITOPE Willis - CRNA   10/5/2022         Pre Anesthesia Assessment complete.  Chart reviewed on 10/5/2022

## 2022-10-05 NOTE — CARE COORDINATION
Chart reviewed, in to see pt for dc planning. Introduced self and board updated. Pt was admitted for L hip fracture while at [facility] completing therapy. Spoke with pt and her daughter/Teagan. Pt and daughter request continued therapy following surgery at [facility]. Explained to them pre-cert will be required and is not able to be started until therapy evals are completed though referral will be called today to expedite the process. CM following. 12:52 PM  Spoke with Kenneth Nixon admissions at [facility] and provided referral for skilled stay following surgery. Pt will need a pre-cert.

## 2022-10-05 NOTE — CONSULTS
Comprehensive Nutrition Assessment    Type and Reason for Visit:  Initial, Consult (General Nutrititon Management)    Nutrition Recommendations/Plan:   Continue current diet order   Order fortified pudding BID (breakfast and lunch) and frozen oral supplement once daily (dinner)   Offer feeding assistance   Document PO/supplement intakes   Monitor weight, meal time behaviors, GI status, fluid status      Malnutrition Assessment:  Malnutrition Status: At risk for malnutrition (Comment) (10/05/22 1028)    Context:  Acute Illness     Findings of the 6 clinical characteristics of malnutrition:  Energy Intake:  50% or less of estimated energy requirements for 5 or more days  Weight Loss:  No significant weight loss (6% over 4 months)     Body Fat Loss:  Unable to assess     Muscle Mass Loss:  Unable to assess    Fluid Accumulation:  Unable to assess     Strength:  Not Performed    Nutrition Assessment:    Admit on 10/4 for left hip fracture. PMH of Cancer, CAD, DM, HTN, HLD. Pt eating breakfast and daughter present during visit. Pt easily distracted and not able to answer most PMH questions. Lack of appetite prior to coming in due to pain in hip. Pain seems to be under control and pt was very hungry during visit. N/V yesterday in ER, but is under control with medications. Daughter states that pt often has diarrhea due to having 3ft of intestines. UBW of 102lbs from 6/2022 office visit. Pt reports she does not like standard oral nutrition supplement, willing to trial pudding and frozen supplement. Daughter cut up pt's food for her, pt may benefit from assist feeds if daughter is not present at meal times. Perform NFPE during next visit. Continue to monitor at high nutrition risk. Nutrition Related Findings:    Meds: Iron, MV. Labs: Na 132, A1c 6.3 Wound Type: None       Current Nutrition Intake & Therapies:    Average Meal Intake: Unable to assess  Average Supplements Intake: None Ordered  ADULT DIET;  Regular  Diet NPO    Anthropometric Measures:  Height: 5' 2\" (157.5 cm)  Ideal Body Weight (IBW): 110 lbs (50 kg)    Admission Body Weight: 95 lb 14.4 oz (43.5 kg)  Current Body Weight: 95 lb 14.4 oz (43.5 kg), 87.2 % IBW.  Weight Source: Not Specified  Current BMI (kg/m2): 17.5  Usual Body Weight: 102 lb (46.3 kg)  % Weight Change (Calculated): -6  Weight Adjustment For: No Adjustment  BMI Categories: Underweight (BMI less than 22) age over 72    Estimated Daily Nutrient Needs:  Energy Requirements Based On: Kcal/kg  Weight Used for Energy Requirements: Current  Energy (kcal/day): 4165-9318 (30-35 kcal/kg)  Weight Used for Protein Requirements: Ideal  Protein (g/day): 60-70 (1.2-1.4 g/kg)  Method Used for Fluid Requirements: 1 ml/kcal  Fluid (ml/day): 7598-6252    Nutrition Diagnosis:   Predicted inadequate energy intake related to pain as evidenced by poor intake prior to admission    Nutrition Interventions:   Food and/or Nutrient Delivery: Continue Current Diet, Start Oral Nutrition Supplement  Nutrition Education/Counseling: No recommendation at this time  Coordination of Nutrition Care: Continue to monitor while inpatient, Feeding Assistance/Environment Change       Goals:  Goals: PO intake 50% or greater, by next RD assessment       Nutrition Monitoring and Evaluation:   Behavioral-Environmental Outcomes: None Identified  Food/Nutrient Intake Outcomes: Supplement Intake, Food and Nutrient Intake  Physical Signs/Symptoms Outcomes: Biochemical Data, Chewing or Swallowing, Diarrhea, GI Status, Nausea or Vomiting, Fluid Status or Edema, Hemodynamic Status, Skin, Weight    Discharge Planning:    Continue Oral Nutrition Supplement, Continue current diet     Alberta Knapp Dietetic Intern   Contact: [phone]

## 2022-10-05 NOTE — CONSULTS
CONSULT    Patient Name: Nirali Chambers   ([birth year])  MRN   [id number]   Today's date:  10/5/2022    CHIEF COMPLAINT:   Left hip pain        HISTORY OF PRESENT ILLNESS:      The patient is a 81 y.o. female  who presents to the ER after and pain in her hip and difficulty ambulating. She apparently was admitted on 9/27/2022 for UTI and acute mental status changes. She was then transferred [facility] where x-rays were performed with mobile[facility]. X-rays demonstrated a hip fracture and she present the emergency room. CT scan and x-rays were performed in the emergency room showing Left intertrochanteric fracture. This morning she is somewhat confused and unable to give accurate history. She does admit to pain in her left hip and low back    Past Medical History         Diagnosis Date    Acute anterior wall MI ([address] 2007    CAD (coronary artery disease)     Dr. Jennings James [facility])     melanoma on back    Cancer [facility]) 2019    near pancreas - chemo    Carotid artery stenosis 3/2000    Bilateral intimal thickening and scattered atheromatous plaques but no flow limiting obstructions. Diabetes mellitus ([address]     PCP    H/O cardiovascular stress test 5/02    EF 74 %,normal perfusion    H/O echocardiogram 11/02    EF 60%, mild to mod MR,    Hyperlipidemia     Hypertension     PCP    Protein-calorie malnutrition, moderate ([address] 8/12/2015       Past Surgical History         Procedure Laterality Date    ABDOMEN SURGERY      COLONOSCOPY N/A 4/18/2021    COLONOSCOPY DIAGNOSTIC performed by Nohemi Tong MD at [address] [place] [address]  5/07    CABG X 3, L mammary artery to the LAD, saphenous vein graft to RCA.  a saphenous vein graft to Cx marginal artery    DIAGNOSTIC CARDIAC CATH LAB PROCEDURE  3/30/07    PTCA to LAD    FRACTURE SURGERY Left 2001    patella    FRACTURE SURGERY Right 2017    wrist    HYSTERECTOMY (CERVIX STATUS UNKNOWN)  1996    OTHER SURGICAL HISTORY  08 11 2015 exp lap, right colon resection    PACEMAKER INSERTION N/A 4/20/2021    PACEMAKER INSERTION PERMANENT performed by Stefano Carney MD at [address] N/A 4/18/2021    EGD BIOPSY performed by Celia Rubio MD at [address] Left 2/17/2021    LEFT WRIST OPEN REDUCTION INTERNAL FIXATION performed by Slade Yusuf MD at [facility] OR       Medications Prior to Admission:     Prior to Admission medications    Medication Sig Start Date End Date Taking? Authorizing Provider   acetaminophen (TYLENOL) 325 MG tablet Take 650 mg by mouth every 6 hours as needed for Pain   Yes Historical Provider, MD   glipiZIDE (GLUCOTROL) 5 MG tablet Take 10 mg by mouth daily   Yes Historical Provider, MD   oxyCODONE (ROXICODONE) 5 MG immediate release tablet Take 5 mg by mouth every 4 hours as needed for Pain.    Yes Historical Provider, MD   amLODIPine (NORVASC) 10 MG tablet TAKE 1 TABLET BY MOUTH EVERY DAY  Patient taking differently: Take 10 mg by mouth daily 9/27/22   Gissel Luna MD   aspirin 81 MG EC tablet Take 1 tablet by mouth daily 9/27/22 10/27/22  Gissel Luna MD   atorvastatin (LIPITOR) 40 MG tablet Take 1 tablet by mouth daily  Patient taking differently: Take 40 mg by mouth nightly 9/27/22 10/27/22  Gissel Luna MD   buPROPion (WELLBUTRIN SR) 150 MG extended release tablet TAKE 1 TABLET BY MOUTH TWICE A DAY  Patient taking differently: Take 150 mg by mouth 2 times daily 9/27/22   Gissel Luna MD   carvedilol (COREG) 25 MG tablet Take 1 tablet by mouth 2 times daily (with meals) 9/27/22 10/27/22  Gissel Luna MD   dexamethasone (DECADRON) 2 MG tablet TAKE 1 TABLET BY MOUTH EVERY DAY WITH BREAKFAST  Patient taking differently: Take 2 mg by mouth daily (with breakfast) 9/27/22   Gissel Luna MD   pantoprazole (PROTONIX) 40 MG tablet Take 1 tablet by mouth every morning (before breakfast) 9/27/22 10/27/22  Gissel Luna MD   promethazine (PHENERGAN) 25 MG tablet Take 1 tablet by mouth every 6 hours as needed for Nausea 9/27/22 10/12/22  Gissel Luna MD   QUEtiapine (SEROQUEL) 25 MG tablet Take 1 tablet by mouth nightly 9/27/22 10/27/22  Gissel Luna MD   venlafaxine (EFFEXOR XR) 75 MG extended release capsule Take 3 capsules by mouth daily for 20 days 9/27/22 10/17/22  Gissel Luna MD   JARDIANCE 25 MG tablet Take 25 mg by mouth daily 7/28/22   Historical Provider, MD   triamcinolone (KENALOG) 0.025 % ointment APPLY TO AFFECTED AREA TWICE A DAY 7/27/22   Historical Provider, MD   ferrous sulfate (FE TABS 325) 325 (65 Fe) MG EC tablet TAKE 1 TABLET BY MOUTH EVERY DAY WITH BREAKFAST  Patient taking differently: Take 325 mg by mouth daily (with breakfast) 7/12/22   Charis Chirinos MD   metFORMIN (GLUCOPHAGE) 500 MG tablet Take 1 tablet by mouth 2 times daily (with meals) 10/17/19   Lacey Sexton MD   valsartan-hydroCHLOROthiazide (DIOVAN-HCT) 320-25 MG per tablet Take 1 tablet by mouth daily  7/27/19   Historical Provider, MD   potassium chloride (MICRO-K) 10 MEQ CR capsule Take 10 mEq by mouth 2 times daily    Historical Provider, MD   Multiple Vitamins-Minerals (THERAPEUTIC MULTIVITAMIN-MINERALS) tablet Take 1 tablet by mouth daily    Historical Provider, MD   fenofibrate (TRICOR) 145 MG tablet Take 145 mg by mouth daily. Historical Provider, MD   cyanocobalamin 1000 MCG/ML injection Inject 1,000 mcg into the muscle every 7 days. 9/22/22  Historical Provider, MD       Allergies     Tramadol and Vicodin [hydrocodone-acetaminophen]    Social History   TOBACCO:   reports that she quit smoking about 33 years ago. Her smoking use included cigarettes. She started smoking about 63 years ago. She has a 30.00 pack-year smoking history. She has never used smokeless tobacco.  ETOH:   reports no history of alcohol use.       Family History         Problem Relation Age of Onset    Heart Disease Mother     Heart Attack Mother     Cancer Father     Heart Disease Brother        Review of Systems   Patient somewhat confused unable to obtain  Physical Exam:    Vitals: BP (!) 164/57   Pulse 73   Temp 98 °F (36.7 °C) (Oral)   Resp 12   Ht 5' 2\" (1.575 m)   Wt 96 lb (43.5 kg)   SpO2 98%   BMI 17.56 kg/m² ,  Body mass index is 17.56 kg/m². General appearance: alert, appears stated age and cooperative  Skin: Skin color, texture, turgor normal. No rashes or lesions  HEENT: there is some ecchymosis seen around the left temporal area  Neck: no adenopathy, no carotid bruit, no JVD, supple, symmetrical, trachea midline, and thyroid not enlarged, symmetric, no tenderness/mass/nodules  Extremities:    - Left hip tender to palpation.     -Pain with PROM to hip, full assessment not done due to known fracture   -No knee or ankle deformity or significant tenderness   -Good distal pulses with good capillary refill   -Able to dorsiflex and plantar flex ankle and toes-bilaterally   -No tenderness over bilateral wrist, elbow or shoulders. Neurologic: Mental status: Awake and alert but confused    Labs     CBC:   Recent Labs     10/04/22  1055 10/05/22  0501   WBC 16.2* 13.2*   HGB 9.3* 8.4*    284     BMP:    Recent Labs     10/04/22  1055 10/04/22  2018 10/05/22  0501   * 128* 132*   K 5.3* 4.2 4.0   CL 95* 99 102   CO2 22 21 20*   BUN 33* 28* 23   CREATININE 1.4* 1.2* 0.9   GLUCOSE 180* 140* 94     INR:    Lab Results   Component Value Date    INR 0.90 10/04/2022    INR 1.26 04/28/2021    INR 1.07 04/25/2021    PROTIME 11.6 (L) 10/04/2022    PROTIME 15.3 (H) 04/28/2021    PROTIME 12.9 04/25/2021        X-ray view   Imaging Review-X-rays & CT scan were personally reviewed by myself     Impression   Acute mildly displaced fracture of the left proximal femur involving the left   femoral neck versus intertrochanteric region. Impression   Acute impacted nondisplaced intertrochanteric left proximal femur fracture. Distended urinary bladder.              Assessment and Plan Patient Active Problem List   Diagnosis Code    Diabetes mellitus (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 Left Intertrochanteric Hip Fracture    Plan:  ORIF left hip 10/6/2022    Based on the fracture pattern I recommend operative fixation of the fracture with a trochanteric nail. The goal of surgical intervention is pain control and mobility. Postoperatively the patient will remain in the hospital for pain control, physical therapy, as well as DVT prophylaxis. Surgical risks were explained to the patient as well as the family; these included but not limited to infection, nonunion, nail cut out, continued pain after surgical intervention. Risks also include post-operative deep venous thrombosis, heart attack, stroke and death. There is also at risk for loss of ambulatory status or a decreased level of ambulatory status. Postoperatively the patient will be weightbearing as tolerated and physical therapy will work with the patient on a daily basis. We will order the use incentive spirometry to prevent pneumonia postoperatively.  The patient will receive 24 hours of antibiotics postoperatively and the Harvey will be planned to be discontinued on postoperative day #2. Patient will be given both chemo (Lovenox) and mechanical (KATIE hoes and SCD's) DVT prophylaxis post-operatively. Post-operatively, we will assess the patients discharge needs with the help of case management. The patient will likely require a stay at a skilled nursing unit or ARU. The patient and family understands the risks and benefits of surgery and wishes to proceed with operative intervention.      Deidre Harley MD

## 2022-10-06 ENCOUNTER — ANESTHESIA (OUTPATIENT)
Dept: OPERATING ROOM | Age: 82
DRG: 480 | End: 2022-10-06
Payer: MEDICARE

## 2022-10-06 ENCOUNTER — APPOINTMENT (OUTPATIENT)
Dept: GENERAL RADIOLOGY | Age: 82
DRG: 480 | End: 2022-10-06
Payer: MEDICARE

## 2022-10-06 LAB
ANION GAP SERPL CALCULATED.3IONS-SCNC: 10 MMOL/L (ref 4–16)
BASOPHILS ABSOLUTE: 0 K/CU MM
BASOPHILS RELATIVE PERCENT: 0.1 % (ref 0–1)
BUN BLDV-MCNC: 25 MG/DL (ref 6–23)
CALCIUM SERPL-MCNC: 8.9 MG/DL (ref 8.3–10.6)
CHLORIDE BLD-SCNC: 103 MMOL/L (ref 99–110)
CO2: 23 MMOL/L (ref 21–32)
CREAT SERPL-MCNC: 0.9 MG/DL (ref 0.6–1.1)
DIFFERENTIAL TYPE: ABNORMAL
EOSINOPHILS ABSOLUTE: 0.1 K/CU MM
EOSINOPHILS RELATIVE PERCENT: 0.5 % (ref 0–3)
GFR AFRICAN AMERICAN: >60 ML/MIN/1.73M2
GFR NON-AFRICAN AMERICAN: >60 ML/MIN/1.73M2
GLUCOSE BLD-MCNC: 113 MG/DL (ref 70–99)
GLUCOSE BLD-MCNC: 120 MG/DL (ref 70–99)
GLUCOSE BLD-MCNC: 129 MG/DL (ref 70–99)
GLUCOSE BLD-MCNC: 131 MG/DL (ref 70–99)
GLUCOSE BLD-MCNC: 136 MG/DL (ref 70–99)
GLUCOSE BLD-MCNC: 140 MG/DL (ref 70–99)
GLUCOSE BLD-MCNC: 148 MG/DL (ref 70–99)
HCT VFR BLD CALC: 29.4 % (ref 37–47)
HEMOGLOBIN: 8.9 GM/DL (ref 12.5–16)
IMMATURE NEUTROPHIL %: 0.7 % (ref 0–0.43)
LYMPHOCYTES ABSOLUTE: 1.5 K/CU MM
LYMPHOCYTES RELATIVE PERCENT: 8.8 % (ref 24–44)
MCH RBC QN AUTO: 25.7 PG (ref 27–31)
MCHC RBC AUTO-ENTMCNC: 30.3 % (ref 32–36)
MCV RBC AUTO: 85 FL (ref 78–100)
MONOCYTES ABSOLUTE: 1.6 K/CU MM
MONOCYTES RELATIVE PERCENT: 9.5 % (ref 0–4)
NUCLEATED RBC %: 0 %
PDW BLD-RTO: 17.4 % (ref 11.7–14.9)
PLATELET # BLD: 344 K/CU MM (ref 140–440)
PMV BLD AUTO: 10 FL (ref 7.5–11.1)
POTASSIUM SERPL-SCNC: 3.9 MMOL/L (ref 3.5–5.1)
RBC # BLD: 3.46 M/CU MM (ref 4.2–5.4)
SEGMENTED NEUTROPHILS ABSOLUTE COUNT: 13.6 K/CU MM
SEGMENTED NEUTROPHILS RELATIVE PERCENT: 80.4 % (ref 36–66)
SODIUM BLD-SCNC: 136 MMOL/L (ref 135–145)
TOTAL IMMATURE NEUTOROPHIL: 0.12 K/CU MM
TOTAL NUCLEATED RBC: 0 K/CU MM
WBC # BLD: 16.9 K/CU MM (ref 4–10.5)

## 2022-10-06 PROCEDURE — P9047 ALBUMIN (HUMAN), 25%, 50ML: HCPCS | Performed by: NURSE ANESTHETIST, CERTIFIED REGISTERED

## 2022-10-06 PROCEDURE — 2709999900 HC NON-CHARGEABLE SUPPLY: Performed by: ORTHOPAEDIC SURGERY

## 2022-10-06 PROCEDURE — 7100000000 HC PACU RECOVERY - FIRST 15 MIN: Performed by: ORTHOPAEDIC SURGERY

## 2022-10-06 PROCEDURE — 6360000002 HC RX W HCPCS: Performed by: ORTHOPAEDIC SURGERY

## 2022-10-06 PROCEDURE — 2500000003 HC RX 250 WO HCPCS: Performed by: NURSE ANESTHETIST, CERTIFIED REGISTERED

## 2022-10-06 PROCEDURE — 2780000010 HC IMPLANT OTHER: Performed by: ORTHOPAEDIC SURGERY

## 2022-10-06 PROCEDURE — 3600000004 HC SURGERY LEVEL 4 BASE: Performed by: ORTHOPAEDIC SURGERY

## 2022-10-06 PROCEDURE — 0QS704Z REPOSITION LEFT UPPER FEMUR WITH INTERNAL FIXATION DEVICE, OPEN APPROACH: ICD-10-PCS | Performed by: ORTHOPAEDIC SURGERY

## 2022-10-06 PROCEDURE — C1769 GUIDE WIRE: HCPCS | Performed by: ORTHOPAEDIC SURGERY

## 2022-10-06 PROCEDURE — C1713 ANCHOR/SCREW BN/BN,TIS/BN: HCPCS | Performed by: ORTHOPAEDIC SURGERY

## 2022-10-06 PROCEDURE — 76000 FLUOROSCOPY <1 HR PHYS/QHP: CPT

## 2022-10-06 PROCEDURE — 36415 COLL VENOUS BLD VENIPUNCTURE: CPT

## 2022-10-06 PROCEDURE — 6370000000 HC RX 637 (ALT 250 FOR IP): Performed by: ORTHOPAEDIC SURGERY

## 2022-10-06 PROCEDURE — 3700000001 HC ADD 15 MINUTES (ANESTHESIA): Performed by: ORTHOPAEDIC SURGERY

## 2022-10-06 PROCEDURE — 6360000002 HC RX W HCPCS: Performed by: NURSE ANESTHETIST, CERTIFIED REGISTERED

## 2022-10-06 PROCEDURE — 82962 GLUCOSE BLOOD TEST: CPT

## 2022-10-06 PROCEDURE — 2720000010 HC SURG SUPPLY STERILE: Performed by: ORTHOPAEDIC SURGERY

## 2022-10-06 PROCEDURE — 94761 N-INVAS EAR/PLS OXIMETRY MLT: CPT

## 2022-10-06 PROCEDURE — 3700000000 HC ANESTHESIA ATTENDED CARE: Performed by: ORTHOPAEDIC SURGERY

## 2022-10-06 PROCEDURE — 85025 COMPLETE CBC W/AUTO DIFF WBC: CPT

## 2022-10-06 PROCEDURE — 7100000001 HC PACU RECOVERY - ADDTL 15 MIN: Performed by: ORTHOPAEDIC SURGERY

## 2022-10-06 PROCEDURE — 2580000003 HC RX 258: Performed by: ORTHOPAEDIC SURGERY

## 2022-10-06 PROCEDURE — 3600000014 HC SURGERY LEVEL 4 ADDTL 15MIN: Performed by: ORTHOPAEDIC SURGERY

## 2022-10-06 PROCEDURE — 6360000002 HC RX W HCPCS: Performed by: ANESTHESIOLOGY

## 2022-10-06 PROCEDURE — 80048 BASIC METABOLIC PNL TOTAL CA: CPT

## 2022-10-06 PROCEDURE — 2580000003 HC RX 258: Performed by: NURSE PRACTITIONER

## 2022-10-06 PROCEDURE — 1200000000 HC SEMI PRIVATE

## 2022-10-06 DEVICE — NAIL IM L380MM DIA10MM 130DEG LNG L PROX FEM GRN TI CANN: Type: IMPLANTABLE DEVICE | Site: HIP | Status: FUNCTIONAL

## 2022-10-06 DEVICE — SCREW BNE L38MM DIA5MM TIB LT GRN TI ST CANN LOK FULL THRD: Type: IMPLANTABLE DEVICE | Site: HIP | Status: FUNCTIONAL

## 2022-10-06 DEVICE — IMPLANTABLE DEVICE: Type: IMPLANTABLE DEVICE | Site: HIP | Status: FUNCTIONAL

## 2022-10-06 RX ORDER — LIDOCAINE HYDROCHLORIDE 20 MG/ML
INJECTION, SOLUTION EPIDURAL; INFILTRATION; INTRACAUDAL; PERINEURAL PRN
Status: DISCONTINUED | OUTPATIENT
Start: 2022-10-06 | End: 2022-10-06 | Stop reason: SDUPTHER

## 2022-10-06 RX ORDER — ONDANSETRON 2 MG/ML
4 INJECTION INTRAMUSCULAR; INTRAVENOUS
Status: DISCONTINUED | OUTPATIENT
Start: 2022-10-06 | End: 2022-10-06 | Stop reason: HOSPADM

## 2022-10-06 RX ORDER — HYDROXYZINE HYDROCHLORIDE 10 MG/1
10 TABLET, FILM COATED ORAL EVERY 8 HOURS PRN
Status: CANCELLED | OUTPATIENT
Start: 2022-10-06

## 2022-10-06 RX ORDER — OXYCODONE HYDROCHLORIDE 5 MG/1
5 TABLET ORAL EVERY 4 HOURS PRN
Status: DISCONTINUED | OUTPATIENT
Start: 2022-10-06 | End: 2022-10-12 | Stop reason: HOSPADM

## 2022-10-06 RX ORDER — OXYCODONE HYDROCHLORIDE 5 MG/1
10 TABLET ORAL PRN
Status: DISCONTINUED | OUTPATIENT
Start: 2022-10-06 | End: 2022-10-06 | Stop reason: HOSPADM

## 2022-10-06 RX ORDER — SODIUM CHLORIDE 0.9 % (FLUSH) 0.9 %
5-40 SYRINGE (ML) INJECTION PRN
Status: DISCONTINUED | OUTPATIENT
Start: 2022-10-06 | End: 2022-10-12 | Stop reason: HOSPADM

## 2022-10-06 RX ORDER — SODIUM CHLORIDE 9 MG/ML
25 INJECTION, SOLUTION INTRAVENOUS PRN
Status: DISCONTINUED | OUTPATIENT
Start: 2022-10-06 | End: 2022-10-06 | Stop reason: HOSPADM

## 2022-10-06 RX ORDER — HYDRALAZINE HYDROCHLORIDE 20 MG/ML
10 INJECTION INTRAMUSCULAR; INTRAVENOUS
Status: DISCONTINUED | OUTPATIENT
Start: 2022-10-06 | End: 2022-10-06 | Stop reason: HOSPADM

## 2022-10-06 RX ORDER — OXYCODONE HYDROCHLORIDE 5 MG/1
5 TABLET ORAL PRN
Status: DISCONTINUED | OUTPATIENT
Start: 2022-10-06 | End: 2022-10-06 | Stop reason: HOSPADM

## 2022-10-06 RX ORDER — SODIUM CHLORIDE, SODIUM LACTATE, POTASSIUM CHLORIDE, CALCIUM CHLORIDE 600; 310; 30; 20 MG/100ML; MG/100ML; MG/100ML; MG/100ML
INJECTION, SOLUTION INTRAVENOUS CONTINUOUS
Status: DISCONTINUED | OUTPATIENT
Start: 2022-10-06 | End: 2022-10-12

## 2022-10-06 RX ORDER — FENTANYL CITRATE 50 UG/ML
25 INJECTION, SOLUTION INTRAMUSCULAR; INTRAVENOUS EVERY 5 MIN PRN
Status: DISCONTINUED | OUTPATIENT
Start: 2022-10-06 | End: 2022-10-06 | Stop reason: HOSPADM

## 2022-10-06 RX ORDER — LABETALOL HYDROCHLORIDE 5 MG/ML
10 INJECTION, SOLUTION INTRAVENOUS
Status: DISCONTINUED | OUTPATIENT
Start: 2022-10-06 | End: 2022-10-06 | Stop reason: HOSPADM

## 2022-10-06 RX ORDER — SODIUM CHLORIDE 0.9 % (FLUSH) 0.9 %
5-40 SYRINGE (ML) INJECTION PRN
Status: DISCONTINUED | OUTPATIENT
Start: 2022-10-06 | End: 2022-10-06 | Stop reason: HOSPADM

## 2022-10-06 RX ORDER — CEFAZOLIN SODIUM 1 G/50ML
INJECTION, SOLUTION INTRAVENOUS PRN
Status: DISCONTINUED | OUTPATIENT
Start: 2022-10-06 | End: 2022-10-06 | Stop reason: SDUPTHER

## 2022-10-06 RX ORDER — SODIUM CHLORIDE 9 MG/ML
INJECTION, SOLUTION INTRAVENOUS PRN
Status: DISCONTINUED | OUTPATIENT
Start: 2022-10-06 | End: 2022-10-12 | Stop reason: HOSPADM

## 2022-10-06 RX ORDER — FENTANYL CITRATE 50 UG/ML
50 INJECTION, SOLUTION INTRAMUSCULAR; INTRAVENOUS EVERY 5 MIN PRN
Status: DISCONTINUED | OUTPATIENT
Start: 2022-10-06 | End: 2022-10-06 | Stop reason: HOSPADM

## 2022-10-06 RX ORDER — KETAMINE HCL 50MG/ML(1)
SYRINGE (ML) INTRAVENOUS PRN
Status: DISCONTINUED | OUTPATIENT
Start: 2022-10-06 | End: 2022-10-06 | Stop reason: SDUPTHER

## 2022-10-06 RX ORDER — SODIUM CHLORIDE 0.9 % (FLUSH) 0.9 %
5-40 SYRINGE (ML) INJECTION EVERY 12 HOURS SCHEDULED
Status: DISCONTINUED | OUTPATIENT
Start: 2022-10-06 | End: 2022-10-06 | Stop reason: HOSPADM

## 2022-10-06 RX ORDER — SODIUM CHLORIDE 0.9 % (FLUSH) 0.9 %
5-40 SYRINGE (ML) INJECTION EVERY 12 HOURS SCHEDULED
Status: DISCONTINUED | OUTPATIENT
Start: 2022-10-06 | End: 2022-10-12 | Stop reason: HOSPADM

## 2022-10-06 RX ORDER — ALBUMIN (HUMAN) 12.5 G/50ML
SOLUTION INTRAVENOUS PRN
Status: DISCONTINUED | OUTPATIENT
Start: 2022-10-06 | End: 2022-10-06 | Stop reason: SDUPTHER

## 2022-10-06 RX ORDER — LANOLIN ALCOHOL/MO/W.PET/CERES
3 CREAM (GRAM) TOPICAL NIGHTLY PRN
Status: DISCONTINUED | OUTPATIENT
Start: 2022-10-06 | End: 2022-10-12 | Stop reason: HOSPADM

## 2022-10-06 RX ORDER — OXYCODONE HYDROCHLORIDE 10 MG/1
10 TABLET ORAL EVERY 4 HOURS PRN
Status: DISCONTINUED | OUTPATIENT
Start: 2022-10-06 | End: 2022-10-12 | Stop reason: HOSPADM

## 2022-10-06 RX ORDER — PROPOFOL 10 MG/ML
INJECTION, EMULSION INTRAVENOUS PRN
Status: DISCONTINUED | OUTPATIENT
Start: 2022-10-06 | End: 2022-10-06 | Stop reason: SDUPTHER

## 2022-10-06 RX ADMIN — QUETIAPINE FUMARATE 25 MG: 25 TABLET ORAL at 20:13

## 2022-10-06 RX ADMIN — PROPOFOL 10 MG: 10 INJECTION, EMULSION INTRAVENOUS at 14:09

## 2022-10-06 RX ADMIN — HYDROMORPHONE HYDROCHLORIDE 0.5 MG: 1 INJECTION, SOLUTION INTRAMUSCULAR; INTRAVENOUS; SUBCUTANEOUS at 16:33

## 2022-10-06 RX ADMIN — PROPOFOL 10 MG: 10 INJECTION, EMULSION INTRAVENOUS at 13:44

## 2022-10-06 RX ADMIN — CARVEDILOL 25 MG: 25 TABLET, FILM COATED ORAL at 16:34

## 2022-10-06 RX ADMIN — CEFAZOLIN SODIUM 1 G: 1 INJECTION, SOLUTION INTRAVENOUS at 13:41

## 2022-10-06 RX ADMIN — BUPROPION HYDROCHLORIDE 150 MG: 150 TABLET, EXTENDED RELEASE ORAL at 20:13

## 2022-10-06 RX ADMIN — SODIUM CHLORIDE 25 ML: 9 INJECTION, SOLUTION INTRAVENOUS at 13:17

## 2022-10-06 RX ADMIN — ALBUMIN (HUMAN) 12.5 G: 0.25 INJECTION, SOLUTION INTRAVENOUS at 13:52

## 2022-10-06 RX ADMIN — Medication 25 MG: at 14:07

## 2022-10-06 RX ADMIN — SODIUM CHLORIDE, PRESERVATIVE FREE 10 ML: 5 INJECTION INTRAVENOUS at 13:16

## 2022-10-06 RX ADMIN — SODIUM CHLORIDE, POTASSIUM CHLORIDE, SODIUM LACTATE AND CALCIUM CHLORIDE: 600; 310; 30; 20 INJECTION, SOLUTION INTRAVENOUS at 16:41

## 2022-10-06 RX ADMIN — PROPOFOL 40 MG: 10 INJECTION, EMULSION INTRAVENOUS at 13:41

## 2022-10-06 RX ADMIN — Medication 25 MG: at 13:41

## 2022-10-06 RX ADMIN — FENTANYL CITRATE 50 MCG: 50 INJECTION, SOLUTION INTRAMUSCULAR; INTRAVENOUS at 15:10

## 2022-10-06 RX ADMIN — ATORVASTATIN CALCIUM 40 MG: 40 TABLET, FILM COATED ORAL at 20:13

## 2022-10-06 RX ADMIN — LIDOCAINE HYDROCHLORIDE 2 ML: 20 INJECTION, SOLUTION EPIDURAL; INFILTRATION; INTRACAUDAL; PERINEURAL at 13:41

## 2022-10-06 ASSESSMENT — ENCOUNTER SYMPTOMS
COUGH: 0
BACK PAIN: 0
CONSTIPATION: 0
SHORTNESS OF BREATH: 0
SORE THROAT: 0
ABDOMINAL DISTENTION: 0
DIARRHEA: 0
WHEEZING: 0
EYE DISCHARGE: 0

## 2022-10-06 ASSESSMENT — PAIN DESCRIPTION - LOCATION
LOCATION: HIP
LOCATION: HIP

## 2022-10-06 ASSESSMENT — PAIN SCALES - GENERAL
PAINLEVEL_OUTOF10: 10
PAINLEVEL_OUTOF10: 8
PAINLEVEL_OUTOF10: 0
PAINLEVEL_OUTOF10: 0

## 2022-10-06 ASSESSMENT — PAIN DESCRIPTION - DESCRIPTORS
DESCRIPTORS: ACHING;THROBBING
DESCRIPTORS: ACHING

## 2022-10-06 ASSESSMENT — PAIN DESCRIPTION - PAIN TYPE
TYPE: SURGICAL PAIN
TYPE: SURGICAL PAIN

## 2022-10-06 ASSESSMENT — PAIN DESCRIPTION - ORIENTATION
ORIENTATION: LEFT
ORIENTATION: LEFT

## 2022-10-06 ASSESSMENT — PAIN DESCRIPTION - FREQUENCY
FREQUENCY: CONTINUOUS
FREQUENCY: CONTINUOUS

## 2022-10-06 ASSESSMENT — PAIN DESCRIPTION - ONSET: ONSET: ON-GOING

## 2022-10-06 ASSESSMENT — PAIN - FUNCTIONAL ASSESSMENT: PAIN_FUNCTIONAL_ASSESSMENT: PREVENTS OR INTERFERES SOME ACTIVE ACTIVITIES AND ADLS

## 2022-10-06 NOTE — OP NOTE
OPERATIVE NOTE    Patient Name:   Stephanie Larson   ([birth year])  MRN       [id number]   Surgery date:   10/6/2022    Preoperative Diagnosis:   Left Intertrochanteric Hip Fracture    Postoperative Diagnosis:   Same    Procedure:     Left  Trochanteric Nail               Surgeon:    Carmelina Jensen. Ching Faye MD    Assistant:     Sarah Gao PA-C who assisted in fracture reduction, protection of neurovascular structures, final implantation of the components, closure of the wound, application of sterile dressing and transfer of patient    Anesthesia:    General endotrachial anesthesia    EBL:    100ml     Implants:   Synthes Trochanteric Nail       130 degree 10mm x 380mm nail with 71MP helical blade    Surgical Indications  The patient presented to the emergency room and was diagnosed with an Intertrochanteric hip fracture. The patient was admitted to the hospital and received medical clearance. We discussed treatment options and I recommended operative fixation cwith trochanteric intramedullary nailing. Risks, benefits, expected outcomes and potential complications were discussed as outlined in the H&P. At no time were any guarantees implied or stated. Patient Positioning and Surgical Prep  The patient was seen in the holding area and the left extremity marked with an indelible pen. The patient was taken to the operative suite, identified and while on the hospital bed, anesthesia was administered. The patient was transferred to the fracture table. The affected leg was placed in boot traction after the foot was well padded. The unaffected leg was gently abducted and externally rotated. The fracture was well visualized using biplanar fluoroscopy and the fracture reduced or manipulated as required. The lower extremity was prepped from the flank to knee with Duraprep and then draped in the normal sterile fashion.   A time-out was then performed confirming the patient's name, operative side, proposed procedure and administration of antibiotics. Exposure  Closed reduction was performed using the fracture table with longitudinal traction. An incision was made proximal to the greater trochanter. The fascia and muscle were split in line with their fibers. Using biplanar, c-arm fluoroscopy. A starting hole was identified at the tip of the greater trochanter. A guide wire was inserted and a 17mm cannulated reamer introduced to open the proximal femur. The long reaming guide wire was inserted into the medullary canal and the proximal femur reamed as necessary. We then measured the proposed length of the femur for the ultimate nail insertion. When then reamed up to 11.5mm to allow an adequate fit. Insertion of Long Trochanteric Femoral Nail  The long trochanteric femoral nail was attached to the insertion handle and inserted over the reaming guide wire. Using biplanar fluoroscopy the knee was visualized confirming length of the nail at the knee. The guide wire was removed. An incision was made along the lateral aspect of the thigh and through the fascia. The blade guide sleeve was inserted and snapped into the aiming guide. The sleeve assembly was advanced to the lateral femur. A 3.2 mm guide wire was drilled into the femoral head and position as close to the center of the femoral head was obtained confirmed with biplanar fluoroscopy. The length for the helical blade was measured to be 85mm. The 11.0 cannulated drill was used to ream out the lateral cortex and the reamer was then placed into the femoral head. The helical blade was assembled to the  and impacted into place. Using the flexible screwdriver, the preassembled locking mechanism was engaged by advancing the screw. The  and aiming guide were disengaged. Final fluoroscopic views were obtained. We then placed a distal locking screw in the standard manner and was placement and legthn was confirmed with fluoroscopy.     Closure and Disposition  The wounds were copiously irrigated and closed in layers. Sterile dressings were applied. All sponge and needle counts were correct. The patient was awakened and taken to the postoperative area in stable condition. I spoke with the patients family in the consultation room postoperatively. ADDENDUM:  Biplanar fluoroscopy was used throughout the case to confirm satisfactory fracture reduction and placement of all hardware.

## 2022-10-06 NOTE — BRIEF OP NOTE
OPERATIVE NOTE    Patient name  Aurora Gaming  MRN    [id number]    Date of Procedure  10/6/2022      Preoperative Diagnosis: Left Intertrochanteric Hip Fracture    Postoperative Diagnosis: Left Intertrochanteric Hip Fracture    Procedure Performed:  Trochanteric Intramedullary Nail Left Hip                                   Surgeon:   Jovita Hawkins. Abhijeet Hartley MD    Assitant   [facility] PA-     Anesthesia:    GETA    Estimated blood loss:  Less than 100 ml    Specimens:   None    Complications:    None      Jovita Hawkins.  Abhijeet Hartley MD

## 2022-10-06 NOTE — PROGRESS NOTES
V2.0  [facility] – [place] Hospitalist Progress Note      Name:  Danelle Trinh /Age/Sex: 1940  (81 y.o. female)   MRN & CSN:  [id number] & [id number] Encounter Date/Time: 10/6/2022 7:36 AM EDT    Location:  [id number] PCP: Jenn Buitrago MD       Hospital Day: 3    Assessment and Plan:   Danelle Trinh is a 81 y.o. female with pmh of carcinoid syndrome, diabetes mellitus, hypertension, CAD, carotid artery stenosis, history of MI hyperlipidemia who presents with Closed left hip fracture, initial encounter ([facility] [address]      Plan:  Left intertrochanteric proximal femur fracture-  post fall  left proximal femur involving the femoral neck versus intertrochanteric region. CT of left hip demonstrate acute impacted nondisplaced intertrochanteric left proximal femur fracture. -CT left hip with reconstructions pending  -Orthopedics consulted-Dr. Nakul Ngo  -Analgesics antiemetics  -Harvey catheter  -Bedrest until further evaluation  -Neurochecks left lower extremity  -N.p.o. at midnight for ORIF today        Hyponatremia resolving  Hyperkalemia- resolved  -MIVF  -follow daily     Leukocytosis - improving  16.2 on admission  no episodes of infection.     UA bland  -Chest x-ray negative for acute findings     CKD stage III:   Baseline creatinine around 1.2-creatinine mildly elevated at 1.4.  - now back at baseline  -Daily labs  -Avoid toxic agents  -MIVF     Diabetes Mellitus type II   -Blood glucose on admission 180.   - SSI  - held home PO meds  - POCT glucose qACHS  - hypoglycemia management protocol   - target blood glucose 100-180  - ADA carb controlled diet  -N.p.o. after midnight    Carcinoid syndrome  Malignancy associated pain: Controlled with oxycodone, active prescription verified on [facility]  follows oncology Dr. Arcelia Lundy     Hyperlipidemia: Continue statin therapy     Hypertension  CAD  Managed on aspirin, amlodipine and carvedilol     Chronic Conditions: Continue all home medications except as stated above or contraindicated. Underweight BMI 17.56: kg/m2 likely secondary to protein calorie malnutrition  -Nutrition consult    Diet Diet NPO   DVT Prophylaxis [] Lovenox, []  Heparin, [x] SCDs, [] Ambulation,  [] Eliquis, [] Xarelto  [] Coumadin   Code Status Full Code   Disposition Patient requires continued admission due to hip fracture         Subjective:     Chief Complaint: Hip Pain       States she feels well. Pain on movement. Tolerating PO. Review of Systems:    Review of Systems   Constitutional:  Positive for activity change. Negative for appetite change, diaphoresis and fatigue. HENT:  Negative for congestion and sore throat. Eyes:  Negative for discharge and visual disturbance. Respiratory:  Negative for cough, shortness of breath and wheezing. Cardiovascular:  Negative for chest pain, palpitations and leg swelling. Gastrointestinal:  Negative for abdominal distention, constipation and diarrhea. Genitourinary:  Negative for difficulty urinating and hematuria. Musculoskeletal:  Positive for arthralgias. Negative for back pain and gait problem. Neurological:  Negative for dizziness, syncope and speech difficulty. Hematological:  Negative for adenopathy. Psychiatric/Behavioral:  Negative for agitation and confusion. All other systems reviewed and are negative. Objective: Intake/Output Summary (Last 24 hours) at 10/6/2022 1122  Last data filed at 10/6/2022 0530  Gross per 24 hour   Intake --   Output 1900 ml   Net -1900 ml          Vitals:   Vitals:    10/06/22 0927   BP: (!) 164/70   Pulse: 73   Resp: 19   Temp: 98.4 °F (36.9 °C)   SpO2: 99%       Physical Exam:   Physical Exam  Vitals and nursing note reviewed. Constitutional:       General: She is not in acute distress. Appearance: Normal appearance. She is not ill-appearing. HENT:      Head: Normocephalic and atraumatic.       Nose: Nose normal.      Mouth/Throat:      Mouth: Mucous membranes are moist.   Eyes: Extraocular Movements: Extraocular movements intact. Pupils: Pupils are equal, round, and reactive to light. Cardiovascular:      Rate and Rhythm: Normal rate and regular rhythm. Pulses: Normal pulses. Heart sounds: Normal heart sounds. Pulmonary:      Effort: Pulmonary effort is normal.      Breath sounds: Normal breath sounds. Abdominal:      Palpations: Abdomen is soft. Musculoskeletal:         General: Tenderness and deformity present. Normal range of motion. Cervical back: Normal range of motion. Comments: Left leg in extension and external rotation. Skin:     General: Skin is warm. Capillary Refill: Capillary refill takes less than 2 seconds. Neurological:      General: No focal deficit present. Mental Status: She is alert and oriented to person, place, and time.    Psychiatric:         Mood and Affect: Mood normal.         Behavior: Behavior normal.          Medications:   Medications:    ferrous sulfate  325 mg Oral Daily with breakfast    amLODIPine  10 mg Oral Daily    aspirin  81 mg Oral Daily    atorvastatin  40 mg Oral Nightly    buPROPion  150 mg Oral BID    carvedilol  25 mg Oral BID WC    dexamethasone  2 mg Oral Daily with breakfast    fenofibrate  160 mg Oral Daily    therapeutic multivitamin-minerals  1 tablet Oral Daily    pantoprazole  40 mg Oral QAM AC    QUEtiapine  25 mg Oral Nightly    venlafaxine  225 mg Oral Daily    sodium chloride flush  5-40 mL IntraVENous 2 times per day    insulin lispro  0-4 Units SubCUTAneous TID WC    insulin lispro  0-4 Units SubCUTAneous Nightly      Infusions:    dextrose      sodium chloride       PRN Meds: melatonin, 3 mg, Nightly PRN  ondansetron, 4 mg, Q30 Min PRN  oxyCODONE, 5 mg, Q4H PRN  glucose, 4 tablet, PRN  dextrose bolus, 125 mL, PRN   Or  dextrose bolus, 250 mL, PRN  glucagon (rDNA), 1 mg, PRN  dextrose, , Continuous PRN  sodium chloride flush, 5-40 mL, PRN  sodium chloride, 25 mL, PRN  ondansetron, 4 mg, Q8H PRN   Or  ondansetron, 4 mg, Q6H PRN  polyethylene glycol, 17 g, Daily PRN  acetaminophen, 650 mg, Q6H PRN   Or  acetaminophen, 650 mg, Q6H PRN      Labs      Recent Results (from the past 24 hour(s))   POCT Glucose    Collection Time: 10/05/22  4:31 PM   Result Value Ref Range    POC Glucose 250 (H) 70 - 99 MG/DL   POCT Glucose    Collection Time: 10/05/22  8:06 PM   Result Value Ref Range    POC Glucose 226 (H) 70 - 99 MG/DL        Imaging/Diagnostics Last 24 Hours   CT HEAD WO CONTRAST    Result Date: 10/4/2022  EXAMINATION: CT OF THE HEAD WITHOUT CONTRAST  10/4/2022 12:05 pm TECHNIQUE: CT of the head was performed without the administration of intravenous contrast. Automated exposure control, iterative reconstruction, and/or weight based adjustment of the mA/kV was utilized to reduce the radiation dose to as low as reasonably achievable. COMPARISON: 09/22/2022. HISTORY: ORDERING SYSTEM PROVIDED HISTORY: fall head injury TECHNOLOGIST PROVIDED HISTORY: Has a \"code stroke\" or \"stroke alert\" been called? ->No Reason for exam:->fall head injury Decision Support Exception - unselect if not a suspected or confirmed emergency medical condition->Emergency Medical Condition (MA) Reason for Exam: trauma/ fall, head injury FINDINGS: BRAIN/VENTRICLES: There is no acute intracranial hemorrhage, mass effect or midline shift. No abnormal extra-axial fluid collection. The gray-white differentiation is maintained without evidence of an acute infarct. There is prominence of the ventricles and sulci due to global parenchymal volume loss. There are nonspecific areas of hypoattenuation within the periventricular and subcortical white matter, which likely represent chronic microvascular ischemic change. ORBITS: The visualized portion of the orbits demonstrate no acute abnormality. SINUSES: The visualized paranasal sinuses and mastoid air cells demonstrate no acute abnormality.  SOFT TISSUES/SKULL: There is mild high left frontal scalp soft tissue swelling. 1. Mild high left frontal scalp soft tissue swelling but no acute intracranial abnormality. XR CHEST PORTABLE    Result Date: 10/4/2022  EXAMINATION: ONE XRAY VIEW OF THE CHEST 10/4/2022 3:56 pm COMPARISON: 09/22/2022 and 09/23/2022. HISTORY: ORDERING SYSTEM PROVIDED HISTORY: falls, left hip fx, edema vs infiltrate TECHNOLOGIST PROVIDED HISTORY: Reason for exam:->falls, left hip fx, edema vs infiltrate Reason for Exam: falls, left hip fx, edema vs infiltrate Additional signs and symptoms: na Relevant Medical/Surgical History: diabetes, hypertension FINDINGS: Lung volumes are large the diaphragm is flattened. There is no confluent consolidation or effusion. The cardiomediastinal silhouette and pulmonary vessels appear normal.     No acute findings. Large lung volumes and flattening of the diaphragms suggesting COPD. CT HIP LEFT WO CONTRAST    Result Date: 10/5/2022  EXAMINATION: CT OF THE LEFT HIP WITHOUT CONTRAST, 10/4/2022 2:15 pm TECHNIQUE: CT of the left hip was performed without the administration of intravenous contrast.  Multiplanar reformatted images are provided for review. Automated exposure control, iterative reconstruction, and/or weight based adjustment of the mA/kV was utilized to reduce the radiation dose to as low as reasonably achievable. COMPARISON: Radiographs 10/04/2022 HISTORY ORDERING SYSTEM PROVIDED HISTORY:  With 3D reconstruction TECHNOLOGIST PROVIDED HISTORY: Reason for Exam:  With 3D reconstruction Decision Support Exception - unselect if not a suspected or confirmed emergency medical condition->Emergency Medical Condition (MA) Reason for Exam:  Closed left hip fracture, initial encounter ([facility] [address] FINDINGS: Acute impacted nondisplaced intertrochanteric left proximal femur fracture. No evidence of dislocation. Mild left hip joint degenerative changes. Distended urinary bladder.      Acute impacted nondisplaced intertrochanteric left proximal femur fracture. Distended urinary bladder. XR HIP 2-3 VW W PELVIS LEFT    Result Date: 10/5/2022  EXAMINATION: ONE XRAY VIEW OF THE PELVIS AND TWO XRAY VIEWS LEFT HIP 10/4/2022 11:05 am COMPARISON: 12/23/2018 HISTORY: ORDERING SYSTEM PROVIDED HISTORY: fall three days ago TECHNOLOGIST PROVIDED HISTORY: Reason for exam:->fall three days ago Reason for Exam: fall three days ago Initial encounter FINDINGS: Osteopenia. There is an acute mildly displaced fracture of the left proximal femur likely involving the left femoral neck and possibly the intertrochanteric region. Mild-to-moderate degenerative changes of the hip joints. The pelvic ring is otherwise intact. No other acute fracture or dislocation. Acute mildly displaced fracture of the left proximal femur involving the left femoral neck versus intertrochanteric region.        Electronically signed by Yahir Mo MD on 10/6/2022 at 11:22 AM

## 2022-10-06 NOTE — PROGRESS NOTES
Comprehensive Nutrition Assessment    Type and Reason for Visit:  Reassess    Nutrition Recommendations/Plan:   Resume diet when appropriate carb controlled  Resume oral nutrition supplement  Will continue to follow up during stay      Malnutrition Assessment:  Malnutrition Status:  Severe malnutrition (10/06/22 1140)    Context:  Acute Illness     Findings of the 6 clinical characteristics of malnutrition:  Energy Intake:  50% or less of estimated energy requirements for 5 or more days  Weight Loss:  No significant weight loss (6% over 4 months)     Body Fat Loss: Moderate body fat loss Buccal region, Orbital   Muscle Mass Loss: Moderate muscle mass loss Clavicles (pectoralis & deltoids), Temples (temporalis), Hand (interosseous)  Fluid Accumulation:  No significant fluid accumulation Extremities   Strength:  Not Performed    Nutrition Assessment:    NPO today with plan for hip ORIF. Reports usually with good appetite but noted decreased intake due to recent pain. Underweight, meeting criteria for malnutrition. Will continue to follow at high nutrition risk at this time. Nutrition Related Findings:    resting in bed, agreeable to eval   meds noted: decadron, multivitamin, iron Wound Type: None       Current Nutrition Intake & Therapies:    Average Meal Intake: NPO  Average Supplements Intake: NPO  Diet NPO    Anthropometric Measures:  Height: 5' 2\" (157.5 cm)  Ideal Body Weight (IBW): 110 lbs (50 kg)    Admission Body Weight: 95 lb 14.4 oz (43.5 kg)  Current Body Weight: 95 lb 14.4 oz (43.5 kg), 87.2 % IBW.  Weight Source: Bed Scale  Current BMI (kg/m2): 17.5  Usual Body Weight: 102 lb (46.3 kg)  % Weight Change (Calculated): -6  Weight Adjustment For: No Adjustment         BMI Categories: Underweight (BMI less than 22) age over 72    Estimated Daily Nutrient Needs:  Energy Requirements Based On: Kcal/kg  Weight Used for Energy Requirements: Current  Energy (kcal/day): 5720-5451 (30-35 kcal/kg)  Weight Used for Protein Requirements: Ideal  Protein (g/day): 60-70 (1.2-1.4 g/kg)  Method Used for Fluid Requirements: 1 ml/kcal  Fluid (ml/day): 9801-5705    Nutrition Diagnosis:   Predicted inadequate energy intake related to pain as evidenced by poor intake prior to admission    Nutrition Interventions:   Food and/or Nutrient Delivery: Start Oral Diet, Start Oral Nutrition Supplement  Nutrition Education/Counseling: No recommendation at this time  Coordination of Nutrition Care: Continue to monitor while inpatient, Feeding Assistance/Environment Change       Goals:     Goals: PO intake 50% or greater, by next RD assessment       Nutrition Monitoring and Evaluation:   Behavioral-Environmental Outcomes: None Identified  Food/Nutrient Intake Outcomes: Food and Nutrient Intake, Supplement Intake  Physical Signs/Symptoms Outcomes: Chewing or Swallowing, Biochemical Data, Meal Time Behavior, Skin, Weight    Discharge Planning:    Continue Oral Nutrition Supplement     Nate Roman RD, LD  Contact: [phone]

## 2022-10-06 NOTE — PROGRESS NOTES
V2.0  [facility] [place]      Name:  Nirali Chambers /Age/Sex: 1940  (81 y.o. female)   MRN & CSN:  [id number] & [id number] Encounter Date/Time: 10/6/2022 7:36 AM EDT    Location:  [id number]-[id number] PCP: Marisabel Burdick MD       Hospital Day: 3    Assessment and Plan:   Nirali Chambers is a 81 y.o. female with pmh of carcinoid syndrome, diabetes mellitus, hypertension, CAD, carotid artery stenosis, history of MI hyperlipidemia who presents with Closed left hip fracture, initial encounter ([facility] [address]      Plan:  Left intertrochanteric proximal femur fracture-  post fall  left proximal femur involving the femoral neck versus intertrochanteric region. CT of left hip demonstrate acute impacted nondisplaced intertrochanteric left proximal femur fracture. -CT left hip with reconstructions pending  -Orthopedics consulted-Dr. Freya Busby  -Analgesics antiemetics  -Harvey catheter  -Bedrest until further evaluation  -Neurochecks left lower extremity  -surgery deferred for tomorrow by orthopedics  -ok for diet, N.p.o. at midnight       Hyponatremia: 127 - resolving  Hyperkalemia 5.3 - resolved  Hypermagnesemia 2.5  -MIVF  -follow daily     Leukocytosis: 16.2 no episodes of infection. UA bland  -Chest x-ray negative for acute findings     CKD stage III: Baseline creatinine around 1.2-creatinine mildly elevated at 1.4.  -Daily labs  -Avoid toxic agents  -MIVF     Diabetes Mellitus type II -with hyperglycemia - without long tern use of insulin.  managed on metformin, glipizide, Jardiance, hold long acting antiglycemics,  last Hgb A1C-6.3, Blood glucose on admission 180.   - SSI  - held home PO meds  - POCT glucose qACHS  - hypoglycemia management protocol   - target blood glucose 100-180  - ADA carb controlled diet  -N.p.o. after midnight    Carcinoid syndrome  Malignancy associated pain: Controlled with oxycodone, active prescription verified on [facility]  follows oncology Dr. Christi Lopez     Hyperlipidemia: Continue statin therapy     Hypertension  CAD  Managed on aspirin, amlodipine and carvedilol     Chronic Conditions: Continue all home medications except as stated above or contraindicated. Underweight BMI 17.56: kg/m2 likely secondary to protein calorie malnutrition  -Nutrition consult    Diet Diet NPO   DVT Prophylaxis [] Lovenox, []  Heparin, [x] SCDs, [] Ambulation,  [] Eliquis, [] Xarelto  [] Coumadin   Code Status Full Code   Disposition Patient requires continued admission due to hip fracture         Subjective:     Chief Complaint: Hip Pain       States she feels well. Pain on movement. Tolerating PO. Review of Systems:    Review of Systems      Objective:      Intake/Output Summary (Last 24 hours) at 10/6/2022 0736  Last data filed at 10/6/2022 0530  Gross per 24 hour   Intake --   Output 1900 ml   Net -1900 ml        Vitals:   Vitals:    10/06/22 0245   Pulse: 72   Resp: 15   Temp: 98.2 °F (36.8 °C)   SpO2:        Physical Exam:   Physical Exam       Medications:   Medications:    ferrous sulfate  325 mg Oral Daily with breakfast    amLODIPine  10 mg Oral Daily    aspirin  81 mg Oral Daily    atorvastatin  40 mg Oral Nightly    buPROPion  150 mg Oral BID    carvedilol  25 mg Oral BID WC    dexamethasone  2 mg Oral Daily with breakfast    fenofibrate  160 mg Oral Daily    therapeutic multivitamin-minerals  1 tablet Oral Daily    pantoprazole  40 mg Oral QAM AC    QUEtiapine  25 mg Oral Nightly    venlafaxine  225 mg Oral Daily    sodium chloride flush  5-40 mL IntraVENous 2 times per day    insulin lispro  0-4 Units SubCUTAneous TID WC    insulin lispro  0-4 Units SubCUTAneous Nightly      Infusions:    dextrose      sodium chloride       PRN Meds: ondansetron, 4 mg, Q30 Min PRN  oxyCODONE, 5 mg, Q4H PRN  glucose, 4 tablet, PRN  dextrose bolus, 125 mL, PRN   Or  dextrose bolus, 250 mL, PRN  glucagon (rDNA), 1 mg, PRN  dextrose, , Continuous PRN  sodium chloride flush, 5-40 mL, PRN  sodium chloride, 25 mL, PRN  ondansetron, 4 mg, Q8H PRN   Or  ondansetron, 4 mg, Q6H PRN  polyethylene glycol, 17 g, Daily PRN  acetaminophen, 650 mg, Q6H PRN   Or  acetaminophen, 650 mg, Q6H PRN        Labs      Recent Results (from the past 24 hour(s))   POCT Glucose    Collection Time: 10/05/22 10:53 AM   Result Value Ref Range    POC Glucose 238 (H) 70 - 99 MG/DL   POCT Glucose    Collection Time: 10/05/22  4:31 PM   Result Value Ref Range    POC Glucose 250 (H) 70 - 99 MG/DL   POCT Glucose    Collection Time: 10/05/22  8:06 PM   Result Value Ref Range    POC Glucose 226 (H) 70 - 99 MG/DL        Imaging/Diagnostics Last 24 Hours   CT HEAD WO CONTRAST    Result Date: 10/4/2022  EXAMINATION: CT OF THE HEAD WITHOUT CONTRAST  10/4/2022 12:05 pm TECHNIQUE: CT of the head was performed without the administration of intravenous contrast. Automated exposure control, iterative reconstruction, and/or weight based adjustment of the mA/kV was utilized to reduce the radiation dose to as low as reasonably achievable. COMPARISON: 09/22/2022. HISTORY: ORDERING SYSTEM PROVIDED HISTORY: fall head injury TECHNOLOGIST PROVIDED HISTORY: Has a \"code stroke\" or \"stroke alert\" been called? ->No Reason for exam:->fall head injury Decision Support Exception - unselect if not a suspected or confirmed emergency medical condition->Emergency Medical Condition (MA) Reason for Exam: trauma/ fall, head injury FINDINGS: BRAIN/VENTRICLES: There is no acute intracranial hemorrhage, mass effect or midline shift. No abnormal extra-axial fluid collection. The gray-white differentiation is maintained without evidence of an acute infarct. There is prominence of the ventricles and sulci due to global parenchymal volume loss. There are nonspecific areas of hypoattenuation within the periventricular and subcortical white matter, which likely represent chronic microvascular ischemic change. ORBITS: The visualized portion of the orbits demonstrate no acute abnormality.  SINUSES: The visualized paranasal sinuses and mastoid air cells demonstrate no acute abnormality. SOFT TISSUES/SKULL: There is mild high left frontal scalp soft tissue swelling. 1. Mild high left frontal scalp soft tissue swelling but no acute intracranial abnormality. XR CHEST PORTABLE    Result Date: 10/4/2022  EXAMINATION: ONE XRAY VIEW OF THE CHEST 10/4/2022 3:56 pm COMPARISON: 09/22/2022 and 09/23/2022. HISTORY: ORDERING SYSTEM PROVIDED HISTORY: falls, left hip fx, edema vs infiltrate TECHNOLOGIST PROVIDED HISTORY: Reason for exam:->falls, left hip fx, edema vs infiltrate Reason for Exam: falls, left hip fx, edema vs infiltrate Additional signs and symptoms: na Relevant Medical/Surgical History: diabetes, hypertension FINDINGS: Lung volumes are large the diaphragm is flattened. There is no confluent consolidation or effusion. The cardiomediastinal silhouette and pulmonary vessels appear normal.     No acute findings. Large lung volumes and flattening of the diaphragms suggesting COPD. CT HIP LEFT WO CONTRAST    Result Date: 10/5/2022  EXAMINATION: CT OF THE LEFT HIP WITHOUT CONTRAST, 10/4/2022 2:15 pm TECHNIQUE: CT of the left hip was performed without the administration of intravenous contrast.  Multiplanar reformatted images are provided for review. Automated exposure control, iterative reconstruction, and/or weight based adjustment of the mA/kV was utilized to reduce the radiation dose to as low as reasonably achievable. COMPARISON: Radiographs 10/04/2022 HISTORY ORDERING SYSTEM PROVIDED HISTORY:  With 3D reconstruction TECHNOLOGIST PROVIDED HISTORY: Reason for Exam:  With 3D reconstruction Decision Support Exception - unselect if not a suspected or confirmed emergency medical condition->Emergency Medical Condition (MA) Reason for Exam:  Closed left hip fracture, initial encounter ([facility] [address] FINDINGS: Acute impacted nondisplaced intertrochanteric left proximal femur fracture. No evidence of dislocation. Mild left hip joint degenerative changes. Distended urinary bladder. Acute impacted nondisplaced intertrochanteric left proximal femur fracture. Distended urinary bladder. XR HIP 2-3 VW W PELVIS LEFT    Result Date: 10/5/2022  EXAMINATION: ONE XRAY VIEW OF THE PELVIS AND TWO XRAY VIEWS LEFT HIP 10/4/2022 11:05 am COMPARISON: 12/23/2018 HISTORY: ORDERING SYSTEM PROVIDED HISTORY: fall three days ago TECHNOLOGIST PROVIDED HISTORY: Reason for exam:->fall three days ago Reason for Exam: fall three days ago Initial encounter FINDINGS: Osteopenia. There is an acute mildly displaced fracture of the left proximal femur likely involving the left femoral neck and possibly the intertrochanteric region. Mild-to-moderate degenerative changes of the hip joints. The pelvic ring is otherwise intact. No other acute fracture or dislocation. Acute mildly displaced fracture of the left proximal femur involving the left femoral neck versus intertrochanteric region.        Electronically signed by Alek Bangura MD on 10/6/2022 at 7:36 AM

## 2022-10-06 NOTE — PROGRESS NOTES
Patient continues to set off her bed alarm trying to get up - nurse explained to her that the alarm is going to go off every time she tries to get up - she stated, \"I dont care. \" Patient refuses to have medication given this am - no blood sugar and no BP in the middle of the night - she also didn't want us to put the tele back on but we talked her into allowing us to put the brown lead back on.

## 2022-10-06 NOTE — CARE COORDINATION
CM reviewed notes. Pt is to have ORIF left hip 10/6/2022. For SNF pt will need PT/OT recs when appropriate for a precert. Dtr would like [facility]G and referral has been made.  [facility]

## 2022-10-07 LAB
ALBUMIN SERPL-MCNC: 3.2 GM/DL (ref 3.4–5)
ALP BLD-CCNC: 77 IU/L (ref 40–128)
ALT SERPL-CCNC: 11 U/L (ref 10–40)
ANION GAP SERPL CALCULATED.3IONS-SCNC: 13 MMOL/L (ref 4–16)
AST SERPL-CCNC: 14 IU/L (ref 15–37)
BASOPHILS ABSOLUTE: 0 K/CU MM
BASOPHILS RELATIVE PERCENT: 0.1 % (ref 0–1)
BILIRUB SERPL-MCNC: 0.4 MG/DL (ref 0–1)
BUN BLDV-MCNC: 28 MG/DL (ref 6–23)
CALCIUM SERPL-MCNC: 8.1 MG/DL (ref 8.3–10.6)
CHLORIDE BLD-SCNC: 109 MMOL/L (ref 99–110)
CO2: 19 MMOL/L (ref 21–32)
CREAT SERPL-MCNC: 0.9 MG/DL (ref 0.6–1.1)
DIFFERENTIAL TYPE: ABNORMAL
EOSINOPHILS ABSOLUTE: 0.2 K/CU MM
EOSINOPHILS RELATIVE PERCENT: 1.2 % (ref 0–3)
GFR AFRICAN AMERICAN: >60 ML/MIN/1.73M2
GFR NON-AFRICAN AMERICAN: >60 ML/MIN/1.73M2
GLUCOSE BLD-MCNC: 114 MG/DL (ref 70–99)
GLUCOSE BLD-MCNC: 130 MG/DL (ref 70–99)
GLUCOSE BLD-MCNC: 206 MG/DL (ref 70–99)
GLUCOSE BLD-MCNC: 230 MG/DL (ref 70–99)
GLUCOSE BLD-MCNC: 86 MG/DL (ref 70–99)
HCT VFR BLD CALC: 21.4 % (ref 37–47)
HCT VFR BLD CALC: 23.9 % (ref 37–47)
HCT VFR BLD CALC: 27.3 % (ref 37–47)
HEMOGLOBIN: 6.4 GM/DL (ref 12.5–16)
HEMOGLOBIN: 7 GM/DL (ref 12.5–16)
HEMOGLOBIN: 8.6 GM/DL (ref 12.5–16)
IMMATURE NEUTROPHIL %: 0.7 % (ref 0–0.43)
LYMPHOCYTES ABSOLUTE: 1 K/CU MM
LYMPHOCYTES RELATIVE PERCENT: 7.3 % (ref 24–44)
MCH RBC QN AUTO: 25.7 PG (ref 27–31)
MCHC RBC AUTO-ENTMCNC: 29.9 % (ref 32–36)
MCV RBC AUTO: 85.9 FL (ref 78–100)
MONOCYTES ABSOLUTE: 1.4 K/CU MM
MONOCYTES RELATIVE PERCENT: 10.3 % (ref 0–4)
NUCLEATED RBC %: 0 %
PDW BLD-RTO: 17.8 % (ref 11.7–14.9)
PLATELET # BLD: 217 K/CU MM (ref 140–440)
PMV BLD AUTO: 10.6 FL (ref 7.5–11.1)
POTASSIUM SERPL-SCNC: 4.1 MMOL/L (ref 3.5–5.1)
RBC # BLD: 2.49 M/CU MM (ref 4.2–5.4)
SEGMENTED NEUTROPHILS ABSOLUTE COUNT: 11.1 K/CU MM
SEGMENTED NEUTROPHILS RELATIVE PERCENT: 80.4 % (ref 36–66)
SODIUM BLD-SCNC: 141 MMOL/L (ref 135–145)
TOTAL IMMATURE NEUTOROPHIL: 0.1 K/CU MM
TOTAL NUCLEATED RBC: 0 K/CU MM
TOTAL PROTEIN: 4.8 GM/DL (ref 6.4–8.2)
WBC # BLD: 13.8 K/CU MM (ref 4–10.5)

## 2022-10-07 PROCEDURE — 85025 COMPLETE CBC W/AUTO DIFF WBC: CPT

## 2022-10-07 PROCEDURE — 6370000000 HC RX 637 (ALT 250 FOR IP): Performed by: ORTHOPAEDIC SURGERY

## 2022-10-07 PROCEDURE — 85014 HEMATOCRIT: CPT

## 2022-10-07 PROCEDURE — 97162 PT EVAL MOD COMPLEX 30 MIN: CPT

## 2022-10-07 PROCEDURE — 1200000000 HC SEMI PRIVATE

## 2022-10-07 PROCEDURE — 6360000002 HC RX W HCPCS: Performed by: ORTHOPAEDIC SURGERY

## 2022-10-07 PROCEDURE — 86850 RBC ANTIBODY SCREEN: CPT

## 2022-10-07 PROCEDURE — 86900 BLOOD TYPING SEROLOGIC ABO: CPT

## 2022-10-07 PROCEDURE — 86901 BLOOD TYPING SEROLOGIC RH(D): CPT

## 2022-10-07 PROCEDURE — 36430 TRANSFUSION BLD/BLD COMPNT: CPT

## 2022-10-07 PROCEDURE — 86922 COMPATIBILITY TEST ANTIGLOB: CPT

## 2022-10-07 PROCEDURE — 30233N1 TRANSFUSION OF NONAUTOLOGOUS RED BLOOD CELLS INTO PERIPHERAL VEIN, PERCUTANEOUS APPROACH: ICD-10-PCS | Performed by: STUDENT IN AN ORGANIZED HEALTH CARE EDUCATION/TRAINING PROGRAM

## 2022-10-07 PROCEDURE — 80053 COMPREHEN METABOLIC PANEL: CPT

## 2022-10-07 PROCEDURE — 80048 BASIC METABOLIC PNL TOTAL CA: CPT

## 2022-10-07 PROCEDURE — 82962 GLUCOSE BLOOD TEST: CPT

## 2022-10-07 PROCEDURE — 97530 THERAPEUTIC ACTIVITIES: CPT

## 2022-10-07 PROCEDURE — 85018 HEMOGLOBIN: CPT

## 2022-10-07 PROCEDURE — 2580000003 HC RX 258: Performed by: ORTHOPAEDIC SURGERY

## 2022-10-07 PROCEDURE — P9016 RBC LEUKOCYTES REDUCED: HCPCS

## 2022-10-07 PROCEDURE — 97166 OT EVAL MOD COMPLEX 45 MIN: CPT

## 2022-10-07 PROCEDURE — 36415 COLL VENOUS BLD VENIPUNCTURE: CPT

## 2022-10-07 RX ORDER — ENOXAPARIN SODIUM 100 MG/ML
30 INJECTION SUBCUTANEOUS DAILY
Status: DISCONTINUED | OUTPATIENT
Start: 2022-10-07 | End: 2022-10-07

## 2022-10-07 RX ORDER — ENOXAPARIN SODIUM 100 MG/ML
30 INJECTION SUBCUTANEOUS DAILY
Status: DISCONTINUED | OUTPATIENT
Start: 2022-10-08 | End: 2022-10-12 | Stop reason: HOSPADM

## 2022-10-07 RX ORDER — SODIUM CHLORIDE 9 MG/ML
INJECTION, SOLUTION INTRAVENOUS PRN
Status: DISCONTINUED | OUTPATIENT
Start: 2022-10-07 | End: 2022-10-12 | Stop reason: HOSPADM

## 2022-10-07 RX ADMIN — SODIUM CHLORIDE, POTASSIUM CHLORIDE, SODIUM LACTATE AND CALCIUM CHLORIDE: 600; 310; 30; 20 INJECTION, SOLUTION INTRAVENOUS at 02:55

## 2022-10-07 RX ADMIN — DEXAMETHASONE 2 MG: 4 TABLET ORAL at 18:24

## 2022-10-07 RX ADMIN — CARVEDILOL 25 MG: 25 TABLET, FILM COATED ORAL at 18:26

## 2022-10-07 RX ADMIN — HYDROMORPHONE HYDROCHLORIDE 0.5 MG: 1 INJECTION, SOLUTION INTRAMUSCULAR; INTRAVENOUS; SUBCUTANEOUS at 09:19

## 2022-10-07 RX ADMIN — ATORVASTATIN CALCIUM 40 MG: 40 TABLET, FILM COATED ORAL at 20:49

## 2022-10-07 RX ADMIN — VENLAFAXINE HYDROCHLORIDE 225 MG: 150 CAPSULE, EXTENDED RELEASE ORAL at 18:28

## 2022-10-07 RX ADMIN — HYDROMORPHONE HYDROCHLORIDE 0.5 MG: 1 INJECTION, SOLUTION INTRAMUSCULAR; INTRAVENOUS; SUBCUTANEOUS at 22:29

## 2022-10-07 RX ADMIN — INSULIN LISPRO 1 UNITS: 100 INJECTION, SOLUTION INTRAVENOUS; SUBCUTANEOUS at 18:34

## 2022-10-07 RX ADMIN — FERROUS SULFATE TAB 325 MG (65 MG ELEMENTAL FE) 325 MG: 325 (65 FE) TAB at 18:24

## 2022-10-07 RX ADMIN — MULTIPLE VITAMINS W/ MINERALS TAB 1 TABLET: TAB at 18:26

## 2022-10-07 RX ADMIN — SODIUM CHLORIDE, PRESERVATIVE FREE 10 ML: 5 INJECTION INTRAVENOUS at 08:00

## 2022-10-07 RX ADMIN — BUPROPION HYDROCHLORIDE 150 MG: 150 TABLET, EXTENDED RELEASE ORAL at 20:49

## 2022-10-07 RX ADMIN — FENOFIBRATE 160 MG: 160 TABLET ORAL at 18:34

## 2022-10-07 RX ADMIN — AMLODIPINE BESYLATE 10 MG: 10 TABLET ORAL at 18:25

## 2022-10-07 RX ADMIN — ASPIRIN 81 MG: 81 TABLET, COATED ORAL at 18:25

## 2022-10-07 RX ADMIN — SODIUM CHLORIDE, POTASSIUM CHLORIDE, SODIUM LACTATE AND CALCIUM CHLORIDE: 600; 310; 30; 20 INJECTION, SOLUTION INTRAVENOUS at 22:33

## 2022-10-07 RX ADMIN — OXYCODONE HYDROCHLORIDE 5 MG: 5 TABLET ORAL at 20:49

## 2022-10-07 RX ADMIN — PANTOPRAZOLE SODIUM 40 MG: 40 TABLET, DELAYED RELEASE ORAL at 18:26

## 2022-10-07 RX ADMIN — Medication 1 TABLET: at 18:27

## 2022-10-07 RX ADMIN — QUETIAPINE FUMARATE 25 MG: 25 TABLET ORAL at 20:49

## 2022-10-07 ASSESSMENT — ENCOUNTER SYMPTOMS
CONSTIPATION: 0
COUGH: 0
SHORTNESS OF BREATH: 0
ABDOMINAL DISTENTION: 0
SORE THROAT: 0
WHEEZING: 0
EYE DISCHARGE: 0
BACK PAIN: 0
DIARRHEA: 0

## 2022-10-07 ASSESSMENT — PAIN SCALES - GENERAL
PAINLEVEL_OUTOF10: 3
PAINLEVEL_OUTOF10: 10
PAINLEVEL_OUTOF10: 6

## 2022-10-07 ASSESSMENT — PAIN DESCRIPTION - DESCRIPTORS: DESCRIPTORS: DULL;STABBING

## 2022-10-07 ASSESSMENT — PAIN DESCRIPTION - LOCATION: LOCATION: KNEE;HIP

## 2022-10-07 ASSESSMENT — PAIN - FUNCTIONAL ASSESSMENT: PAIN_FUNCTIONAL_ASSESSMENT: PREVENTS OR INTERFERES SOME ACTIVE ACTIVITIES AND ADLS

## 2022-10-07 ASSESSMENT — PAIN DESCRIPTION - ORIENTATION: ORIENTATION: LEFT

## 2022-10-07 NOTE — PROGRESS NOTES
Physician Progress Note      PATIENT:               Landon Hylton  CSN #:                  [id number]  :                       1940  ADMIT DATE:       10/4/2022 10:32 AM  DISCH DATE:  RESPONDING  PROVIDER #:        Lakshmi Torres MD          QUERY TEXT:    Pt admitted with Left Intertrochanteric Hip Fracture and has malnutrition   documented. Please further specify type of malnutrition with documentation in   the medical record. The medical record reflects the following:  Risk Factors: Carcinoid syndrome, Neoplasm related pain, Age  Clinical Indicators: Malnutrition Status:  Severe malnutrition (10/06/22 1140)   Context:  Acute Illness  Findings of the 6 clinical characteristics of malnutrition: Energy Intake:    50% or less of estimated energy requirements for 5 or more days,Weight Loss:    No significant weight loss (6% over 4 months)  Body Fat Loss: Moderate body fat loss Buccal region, Orbital Muscle Mass   Loss: Moderate muscle mass loss Clavicles (pectoralis & deltoids), Temples   (temporalis), Hand (interosseous), Current BMI (kg/m2): 17.5  Treatment: Nutrition consult, Start Oral Diet, Start Oral Nutrition Supplement    ASPEN Criteria:    https://aspenjournals. onlinelibrary. reddy. com/doi/full/10.1177/754892487984308  5    Thank you, Riley Reveles RN, CDS ([phone])  Options provided:  -- Severe Malnutrition  -- Other - I will add my own diagnosis  -- Disagree - Not applicable / Not valid  -- Disagree - Clinically unable to determine / Unknown  -- Refer to Clinical Documentation Reviewer    PROVIDER RESPONSE TEXT:    This patient has severe malnutrition.     Query created by: Kim Goss on 10/7/2022 10:37 AM      Electronically signed by:  Lakshmi Torres MD 10/7/2022 11:44 AM

## 2022-10-07 NOTE — PROCEDURES

## 2022-10-07 NOTE — CARE COORDINATION
CM reviewed notes  POST OP day 1. Placed a white board for PT/OT to see for discharge planning. CM called [facility] and they will begin precert. CM team follow up for the week end.  [address]

## 2022-10-07 NOTE — PROGRESS NOTES
Jerzy Weston ([birth year])    Daily Progress Note-  Erendira Haji MD               Today's Date:     10/7/2022          Subjective:      Awake and alert this morning, slightly confused  States she has some pain in her hip  Denies shortness of breath or chest pain. Objective:   No data found. I/O last 3 completed shifts: In: 600 [I.V.:500; IV Piggyback:100]  Out: 1984 [Urine:1350; Blood:100]  DRAIN/TUBE OUTPUT:       Physical Exam:       Left Lower Extremity    Incision:  dressing in place, clean, dry, and intact    Lower Extremity Motor :    Moving lower extremities without difficulty today. Able to dorsiflex and   plantar flex foot/ankle. Lower Extremity Sensory:   Neurovascularly intact to gross sensation and touch in lower extremities. Pulses:    present 2+ bilaterally lower extremities.       LABS   CBC:   Recent Labs     10/04/22  1055 10/05/22  0501 10/06/22  1202   WBC 16.2* 13.2* 16.9*   HGB 9.3* 8.4* 8.9*    284 344     BMP:    Recent Labs     10/04/22  2018 10/05/22  0501 10/06/22  1202   * 132* 136   K 4.2 4.0 3.9   CL 99 102 103   CO2 21 20* 23   BUN 28* 23 25*   CREATININE 1.2* 0.9 0.9   GLUCOSE 140* 94 136*         Medications   Meds:    calcium-cholecalciferol  1 tablet Oral Daily    sodium chloride flush  5-40 mL IntraVENous 2 times per day    ferrous sulfate  325 mg Oral Daily with breakfast    amLODIPine  10 mg Oral Daily    aspirin  81 mg Oral Daily    atorvastatin  40 mg Oral Nightly    buPROPion  150 mg Oral BID    carvedilol  25 mg Oral BID WC    dexamethasone  2 mg Oral Daily with breakfast    fenofibrate  160 mg Oral Daily    therapeutic multivitamin-minerals  1 tablet Oral Daily    pantoprazole  40 mg Oral QAM AC    QUEtiapine  25 mg Oral Nightly    venlafaxine  225 mg Oral Daily    insulin lispro  0-4 Units SubCUTAneous TID WC    insulin lispro  0-4 Units SubCUTAneous Nightly       Assessment and Plan     1. Post operative day # 1 Left hip ORIF (10/7/2022)    1:  Mobilize with Physical therapy   -WBAT  2.   Acute blood loss anemia, not a complication   -Hb 8.9  3:  Continue Deep venous thrombosis prophylaxis    -Chemoprophylaxis ASA   -Mechanical-KATIE hose, -SCD's, ambulation  4:  Continue Pain Control   -wean to PO meds  5:  D/C Planning:     -[facility]   -OK to MO when medically stable           Suyapa Lawrence MD

## 2022-10-07 NOTE — PROGRESS NOTES
[facility] ACUTE CARE OCCUPATIONAL THERAPY EVALUATION    Natalee Quiros, 1940, [id number], 10/7/2022    Discharge Recommendation: Hira Jansen      History:  [place]:  The primary encounter diagnosis was Closed left hip fracture, initial encounter ([facility] [address] A diagnosis of Hyponatremia was also pertinent to this visit. Subjective:  Patient states: \"My daughter and I like to smith when I'm feeling good! \"   Pain: Pt denied pain this date at rest, Voiced severe Lt hip pain with transitioning to sitting EOB but did not quantify  Communication with other providers: PT General Wali, RN Lina  Restrictions: General Precautions, Fall Risk, WBAT Lt LE, IV, Telemetry, Pulse Ox, Bed exit alarm, EOB activity only this date per RN due to low Hgb    Home Setup/Prior level of function:  Social/Functional History  Lives With: Daughter  Type of Home: House  Home Layout: Two level, Able to Live on Main level with bedroom/bathroom, Performs ADL's on one level  Home Access: Stairs to enter with rails  Entrance Stairs - Number of Steps: 2  Bathroom Toilet: Bedside commode  Home Equipment: Walker, rolling  ADL Assistance: Independent (daughter supervises in bathroom)  Homemaking Assistance: Needs assistance  Homemaking Responsibilities: No  Ambulation Assistance: Independent (with use of RW and daughter supervision)  Transfer Assistance: Independent (daughter supervises)  Active : No  Occupation: Retired  Leisure: Going to [place]    Examination:  Observation: Supine in bed upon arrival. Mildly confused but participatory with evaluation.   Vision: WFL  Hearing: WFL  Vitals: Stable vitals throughout session    Body Systems and functions:  ROM: Grossly WFL all joints in BL UEs  Strength: 4-/5 MMT all major muscle groups BL UEs   Sensation: WFL (denies numbness/tingling)  Tone: Normal  Coordination: Grossly WFL for ADLs  Perception: WNL    Activities of Daily Living (ADLs):  Feeding: SBA/setup (took drinks of water while seated EOB)  Grooming: SBA/setup (seated facial hygiene task EOB; unable to complete in standing this date due to low Hgb)  UB bathing: SBA  LB bathing: Max A (reaching distal LEs, bottom, posterior thighs)  UB dressing: CGA (light dynamic sitting balance with donning robe seated EOB)  LB dressing: Dependent (donning BL socks)  Toileting: Dependent (Harvey in place; total assist required for a bowel movement at this time)    Cognitive and Psychosocial Functioning:  Overall cognitive status: Impaired (pt oriented, but demonstrated delayed thought processing, memory impairments recalling recent events, decreased comprehension of information, limited overall insight/safety awareness)  Affect: Normal     Balance:   Sitting: Initially mod A with retropulsion but progressed to SBA with time and postural cues, CGA for light dynamic sitting tasks  Standing: Unsafe/unable to attempt this date due to low Hgb    Functional Mobility:  Bed Mobility: Max A x 2 with all aspects (supine to sitting EOB, sitting EOB to supine, scooting hips up in bed)  Transfers: Unsafe/unable to attempt this date due to low Hgb  Ambulation: See above      AM-PAC 6 click short form for inpatient daily activity:   How much help from another person does the patient currently need. .. Unable  Dep A Lot  Max A A Lot   Mod A A Little  Min A A Little   CGA  SBA None   Mod I  Indep  Sup   1. Putting on and taking off regular lower body clothing? [x] 1    [] 2   [] 2   [] 3   [] 3   [] 4      2. Bathing (including washing, rinsing, drying)? [] 1   [x] 2   [] 2 [] 3 [] 3 [] 4   3. Toileting, which includes using toilet, bedpan, or urinal? [x] 1    [] 2   [] 2   [] 3   [] 3   [] 4     4. Putting on and taking off regular upper body clothing? [] 1   [] 2   [] 2   [] 3   [x] 3    [] 4      5. Taking care of personal grooming such as brushing teeth? [] 1   [] 2    [] 2 [] 3    [x] 3   [] 4      6. Eating meals?    [] 1   [] 2 [] 2   [] 3   [x] 3   [] 4      Raw Score:  13   [24=0% impaired(CH), 23=1-19%(CI), 20-22=20-39%(CJ), 15-19=40-59%(CK), 10-14=60-79%(CL), 7-9=80-99%(CM), 6=100%(CN)]     Treatment:  Therapeutic Activity Training:   Therapeutic activity training was instructed today. Cues were given for safety, sequence, UE/LE placement, awareness, and balance. Activities performed today included bed mobility training, UB/LB dressing tasks, sitting balance/tolerance, education on role of OT, POC, WBAT status Lt LE, importance of EOB/OOB activity, d/c planning and recommendations    Safety Measures: Gait belt used, Left in Bed, Alarm in place    Assessment:  Pt is an 81year old female with a past medical history of Acute anterior wall MI ([facility] [address] CAD (coronary artery disease), Cancer ([facility] [address] Cancer ([facility] [address] Carotid artery stenosis, Diabetes mellitus ([facility] [address] H/O cardiovascular stress test, H/O echocardiogram, Hyperlipidemia, Hypertension, and Protein-calorie malnutrition, moderate ([address] Pt admitted following a fall and diagnosed with a Lt hip intertrochanteric fracture. Pt underwent Lt trochanteric nailing on 10-16. Pt has recently been in [place] for rehab but typically lives at home with her daughter and baseline level of functioning is as above. Pt currently presents with the above impairments, and will need continued OT services in SNF at discharge. Complexity: Moderate  Prognosis: Good, Fair  Plan: 3x/week      Goals:  1. Pt will complete all aspects of bed mobility for EOB/OOB ADLs mod A with HOB flat  2. Pt will complete UB/LB bathing mod A with setup using long handled sponge PRN  3. Pt will complete all aspects of LB dressing mod A with setup using AE PRN  4. Pt will complete all functional transfers to and from bed, chair, and BSC mod A/good safety awareness  5. Pt will ambulate 5 ft bed to [facility] for toileting task mod A x 2 with RW  6. Pt will complete all aspects of toileting task max A on [facility]  7.  Pt will complete oral hygiene/grooming routine in unsupported sitting EOB with supervision/setup  8.  Pt will complete ther ex/ther act with focus on UE strengthening and functional sitting/standing tolerance    Time:   Time in: 859  Time out: 919  Timed treatment minutes: 10  Total time: 20    Electronically signed by:    BRITTNEY Corona/L, [address] [place] [id number]

## 2022-10-07 NOTE — PROGRESS NOTES
Comprehensive Nutrition Assessment    Type and Reason for Visit:  Reassess, Consult (patient request)    Nutrition Recommendations/Plan:   Continue current carb controlled diet   Will modify oral nutrition supplement to try frozen since patient dislikes current standard supplements  Encourage consistent meal intake  Will continue to follow up during stay      Malnutrition Assessment:  Malnutrition Status:  Severe malnutrition (10/06/22 1140)    Context:  Acute Illness       Nutrition Assessment:    Able to resume diet, currently carb controlled, 75 g/meal, with standard oral nutrition supplement. Patient reports eating meals, no po data at this time. Dislikes current oral nutrition supplement but agreeable to trying frozen version at this time. Encouraged to consume protein with meals. Will continue to follow at high nutrition risk at this time. Nutrition Related Findings:    sitting up in bed with friend in room, receiving blood transfusion Wound Type: None       Current Nutrition Intake & Therapies:    Average Meal Intake: Unable to assess  Average Supplements Intake: Refusing to take (dislikes ensure)  ADULT ORAL NUTRITION SUPPLEMENT; Breakfast, Lunch, Dinner; Standard High Calorie/High Protein Oral Supplement  ADULT DIET; Regular; 5 carb choices (75 gm/meal)    Anthropometric Measures:  Height: 5' 2\" (157.5 cm)  Ideal Body Weight (IBW): 110 lbs (50 kg)    Admission Body Weight: 95 lb 14.4 oz (43.5 kg)  Current Body Weight: 95 lb 14.4 oz (43.5 kg), 87.2 % IBW.  Weight Source: Bed Scale  Current BMI (kg/m2): 17.5  Usual Body Weight: 102 lb (46.3 kg)  % Weight Change (Calculated): -6  Weight Adjustment For: No Adjustment                 BMI Categories: Underweight (BMI less than 22) age over 72    Estimated Daily Nutrient Needs:  Energy Requirements Based On: Kcal/kg  Weight Used for Energy Requirements: Current  Energy (kcal/day): 5983-6763 (30-35 kcal/kg)  Weight Used for Protein Requirements: Ideal  Protein (g/day): 60-70 (1.2-1.4 g/kg)  Method Used for Fluid Requirements: 1 ml/kcal  Fluid (ml/day): 1550-7900    Nutrition Diagnosis:   Predicted inadequate energy intake related to pain as evidenced by poor intake prior to admission    Nutrition Interventions:   Food and/or Nutrient Delivery: Continue Current Diet, Modify Oral Nutrition Supplement  Nutrition Education/Counseling: Education initiated  Coordination of Nutrition Care: Continue to monitor while inpatient       Goals:  Previous Goal Met: Progressing toward Goal(s)  Goals: PO intake 50% or greater, by next RD assessment       Nutrition Monitoring and Evaluation:   Behavioral-Environmental Outcomes: None Identified  Food/Nutrient Intake Outcomes: Food and Nutrient Intake, Supplement Intake  Physical Signs/Symptoms Outcomes: Biochemical Data, Meal Time Behavior, Skin, Weight    Discharge Planning:    Continue current diet, Continue Oral Nutrition Supplement     Karen Munguia RD, LD  Contact: [phone]

## 2022-10-07 NOTE — PROGRESS NOTES
Physical Therapy  [facility] ACUTE CARE PHYSICAL THERAPY EVALUATION  Isaac Hawkins, 1940, [id number], 10/7/2022    History  [place]:  The primary encounter diagnosis was Closed left hip fracture, initial encounter ([address] A diagnosis of Hyponatremia was also pertinent to this visit. Patient  has a past medical history of Acute anterior wall MI ([address] CAD (coronary artery disease), Cancer ([address] Cancer ([address] Carotid artery stenosis, Diabetes mellitus ([address] H/O cardiovascular stress test, H/O echocardiogram, Hyperlipidemia, Hypertension, and Protein-calorie malnutrition, moderate ([address] Patient  has a past surgical history that includes Coronary artery bypass graft (5/07); Diagnostic Cardiac Cath Lab Procedure (3/30/07); Abdomen surgery; other surgical history (08 11 2015); skin biopsy; fracture surgery (Left, 2001); fracture surgery (Right, 2017); Hysterectomy (1996); Wrist fracture surgery (Left, 2/17/2021); Upper gastrointestinal endoscopy (N/A, 4/18/2021); Colonoscopy (N/A, 4/18/2021); and Pacemaker insertion (N/A, 4/20/2021). Subjective:  Patient states: \"Oh I feel it\" (talking about her left leg pain)   Pain: 8/10 left hip  Communication with other providers:  Handoff to RN, co-eval with Levi VILLASENOR   Restrictions: WBAT LLE     Home Setup/Prior level of function  Social/Functional History  Lives With: Daughter (home with pt 24/7)   Type of Home: House  Home Layout: Two level, Able to Live on Main level with bedroom/bathroom, Performs ADL's on one level  Home Access: Stairs to enter with rails  Entrance Stairs - Number of Steps: 2  Bathroom Toilet: standard toilet, also has BSC   Shower: tub/shower with shower chair   Home Equipment: Walker, rolling (PRN use of RW, daughter tries to encourage all the time)  ADL Assistance: Independent  Homemaking Assistance: Needs assistance  Homemaking Responsibilities: No  Ambulation Assistance: Independent (mostly indep with or without RW.  Daughter supervises some of the time)  Transfer Assistance: Independent   Active : No  Occupation: Retired  Leisure: Going to [email or web address]  Additional comments: pt admitted from [facility] [address] [facility] of body systems (includes body structures/functions, activity/participation limitations):  Observation:  Supine in bed upon arrival. Cooperative with therapy to her tolerance. Limited to EOB mobility only with significant pain as well as low hg. Intermittent confusion   Vision:  [facility], wears glasses but not with her   Hearing:  [facility]  Cardiopulmonary:  stable vitals on room air   Orientation: oriented to person, place, and time     Musculoskeletal  ROM R/L:  WFL RLE, L hip limited due to significant pain. WFL L knee and ankle. Strength R/L:  RLE grossly 4/5, LLE grossly 2/5. Significant limitations due to pain  Neuro:  [facility]      Mobility/treatment:   Rolling L/R:  NT   Supine to sit:  maxA x 2 for bilat LE advancement and uprighting trunk. Attempted slow and incremental movements initially to advance LEs to EOB with pt attempting to help but very limited with pain needing therapist to move BLE together   Sit to supine: maxA x 2 for bilat LE advancement and trunk guidance. Scooting: fwd to EOB maxA, supine to HOB maxA x 2   Transfers: unable to assess this date due to pts lower hg value and limited to EOB only. Sitting balance:  x ~6 minutes at EOB modA initially progressing to CGA/close SBA with BUE support. CGA with light dynamic and single UE support. Standing balance:  unable to assess this date due to pts low hg value and limited to EOB only. Gait: unable to assess this date due to pts low hg value and limited to EOB only. Educated pt on POC, role of PT, DME use, discharge.   Cues provided for sequencing to inc safety and indep with mobility     [facility] 6 Clicks Inpatient Mobility:  AM-PAC Inpatient Mobility Raw Score : 8    Safety: patient left in bed, call light within reach, RN notified    Assessment:  Pt is an 81year old female admitted with AMS and a recent fall sustaining a left hip fx. She is s/p ORIF on 10/6. Recommend subacute rehab once medically stable. At baseline she ss mostly Camelia with gross mobility and ADLS but does have help from her daughter when she feels she needs it. She performed below her baseline this date with dec strength, balance ,and activity tolerance with significant pain and low hg limiting mobility today. She would benefit from continued therapy to address her current deficits, dec potential fall risk,and restore function. Complexity: Moderate  Prognosis: Good, no significant barriers to participation at this time. Plan: 6-7 times per week  Discharge Recommendations: Subacute/Skilled Nursing Facility  Equipment: continue to assess at next level of care     Goals:  Short Term Goals  Time Frame for Short Term Goals: 2 weeks  Short Term Goal 1: Pt will perform sit><supine modA  Short Term Goal 2: Pt will transfer between surfaces modA  Short Term Goal 3: Pt will ambulate 30ft with RW modA  Short Term Goal 4: Pt will perform sitting light dynamic activity x 5 minutes without UE support Camelia       Treatment plan:  Bed mobility, transfers, balance, gait, TA, TX, stairs, WC     Recommendations for NURSING mobility: anticipate squat/stand pivot when medically appropriate and pain level tolerable to transfer.      Time:   Time in: 0859  Time out: 0919  Timed treatment minutes: 10  Total time: 20 minutes     Electronically signed by:    Estevan Blackburn, [id number]  10/7/2022, 11:43 AM

## 2022-10-07 NOTE — PROGRESS NOTES
V2.0  [facility] Hospitalist Progress Note      Name:  Jerzy Weston /Age/Sex: 1940  (81 y.o. female)   MRN & CSN:  [id number] & [id number] Encounter Date/Time: 10/7/2022 7:36 AM EDT    Location:  [address] [place] [address]-H PCP: Joaquim Davis MD       Hospital Day: 4    Assessment and Plan:   Jerzy Weston is a 81 y.o. female with pmh of carcinoid syndrome, diabetes mellitus, hypertension, CAD, carotid artery stenosis, history of MI hyperlipidemia who presents with Closed left hip fracture, initial encounter ([facility] [address]      Plan:  Left intertrochanteric proximal femur fracture-  post fall  S/p Left  Trochanteric Nailing 10/6  CT of left hip demonstrate acute impacted nondisplaced intertrochanteric left proximal femur fracture.  -pain control  -Orthopedics consulted-Dr. Hammad Quinn  -Analgesics antiemetics  -Harvey catheter  -Bedrest until further evaluation  -Neurochecks left lower extremity  -restart diet    Acute Blood loss anemia post surgery  - pre oop Hb 8.9, post op today 6.4  - will transfuse 1 PRBC  - recheck H/H post transfusion    Hyponatremia resolving  Hyperkalemia- resolved  -MIVF  -follow daily     Leukocytosis - improving  16.2 on admission  no episodes of infection.     UA bland  -Chest x-ray negative for acute findings     CKD stage III:   Baseline creatinine around 1.2-creatinine mildly elevated at 1.4.  - now back at baseline  -Daily labs  -Avoid toxic agents  -MIVF     Diabetes Mellitus type II   -Blood glucose on admission 180.   - SSI  - held home PO meds  - POCT glucose qACHS  - hypoglycemia management protocol   - target blood glucose 100-180  - carb controlled diet    Carcinoid syndrome  Malignancy associated pain: Controlled with oxycodone, active prescription verified on [facility]  follows oncology Dr. Karen Rodas     Hyperlipidemia: Continue statin therapy     Hypertension  CAD  Managed on aspirin, amlodipine and carvedilol     Chronic Conditions: Continue all home medications except as stated above or contraindicated. Severe Malnutrition   Underweight BMI 17.56: kg/m2 likely secondary to protein calorie malnutrition  -Nutrition consult    Diet ADULT ORAL NUTRITION SUPPLEMENT; Breakfast, Lunch, Dinner; Standard High Calorie/High Protein Oral Supplement   DVT Prophylaxis [] Lovenox, []  Heparin, [x] SCDs, [] Ambulation,  [] Eliquis, [] Xarelto  [] Coumadin   Code Status Full Code   Disposition Patient requires continued admission due to hip fracture         Subjective:     Chief Complaint: Hip Pain       States she feels well. Pain on movement but controlled. Noted to be anemic today on labs, discussed blood transfusion and patient consents. All questions answered. Review of Systems:    Review of Systems   Constitutional:  Positive for activity change. Negative for appetite change, diaphoresis and fatigue. HENT:  Negative for congestion and sore throat. Eyes:  Negative for discharge and visual disturbance. Respiratory:  Negative for cough, shortness of breath and wheezing. Cardiovascular:  Negative for chest pain, palpitations and leg swelling. Gastrointestinal:  Negative for abdominal distention, constipation and diarrhea. Genitourinary:  Negative for difficulty urinating and hematuria. Musculoskeletal:  Positive for arthralgias. Negative for back pain and gait problem. Neurological:  Negative for dizziness, syncope and speech difficulty. Hematological:  Negative for adenopathy. Psychiatric/Behavioral:  Negative for agitation and confusion. All other systems reviewed and are negative. Objective: Intake/Output Summary (Last 24 hours) at 10/7/2022 1144  Last data filed at 10/7/2022 0300  Gross per 24 hour   Intake 600 ml   Output 1050 ml   Net -450 ml          Vitals:   Vitals:    10/07/22 0919   BP:    Pulse:    Resp: 18   Temp:    SpO2:        Physical Exam:   Physical Exam  Vitals and nursing note reviewed. Constitutional:       General: She is not in acute distress. Appearance: Normal appearance. She is not ill-appearing. HENT:      Head: Normocephalic and atraumatic. Nose: Nose normal.      Mouth/Throat:      Mouth: Mucous membranes are moist.   Eyes:      Extraocular Movements: Extraocular movements intact. Pupils: Pupils are equal, round, and reactive to light. Cardiovascular:      Rate and Rhythm: Normal rate and regular rhythm. Pulses: Normal pulses. Heart sounds: Normal heart sounds. Pulmonary:      Effort: Pulmonary effort is normal.      Breath sounds: Normal breath sounds. Abdominal:      Palpations: Abdomen is soft. Musculoskeletal:         General: Tenderness and deformity present. Normal range of motion. Cervical back: Normal range of motion. Comments: Left leg in extension and external rotation. Skin:     General: Skin is warm. Capillary Refill: Capillary refill takes less than 2 seconds. Neurological:      General: No focal deficit present. Mental Status: She is alert and oriented to person, place, and time.    Psychiatric:         Mood and Affect: Mood normal.         Behavior: Behavior normal.          Medications:   Medications:    calcium-cholecalciferol  1 tablet Oral Daily    sodium chloride flush  5-40 mL IntraVENous 2 times per day    ferrous sulfate  325 mg Oral Daily with breakfast    amLODIPine  10 mg Oral Daily    aspirin  81 mg Oral Daily    atorvastatin  40 mg Oral Nightly    buPROPion  150 mg Oral BID    carvedilol  25 mg Oral BID WC    dexamethasone  2 mg Oral Daily with breakfast    fenofibrate  160 mg Oral Daily    therapeutic multivitamin-minerals  1 tablet Oral Daily    pantoprazole  40 mg Oral QAM AC    QUEtiapine  25 mg Oral Nightly    venlafaxine  225 mg Oral Daily    insulin lispro  0-4 Units SubCUTAneous TID WC    insulin lispro  0-4 Units SubCUTAneous Nightly      Infusions:    sodium chloride      sodium chloride      lactated ringers 75 mL/hr at 10/07/22 0255    dextrose       PRN Meds: sodium chloride, , PRN  melatonin, 3 mg, Nightly PRN  sodium chloride flush, 5-40 mL, PRN  sodium chloride, , PRN  HYDROmorphone, 0.25 mg, Q3H PRN   Or  HYDROmorphone, 0.5 mg, Q3H PRN  oxyCODONE, 5 mg, Q4H PRN   Or  oxyCODONE, 10 mg, Q4H PRN  ondansetron, 4 mg, Q30 Min PRN  oxyCODONE, 5 mg, Q4H PRN  glucose, 4 tablet, PRN  dextrose bolus, 125 mL, PRN   Or  dextrose bolus, 250 mL, PRN  glucagon (rDNA), 1 mg, PRN  dextrose, , Continuous PRN  sodium chloride flush, 5-40 mL, PRN  ondansetron, 4 mg, Q8H PRN   Or  ondansetron, 4 mg, Q6H PRN  polyethylene glycol, 17 g, Daily PRN  acetaminophen, 650 mg, Q6H PRN   Or  acetaminophen, 650 mg, Q6H PRN      Labs      Recent Results (from the past 24 hour(s))   CBC auto differential    Collection Time: 10/06/22 12:02 PM   Result Value Ref Range    WBC 16.9 (H) 4.0 - 10.5 K/CU MM    RBC 3.46 (L) 4.2 - 5.4 M/CU MM    Hemoglobin 8.9 (L) 12.5 - 16.0 GM/DL    Hematocrit 29.4 (L) 37 - 47 %    MCV 85.0 78 - 100 FL    MCH 25.7 (L) 27 - 31 PG    MCHC 30.3 (L) 32.0 - 36.0 %    RDW 17.4 (H) 11.7 - 14.9 %    Platelets 836 151 - 237 K/CU MM    MPV 10.0 7.5 - 11.1 FL    Differential Type AUTOMATED DIFFERENTIAL     Segs Relative 80.4 (H) 36 - 66 %    Lymphocytes % 8.8 (L) 24 - 44 %    Monocytes % 9.5 (H) 0 - 4 %    Eosinophils % 0.5 0 - 3 %    Basophils % 0.1 0 - 1 %    Segs Absolute 13.6 K/CU MM    Lymphocytes Absolute 1.5 K/CU MM    Monocytes Absolute 1.6 K/CU MM    Eosinophils Absolute 0.1 K/CU MM    Basophils Absolute 0.0 K/CU MM    Nucleated RBC % 0.0 %    Total Nucleated RBC 0.0 K/CU MM    Total Immature Neutrophil 0.12 K/CU MM    Immature Neutrophil % 0.7 (H) 0 - 0.43 %   Basic Metabolic Panel w/ Reflex to MG    Collection Time: 10/06/22 12:02 PM   Result Value Ref Range    Sodium 136 135 - 145 MMOL/L    Potassium 3.9 3.5 - 5.1 MMOL/L    Chloride 103 99 - 110 mMol/L    CO2 23 21 - 32 MMOL/L    Anion Gap 10 4 - 16    BUN 25 (H) 6 - 23 MG/DL    Creatinine 0.9 0.6 - 1.1 MG/DL Glucose 136 (H) 70 - 99 MG/DL    Calcium 8.9 8.3 - 10.6 MG/DL    GFR Non-African American >60 >60 mL/min/1.73m2    GFR African American >60 >60 mL/min/1.73m2   POCT Glucose    Collection Time: 10/06/22  1:02 PM   Result Value Ref Range    POC Glucose 120 (H) 70 - 99 MG/DL   POCT Glucose    Collection Time: 10/06/22  2:57 PM   Result Value Ref Range    POC Glucose 113 (H) 70 - 99 MG/DL   POCT Glucose    Collection Time: 10/06/22  4:17 PM   Result Value Ref Range    POC Glucose 148 (H) 70 - 99 MG/DL   POCT Glucose    Collection Time: 10/06/22  7:56 PM   Result Value Ref Range    POC Glucose 129 (H) 70 - 99 MG/DL   POCT Glucose    Collection Time: 10/06/22  8:11 PM   Result Value Ref Range    POC Glucose 140 (H) 70 - 99 MG/DL   POCT Glucose    Collection Time: 10/07/22  6:42 AM   Result Value Ref Range    POC Glucose 86 70 - 99 MG/DL   CBC auto differential    Collection Time: 10/07/22  8:33 AM   Result Value Ref Range    WBC 13.8 (H) 4.0 - 10.5 K/CU MM    RBC 2.49 (L) 4.2 - 5.4 M/CU MM    Hemoglobin 6.4 (LL) 12.5 - 16.0 GM/DL    Hematocrit 21.4 (L) 37 - 47 %    MCV 85.9 78 - 100 FL    MCH 25.7 (L) 27 - 31 PG    MCHC 29.9 (L) 32.0 - 36.0 %    RDW 17.8 (H) 11.7 - 14.9 %    Platelets 377 466 - 232 K/CU MM    MPV 10.6 7.5 - 11.1 FL    Differential Type AUTOMATED DIFFERENTIAL     Segs Relative 80.4 (H) 36 - 66 %    Lymphocytes % 7.3 (L) 24 - 44 %    Monocytes % 10.3 (H) 0 - 4 %    Eosinophils % 1.2 0 - 3 %    Basophils % 0.1 0 - 1 %    Segs Absolute 11.1 K/CU MM    Lymphocytes Absolute 1.0 K/CU MM    Monocytes Absolute 1.4 K/CU MM    Eosinophils Absolute 0.2 K/CU MM    Basophils Absolute 0.0 K/CU MM    Nucleated RBC % 0.0 %    Total Nucleated RBC 0.0 K/CU MM    Total Immature Neutrophil 0.10 K/CU MM    Immature Neutrophil % 0.7 (H) 0 - 0.43 %   Comprehensive Metabolic Panel    Collection Time: 10/07/22  8:33 AM   Result Value Ref Range    Sodium 141 135 - 145 MMOL/L    Potassium 4.1 3.5 - 5.1 MMOL/L    Chloride 109 99 - 110 mMol/L    CO2 19 (L) 21 - 32 MMOL/L    BUN 28 (H) 6 - 23 MG/DL    Creatinine 0.9 0.6 - 1.1 MG/DL    Glucose 114 (H) 70 - 99 MG/DL    Calcium 8.1 (L) 8.3 - 10.6 MG/DL    Albumin 3.2 (L) 3.4 - 5.0 GM/DL    Total Protein 4.8 (L) 6.4 - 8.2 GM/DL    Total Bilirubin 0.4 0.0 - 1.0 MG/DL    ALT 11 10 - 40 U/L    AST 14 (L) 15 - 37 IU/L    Alkaline Phosphatase 77 40 - 128 IU/L    GFR Non-African American >60 >60 mL/min/1.73m2    GFR African American >60 >60 mL/min/1.73m2    Anion Gap 13 4 - 16   TYPE AND SCREEN    Collection Time: 10/07/22 10:13 AM   Result Value Ref Range    ABO/Rh O POSITIVE     Antibody Screen NEGATIVE    Hemoglobin and Hematocrit    Collection Time: 10/07/22 10:13 AM   Result Value Ref Range    Hemoglobin 7.0 (L) 12.5 - 16.0 GM/DL    Hematocrit 23.9 (L) 37 - 47 %   POCT Glucose    Collection Time: 10/07/22 10:51 AM   Result Value Ref Range    POC Glucose 130 (H) 70 - 99 MG/DL        Imaging/Diagnostics Last 24 Hours   CT HEAD WO CONTRAST    Result Date: 10/4/2022  EXAMINATION: CT OF THE HEAD WITHOUT CONTRAST  10/4/2022 12:05 pm TECHNIQUE: CT of the head was performed without the administration of intravenous contrast. Automated exposure control, iterative reconstruction, and/or weight based adjustment of the mA/kV was utilized to reduce the radiation dose to as low as reasonably achievable. COMPARISON: 09/22/2022. HISTORY: ORDERING SYSTEM PROVIDED HISTORY: fall head injury TECHNOLOGIST PROVIDED HISTORY: Has a \"code stroke\" or \"stroke alert\" been called? ->No Reason for exam:->fall head injury Decision Support Exception - unselect if not a suspected or confirmed emergency medical condition->Emergency Medical Condition (MA) Reason for Exam: trauma/ fall, head injury FINDINGS: BRAIN/VENTRICLES: There is no acute intracranial hemorrhage, mass effect or midline shift. No abnormal extra-axial fluid collection. The gray-white differentiation is maintained without evidence of an acute infarct.  There is prominence of the ventricles and sulci due to global parenchymal volume loss. There are nonspecific areas of hypoattenuation within the periventricular and subcortical white matter, which likely represent chronic microvascular ischemic change. ORBITS: The visualized portion of the orbits demonstrate no acute abnormality. SINUSES: The visualized paranasal sinuses and mastoid air cells demonstrate no acute abnormality. SOFT TISSUES/SKULL: There is mild high left frontal scalp soft tissue swelling. 1. Mild high left frontal scalp soft tissue swelling but no acute intracranial abnormality. XR CHEST PORTABLE    Result Date: 10/4/2022  EXAMINATION: ONE XRAY VIEW OF THE CHEST 10/4/2022 3:56 pm COMPARISON: 09/22/2022 and 09/23/2022. HISTORY: ORDERING SYSTEM PROVIDED HISTORY: falls, left hip fx, edema vs infiltrate TECHNOLOGIST PROVIDED HISTORY: Reason for exam:->falls, left hip fx, edema vs infiltrate Reason for Exam: falls, left hip fx, edema vs infiltrate Additional signs and symptoms: na Relevant Medical/Surgical History: diabetes, hypertension FINDINGS: Lung volumes are large the diaphragm is flattened. There is no confluent consolidation or effusion. The cardiomediastinal silhouette and pulmonary vessels appear normal.     No acute findings. Large lung volumes and flattening of the diaphragms suggesting COPD. CT HIP LEFT WO CONTRAST    Result Date: 10/5/2022  EXAMINATION: CT OF THE LEFT HIP WITHOUT CONTRAST, 10/4/2022 2:15 pm TECHNIQUE: CT of the left hip was performed without the administration of intravenous contrast.  Multiplanar reformatted images are provided for review. Automated exposure control, iterative reconstruction, and/or weight based adjustment of the mA/kV was utilized to reduce the radiation dose to as low as reasonably achievable.  COMPARISON: Radiographs 10/04/2022 HISTORY ORDERING SYSTEM PROVIDED HISTORY:  With 3D reconstruction TECHNOLOGIST PROVIDED HISTORY: Reason for Exam:  With 3D reconstruction Decision Support Exception - unselect if not a suspected or confirmed emergency medical condition->Emergency Medical Condition (MA) Reason for Exam:  Closed left hip fracture, initial encounter ([facility] [address] FINDINGS: Acute impacted nondisplaced intertrochanteric left proximal femur fracture. No evidence of dislocation. Mild left hip joint degenerative changes. Distended urinary bladder. Acute impacted nondisplaced intertrochanteric left proximal femur fracture. Distended urinary bladder. XR HIP 2-3 VW W PELVIS LEFT    Result Date: 10/5/2022  EXAMINATION: ONE XRAY VIEW OF THE PELVIS AND TWO XRAY VIEWS LEFT HIP 10/4/2022 11:05 am COMPARISON: 12/23/2018 HISTORY: ORDERING SYSTEM PROVIDED HISTORY: fall three days ago TECHNOLOGIST PROVIDED HISTORY: Reason for exam:->fall three days ago Reason for Exam: fall three days ago Initial encounter FINDINGS: Osteopenia. There is an acute mildly displaced fracture of the left proximal femur likely involving the left femoral neck and possibly the intertrochanteric region. Mild-to-moderate degenerative changes of the hip joints. The pelvic ring is otherwise intact. No other acute fracture or dislocation. Acute mildly displaced fracture of the left proximal femur involving the left femoral neck versus intertrochanteric region.        Electronically signed by Gregory Lancaster MD on 10/7/2022 at 11:44 AM

## 2022-10-08 LAB
ABO/RH: NORMAL
ANTIBODY SCREEN: NEGATIVE
COMPONENT: NORMAL
CROSSMATCH RESULT: NORMAL
GLUCOSE BLD-MCNC: 153 MG/DL (ref 70–99)
GLUCOSE BLD-MCNC: 153 MG/DL (ref 70–99)
GLUCOSE BLD-MCNC: 253 MG/DL (ref 70–99)
GLUCOSE BLD-MCNC: 294 MG/DL (ref 70–99)
QUANTI TB1 MINUS NIL: 0.01 IU/ML (ref 0–0.34)
QUANTI TB2 MINUS NIL: 0.01 IU/ML (ref 0–0.34)
QUANTIFERON (R) TB GOLD (INCUBATED): NEGATIVE IU/ML
QUANTIFERON MITOGEN MINUS NIL: 1.78 IU/ML
QUANTIFERON NIL: 0.01 IU/ML
STATUS: NORMAL
TRANSFUSION STATUS: NORMAL
UNIT DIVISION: 0
UNIT NUMBER: NORMAL

## 2022-10-08 PROCEDURE — 6360000002 HC RX W HCPCS: Performed by: ORTHOPAEDIC SURGERY

## 2022-10-08 PROCEDURE — 94761 N-INVAS EAR/PLS OXIMETRY MLT: CPT

## 2022-10-08 PROCEDURE — 2580000003 HC RX 258: Performed by: ORTHOPAEDIC SURGERY

## 2022-10-08 PROCEDURE — 1200000000 HC SEMI PRIVATE

## 2022-10-08 PROCEDURE — 6370000000 HC RX 637 (ALT 250 FOR IP): Performed by: ORTHOPAEDIC SURGERY

## 2022-10-08 PROCEDURE — 6360000002 HC RX W HCPCS: Performed by: STUDENT IN AN ORGANIZED HEALTH CARE EDUCATION/TRAINING PROGRAM

## 2022-10-08 PROCEDURE — 97530 THERAPEUTIC ACTIVITIES: CPT

## 2022-10-08 PROCEDURE — 94150 VITAL CAPACITY TEST: CPT

## 2022-10-08 PROCEDURE — 82962 GLUCOSE BLOOD TEST: CPT

## 2022-10-08 PROCEDURE — 97110 THERAPEUTIC EXERCISES: CPT

## 2022-10-08 RX ADMIN — AMLODIPINE BESYLATE 10 MG: 10 TABLET ORAL at 08:44

## 2022-10-08 RX ADMIN — ATORVASTATIN CALCIUM 40 MG: 40 TABLET, FILM COATED ORAL at 21:11

## 2022-10-08 RX ADMIN — INSULIN LISPRO 2 UNITS: 100 INJECTION, SOLUTION INTRAVENOUS; SUBCUTANEOUS at 16:06

## 2022-10-08 RX ADMIN — OXYCODONE HYDROCHLORIDE 10 MG: 10 TABLET ORAL at 10:07

## 2022-10-08 RX ADMIN — QUETIAPINE FUMARATE 25 MG: 25 TABLET ORAL at 21:11

## 2022-10-08 RX ADMIN — SODIUM CHLORIDE, PRESERVATIVE FREE 10 ML: 5 INJECTION INTRAVENOUS at 21:12

## 2022-10-08 RX ADMIN — ENOXAPARIN SODIUM 30 MG: 100 INJECTION SUBCUTANEOUS at 08:44

## 2022-10-08 RX ADMIN — FENOFIBRATE 160 MG: 160 TABLET ORAL at 08:45

## 2022-10-08 RX ADMIN — SODIUM CHLORIDE, POTASSIUM CHLORIDE, SODIUM LACTATE AND CALCIUM CHLORIDE: 600; 310; 30; 20 INJECTION, SOLUTION INTRAVENOUS at 08:50

## 2022-10-08 RX ADMIN — VENLAFAXINE HYDROCHLORIDE 225 MG: 150 CAPSULE, EXTENDED RELEASE ORAL at 08:45

## 2022-10-08 RX ADMIN — MULTIPLE VITAMINS W/ MINERALS TAB 1 TABLET: TAB at 08:44

## 2022-10-08 RX ADMIN — DEXAMETHASONE 2 MG: 4 TABLET ORAL at 08:45

## 2022-10-08 RX ADMIN — FERROUS SULFATE TAB 325 MG (65 MG ELEMENTAL FE) 325 MG: 325 (65 FE) TAB at 08:44

## 2022-10-08 RX ADMIN — BUPROPION HYDROCHLORIDE 150 MG: 150 TABLET, EXTENDED RELEASE ORAL at 08:45

## 2022-10-08 RX ADMIN — Medication 1 TABLET: at 08:44

## 2022-10-08 RX ADMIN — BUPROPION HYDROCHLORIDE 150 MG: 150 TABLET, EXTENDED RELEASE ORAL at 21:11

## 2022-10-08 RX ADMIN — ASPIRIN 81 MG: 81 TABLET, COATED ORAL at 08:45

## 2022-10-08 RX ADMIN — SODIUM CHLORIDE, PRESERVATIVE FREE 10 ML: 5 INJECTION INTRAVENOUS at 08:44

## 2022-10-08 RX ADMIN — CARVEDILOL 25 MG: 25 TABLET, FILM COATED ORAL at 17:39

## 2022-10-08 RX ADMIN — CARVEDILOL 25 MG: 25 TABLET, FILM COATED ORAL at 08:44

## 2022-10-08 RX ADMIN — OXYCODONE HYDROCHLORIDE 10 MG: 10 TABLET ORAL at 21:17

## 2022-10-08 RX ADMIN — PANTOPRAZOLE SODIUM 40 MG: 40 TABLET, DELAYED RELEASE ORAL at 06:47

## 2022-10-08 RX ADMIN — SODIUM CHLORIDE, POTASSIUM CHLORIDE, SODIUM LACTATE AND CALCIUM CHLORIDE: 600; 310; 30; 20 INJECTION, SOLUTION INTRAVENOUS at 22:41

## 2022-10-08 ASSESSMENT — ENCOUNTER SYMPTOMS
SHORTNESS OF BREATH: 0
BACK PAIN: 0
COUGH: 0
CONSTIPATION: 0
EYE DISCHARGE: 0
SORE THROAT: 0
WHEEZING: 0
DIARRHEA: 0
ABDOMINAL DISTENTION: 0

## 2022-10-08 ASSESSMENT — PAIN DESCRIPTION - DESCRIPTORS
DESCRIPTORS: SHARP;CRAMPING
DESCRIPTORS: SHARP;PRESSURE

## 2022-10-08 ASSESSMENT — PAIN DESCRIPTION - ORIENTATION
ORIENTATION: UPPER;LEFT
ORIENTATION: LEFT

## 2022-10-08 ASSESSMENT — PAIN SCALES - GENERAL
PAINLEVEL_OUTOF10: 8
PAINLEVEL_OUTOF10: 10

## 2022-10-08 ASSESSMENT — PAIN DESCRIPTION - LOCATION
LOCATION: HIP
LOCATION: LEG

## 2022-10-08 NOTE — DISCHARGE INSTR - COC
Continuity of Care Form    Patient Name: Abby Kitchen   :  1940  MRN:  [id number]    Admit date:  10/4/2022  Discharge date:  10/11/22    Code Status Order: Full Code   Advance Directives:   [facility] Documentation       Date/Time Healthcare Directive Type of Healthcare Directive Copy in [address] [facility] [address] Agent's Name Healthcare Agent's Phone Number    10/06/22 1253 No, patient does not have an advance directive for healthcare treatment -- -- -- -- --            Admitting Physician:  Carolina Orr MD  PCP: Jamie Bonilla MD    Discharging Nurse: Jennifer Strong RN  [address] Unit/Room#: 8977/4909-H  Discharging Unit Phone Number: [phone]    Emergency Contact:   Extended Emergency Contact Information  Primary Emergency Contact: [address]  Phone: [phone]  Relation: Child    Past Surgical History:  Past Surgical History:   Procedure Laterality Date    ABDOMEN SURGERY      COLONOSCOPY N/A 2021    COLONOSCOPY DIAGNOSTIC performed by Gregoria Oneil MD at [facility]      CABG X 3, L mammary artery to the LAD, saphenous vein graft to RCA.  a saphenous vein graft to Cx marginal artery    DIAGNOSTIC CARDIAC CATH LAB PROCEDURE  3/30/07    PTCA to LAD    FRACTURE SURGERY Left     patella    FRACTURE SURGERY Right 2017    wrist    HYSTERECTOMY (CERVIX STATUS UNKNOWN)  1996    OTHER SURGICAL HISTORY  2015    exp lap, right colon resection    PACEMAKER INSERTION N/A 2021    PACEMAKER INSERTION PERMANENT performed by Gaby Alvarez MD at [address] [facility] N/A 2021    EGD BIOPSY performed by Gregoria Oneil MD at [facility] Left 2021    LEFT WRIST OPEN REDUCTION INTERNAL FIXATION performed by Lakeshia Clarke MD at [address]       Immunization History:   Immunization History   Administered Date(s) Administered    COVID-19, [facility] CTR BLUE border, Primary or Immunocompromised, (age 12y+), IM, 100 mcg/0.5mL 2021    COVID-19, PFIZER PURPLE top, DILUTE for use, (age 15 y+), 30mcg/0.3mL 2021    Pneumococcal Polysaccharide ([id number]) 2014       Active Problems:  Patient Active Problem List   Diagnosis Code    Diabetes mellitus ([facility] E11.9    CAD (coronary artery disease) I25.10    Hyperlipidemia E78.5    Carotid artery stenosis I65.29    S/P CABG x 3 Z95.1    Ischemia, bowel (HCC) K55.9    Protein-calorie malnutrition, moderate (HCC) E44.0    HTN (hypertension) I10    Hypokalemia E87.6    Anxiety F41.9    Gastroenteritis K52.9    Stroke-like symptoms R29.90    Peripheral polyneuropathy G62.9    Ataxia R27.0    Urinary tract infection without hematuria N39.0    Carcinoid syndrome (HCC) E34.0    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    Closed fracture of left distal radius S52.502A    GI bleed K92.2    Stage 3 chronic kidney disease (HCC) N18.30    Abdominal pain R10.9    Anemia D64.9    Moderate malnutrition (HCC) E44.0    Closed left hip fracture, initial encounter ([facility] S72.002A       Isolation/Infection:   Isolation            Airborne, Droplet and Contact           Patient Infection Status       Infection Onset Added Last Indicated Last Indicated By Review Planned Expiration Resolved Resolved By    None active    Resolved    Pulmonary Tuberculosis (Rule Out) 22 Quantiferon, Incubated (Ordered)   10/08/22 Rule-Out Test Resulted    C-diff Rule Out 21 C difficile Molecular/PCR (Ordered)   22 Dudley Plummer RN    COVID-19 (Rule Out) 02/15/21 02/15/21 02/15/21 Covid-19 Ambulatory (Ordered)   21     COVID-19 (Rule Out) 21 Covid-19 Ambulatory (Ordered)   21 Rule-Out Test Resulted            Nurse Assessment:  Last Vital Signs: BP (!) 166/63   Pulse 73   Temp 97.9 °F (36.6 °C) (Oral)   Resp 16   Ht 5' 2\" (1.575 m)   Wt 96 lb (43.5 kg)   SpO2 97%   BMI 17.56 kg/m²     Last documented pain score (0-10 scale): Pain Level: 8  Last Weight:   Wt Readings from Last 1 Encounters:   10/04/22 96 lb (43.5 kg)     Mental Status:  disoriented, alert, and alert to self and place at times    IV Access:  [address] [place] [address] IV ACCESS:[id number]}    Nursing Mobility/ADLs:  Walking   Dependent  Transfer  Dependent  Bathing  Assisted  Dressing  Assisted  Toileting  Dependent  Feeding  Assisted  Med Admin  Dependent  Med Delivery   whole    Wound Care Documentation and Therapy:  Incision 10/06/22 Thigh Anterior;Left;Proximal (Active)   Dressing Status Clean;Dry; Intact 10/08/22 0906   Incision Cleansed Cleansed with saline 10/06/22 1447   Closure Staples 10/06/22 1447   Drainage Amount None 10/08/22 0906   Odor None 10/08/22 0906   Number of days: 1        Elimination:  Continence: Bowel: {YES / MS:00901}  Bladder: {YES / DE:18237}  Urinary Catheter: {Urinary Catheter:[id number]}   Colostomy/Ileostomy/Ileal Conduit: {YES / IE:51241}       Date of Last BM: ***    Intake/Output Summary (Last 24 hours) at 10/8/2022 1208  Last data filed at 10/8/2022 0849  Gross per 24 hour   Intake 465.33 ml   Output 1800 ml   Net -1334.67 ml     I/O last 3 completed shifts:   In: 465.3 [Blood:465.3]  Out: 950 [Urine:950]    Safety Concerns:     History of Falls (last 30 days), At Risk for Falls, and confusion    Impairments/Disabilities:      Vision    Patient's personal belongings (please select all that are sent with patient):  None    RN SIGNATURE:  Electronically signed by Omar Vallejo RN on 10/12/22 at 3:22 PM EDT    CASE MANAGEMENT/SOCIAL WORK SECTION    Inpatient Status Date: ***    Readmission Risk Assessment Score:  Readmission Risk              Risk of Unplanned Readmission:  31           Discharging to Facility/ Agency   Name:   Address:  Phone:  Fax:    Dialysis Facility (if applicable) Name:  Address:  Dialysis Schedule:  Phone:  Fax:    / signature: {Esignature:[id number]}    PHYSICIAN SECTION      Nutrition Therapy:  Current Nutrition Therapy:   - Oral Diet:  General    Routes of Feeding: Oral  Liquids: No Restrictions  Daily Fluid Restriction: no  Last Modified Barium Swallow with Video (Video Swallowing Test): not done    Treatments at the Time of Hospital Discharge:   Respiratory Treatments:   Oxygen Therapy:  is not on home oxygen therapy. Ventilator:    - No ventilator support    Rehab Therapies: Physical Therapy and Occupational Therapy  Weight Bearing Status/Restrictions: No weight bearing restrictions  Other Medical Equipment (for information only, NOT a DME order):  walker  Other Treatments:     Prognosis: Fair    Condition at Discharge: Stable    Rehab Potential (if transferring to Rehab): Fair    Recommended Labs or Other Treatments After Discharge: weekly CBC/BMP    Physician Certification: I certify the above information and transfer of Melinda Traylor  is necessary for the continuing treatment of the diagnosis listed and that she requires [place] for less 30 days.      Update Admission H&P: No change in H&P    PHYSICIAN SIGNATURE:  Electronically signed by Louie Duckworth MD on 10/12/22 at 12:12 PM EDT

## 2022-10-08 NOTE — PROGRESS NOTES
Physical Therapy    Physical Therapy Treatment Note  Name: Melinda Traylor MRN: [id number] :   1940   Date:  10/8/2022   Admission Date: 10/4/2022 Room:  [address] [place] [address]   Restrictions/Precautions:          WBAT LLE, gen prec, fall risk  Communication with other providers:  nurse states pt ok for tx  Subjective:  Patient states:  agreeable to PT  Pain:   Location, Type, Intensity (0/10 to 10/10):  9/10 LLE        Objective:    Observation:  supine in bed upon entry  Treatment, including education/measures:  Supine Exercises: Ankle pumps x10  Quad sets 2x8  Heel slides 2x5 AAROM  Hip abd x 6 AAROM  Therapeutic Exercise:  Therapeutic exercises were instructed today. Cues were given for technique, safety, recruitment, and rationale. Cues were verbal and/or tactile. Transfers  Rolling: modA  Supine to sit :modA  Sit to supine :modA  Scooting :modA  Sit to stand :modA-maxA  Stand to sit :modA  Vc's and tc's for proper form. Pt requires full explanation at times for purpose of intervention. Increased time required to complete all tasks. Vc's/tc for hand placement and LE placement in prep for sit>stand. Standing Balance:  X~25sec, pt requires mod posterior supported assisted per PTA, vc/tc and manual assist for LLE placement and establishing proper COG and IZZY. Pt improves ant wt shift for a few seconds but requires min-modA per PTA throughout to remain upright, using FWW for BUE support. Safety  Patient left safely in the bed, with call light/phone in reach with alarm applied. Gait belt was used for transfers and gait. Assessment / Impression:    Pt improves transfer (sit<>stand) independence on this date. Patient's tolerance of treatment:  Fair   Adverse Reaction: na  Significant change in status and impact:  na  Barriers to improvement:  weakness, reduced mobility.   Plan for Next Session:    Cont POC, focus on standing and transfers  Time in:  1255  Time out:  1326  Timed treatment minutes: 31  Total treatment time:  32    Previously filed items:           Short Term Goals  Time Frame for Short Term Goals: 2 weeks  Short Term Goal 1: Pt will perform sit><supine modA  Short Term Goal 2: Pt will transfer between surfaces modA  Short Term Goal 3: Pt will ambulate 30ft with RW modA  Short Term Goal 4: Pt will perform sitting light dynamic activity x 5 minutes without UE support Camelia    Electronically signed by:    Avi Whitmore PTA  10/8/2022, 2:00 PM

## 2022-10-08 NOTE — PROGRESS NOTES
V2.0  [facility] Hospitalist Progress Note      Name:  Pili Blake /Age/Sex: 1940  (81 y.o. female)   MRN & CSN:  [id number] & [id number] Encounter Date/Time: 10/8/2022 7:36 AM EDT    Location:  5110/9575-C PCP: Marija Chavez MD       Hospital Day: 5    Assessment and Plan:   Pili Blake is a 81 y.o. female with pmh of carcinoid syndrome, diabetes mellitus, hypertension, CAD, carotid artery stenosis, history of MI hyperlipidemia who presents with Closed left hip fracture, initial encounter ([facility] [address]      Plan:  Left intertrochanteric proximal femur fracture-  post fall  S/p Left  Trochanteric Nailing 10/6  CT of left hip demonstrate acute impacted nondisplaced intertrochanteric left proximal femur fracture.  -pain control  -Analgesics antiemetics  -Harvey catheter removed, will use external urine collection system. -PT/OT, OOB as tolerated  -Neurochecks left lower extremity  -tolerating diet    Acute Blood loss anemia post surgery  - pre op Hb 8.9, post op today 6.4  - will transfuse 1 PRBC  - recheck H/H post transfusion responded appropriately to transfusion Hb 8.6    Hyponatremia resolving  Hyperkalemia- resolved  -MIVF  -follow daily     Leukocytosis - improving  16.2 on admission  no episodes of infection.     UA bland  -Chest x-ray negative for acute findings     CKD stage III:   Baseline creatinine around 1.2-creatinine mildly elevated at 1.4.  - now back at baseline  -Daily labs  -Avoid toxic agents  -MIVF     Diabetes Mellitus type II   -Blood glucose on admission 180.   - SSI  - held home PO meds  - POCT glucose qACHS  - hypoglycemia management protocol   - target blood glucose 100-180  - carb controlled diet    Carcinoid syndrome  Malignancy associated pain: Controlled with oxycodone, active prescription verified on [facility]  follows oncology Dr. Camilla Epps     Hyperlipidemia: Continue statin therapy     Hypertension  CAD  Managed on aspirin, amlodipine and carvedilol     Chronic Conditions: Continue all home medications except as stated above or contraindicated. Severe Malnutrition   Underweight BMI 17.56: kg/m2 likely secondary to protein calorie malnutrition  -Nutrition consult    Diet ADULT DIET; Regular; 5 carb choices (75 gm/meal)  ADULT ORAL NUTRITION SUPPLEMENT; Breakfast, Lunch, Dinner; Frozen Oral Supplement   DVT Prophylaxis [] Lovenox, []  Heparin, [x] SCDs, [] Ambulation,  [] Eliquis, [] Xarelto  [] Coumadin   Code Status Full Code   Disposition Patient requires continued admission due to placement         Subjective:     Chief Complaint: Hip Pain       States she feels well. Pain on movement but controlled. Denies any active complaints    Review of Systems:    Review of Systems   Constitutional:  Positive for activity change. Negative for appetite change, diaphoresis and fatigue. HENT:  Negative for congestion and sore throat. Eyes:  Negative for discharge and visual disturbance. Respiratory:  Negative for cough, shortness of breath and wheezing. Cardiovascular:  Negative for chest pain, palpitations and leg swelling. Gastrointestinal:  Negative for abdominal distention, constipation and diarrhea. Genitourinary:  Negative for difficulty urinating and hematuria. Musculoskeletal:  Positive for arthralgias. Negative for back pain and gait problem. Neurological:  Negative for dizziness, syncope and speech difficulty. Hematological:  Negative for adenopathy. Psychiatric/Behavioral:  Negative for agitation and confusion. All other systems reviewed and are negative. Objective: Intake/Output Summary (Last 24 hours) at 10/8/2022 1225  Last data filed at 10/8/2022 0849  Gross per 24 hour   Intake 465.33 ml   Output 1800 ml   Net -1334.67 ml          Vitals:   Vitals:    10/08/22 1219   BP:    Pulse: 71   Resp: 15   Temp:    SpO2: 96%       Physical Exam:   Physical Exam  Vitals and nursing note reviewed. Constitutional:       General: She is not in acute distress. Appearance: Normal appearance. She is not ill-appearing. HENT:      Head: Normocephalic and atraumatic. Nose: Nose normal.      Mouth/Throat:      Mouth: Mucous membranes are moist.   Eyes:      Extraocular Movements: Extraocular movements intact. Pupils: Pupils are equal, round, and reactive to light. Cardiovascular:      Rate and Rhythm: Normal rate and regular rhythm. Pulses: Normal pulses. Heart sounds: Normal heart sounds. Pulmonary:      Effort: Pulmonary effort is normal.      Breath sounds: Normal breath sounds. Abdominal:      Palpations: Abdomen is soft. Musculoskeletal:         General: Tenderness and deformity present. Normal range of motion. Cervical back: Normal range of motion. Comments: Left leg in extension and external rotation. Skin:     General: Skin is warm. Capillary Refill: Capillary refill takes less than 2 seconds. Neurological:      General: No focal deficit present. Mental Status: She is alert and oriented to person, place, and time.    Psychiatric:         Mood and Affect: Mood normal.         Behavior: Behavior normal.          Medications:   Medications:    enoxaparin  30 mg SubCUTAneous Daily    calcium-cholecalciferol  1 tablet Oral Daily    sodium chloride flush  5-40 mL IntraVENous 2 times per day    ferrous sulfate  325 mg Oral Daily with breakfast    amLODIPine  10 mg Oral Daily    aspirin  81 mg Oral Daily    atorvastatin  40 mg Oral Nightly    buPROPion  150 mg Oral BID    carvedilol  25 mg Oral BID WC    dexamethasone  2 mg Oral Daily with breakfast    fenofibrate  160 mg Oral Daily    therapeutic multivitamin-minerals  1 tablet Oral Daily    pantoprazole  40 mg Oral QAM AC    QUEtiapine  25 mg Oral Nightly    venlafaxine  225 mg Oral Daily    insulin lispro  0-4 Units SubCUTAneous TID WC    insulin lispro  0-4 Units SubCUTAneous Nightly      Infusions:    sodium chloride      sodium chloride      lactated ringers 75 mL/hr been called? ->No Reason for exam:->fall head injury Decision Support Exception - unselect if not a suspected or confirmed emergency medical condition->Emergency Medical Condition (MA) Reason for Exam: trauma/ fall, head injury FINDINGS: BRAIN/VENTRICLES: There is no acute intracranial hemorrhage, mass effect or midline shift. No abnormal extra-axial fluid collection. The gray-white differentiation is maintained without evidence of an acute infarct. There is prominence of the ventricles and sulci due to global parenchymal volume loss. There are nonspecific areas of hypoattenuation within the periventricular and subcortical white matter, which likely represent chronic microvascular ischemic change. ORBITS: The visualized portion of the orbits demonstrate no acute abnormality. SINUSES: The visualized paranasal sinuses and mastoid air cells demonstrate no acute abnormality. SOFT TISSUES/SKULL: There is mild high left frontal scalp soft tissue swelling. 1. Mild high left frontal scalp soft tissue swelling but no acute intracranial abnormality. XR CHEST PORTABLE    Result Date: 10/4/2022  EXAMINATION: ONE XRAY VIEW OF THE CHEST 10/4/2022 3:56 pm COMPARISON: 09/22/2022 and 09/23/2022. HISTORY: ORDERING SYSTEM PROVIDED HISTORY: falls, left hip fx, edema vs infiltrate TECHNOLOGIST PROVIDED HISTORY: Reason for exam:->falls, left hip fx, edema vs infiltrate Reason for Exam: falls, left hip fx, edema vs infiltrate Additional signs and symptoms: na Relevant Medical/Surgical History: diabetes, hypertension FINDINGS: Lung volumes are large the diaphragm is flattened. There is no confluent consolidation or effusion. The cardiomediastinal silhouette and pulmonary vessels appear normal.     No acute findings. Large lung volumes and flattening of the diaphragms suggesting COPD.      CT HIP LEFT WO CONTRAST    Result Date: 10/5/2022  EXAMINATION: CT OF THE LEFT HIP WITHOUT CONTRAST, 10/4/2022 2:15 pm TECHNIQUE: CT of the left hip was performed without the administration of intravenous contrast.  Multiplanar reformatted images are provided for review. Automated exposure control, iterative reconstruction, and/or weight based adjustment of the mA/kV was utilized to reduce the radiation dose to as low as reasonably achievable. COMPARISON: Radiographs 10/04/2022 HISTORY ORDERING SYSTEM PROVIDED HISTORY:  With 3D reconstruction TECHNOLOGIST PROVIDED HISTORY: Reason for Exam:  With 3D reconstruction Decision Support Exception - unselect if not a suspected or confirmed emergency medical condition->Emergency Medical Condition (MA) Reason for Exam:  Closed left hip fracture, initial encounter ([facility] [address] FINDINGS: Acute impacted nondisplaced intertrochanteric left proximal femur fracture. No evidence of dislocation. Mild left hip joint degenerative changes. Distended urinary bladder. Acute impacted nondisplaced intertrochanteric left proximal femur fracture. Distended urinary bladder. XR HIP 2-3 VW W PELVIS LEFT    Result Date: 10/5/2022  EXAMINATION: ONE XRAY VIEW OF THE PELVIS AND TWO XRAY VIEWS LEFT HIP 10/4/2022 11:05 am COMPARISON: 12/23/2018 HISTORY: ORDERING SYSTEM PROVIDED HISTORY: fall three days ago TECHNOLOGIST PROVIDED HISTORY: Reason for exam:->fall three days ago Reason for Exam: fall three days ago Initial encounter FINDINGS: Osteopenia. There is an acute mildly displaced fracture of the left proximal femur likely involving the left femoral neck and possibly the intertrochanteric region. Mild-to-moderate degenerative changes of the hip joints. The pelvic ring is otherwise intact. No other acute fracture or dislocation. Acute mildly displaced fracture of the left proximal femur involving the left femoral neck versus intertrochanteric region.        Electronically signed by Justin Hull MD on 10/8/2022 at 12:25 PM

## 2022-10-08 NOTE — CARE COORDINATION
Patient on the weekend follow up list.  reviewed the patient medical record and call to [place] at [facility]. Patient pre-cert is still pending.

## 2022-10-09 LAB
ANION GAP SERPL CALCULATED.3IONS-SCNC: 7 MMOL/L (ref 4–16)
BASOPHILS ABSOLUTE: 0 K/CU MM
BASOPHILS RELATIVE PERCENT: 0.1 % (ref 0–1)
BUN BLDV-MCNC: 16 MG/DL (ref 6–23)
CALCIUM SERPL-MCNC: 8.1 MG/DL (ref 8.3–10.6)
CHLORIDE BLD-SCNC: 104 MMOL/L (ref 99–110)
CO2: 25 MMOL/L (ref 21–32)
CREAT SERPL-MCNC: 0.8 MG/DL (ref 0.6–1.1)
DIFFERENTIAL TYPE: ABNORMAL
EOSINOPHILS ABSOLUTE: 0.3 K/CU MM
EOSINOPHILS RELATIVE PERCENT: 1.9 % (ref 0–3)
GFR AFRICAN AMERICAN: >60 ML/MIN/1.73M2
GFR NON-AFRICAN AMERICAN: >60 ML/MIN/1.73M2
GLUCOSE BLD-MCNC: 159 MG/DL (ref 70–99)
GLUCOSE BLD-MCNC: 166 MG/DL (ref 70–99)
GLUCOSE BLD-MCNC: 173 MG/DL (ref 70–99)
GLUCOSE BLD-MCNC: 176 MG/DL (ref 70–99)
GLUCOSE BLD-MCNC: 211 MG/DL (ref 70–99)
HCT VFR BLD CALC: 25.1 % (ref 37–47)
HEMOGLOBIN: 7.9 GM/DL (ref 12.5–16)
IMMATURE NEUTROPHIL %: 0.7 % (ref 0–0.43)
LYMPHOCYTES ABSOLUTE: 1.2 K/CU MM
LYMPHOCYTES RELATIVE PERCENT: 7.2 % (ref 24–44)
MCH RBC QN AUTO: 27.3 PG (ref 27–31)
MCHC RBC AUTO-ENTMCNC: 31.5 % (ref 32–36)
MCV RBC AUTO: 86.9 FL (ref 78–100)
MONOCYTES ABSOLUTE: 1.3 K/CU MM
MONOCYTES RELATIVE PERCENT: 7.9 % (ref 0–4)
NUCLEATED RBC %: 0 %
PDW BLD-RTO: 17.2 % (ref 11.7–14.9)
PLATELET # BLD: 203 K/CU MM (ref 140–440)
PMV BLD AUTO: 10.6 FL (ref 7.5–11.1)
POTASSIUM SERPL-SCNC: 3.7 MMOL/L (ref 3.5–5.1)
RBC # BLD: 2.89 M/CU MM (ref 4.2–5.4)
SEGMENTED NEUTROPHILS ABSOLUTE COUNT: 13.7 K/CU MM
SEGMENTED NEUTROPHILS RELATIVE PERCENT: 82.2 % (ref 36–66)
SODIUM BLD-SCNC: 136 MMOL/L (ref 135–145)
TOTAL IMMATURE NEUTOROPHIL: 0.12 K/CU MM
TOTAL NUCLEATED RBC: 0 K/CU MM
WBC # BLD: 16.6 K/CU MM (ref 4–10.5)

## 2022-10-09 PROCEDURE — 6360000002 HC RX W HCPCS: Performed by: ORTHOPAEDIC SURGERY

## 2022-10-09 PROCEDURE — 85025 COMPLETE CBC W/AUTO DIFF WBC: CPT

## 2022-10-09 PROCEDURE — 1200000000 HC SEMI PRIVATE

## 2022-10-09 PROCEDURE — 80048 BASIC METABOLIC PNL TOTAL CA: CPT

## 2022-10-09 PROCEDURE — 2580000003 HC RX 258: Performed by: ORTHOPAEDIC SURGERY

## 2022-10-09 PROCEDURE — 6360000002 HC RX W HCPCS: Performed by: STUDENT IN AN ORGANIZED HEALTH CARE EDUCATION/TRAINING PROGRAM

## 2022-10-09 PROCEDURE — 94150 VITAL CAPACITY TEST: CPT

## 2022-10-09 PROCEDURE — 36415 COLL VENOUS BLD VENIPUNCTURE: CPT

## 2022-10-09 PROCEDURE — 6370000000 HC RX 637 (ALT 250 FOR IP): Performed by: ORTHOPAEDIC SURGERY

## 2022-10-09 PROCEDURE — 94761 N-INVAS EAR/PLS OXIMETRY MLT: CPT

## 2022-10-09 PROCEDURE — 82962 GLUCOSE BLOOD TEST: CPT

## 2022-10-09 PROCEDURE — 94664 DEMO&/EVAL PT USE INHALER: CPT

## 2022-10-09 RX ADMIN — FENOFIBRATE 160 MG: 160 TABLET ORAL at 08:54

## 2022-10-09 RX ADMIN — CARVEDILOL 25 MG: 25 TABLET, FILM COATED ORAL at 08:54

## 2022-10-09 RX ADMIN — Medication 1 TABLET: at 08:55

## 2022-10-09 RX ADMIN — INSULIN LISPRO 1 UNITS: 100 INJECTION, SOLUTION INTRAVENOUS; SUBCUTANEOUS at 18:17

## 2022-10-09 RX ADMIN — AMLODIPINE BESYLATE 10 MG: 10 TABLET ORAL at 08:55

## 2022-10-09 RX ADMIN — FERROUS SULFATE TAB 325 MG (65 MG ELEMENTAL FE) 325 MG: 325 (65 FE) TAB at 08:54

## 2022-10-09 RX ADMIN — QUETIAPINE FUMARATE 25 MG: 25 TABLET ORAL at 22:14

## 2022-10-09 RX ADMIN — SODIUM CHLORIDE, POTASSIUM CHLORIDE, SODIUM LACTATE AND CALCIUM CHLORIDE: 600; 310; 30; 20 INJECTION, SOLUTION INTRAVENOUS at 22:15

## 2022-10-09 RX ADMIN — SODIUM CHLORIDE, POTASSIUM CHLORIDE, SODIUM LACTATE AND CALCIUM CHLORIDE: 600; 310; 30; 20 INJECTION, SOLUTION INTRAVENOUS at 11:56

## 2022-10-09 RX ADMIN — CARVEDILOL 25 MG: 25 TABLET, FILM COATED ORAL at 18:18

## 2022-10-09 RX ADMIN — OXYCODONE HYDROCHLORIDE 10 MG: 10 TABLET ORAL at 08:54

## 2022-10-09 RX ADMIN — VENLAFAXINE HYDROCHLORIDE 225 MG: 150 CAPSULE, EXTENDED RELEASE ORAL at 08:55

## 2022-10-09 RX ADMIN — ATORVASTATIN CALCIUM 40 MG: 40 TABLET, FILM COATED ORAL at 22:14

## 2022-10-09 RX ADMIN — PANTOPRAZOLE SODIUM 40 MG: 40 TABLET, DELAYED RELEASE ORAL at 06:04

## 2022-10-09 RX ADMIN — BUPROPION HYDROCHLORIDE 150 MG: 150 TABLET, EXTENDED RELEASE ORAL at 22:14

## 2022-10-09 RX ADMIN — DEXAMETHASONE 2 MG: 4 TABLET ORAL at 08:54

## 2022-10-09 RX ADMIN — MULTIPLE VITAMINS W/ MINERALS TAB 1 TABLET: TAB at 08:55

## 2022-10-09 RX ADMIN — BUPROPION HYDROCHLORIDE 150 MG: 150 TABLET, EXTENDED RELEASE ORAL at 08:55

## 2022-10-09 RX ADMIN — ENOXAPARIN SODIUM 30 MG: 100 INJECTION SUBCUTANEOUS at 08:55

## 2022-10-09 RX ADMIN — ASPIRIN 81 MG: 81 TABLET, COATED ORAL at 08:55

## 2022-10-09 ASSESSMENT — PAIN DESCRIPTION - DESCRIPTORS: DESCRIPTORS: ACHING

## 2022-10-09 ASSESSMENT — ENCOUNTER SYMPTOMS
EYE DISCHARGE: 0
WHEEZING: 0
DIARRHEA: 0
CONSTIPATION: 0
SORE THROAT: 0
COUGH: 0
ABDOMINAL DISTENTION: 0
SHORTNESS OF BREATH: 0
BACK PAIN: 0

## 2022-10-09 ASSESSMENT — PAIN SCALES - GENERAL: PAINLEVEL_OUTOF10: 8

## 2022-10-09 ASSESSMENT — PAIN DESCRIPTION - LOCATION: LOCATION: HIP

## 2022-10-09 ASSESSMENT — PAIN DESCRIPTION - ORIENTATION: ORIENTATION: LEFT

## 2022-10-09 NOTE — PROGRESS NOTES
pain.    Objective:   Patient Vitals for the past 4 hrs:   BP Temp Temp src Pulse Resp SpO2   10/09/22 0847 (!) 150/60 97.9 °F (36.6 °C) Oral 74 16 96 %     I/O last 3 completed shifts:  In: -   Out: 1100 [Urine:1100]  DRAIN/TUBE OUTPUT:         LABS   CBC:   Recent Labs     10/06/22  1202 10/07/22  0833 10/07/22  1013 10/07/22  2151 10/09/22  0815   WBC 16.9* 13.8*  --   --  16.6*   HGB 8.9* 6.4* 7.0* 8.6* 7.9*    217  --   --  203     BMP:    Recent Labs     10/06/22  1202 10/07/22  0833 10/09/22  0815    141 136   K 3.9 4.1 3.7    109 104   CO2 23 19* 25   BUN 25* 28* 16   CREATININE 0.9 0.9 0.8   GLUCOSE 136* 114* 159*         Medications   Meds:    enoxaparin  30 mg SubCUTAneous Daily    calcium-cholecalciferol  1 tablet Oral Daily    sodium chloride flush  5-40 mL IntraVENous 2 times per day    ferrous sulfate  325 mg Oral Daily with breakfast    amLODIPine  10 mg Oral Daily    aspirin  81 mg Oral Daily    atorvastatin  40 mg Oral Nightly    buPROPion  150 mg Oral BID    carvedilol  25 mg Oral BID WC    dexamethasone  2 mg Oral Daily with breakfast    fenofibrate  160 mg Oral Daily    therapeutic multivitamin-minerals  1 tablet Oral Daily    pantoprazole  40 mg Oral QAM AC    QUEtiapine  25 mg Oral Nightly    venlafaxine  225 mg Oral Daily    insulin lispro  0-4 Units SubCUTAneous TID WC    insulin lispro  0-4 Units SubCUTAneous Nightly       Assessment and Plan     1. Post operative day # 3 Left hip ORIF (10/6/2022)    1:  Mobilize with Physical therapy   -WBAT  2.   Acute blood loss anemia, not a complication   -Hb 7.9 S/P PRBC  3:  Continue Deep venous thrombosis prophylaxis    -Chemoprophylaxis lovenox, ASA   -Mechanical-KATIE hose, -SCD's, ambulation  4:  Continue Pain Control  5:  D/C Planning:     -Skilled Nursing Facility-Want to go to [facility]   -OK to [place] when medically stable     -FU in 4 weeks or sooner if needed       Tammy Ramirez MD

## 2022-10-09 NOTE — DISCHARGE INSTRUCTIONS
Follow up: [phone]  If you go to a skilled nursing unit follow up in 4 weeks for evaluation of the wound & for X-rays. Call [phone] for an appointment. If you go Home follow up in 2 weeks for an suture removal and X-rays. Please remove staples on 10/20/22    When You Were in the [address] [place] [address], [facility] [address] [place] were in the hospital for surgery to repair a hip fracture, a break in the upper part of your thigh bone. You may have had hip pinning surgery or a special metal plate and screws, called compression screws, put in place. You may have had a hemiarthroplasty to replace the ball part of your hip joint. You received physical therapy while you were in the hospital or at a rehabilitation center before being discharged from the hospital.      Most will need to go to a skilled nursing unit during your recovery. This will aid in your rehabilitation. You will be able to focus on your physical rehabilitation and not worry about daily needs and activities of daily living (meals, laundry, household chores)      What to Expect at [facility] Nursing unit  Most of the problems that develop after hip fracture surgery can be prevented by getting out of bed and walking as soon as possible. For this reason, it is very important to stay active and follow the instructions from physical therapy. You may have bruises around your incision. These will go away. It is normal for the skin around your incision to be a little red. It is also normal to have a small amount of watery or dark bloody fluid draining from your incision for several days. Wound Care  You may start showering again about 5 to 7 days after your surgery. Ask your doctor or nurse when you can start. After you shower, gently pat the incision area dry with a clean towel. Do not rub it dry. Do not soak in a bathtub, swimming pool, or hot tub until your doctor says it is okay.    Change your dressing (bandage) over your incision every day. Gently wash the wound with soap and water and pat it dry. Check your incision for any signs of infection at least once a day. These signs include more redness, more drainage, or the wound is opening up. If this occurs call Dr. Tejal Hendrickson ([phone])    Your staples will be removed in 10-14 days in the nursing home      Medications  You will be given a list of medications to resume once discharged from the hospital.  Follow as directed. Pain medications; A narcotic pain medication will be given to you. Take for only pain. These narcotic pain medications can cause dry mouth,  dizziness, and confusion. They can also slow your bowels and you may not have a bowel movement for several days. Take over the counter Colace once a day while on these medications to prevent this. If you have not had a bowel movement for several days call your family doctor. Blood thinner  You will be on a blood thinner for 2-3 weeks as directed. This is important to prevent blood clots. Other Self-care  To prevent another fracture, do everything you can to make your bones strong. If you smoke, stop. Ask your doctor for help quitting. Smoking will keep your bone from healing. Tell your doctor if you drink alcohol regularly. You might have a bad reaction from taking pain medicine and drinking alcohol. Alcohol may also make it harder to recovery from surgery. Keep wearing the compression stockings you used in the hospital until you doctor says you can stop. This will be at least 2 or 3 weeks. If you have pain, take the pain medicines your doctor prescribed. Getting up and moving around can also help reduce your pain.    Be careful not to get pressure sores (also called pressure ulcers or bed sores) from staying in bed or a chair for long periods of time      When to Call the [phone]  Call Dr. Tejal Hendrickson if you have:   Shortness of breath or chest pain when you breathe Frequent urination or burning when you urinate   Redness or increasing pain around your incision   Drainage from your incision   Swelling in one of your legs (it will be red and warmer than the other leg)   Fever higher than 101°F   Pain that is not controlled by your pain medicines   Nosebleeds or blood in your urine or stools, if you are taking blood thiners

## 2022-10-09 NOTE — CONSENT
Informed Consent for Blood Component Transfusion Note    I have discussed with the patient the rationale for blood component transfusion; its benefits in treating or preventing fatigue, organ damage, or death; and its risk which includes mild transfusion reactions, rare risk of blood borne infection, or more serious but rare reactions. I have discussed the alternatives to transfusion, including the risk and consequences of not receiving transfusion. The patient had an opportunity to ask questions and had agreed to proceed with transfusion of blood components.     Electronically signed by Irving Humphrey MD on 10/9/22 at 7:27 AM EDT

## 2022-10-09 NOTE — PROGRESS NOTES
V2.0  [facility] Hospitalist Progress Note      Name:  Herson Mathis /Age/Sex: 1940  (81 y.o. female)   MRN & CSN:  [id number] & [id number] Encounter Date/Time: 10/9/2022 7:36 AM EDT    Location:  [address] [place], [place] [id number] PCP: Janet Wilson MD       Hospital Day: 6    Assessment and Plan:   Herson Mathis is a 81 y.o. female with pmh of carcinoid syndrome, diabetes mellitus, hypertension, CAD, carotid artery stenosis, history of MI hyperlipidemia who presents with Closed left hip fracture, initial encounter ([facility] [address]      Plan:  Left intertrochanteric proximal femur fracture-  post fall  S/p Left  Trochanteric Nailing 10/6  CT of left hip demonstrate acute impacted nondisplaced intertrochanteric left proximal femur fracture.  -pain control  -Analgesics antiemetics  -Harvey catheter removed, will use external urine collection system. -PT/OT, OOB as tolerated, WBAT  -tolerating diet    Acute Blood loss anemia post surgery  - pre op Hb 8.9, post op today 6.4  - will transfuse 1 PRBC  - recheck H/H post transfusion responded appropriately to transfusion Hb 8.6    Hyponatremia resolving  Hyperkalemia- resolved  -MIVF  -follow daily     Leukocytosis - improving  16.2 on admission  no episodes of infection.     UA bland  -Chest x-ray negative for acute findings     CKD stage III:   Baseline creatinine around 1.2-creatinine mildly elevated at 1.4.  - now back at baseline  -Daily labs  -Avoid toxic agents  -MIVF     Diabetes Mellitus type II   -Blood glucose on admission 180.   - SSI  - held home PO meds  - POCT glucose qACHS  - hypoglycemia management protocol   - target blood glucose 100-180  - carb controlled diet    Carcinoid syndrome  Malignancy associated pain: Controlled with oxycodone, active prescription verified on [facility]  follows oncology Dr. Ilana Law     Hyperlipidemia: Continue statin therapy     Hypertension  CAD  Managed on aspirin, amlodipine and carvedilol     Chronic Conditions: Continue all home medications except as stated above or contraindicated. Severe Malnutrition   Underweight BMI 17.56: kg/m2 likely secondary to protein calorie malnutrition  -Nutrition consult    Diet ADULT DIET; Regular; 5 carb choices (75 gm/meal)  ADULT ORAL NUTRITION SUPPLEMENT; Breakfast, Lunch, Dinner; Frozen Oral Supplement   DVT Prophylaxis [] Lovenox, []  Heparin, [x] SCDs, [] Ambulation,  [] Eliquis, [] Xarelto  [] Coumadin   Code Status Full Code   Disposition Patient requires continued admission due to placement         Subjective:     Chief Complaint: Hip Pain       States she feels well. Pain on movement but controlled. Tolerating diet. Denies any active complaints    Review of Systems:    Review of Systems   Constitutional:  Positive for activity change. Negative for appetite change, diaphoresis and fatigue. HENT:  Negative for congestion and sore throat. Eyes:  Negative for discharge and visual disturbance. Respiratory:  Negative for cough, shortness of breath and wheezing. Cardiovascular:  Negative for chest pain, palpitations and leg swelling. Gastrointestinal:  Negative for abdominal distention, constipation and diarrhea. Genitourinary:  Negative for difficulty urinating and hematuria. Musculoskeletal:  Positive for arthralgias. Negative for back pain and gait problem. Neurological:  Negative for dizziness, syncope and speech difficulty. Hematological:  Negative for adenopathy. Psychiatric/Behavioral:  Negative for agitation and confusion. All other systems reviewed and are negative. Objective:   No intake or output data in the 24 hours ending 10/09/22 0953       Vitals:   Vitals:    10/09/22 0847   BP: (!) 150/60   Pulse: 74   Resp: 16   Temp: 97.9 °F (36.6 °C)   SpO2: 96%       Physical Exam:   Physical Exam  Vitals and nursing note reviewed. Constitutional:       General: She is not in acute distress. Appearance: Normal appearance. She is not ill-appearing.    HENT:      Head: Normocephalic and atraumatic. Nose: Nose normal.      Mouth/Throat:      Mouth: Mucous membranes are moist.   Eyes:      Extraocular Movements: Extraocular movements intact. Pupils: Pupils are equal, round, and reactive to light. Cardiovascular:      Rate and Rhythm: Normal rate and regular rhythm. Pulses: Normal pulses. Heart sounds: Normal heart sounds. Pulmonary:      Effort: Pulmonary effort is normal.      Breath sounds: Normal breath sounds. Abdominal:      Palpations: Abdomen is soft. Musculoskeletal:         General: Tenderness and deformity present. Normal range of motion. Cervical back: Normal range of motion. Comments: Left leg in extension and external rotation. Skin:     General: Skin is warm. Capillary Refill: Capillary refill takes less than 2 seconds. Neurological:      General: No focal deficit present. Mental Status: She is alert and oriented to person, place, and time.    Psychiatric:         Mood and Affect: Mood normal.         Behavior: Behavior normal.          Medications:   Medications:    enoxaparin  30 mg SubCUTAneous Daily    calcium-cholecalciferol  1 tablet Oral Daily    sodium chloride flush  5-40 mL IntraVENous 2 times per day    ferrous sulfate  325 mg Oral Daily with breakfast    amLODIPine  10 mg Oral Daily    aspirin  81 mg Oral Daily    atorvastatin  40 mg Oral Nightly    buPROPion  150 mg Oral BID    carvedilol  25 mg Oral BID WC    dexamethasone  2 mg Oral Daily with breakfast    fenofibrate  160 mg Oral Daily    therapeutic multivitamin-minerals  1 tablet Oral Daily    pantoprazole  40 mg Oral QAM AC    QUEtiapine  25 mg Oral Nightly    venlafaxine  225 mg Oral Daily    insulin lispro  0-4 Units SubCUTAneous TID WC    insulin lispro  0-4 Units SubCUTAneous Nightly      Infusions:    sodium chloride      sodium chloride      lactated ringers 75 mL/hr at 10/08/22 2241    dextrose       PRN Meds: sodium chloride, , PRN  melatonin, 3 mg, Nightly PRN  sodium chloride flush, 5-40 mL, PRN  sodium chloride, , PRN  HYDROmorphone, 0.25 mg, Q3H PRN   Or  HYDROmorphone, 0.5 mg, Q3H PRN  oxyCODONE, 5 mg, Q4H PRN   Or  oxyCODONE, 10 mg, Q4H PRN  ondansetron, 4 mg, Q30 Min PRN  oxyCODONE, 5 mg, Q4H PRN  glucose, 4 tablet, PRN  dextrose bolus, 125 mL, PRN   Or  dextrose bolus, 250 mL, PRN  glucagon (rDNA), 1 mg, PRN  dextrose, , Continuous PRN  sodium chloride flush, 5-40 mL, PRN  ondansetron, 4 mg, Q8H PRN   Or  ondansetron, 4 mg, Q6H PRN  polyethylene glycol, 17 g, Daily PRN  acetaminophen, 650 mg, Q6H PRN   Or  acetaminophen, 650 mg, Q6H PRN      Labs      Recent Results (from the past 24 hour(s))   POCT Glucose    Collection Time: 10/08/22 11:23 AM   Result Value Ref Range    POC Glucose 153 (H) 70 - 99 MG/DL   POCT Glucose    Collection Time: 10/08/22  4:02 PM   Result Value Ref Range    POC Glucose 294 (H) 70 - 99 MG/DL   POCT Glucose    Collection Time: 10/08/22  8:08 PM   Result Value Ref Range    POC Glucose 253 (H) 70 - 99 MG/DL   POCT Glucose    Collection Time: 10/09/22  6:53 AM   Result Value Ref Range    POC Glucose 173 (H) 70 - 99 MG/DL   CBC with Auto Differential    Collection Time: 10/09/22  8:15 AM   Result Value Ref Range    WBC 16.6 (H) 4.0 - 10.5 K/CU MM    RBC 2.89 (L) 4.2 - 5.4 M/CU MM    Hemoglobin 7.9 (L) 12.5 - 16.0 GM/DL    Hematocrit 25.1 (L) 37 - 47 %    MCV 86.9 78 - 100 FL    MCH 27.3 27 - 31 PG    MCHC 31.5 (L) 32.0 - 36.0 %    RDW 17.2 (H) 11.7 - 14.9 %    Platelets 224 906 - 263 K/CU MM    MPV 10.6 7.5 - 11.1 FL    Differential Type AUTOMATED DIFFERENTIAL     Segs Relative 82.2 (H) 36 - 66 %    Lymphocytes % 7.2 (L) 24 - 44 %    Monocytes % 7.9 (H) 0 - 4 %    Eosinophils % 1.9 0 - 3 %    Basophils % 0.1 0 - 1 %    Segs Absolute 13.7 K/CU MM    Lymphocytes Absolute 1.2 K/CU MM    Monocytes Absolute 1.3 K/CU MM    Eosinophils Absolute 0.3 K/CU MM    Basophils Absolute 0.0 K/CU MM    Nucleated RBC % 0.0 %    Total Nucleated RBC 0.0 K/CU MM    Total Immature Neutrophil 0.12 K/CU MM    Immature Neutrophil % 0.7 (H) 0 - 0.43 %   Basic Metabolic Panel w/ Reflex to MG    Collection Time: 10/09/22  8:15 AM   Result Value Ref Range    Sodium 136 135 - 145 MMOL/L    Potassium 3.7 3.5 - 5.1 MMOL/L    Chloride 104 99 - 110 mMol/L    CO2 25 21 - 32 MMOL/L    Anion Gap 7 4 - 16    BUN 16 6 - 23 MG/DL    Creatinine 0.8 0.6 - 1.1 MG/DL    Glucose 159 (H) 70 - 99 MG/DL    Calcium 8.1 (L) 8.3 - 10.6 MG/DL    GFR Non-African American >60 >60 mL/min/1.73m2    GFR African American >60 >60 mL/min/1.73m2        Imaging/Diagnostics Last 24 Hours   CT HEAD WO CONTRAST    Result Date: 10/4/2022  EXAMINATION: CT OF THE HEAD WITHOUT CONTRAST  10/4/2022 12:05 pm TECHNIQUE: CT of the head was performed without the administration of intravenous contrast. Automated exposure control, iterative reconstruction, and/or weight based adjustment of the mA/kV was utilized to reduce the radiation dose to as low as reasonably achievable. COMPARISON: 09/22/2022. HISTORY: ORDERING SYSTEM PROVIDED HISTORY: fall head injury TECHNOLOGIST PROVIDED HISTORY: Has a \"code stroke\" or \"stroke alert\" been called? ->No Reason for exam:->fall head injury Decision Support Exception - unselect if not a suspected or confirmed emergency medical condition->Emergency Medical Condition (MA) Reason for Exam: trauma/ fall, head injury FINDINGS: BRAIN/VENTRICLES: There is no acute intracranial hemorrhage, mass effect or midline shift. No abnormal extra-axial fluid collection. The gray-white differentiation is maintained without evidence of an acute infarct. There is prominence of the ventricles and sulci due to global parenchymal volume loss. There are nonspecific areas of hypoattenuation within the periventricular and subcortical white matter, which likely represent chronic microvascular ischemic change. ORBITS: The visualized portion of the orbits demonstrate no acute abnormality.  SINUSES: The visualized paranasal sinuses and mastoid air cells demonstrate no acute abnormality. SOFT TISSUES/SKULL: There is mild high left frontal scalp soft tissue swelling. 1. Mild high left frontal scalp soft tissue swelling but no acute intracranial abnormality. XR CHEST PORTABLE    Result Date: 10/4/2022  EXAMINATION: ONE XRAY VIEW OF THE CHEST 10/4/2022 3:56 pm COMPARISON: 09/22/2022 and 09/23/2022. HISTORY: ORDERING SYSTEM PROVIDED HISTORY: falls, left hip fx, edema vs infiltrate TECHNOLOGIST PROVIDED HISTORY: Reason for exam:->falls, left hip fx, edema vs infiltrate Reason for Exam: falls, left hip fx, edema vs infiltrate Additional signs and symptoms: na Relevant Medical/Surgical History: diabetes, hypertension FINDINGS: Lung volumes are large the diaphragm is flattened. There is no confluent consolidation or effusion. The cardiomediastinal silhouette and pulmonary vessels appear normal.     No acute findings. Large lung volumes and flattening of the diaphragms suggesting COPD. CT HIP LEFT WO CONTRAST    Result Date: 10/5/2022  EXAMINATION: CT OF THE LEFT HIP WITHOUT CONTRAST, 10/4/2022 2:15 pm TECHNIQUE: CT of the left hip was performed without the administration of intravenous contrast.  Multiplanar reformatted images are provided for review. Automated exposure control, iterative reconstruction, and/or weight based adjustment of the mA/kV was utilized to reduce the radiation dose to as low as reasonably achievable. COMPARISON: Radiographs 10/04/2022 HISTORY ORDERING SYSTEM PROVIDED HISTORY:  With 3D reconstruction TECHNOLOGIST PROVIDED HISTORY: Reason for Exam:  With 3D reconstruction Decision Support Exception - unselect if not a suspected or confirmed emergency medical condition->Emergency Medical Condition (MA) Reason for Exam:  Closed left hip fracture, initial encounter ([facility] [address] FINDINGS: Acute impacted nondisplaced intertrochanteric left proximal femur fracture. No evidence of dislocation.   Mild left hip joint degenerative changes. Distended urinary bladder. Acute impacted nondisplaced intertrochanteric left proximal femur fracture. Distended urinary bladder. XR HIP 2-3 VW W PELVIS LEFT    Result Date: 10/5/2022  EXAMINATION: ONE XRAY VIEW OF THE PELVIS AND TWO XRAY VIEWS LEFT HIP 10/4/2022 11:05 am COMPARISON: 12/23/2018 HISTORY: ORDERING SYSTEM PROVIDED HISTORY: fall three days ago TECHNOLOGIST PROVIDED HISTORY: Reason for exam:->fall three days ago Reason for Exam: fall three days ago Initial encounter FINDINGS: Osteopenia. There is an acute mildly displaced fracture of the left proximal femur likely involving the left femoral neck and possibly the intertrochanteric region. Mild-to-moderate degenerative changes of the hip joints. The pelvic ring is otherwise intact. No other acute fracture or dislocation. Acute mildly displaced fracture of the left proximal femur involving the left femoral neck versus intertrochanteric region.        Electronically signed by Carolina Orr MD on 10/9/2022 at 9:53 AM

## 2022-10-10 LAB
GLUCOSE BLD-MCNC: 134 MG/DL (ref 70–99)
GLUCOSE BLD-MCNC: 161 MG/DL (ref 70–99)
GLUCOSE BLD-MCNC: 229 MG/DL (ref 70–99)
GLUCOSE BLD-MCNC: 240 MG/DL (ref 70–99)

## 2022-10-10 PROCEDURE — 6360000002 HC RX W HCPCS: Performed by: ORTHOPAEDIC SURGERY

## 2022-10-10 PROCEDURE — 2580000003 HC RX 258: Performed by: ORTHOPAEDIC SURGERY

## 2022-10-10 PROCEDURE — 97110 THERAPEUTIC EXERCISES: CPT

## 2022-10-10 PROCEDURE — 1200000000 HC SEMI PRIVATE

## 2022-10-10 PROCEDURE — 82962 GLUCOSE BLOOD TEST: CPT

## 2022-10-10 PROCEDURE — 6370000000 HC RX 637 (ALT 250 FOR IP): Performed by: ORTHOPAEDIC SURGERY

## 2022-10-10 PROCEDURE — 97530 THERAPEUTIC ACTIVITIES: CPT

## 2022-10-10 PROCEDURE — 6360000002 HC RX W HCPCS: Performed by: STUDENT IN AN ORGANIZED HEALTH CARE EDUCATION/TRAINING PROGRAM

## 2022-10-10 PROCEDURE — 94761 N-INVAS EAR/PLS OXIMETRY MLT: CPT

## 2022-10-10 RX ADMIN — QUETIAPINE FUMARATE 25 MG: 25 TABLET ORAL at 20:35

## 2022-10-10 RX ADMIN — INSULIN LISPRO 1 UNITS: 100 INJECTION, SOLUTION INTRAVENOUS; SUBCUTANEOUS at 18:21

## 2022-10-10 RX ADMIN — FERROUS SULFATE TAB 325 MG (65 MG ELEMENTAL FE) 325 MG: 325 (65 FE) TAB at 10:28

## 2022-10-10 RX ADMIN — BUPROPION HYDROCHLORIDE 150 MG: 150 TABLET, EXTENDED RELEASE ORAL at 20:35

## 2022-10-10 RX ADMIN — CARVEDILOL 25 MG: 25 TABLET, FILM COATED ORAL at 18:21

## 2022-10-10 RX ADMIN — CARVEDILOL 25 MG: 25 TABLET, FILM COATED ORAL at 10:27

## 2022-10-10 RX ADMIN — PANTOPRAZOLE SODIUM 40 MG: 40 TABLET, DELAYED RELEASE ORAL at 06:07

## 2022-10-10 RX ADMIN — ATORVASTATIN CALCIUM 40 MG: 40 TABLET, FILM COATED ORAL at 20:35

## 2022-10-10 RX ADMIN — SODIUM CHLORIDE, POTASSIUM CHLORIDE, SODIUM LACTATE AND CALCIUM CHLORIDE: 600; 310; 30; 20 INJECTION, SOLUTION INTRAVENOUS at 03:26

## 2022-10-10 RX ADMIN — AMLODIPINE BESYLATE 10 MG: 10 TABLET ORAL at 10:27

## 2022-10-10 RX ADMIN — Medication 1 TABLET: at 10:28

## 2022-10-10 RX ADMIN — ENOXAPARIN SODIUM 30 MG: 100 INJECTION SUBCUTANEOUS at 10:28

## 2022-10-10 RX ADMIN — FENOFIBRATE 160 MG: 160 TABLET ORAL at 10:28

## 2022-10-10 RX ADMIN — ASPIRIN 81 MG: 81 TABLET, COATED ORAL at 10:28

## 2022-10-10 RX ADMIN — OXYCODONE HYDROCHLORIDE 10 MG: 10 TABLET ORAL at 10:28

## 2022-10-10 RX ADMIN — VENLAFAXINE HYDROCHLORIDE 225 MG: 150 CAPSULE, EXTENDED RELEASE ORAL at 10:28

## 2022-10-10 RX ADMIN — BUPROPION HYDROCHLORIDE 150 MG: 150 TABLET, EXTENDED RELEASE ORAL at 10:31

## 2022-10-10 RX ADMIN — OXYCODONE HYDROCHLORIDE 5 MG: 5 TABLET ORAL at 06:14

## 2022-10-10 RX ADMIN — MULTIPLE VITAMINS W/ MINERALS TAB 1 TABLET: TAB at 10:28

## 2022-10-10 RX ADMIN — DEXAMETHASONE 2 MG: 4 TABLET ORAL at 10:28

## 2022-10-10 ASSESSMENT — PAIN DESCRIPTION - LOCATION
LOCATION: HIP;KNEE
LOCATION: ABDOMEN

## 2022-10-10 ASSESSMENT — PAIN DESCRIPTION - ORIENTATION
ORIENTATION: RIGHT
ORIENTATION: RIGHT

## 2022-10-10 ASSESSMENT — PAIN SCALES - GENERAL
PAINLEVEL_OUTOF10: 8
PAINLEVEL_OUTOF10: 6

## 2022-10-10 ASSESSMENT — ENCOUNTER SYMPTOMS
SHORTNESS OF BREATH: 0
BACK PAIN: 0
ABDOMINAL DISTENTION: 0
DIARRHEA: 0
COUGH: 0
EYE DISCHARGE: 0
WHEEZING: 0
CONSTIPATION: 0
SORE THROAT: 0

## 2022-10-10 ASSESSMENT — PAIN DESCRIPTION - DESCRIPTORS: DESCRIPTORS: ACHING;DISCOMFORT

## 2022-10-10 ASSESSMENT — PAIN - FUNCTIONAL ASSESSMENT: PAIN_FUNCTIONAL_ASSESSMENT: PREVENTS OR INTERFERES SOME ACTIVE ACTIVITIES AND ADLS

## 2022-10-10 ASSESSMENT — PAIN DESCRIPTION - FREQUENCY: FREQUENCY: CONTINUOUS

## 2022-10-10 ASSESSMENT — PAIN DESCRIPTION - PAIN TYPE: TYPE: SURGICAL PAIN

## 2022-10-10 NOTE — PLAN OF CARE
Problem: Discharge Planning  Goal: Discharge to home or other facility with appropriate resources  10/10/2022 1302 by Dylan Dudley RN  Outcome: Progressing  10/10/2022 1238 by Deepak Khan RN  Outcome: Progressing     Problem: Skin/Tissue Integrity  Goal: Absence of new skin breakdown  Description: 1. Monitor for areas of redness and/or skin breakdown  2. Assess vascular access sites hourly  3. Every 4-6 hours minimum:  Change oxygen saturation probe site  4. Every 4-6 hours:  If on nasal continuous positive airway pressure, respiratory therapy assess nares and determine need for appliance change or resting period.   10/10/2022 1302 by Dylan Dudley RN  Outcome: Progressing  10/10/2022 1238 by Deepak Khan RN  Outcome: Progressing     Problem: Safety - Adult  Goal: Free from fall injury  10/10/2022 1302 by Dylan Dudley RN  Outcome: Progressing  10/10/2022 1238 by Deepak Khan RN  Outcome: Progressing     Problem: ABCDS Injury Assessment  Goal: Absence of physical injury  10/10/2022 1302 by Dylan Dudley RN  Outcome: Progressing  10/10/2022 1238 by Deepak Khan RN  Outcome: Progressing     Problem: Pain  Goal: Verbalizes/displays adequate comfort level or baseline comfort level  10/10/2022 1302 by Dylan Dudley RN  Outcome: Progressing  10/10/2022 1238 by Deepak Khan RN  Outcome: Progressing     Problem: Chronic Conditions and Co-morbidities  Goal: Patient's chronic conditions and co-morbidity symptoms are monitored and maintained or improved  10/10/2022 1302 by Dylan Dudley RN  Outcome: Progressing  10/10/2022 1238 by Deepak Khan RN  Outcome: Progressing     Problem: Nutrition Deficit:  Goal: Optimize nutritional status  10/10/2022 1302 by Dylan Dudley RN  Outcome: Progressing  10/10/2022 1238 by Deepak Khan RN  Outcome: Progressing

## 2022-10-10 NOTE — PROGRESS NOTES
Physical Therapy    Physical Therapy Treatment Note  Name: Juaquin Urrutia MRN: [id number] :   1940   Date:  10/10/2022   Admission Date: 10/4/2022 Room:  [address] [place] [address]   Restrictions/Precautions:         s/p ORIF WBAT LLE, gen prec, fall risk    Communication with other providers:  per nurse ok to tx  Subjective:  Patient states:  motivated and agreeable to tx. Dghter present and reports pt is no longer in droplet precautions. Pain:   Location, Type, Intensity (0/10 to 10/10):  pt reports no pain at rest but 7 with standing and ex LLE  Objective:    Observation:  alert and oriented to self  Treatment, including education/measures:  Sup to sit pt needing increased time and effort and attempted and unable to tolerate but then given leg  and pt was able to slowly with mod assist scoot left LE out of bed and max assist at trunk to get in sitting EOB. Attempt to transfer sit to stand at walker with max assist but unable to tolerate. SPT bed to chair with max assist  Attempted sit to stand from chair to rw with max assist but unable to tolerate. Turned chair to face bed rail and pt was able to transfer sit<=>stand with max assist of 2 x 1 reps and max assist of one for 1 reps. Nurse tech in room and performed arron care while pt standing using bed rail for support with mod assist and cues. Pt given written copy of LE ex;  Trunk stretches with deep breathing  10 reps aps  10 reps quad sets  10 reps glut sets  10 reps heel slides on RLE, 5 reps x 2 LLE  10 reps abd/add RLE , 3 reps LLE with leg  and mod assist. Pt unable to tolerate. 10 reps laqs  Safety  Patient left safely in the chair, with call light/phone in reach with alarm applied. Gait belt was used for transfers and gait.    Assessment / Impression:      Patient's tolerance of treatment:  fair   Adverse Reaction: na  Significant change in status and impact:  na  Barriers to improvement:  pain, strength, and safety  Plan for Next Session: Cont. POC  Time in:  1100  Time out:  1200  Timed treatment minutes:  60  Total treatment time:  60    Previously filed items:           Short Term Goals  Time Frame for Short Term Goals: 2 weeks  Short Term Goal 1: Pt will perform sit><supine modA  Short Term Goal 2: Pt will transfer between surfaces modA  Short Term Goal 3: Pt will ambulate 30ft with RW modA  Short Term Goal 4: Pt will perform sitting light dynamic activity x 5 minutes without UE support Camelia    Electronically signed by:    Lupillo Dunn PTA  10/10/2022, 8:45 AM

## 2022-10-10 NOTE — CARE COORDINATION
This RN CM left a message with , Meli Kline , at [facility] for [facility] with admissions for update on pending precert.

## 2022-10-10 NOTE — ADT AUTH CERT
Utilization Reviews       Hip Fracture, Open Repair - Care Day 4 (10/7/2022) by Yesica Holt RN       Review Status Review Entered   Completed 10/7/2022 1447       Created By   Yesica Holt RN      Criteria Review      Care Day: 4 Care Date: 10/7/2022 Level of Care: Inpatient Floor    Guideline Day 2    Level Of Care    (X) Floor    Clinical Status    (X) * Procedure completed    ( ) * Tolerates mobilization    10/7/2022 2:47 PM EDT by Mirela Arango 6 Clicks Inpatient Mobility:  AM-PAC Inpatient Mobility Raw Score : 8   Max 2 assist with PT    (X) * No evidence of new neurovascular compromise of lower extremity    Activity    (X) * Up to chair    10/7/2022 2:47 PM EDT by Sandi Wahl      placed to chair with PT    (X) * Limited ambulation    Routes    (X) Diet as tolerated    10/7/2022 2:47 PM EDT by Sandi Wahl      regular    Interventions    (X) Physical therapy    * Milestone   Additional Notes   DATE: 10/7/22            PERTINENT UPDATES:   1 unit prbc post surgery anemia   Pain control            VITALS:   T - 97.5   Resp 17   Pulse 72   /55      95% on RA            ABNL/PERTINENT LABS/RADIOLOGY/DIAGNOSTIC STUDIES:   10/7/22 08:33      10/7/22 08:33   Sodium: 141   Potassium: 4.1      CO2: 19 (L)   BUN,BUNPL: 28 (H)   Glucose, Random: 114 (H)   CALCIUM, SERUM, [id number]: 8.1 (L)   Total Protein: 4.8 (L)   Albumin: 3.2 (L)   AST: 14 (L)      WBC: 13.8 (H)   RBC: 2.49 (L)   Hemoglobin Quant: 6.4 (LL)   Hematocrit: 21.4 (L)   MCH: 25.7 (L)   MCHC: 29.9 (L)   RDW: 17.8 (H)   Lymphocyte %: 7.3 (L)   Monocytes %: 10.3 (H)      Segs Relative: 80.4 (H)   Immature Neutrophil %: 0.7 (H)         10/7/22 10:13   Hemoglobin Quant: 7.0 (L)   Hematocrit: 23.9 (L)            PHYSICAL EXAM:   Musculoskeletal:          General: Tenderness and deformity present. Normal range of motion. Cervical back: Normal range of motion. Comments: Left leg in extension and external rotation. MD CONSULTS/ASSESSMENT AND PLAN:   IM   Updates:   -restart diet       Acute Blood loss anemia post surgery   - pre oop Hb 8.9, post op today 6.4   - will transfuse 1 PRBC   - recheck H/H post transfusion   Cont all other previous ordered care      Ortho:   1. Post operative day # 1 Left hip ORIF (10/7/2022)       1:  Mobilize with Physical therapy               -WBAT   2. Acute blood loss anemia, not a complication               -Hb 8.9   3:  Continue Deep venous thrombosis prophylaxis               -Chemoprophylaxis ASA               -Mechanical-KATIE hose, -SCD's, ambulation   4:  Continue Pain Control               -wean to PO meds   5:  D/C Planning:                 -[facility]               -OK to CT when medically stable         MEDICATIONS:   lactated ringers infusion   Rate: 75 mL/hr Freq: CONTINUOUS Route: IV         HYDROmorphone (DILAUDID) injection 0.5 mg   Dose: 0.5 mg   Freq: EVERY 3 HOURS PRN Route: IV x 1         Home meds resumed                 Hip Fracture, Open Repair - Care Day 3 (10/6/2022) by Manuel Hernandez RN       Review Status Review Entered   Completed 10/7/2022 1439       Created By   Manuel Hernandez RN      Criteria Review      Care Day: 3 Care Date: 10/6/2022 Level of Care:    Guideline Day 1    Level Of Care    (X) OR to floor    Clinical Status    (X) * Clinical Indications met    Routes    (X) IV fluids    10/7/2022 2:39 PM EDT by Fadumo Weinberg      lactated ringers infusion  Rate: 75 mL/hr Freq: CONTINUOUS Route: IV    * Milestone   Additional Notes   DATE: 10/6/22         PERTINENT UPDATES:   To OR today   PT remains confused.   Attempting to get out of bed and refusing meds and tele at times         VITALS:   T - 97.5   R - 23   P - 74   BP - 110/41         96% on RA         ABNL/PERTINENT LABS/RADIOLOGY/DIAGNOSTIC STUDIES:   10/6/22 12:02   Sodium: 136   Potassium: 3.9      BUN,BUNPL: 25 (H)      Glucose, Random: 136 (H)   WBC: 16.9 (H)   RBC: 3.46 (L)   Hemoglobin Quant: 8.9 (L)   Hematocrit: 29.4 (L)   MCH: 25.7 (L)   MCHC: 30.3 (L)      RDW: 17.4 (H)   Lymphocyte %: 8.8 (L)   Monocytes %: 9.5 (H)   Segs Relative: 80.4 (H)            PHYSICAL EXAM:      General: Tenderness and deformity present. Normal range of motion. Cervical back: Normal range of motion. Comments: Left leg in extension and external rotation. MD CONSULTS/ASSESSMENT AND PLAN:   Ortho:   OPERATIVE NOTE      Preoperative Diagnosis:         Left Intertrochanteric Hip Fracture       Postoperative Diagnosis:        Same       Procedure:                              Left  Trochanteric Nail          Anesthesia:                             General endotrachial anesthesia       EBL:                                        100ml         Implants:                                 Synthes Trochanteric Nail                                                    130 degree 10mm x 380mm nail with 34PF helical blade       Surgical Indications   The patient presented to the emergency room and was diagnosed with an Intertrochanteric hip fracture. The patient was admitted to the hospital and received medical clearance. We discussed treatment options and I recommended operative fixation cwith trochanteric intramedullary nailing. Risks, benefits, expected outcomes and potential complications were discussed as outlined in the H&P. At no time were any guarantees implied or stated. Patient Positioning and Surgical Prep      The patient was seen in the holding area and the left extremity marked with an indelible pen. The patient was taken to the operative suite, identified and while on the hospital bed, anesthesia was administered. The patient was transferred to the fracture table. The affected leg was placed in boot traction after the foot was well padded. The unaffected leg was gently abducted and externally rotated.   The fracture was well visualized using biplanar fluoroscopy and the fracture reduced or manipulated as required. The lower extremity was prepped from the flank to knee with Duraprep and then draped in the normal sterile fashion. A time-out was then performed confirming the patient's name, operative side, proposed procedure and administration of antibiotics. Exposure      Closed reduction was performed using the fracture table with longitudinal traction. An incision was made proximal to the greater trochanter. The fascia and muscle were split in line with their fibers. Using biplanar, c-arm fluoroscopy. A starting hole was identified at the tip of the greater trochanter. A guide wire was inserted and a 17mm cannulated reamer introduced to open the proximal femur. The long reaming guide wire was inserted into the medullary canal and the proximal femur reamed as necessary. We then measured the proposed length of the femur for the ultimate nail insertion. When then reamed up to 11.5mm to allow an adequate fit. Insertion of Long Trochanteric Femoral Nail   The long trochanteric femoral nail was attached to the insertion handle and inserted over the reaming guide wire. Using biplanar fluoroscopy the knee was visualized confirming length of the nail at the knee. The guide wire was removed. An incision was made along the lateral aspect of the thigh and through the fascia. The blade guide sleeve was inserted and snapped into the aiming guide. The sleeve assembly was advanced to the lateral femur. A 3.2 mm guide wire was drilled into the femoral head and position as close to the center of the femoral head was obtained confirmed with biplanar fluoroscopy. The length for the helical blade was measured to be 85mm. The 11.0 cannulated drill was used to ream out the lateral cortex and the reamer was then placed into the femoral head. The helical blade was assembled to the  and impacted into place.        Using the flexible screwdriver, the preassembled locking mechanism was engaged by advancing the screw. The  and aiming guide were disengaged. Final fluoroscopic views were obtained. We then placed a distal locking screw in the standard manner and was placement and legthn was confirmed with fluoroscopy. Closure and Disposition   The wounds were copiously irrigated and closed in layers. Sterile dressings were applied. All sponge and needle counts were correct. The patient was awakened and taken to the postoperative area in stable condition. I spoke with the patient's family in the consultation room postoperatively. ADDENDUM:   Biplanar fluoroscopy was used throughout the case to confirm satisfactory fracture reduction and placement of all hardware. IM:   Plan:   Left intertrochanteric proximal femur fracture-  post fall   left proximal femur involving the femoral neck versus intertrochanteric region. CT of left hip demonstrate acute impacted nondisplaced intertrochanteric left proximal femur fracture. -CT left hip with reconstructions pending   -Orthopedics consulted-Dr. Barnes Mon   -Analgesics antiemetics   -Harvey catheter   -Bedrest until further evaluation   -Neurochecks left lower extremity   -N.p.o. at midnight for ORIF today            Hyponatremia resolving   Hyperkalemia- resolved   -MIVF   -follow daily       Leukocytosis - improving   16.2 on admission   no episodes of infection.      UA bland   -Chest x-ray negative for acute findings       CKD stage III:    Baseline creatinine around 1.2-creatinine mildly elevated at 1.4.   - now back at baseline   -Daily labs   -Avoid toxic agents   -MIVF       Diabetes Mellitus type II    -Blood glucose on admission 180.    - SSI   - held home PO meds   - POCT glucose qACHS   - hypoglycemia management protocol    - target blood glucose 100-180   - ADA carb controlled diet   -N.p.o. after midnight         MEDICATIONS:   0.9 % sodium chloride infusion Dose: 25 mL   Freq: PRN Route: IV         fentaNYL (SUBLIMAZE) injection 50 mcg   Dose: 50 mcg   Freq: EVERY 5 MIN PRN Route: IV x 1         HYDROmorphone (DILAUDID) injection 0.5 mg   Dose: 0.5 mg   Freq: EVERY 3 HOURS PRN Route: IV x 1         Home meds resumed

## 2022-10-10 NOTE — PROGRESS NOTES
V2.0  [facility] [place]      Name:  Pinky Andersno /Age/Sex: 1940  (81 y.o. female)   MRN & CSN:  [id number] & [id number] Encounter Date/Time: 10/10/2022 7:36 AM EDT    Location:  9476/4161-Q PCP: Isma Mcgrath MD       Hospital Day: 7    Assessment and Plan:   Pinky Anderson is a 81 y.o. female with pmh of carcinoid syndrome, diabetes mellitus, hypertension, CAD, carotid artery stenosis, history of MI hyperlipidemia who presents with Closed left hip fracture, initial encounter ([facility] [address]      Plan:  Left intertrochanteric proximal femur fracture-  post fall  S/p Left  Trochanteric Nailing 10/6  CT of left hip demonstrate acute impacted nondisplaced intertrochanteric left proximal femur fracture.  -pain control  -Analgesics antiemetics  -Harvey catheter removed, will use external urine collection system. -PT/OT, OOB as tolerated, WBAT  -tolerating diet    Acute Blood loss anemia post surgery  - pre op Hb 8.9, post op 6.4  - s/p transfuse 1 PRBC 10/7  - recheck H/H post transfusion responded appropriately to transfusion Hb 8.6  - Hb stable since transfusion    Hyponatremia resolved  Hyperkalemia- resolved  -MIVF  -follow daily     Leukocytosis -   Does not qualify for SIRS ()  16.2 on admission, WBC up and down  no episodes fever  UA bland  Chest x-ray negative for acute findings  No concern for infection.        CKD stage III:   Baseline creatinine around 1.2-creatinine mildly elevated at 1.4.  - now back at baseline  -Daily labs  -Avoid toxic agents     Diabetes Mellitus type II   -Blood glucose on admission 180.   - SSI  - held home PO meds  - POCT glucose qACHS  - hypoglycemia management protocol   - target blood glucose 100-180  - carb controlled diet    Carcinoid syndrome  Malignancy associated pain: Controlled with oxycodone, active prescription verified on [facility]  follows oncology Dr. Jimenez Led     Hyperlipidemia: Continue statin therapy     Hypertension  CAD  Managed on aspirin, amlodipine and carvedilol     Chronic Conditions: Continue all home medications except as stated above or contraindicated. Severe Malnutrition   Underweight BMI 17.56: kg/m2 likely secondary to protein calorie malnutrition  -Nutrition consult    Diet ADULT DIET; Regular; 5 carb choices (75 gm/meal)  ADULT ORAL NUTRITION SUPPLEMENT; Breakfast, Lunch, Dinner; Frozen Oral Supplement   DVT Prophylaxis [x] Lovenox, []  Heparin, [] SCDs, [] Ambulation,  [] Eliquis, [] Xarelto  [] Coumadin   Code Status Full Code   Disposition Patient requires continued admission due to placement         Subjective:     Chief Complaint: Hip Pain       States she feels well. Pain on movement but controlled. Tolerating diet. Denies any active complaints    Review of Systems:    Review of Systems   Constitutional:  Positive for activity change. Negative for appetite change, diaphoresis and fatigue. HENT:  Negative for congestion and sore throat. Eyes:  Negative for discharge and visual disturbance. Respiratory:  Negative for cough, shortness of breath and wheezing. Cardiovascular:  Negative for chest pain, palpitations and leg swelling. Gastrointestinal:  Negative for abdominal distention, constipation and diarrhea. Genitourinary:  Negative for difficulty urinating and hematuria. Musculoskeletal:  Positive for arthralgias. Negative for back pain and gait problem. Neurological:  Negative for dizziness, syncope and speech difficulty. Hematological:  Negative for adenopathy. Psychiatric/Behavioral:  Negative for agitation and confusion. All other systems reviewed and are negative. Objective: Intake/Output Summary (Last 24 hours) at 10/10/2022 0814  Last data filed at 10/10/2022 0615  Gross per 24 hour   Intake --   Output 600 ml   Net -600 ml          Vitals:   Vitals:    10/10/22 0747   BP:    Pulse: 80   Resp: 17   Temp:    SpO2:        Physical Exam:   Physical Exam  Vitals and nursing note reviewed. Constitutional:       General: She is not in acute distress. Appearance: Normal appearance. She is not ill-appearing. HENT:      Head: Normocephalic and atraumatic. Nose: Nose normal.      Mouth/Throat:      Mouth: Mucous membranes are moist.   Eyes:      Extraocular Movements: Extraocular movements intact. Pupils: Pupils are equal, round, and reactive to light. Cardiovascular:      Rate and Rhythm: Normal rate and regular rhythm. Pulses: Normal pulses. Heart sounds: Normal heart sounds. Pulmonary:      Effort: Pulmonary effort is normal.      Breath sounds: Normal breath sounds. Abdominal:      Palpations: Abdomen is soft. Musculoskeletal:         General: Tenderness and deformity present. Normal range of motion. Cervical back: Normal range of motion. Comments: Left leg in extension and external rotation. Skin:     General: Skin is warm. Capillary Refill: Capillary refill takes less than 2 seconds. Neurological:      General: No focal deficit present. Mental Status: She is alert and oriented to person, place, and time.    Psychiatric:         Mood and Affect: Mood normal.         Behavior: Behavior normal.          Medications:   Medications:    enoxaparin  30 mg SubCUTAneous Daily    calcium-cholecalciferol  1 tablet Oral Daily    sodium chloride flush  5-40 mL IntraVENous 2 times per day    ferrous sulfate  325 mg Oral Daily with breakfast    amLODIPine  10 mg Oral Daily    aspirin  81 mg Oral Daily    atorvastatin  40 mg Oral Nightly    buPROPion  150 mg Oral BID    carvedilol  25 mg Oral BID WC    dexamethasone  2 mg Oral Daily with breakfast    fenofibrate  160 mg Oral Daily    therapeutic multivitamin-minerals  1 tablet Oral Daily    pantoprazole  40 mg Oral QAM AC    QUEtiapine  25 mg Oral Nightly    venlafaxine  225 mg Oral Daily    insulin lispro  0-4 Units SubCUTAneous TID WC    insulin lispro  0-4 Units SubCUTAneous Nightly      Infusions: sodium chloride      sodium chloride      lactated ringers 75 mL/hr at 10/10/22 0326    dextrose       PRN Meds: sodium chloride, , PRN  melatonin, 3 mg, Nightly PRN  sodium chloride flush, 5-40 mL, PRN  sodium chloride, , PRN  HYDROmorphone, 0.25 mg, Q3H PRN   Or  HYDROmorphone, 0.5 mg, Q3H PRN  oxyCODONE, 5 mg, Q4H PRN   Or  oxyCODONE, 10 mg, Q4H PRN  ondansetron, 4 mg, Q30 Min PRN  oxyCODONE, 5 mg, Q4H PRN  glucose, 4 tablet, PRN  dextrose bolus, 125 mL, PRN   Or  dextrose bolus, 250 mL, PRN  glucagon (rDNA), 1 mg, PRN  dextrose, , Continuous PRN  sodium chloride flush, 5-40 mL, PRN  ondansetron, 4 mg, Q8H PRN   Or  ondansetron, 4 mg, Q6H PRN  polyethylene glycol, 17 g, Daily PRN  acetaminophen, 650 mg, Q6H PRN   Or  acetaminophen, 650 mg, Q6H PRN      Labs      Recent Results (from the past 24 hour(s))   CBC with Auto Differential    Collection Time: 10/09/22  8:15 AM   Result Value Ref Range    WBC 16.6 (H) 4.0 - 10.5 K/CU MM    RBC 2.89 (L) 4.2 - 5.4 M/CU MM    Hemoglobin 7.9 (L) 12.5 - 16.0 GM/DL    Hematocrit 25.1 (L) 37 - 47 %    MCV 86.9 78 - 100 FL    MCH 27.3 27 - 31 PG    MCHC 31.5 (L) 32.0 - 36.0 %    RDW 17.2 (H) 11.7 - 14.9 %    Platelets 239 506 - 149 K/CU MM    MPV 10.6 7.5 - 11.1 FL    Differential Type AUTOMATED DIFFERENTIAL     Segs Relative 82.2 (H) 36 - 66 %    Lymphocytes % 7.2 (L) 24 - 44 %    Monocytes % 7.9 (H) 0 - 4 %    Eosinophils % 1.9 0 - 3 %    Basophils % 0.1 0 - 1 %    Segs Absolute 13.7 K/CU MM    Lymphocytes Absolute 1.2 K/CU MM    Monocytes Absolute 1.3 K/CU MM    Eosinophils Absolute 0.3 K/CU MM    Basophils Absolute 0.0 K/CU MM    Nucleated RBC % 0.0 %    Total Nucleated RBC 0.0 K/CU MM    Total Immature Neutrophil 0.12 K/CU MM    Immature Neutrophil % 0.7 (H) 0 - 0.43 %   Basic Metabolic Panel w/ Reflex to MG    Collection Time: 10/09/22  8:15 AM   Result Value Ref Range    Sodium 136 135 - 145 MMOL/L    Potassium 3.7 3.5 - 5.1 MMOL/L    Chloride 104 99 - 110 mMol/L    CO2 25 21 - 32 MMOL/L    Anion Gap 7 4 - 16    BUN 16 6 - 23 MG/DL    Creatinine 0.8 0.6 - 1.1 MG/DL    Glucose 159 (H) 70 - 99 MG/DL    Calcium 8.1 (L) 8.3 - 10.6 MG/DL    GFR Non-African American >60 >60 mL/min/1.73m2    GFR African American >60 >60 mL/min/1.73m2   POCT Glucose    Collection Time: 10/09/22 11:25 AM   Result Value Ref Range    POC Glucose 176 (H) 70 - 99 MG/DL   POCT Glucose    Collection Time: 10/09/22  4:35 PM   Result Value Ref Range    POC Glucose 211 (H) 70 - 99 MG/DL   POCT Glucose    Collection Time: 10/09/22 10:12 PM   Result Value Ref Range    POC Glucose 166 (H) 70 - 99 MG/DL   POCT Glucose    Collection Time: 10/10/22  6:53 AM   Result Value Ref Range    POC Glucose 134 (H) 70 - 99 MG/DL        Imaging/Diagnostics Last 24 Hours   CT HEAD WO CONTRAST    Result Date: 10/4/2022  EXAMINATION: CT OF THE HEAD WITHOUT CONTRAST  10/4/2022 12:05 pm TECHNIQUE: CT of the head was performed without the administration of intravenous contrast. Automated exposure control, iterative reconstruction, and/or weight based adjustment of the mA/kV was utilized to reduce the radiation dose to as low as reasonably achievable. COMPARISON: 09/22/2022. HISTORY: ORDERING SYSTEM PROVIDED HISTORY: fall head injury TECHNOLOGIST PROVIDED HISTORY: Has a \"code stroke\" or \"stroke alert\" been called? ->No Reason for exam:->fall head injury Decision Support Exception - unselect if not a suspected or confirmed emergency medical condition->Emergency Medical Condition (MA) Reason for Exam: trauma/ fall, head injury FINDINGS: BRAIN/VENTRICLES: There is no acute intracranial hemorrhage, mass effect or midline shift. No abnormal extra-axial fluid collection. The gray-white differentiation is maintained without evidence of an acute infarct. There is prominence of the ventricles and sulci due to global parenchymal volume loss.  There are nonspecific areas of hypoattenuation within the periventricular and subcortical white matter, which likely represent chronic microvascular ischemic change. ORBITS: The visualized portion of the orbits demonstrate no acute abnormality. SINUSES: The visualized paranasal sinuses and mastoid air cells demonstrate no acute abnormality. SOFT TISSUES/SKULL: There is mild high left frontal scalp soft tissue swelling. 1. Mild high left frontal scalp soft tissue swelling but no acute intracranial abnormality. XR CHEST PORTABLE    Result Date: 10/4/2022  EXAMINATION: ONE XRAY VIEW OF THE CHEST 10/4/2022 3:56 pm COMPARISON: 09/22/2022 and 09/23/2022. HISTORY: ORDERING SYSTEM PROVIDED HISTORY: falls, left hip fx, edema vs infiltrate TECHNOLOGIST PROVIDED HISTORY: Reason for exam:->falls, left hip fx, edema vs infiltrate Reason for Exam: falls, left hip fx, edema vs infiltrate Additional signs and symptoms: na Relevant Medical/Surgical History: diabetes, hypertension FINDINGS: Lung volumes are large the diaphragm is flattened. There is no confluent consolidation or effusion. The cardiomediastinal silhouette and pulmonary vessels appear normal.     No acute findings. Large lung volumes and flattening of the diaphragms suggesting COPD. CT HIP LEFT WO CONTRAST    Result Date: 10/5/2022  EXAMINATION: CT OF THE LEFT HIP WITHOUT CONTRAST, 10/4/2022 2:15 pm TECHNIQUE: CT of the left hip was performed without the administration of intravenous contrast.  Multiplanar reformatted images are provided for review. Automated exposure control, iterative reconstruction, and/or weight based adjustment of the mA/kV was utilized to reduce the radiation dose to as low as reasonably achievable.  COMPARISON: Radiographs 10/04/2022 HISTORY ORDERING SYSTEM PROVIDED HISTORY:  With 3D reconstruction TECHNOLOGIST PROVIDED HISTORY: Reason for Exam:  With 3D reconstruction Decision Support Exception - unselect if not a suspected or confirmed emergency medical condition->Emergency Medical Condition (MA) Reason for Exam:  Closed left hip fracture, initial encounter ([facility] [address] FINDINGS: Acute impacted nondisplaced intertrochanteric left proximal femur fracture. No evidence of dislocation. Mild left hip joint degenerative changes. Distended urinary bladder. Acute impacted nondisplaced intertrochanteric left proximal femur fracture. Distended urinary bladder. XR HIP 2-3 VW W PELVIS LEFT    Result Date: 10/5/2022  EXAMINATION: ONE XRAY VIEW OF THE PELVIS AND TWO XRAY VIEWS LEFT HIP 10/4/2022 11:05 am COMPARISON: 12/23/2018 HISTORY: ORDERING SYSTEM PROVIDED HISTORY: fall three days ago TECHNOLOGIST PROVIDED HISTORY: Reason for exam:->fall three days ago Reason for Exam: fall three days ago Initial encounter FINDINGS: Osteopenia. There is an acute mildly displaced fracture of the left proximal femur likely involving the left femoral neck and possibly the intertrochanteric region. Mild-to-moderate degenerative changes of the hip joints. The pelvic ring is otherwise intact. No other acute fracture or dislocation. Acute mildly displaced fracture of the left proximal femur involving the left femoral neck versus intertrochanteric region.        Electronically signed by Deb Spencer MD on 10/10/2022 at 8:14 AM

## 2022-10-11 LAB
ANION GAP SERPL CALCULATED.3IONS-SCNC: 9 MMOL/L (ref 4–16)
BASOPHILS ABSOLUTE: 0 K/CU MM
BASOPHILS RELATIVE PERCENT: 0.1 % (ref 0–1)
BUN BLDV-MCNC: 14 MG/DL (ref 6–23)
CALCIUM SERPL-MCNC: 7.7 MG/DL (ref 8.3–10.6)
CHLORIDE BLD-SCNC: 105 MMOL/L (ref 99–110)
CO2: 22 MMOL/L (ref 21–32)
CREAT SERPL-MCNC: 0.6 MG/DL (ref 0.6–1.1)
DIFFERENTIAL TYPE: ABNORMAL
EOSINOPHILS ABSOLUTE: 0.4 K/CU MM
EOSINOPHILS RELATIVE PERCENT: 3.3 % (ref 0–3)
GFR AFRICAN AMERICAN: >60 ML/MIN/1.73M2
GFR NON-AFRICAN AMERICAN: >60 ML/MIN/1.73M2
GLUCOSE BLD-MCNC: 138 MG/DL (ref 70–99)
GLUCOSE BLD-MCNC: 139 MG/DL (ref 70–99)
GLUCOSE BLD-MCNC: 153 MG/DL (ref 70–99)
GLUCOSE BLD-MCNC: 201 MG/DL (ref 70–99)
GLUCOSE BLD-MCNC: 221 MG/DL (ref 70–99)
GLUCOSE BLD-MCNC: 223 MG/DL (ref 70–99)
HCT VFR BLD CALC: 24.8 % (ref 37–47)
HEMOGLOBIN: 7.5 GM/DL (ref 12.5–16)
IMMATURE NEUTROPHIL %: 0.6 % (ref 0–0.43)
LYMPHOCYTES ABSOLUTE: 1.1 K/CU MM
LYMPHOCYTES RELATIVE PERCENT: 8.8 % (ref 24–44)
MAGNESIUM: 1.6 MG/DL (ref 1.8–2.4)
MCH RBC QN AUTO: 27.4 PG (ref 27–31)
MCHC RBC AUTO-ENTMCNC: 30.2 % (ref 32–36)
MCV RBC AUTO: 90.5 FL (ref 78–100)
MONOCYTES ABSOLUTE: 0.9 K/CU MM
MONOCYTES RELATIVE PERCENT: 7.1 % (ref 0–4)
NUCLEATED RBC %: 0 %
PDW BLD-RTO: 17.9 % (ref 11.7–14.9)
PLATELET # BLD: 169 K/CU MM (ref 140–440)
PMV BLD AUTO: 10.4 FL (ref 7.5–11.1)
POTASSIUM SERPL-SCNC: 3.5 MMOL/L (ref 3.5–5.1)
RBC # BLD: 2.74 M/CU MM (ref 4.2–5.4)
SEGMENTED NEUTROPHILS ABSOLUTE COUNT: 10 K/CU MM
SEGMENTED NEUTROPHILS RELATIVE PERCENT: 80.1 % (ref 36–66)
SODIUM BLD-SCNC: 136 MMOL/L (ref 135–145)
TOTAL IMMATURE NEUTOROPHIL: 0.07 K/CU MM
TOTAL NUCLEATED RBC: 0 K/CU MM
WBC # BLD: 12.5 K/CU MM (ref 4–10.5)

## 2022-10-11 PROCEDURE — 85025 COMPLETE CBC W/AUTO DIFF WBC: CPT

## 2022-10-11 PROCEDURE — 82962 GLUCOSE BLOOD TEST: CPT

## 2022-10-11 PROCEDURE — 1200000000 HC SEMI PRIVATE

## 2022-10-11 PROCEDURE — 97530 THERAPEUTIC ACTIVITIES: CPT

## 2022-10-11 PROCEDURE — 6360000002 HC RX W HCPCS: Performed by: ORTHOPAEDIC SURGERY

## 2022-10-11 PROCEDURE — 6370000000 HC RX 637 (ALT 250 FOR IP): Performed by: ORTHOPAEDIC SURGERY

## 2022-10-11 PROCEDURE — 80048 BASIC METABOLIC PNL TOTAL CA: CPT

## 2022-10-11 PROCEDURE — 6360000002 HC RX W HCPCS: Performed by: STUDENT IN AN ORGANIZED HEALTH CARE EDUCATION/TRAINING PROGRAM

## 2022-10-11 PROCEDURE — 97116 GAIT TRAINING THERAPY: CPT

## 2022-10-11 PROCEDURE — 97110 THERAPEUTIC EXERCISES: CPT

## 2022-10-11 PROCEDURE — 2580000003 HC RX 258: Performed by: ORTHOPAEDIC SURGERY

## 2022-10-11 PROCEDURE — 36415 COLL VENOUS BLD VENIPUNCTURE: CPT

## 2022-10-11 PROCEDURE — 94761 N-INVAS EAR/PLS OXIMETRY MLT: CPT

## 2022-10-11 PROCEDURE — 83735 ASSAY OF MAGNESIUM: CPT

## 2022-10-11 RX ADMIN — BUPROPION HYDROCHLORIDE 150 MG: 150 TABLET, EXTENDED RELEASE ORAL at 23:26

## 2022-10-11 RX ADMIN — CARVEDILOL 25 MG: 25 TABLET, FILM COATED ORAL at 16:42

## 2022-10-11 RX ADMIN — SODIUM CHLORIDE, PRESERVATIVE FREE 10 ML: 5 INJECTION INTRAVENOUS at 09:17

## 2022-10-11 RX ADMIN — INSULIN LISPRO 1 UNITS: 100 INJECTION, SOLUTION INTRAVENOUS; SUBCUTANEOUS at 18:42

## 2022-10-11 RX ADMIN — ASPIRIN 81 MG: 81 TABLET, COATED ORAL at 09:16

## 2022-10-11 RX ADMIN — VENLAFAXINE HYDROCHLORIDE 225 MG: 150 CAPSULE, EXTENDED RELEASE ORAL at 09:27

## 2022-10-11 RX ADMIN — AMLODIPINE BESYLATE 10 MG: 10 TABLET ORAL at 09:17

## 2022-10-11 RX ADMIN — INSULIN LISPRO 1 UNITS: 100 INJECTION, SOLUTION INTRAVENOUS; SUBCUTANEOUS at 14:50

## 2022-10-11 RX ADMIN — OXYCODONE HYDROCHLORIDE 10 MG: 10 TABLET ORAL at 16:41

## 2022-10-11 RX ADMIN — BUPROPION HYDROCHLORIDE 150 MG: 150 TABLET, EXTENDED RELEASE ORAL at 09:25

## 2022-10-11 RX ADMIN — Medication 1 TABLET: at 09:17

## 2022-10-11 RX ADMIN — DEXAMETHASONE 2 MG: 4 TABLET ORAL at 09:25

## 2022-10-11 RX ADMIN — OXYCODONE HYDROCHLORIDE 10 MG: 10 TABLET ORAL at 06:33

## 2022-10-11 RX ADMIN — SODIUM CHLORIDE, POTASSIUM CHLORIDE, SODIUM LACTATE AND CALCIUM CHLORIDE: 600; 310; 30; 20 INJECTION, SOLUTION INTRAVENOUS at 06:40

## 2022-10-11 RX ADMIN — ATORVASTATIN CALCIUM 40 MG: 40 TABLET, FILM COATED ORAL at 23:26

## 2022-10-11 RX ADMIN — MULTIPLE VITAMINS W/ MINERALS TAB 1 TABLET: TAB at 09:16

## 2022-10-11 RX ADMIN — PANTOPRAZOLE SODIUM 40 MG: 40 TABLET, DELAYED RELEASE ORAL at 06:33

## 2022-10-11 RX ADMIN — QUETIAPINE FUMARATE 25 MG: 25 TABLET ORAL at 23:26

## 2022-10-11 RX ADMIN — OXYCODONE HYDROCHLORIDE 5 MG: 5 TABLET ORAL at 23:25

## 2022-10-11 RX ADMIN — ENOXAPARIN SODIUM 30 MG: 100 INJECTION SUBCUTANEOUS at 09:16

## 2022-10-11 RX ADMIN — CARVEDILOL 25 MG: 25 TABLET, FILM COATED ORAL at 09:25

## 2022-10-11 RX ADMIN — FENOFIBRATE 160 MG: 160 TABLET ORAL at 09:16

## 2022-10-11 RX ADMIN — FERROUS SULFATE TAB 325 MG (65 MG ELEMENTAL FE) 325 MG: 325 (65 FE) TAB at 09:25

## 2022-10-11 ASSESSMENT — PAIN DESCRIPTION - DESCRIPTORS
DESCRIPTORS: THROBBING
DESCRIPTORS: SHARP
DESCRIPTORS: THROBBING;DISCOMFORT

## 2022-10-11 ASSESSMENT — PAIN - FUNCTIONAL ASSESSMENT: PAIN_FUNCTIONAL_ASSESSMENT: ACTIVITIES ARE NOT PREVENTED

## 2022-10-11 ASSESSMENT — PAIN DESCRIPTION - LOCATION
LOCATION: HIP
LOCATION: LEG
LOCATION: HIP

## 2022-10-11 ASSESSMENT — PAIN DESCRIPTION - ORIENTATION
ORIENTATION: LEFT
ORIENTATION: LEFT
ORIENTATION: LEFT;UPPER

## 2022-10-11 ASSESSMENT — PAIN SCALES - GENERAL
PAINLEVEL_OUTOF10: 10
PAINLEVEL_OUTOF10: 10
PAINLEVEL_OUTOF10: 6

## 2022-10-11 NOTE — PROGRESS NOTES
V2.0  [facility] [place] [facility] Hospitalist Progress Note      Name:  Konstantin Daily /Age/Sex: 1940  (81 y.o. female)   MRN & CSN:  [id number] & [id number] Encounter Date/Time: 10/11/2022 12:35 PM EDT    Location:  [address] [place] [address]-I PCP: Estevan Espinosa MD       Hospital Day: 8    Assessment and Plan:   Konstantin Daily is a 81 y.o. female with who presents with Closed left hip fracture, initial encounter ([facility] [address]    1. Left intertrochanteric proximal femur fracture secondary post fall  -S/p Left  Trochanteric Nailing 10/6  -CT of left hip demonstrate acute impacted nondisplaced intertrochanteric left proximal femur fracture.  -pain control  -Analgesics antiemetics  -Harvey catheter removed, will use external urine collection system. -PT/OT, OOB as tolerated, WBAT  -tolerating diet     2. Acute Blood loss anemia post surgery  - pre op Hb 8.9, post op 6.4.  s/p transfuse 1 PRBC 10/7  -Hemoglobin has remained stable   -Continue to monitor    3. Hyponatremia resolved  Hyperkalemia- resolved     4. Leukocytosis, likely reactive   -Does not qualify for SIRS ()  16.2 on admission, WBC up and down  no episodes fever  UA unremarkable  Chest x-ray negative for acute findings  No concern for infection. 5.  Mild KENZIE on CKD stage III  -KENZIE resolved   -Baseline creatinine around 1.2-creatinine mildly elevated at 1.4.    - now back at baseline  -Daily labs  -Avoid toxic agents     6. Diabetes Mellitus type II   - SSI  - held home PO meds  - POCT glucose qACHS  - hypoglycemia management protocol   - carb controlled diet    7. Carcinoid syndrome  -Malignancy associated pain: Controlled with oxycodone, active prescription verified on [facility]  -follows oncology Dr. Aidan Thompson     8. Hyperlipidemia  - Continue statin therapy     9. Hypertension  -CAD  -Managed on aspirin, amlodipine and carvedilol     10. Chronic Conditions  - Continue all home medications except as stated above or contraindicated.      11. Severe Malnutrition   -Underweight BMI 17.56: kg/m2 likely secondary to protein calorie malnutrition  -Nutrition consult     Diet ADULT DIET; Regular; 5 carb choices (75 gm/meal)  ADULT ORAL NUTRITION SUPPLEMENT; Breakfast, Lunch, Dinner; Frozen Oral Supplement   DVT Prophylaxis [x] Lovenox, []  Heparin, [] SCDs, [] Ambulation,  [] Eliquis, [] Xarelto  [] Coumadin   Code Status Full Code   Disposition Patient requires continued admission due to placement     Subjective:     Chief Complaint: Hip Pain  Patient seen and examined at bedside this morning. Reports some mild pain in her left hip but denies any chest pain, shortness of breath, nausea vomiting, fever or chills. Vital signs are stable    Objective:   No intake or output data in the 24 hours ending 10/11/22 1235     Vitals:   Vitals:    10/11/22 0925   BP:    Pulse: 80   Resp:    Temp:    SpO2:        Physical Exam:     General: NAD  Eyes: EOMI  ENT: neck supple  Cardiovascular: Regular rate. Respiratory: Clear to auscultation  Gastrointestinal: Soft, non tender  Genitourinary: no suprapubic tenderness  Musculoskeletal: No edema. Limited ROM  Skin: warm, dry  Neuro: Alert. Psych: Mood appropriate.      Medications:   Medications:    enoxaparin  30 mg SubCUTAneous Daily    calcium-cholecalciferol  1 tablet Oral Daily    sodium chloride flush  5-40 mL IntraVENous 2 times per day    ferrous sulfate  325 mg Oral Daily with breakfast    amLODIPine  10 mg Oral Daily    aspirin  81 mg Oral Daily    atorvastatin  40 mg Oral Nightly    buPROPion  150 mg Oral BID    carvedilol  25 mg Oral BID WC    dexamethasone  2 mg Oral Daily with breakfast    fenofibrate  160 mg Oral Daily    therapeutic multivitamin-minerals  1 tablet Oral Daily    pantoprazole  40 mg Oral QAM AC    QUEtiapine  25 mg Oral Nightly    venlafaxine  225 mg Oral Daily    insulin lispro  0-4 Units SubCUTAneous TID WC    insulin lispro  0-4 Units SubCUTAneous Nightly      Infusions:    sodium chloride      sodium chloride      lactated ringers 75 mL/hr at 10/11/22 0640    dextrose       PRN Meds: sodium chloride, , PRN  melatonin, 3 mg, Nightly PRN  sodium chloride flush, 5-40 mL, PRN  sodium chloride, , PRN  HYDROmorphone, 0.25 mg, Q3H PRN   Or  HYDROmorphone, 0.5 mg, Q3H PRN  oxyCODONE, 5 mg, Q4H PRN   Or  oxyCODONE, 10 mg, Q4H PRN  ondansetron, 4 mg, Q30 Min PRN  oxyCODONE, 5 mg, Q4H PRN  glucose, 4 tablet, PRN  dextrose bolus, 125 mL, PRN   Or  dextrose bolus, 250 mL, PRN  glucagon (rDNA), 1 mg, PRN  dextrose, , Continuous PRN  sodium chloride flush, 5-40 mL, PRN  ondansetron, 4 mg, Q8H PRN   Or  ondansetron, 4 mg, Q6H PRN  polyethylene glycol, 17 g, Daily PRN  acetaminophen, 650 mg, Q6H PRN   Or  acetaminophen, 650 mg, Q6H PRN        Labs      Recent Results (from the past 24 hour(s))   POCT Glucose    Collection Time: 10/10/22  4:18 PM   Result Value Ref Range    POC Glucose 229 (H) 70 - 99 MG/DL   POCT Glucose    Collection Time: 10/10/22  7:40 PM   Result Value Ref Range    POC Glucose 240 (H) 70 - 99 MG/DL   Basic Metabolic Panel w/ Reflex to MG    Collection Time: 10/11/22  5:26 AM   Result Value Ref Range    Sodium 136 135 - 145 MMOL/L    Potassium 3.5 3.5 - 5.1 MMOL/L    Chloride 105 99 - 110 mMol/L    CO2 22 21 - 32 MMOL/L    Anion Gap 9 4 - 16    BUN 14 6 - 23 MG/DL    Creatinine 0.6 0.6 - 1.1 MG/DL    Glucose 139 (H) 70 - 99 MG/DL    Calcium 7.7 (L) 8.3 - 10.6 MG/DL    GFR Non-African American >60 >60 mL/min/1.73m2    GFR African American >60 >60 mL/min/1.73m2   CBC with Auto Differential    Collection Time: 10/11/22  5:26 AM   Result Value Ref Range    WBC 12.5 (H) 4.0 - 10.5 K/CU MM    RBC 2.74 (L) 4.2 - 5.4 M/CU MM    Hemoglobin 7.5 (L) 12.5 - 16.0 GM/DL    Hematocrit 24.8 (L) 37 - 47 %    MCV 90.5 78 - 100 FL    MCH 27.4 27 - 31 PG    MCHC 30.2 (L) 32.0 - 36.0 %    RDW 17.9 (H) 11.7 - 14.9 %    Platelets 185 937 - 793 K/CU MM    MPV 10.4 7.5 - 11.1 FL    Differential Type AUTOMATED DIFFERENTIAL     Segs Relative 80.1 (H) 36 - 66 % Lymphocytes % 8.8 (L) 24 - 44 %    Monocytes % 7.1 (H) 0 - 4 %    Eosinophils % 3.3 (H) 0 - 3 %    Basophils % 0.1 0 - 1 %    Segs Absolute 10.0 K/CU MM    Lymphocytes Absolute 1.1 K/CU MM    Monocytes Absolute 0.9 K/CU MM    Eosinophils Absolute 0.4 K/CU MM    Basophils Absolute 0.0 K/CU MM    Nucleated RBC % 0.0 %    Total Nucleated RBC 0.0 K/CU MM    Total Immature Neutrophil 0.07 K/CU MM    Immature Neutrophil % 0.6 (H) 0 - 0.43 %   Magnesium    Collection Time: 10/11/22  5:26 AM   Result Value Ref Range    Magnesium 1.6 (L) 1.8 - 2.4 mg/dl   POCT Glucose    Collection Time: 10/11/22  6:36 AM   Result Value Ref Range    POC Glucose 153 (H) 70 - 99 MG/DL   POCT Glucose    Collection Time: 10/11/22 10:54 AM   Result Value Ref Range    POC Glucose 221 (H) 70 - 99 MG/DL   POCT Glucose    Collection Time: 10/11/22 11:04 AM   Result Value Ref Range    POC Glucose 223 (H) 70 - 99 MG/DL        Imaging/Diagnostics Last 24 Hours   No results found.     Electronically signed by Vasiliy Cook MD on 10/11/2022 at 12:35 PM

## 2022-10-11 NOTE — PROGRESS NOTES
Occupational Therapy  . Occupational Therapy Treatment Note    Name: Marissa Champagne MRN: [id number] :   1940   Date:  10/11/2022   Admission Date: 10/4/2022 Room:  [facility]8/1128-A       Discharge Recommendation: Skilled Nursing Facility    Primary Problem:      Restrictions/Precautions:          General precautions, fall risk  WBAT LLE    Communication with other providers: cotx with PTA Diana for assist and safety as well as patient tolerance with therapy. Subjective:  Patient states:  oh I dont want to do this right now  Pain:   Location, Type, Intensity (0/10 to 10/10):  no numerical rating but cried out with ambulation. Objective:    Observation: patient in high fowlers. Student nurse in. Patient needs encouragement to participate. And stops periodically to watch tv. Cues needed throughout. Objective Measures:  tele    Treatment, including education:    Patient declining all ADLs offered this date. Therapeutic activity training was instructed today. Cues were given for safety, sequence, UE/LE placement, awareness, and balance. Activities performed today included bed mobility training, sup-sit, sit-stand, SPT. High fowlers to EOB- with use of leg - SBA initially. Patient would advance BLE then get distracted by the television then stop. Cues needed for initiation. This happened numerous times before Ultimately needing Mod x2 (patient transferred to R side)  Eob sitting balance- fair. Stand to [address] x1 + CGA of another. Patient prompted to SPT to recliner but took took steps forward- x5 feet with Min A with chair follow. Sit to recliner- Min A for safe and slow descent. While supine patient performed ankle pumps, heel slides. While seated performed- marching, glute sets, clam shells. Patient educated on role of OT , benefits of OT and rationale for therapeutic intervention. Benefit of OOB/EOB activities, benefit of movement.  AE/AD, WS/EC,     All therapeutic intervention performed c emphasis on dynamic balance / standing tolerance to increase strength, endurance and activity tolerance for increased [place] c ADL tasks and func transfers / mobility. Patient left safely in bedside chair at end of session, with call light in reach, alarm on and nursing aware. Gait belt was used for func transfers / mobility. Assessment / Impression:    Per chart review patein has greatly improved with decreased assist levels. Patient did however transfer to R side. Patient's tolerance of treatment: fair+  Adverse Reaction: None  Significant change in status and impact: Improved from previous session  Barriers to improvement: pain, self limiting.        Plan for Next Session:    Continue with OT POC      Time in:  840  Time out:  911  Timed treatment minutes:  31  Total treatment time:  31      Electronically signed by:    GURVINDER Oconnor COTA/L [id number]    10/11/2022, 9:24 AM

## 2022-10-11 NOTE — PROGRESS NOTES
Comprehensive Nutrition Assessment    Type and Reason for Visit:  Reassess    Nutrition Recommendations/Plan:   Continue carb controlled diet, current higher calorie   Will discontinue oral nutrition supplement per patient request  Encourage consistent intake  Document po intake  Will continue to follow up during stay      Malnutrition Assessment:  Malnutrition Status:  Severe malnutrition (10/06/22 1140)    Context:  Acute Illness       Nutrition Assessment:    Remains on carb controlled diet. No po data for past 4 days, patient reports eating meals. Tried alternate oral nutrition supplement, frozen version, dislikes and does not want any supplements at this time. Encouraged to continue to eat well at meals. Current meal orders reasonable. Will cotninue to follow at high nutrition risk. Nutrition Related Findings:    sitting up in chair, glucose POCT over 200 mg/dL at times, remains on decadron    Hb 7.5 Wound Type: None       Current Nutrition Intake & Therapies:    Average Meal Intake: Unable to assess  Average Supplements Intake: Refusing to take  ADULT DIET; Regular; 5 carb choices (75 gm/meal)  ADULT ORAL NUTRITION SUPPLEMENT; Breakfast, Lunch, Dinner; Frozen Oral Supplement    Anthropometric Measures:  Height: 5' 2\" (157.5 cm)  Ideal Body Weight (IBW): 110 lbs (50 kg)    Admission Body Weight: 95 lb 14.4 oz (43.5 kg)  Current Body Weight: 95 lb 14.4 oz (43.5 kg), 87.2 % IBW.  Weight Source: Bed Scale  Current BMI (kg/m2): 17.5  Usual Body Weight: 102 lb (46.3 kg)  % Weight Change (Calculated): -6  Weight Adjustment For: No Adjustment                 BMI Categories: Underweight (BMI less than 22) age over 72    Estimated Daily Nutrient Needs:  Energy Requirements Based On: Kcal/kg  Weight Used for Energy Requirements: Current  Energy (kcal/day): 6626-2910 (30-35 kcal/kg)  Weight Used for Protein Requirements: Ideal  Protein (g/day): 60-70 (1.2-1.4 g/kg)  Method Used for Fluid Requirements: 1 ml/kcal  Fluid (ml/day): 7350-3991    Nutrition Diagnosis:   Predicted inadequate energy intake related to pain as evidenced by poor intake prior to admission    Nutrition Interventions:   Food and/or Nutrient Delivery: Continue Current Diet, Discontinue Oral Nutrition Supplement  Nutrition Education/Counseling: Education initiated  Coordination of Nutrition Care: Continue to monitor while inpatient  Plan of Care discussed with: patient -adequate meal intake, will discontinue supplement per request    Goals:  Previous Goal Met: Progressing toward Goal(s)  Goals: PO intake 50% or greater, by next RD assessment       Nutrition Monitoring and Evaluation:   Behavioral-Environmental Outcomes: None Identified  Food/Nutrient Intake Outcomes: Food and Nutrient Intake  Physical Signs/Symptoms Outcomes: Biochemical Data, Meal Time Behavior, Skin, Weight    Discharge Planning:    Continue current diet     Sydelle Lesch, RD, LD  Contact: [phone]

## 2022-10-11 NOTE — CARE COORDINATION
Left message for Rosetta Like with [facility] admissions for update on pending precert. As of late yesterday afternoon, precert continued to pend.

## 2022-10-12 VITALS
TEMPERATURE: 98.6 F | SYSTOLIC BLOOD PRESSURE: 167 MMHG | DIASTOLIC BLOOD PRESSURE: 73 MMHG | OXYGEN SATURATION: 96 % | BODY MASS INDEX: 17.66 KG/M2 | WEIGHT: 96 LBS | HEART RATE: 79 BPM | RESPIRATION RATE: 18 BRPM | HEIGHT: 62 IN

## 2022-10-12 LAB
GLUCOSE BLD-MCNC: 134 MG/DL (ref 70–99)
GLUCOSE BLD-MCNC: 175 MG/DL (ref 70–99)
HCT VFR BLD CALC: 25 % (ref 37–47)
HEMOGLOBIN: 7.7 GM/DL (ref 12.5–16)
SARS-COV-2, NAAT: NOT DETECTED
SOURCE: NORMAL

## 2022-10-12 PROCEDURE — 82962 GLUCOSE BLOOD TEST: CPT

## 2022-10-12 PROCEDURE — 36415 COLL VENOUS BLD VENIPUNCTURE: CPT

## 2022-10-12 PROCEDURE — 87635 SARS-COV-2 COVID-19 AMP PRB: CPT

## 2022-10-12 PROCEDURE — 6370000000 HC RX 637 (ALT 250 FOR IP): Performed by: ORTHOPAEDIC SURGERY

## 2022-10-12 PROCEDURE — 6360000002 HC RX W HCPCS: Performed by: ORTHOPAEDIC SURGERY

## 2022-10-12 PROCEDURE — 85014 HEMATOCRIT: CPT

## 2022-10-12 PROCEDURE — 6360000002 HC RX W HCPCS: Performed by: STUDENT IN AN ORGANIZED HEALTH CARE EDUCATION/TRAINING PROGRAM

## 2022-10-12 PROCEDURE — 94150 VITAL CAPACITY TEST: CPT

## 2022-10-12 PROCEDURE — 85018 HEMOGLOBIN: CPT

## 2022-10-12 PROCEDURE — 94761 N-INVAS EAR/PLS OXIMETRY MLT: CPT

## 2022-10-12 RX ADMIN — CARVEDILOL 25 MG: 25 TABLET, FILM COATED ORAL at 08:54

## 2022-10-12 RX ADMIN — AMLODIPINE BESYLATE 10 MG: 10 TABLET ORAL at 08:54

## 2022-10-12 RX ADMIN — ENOXAPARIN SODIUM 30 MG: 100 INJECTION SUBCUTANEOUS at 08:54

## 2022-10-12 RX ADMIN — FENOFIBRATE 160 MG: 160 TABLET ORAL at 08:53

## 2022-10-12 RX ADMIN — FERROUS SULFATE TAB 325 MG (65 MG ELEMENTAL FE) 325 MG: 325 (65 FE) TAB at 08:54

## 2022-10-12 RX ADMIN — MULTIPLE VITAMINS W/ MINERALS TAB 1 TABLET: TAB at 08:53

## 2022-10-12 RX ADMIN — VENLAFAXINE HYDROCHLORIDE 225 MG: 150 CAPSULE, EXTENDED RELEASE ORAL at 08:57

## 2022-10-12 RX ADMIN — PANTOPRAZOLE SODIUM 40 MG: 40 TABLET, DELAYED RELEASE ORAL at 06:19

## 2022-10-12 RX ADMIN — BUPROPION HYDROCHLORIDE 150 MG: 150 TABLET, EXTENDED RELEASE ORAL at 08:57

## 2022-10-12 RX ADMIN — DEXAMETHASONE 2 MG: 4 TABLET ORAL at 08:54

## 2022-10-12 RX ADMIN — ASPIRIN 81 MG: 81 TABLET, COATED ORAL at 08:54

## 2022-10-12 RX ADMIN — Medication 1 TABLET: at 08:53

## 2022-10-12 NOTE — PLAN OF CARE
Problem: Discharge Planning  Goal: Discharge to home or other facility with appropriate resources  Outcome: Progressing     Problem: Skin/Tissue Integrity  Goal: Absence of new skin breakdown  Description: 1. Monitor for areas of redness and/or skin breakdown  2. Assess vascular access sites hourly  3. Every 4-6 hours minimum:  Change oxygen saturation probe site  4. Every 4-6 hours:  If on nasal continuous positive airway pressure, respiratory therapy assess nares and determine need for appliance change or resting period.   Outcome: Progressing     Problem: Safety - Adult  Goal: Free from fall injury  Outcome: Progressing     Problem: ABCDS Injury Assessment  Goal: Absence of physical injury  Outcome: Progressing     Problem: Pain  Goal: Verbalizes/displays adequate comfort level or baseline comfort level  Outcome: Progressing     Problem: Chronic Conditions and Co-morbidities  Goal: Patient's chronic conditions and co-morbidity symptoms are monitored and maintained or improved  Outcome: Progressing     Problem: Nutrition Deficit:  Goal: Optimize nutritional status  Outcome: Progressing  Flowsheets (Taken 10/11/2022 1124 by Edgardo Moe, RD, LD)  Nutrient intake appropriate for improving, restoring, or maintaining nutritional needs:   Monitor oral intake, labs, and treatment plans   Recommend appropriate diets, oral nutritional supplements, and vitamin/mineral supplements

## 2022-10-12 NOTE — CARE COORDINATION
This RN CM received a VM from late yesterday afternoon from [employer] with [facility] admissions. Cande states that precert continues to pend and [employer] will call and let this RN CM know once a determination has been received.

## 2022-10-12 NOTE — CARE COORDINATION
Kristianert has been approved for pt to go to [facility]. Pt will need completed ARNEL and rapid COVID. PS sent to provider.

## 2022-10-12 NOTE — CONSULTS
Despite two attempts to place PIV unable to gain access. Consult will remain open for daytime PICC nurse. Renay Lentz, primary LPN notified.

## 2022-10-12 NOTE — CARE COORDINATION
Transport scheduled for 1515 with [facility] the patient's nurse Jerzy Zhao and [facility] advised of transport time.

## 2022-10-12 NOTE — DISCHARGE SUMMARY
V2.0  Discharge Summary    Name:  Danelle Trinh /Age/Sex: 1940 (68 y.o. female)   Admit Date: 10/4/2022  Discharge Date: 10/12/22    MRN & CSN:  [id number] & [id number] Encounter Date and Time 10/12/22 1:55 PM EDT    Attending:  Jolie Childers MD Discharging Provider: Jolie Childers MD       Hospital Course:     Brief HPI:     Danelle Trinh is a 81 y.o.  female, with past medical history significant for carcinoid syndrome, diabetes mellitus, hypertension, CAD, carotid artery stenosis, history of MI hyperlipidemia, who presented to the emergency room with complaint of pain and unable to bear weight. The present condition started 3 days ago after reported fall. States she has not been able to participate in PT the past 3 days she is unable to bear weight on her left leg. She is currently a resident at [facility] due to her complaint declined outpatient x-ray at that was abnormal and sent her in for further evaluation. At time of admission there is no family members present at bedside. Patient is awake alert oriented to herself and cannot provide some history. She does not recall falling states it hurts to walk. She denies any pain with palpation of hips or pelvis. ER provider patient was recently hospitalized at [facility] for UTI, altered mental status and falls patient was initially evaluated care emergency and was transferred for admission. Daughter stated to them that patient has not been able to get out of bed for the past few days due to the pain. On exam patient denies any dysuria, urgency or frequency. She denies any pain in her knees, right hip or arms. No numbness or tingling or weaknesses in extremities    Brief Problem Based Course:        1.  Left intertrochanteric proximal femur fracture secondary post fall  -S/p Left  Trochanteric Nailing 10/6  -CT of left hip demonstrate acute impacted nondisplaced intertrochanteric left proximal femur fracture.  -pain control  -Analgesics antiemetics  -Harvey catheter removed, will use external urine collection system. - WBAT  -tolerating diet     2. Acute Blood loss anemia post surgery  - pre op Hb 8.9, post op 6.4.  s/p transfuse 1 PRBC 10/7  -Hemoglobin has remained stable      3. Hyponatremia resolved  Hyperkalemia- resolved     4. Leukocytosis, likely reactive   -Does not qualify for SIRS (1/4)  16.2 on admission, WBC up and down  no episodes fever  UA unremarkable  Chest x-ray negative for acute findings  No concern for infection. 5.  Mild KENZIE on CKD stage III  -KENZIE resolved   -Baseline creatinine around 1.2-creatinine mildly elevated at 1.4. On presentation  - now back at baseline  -Daily labs  -Avoid toxic agents     6. Diabetes Mellitus type II   -Resume home meds    7. Carcinoid syndrome  -Malignancy associated pain: Controlled with oxycodone, active prescription verified on [facility]  -follows oncology Dr. Jaya Barragan     8. Hyperlipidemia  - Continue statin therapy     9. Hypertension  -CAD  -Managed on aspirin, amlodipine and carvedilol     10. Chronic Conditions  - Continue all home medications except as stated above or contraindicated. 11. Severe Malnutrition   -Underweight BMI 17.56: kg/m2 likely secondary to protein calorie malnutrition  -Nutrition consult    The patient expressed appropriate understanding of, and agreement with the discharge recommendations, medications, and plan.      Consults this admission:  IP CONSULT TO ORTHOPEDIC SURGERY  IP CONSULT TO HOSPITALIST  IP CONSULT TO DIETITIAN  IP CONSULT TO DIETITIAN  IP CONSULT TO IV TEAM    Discharge Diagnosis:   Closed left hip fracture, initial encounter [facility])        Discharge Instruction:   Follow up appointments:   Primary care physician: Cindi Marcus MD within 2 weeks  Diet: regular diet   Activity: activity as tolerated  Disposition: Discharged to:   []Home, [][facility], [x]SNF, []Acute Rehab, []Hospice   Condition on discharge: Stable  Labs and Tests to be Followed up as an outpatient by PCP or Specialist:     Discharge Medications:        Medication List        CHANGE how you take these medications      amLODIPine 10 MG tablet  Commonly known as: NORVASC  TAKE 1 TABLET BY MOUTH EVERY DAY  What changed:   how much to take  how to take this  when to take this  additional instructions     atorvastatin 40 MG tablet  Commonly known as: LIPITOR  Take 1 tablet by mouth daily  What changed: when to take this     buPROPion 150 MG extended release tablet  Commonly known as: WELLBUTRIN SR  TAKE 1 TABLET BY MOUTH TWICE A DAY  What changed:   how much to take  how to take this  when to take this  additional instructions     dexamethasone 2 MG tablet  Commonly known as: DECADRON  TAKE 1 TABLET BY MOUTH EVERY DAY WITH BREAKFAST  What changed:   how much to take  how to take this  when to take this  additional instructions     ferrous sulfate 325 (65 Fe) MG EC tablet  Commonly known as: FE TABS 325  TAKE 1 TABLET BY MOUTH EVERY DAY WITH BREAKFAST  What changed: See the new instructions. glipiZIDE 5 MG tablet  Commonly known as: GLUCOTROL  What changed: Another medication with the same name was removed. Continue taking this medication, and follow the directions you see here.             CONTINUE taking these medications      acetaminophen 325 MG tablet  Commonly known as: TYLENOL     aspirin 81 MG EC tablet  Take 1 tablet by mouth daily     carvedilol 25 MG tablet  Commonly known as: COREG  Take 1 tablet by mouth 2 times daily (with meals)     fenofibrate 145 MG tablet  Commonly known as: TRICOR     Jardiance 25 MG tablet  Generic drug: empagliflozin     metFORMIN 500 MG tablet  Commonly known as: GLUCOPHAGE  Take 1 tablet by mouth 2 times daily (with meals)     oxyCODONE 5 MG immediate release tablet  Commonly known as: ROXICODONE     pantoprazole 40 MG tablet  Commonly known as: PROTONIX  Take 1 tablet by mouth every morning (before breakfast)     potassium chloride 10 MEQ extended release capsule  Commonly known as: MICRO-K     promethazine 25 MG tablet  Commonly known as: PHENERGAN  Take 1 tablet by mouth every 6 hours as needed for Nausea     QUEtiapine 25 MG tablet  Commonly known as: SEROQUEL  Take 1 tablet by mouth nightly     therapeutic multivitamin-minerals tablet     triamcinolone 0.025 % ointment  Commonly known as: KENALOG     valsartan-hydroCHLOROthiazide 320-25 MG per tablet  Commonly known as: DIOVAN-HCT     venlafaxine 75 MG extended release capsule  Commonly known as: Effexor XR  Take 3 capsules by mouth daily for 20 days            STOP taking these medications      cyanocobalamin 1000 MCG/ML injection             Objective Findings at Discharge:   BP (!) 162/69   Pulse 75   Temp 98.6 °F (37 °C) (Oral)   Resp 17   Ht 5' 2\" (1.575 m)   Wt 96 lb (43.5 kg)   SpO2 94%   BMI 17.56 kg/m²       Physical Exam:   General: NAD  Eyes: EOMI  ENT: neck supple  Cardiovascular: Regular rate. Respiratory: Clear to auscultation  Gastrointestinal: Soft, non tender  Genitourinary: no suprapubic tenderness  Musculoskeletal: No edema  Skin: warm, dry  Neuro: Alert. Psych: Mood appropriate. Labs and Imaging   [facility] LESS THAN 1 HOUR    Result Date: 10/6/2022  Radiology exam is complete. No Radiologist dictation. Please follow up with ordering provider. CBC:   Recent Labs     10/11/22  0526 10/12/22  0901   WBC 12.5*  --    HGB 7.5* 7.7*     --      BMP:    Recent Labs     10/11/22  0526      K 3.5      CO2 22   BUN 14   CREATININE 0.6   GLUCOSE 139*     Hepatic: No results for input(s): AST, ALT, ALB, BILITOT, ALKPHOS in the last 72 hours.   Lipids:   Lab Results   Component Value Date/Time    CHOL 84 10/15/2019 05:30 AM    HDL 32 10/15/2019 05:30 AM    TRIG 182 10/15/2019 05:30 AM     Hemoglobin A1C:   Lab Results   Component Value Date/Time    LABA1C 6.3 10/04/2022 08:18 PM     TSH: No results found for: TSH  Troponin:   Lab Results   Component Value Date/Time TROPONINT <0.010 04/27/2021 04:25 AM    TROPONINT <0.010 04/17/2021 06:56 PM    TROPONINT <0.010 04/17/2021 04:42 AM     Lactic Acid: No results for input(s): LACTA in the last 72 hours. BNP: No results for input(s): PROBNP in the last 72 hours.   UA:  Lab Results   Component Value Date/Time    NITRU NEGATIVE 10/04/2022 03:26 PM    COLORU YELLOW 10/04/2022 03:26 PM    WBCUA NONE SEEN 10/04/2022 03:26 PM    RBCUA NONE SEEN 10/04/2022 03:26 PM    MUCUS RARE 04/27/2021 06:33 AM    TRICHOMONAS NONE SEEN 10/04/2022 03:26 PM    YEAST MANY 10/04/2022 03:26 PM    BACTERIA NEGATIVE 10/04/2022 03:26 PM    CLARITYU SLIGHTLY CLOUDY 10/04/2022 03:26 PM    SPECGRAV 1.015 10/04/2022 03:26 PM    LEUKOCYTESUR NEGATIVE 10/04/2022 03:26 PM    UROBILINOGEN 0.2 10/04/2022 03:26 PM    BILIRUBINUR NEGATIVE 10/04/2022 03:26 PM    BLOODU NEGATIVE 10/04/2022 03:26 PM    Carvel Whiting NEGATIVE 10/04/2022 03:26 PM     Urine Cultures: No results found for: LABURIN  Blood Cultures: No results found for: BC  No results found for: BLOODCULT2  Organism: No results found for: ORG    Time Spent Discharging patient 35 minutes    Electronically signed by Ignacio Perez MD on 10/12/2022 at 1:55 PM

## 2022-10-13 NOTE — ADT AUTH CERT
Utilization Reviews       Hip Fracture, Open Repair - Care Day 8 (10/11/2022) by Lynsday Oliveira RN       Review Status Review Entered   Completed 10/12/2022 1444       Created By   Lyndsay Oliveira RN      Criteria Review      Care Day: 8 Care Date: 10/11/2022 Level of Care: Inpatient Floor    Guideline Day 2    Level Of Care    (X) Floor    10/12/2022 2:44 PM EDT by Dominique Donald      acute    Clinical Status    (X) * Procedure completed    (X) * Tolerates mobilization    (X) * No evidence of new neurovascular compromise of lower extremity    Activity    (X) * Up to chair    (X) * Limited ambulation    10/12/2022 2:44 PM EDT by Dominique Donald      PT/OT, OOB as tolerated, WBAT    Routes    (X) Diet as tolerated    10/12/2022 2:44 PM EDT by Dominique Donald      ADULT DIET; Regular; 5 carb choices (75 gm/meal)  ADULT ORAL NUTRITION SUPPLEMENT; Breakfast, Lunch, Dinner; Frozen Oral Supplement    Interventions    (X) Gait training    (X) Physical therapy    10/12/2022 2:44 PM EDT by Dominique Donald      PT/OT, OOB as tolerated, WBAT    * Milestone   Additional Notes   DATE:    10/11         VITALS:   98.8 (37.1)  18  80  159/63  97%  None (Room air)          ABNL/PERTINENT LABS/RADIOLOGY/DIAGNOSTIC STUDIES:   10/11/22 05:26   Magnesium: 1.6 (L)   Glucose, Random: 139 (H)   CALCIUM, SERUM, 495127: 7.7 (L)   WBC: 12.5 (H)   RBC: 2.74 (L)   Hemoglobin Quant: 7.5 (L)   Hematocrit: 24.8 (L)   MCHC: 30.2 (L)   RDW: 17.9 (H)   Lymphocyte %: 8.8 (L)   Monocytes %: 7.1 (H)   Eosinophils %: 3.3 (H)   Segs Relative: 80.1 (H)   Immature Neutrophil %: 0.6 (H)      10/11/22 06:36   POC Glucose: 153 (H)      10/11/22 10:54   POC Glucose: 221 (H)      10/11/22 11:04   POC Glucose: 223 (H)      10/11/22 16:05   POC Glucose: 201 (H)      10/11/22 23:16   POC Glucose: 138 (H)         PHYSICAL EXAM:   General: NAD   Eyes: EOMI   ENT: neck supple   Cardiovascular: Regular rate.    Respiratory: Clear to auscultation   Gastrointestinal: Soft, non tender Genitourinary: no suprapubic tenderness   Musculoskeletal: No edema. Limited ROM   Skin: warm, dry   Neuro: Alert. Psych: Mood appropriate. MD CONSULTS/ASSESSMENT AND PLAN:   **IM   1. Left intertrochanteric proximal femur fracture secondary post fall   -S/p Left  Trochanteric Nailing 10/6   -CT of left hip demonstrate acute impacted nondisplaced intertrochanteric left proximal femur fracture.   -pain control   -Analgesics antiemetics   -Harvey catheter removed, will use external urine collection system. -PT/OT, OOB as tolerated, WBAT   -tolerating diet       2. Acute Blood loss anemia post surgery   - pre op Hb 8.9, post op 6.4.  s/p transfuse 1 PRBC 10/7   -Hemoglobin has remained stable    -Continue to monitor       3. Hyponatremia resolved   Hyperkalemia- resolved       4. Leukocytosis, likely reactive    -Does not qualify for SIRS (1/4)   16.2 on admission, WBC up and down   no episodes fever   UA unremarkable   Chest x-ray negative for acute findings   No concern for infection. 5.  Mild KENZIE on CKD stage III   -KENZIE resolved    -Baseline creatinine around 1.2-creatinine mildly elevated at 1.4.     - now back at baseline   -Daily labs   -Avoid toxic agents       6. Diabetes Mellitus type II    - SSI   - held home PO meds   - POCT glucose qACHS   - hypoglycemia management protocol    - carb controlled diet      7. Carcinoid syndrome   -Malignancy associated pain: Controlled with oxycodone, active prescription verified on [facility]   -follows oncology Dr. Inderjit Myers       8. Hyperlipidemia   - Continue statin therapy       9. Hypertension   -CAD   -Managed on aspirin, amlodipine and carvedilol       10. Chronic Conditions   - Continue all home medications except as stated above or contraindicated.        11. Severe Malnutrition    -Underweight BMI 17.56: kg/m2 likely secondary to protein calorie malnutrition   -Nutrition consult         MEDICATIONS:   amLODIPine (NORVASC) tablet 10 mg   Dose: 10 mg Freq:  DAILY Route: PO      aspirin EC tablet 81 mg   Dose: 81 mg   Freq: DAILY Route: PO      atorvastatin (LIPITOR) tablet 40 mg   Dose: 40 mg   Freq: NIGHTLY Route: PO      calcium-cholecalciferol 500-200 MG-UNIT per tablet 1 tablet   Dose: 1 tablet   Freq: DAILY Route: PO      carvedilol (COREG) tablet 25 mg   Dose: 25 mg   Freq: 2 TIMES DAILY WITH MEALS Route: PO      dexamethasone (DECADRON) tablet 2 mg   Dose: 2 mg   Freq: DAILY WITH BREAKFAST Route: PO      enoxaparin Sodium (LOVENOX) injection 30 mg   Dose: 30 mg   Freq: DAILY Route: SC      fenofibrate (TRIGLIDE) tablet 160 mg   Dose: 160 mg   Freq: DAILY Route: PO      ferrous sulfate (IRON 325) tablet 325 mg   Dose: 325 mg   Freq: DAILY WITH BREAKFAST Route: PO      pantoprazole (PROTONIX) tablet 40 mg   Dose: 40 mg   Freq: DAILY BEFORE BREAKFAST Route: PO      QUEtiapine (SEROQUEL) tablet 25 mg   Dose: 25 mg   Freq: NIGHTLY Route: PO      therapeutic multivitamin-minerals 1 tablet   Dose: 1 tablet   Freq: DAILY Route: PO      lactated ringers infusion   Rate: 75 mL/hr Freq: CONTINUOUS Route: IV      oxyCODONE HCl (OXY-IR) immediate release tablet 10 mg   Dose: 10 mg   Freq: EVERY 4 HOURS PRN Route: PO x2      oxyCODONE (ROXICODONE) immediate release tablet 5 mg   Dose: 5 mg   Freq: EVERY 4 HOURS PRN Route: PO x1

## 2022-10-14 ENCOUNTER — TELEPHONE (OUTPATIENT)
Dept: INFUSION THERAPY | Age: 82
End: 2022-10-14

## 2022-10-14 NOTE — TELEPHONE ENCOUNTER
Patient missed 10/13/22 OV & Injection. Patient is in [facility] for physical therapy. Ted Nicole (patient's Delroy Patterson) had questions about rescheduling the injection & OV. [facility]

## 2022-10-20 NOTE — ANESTHESIA POSTPROCEDURE EVALUATION
Department of Anesthesiology  Postprocedure Note    Patient: Zari Narayan  MRN: [id number]  YOB: 1940  Date of evaluation: 10/20/2022      Procedure Summary     Date: 10/06/22 Room / Location: [address]    Anesthesia Start: 1339 Anesthesia Stop: 1459    Procedure: HIP OPEN REDUCTION INTERNAL FIXATION (Left: Hip) Diagnosis:       Closed fracture of left hip with delayed healing, subsequent encounter      (Closed fracture of left hip with delayed healing, subsequent encounter [S72.002G])    Surgeons: Nura Nolasco MD Responsible Provider: Ross Hagen MD    Anesthesia Type: general ASA Status: 4          Anesthesia Type: No value filed.     Tyler Phase I: Tyler Score: 9    Tyler Phase II:        Anesthesia Post Evaluation    Patient location during evaluation: PACU  Patient participation: complete - patient participated  Level of consciousness: sleepy but conscious  Pain score: 2  Airway patency: patent  Nausea & Vomiting: no nausea and no vomiting  Complications: no  Cardiovascular status: hemodynamically stable  Respiratory status: acceptable  Hydration status: euvolemic

## 2022-11-14 RX ORDER — BUPROPION HYDROCHLORIDE 150 MG/1
TABLET, EXTENDED RELEASE ORAL
Qty: 180 TABLET | Refills: 5 | OUTPATIENT
Start: 2022-11-14

## 2022-11-15 ENCOUNTER — TELEPHONE (OUTPATIENT)
Dept: INFUSION THERAPY | Age: 82
End: 2022-11-15

## 2022-11-15 NOTE — TELEPHONE ENCOUNTER
Daughter Pura Velasco [phone] [facility] stating pt fell & broke her hip; had hip surgery & is now in an [facility]. Pt missed her lanreotide injection & is wondering if this is something that can be given in the ECF while pt is there. Spoke with Omid Coelho RN NN who will check into this & return call.

## 2022-11-15 NOTE — TELEPHONE ENCOUNTER
Daughter Verba Lombard called to schedule pt's lanreotide injection. She said pt is now out of the ECF but said she never heard back from our office when she LVM on 10/14 concerning if pt was able to receive injections at the [facility]. Daughter states maybe our office called the ECF & they just didn't inform her. Injection appt scheduled for 11/16 @ 2:00; daughter voiced understanding.

## 2022-11-16 ENCOUNTER — HOSPITAL ENCOUNTER (OUTPATIENT)
Dept: INFUSION THERAPY | Age: 82
Discharge: HOME | End: 2022-11-16
Payer: MEDICARE

## 2022-11-16 DIAGNOSIS — E34.0 CARCINOID SYNDROME (HCC): Primary | ICD-10-CM

## 2022-11-16 PROCEDURE — 6360000002 HC RX W HCPCS: Performed by: INTERNAL MEDICINE

## 2022-11-16 PROCEDURE — 96372 THER/PROPH/DIAG INJ SC/IM: CPT

## 2022-11-16 RX ORDER — LANREOTIDE ACETATE 120 MG/.5ML
120 INJECTION SUBCUTANEOUS ONCE
Status: COMPLETED | OUTPATIENT
Start: 2022-11-16 | End: 2022-11-16

## 2022-11-16 RX ORDER — LANREOTIDE ACETATE 120 MG/.5ML
120 INJECTION SUBCUTANEOUS ONCE
OUTPATIENT
Start: 2022-12-08 | End: 2022-12-08

## 2022-11-16 RX ADMIN — LANREOTIDE ACETATE 120 MG: 120 INJECTION SUBCUTANEOUS at 14:03

## 2022-11-16 NOTE — PROGRESS NOTES
Pt here for Lanreotide injection. Pt has no complaints. Pt given injection SQ to right buttock. Cold spray utilized. Pt tolerated with no complaints.  Left by wheelchair discharge instructions given

## 2022-12-14 ENCOUNTER — TELEPHONE (OUTPATIENT)
Dept: INFUSION THERAPY | Age: 82
End: 2022-12-14

## 2022-12-14 NOTE — TELEPHONE ENCOUNTER
Daughter called to cancel ov + injection for today due to pt not feeling well. Rescheduled ov with Maribel HERNANDEZ on 12/19 @ 2:15 with injection @ 2:45; daughter voiced understanding.

## 2022-12-19 ENCOUNTER — OFFICE VISIT (OUTPATIENT)
Dept: ONCOLOGY | Age: 82
End: 2022-12-19
Payer: MEDICARE

## 2022-12-19 ENCOUNTER — HOSPITAL ENCOUNTER (OUTPATIENT)
Dept: INFUSION THERAPY | Age: 82
Discharge: HOME | End: 2022-12-19
Payer: MEDICARE

## 2022-12-19 VITALS
DIASTOLIC BLOOD PRESSURE: 63 MMHG | HEIGHT: 62 IN | BODY MASS INDEX: 17.81 KG/M2 | HEART RATE: 74 BPM | TEMPERATURE: 97.7 F | OXYGEN SATURATION: 98 % | WEIGHT: 96.8 LBS | SYSTOLIC BLOOD PRESSURE: 136 MMHG

## 2022-12-19 DIAGNOSIS — E34.0 CARCINOID SYNDROME (HCC): Primary | ICD-10-CM

## 2022-12-19 LAB
ALBUMIN SERPL-MCNC: 3.5 GM/DL (ref 3.4–5)
ALP BLD-CCNC: 101 IU/L (ref 40–128)
ALT SERPL-CCNC: 10 U/L (ref 10–40)
ANION GAP SERPL CALCULATED.3IONS-SCNC: 11 MMOL/L (ref 4–16)
AST SERPL-CCNC: 16 IU/L (ref 15–37)
BASOPHILS ABSOLUTE: 0.1 K/CU MM
BASOPHILS RELATIVE PERCENT: 0.7 % (ref 0–1)
BILIRUB SERPL-MCNC: 0.3 MG/DL (ref 0–1)
BUN BLDV-MCNC: 13 MG/DL (ref 6–23)
CALCIUM SERPL-MCNC: 8.8 MG/DL (ref 8.3–10.6)
CHLORIDE BLD-SCNC: 100 MMOL/L (ref 99–110)
CO2: 24 MMOL/L (ref 21–32)
CREAT SERPL-MCNC: 0.8 MG/DL (ref 0.6–1.1)
DIFFERENTIAL TYPE: ABNORMAL
EOSINOPHILS ABSOLUTE: 0.3 K/CU MM
EOSINOPHILS RELATIVE PERCENT: 3.6 % (ref 0–3)
GFR SERPL CREATININE-BSD FRML MDRD: >60 ML/MIN/1.73M2
GLUCOSE BLD-MCNC: 132 MG/DL (ref 70–99)
HCT VFR BLD CALC: 31.8 % (ref 37–47)
HEMOGLOBIN: 9.6 GM/DL (ref 12.5–16)
LYMPHOCYTES ABSOLUTE: 1.3 K/CU MM
LYMPHOCYTES RELATIVE PERCENT: 13.3 % (ref 24–44)
MCH RBC QN AUTO: 25.7 PG (ref 27–31)
MCHC RBC AUTO-ENTMCNC: 30.2 % (ref 32–36)
MCV RBC AUTO: 85 FL (ref 78–100)
MONOCYTES ABSOLUTE: 1 K/CU MM
MONOCYTES RELATIVE PERCENT: 10.5 % (ref 0–4)
PDW BLD-RTO: 16.2 % (ref 11.7–14.9)
PLATELET # BLD: 41 K/CU MM (ref 140–440)
POTASSIUM SERPL-SCNC: 4.4 MMOL/L (ref 3.5–5.1)
RBC # BLD: 3.74 M/CU MM (ref 4.2–5.4)
SEGMENTED NEUTROPHILS ABSOLUTE COUNT: 6.8 K/CU MM
SEGMENTED NEUTROPHILS RELATIVE PERCENT: 71.9 % (ref 36–66)
SODIUM BLD-SCNC: 135 MMOL/L (ref 135–145)
TOTAL PROTEIN: 6.1 GM/DL (ref 6.4–8.2)
WBC # BLD: 9.5 K/CU MM (ref 4–10.5)

## 2022-12-19 PROCEDURE — 3078F DIAST BP <80 MM HG: CPT | Performed by: PHYSICIAN ASSISTANT

## 2022-12-19 PROCEDURE — 99214 OFFICE O/P EST MOD 30 MIN: CPT | Performed by: PHYSICIAN ASSISTANT

## 2022-12-19 PROCEDURE — 36415 COLL VENOUS BLD VENIPUNCTURE: CPT

## 2022-12-19 PROCEDURE — 1123F ACP DISCUSS/DSCN MKR DOCD: CPT | Performed by: PHYSICIAN ASSISTANT

## 2022-12-19 PROCEDURE — G8400 PT W/DXA NO RESULTS DOC: HCPCS | Performed by: PHYSICIAN ASSISTANT

## 2022-12-19 PROCEDURE — G8484 FLU IMMUNIZE NO ADMIN: HCPCS | Performed by: PHYSICIAN ASSISTANT

## 2022-12-19 PROCEDURE — 96372 THER/PROPH/DIAG INJ SC/IM: CPT

## 2022-12-19 PROCEDURE — 85025 COMPLETE CBC W/AUTO DIFF WBC: CPT

## 2022-12-19 PROCEDURE — G8427 DOCREV CUR MEDS BY ELIG CLIN: HCPCS | Performed by: PHYSICIAN ASSISTANT

## 2022-12-19 PROCEDURE — 6360000002 HC RX W HCPCS: Performed by: INTERNAL MEDICINE

## 2022-12-19 PROCEDURE — 3074F SYST BP LT 130 MM HG: CPT | Performed by: PHYSICIAN ASSISTANT

## 2022-12-19 PROCEDURE — 1090F PRES/ABSN URINE INCON ASSESS: CPT | Performed by: PHYSICIAN ASSISTANT

## 2022-12-19 PROCEDURE — 80053 COMPREHEN METABOLIC PANEL: CPT

## 2022-12-19 PROCEDURE — 1036F TOBACCO NON-USER: CPT | Performed by: PHYSICIAN ASSISTANT

## 2022-12-19 PROCEDURE — G8419 CALC BMI OUT NRM PARAM NOF/U: HCPCS | Performed by: PHYSICIAN ASSISTANT

## 2022-12-19 RX ORDER — LANREOTIDE ACETATE 120 MG/.5ML
120 INJECTION SUBCUTANEOUS ONCE
OUTPATIENT
Start: 2023-01-11 | End: 2023-01-11

## 2022-12-19 RX ORDER — LANREOTIDE ACETATE 120 MG/.5ML
120 INJECTION SUBCUTANEOUS ONCE
Status: COMPLETED | OUTPATIENT
Start: 2022-12-19 | End: 2022-12-19

## 2022-12-19 RX ADMIN — LANREOTIDE ACETATE 120 MG: 120 INJECTION SUBCUTANEOUS at 14:50

## 2022-12-19 ASSESSMENT — PATIENT HEALTH QUESTIONNAIRE - PHQ9
5. POOR APPETITE OR OVEREATING: 0
10. IF YOU CHECKED OFF ANY PROBLEMS, HOW DIFFICULT HAVE THESE PROBLEMS MADE IT FOR YOU TO DO YOUR WORK, TAKE CARE OF THINGS AT HOME, OR GET ALONG WITH OTHER PEOPLE: 2
1. LITTLE INTEREST OR PLEASURE IN DOING THINGS: 2
6. FEELING BAD ABOUT YOURSELF - OR THAT YOU ARE A FAILURE OR HAVE LET YOURSELF OR YOUR FAMILY DOWN: 0
8. MOVING OR SPEAKING SO SLOWLY THAT OTHER PEOPLE COULD HAVE NOTICED. OR THE OPPOSITE, BEING SO FIGETY OR RESTLESS THAT YOU HAVE BEEN MOVING AROUND A LOT MORE THAN USUAL: 0
SUM OF ALL RESPONSES TO PHQ9 QUESTIONS 1 & 2: 4
SUM OF ALL RESPONSES TO PHQ QUESTIONS 1-9: 9
4. FEELING TIRED OR HAVING LITTLE ENERGY: 3
7. TROUBLE CONCENTRATING ON THINGS, SUCH AS READING THE NEWSPAPER OR WATCHING TELEVISION: 1
SUM OF ALL RESPONSES TO PHQ QUESTIONS 1-9: 9
SUM OF ALL RESPONSES TO PHQ QUESTIONS 1-9: 9
2. FEELING DOWN, DEPRESSED OR HOPELESS: 2
9. THOUGHTS THAT YOU WOULD BE BETTER OFF DEAD, OR OF HURTING YOURSELF: 0
3. TROUBLE FALLING OR STAYING ASLEEP: 1
SUM OF ALL RESPONSES TO PHQ QUESTIONS 1-9: 9

## 2022-12-19 NOTE — PROGRESS NOTES
MA Rooming Questions  Patient: Stephanie Larson  MRN: [id number]    Date: 12/19/2022        1. Do you have any new issues?   no         2. Do you need any refills on medications?    no    3. Have you had any imaging done since your last visit?   no    4. Have you been hospitalized or seen in the emergency room since your last visit here?   no    5. Did the patient have a depression screening completed today?  Yes    PHQ-9 Total Score: 9 (12/19/2022  2:30 PM)  Thoughts that you would be better off dead, or of hurting yourself in some way: 0 (12/19/2022  2:30 PM)       PHQ-9 Given to (if applicable):               PHQ-9 Score (if applicable):                     [x] Positive     []  Negative              Does question #9 need addressed (if applicable)                     [] Yes    [x]  No               Kelli Heart, CMA

## 2022-12-19 NOTE — PROGRESS NOTES
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 Faiza Urias is a 30-year-old very pleasant patient with medical history significant for hyperlipidemia, gastroesophageal reflux disease, coronary artery disease, depression and adjustment disorder, osteoporosis, diabetes mellitus, and history of small intestinal carcinoid tumor, status post surgery by Dr. Kenny Walker in , initially referred to me on 2014, for evaluation of thrombocytopenia. She stated that she was found to have thrombocytopenia on routine blood tests done on 2014. Repeat blood tests on 2014, showed persistent thrombocytopenia and she was referred to me for evaluation. She does not have leucopenia or anemia. She complains of severe tiredness and fatigue. Besides that she does not have any other significant symptoms. She denies bleeding diathesis too. Laboratory workups done on 2014, showed platelet count of 37, however, manual differential showed platelet count of 97. Her white blood cell count was 6.4 and hemoglobin was 12.2. Her LDH is mildly elevated (261), vitamin B12 normal (154), normal folate (more than 20), negative hepatitis panel, antinuclear antibody was not detected and negative rheumatoid factor and normal TSH (2.4). Since all the workups have been within normal range, I believe her mild thrombocytopenia is most likely due to immune thrombocytopenia. Ms. Faiza Urias started following with Dr. aRvi Figueroa at [facility] for immune thrombocytopenia.  Since her platelet count dropped below 20,000 per cubic millimeter, she was treated with prednisone. She responded well to prednisone; however, her platelet is always in her 40,000 range when she started tapering off the prednisone. She discussed about the splenectomy and Ms. Judge Hubbard was not sure she really wanted to proceed with splenectomy at this moment. Since Ms. Diane's insurance does not cover [facility], she was referred back to me for continuation of care for her immune thrombocytopenia. Since Ms. Bowman thrombocytopenia has gotten better, I stopped prednisone since April 29, 2016. She has been under close observation since then. Ms. Judge Hubbard was found to have suspicious lesion in her mid back by Dermatologist, Dr. Ne Jean and she referred her to Dr. Florina Peralta at [facility] for further evaluation. She initially underwent wide excision and sentinel lymph node biopsy on April 14, 2017. The initial pathology revealed 4 mm in Breslows depth, Charles level IV, non-ulcerated, with 2 mitosis/mm2. 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 Since she has stage IIB malignant melanoma, I recommend for close observation only. She has been under close observation since then. She had PET-CT scan and it showed mesenteric calcified mass. She was subsequently referred to the Gastroenterologist for EUS since she had history of carcinoid tumor. Ms. Judge Hubbard underwent endoscopic ultrasound by Dr. Arti Manning on May 10, 2017, and it showed a mass in the parapancreatic region, 3 cm in size. 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 The ki-67 was less than 2%. Tumor cells are positive for AE1/3, synaptophysin, and chromogranin.       She subsequently underwent nuclear PET neuroendocrine scan on July 21, 2017, and it showed a soft tissue mass in the pancreatic uncinate process with intense radiotracer activity, compatible with somatostatin receptor-avid malignancy. Paraesophageal lymph node in the chest at the level of the rafiq and aortocaval lymph node with intense radiotracer activity, compatible with somatostatin receptor-avid robbie metastasis. Since she was found to have metastatic lymph node involvement in intrathoracic and abdominal lymph nodes, she is not a surgical candidate. Tumor markers were reviewed and she was found to have mild elevation in chromogranin A (136) but her other tumor markers were within normal range. Since she has been having diarrhea (at least 5-6 times a day) and her tumors are octreotide avid, she was started with octreotide since September 1, 2017. She was started with hormone therapy, octreotide for tumor stabilization as well as for the symptom management (diarrhea). Since she lives in [facility], her case was subsequently referred to us for continuation of octreotide injection locally here in [facility]. Octreotide was started in our center since October 5, 2017. CT scan of the chest, abdomen and pelvis done on December 23, 2020 showed interval increase in size of a soft tissue mass with scattered calcifications posterior to the pancreatic head currently measuring up to 4 cm, previously 3.3 cm. No evidence of metastatic disease in the chest.    CT scan of the abdomen and pelvis done on April 27, 2021 showed a stable appearance of a pancreatic mass measuring 3.3 x 4 cm. CT chest, abdomen and pelvis done on 5/5/2022 showed no metastatic disease in the chest.  Essentially stable 36 mm mass in the retroperitoneum encasing the SMA with adjacent 18 mm calcified mesenteric nodule corresponding to the history of carcinoid. Otherwise no metastatic disease or adenopathy in the abdomen or pelvis. Chronic appearing mild to moderate T3 and L1 compression fractures. She was seen at [facility] due to abdominal pain.  She had CT abd/pelvis which showed mild mural thickening in the sigmoid colon and rectum, may indicate underlying infectious or inflammatory proctocolitis, and increased size of soft tissue mass in RUQ with associated calcification since prior exam. We did request comparison to most recent study at [facility]. She has ongoing abdominal discomfort and we started Bentyl with improvement. Reports improvement of diarrhea since addition of telotristat 250 mg TID which was started 5/25/22. She stopped it on 7/20/22 after she had episodes of constipation. On August 11, 2022, she presented to me for followup. I have been following Ms. Diane for immune thrombocytopenia, history of stage IIB malignant melanoma, and the recurrent carcinoid tumor. 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 She stated that her diarrhea is getting worse lately. She continues to have significant diarrhea even though she is taking lomotil (~ 8 tablets daily). I reviewed with her findings on CT scan done on 5/5/22. She has at least stable disease noted on CT scan. She has been loosing weight a lot lately due to uncontrolled diarrhea. Since her carcinoid syndrome becomes refractory to lanreotide, I recommend to add telotristat 250 mg TID. Lanreotide and telotristat 250 mg TID was started since 5/25/22. Her diarrhea got better since adding telotristat. She has decided to stop telotristat on 7/20/22 after episodes of constipation. Continues to have diarrhea but states it is stable and manageable. Reviewed CT findings at [place]. Will plan to have repeat scan in 4 months. Ordered in December 2022. I recommend her to continue with lanreotide for now. Malignancy associated pain - Her pain has been controlled well with Norco 5/325 mg every 6 hourly as needed basis. I will continue to follow her pain status closely. Her platelet count was 56,528/[facility] on 4/21/22. 94k in September 2022. I will continue to monitor closely.   She has no recent labs for comparison, will order on this visit. Depression - stated that she still has depression. I recognized that she hasn't had bupropion and she is only on effexor. I will start bupropion today and sent the script for her. Hyperlipidemia - she is on lipitor. Hypertension, CAD - she is on aspirin, amlodipine and carvedilol. Recommend salt restriction. GERD - stable on pantoprazole. Diabetes - she is on metformin. Recommend ADA diet. Health maintenance - I recommend her to have age-appropriate cancer screening, exercise, low-fat and low-sodium diet. She doesn't have any significant new symptoms on today's visit. PAST MEDICAL HISTORY:  Significant for:  1. Hyperlipidemia. 2.         Diabetes mellitus. 3.         Coronary artery disease. 4.         Gastroesophageal reflux disease. 5.         History of depression and adjustment disorder. 6.         Osteoporosis. 7.         History of small intestinal cancer, status post surgery by Dr. Juvencio Sanchez in [address] [place] [address]. PAST SURGICAL HISTORY:  Significant for:  1. Appendectomy in 1959.  2.         Coronary artery angioplasty in 1992. 3.         Small intestine cancer removal in 1998.  4.         Cholecystectomy in 1998.  5.         Open heart surgery in March 31, 2009. 6.         Melanoma surgery on October 10, 2011. 7.         Hysterectomy in 1996. FAMILY HISTORY:  Significant for melanoma in her brother and sister. No other family history of cancer disease. SOCIAL HISTORY:  She denies smoking, alcohol drinking, and illicit drug abuse. She is  for 55 years and has four children. She is retiree from the [employer]. ALLERGIES:  No known drug allergies. Review of Systems: \"Per interval history; otherwise 10 point ROS is negative. \"  Her energy level is fair and her sleep is fine. She doesn't have fever, chills, night sweats, cough, shortness of breath, chest pain, hemoptysis or palpitations.   Her bowels and bladder functions are normal, except abdominal pain and ongoing chronic significant diarrhea. She denies nausea, vomiting,, constipation or dysuria. She has some loss of appetite and weight loss. She doesn't have neuropathy and she denies bleeding or clotting disorder. She has abdominal pain. Denies anxiety. She has depression. She denies any suicidal idea. The rests of the systems are unremarkable. Vital Signs:  /63 (Site: Right Upper Arm, Position: Sitting, Cuff Size: Medium Adult)   Pulse 74   Temp 97.7 °F (36.5 °C) (Infrared)   Ht 5' 2\" (1.575 m)   Wt 96 lb 12.8 oz (43.9 kg)   SpO2 98%   BMI 17.70 kg/m²     Physical Exam:  CONSTITUTIONAL: alert, cooperative, awake, no apparent distress   EYES: pupils equal, round and reactive to light, sclera clear and conjunctiva normal  ENT: without obvious abnormality, normocephalic, atraumatic  NECK: supple, symmetrical, no jugular venous distension and no carotid bruits   HEMATOLOGIC/LYMPHATIC: no cervical, supraclavicular or axillary lymphadenopathy   LUNGS: VBS, no wheezes, clear to auscultation, no crackles, no increased work of breathing, no rhonchi,        CARDIOVASCULAR: regular rate and rhythm, normal S1 and S2, no murmur noted  ABDOMEN: soft, non-distended, normal bowel sounds x 4, non-tender, no masses palpated, no hepatosplenomegaly   MUSCULOSKELETAL: full range of motion noted, tone is normal  NEUROLOGIC: awake, alert, oriented to name, place and time. Motor skills grossly intact. SKIN: Normal skin color, texture, turgor and no jaundice.  appears intact   EXTREMITIES: no leg swelling, no clubbing, no cyanosis, no LE edema,      Labs:  Hematology:  Lab Results   Component Value Date    WBC 12.5 (H) 10/11/2022    RBC 2.74 (L) 10/11/2022    HGB 7.7 (L) 10/12/2022    HCT 25.0 (L) 10/12/2022    MCV 90.5 10/11/2022    MCH 27.4 10/11/2022    MCHC 30.2 (L) 10/11/2022    RDW 17.9 (H) 10/11/2022     10/11/2022    MPV 10.4 10/11/2022    [facility] 07/[facility]/20[facility]    SEGSPCT 80.1 (H) 10/11/2022    EOSRELPCT 3.3 (H) 10/11/2022    BASOPCT 0.1 10/11/2022    LYMPHOPCT 8.8 (L) 10/11/2022    MONOPCT 7.1 (H) 10/11/2022    BANDABS 0.07 07/21/2022    SEGSABS 10.0 10/11/2022    EOSABS 0.4 10/11/2022    BASOSABS 0.0 10/11/2022    LYMPHSABS 1.1 10/11/2022    MONOSABS 0.9 10/11/2022    DIFFTYPE AUTOMATED DIFFERENTIAL 10/11/2022    ANISOCYTOSIS 1+ 05/27/2021    POLYCHROM 1+ 03/29/2017    WBCMORP  04/17/2021     SLIDE SCANNED  MANUAL DIFFERENTIAL APPEARS TO MATCH AUTOMATED DIFFERENTIAL WELL    PLTM DECREASED 04/27/2021     Lab Results   Component Value Date    ESR 53 (H) 09/23/2022     Chemistry:  Lab Results   Component Value Date     10/11/2022    K 3.5 10/11/2022     10/11/2022    CO2 22 10/11/2022    BUN 14 10/11/2022    CREATININE 0.6 10/11/2022    GLUCOSE 139 (H) 10/11/2022    CALCIUM 7.7 (L) 10/11/2022    PROT 4.8 (L) 10/07/2022    LABALBU 3.2 (L) 10/07/2022    BILITOT 0.4 10/07/2022    ALKPHOS 77 10/07/2022    AST 14 (L) 10/07/2022    ALT 11 10/07/2022    LABGLOM >60 10/11/2022    GFRAA >60 10/11/2022    PHOS 3.6 09/27/2022    MG 1.6 (L) 10/11/2022    POCGLU 175 (H) 10/12/2022     Lab Results   Component Value Date     11/09/2021    HOMOCYSTEINE (H) 10/15/2019     17.8  Risk of vascular disease   increases above 9 umol/L  and is significant >15 umol/L. No components found for: LD  Lab Results   Component Value Date    TSHHS 1.880 09/23/2022    T4FREE 1.13 11/24/2014    FT3 2.9 11/24/2014     Immunology:  Lab Results   Component Value Date    PROT 4.8 (L) 10/07/2022    SPEP  04/17/2021     INTERPRETATION - Decreased albumin and total proteins.   LP.    ALBUMINELP 3.0 (L) 04/17/2021    LABALPH 0.2 04/17/2021    LABALPH 0.5 04/17/2021    LABBETA 0.8 04/17/2021    GAMGLOB 0.8 04/17/2021     No results found for: [facility] Whiting, [facility]  No results found for: B2M  Coagulation Panel:  Lab Results   Component Value Date    PROTIME 11.6 (L) 10/04/2022    INR 0.90 10/04/2022    APTT 27.3 10/04/2022     Anemia Panel:  Lab Results   Component Value Date    ZQLUSGLV84 450.2 09/23/2022    FOLATE >20.0 (H) 09/23/2022     Tumor Markers:  No results found for: , CEA, , LABCA2, PSA  Observations:  PHQ-9 Total Score: 9 (12/19/2022  2:30 PM)  Thoughts that you would be better off dead, or of hurting yourself in some way: 0 (12/19/2022  2:30 PM)      Assessment: Thrombocytopenia - most likely immune thrombocytopenia. Stage IIB malignant melanoma. Recurrent/metastatic small intestine carcinoid tumor. Plan:  Ms. Zulema Fischer has been followed for immune thrombocytopenia, history of stage IIB malignant melanoma, and the recurrent carcinoid tumor. CT scan of the chest, abdomen and pelvis done on December 23, 2020 showed interval increase in size of a soft tissue mass with scattered calcifications posterior to the pancreatic head currently measuring up to 4 cm, previously 3.3 cm. No evidence of metastatic disease in the chest.    CT scan of the abdomen and pelvis done on April 27, 2021 showed a stable appearance of a pancreatic mass measuring 3.3 x 4 cm. CT chest, abdomen and pelvis done on 5/5/2022 showed no metastatic disease in the chest.  Essentially stable 36 mm mass in the retroperitoneum encasing the SMA with adjacent 18 mm calcified mesenteric nodule corresponding to the history of carcinoid. Otherwise no metastatic disease or adenopathy in the abdomen or pelvis. Chronic appearing mild to moderate T3 and L1 compression fractures. She was seen at [facility] ER due to abdominal pain. She had CT abd/pelvis which showed mild mural thickening in the sigmoid colon and rectum, may indicate underlying infectious or inflammatory proctocolitis, and increased size of soft tissue mass in RUQ with associated calcification since prior exam. We did request comparison to most recent study at [facility].      She has ongoing abdominal discomfort and we started Bentyl with improvement. Reports improvement of diarrhea since addition of telotristat 250 mg TID which was started 5/25/22. She stopped it on 7/20/22 after she had episodes of constipation. On August 11, 2022, she presented to me for followup. I have been following Ms. Diane for immune thrombocytopenia, history of stage IIB malignant melanoma, and the recurrent carcinoid tumor. 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 She stated that her diarrhea is getting worse lately. She continues to have significant diarrhea even though she is taking lomotil (~ 8 tablets daily). I reviewed with her findings on CT scan done on 5/5/22. She has at least stable disease noted on CT scan. She has been loosing weight a lot lately due to uncontrolled diarrhea. Since her carcinoid syndrome becomes refractory to lanreotide, I recommend to add telotristat 250 mg TID. Lanreotide and telotristat 250 mg TID was started since 5/25/22. Her diarrhea got better since adding telotristat. She has decided to stop telotristat on 7/20/22 after episodes of constipation. Continues to have diarrhea but states it is stable and manageable. Reviewed CT findings at [facility]. Will plan to have repeat scan in 4 months. Ordered in December 2022. I recommend her to continue with lanreotide for now. Malignancy associated pain - Her pain has been controlled well with Norco 5/325 mg every 6 hourly as needed basis. I will continue to follow her pain status closely. Her platelet count was 56,385/[facility] on 4/21/22. 94k in September 2022. I will continue to monitor closely. She has no recent labs for comparison, will order on this visit. Depression - stated that she still has depression. I recognized that she hasn't had bupropion and she is only on effexor. I will start bupropion today and sent the script for her. Hyperlipidemia - she is on lipitor.      Hypertension, CAD - she is on aspirin, amlodipine and carvedilol. Recommend salt restriction. GERD - stable on pantoprazole. Diabetes - she is on metformin. Recommend ADA diet. Health maintenance - I recommend her to have age-appropriate cancer screening, exercise, low-fat and low-sodium diet. I answered all her questions and concerns for today. Recent imaging and labs were reviewed and discussed with the patient. Maribel Novak PA-C    Portions of this note are copied forward from previous clinic note. Interval history, ROS, physical exam, assessment and plan has been reviewed and updated for accuracy by this provider.

## 2022-12-19 NOTE — PROGRESS NOTES
Pt here for Lanreotide injection after OV. Pt has no complaints. Injection given SQ to right buttock area. Cold spray utilized. Pt tolerated with no complaints. Left by wheelchair to lab.  Discharge instructions given

## 2023-01-11 ENCOUNTER — HOSPITAL ENCOUNTER (OUTPATIENT)
Dept: INFUSION THERAPY | Age: 83
Discharge: HOME | End: 2023-01-11
Payer: MEDICARE

## 2023-01-11 ENCOUNTER — TELEPHONE (OUTPATIENT)
Dept: INFUSION THERAPY | Age: 83
End: 2023-01-11

## 2023-01-11 DIAGNOSIS — E34.0 CARCINOID SYNDROME (HCC): Primary | ICD-10-CM

## 2023-01-11 PROCEDURE — 6360000002 HC RX W HCPCS: Performed by: INTERNAL MEDICINE

## 2023-01-11 PROCEDURE — 96402 CHEMO HORMON ANTINEOPL SQ/IM: CPT

## 2023-01-11 RX ORDER — LANREOTIDE ACETATE 120 MG/.5ML
120 INJECTION SUBCUTANEOUS ONCE
Status: COMPLETED | OUTPATIENT
Start: 2023-01-11 | End: 2023-01-11

## 2023-01-11 RX ORDER — LANREOTIDE ACETATE 120 MG/.5ML
120 INJECTION SUBCUTANEOUS ONCE
OUTPATIENT
Start: 2023-01-16 | End: 2023-01-16

## 2023-01-11 RX ADMIN — LANREOTIDE ACETATE 120 MG: 120 INJECTION SUBCUTANEOUS at 11:37

## 2023-01-11 NOTE — TELEPHONE ENCOUNTER
Daughter Teagan LONNIE asking if we could change pt's injection time today from 2:00 to a morning appt due to a conflict.    Returned call @ 8:45 & changed to 11:15; daughter voiced understanding.

## 2023-01-11 NOTE — PROGRESS NOTES
Patient arrived to treatment suite from doctor appointment for Lanreotide injection. No questions or concerns for the doctor at this time. Treatment approved and given subcutaneously in right buttocks per patient request, band-aid applied. Cold spray utilized for comfort. Patient tolerated well. Left treatment suite with assistance from caregiver in wheelchair. Discharge instructions provided.

## 2023-02-06 RX ORDER — LANOLIN ALCOHOL/MO/W.PET/CERES
CREAM (GRAM) TOPICAL
Qty: 30 TABLET | Refills: 5 | Status: SHIPPED | OUTPATIENT
Start: 2023-02-06

## 2023-02-08 ENCOUNTER — HOSPITAL ENCOUNTER (OUTPATIENT)
Dept: INFUSION THERAPY | Age: 83
Discharge: HOME | End: 2023-02-08
Payer: MEDICARE

## 2023-02-08 DIAGNOSIS — E34.0 CARCINOID SYNDROME (HCC): Primary | ICD-10-CM

## 2023-02-08 PROCEDURE — 96402 CHEMO HORMON ANTINEOPL SQ/IM: CPT

## 2023-02-08 PROCEDURE — 6360000002 HC RX W HCPCS: Performed by: INTERNAL MEDICINE

## 2023-02-08 RX ORDER — LANREOTIDE ACETATE 120 MG/.5ML
120 INJECTION SUBCUTANEOUS ONCE
OUTPATIENT
Start: 2023-03-08 | End: 2023-03-08

## 2023-02-08 RX ORDER — LANREOTIDE ACETATE 120 MG/.5ML
120 INJECTION SUBCUTANEOUS ONCE
Status: COMPLETED | OUTPATIENT
Start: 2023-02-08 | End: 2023-02-08

## 2023-02-08 RX ADMIN — LANREOTIDE ACETATE 120 MG: 120 INJECTION SUBCUTANEOUS at 14:13

## 2023-02-08 NOTE — PROGRESS NOTES
Patient arrived to treatment suite  for a Lanreotide injection. No questions or concerns for the doctor at this time. Treatment approved and given subcutaneously in LT buttocks per patient request, band-aid applied. Cold spray utilized for comfort. Patient tolerated well. Left treatment suite with assistance from caregiver in wheelchair. Discharge instructions provided.

## 2023-03-08 ENCOUNTER — HOSPITAL ENCOUNTER (OUTPATIENT)
Dept: INFUSION THERAPY | Age: 83
Discharge: HOME | End: 2023-03-08
Payer: MEDICARE

## 2023-03-08 DIAGNOSIS — E34.0 CARCINOID SYNDROME (HCC): Primary | ICD-10-CM

## 2023-03-08 PROCEDURE — 6360000002 HC RX W HCPCS: Performed by: INTERNAL MEDICINE

## 2023-03-08 PROCEDURE — 96402 CHEMO HORMON ANTINEOPL SQ/IM: CPT

## 2023-03-08 PROCEDURE — 96372 THER/PROPH/DIAG INJ SC/IM: CPT

## 2023-03-08 RX ORDER — LANREOTIDE ACETATE 120 MG/.5ML
120 INJECTION SUBCUTANEOUS ONCE
OUTPATIENT
Start: 2023-04-05 | End: 2023-04-05

## 2023-03-08 RX ORDER — LANREOTIDE ACETATE 120 MG/.5ML
120 INJECTION SUBCUTANEOUS ONCE
Status: COMPLETED | OUTPATIENT
Start: 2023-03-08 | End: 2023-03-08

## 2023-03-08 RX ADMIN — LANREOTIDE ACETATE 120 MG: 120 INJECTION SUBCUTANEOUS at 14:59

## 2023-03-08 NOTE — PROGRESS NOTES
Patient arrived to treatment suite from doctor appointment for Lanreotide injection. No questions or concerns for the doctor at this time. Treatment approved and given subcutaneously in right buttocks per patient request, band-aid applied. Cold spray utilized for comfort. Patient tolerated well. Left treatment suite with assistance from caregiver in wheelchair. Discharge instructions declined.

## 2023-04-13 ENCOUNTER — HOSPITAL ENCOUNTER (OUTPATIENT)
Dept: INFUSION THERAPY | Age: 83
Discharge: HOME | End: 2023-04-13
Payer: MEDICARE

## 2023-04-13 DIAGNOSIS — E34.0 CARCINOID SYNDROME (HCC): Primary | ICD-10-CM

## 2023-04-13 PROCEDURE — 6360000002 HC RX W HCPCS: Performed by: INTERNAL MEDICINE

## 2023-04-13 PROCEDURE — 96372 THER/PROPH/DIAG INJ SC/IM: CPT

## 2023-04-13 PROCEDURE — 96402 CHEMO HORMON ANTINEOPL SQ/IM: CPT

## 2023-04-13 RX ORDER — LANREOTIDE ACETATE 120 MG/.5ML
120 INJECTION SUBCUTANEOUS ONCE
Status: COMPLETED | OUTPATIENT
Start: 2023-04-13 | End: 2023-04-13

## 2023-04-13 RX ORDER — LANREOTIDE ACETATE 120 MG/.5ML
120 INJECTION SUBCUTANEOUS ONCE
OUTPATIENT
Start: 2023-05-04 | End: 2023-05-04

## 2023-04-13 RX ADMIN — LANREOTIDE ACETATE 120 MG: 120 INJECTION SUBCUTANEOUS at 14:58

## 2023-04-13 NOTE — PROGRESS NOTES
Patient arrived to treatment suite  for a Lanreotide injection. No questions or concerns for the doctor at this time. Treatment approved and given subcutaneously in LT buttocks per patient request, band-aid applied. Patient tolerated well. Left treatment suite with assistance from caregiver in wheelchair.   Discharge instructions provided

## 2023-05-12 ENCOUNTER — TELEPHONE (OUTPATIENT)
Dept: INFUSION THERAPY | Age: 83
End: 2023-05-12

## 2023-05-17 NOTE — TELEPHONE ENCOUNTER
Final attempt to contact pt in regards to missed appt on 5/11. Will leave her scheduled for her next injection on 6/8.  LMOVM for a return call

## 2023-05-25 ENCOUNTER — OFFICE VISIT (OUTPATIENT)
Dept: ONCOLOGY | Age: 83
End: 2023-05-25
Payer: MEDICARE

## 2023-05-25 ENCOUNTER — HOSPITAL ENCOUNTER (OUTPATIENT)
Dept: INFUSION THERAPY | Age: 83
Discharge: HOME | End: 2023-05-25
Payer: MEDICARE

## 2023-05-25 VITALS
SYSTOLIC BLOOD PRESSURE: 128 MMHG | OXYGEN SATURATION: 98 % | BODY MASS INDEX: 16.86 KG/M2 | DIASTOLIC BLOOD PRESSURE: 63 MMHG | HEIGHT: 62 IN | WEIGHT: 91.6 LBS | HEART RATE: 67 BPM | TEMPERATURE: 97.7 F

## 2023-05-25 DIAGNOSIS — E34.0 CARCINOID SYNDROME (HCC): Primary | ICD-10-CM

## 2023-05-25 PROCEDURE — 99213 OFFICE O/P EST LOW 20 MIN: CPT | Performed by: INTERNAL MEDICINE

## 2023-05-25 PROCEDURE — G8419 CALC BMI OUT NRM PARAM NOF/U: HCPCS | Performed by: INTERNAL MEDICINE

## 2023-05-25 PROCEDURE — 1123F ACP DISCUSS/DSCN MKR DOCD: CPT | Performed by: INTERNAL MEDICINE

## 2023-05-25 PROCEDURE — G8427 DOCREV CUR MEDS BY ELIG CLIN: HCPCS | Performed by: INTERNAL MEDICINE

## 2023-05-25 PROCEDURE — 1036F TOBACCO NON-USER: CPT | Performed by: INTERNAL MEDICINE

## 2023-05-25 PROCEDURE — 3074F SYST BP LT 130 MM HG: CPT | Performed by: INTERNAL MEDICINE

## 2023-05-25 PROCEDURE — 3078F DIAST BP <80 MM HG: CPT | Performed by: INTERNAL MEDICINE

## 2023-05-25 PROCEDURE — 1090F PRES/ABSN URINE INCON ASSESS: CPT | Performed by: INTERNAL MEDICINE

## 2023-05-25 PROCEDURE — 6360000002 HC RX W HCPCS: Performed by: INTERNAL MEDICINE

## 2023-05-25 PROCEDURE — 96402 CHEMO HORMON ANTINEOPL SQ/IM: CPT

## 2023-05-25 PROCEDURE — G8400 PT W/DXA NO RESULTS DOC: HCPCS | Performed by: INTERNAL MEDICINE

## 2023-05-25 RX ORDER — OXYCODONE HYDROCHLORIDE 5 MG/1
TABLET ORAL
COMMUNITY
Start: 2023-04-19

## 2023-05-25 RX ORDER — LANREOTIDE ACETATE 120 MG/.5ML
120 INJECTION SUBCUTANEOUS ONCE
Status: COMPLETED | OUTPATIENT
Start: 2023-05-25 | End: 2023-05-25

## 2023-05-25 RX ORDER — LANREOTIDE ACETATE 120 MG/.5ML
120 INJECTION SUBCUTANEOUS ONCE
OUTPATIENT
Start: 2023-06-08 | End: 2023-06-08

## 2023-05-25 RX ADMIN — LANREOTIDE ACETATE 120 MG: 120 INJECTION SUBCUTANEOUS at 15:45

## 2023-05-25 ASSESSMENT — PATIENT HEALTH QUESTIONNAIRE - PHQ9
SUM OF ALL RESPONSES TO PHQ QUESTIONS 1-9: 2
SUM OF ALL RESPONSES TO PHQ9 QUESTIONS 1 & 2: 2
SUM OF ALL RESPONSES TO PHQ QUESTIONS 1-9: 2
2. FEELING DOWN, DEPRESSED OR HOPELESS: 1
1. LITTLE INTEREST OR PLEASURE IN DOING THINGS: 1

## 2023-05-25 NOTE — PROGRESS NOTES
MA Rooming Questions  Patient: Paola Aguilera  MRN: [id number]    Date: 5/25/2023        1. Do you have any new issues? yes - patient states her allergies are bothering her          2. Do you need any refills on medications?    no    3. Have you had any imaging done since your last visit?   no    4. Have you been hospitalized or seen in the emergency room since your last visit here?   no    5. Did the patient have a depression screening completed today?  Yes    PHQ-9 Total Score: 2 (5/25/2023  3:32 PM)       PHQ-9 Given to (if applicable):               PHQ-9 Score (if applicable):                     [] Positive     []  Negative              Does question #9 need addressed (if applicable)                     [] Yes    []  No               Nayeli Andrews CMA
immune thrombocytopenia, history of stage IIB malignant melanoma, and the recurrent carcinoid tumor. 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 She stated that her diarrhea is getting worse lately. She continues to have significant diarrhea even though she is taking lomotil (~ 8 tablets daily). I reviewed with her findings on CT scan done on 5/5/22. She has at least stable disease noted on CT scan. She has been loosing weight a lot lately due to uncontrolled diarrhea. Since her carcinoid syndrome becomes refractory to lanreotide, I recommend to add telotristat 250 mg TID. Lanreotide and telotristat 250 mg TID was started since 5/25/22. Her diarrhea got better since adding telotristat. She has decided to stop telotristat on 7/20/22 after episodes of constipation. [facility] Recommend her to have repeat CT scan in 2 months. I will repeat her labs on next injection day. I recommend her to continue with lanreotide for now. Malignancy associated pain - Her pain has been controlled well with Norco 5/325 mg every 6 hourly as needed basis. I will continue to follow her pain status closely. Her platelet count was 14,809/[facility] on 12/19/22. I will continue to monitor closely. Depression - stated that she still has depression. I recognized that she hasn't had bupropion and she is only on effexor. I will start bupropion today and sent the script for her. Hyperlipidemia - she is on lipitor. Hypertension, CAD - she is on aspirin, amlodipine and carvedilol. Recommend salt restriction. GERD - stable on pantoprazole. Diabetes - she is on metformin. Recommend ADA diet. Health maintenance - I recommend her to have age-appropriate cancer screening, exercise, low-fat and low-sodium diet. I answered all her questions and concerns for today. Recent imaging and labs were reviewed and discussed with the patient.

## 2023-05-25 NOTE — PROGRESS NOTES
Patient ambulated into treatment suite  for a Lanreotide injection. Pt has an office visit with Dr. Saúl Saenz today as well. Treatment approved and given subcutaneously in LT buttocks per patient request, band-aid applied. Patient tolerated well. Left treatment suite with assistance from caregiver in wheelchair.

## 2023-05-31 ENCOUNTER — TELEPHONE (OUTPATIENT)
Dept: ONCOLOGY | Age: 83
End: 2023-05-31

## 2023-05-31 NOTE — TELEPHONE ENCOUNTER
5/31/23 - left pt's daughter, Melba Thomson for the 6/23/23 ct scans at [facility] arrival time of 10:00 am and NPO 4 hours prior. No metformin 48 hours after the scan.  I left my direct ine [phone] to call back and  confirm

## 2023-06-19 PROBLEM — R53.83 FATIGUE: Status: ACTIVE | Noted: 2023-06-19

## 2023-06-23 ENCOUNTER — HOSPITAL ENCOUNTER (OUTPATIENT)
Dept: CT IMAGING | Age: 83
Discharge: HOME | End: 2023-06-23
Attending: INTERNAL MEDICINE
Payer: MEDICARE

## 2023-06-23 ENCOUNTER — HOSPITAL ENCOUNTER (OUTPATIENT)
Dept: INFUSION THERAPY | Age: 83
Discharge: HOME | End: 2023-06-23
Payer: MEDICARE

## 2023-06-23 DIAGNOSIS — E34.0 CARCINOID SYNDROME (HCC): Primary | ICD-10-CM

## 2023-06-23 DIAGNOSIS — E34.0 CARCINOID SYNDROME (HCC): ICD-10-CM

## 2023-06-23 LAB
EGFR, POC: 38 ML/MIN/1.73M2
POC CREATININE: 1.4 MG/DL (ref 0.6–1.1)

## 2023-06-23 PROCEDURE — 82565 ASSAY OF CREATININE: CPT

## 2023-06-23 PROCEDURE — 6360000002 HC RX W HCPCS: Performed by: INTERNAL MEDICINE

## 2023-06-23 PROCEDURE — 74176 CT ABD & PELVIS W/O CONTRAST: CPT

## 2023-06-23 PROCEDURE — 71250 CT THORAX DX C-: CPT

## 2023-06-23 PROCEDURE — 96372 THER/PROPH/DIAG INJ SC/IM: CPT

## 2023-06-23 PROCEDURE — 6360000004 HC RX CONTRAST MEDICATION: Performed by: INTERNAL MEDICINE

## 2023-06-23 RX ORDER — SODIUM CHLORIDE 0.9 % (FLUSH) 0.9 %
10 SYRINGE (ML) INJECTION PRN
Status: DISCONTINUED | OUTPATIENT
Start: 2023-06-23 | End: 2023-06-24 | Stop reason: HOSPADM

## 2023-06-23 RX ORDER — LANREOTIDE ACETATE 120 MG/.5ML
120 INJECTION SUBCUTANEOUS ONCE
Status: COMPLETED | OUTPATIENT
Start: 2023-06-23 | End: 2023-06-23

## 2023-06-23 RX ORDER — LANREOTIDE ACETATE 120 MG/.5ML
120 INJECTION SUBCUTANEOUS ONCE
OUTPATIENT
Start: 2023-07-21 | End: 2023-07-21

## 2023-06-23 RX ADMIN — IOPAMIDOL 18 ML: 755 INJECTION, SOLUTION INTRAVENOUS at 10:15

## 2023-06-23 RX ADMIN — LANREOTIDE ACETATE 120 MG: 120 INJECTION SUBCUTANEOUS at 11:51

## 2023-06-23 NOTE — PROGRESS NOTES
Pt. Here for injection. Lanreotide injection given in left buttocks, pt. Tolerated well. Discharge AVS given.

## 2023-07-17 RX ORDER — TRIAMCINOLONE ACETONIDE 0.25 MG/G
OINTMENT TOPICAL
Qty: 60 G | Refills: 2 | Status: SHIPPED | OUTPATIENT
Start: 2023-07-17

## 2023-07-20 ENCOUNTER — OFFICE VISIT (OUTPATIENT)
Dept: ONCOLOGY | Age: 83
End: 2023-07-20
Payer: MEDICARE

## 2023-07-20 ENCOUNTER — HOSPITAL ENCOUNTER (OUTPATIENT)
Dept: INFUSION THERAPY | Age: 83
Discharge: HOME | End: 2023-07-20
Attending: INTERNAL MEDICINE
Payer: MEDICARE

## 2023-07-20 VITALS
BODY MASS INDEX: 16.75 KG/M2 | OXYGEN SATURATION: 99 % | HEIGHT: 62 IN | SYSTOLIC BLOOD PRESSURE: 122 MMHG | HEART RATE: 96 BPM | DIASTOLIC BLOOD PRESSURE: 70 MMHG

## 2023-07-20 DIAGNOSIS — D64.9 ANEMIA, UNSPECIFIED TYPE: ICD-10-CM

## 2023-07-20 DIAGNOSIS — D69.3 IMMUNE THROMBOCYTOPENIC PURPURA (HCC): ICD-10-CM

## 2023-07-20 DIAGNOSIS — R53.83 FATIGUE, UNSPECIFIED TYPE: ICD-10-CM

## 2023-07-20 DIAGNOSIS — E34.0 CARCINOID SYNDROME (HCC): Primary | ICD-10-CM

## 2023-07-20 LAB
ALBUMIN SERPL-MCNC: 3.9 GM/DL (ref 3.4–5)
ALP BLD-CCNC: 79 IU/L (ref 40–129)
ALT SERPL-CCNC: 9 U/L (ref 10–40)
ANION GAP SERPL CALCULATED.3IONS-SCNC: 12 MMOL/L (ref 4–16)
AST SERPL-CCNC: 11 IU/L (ref 15–37)
BASOPHILS ABSOLUTE: 0 K/CU MM
BASOPHILS RELATIVE PERCENT: 0.4 % (ref 0–1)
BILIRUB SERPL-MCNC: 0.5 MG/DL (ref 0–1)
BUN SERPL-MCNC: 13 MG/DL (ref 6–23)
CALCIUM SERPL-MCNC: 8.5 MG/DL (ref 8.3–10.6)
CHLORIDE BLD-SCNC: 101 MMOL/L (ref 99–110)
CO2: 21 MMOL/L (ref 21–32)
CREAT SERPL-MCNC: 1 MG/DL (ref 0.6–1.1)
DIFFERENTIAL TYPE: ABNORMAL
EOSINOPHILS ABSOLUTE: 0.4 K/CU MM
EOSINOPHILS RELATIVE PERCENT: 3.2 % (ref 0–3)
GFR SERPL CREATININE-BSD FRML MDRD: 57 ML/MIN/1.73M2
GLUCOSE SERPL-MCNC: 179 MG/DL (ref 70–99)
HCT VFR BLD CALC: 31.9 % (ref 37–47)
HEMOGLOBIN: 10 GM/DL (ref 12.5–16)
LACTATE DEHYDROGENASE: 194 IU/L (ref 120–246)
LYMPHOCYTES ABSOLUTE: 1.4 K/CU MM
LYMPHOCYTES RELATIVE PERCENT: 12.6 % (ref 24–44)
MCH RBC QN AUTO: 25.9 PG (ref 27–31)
MCHC RBC AUTO-ENTMCNC: 31.3 % (ref 32–36)
MCV RBC AUTO: 82.6 FL (ref 78–100)
MONOCYTES ABSOLUTE: 1.4 K/CU MM
MONOCYTES RELATIVE PERCENT: 12.2 % (ref 0–4)
PDW BLD-RTO: 18.4 % (ref 11.7–14.9)
PLATELET # BLD: 59 K/CU MM (ref 140–440)
PMV BLD AUTO: 11.2 FL (ref 7.5–11.1)
POTASSIUM SERPL-SCNC: 3.8 MMOL/L (ref 3.5–5.1)
RBC # BLD: 3.86 M/CU MM (ref 4.2–5.4)
SEGMENTED NEUTROPHILS ABSOLUTE COUNT: 8 K/CU MM
SEGMENTED NEUTROPHILS RELATIVE PERCENT: 71.6 % (ref 36–66)
SODIUM BLD-SCNC: 134 MMOL/L (ref 135–145)
TOTAL PROTEIN: 6 GM/DL (ref 6.4–8.2)
WBC # BLD: 11.1 K/CU MM (ref 4–10.5)

## 2023-07-20 PROCEDURE — 3074F SYST BP LT 130 MM HG: CPT | Performed by: INTERNAL MEDICINE

## 2023-07-20 PROCEDURE — 6360000002 HC RX W HCPCS: Performed by: INTERNAL MEDICINE

## 2023-07-20 PROCEDURE — 96372 THER/PROPH/DIAG INJ SC/IM: CPT

## 2023-07-20 PROCEDURE — 96402 CHEMO HORMON ANTINEOPL SQ/IM: CPT

## 2023-07-20 PROCEDURE — 85025 COMPLETE CBC W/AUTO DIFF WBC: CPT

## 2023-07-20 PROCEDURE — 36415 COLL VENOUS BLD VENIPUNCTURE: CPT

## 2023-07-20 PROCEDURE — 83615 LACTATE (LD) (LDH) ENZYME: CPT

## 2023-07-20 PROCEDURE — 3078F DIAST BP <80 MM HG: CPT | Performed by: INTERNAL MEDICINE

## 2023-07-20 PROCEDURE — 1123F ACP DISCUSS/DSCN MKR DOCD: CPT | Performed by: INTERNAL MEDICINE

## 2023-07-20 PROCEDURE — 80053 COMPREHEN METABOLIC PANEL: CPT

## 2023-07-20 PROCEDURE — 99214 OFFICE O/P EST MOD 30 MIN: CPT | Performed by: INTERNAL MEDICINE

## 2023-07-20 PROCEDURE — 86316 IMMUNOASSAY TUMOR OTHER: CPT

## 2023-07-20 RX ORDER — LANREOTIDE ACETATE 120 MG/.5ML
120 INJECTION SUBCUTANEOUS ONCE
OUTPATIENT
Start: 2023-07-21 | End: 2023-07-21

## 2023-07-20 RX ORDER — ACYCLOVIR 800 MG/1
TABLET ORAL
COMMUNITY
Start: 2023-07-10

## 2023-07-20 RX ORDER — DIPHENOXYLATE HYDROCHLORIDE AND ATROPINE SULFATE 2.5; .025 MG/1; MG/1
TABLET ORAL
COMMUNITY
Start: 2023-07-12

## 2023-07-20 RX ORDER — TELOTRISTAT ETHYL 250 MG/1
250 TABLET ORAL 3 TIMES DAILY
Qty: 90 TABLET | Refills: 1 | Status: ACTIVE | OUTPATIENT
Start: 2023-07-20

## 2023-07-20 RX ORDER — PROMETHAZINE HYDROCHLORIDE 25 MG/1
TABLET ORAL
COMMUNITY
Start: 2023-07-07

## 2023-07-20 RX ORDER — OLOPATADINE HYDROCHLORIDE 2 MG/ML
SOLUTION/ DROPS OPHTHALMIC
COMMUNITY
Start: 2023-06-08

## 2023-07-20 RX ORDER — LANREOTIDE ACETATE 120 MG/.5ML
120 INJECTION SUBCUTANEOUS ONCE
Status: COMPLETED | OUTPATIENT
Start: 2023-07-20 | End: 2023-07-20

## 2023-07-20 RX ORDER — VALSARTAN 320 MG/1
TABLET ORAL
COMMUNITY
Start: 2023-07-07

## 2023-07-20 RX ADMIN — LANREOTIDE ACETATE 120 MG: 120 INJECTION SUBCUTANEOUS at 15:15

## 2023-07-20 NOTE — PROGRESS NOTES
Patient ambulated into treatment suite  for a Lanreotide injection. Pt has an office visit with Dr. Lg Gonzalez today as well. Treatment approved and given subcutaneously in LT buttocks per patient request, band-aid applied. Patient tolerated well. Left treatment suite with assistance from caregiver in wheelchair.

## 2023-07-20 NOTE — PROGRESS NOTES
RX for telotristat ethyl (xermelo) 250 mg TID e-scribed to [facility] pharmacy on [address] per physician order.

## 2023-07-20 NOTE — PROGRESS NOTES
MA Rooming Questions  Patient: Jack Hathaway  MRN: [id number]    Date: 7/20/2023        1. Do you have any new issues? yes - patient had a respite stay at [facility] and had several falls. 2. Do you need any refills on medications?    no    3. Have you had any imaging done since your last visit? yes - ct chest abd pelvis 6/23    4. Have you been hospitalized or seen in the emergency room since your last visit here?   no    5. Did the patient have a depression screening completed today?  No    No data recorded     PHQ-9 Given to (if applicable):               PHQ-9 Score (if applicable):                     [] Positive     []  Negative              Does question #9 need addressed (if applicable)                     [] Yes    []  No               Armin Tuttle CMA

## 2023-07-23 LAB — CGA SERPL-MCNC: 183 NG/ML (ref 0–103)

## 2023-08-17 ENCOUNTER — HOSPITAL ENCOUNTER (OUTPATIENT)
Dept: INFUSION THERAPY | Age: 83
Discharge: HOME | End: 2023-08-17
Attending: INTERNAL MEDICINE
Payer: MEDICARE

## 2023-08-17 ENCOUNTER — OFFICE VISIT (OUTPATIENT)
Dept: ONCOLOGY | Age: 83
End: 2023-08-17
Payer: MEDICARE

## 2023-08-17 VITALS
SYSTOLIC BLOOD PRESSURE: 144 MMHG | DIASTOLIC BLOOD PRESSURE: 73 MMHG | HEART RATE: 71 BPM | RESPIRATION RATE: 16 BRPM | TEMPERATURE: 98.4 F | WEIGHT: 93.8 LBS | BODY MASS INDEX: 17.26 KG/M2 | OXYGEN SATURATION: 97 % | HEIGHT: 62 IN

## 2023-08-17 DIAGNOSIS — E34.0 CARCINOID SYNDROME (HCC): Primary | ICD-10-CM

## 2023-08-17 PROCEDURE — 3078F DIAST BP <80 MM HG: CPT | Performed by: INTERNAL MEDICINE

## 2023-08-17 PROCEDURE — 99214 OFFICE O/P EST MOD 30 MIN: CPT | Performed by: INTERNAL MEDICINE

## 2023-08-17 PROCEDURE — 6360000002 HC RX W HCPCS: Performed by: INTERNAL MEDICINE

## 2023-08-17 PROCEDURE — 1123F ACP DISCUSS/DSCN MKR DOCD: CPT | Performed by: INTERNAL MEDICINE

## 2023-08-17 PROCEDURE — 96402 CHEMO HORMON ANTINEOPL SQ/IM: CPT

## 2023-08-17 PROCEDURE — 96372 THER/PROPH/DIAG INJ SC/IM: CPT

## 2023-08-17 PROCEDURE — 3077F SYST BP >= 140 MM HG: CPT | Performed by: INTERNAL MEDICINE

## 2023-08-17 RX ORDER — LANREOTIDE ACETATE 120 MG/.5ML
120 INJECTION SUBCUTANEOUS ONCE
Status: COMPLETED | OUTPATIENT
Start: 2023-08-17 | End: 2023-08-17

## 2023-08-17 RX ADMIN — LANREOTIDE ACETATE 120 MG: 120 INJECTION SUBCUTANEOUS at 14:40

## 2023-08-17 NOTE — PROGRESS NOTES
MA Rooming Questions  Patient: Hanna Milton  MRN: [id number]    Date: 8/17/2023        1. Do you have any new issues?   no         2. Do you need any refills on medications?    no    3. Have you had any imaging done since your last visit?   no    4. Have you been hospitalized or seen in the emergency room since your last visit here?   no    5. Did the patient have a depression screening completed today?  No    No data recorded     PHQ-9 Given to (if applicable):               PHQ-9 Score (if applicable):                     [] Positive     []  Negative              Does question #9 need addressed (if applicable)                     [] Yes    []  No               Irasema Alva CMA
moderate T3 and L1 compression fractures. She was seen at [facility] ER due to abdominal pain. She had CT abd/pelvis which showed mild mural thickening in the sigmoid colon and rectum, may indicate underlying infectious or inflammatory proctocolitis, and increased size of soft tissue mass in RUQ with associated calcification since prior exam. We did request comparison to most recent study at [facility]. She has ongoing abdominal discomfort and we started Bentyl with improvement. Reports improvement of diarrhea since addition of telotristat 250 mg TID which was started 5/25/22. She stopped it on 7/20/22 after she had episodes of constipation. CT chest, abdomen and pelvis done on 6/23/23 showed essentially stable 4.0 cm and 2.0 cm right upper quadrant mesenteric masses  corresponding to history of carcinoid. No new findings of metastatic disease in the chest, abdomen or pelvis on this unenhanced exam.    On August 17, 2023, she presented to me for followup. I have been following Ms. Diane for immune thrombocytopenia, history of stage IIB malignant melanoma, and the recurrent carcinoid tumor. 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 She stated that her diarrhea is getting worse lately. She continues to have significant diarrhea even though she is taking lomotil (~ 8 tablets daily). I reviewed with her findings on CT scan done on 5/5/22. She has at least stable disease noted on CT scan. She has been loosing weight a lot lately due to uncontrolled diarrhea. Since her carcinoid syndrome becomes refractory to lanreotide, I recommend to add telotristat 250 mg TID. Lanreotide and telotristat 250 mg TID was started since 5/25/22. Her diarrhea got better since adding telotristat. She has decided to stop telotristat on 7/20/22 after episodes of constipation. Reviewed CT done on 6/23/23. She has stable disease and there is no sign of progression of the disease.       I recommend her

## 2023-08-17 NOTE — PROGRESS NOTES
Patient arrived to treatment suite from doctor appointment for Lanreotide injection. No questions or concerns for the doctor at this time. Treatment approved and given subcutaneously in right buttocks per patient request, band-aid applied. Cold spray utilized for comfort. Patient tolerated well. Left treatment suite with assistance from caregiver in wheelchair. Discharge instructions provided at check-out.

## 2023-08-21 RX ORDER — FERROUS SULFATE 325(65) MG
TABLET ORAL
Qty: 30 TABLET | Refills: 1 | Status: SHIPPED | OUTPATIENT
Start: 2023-08-21

## 2023-09-12 RX ORDER — LANREOTIDE ACETATE 120 MG/.5ML
120 INJECTION SUBCUTANEOUS ONCE
OUTPATIENT
Start: 2023-09-14 | End: 2023-09-14

## 2023-09-18 ENCOUNTER — HOSPITAL ENCOUNTER (OUTPATIENT)
Dept: INFUSION THERAPY | Age: 83
Discharge: HOME | End: 2023-09-18
Attending: INTERNAL MEDICINE
Payer: MEDICARE

## 2023-09-18 ENCOUNTER — OFFICE VISIT (OUTPATIENT)
Dept: ONCOLOGY | Age: 83
End: 2023-09-18
Payer: MEDICARE

## 2023-09-18 VITALS
TEMPERATURE: 97.6 F | RESPIRATION RATE: 14 BRPM | DIASTOLIC BLOOD PRESSURE: 58 MMHG | WEIGHT: 90.6 LBS | OXYGEN SATURATION: 99 % | HEIGHT: 62 IN | HEART RATE: 68 BPM | BODY MASS INDEX: 16.67 KG/M2 | SYSTOLIC BLOOD PRESSURE: 102 MMHG

## 2023-09-18 DIAGNOSIS — E34.0 CARCINOID SYNDROME (HCC): Primary | ICD-10-CM

## 2023-09-18 LAB
ALBUMIN SERPL-MCNC: 4 GM/DL (ref 3.4–5)
ALP BLD-CCNC: 75 IU/L (ref 40–128)
ALT SERPL-CCNC: 5 U/L (ref 10–40)
ANION GAP SERPL CALCULATED.3IONS-SCNC: 13 MMOL/L (ref 4–16)
AST SERPL-CCNC: 9 IU/L (ref 15–37)
BASOPHILS ABSOLUTE: 0.1 K/CU MM
BASOPHILS RELATIVE PERCENT: 0.9 % (ref 0–1)
BILIRUB SERPL-MCNC: 0.3 MG/DL (ref 0–1)
BUN SERPL-MCNC: 32 MG/DL (ref 6–23)
CALCIUM SERPL-MCNC: 8.9 MG/DL (ref 8.3–10.6)
CHLORIDE BLD-SCNC: 103 MMOL/L (ref 99–110)
CO2: 18 MMOL/L (ref 21–32)
CREAT SERPL-MCNC: 1.4 MG/DL (ref 0.6–1.1)
DIFFERENTIAL TYPE: ABNORMAL
EOSINOPHILS ABSOLUTE: 0.4 K/CU MM
EOSINOPHILS RELATIVE PERCENT: 4.2 % (ref 0–3)
GFR SERPL CREATININE-BSD FRML MDRD: 38 ML/MIN/1.73M2
GLUCOSE SERPL-MCNC: 153 MG/DL (ref 70–99)
HCT VFR BLD CALC: 34.4 % (ref 37–47)
HEMOGLOBIN: 10.8 GM/DL (ref 12.5–16)
LYMPHOCYTES ABSOLUTE: 1.2 K/CU MM
LYMPHOCYTES RELATIVE PERCENT: 13.1 % (ref 24–44)
MCH RBC QN AUTO: 26.4 PG (ref 27–31)
MCHC RBC AUTO-ENTMCNC: 31.4 % (ref 32–36)
MCV RBC AUTO: 84.1 FL (ref 78–100)
MONOCYTES ABSOLUTE: 0.7 K/CU MM
MONOCYTES RELATIVE PERCENT: 7.4 % (ref 0–4)
PDW BLD-RTO: 15.1 % (ref 11.7–14.9)
PLATELET # BLD: 53 K/CU MM (ref 140–440)
PMV BLD AUTO: 11.4 FL (ref 7.5–11.1)
POTASSIUM SERPL-SCNC: 3.8 MMOL/L (ref 3.5–5.1)
RBC # BLD: 4.09 M/CU MM (ref 4.2–5.4)
SEGMENTED NEUTROPHILS ABSOLUTE COUNT: 6.8 K/CU MM
SEGMENTED NEUTROPHILS RELATIVE PERCENT: 74.4 % (ref 36–66)
SODIUM BLD-SCNC: 134 MMOL/L (ref 135–145)
TOTAL PROTEIN: 6.4 GM/DL (ref 6.4–8.2)
WBC # BLD: 9.1 K/CU MM (ref 4–10.5)

## 2023-09-18 PROCEDURE — 1123F ACP DISCUSS/DSCN MKR DOCD: CPT | Performed by: INTERNAL MEDICINE

## 2023-09-18 PROCEDURE — 99214 OFFICE O/P EST MOD 30 MIN: CPT | Performed by: INTERNAL MEDICINE

## 2023-09-18 PROCEDURE — 86316 IMMUNOASSAY TUMOR OTHER: CPT

## 2023-09-18 PROCEDURE — 36415 COLL VENOUS BLD VENIPUNCTURE: CPT

## 2023-09-18 PROCEDURE — 80053 COMPREHEN METABOLIC PANEL: CPT

## 2023-09-18 PROCEDURE — 96372 THER/PROPH/DIAG INJ SC/IM: CPT

## 2023-09-18 PROCEDURE — 6360000002 HC RX W HCPCS: Performed by: INTERNAL MEDICINE

## 2023-09-18 PROCEDURE — 3074F SYST BP LT 130 MM HG: CPT | Performed by: INTERNAL MEDICINE

## 2023-09-18 PROCEDURE — 3078F DIAST BP <80 MM HG: CPT | Performed by: INTERNAL MEDICINE

## 2023-09-18 PROCEDURE — 85025 COMPLETE CBC W/AUTO DIFF WBC: CPT

## 2023-09-18 RX ORDER — LANREOTIDE ACETATE 120 MG/.5ML
120 INJECTION SUBCUTANEOUS ONCE
Status: COMPLETED | OUTPATIENT
Start: 2023-09-18 | End: 2023-09-18

## 2023-09-18 RX ORDER — FERROUS SULFATE 325(65) MG
1 TABLET ORAL DAILY
Qty: 30 TABLET | Refills: 4 | Status: SHIPPED | OUTPATIENT
Start: 2023-09-18

## 2023-09-18 RX ORDER — LANREOTIDE ACETATE 120 MG/.5ML
120 INJECTION SUBCUTANEOUS ONCE
OUTPATIENT
Start: 2023-10-12 | End: 2023-10-12

## 2023-09-18 RX ADMIN — LANREOTIDE ACETATE 120 MG: 120 INJECTION SUBCUTANEOUS at 15:06

## 2023-09-18 NOTE — PROGRESS NOTES
MA Rooming Questions  Patient: Claudio Coleman  MRN: [id number]    Date: 9/18/2023        1. Do you have any new issues?   no         2. Do you need any refills on medications? yes - IRON; Pill pack on [address] [place] [address] Street    3. Have you had any imaging done since your last visit?   no    4. Have you been hospitalized or seen in the emergency room since your last visit here?   no    5. Did the patient have a depression screening completed today?  No    No data recorded     PHQ-9 Given to (if applicable):               PHQ-9 Score (if applicable):                     [] Positive     []  Negative              Does question #9 need addressed (if applicable)                     [] Yes    []  No               Jaclyn Lynch MA
HOMOCYSTEINE (H) 10/15/2019     17.8  Risk of vascular disease   increases above 9 umol/L  and is significant >15 umol/L. No results found for: \"LD\"  Lab Results   Component Value Date    TSHHS 1.880 09/23/2022    T4FREE 1.13 11/24/2014    FT3 2.9 11/24/2014     Immunology:  Lab Results   Component Value Date    PROT 6.0 (L) 07/20/2023    SPEP  04/17/2021     INTERPRETATION - Decreased albumin and total proteins. LP.    ALBUMINELP 3.0 (L) 04/17/2021    LABALPH 0.2 04/17/2021    LABALPH 0.5 04/17/2021    GAMGLOB 0.8 04/17/2021     No results found for: \"KAPPAUVOL\", \"LAMBDAUVOL\", \"KLFLCR\"  No results found for: \"B2M\"  Coagulation Panel:  Lab Results   Component Value Date    PROTIME 11.6 (L) 10/04/2022    INR 0.90 10/04/2022    APTT 27.3 10/04/2022     Anemia Panel:  Lab Results   Component Value Date    NXZALOPG80 450.2 09/23/2022    FOLATE >20.0 (H) 09/23/2022     Tumor Markers:  No results found for: \"\", \"CEA\", \"\", \"LABCA2\", \"PSA\"  Observations:  No data recorded        Assessment: Thrombocytopenia - most likely immune thrombocytopenia. Stage IIB malignant melanoma. Recurrent/metastatic small intestine carcinoid tumor. Plan:  Ms. Chula De La Rosa has been followed for immune thrombocytopenia, history of stage IIB malignant melanoma, and the recurrent carcinoid tumor. CT scan of the chest, abdomen and pelvis done on December 23, 2020 showed interval increase in size of a soft tissue mass with scattered calcifications posterior to the pancreatic head currently measuring up to 4 cm, previously 3.3 cm. No evidence of metastatic disease in the chest.    CT scan of the abdomen and pelvis done on April 27, 2021 showed a stable appearance of a pancreatic mass measuring 3.3 x 4 cm.     CT chest, abdomen and pelvis done on 5/5/2022 showed no metastatic disease in the chest.  Essentially stable 36 mm mass in the retroperitoneum encasing the SMA with adjacent 18 mm calcified mesenteric nodule corresponding

## 2023-09-18 NOTE — PROGRESS NOTES
Pt here for Lanreotide injection. Injection given SQ to left buttock, cold spray utilized. Pt tolerated without incident left ambulatory instructed to stop at check out desk for next OV and discharge paperwork.

## 2023-09-21 LAB — CGA SERPL-MCNC: 426 NG/ML (ref 0–103)

## 2023-09-22 NOTE — PROGRESS NOTES
Physical Therapy  Name: Jesus Mistry MRN: [id number] :   1940   Date:  10/11/2022   Admission Date: 10/4/2022 Room:  [address] [place] [address]   Restrictions/Precautions:        s/p ORIF WBAT LLE, gen prec, fall risk  Communication with other providers:  [facility] RN states pt is ok to see for therapy  Subjective:  Patient states:  I don't want to do this right now, pt was given education and then agreed to get up  Pain:   Location, Type, Intensity (0/10 to 10/10):  L hip and knee but did not quantify  Objective:    Observation:  pt was in the bed  Treatment, including education/measures:  Supine Exercises: Ankle pumps x 10  Heel slides x 10  Hip abd x 5  Therapeutic Exercise:  Therapeutic exercises were instructed today. Cues were given for technique, safety, recruitment, and rationale. Cues were verbal and/or tactile. Transfers with line management of tele and IV  Supine to sit :pt initiated and used the leg  to come to the side of the bed. Then moved back into the bed with SBA. Pt initiated trans again and needed mod A of 2 to complete  Scooting :mod a of 2 top EOB  Sit to stand :min A of 1 and CGA of 1 with VC's  Stand to sit :min a of 2  Gait:  Pt amb with RW for 5 ft with min a of 2 with chair follow. Pt had one knee buckle and wanted to sit  Gait Comments: with VC's' and TC's for B LE placement, walker placement and sequence throughout ambulation; with VC's and TC's to maintain upright posture in order to avoid COM shifting outside of IZZY; with VC's for PLB throughout ambulation  Sitting Exercises:  LAQ's x 10  Marching x 10   Clam shells x 10. Safety  Patient left safely in the chair, with call light/phone in reach with alarm applied. Gait belt was used for transfers and gait.   Assessment / Impression:     Patient's tolerance of treatment:  good, pt needed encouragement at first and is easily distracted by her TV   Adverse Reaction: none  Significant change in status and impact:  none  Barriers to improvement:  pain, ?cognition  Plan for Next Session:    Will cont to work towards pt's goals per her tolerance  Time in:  0840  Time out:  0911  Timed treatment minutes:  31  Total treatment time:  31  Previously filed items:     Short Term Goals  Time Frame for Short Term Goals: 2 weeks  Short Term Goal 1: Pt will perform sit><supine modA  Short Term Goal 2: Pt will transfer between surfaces modA  Short Term Goal 3: Pt will ambulate 30ft with RW modA  Short Term Goal 4: Pt will perform sitting light dynamic activity x 5 minutes without UE support Camelia     Electronically signed by:     Naseem Bertrand PTA  10/11/2022, 9:25 AM Patient requests all Lab, Cardiology, and Radiology Results on their Discharge Instructions

## 2023-10-27 ENCOUNTER — HOSPITAL ENCOUNTER (OUTPATIENT)
Dept: INFUSION THERAPY | Age: 83
Discharge: HOME | End: 2023-10-27
Attending: INTERNAL MEDICINE
Payer: MEDICARE

## 2023-10-27 ENCOUNTER — OFFICE VISIT (OUTPATIENT)
Dept: ONCOLOGY | Age: 83
End: 2023-10-27
Payer: MEDICARE

## 2023-10-27 VITALS
BODY MASS INDEX: 16.38 KG/M2 | HEART RATE: 62 BPM | RESPIRATION RATE: 16 BRPM | HEIGHT: 62 IN | TEMPERATURE: 97.5 F | DIASTOLIC BLOOD PRESSURE: 58 MMHG | OXYGEN SATURATION: 99 % | SYSTOLIC BLOOD PRESSURE: 103 MMHG | WEIGHT: 89 LBS

## 2023-10-27 DIAGNOSIS — E34.0 CARCINOID SYNDROME (HCC): Primary | ICD-10-CM

## 2023-10-27 LAB
ALBUMIN SERPL-MCNC: 3.8 GM/DL (ref 3.4–5)
ALP BLD-CCNC: 109 IU/L (ref 40–129)
ALT SERPL-CCNC: <5 U/L (ref 10–40)
ANION GAP SERPL CALCULATED.3IONS-SCNC: 9 MMOL/L (ref 4–16)
AST SERPL-CCNC: 10 IU/L (ref 15–37)
BASOPHILS ABSOLUTE: 0 K/CU MM
BASOPHILS RELATIVE PERCENT: 0.5 % (ref 0–1)
BILIRUB SERPL-MCNC: 0.2 MG/DL (ref 0–1)
BUN SERPL-MCNC: 14 MG/DL (ref 6–23)
CALCIUM SERPL-MCNC: 9.1 MG/DL (ref 8.3–10.6)
CHLORIDE BLD-SCNC: 100 MMOL/L (ref 99–110)
CO2: 26 MMOL/L (ref 21–32)
CREAT SERPL-MCNC: 1.1 MG/DL (ref 0.6–1.1)
DIFFERENTIAL TYPE: ABNORMAL
EOSINOPHILS ABSOLUTE: 0.4 K/CU MM
EOSINOPHILS RELATIVE PERCENT: 4.8 % (ref 0–3)
GFR SERPL CREATININE-BSD FRML MDRD: 50 ML/MIN/1.73M2
GLUCOSE SERPL-MCNC: 166 MG/DL (ref 70–99)
HCT VFR BLD CALC: 35.9 % (ref 37–47)
HEMOGLOBIN: 11.1 GM/DL (ref 12.5–16)
LACTATE DEHYDROGENASE: 162 IU/L (ref 120–246)
LYMPHOCYTES ABSOLUTE: 1.2 K/CU MM
LYMPHOCYTES RELATIVE PERCENT: 15.3 % (ref 24–44)
MCH RBC QN AUTO: 26.7 PG (ref 27–31)
MCHC RBC AUTO-ENTMCNC: 30.9 % (ref 32–36)
MCV RBC AUTO: 86.5 FL (ref 78–100)
MONOCYTES ABSOLUTE: 0.8 K/CU MM
MONOCYTES RELATIVE PERCENT: 9.2 % (ref 0–4)
PDW BLD-RTO: 14.6 % (ref 11.7–14.9)
PLATELET # BLD: 29 K/CU MM (ref 140–440)
PMV BLD AUTO: ABNORMAL FL (ref 7.5–11.1)
POTASSIUM SERPL-SCNC: 4.7 MMOL/L (ref 3.5–5.1)
RBC # BLD: 4.15 M/CU MM (ref 4.2–5.4)
SEGMENTED NEUTROPHILS ABSOLUTE COUNT: 5.7 K/CU MM
SEGMENTED NEUTROPHILS RELATIVE PERCENT: 70.2 % (ref 36–66)
SODIUM BLD-SCNC: 135 MMOL/L (ref 135–145)
TOTAL PROTEIN: 6.2 GM/DL (ref 6.4–8.2)
WBC # BLD: 8.1 K/CU MM (ref 4–10.5)

## 2023-10-27 PROCEDURE — 1123F ACP DISCUSS/DSCN MKR DOCD: CPT | Performed by: INTERNAL MEDICINE

## 2023-10-27 PROCEDURE — 80053 COMPREHEN METABOLIC PANEL: CPT

## 2023-10-27 PROCEDURE — 3074F SYST BP LT 130 MM HG: CPT | Performed by: INTERNAL MEDICINE

## 2023-10-27 PROCEDURE — 3078F DIAST BP <80 MM HG: CPT | Performed by: INTERNAL MEDICINE

## 2023-10-27 PROCEDURE — 83615 LACTATE (LD) (LDH) ENZYME: CPT

## 2023-10-27 PROCEDURE — 96402 CHEMO HORMON ANTINEOPL SQ/IM: CPT

## 2023-10-27 PROCEDURE — 85025 COMPLETE CBC W/AUTO DIFF WBC: CPT

## 2023-10-27 PROCEDURE — 36415 COLL VENOUS BLD VENIPUNCTURE: CPT

## 2023-10-27 PROCEDURE — 99213 OFFICE O/P EST LOW 20 MIN: CPT | Performed by: INTERNAL MEDICINE

## 2023-10-27 PROCEDURE — 96372 THER/PROPH/DIAG INJ SC/IM: CPT

## 2023-10-27 PROCEDURE — 6360000002 HC RX W HCPCS: Performed by: INTERNAL MEDICINE

## 2023-10-27 RX ORDER — LANREOTIDE ACETATE 120 MG/.5ML
120 INJECTION SUBCUTANEOUS ONCE
OUTPATIENT
Start: 2023-11-17 | End: 2023-11-17

## 2023-10-27 RX ORDER — LANREOTIDE ACETATE 120 MG/.5ML
120 INJECTION SUBCUTANEOUS ONCE
Status: COMPLETED | OUTPATIENT
Start: 2023-10-27 | End: 2023-10-27

## 2023-10-27 RX ADMIN — LANREOTIDE ACETATE 120 MG: 120 INJECTION SUBCUTANEOUS at 15:25

## 2023-10-27 NOTE — PROGRESS NOTES
Patient wheeled to infusion area after OV with Dr. Adi Alfaro, here today for a lanreotide injection. No concerns at this time. Treatment approved and given in right dorsogluteal region. Patient tolerated well. Patient provided discharge instructions. RTC 12/01 for next OV and injection.

## 2023-10-27 NOTE — PROGRESS NOTES
MA Rooming Questions  Patient: Aide Solorio  MRN: [id number]    Date: 10/27/2023        1. Do you have any new issues?   no         2. Do you need any refills on medications?    no    3. Have you had any imaging done since your last visit?   no    4. Have you been hospitalized or seen in the emergency room since your last visit here?   no    5. Did the patient have a depression screening completed today?  No    No data recorded     PHQ-9 Given to (if applicable):               PHQ-9 Score (if applicable):                     [] Positive     []  Negative              Does question #9 need addressed (if applicable)                     [] Yes    []  No               Elham Valera CMA
in the sigmoid colon and rectum, may indicate underlying infectious or inflammatory proctocolitis, and increased size of soft tissue mass in RUQ with associated calcification since prior exam. We did request comparison to most recent study at [facility]. She has ongoing abdominal discomfort and we started Bentyl with improvement. Reports improvement of diarrhea since addition of telotristat 250 mg TID which was started 5/25/22. She stopped it on 7/20/22 after she had episodes of constipation. CT chest, abdomen and pelvis done on 6/23/23 showed essentially stable 4.0 cm and 2.0 cm right upper quadrant mesenteric masses corresponding to history of carcinoid. No new findings of metastatic disease in the chest, abdomen or pelvis on this unenhanced exam.    On October 27, 2023, she presented to me for followup. I have been following Ms. Diane for immune thrombocytopenia, history of stage IIB malignant melanoma, and the recurrent carcinoid tumor. 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 She stated that her diarrhea is getting worse lately. She continues to have significant diarrhea even though she is taking lomotil (~ 8 tablets daily). I reviewed with her findings on CT scan done on 5/5/22. She has at least stable disease noted on CT scan. She has been loosing weight a lot lately due to uncontrolled diarrhea. Since her carcinoid syndrome becomes refractory to lanreotide, I recommend to add telotristat 250 mg TID. Lanreotide and telotristat 250 mg TID was started since 5/25/22. Her diarrhea got better since adding telotristat. She has decided to stop telotristat on 7/20/22 after episodes of constipation. Reviewed CT done on 6/23/23. She has stable disease and there is no sign of progression of the disease. Will plan to have repeat CT scans in 6 months. I recommend her to continue with lanreotide for now. She still has significant diarrhea.  She is willing to retry

## 2023-12-10 ENCOUNTER — HOSPITAL ENCOUNTER (EMERGENCY)
Age: 83
Discharge: HOME | End: 2023-12-10
Attending: EMERGENCY MEDICINE
Payer: MEDICARE

## 2023-12-10 ENCOUNTER — APPOINTMENT (OUTPATIENT)
Dept: CT IMAGING | Age: 83
End: 2023-12-10
Payer: MEDICARE

## 2023-12-10 ENCOUNTER — APPOINTMENT (OUTPATIENT)
Dept: GENERAL RADIOLOGY | Age: 83
End: 2023-12-10
Payer: MEDICARE

## 2023-12-10 VITALS
WEIGHT: 89 LBS | SYSTOLIC BLOOD PRESSURE: 151 MMHG | HEIGHT: 63 IN | OXYGEN SATURATION: 97 % | HEART RATE: 60 BPM | TEMPERATURE: 98.1 F | RESPIRATION RATE: 15 BRPM | DIASTOLIC BLOOD PRESSURE: 61 MMHG | BODY MASS INDEX: 15.77 KG/M2

## 2023-12-10 DIAGNOSIS — R41.0 ACUTE CONFUSION: Primary | ICD-10-CM

## 2023-12-10 LAB
ALBUMIN SERPL-MCNC: 4 GM/DL (ref 3.4–5)
ALP BLD-CCNC: 62 IU/L (ref 40–129)
ALT SERPL-CCNC: 6 U/L (ref 10–40)
ANION GAP SERPL CALCULATED.3IONS-SCNC: 10 MMOL/L (ref 4–16)
AST SERPL-CCNC: 12 IU/L (ref 15–37)
BASOPHILS ABSOLUTE: 0.1 K/CU MM
BASOPHILS RELATIVE PERCENT: 0.8 % (ref 0–1)
BILIRUB SERPL-MCNC: 0.4 MG/DL (ref 0–1)
BUN SERPL-MCNC: 23 MG/DL (ref 6–23)
CALCIUM SERPL-MCNC: 8.9 MG/DL (ref 8.3–10.6)
CHLORIDE BLD-SCNC: 103 MMOL/L (ref 99–110)
CO2: 24 MMOL/L (ref 21–32)
CREAT SERPL-MCNC: 1.3 MG/DL (ref 0.6–1.1)
DIFFERENTIAL TYPE: ABNORMAL
EOSINOPHILS ABSOLUTE: 0.2 K/CU MM
EOSINOPHILS RELATIVE PERCENT: 3 % (ref 0–3)
GFR SERPL CREATININE-BSD FRML MDRD: 41 ML/MIN/1.73M2
GLUCOSE SERPL-MCNC: 176 MG/DL (ref 70–99)
HCT VFR BLD CALC: 35.9 % (ref 37–47)
HEMOGLOBIN: 11 GM/DL (ref 12.5–16)
IMMATURE NEUTROPHIL %: 0.4 % (ref 0–0.43)
LYMPHOCYTES ABSOLUTE: 1 K/CU MM
LYMPHOCYTES RELATIVE PERCENT: 13.5 % (ref 24–44)
MCH RBC QN AUTO: 27 PG (ref 27–31)
MCHC RBC AUTO-ENTMCNC: 30.6 % (ref 32–36)
MCV RBC AUTO: 88.2 FL (ref 78–100)
MONOCYTES ABSOLUTE: 0.6 K/CU MM
MONOCYTES RELATIVE PERCENT: 7.4 % (ref 0–4)
NUCLEATED RBC %: 0 %
PDW BLD-RTO: 15.3 % (ref 11.7–14.9)
PLATELET # BLD: 44 K/CU MM (ref 140–440)
PMV BLD AUTO: 10.4 FL (ref 7.5–11.1)
POTASSIUM SERPL-SCNC: 4.4 MMOL/L (ref 3.5–5.1)
RBC # BLD: 4.07 M/CU MM (ref 4.2–5.4)
SEGMENTED NEUTROPHILS ABSOLUTE COUNT: 5.7 K/CU MM
SEGMENTED NEUTROPHILS RELATIVE PERCENT: 74.9 % (ref 36–66)
SODIUM BLD-SCNC: 137 MMOL/L (ref 135–145)
TOTAL IMMATURE NEUTOROPHIL: 0.03 K/CU MM
TOTAL NUCLEATED RBC: 0 K/CU MM
TOTAL PROTEIN: 6.9 GM/DL (ref 6.4–8.2)
TROPONIN, HIGH SENSITIVITY: 18 NG/L (ref 0–14)
TROPONIN, HIGH SENSITIVITY: 22 NG/L (ref 0–14)
WBC # BLD: 7.6 K/CU MM (ref 4–10.5)

## 2023-12-10 PROCEDURE — 99285 EMERGENCY DEPT VISIT HI MDM: CPT

## 2023-12-10 PROCEDURE — 84484 ASSAY OF TROPONIN QUANT: CPT

## 2023-12-10 PROCEDURE — 70450 CT HEAD/BRAIN W/O DYE: CPT

## 2023-12-10 PROCEDURE — 71045 X-RAY EXAM CHEST 1 VIEW: CPT

## 2023-12-10 PROCEDURE — 80053 COMPREHEN METABOLIC PANEL: CPT

## 2023-12-10 PROCEDURE — 85025 COMPLETE CBC W/AUTO DIFF WBC: CPT

## 2023-12-10 PROCEDURE — 93005 ELECTROCARDIOGRAM TRACING: CPT | Performed by: EMERGENCY MEDICINE

## 2023-12-10 NOTE — ACP (ADVANCE CARE PLANNING)
Patient does not have any ACP documents/Medical Power of . LSW notes hospital will follow [place] Next of Kin hierarchy in the following descending order for priority:    Guardian  Spouse  Majority of adult Children  Parents  Majority of adult Siblings  Nearest Relative not described above    Per [place] Next of Kin hierarchy: Patients' child will be [address]

## 2023-12-10 NOTE — DISCHARGE INSTRUCTIONS
Patient is ill-appearing but having shortness of breath of complaint of acute persistent headache or acute persistent confusion return to ER  As please consult with your primary care physician for further care and recommendations

## 2023-12-10 NOTE — ED PROVIDER NOTES
Emergency Department Encounter    Patient: Hoang Quispe  MRN: [id number]  : 1940  Date of Evaluation: 12/10/2023  ED Provider:  Pasha Medel MD    Triage Chief Complaint:   No chief complaint on file. [place]:  Hoang Quispe is a 81 y.o. very frail looking and female with history of known coronary artery disease with anterior myocardial infection, inoperable intra-abdominal cancer, type 2 diabetes, hypertension and hyperlipidemia who also has a protein calorie malnutrition that presents ambulance from home with generalized weakness. Patient's daughter who is the medical power of  states that the patient slipped out of bed this morning with softer landing to the ground. She does not think she did sustain any injury. She was able to put her back in bed. Patient was then found on the floor confused. EMS report she was very confused and was not responding and drooling from the mouth. They estimated her Fareed Coma Scale at 4. However on arrival patient is alert oriented to person and is able to answer questions appropriately for the most part. She has no complaints of any kind. I do not see any obvious physical injuries on examination. Patient's daughter stated that patient is a full code. No diarrhea or vomiting or fever or cough or shortness of breath reported. Family reports that patient is on nitrofurantoin chronically for UTI    ROS - see HPI, below listed is current ROS at time of my eval:  General:  No fevers, no chills, no weakness  Eyes:  No recent vison changes, no discharge  ENT:  No sore throat, no nasal congestion, no hearing changes  Cardiovascular:  No chest pain, no palpitations  Respiratory:  No shortness of breath, no cough, no wheezing  Gastrointestinal:  No pain, no nausea, no vomiting, no diarrhea  Musculoskeletal: Status post fall, no muscle pain, no joint pain  Skin:  No rash, no pruritis, no easy bruising  Neurologic:. Acute confusion.   Chronic generalized

## 2023-12-11 LAB
EKG ATRIAL RATE: 61 BPM
EKG DIAGNOSIS: NORMAL
EKG P AXIS: 83 DEGREES
EKG P-R INTERVAL: 182 MS
EKG Q-T INTERVAL: 486 MS
EKG QRS DURATION: 120 MS
EKG QTC CALCULATION (BAZETT): 489 MS
EKG R AXIS: 65 DEGREES
EKG T AXIS: 56 DEGREES
EKG VENTRICULAR RATE: 61 BPM

## 2023-12-11 PROCEDURE — 93010 ELECTROCARDIOGRAM REPORT: CPT | Performed by: INTERNAL MEDICINE

## 2023-12-14 NOTE — PROGRESS NOTES
Pt incontinent of large amount of tan loose BM all over room floor. C/o abdominal cramping and nausea. This RN asked pt numerous times how she would manage at home and voiced that she may need more time in hospital, but assured this RN that she was fine and demanded to return home today. Refusing SNF or ARU, stating \"I have to get home to take care of my . \" pt discharged in no distress and wheeled to front entrance with belongings. (1) 13 years and above

## 2023-12-22 ENCOUNTER — HOSPITAL ENCOUNTER (OUTPATIENT)
Dept: INFUSION THERAPY | Age: 83
Discharge: HOME | End: 2023-12-22
Attending: INTERNAL MEDICINE
Payer: MEDICARE

## 2023-12-22 DIAGNOSIS — E34.0 CARCINOID SYNDROME (HCC): Primary | ICD-10-CM

## 2023-12-22 PROCEDURE — 96402 CHEMO HORMON ANTINEOPL SQ/IM: CPT

## 2023-12-22 PROCEDURE — 6360000002 HC RX W HCPCS: Performed by: INTERNAL MEDICINE

## 2023-12-22 RX ORDER — LANREOTIDE ACETATE 120 MG/.5ML
120 INJECTION SUBCUTANEOUS ONCE
OUTPATIENT
Start: 2024-01-19 | End: 2024-01-19

## 2023-12-22 RX ORDER — LANREOTIDE ACETATE 120 MG/.5ML
120 INJECTION SUBCUTANEOUS ONCE
Status: COMPLETED | OUTPATIENT
Start: 2023-12-22 | End: 2023-12-22

## 2023-12-22 RX ADMIN — LANREOTIDE ACETATE 120 MG: 120 INJECTION SUBCUTANEOUS at 15:26

## 2023-12-26 ENCOUNTER — TELEPHONE (OUTPATIENT)
Dept: ONCOLOGY | Age: 83
End: 2023-12-26

## 2023-12-26 NOTE — TELEPHONE ENCOUNTER
Left message with CT scan time and prep to be done on 1/10/24 [facility] arrival at 2 PM. Informed to call with any questions.

## 2024-01-25 ENCOUNTER — HOSPITAL ENCOUNTER (OUTPATIENT)
Dept: INFUSION THERAPY | Age: 84
Discharge: HOME | End: 2024-01-25
Attending: INTERNAL MEDICINE
Payer: MEDICARE

## 2024-01-25 DIAGNOSIS — E34.0 CARCINOID SYNDROME (HCC): Primary | ICD-10-CM

## 2024-01-25 PROCEDURE — 6360000002 HC RX W HCPCS: Performed by: INTERNAL MEDICINE

## 2024-01-25 PROCEDURE — 96372 THER/PROPH/DIAG INJ SC/IM: CPT

## 2024-01-25 RX ORDER — LANREOTIDE ACETATE 120 MG/.5ML
120 INJECTION SUBCUTANEOUS ONCE
OUTPATIENT
Start: 2024-02-16 | End: 2024-02-16

## 2024-01-25 RX ORDER — LANREOTIDE ACETATE 120 MG/.5ML
120 INJECTION SUBCUTANEOUS ONCE
Status: COMPLETED | OUTPATIENT
Start: 2024-01-25 | End: 2024-01-25

## 2024-01-25 RX ADMIN — LANREOTIDE ACETATE 120 MG: 120 INJECTION SUBCUTANEOUS at 14:45

## 2024-01-25 NOTE — PROGRESS NOTES
Patient wheeled to infusion area, here today for a lanreotide injection. No concerns at this time. Treatment approved and given in left dorsogluteal region. Patient tolerated well. Patient provided discharge instructions.RTC 1/30 for OV with Dr. Lieberman

## 2024-01-30 ENCOUNTER — TELEPHONE (OUTPATIENT)
Dept: ONCOLOGY | Age: 84
End: 2024-01-30

## 2024-02-16 ENCOUNTER — HOSPITAL ENCOUNTER (OUTPATIENT)
Dept: CT IMAGING | Age: 84
Discharge: HOME | End: 2024-02-16
Attending: INTERNAL MEDICINE
Payer: MEDICARE

## 2024-02-16 DIAGNOSIS — E34.0 CARCINOID SYNDROME (HCC): ICD-10-CM

## 2024-02-16 LAB
EGFR, POC: 56 ML/MIN/1.73M2
POC CREATININE: 1 MG/DL (ref 0.5–1.2)

## 2024-02-16 PROCEDURE — 71260 CT THORAX DX C+: CPT

## 2024-02-16 PROCEDURE — 74177 CT ABD & PELVIS W/CONTRAST: CPT

## 2024-02-16 PROCEDURE — 6360000004 HC RX CONTRAST MEDICATION: Performed by: INTERNAL MEDICINE

## 2024-02-16 PROCEDURE — 2580000003 HC RX 258: Performed by: INTERNAL MEDICINE

## 2024-02-16 PROCEDURE — 82565 ASSAY OF CREATININE: CPT

## 2024-02-16 RX ORDER — SODIUM CHLORIDE 9 MG/ML
10 INJECTION INTRAVENOUS PRN
Status: DISCONTINUED | OUTPATIENT
Start: 2024-02-16 | End: 2024-02-17 | Stop reason: HOSPADM

## 2024-02-16 RX ADMIN — IOPAMIDOL 75 ML: 755 INJECTION, SOLUTION INTRAVENOUS at 14:18

## 2024-02-16 RX ADMIN — IOPAMIDOL 9 ML: 755 INJECTION, SOLUTION INTRAVENOUS at 13:30

## 2024-02-16 RX ADMIN — SODIUM CHLORIDE, PRESERVATIVE FREE 10 ML: 5 INJECTION INTRAVENOUS at 14:06

## 2024-02-22 ENCOUNTER — HOSPITAL ENCOUNTER (OUTPATIENT)
Dept: INFUSION THERAPY | Age: 84
Discharge: HOME | End: 2024-02-22
Attending: INTERNAL MEDICINE
Payer: MEDICARE

## 2024-02-22 VITALS
TEMPERATURE: 98.3 F | WEIGHT: 89.2 LBS | HEART RATE: 76 BPM | BODY MASS INDEX: 15.8 KG/M2 | DIASTOLIC BLOOD PRESSURE: 73 MMHG | OXYGEN SATURATION: 97 % | HEIGHT: 63 IN | SYSTOLIC BLOOD PRESSURE: 156 MMHG

## 2024-02-22 DIAGNOSIS — E34.0 CARCINOID SYNDROME (HCC): Primary | ICD-10-CM

## 2024-02-22 PROCEDURE — 96372 THER/PROPH/DIAG INJ SC/IM: CPT

## 2024-02-22 PROCEDURE — 96402 CHEMO HORMON ANTINEOPL SQ/IM: CPT

## 2024-02-22 PROCEDURE — 6360000002 HC RX W HCPCS: Performed by: INTERNAL MEDICINE

## 2024-02-22 RX ORDER — LANREOTIDE ACETATE 120 MG/.5ML
120 INJECTION SUBCUTANEOUS ONCE
Status: COMPLETED | OUTPATIENT
Start: 2024-02-22 | End: 2024-02-22

## 2024-02-22 RX ORDER — LANREOTIDE ACETATE 120 MG/.5ML
120 INJECTION SUBCUTANEOUS ONCE
OUTPATIENT
Start: 2024-03-21 | End: 2024-03-21

## 2024-02-22 RX ADMIN — LANREOTIDE ACETATE 120 MG: 120 INJECTION SUBCUTANEOUS at 14:12

## 2024-02-22 NOTE — PROGRESS NOTES
Pt here for Lanreotide injection. Pt has no concerns or issues to discuss at this time. Injection given SQ to left buttock area. Cold spray utilized. Pt tolerated without incident left via wheelchair instructed to stop at check out desk to schedule for OV and discharge paperwork.

## 2024-03-08 ENCOUNTER — CLINICAL DOCUMENTATION (OUTPATIENT)
Dept: ONCOLOGY | Age: 84
End: 2024-03-08

## 2024-03-11 NOTE — PROGRESS NOTES
Per pt POA, pt info faxed to [facility] ([facility] attn [facility]. Confirmation rec'd

## 2024-03-18 RX ORDER — FERROUS SULFATE 325(65) MG
1 TABLET ORAL DAILY
Qty: 30 TABLET | Refills: 0 | Status: SHIPPED | OUTPATIENT
Start: 2024-03-18

## 2024-03-21 ENCOUNTER — HOSPITAL ENCOUNTER (OUTPATIENT)
Dept: INFUSION THERAPY | Age: 84
Discharge: HOME | End: 2024-03-21
Attending: INTERNAL MEDICINE
Payer: MEDICARE

## 2024-03-21 ENCOUNTER — OFFICE VISIT (OUTPATIENT)
Dept: ONCOLOGY | Age: 84
End: 2024-03-21

## 2024-03-21 VITALS
OXYGEN SATURATION: 95 % | WEIGHT: 85 LBS | SYSTOLIC BLOOD PRESSURE: 93 MMHG | BODY MASS INDEX: 15.06 KG/M2 | RESPIRATION RATE: 16 BRPM | HEIGHT: 63 IN | DIASTOLIC BLOOD PRESSURE: 55 MMHG | HEART RATE: 72 BPM | TEMPERATURE: 98.2 F

## 2024-03-21 DIAGNOSIS — D69.3 IMMUNE THROMBOCYTOPENIC PURPURA (HCC): ICD-10-CM

## 2024-03-21 DIAGNOSIS — E34.0 CARCINOID SYNDROME (HCC): Primary | ICD-10-CM

## 2024-03-21 PROCEDURE — 6360000002 HC RX W HCPCS: Performed by: INTERNAL MEDICINE

## 2024-03-21 PROCEDURE — 96372 THER/PROPH/DIAG INJ SC/IM: CPT

## 2024-03-21 RX ORDER — LANREOTIDE ACETATE 120 MG/.5ML
120 INJECTION SUBCUTANEOUS ONCE
Status: COMPLETED | OUTPATIENT
Start: 2024-03-21 | End: 2024-03-21

## 2024-03-21 RX ORDER — LANREOTIDE ACETATE 120 MG/.5ML
120 INJECTION SUBCUTANEOUS ONCE
OUTPATIENT
Start: 2024-04-18 | End: 2024-04-18

## 2024-03-21 RX ADMIN — LANREOTIDE ACETATE 120 MG: 120 INJECTION SUBCUTANEOUS at 15:09

## 2024-03-21 NOTE — PROGRESS NOTES
MA Rooming Questions  Patient: Terrie Diane  MRN: [id number]    Date: 3/21/2024        1. Do you have any new issues?   yes - c/o constant pain in back and stomach, light headed, B/P low- 93/55         2. Do you need any refills on medications?    no    3. Have you had any imaging done since your last visit?   yes - CT    4. Have you been hospitalized or seen in the emergency room since your last visit here?   no    5. Did the patient have a depression screening completed today? No    No data recorded     PHQ-9 Given to (if applicable):               PHQ-9 Score (if applicable):                     [] Positive     []  Negative              Does question #9 need addressed (if applicable)                     [] Yes    []  No               Vicenta Frias CMA

## 2024-03-21 NOTE — PROGRESS NOTES
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 Diane is a 83-year-old very pleasant patient with medical history significant for hyperlipidemia, gastroesophageal reflux disease, coronary artery disease, depression and adjustment disorder, osteoporosis, diabetes mellitus, and history of small intestinal carcinoid tumor, status post surgery by Dr. Iqbal in , initially referred to me on 2014, for evaluation of thrombocytopenia.      She stated that she was found to have thrombocytopenia on routine blood tests done on 2014.  Repeat blood tests on 2014, showed persistent thrombocytopenia and she was referred to me for evaluation.  She does not have leucopenia or anemia.  She complains of severe tiredness and fatigue.  Besides that she does not have any other significant symptoms.  She denies bleeding diathesis too.      Laboratory workups done on 2014, showed platelet count of 37, however, manual differential showed platelet count of 97.  Her white blood cell count was 6.4 and hemoglobin was 12.2.  Her LDH is mildly elevated (261), vitamin B12 normal (154), normal folate (more than 20), negative hepatitis panel, antinuclear antibody was not detected and negative rheumatoid factor and normal TSH (2.4).      Since all the workups have been within normal range, I believe her mild thrombocytopenia is most likely due to immune thrombocytopenia.     Ms. Diane started following with Dr. El at [facility] for immune thrombocytopenia. Since her platelet count dropped below 20,000 per cubic

## 2024-03-21 NOTE — PROGRESS NOTES
Patient wheeled to infusion area after OV with Dr. Lin, here today for a lanreotide injection. No concerns at this time. Treatment approved and given in left dorsal gluteal. Patient tolerated well. .

## 2024-04-17 RX ORDER — FERROUS SULFATE 325(65) MG
1 TABLET ORAL DAILY
Qty: 30 TABLET | Refills: 3 | Status: SHIPPED | OUTPATIENT
Start: 2024-04-17

## 2024-05-16 ENCOUNTER — TELEPHONE (OUTPATIENT)
Dept: INFUSION THERAPY | Age: 84
End: 2024-05-16

## (undated) DEVICE — PENCIL ES CRD L10FT HND SWCHING ROCK SWCH W/ EDGE COAT BLDE

## (undated) DEVICE — APPLICATOR MEDICATED 26 CC SOLUTION HI LT ORNG CHLORAPREP

## (undated) DEVICE — SOLUTION IV IRRIG POUR BRL 0.9% SODIUM CHL 2F7124

## (undated) DEVICE — PACK SET UP

## (undated) DEVICE — YANKAUER,FLEXIBLE HANDLE,REGLR CAPACITY: Brand: MEDLINE INDUSTRIES, INC.

## (undated) DEVICE — ELECTRODE ES AD CRDLSS PT RET REM POLYHESIVE

## (undated) DEVICE — FORCEPS BX L240CM JAW DIA2.8MM L CAP W/ NDL MIC MESH TOOTH

## (undated) DEVICE — INTRODUCER SHTH 7FR L13CM NDL 18GA GWIRE 0.035IN SYR VLV

## (undated) DEVICE — ROD RMR L950MM DIA3MM W/ STR BALL TIP

## (undated) DEVICE — 3M™ STERI-DRAPE™ INSTRUMENT POUCH 1018: Brand: STERI-DRAPE™

## (undated) DEVICE — COTTON UNDERCAST PADDING,CRIMPED FINISH: Brand: WEBRIL

## (undated) DEVICE — BANDAGE,ELASTIC,ESMARK,STERILE,4"X9',LF: Brand: MEDLINE

## (undated) DEVICE — COVER,C-ARM,41X74: Brand: MEDLINE

## (undated) DEVICE — DRESSING TRNSPAR W5XL4.5IN FLM SHT SEMIPERMEABLE WIND

## (undated) DEVICE — 1010 S-DRAPE TOWEL DRAPE 10/BX: Brand: STERI-DRAPE™

## (undated) DEVICE — SUTURE MCRYL SZ 4-0 L18IN ABSRB UD L19MM PS-2 3/8 CIR PRIM Y496G

## (undated) DEVICE — GLOVE SURG SZ 65 CRM LTX FREE POLYISOPRENE POLYMER BEAD ANTI

## (undated) DEVICE — SUTURE VCRL SZ 0 L27IN ABSRB UD L36MM CT-1 1/2 CIR J260H

## (undated) DEVICE — GLOVE SURG SZ 7 L12IN FNGR THK94MIL TRNSLUC YEL LTX HYDRGEL

## (undated) DEVICE — TOWEL,OR,DSP,ST,BLUE,STD,6/PK,12PK/CS: Brand: MEDLINE

## (undated) DEVICE — SUTURE NONABSORBABLE MONOFILAMENT 4-0 FS-2 18 IN ETHILON 662H

## (undated) DEVICE — BIT DRL L145MM DIA4.2MM NONSTERILE 3 FLUT NDL PNT QUIK CPL

## (undated) DEVICE — SUTURE VCRL SZ 3-0 L27IN ABSRB UD L26MM SH 1/2 CIR J416H

## (undated) DEVICE — Z DISCONTINUED (USE MFG CAT MVABO)  TUBING GAS SAMPLING STD 6.5 FT FEMALE CONN SMRT CAPNOLINE

## (undated) DEVICE — DRAPE SHEET ULTRAGARD: Brand: MEDLINE

## (undated) DEVICE — SUTURE PROL SZ 3-0 L30IN NONABSORBABLE BLU L26MM CT-2 1/2 8422H

## (undated) DEVICE — SYRINGE IRRIG 60ML SFT PLIABLE BLB EZ TO GRP 1 HND USE W/

## (undated) DEVICE — 4-PORT MANIFOLD: Brand: NEPTUNE 2

## (undated) DEVICE — GAUZE,SPONGE,4"X4",16PLY,XRAY,STRL,LF: Brand: MEDLINE

## (undated) DEVICE — COUNTER NDL 30 COUNT FOAM STRP SGL MAG

## (undated) DEVICE — PADDING,UNDERCAST,COTTON, 4"X4YD STERILE: Brand: MEDLINE

## (undated) DEVICE — DRAPE,U/ SHT,SPLIT,PLAS,STERIL: Brand: MEDLINE

## (undated) DEVICE — Z DISCONTINUED NO SUB IDED TUBING ETCO2 AD L6.5FT NSL ORAL CVD PRNG NONFLARED TIP OVR

## (undated) DEVICE — Device

## (undated) DEVICE — 3M™ STERI-DRAPE™ U-DRAPE 1015: Brand: STERI-DRAPE™

## (undated) DEVICE — MARKER SURG SKIN UTIL REGULAR/FINE 2 TIP W/ RUL AND 9 LBL

## (undated) DEVICE — INTENDED FOR TISSUE SEPARATION, AND OTHER PROCEDURES THAT REQUIRE A SHARP SURGICAL BLADE TO PUNCTURE OR CUT.: Brand: BARD-PARKER ® STAINLESS STEEL BLADES

## (undated) DEVICE — GLOVE SURG SZ 7 CRM LTX FREE POLYISOPRENE POLYMER BEAD ANTI

## (undated) DEVICE — SPONGE LAP W18XL18IN WHT COT 4 PLY FLD STRUNG RADPQ DISP ST

## (undated) DEVICE — BANDAGE COMPR W4INXL5YD WHT BGE POLY COT M E WRP WV HK AND

## (undated) DEVICE — GOWN,SIRUS,POLYRNF,BRTHSLV,XLN/XL,20/CS: Brand: MEDLINE

## (undated) DEVICE — TUBING, SUCTION, 9/32" X 10', STRAIGHT: Brand: MEDLINE

## (undated) DEVICE — TUBING SUCT 12FR MAL ALUM SHFT FN CAP VENT UNIV CONN W/ OBT

## (undated) DEVICE — GUIDEWIRE ORTH L400MM DIA3.2MM FOR TFN

## (undated) DEVICE — ZINACTIVE USE 2539609 APPLICATOR MEDICATED 10.5 CC SOLUTION HI LT ORNG CHLORAPREP

## (undated) DEVICE — SUTURE VCRL SZ 2-0 L27IN ABSRB UD L26MM CT-2 1/2 CIR J269H

## (undated) DEVICE — SHEET,T,THYROID,STERILE: Brand: MEDLINE

## (undated) DEVICE — SUTURE ETHLN SZ 3-0 L30IN NONABSORBABLE BLK FS-1 L24MM 3/8 669H

## (undated) DEVICE — BIT DRL L100MM DIA2MM QUIK CPL W/O STP REUSE

## (undated) DEVICE — BANDAGE,SELF ADHRNT,COFLEX,4"X5YD,STRL: Brand: COLABEL

## (undated) DEVICE — BIT DRL L110MM DIA1.8MM QUIK CPL CALIB W/O STP REUSE

## (undated) DEVICE — MEDI-TRACE CADENCE ADULT, DEFIBRILLATION ELECTRODE -RTS  (10 PR/PK) - ZOLL: Brand: MEDI-TRACE CADENCE

## (undated) DEVICE — GLOVE SURG SZ 8 L12IN THK75MIL DK GRN LTX FREE

## (undated) DEVICE — DRAPE,UTILITY,XL,4/PK,STERILE: Brand: MEDLINE

## (undated) DEVICE — TUBING, SUCTION, 3/16" X 10', STRAIGHT: Brand: MEDLINE

## (undated) DEVICE — ELECTRODE ES L2.75IN S STL INSUL BLDE W/ SL EDGE

## (undated) DEVICE — SHEET,DRAPE,53X77,STERILE: Brand: MEDLINE

## (undated) DEVICE — PACK,BASIC,SIRUS,V: Brand: MEDLINE

## (undated) DEVICE — SUTURE ABSORBABLE BRAIDED 2-0 CT-1 27 IN UD VICRYL J259H

## (undated) DEVICE — 3M™ IOBAN™ 2 ANTIMICROBIAL INCISE DRAPE 6650EZ: Brand: IOBAN™ 2

## (undated) DEVICE — DECANTER BAG 9": Brand: MEDLINE INDUSTRIES, INC.

## (undated) DEVICE — SUTURE VCRL SZ 2-0 L18IN ABSRB UD CT-1 L36MM 1/2 CIR J839D

## (undated) DEVICE — ADAPTER,CATHETER/SYRINGE/LUER,STERILE: Brand: MEDLINE

## (undated) DEVICE — GLOVE SURG SZ 75 CRM LTX FREE POLYISOPRENE POLYMER BEAD ANTI

## (undated) DEVICE — ADHESIVE SKIN CLSR 0.7ML TOP DERMBND ADV

## (undated) DEVICE — DRESSING,GAUZE,XEROFORM,CURAD,1"X8",ST: Brand: CURAD

## (undated) DEVICE — SPONGE GZ W4XL8IN COT WVN 12 PLY

## (undated) DEVICE — BANDAGE COMPR W6INXL5YD WHT BGE POLY COT M E WRP WV HK AND

## (undated) DEVICE — DRESSING TRNSPAR W4XL10IN FLM MIC POR SURESITE 123

## (undated) DEVICE — ZIMMER® STERILE DISPOSABLE TOURNIQUET CUFF WITH PLC, DUAL PORT, SINGLE BLADDER, 18 IN. (46 CM)

## (undated) DEVICE — C-ARM: Brand: UNBRANDED